# Patient Record
Sex: FEMALE | Race: WHITE | Employment: UNEMPLOYED | URBAN - METROPOLITAN AREA
[De-identification: names, ages, dates, MRNs, and addresses within clinical notes are randomized per-mention and may not be internally consistent; named-entity substitution may affect disease eponyms.]

---

## 2018-04-09 ENCOUNTER — MEDICAL CORRESPONDENCE (OUTPATIENT)
Dept: TRANSPLANT | Facility: CLINIC | Age: 28
End: 2018-04-09

## 2018-04-09 ENCOUNTER — OFFICE VISIT (OUTPATIENT)
Dept: TRANSPLANT | Facility: CLINIC | Age: 28
End: 2018-04-09
Attending: INTERNAL MEDICINE
Payer: COMMERCIAL

## 2018-04-09 VITALS
TEMPERATURE: 98.8 F | BODY MASS INDEX: 28.42 KG/M2 | RESPIRATION RATE: 16 BRPM | SYSTOLIC BLOOD PRESSURE: 122 MMHG | HEART RATE: 75 BPM | HEIGHT: 63 IN | OXYGEN SATURATION: 98 % | DIASTOLIC BLOOD PRESSURE: 74 MMHG | WEIGHT: 160.4 LBS

## 2018-04-09 DIAGNOSIS — Z71.9 VISIT FOR COUNSELING: Primary | ICD-10-CM

## 2018-04-09 DIAGNOSIS — C81.10 HODGKIN LYMPHOMA, NODULAR SCLEROSIS (H): Primary | ICD-10-CM

## 2018-04-09 DIAGNOSIS — C81.11 NODULAR SCLEROSIS HODGKIN LYMPHOMA OF LYMPH NODES OF NECK (H): Primary | ICD-10-CM

## 2018-04-09 PROCEDURE — 40000268 ZZH STATISTIC NO CHARGES: Mod: ZF

## 2018-04-09 PROCEDURE — G0463 HOSPITAL OUTPT CLINIC VISIT: HCPCS

## 2018-04-09 RX ORDER — IBUPROFEN 200 MG
200 TABLET ORAL EVERY 6 HOURS PRN
COMMUNITY
Start: 2013-12-11 | End: 2018-05-02

## 2018-04-09 RX ORDER — ACETAMINOPHEN 500 MG
TABLET ORAL EVERY 6 HOURS PRN
Status: ON HOLD | COMMUNITY
End: 2018-08-07

## 2018-04-09 RX ORDER — AMOXICILLIN 500 MG/1
500 CAPSULE ORAL
COMMUNITY
Start: 2018-04-06 | End: 2018-04-16

## 2018-04-09 ASSESSMENT — PAIN SCALES - GENERAL: PAINLEVEL: NO PAIN (0)

## 2018-04-09 NOTE — MR AVS SNAPSHOT
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  After hours call the   hospital at [phone] or   [phone]. Ask for the BMT   fellow on-call            Follow-ups after your visit        Who to contact     If you have questions or need follow up information about today's clinic visit or your schedule please contact [facility] BLOOD AND MARROW TRANSPLANT directly at [phone].  Normal or non-critical lab and imaging results will be communicated to you by Waveseerhart, letter or phone within 4 business days after the clinic has received the results. If you do not hear from us within 7 days, please contact the clinic through Mint Solutionst or phone. If you have a critical or abnormal lab result, we will notify you by phone as soon as possible.  Submit refill requests through [employer] or call your pharmacy and they will forward the refill request to us. Please allow 3 business days for your refill to be completed.          Additional Information About Your Visit        Waveseerhart Information     [employer] lets you send messages to your doctor, view your test results, renew your prescriptions, schedule appointments and more. To sign up, go to [email or web address] . Click on \"Log in\" on the left side of the screen, which will take you to the Welcome page. Then click on \"Sign up Now\" on the right side of the page. " "    You will be asked to enter the access code listed below, as well as some personal information. Please follow the directions to create your username and password.     Your access code is: [email or web address]  Expires: 2018  6:30 AM     Your access code will  in 90 days. If you need help or a new code, please call your [facility] clinic or [phone].        Care EveryWhere ID     This is your Care EveryWhere ID. This could be used by other organizations to access your [facility] medical records  [id number]        Your Vitals Were     Pulse Temperature Respirations Height Pulse Oximetry BMI (Body Mass Index)    75 98.8  F (37.1  C) (Oral) 16 1.6 m (5' 3\") 98% 28.41 kg/m2       Blood Pressure from Last 3 Encounters:   18 122/74    Weight from Last 3 Encounters:   18 72.8 kg (160 lb 6.4 oz)              Today, you had the following     No orders found for display       Recent Review Flowsheet Data     There is no flowsheet data to display.               Primary Care Provider Fax #    Physician No Ref-Primary [phone]       No address on file        Equal Access to Services     RICHIE RAMEY : Hadii velma Luis, wasarahda luamita, qaybta kaalmada adetima, jen raymundo. So [facility] [phone].    ATENCIÓN: Si habla español, tiene a isabel disposición servicios gratuitos de asistencia lingüística. Llame al [phone].    We comply with applicable federal civil rights laws and [place] laws. We do not discriminate on the basis of race, color, national origin, age, disability, sex, sexual orientation, or gender identity.            Thank you!     Thank you for choosing [facility] BLOOD AND MARROW TRANSPLANT  for your care. Our goal is always to provide you with excellent care. Hearing back from our patients is one way we can continue to improve our services. Please take a few minutes to complete the written survey that you may receive in the mail after your visit with " us. Thank you!             Your Updated Medication List - Protect others around you: Learn how to safely use, store and throw away your medicines at www.disposemymeds.org.          This list is accurate as of 4/9/18 11:59 PM.  Always use your most recent med list.                   Brand Name Dispense Instructions for use Diagnosis    acetaminophen 500 MG tablet    TYLENOL     Take 500-1,000 mg by mouth        amoxicillin 500 MG capsule    AMOXIL     Take 500 mg by mouth        ibuprofen 200 MG tablet    ADVIL/MOTRIN     Take 200 mg by mouth

## 2018-04-09 NOTE — MR AVS SNAPSHOT
After Visit Summary   4/9/2018    Amira Avery    MRN: [id number]           Patient Information     Date Of Birth          1990        Visit Information        Provider Department      4/9/2018 9:00 AM Coordinator, Nettie Bmt Nurse [facility] Blood and Marrow Transplant        Today's Diagnoses     Hodgkin lymphoma, nodular sclerosis (H)    -  1          Clinics and Surgery Center ([facility])  [address] [place] [address]  Phone: [phone]  Clinic Hours:   Monday-Thursday:7am to 7pm   Friday: 7am to 5pm   Weekends and holidays:    8am to noon (in general)  If your fever is 100.5  or greater,   call the clinic.  After hours call the   hospital at [phone] or   [phone]. Ask for the BMT   fellow on-call            Follow-ups after your visit        Your next 10 appointments already scheduled     Apr 20, 2018 12:30 PM CDT   Masonic Lab Draw with  MASONIC LAB DRAW   [facility] Masonic Lab Draw ([facility])    [address] [place] [address]  [address] [place] [id number] [phone]            Apr 20, 2018  1:00 PM CDT   Bone Marrow Biopsy with  BMT WES #3, UU BONE MARROW BIOPSY   [facility] Blood and Marrow Transplant ([facility])    [address] [place] [address]  [address]  [facility] [address]   [phone]            Apr 20, 2018  2:00 PM CDT   BMT Nurse Visit with  Bmt Nurse 1   [facility] Blood and Marrow Transplant ([facility])    [address] [place] [id number] [phone]            Apr 23, 2018  6:45 AM CDT   (Arrive by 6:30 AM)   PET ONCOLOGY WHOLE BODY with UUPET1   [facility] PET [facility] ([facility])    [address] [id number] [phone]           Tell your doctor:   If there is any chance you may be pregnant or if you are breastfeeding.   If you have problems lying in small spaces  (claustrophobia). If you do, your doctor may give you medicine to help you relax. If you have diabetes:   Have your exam early in the morning. Your blood glucose will go up as the day goes by.   Your glucose level must be 180 or less at the start of the exam. Please take any medicines you need to ensure this blood glucose level.   If you are taking insulin in the morning take with breakfast by 6 am and schedule procedure between 12 and 2:15 pm.   If you are taking insulin at night take nightly dose, fast overnight, schedule procedure before 10 am.   If you take insulin both morning and night take morning dose by 6am and schedule procedure between 12 and 2:15 pm.   24 hours before your scan: Don t do any heavy exercise. (No jogging, aerobics or other workouts.) Exercise will make your pictures less accurate.  At least 7 hours before your scan, or the evening before if you have an early appointment: Eat a low carb, high protein meal (Lean meats, seafood, beans, soy, low-fat dairy, eggs, nuts & seeds). 6 hours before your scan:   Stop all food and liquids (except water).   Do not chew gum or suck on mints.   If you need to take medicine with food, you may take it with a few crackers.  Please call your Imaging Department at your exam site with any questions.            Apr 23, 2018 11:30 AM CDT   FULL PULMONARY FUNCTION with  PFL A   [facility] Pulmonary Function Testing ([facility])    [address]  3rd Floor  [facility] [id number] [phone]            Apr 23, 2018 12:30 PM CDT   (Arrive by 12:15 PM)   New Patient Visit with MARIA TERESA Alston [facility] Interventional Radiology ([facility])    [address]  1st Floor  [facility] [id number] [phone]            Apr 23, 2018  2:00 PM CDT   BMT NURSE COORD WITH ROOM with  Bmt Nurse Coordinator,  2 116 CONSULT [facility] Blood and Marrow Transplant ([facility])    [address]  [address]  [facility] [id number] [phone]            Apr 24, 2018  9:00 AM CDT   (Arrive by 8:45 AM)   BMT SW PSA with MAT Kitchen   [facility] Blood and Marrow Transplant ([facility])    [address]  [address]  [facility] [id number] [phone]              Who to contact     If you have questions or need follow up information about today's clinic visit or your schedule please contact [facility] BLOOD AND MARROW TRANSPLANT directly at [phone].  Normal or non-critical lab and imaging results will be communicated to you by Private Practicehart, letter or phone within 4 business days after the clinic has received the results. If you do not hear from us within 7 days, please contact the clinic through Private Practicehart or phone. If you have a critical or abnormal lab result, we will notify you by phone as soon as possible.  Submit refill requests through [employer] or call your pharmacy and they will forward the refill request to us. Please allow 3 business days for your refill to be completed.          Additional Information About Your Visit        Private Practicehart Information     [employer] gives you secure access to your electronic health record. If you see a primary care provider, you can also send messages to your care team and make appointments. If you have questions, please call your primary care clinic.  If you do not have a primary care provider, please call [phone] and they will assist you.        Care EveryWhere ID     This is your Care EveryWhere ID. This could be used by other organizations to access your [facility] medical records  [id number]         Blood Pressure from Last 3 Encounters:   04/19/18 123/70   04/18/18 131/78   04/09/18 122/74    Weight from Last 3 Encounters:   04/19/18 70.6 kg (155 lb 10.3 oz)   04/18/18 70.9 kg (156 lb 6.4 oz)   04/09/18 72.8 kg (160 lb 6.4 oz)              Today, you had the following     No orders  found for display       Recent Review Flowsheet Data     BMT Recent Results Latest Ref Rng & Units 4/18/2018    WBC 4.0 - 11.0 10e9/L 5.0    Hemoglobin 11.7 - 15.7 g/dL 12.3    Platelet Count 150 - 450 10e9/L 268    Neutrophils (Absolute) 1.6 - 8.3 10e9/L 2.9    INR 0.86 - 1.14 1.06    Sodium 133 - 144 mmol/L 139    Potassium 3.4 - 5.3 mmol/L 4.0    Chloride 94 - 109 mmol/L 110(H)    Glucose 70 - 99 mg/dL 94    Urea Nitrogen 7 - 30 mg/dL 13    Creatinine 0.52 - 1.04 mg/dL 0.65    Calcium (Total) 8.5 - 10.1 mg/dL 8.7    Protein (Total) 6.8 - 8.8 g/dL 7.4    Albumin 3.4 - 5.0 g/dL 3.9    Alkaline Phosphatase 40 - 150 U/L 48    AST 0 - 45 U/L 53(H)    ALT 0 - 50 U/L 40    MCV 78 - 100 fl 90               Primary Care Provider Fax #    Physician No Ref-Primary [phone]       No address on file        Equal Access to Services     RICHIE RAMEY : Hadii velma Luis, samaria ceja, sweta mcgraw, jen de la cruz . So [facility] [phone].    ATENCIÓN: Si habla español, tiene a isabel disposición servicios gratuitos de asistencia lingüística. ame al [phone].    We comply with applicable federal civil rights laws and [place] laws. We do not discriminate on the basis of race, color, national origin, age, disability, sex, sexual orientation, or gender identity.            Thank you!     Thank you for choosing [facility] BLOOD AND MARROW TRANSPLANT  for your care. Our goal is always to provide you with excellent care. Hearing back from our patients is one way we can continue to improve our services. Please take a few minutes to complete the written survey that you may receive in the mail after your visit with us. Thank you!             Your Updated Medication List - Protect others around you: Learn how to safely use, store and throw away your medicines at www.Indexingemymeds.org.          This list is accurate as of 4/9/18 11:59 PM.  Always use your most recent med list.                    Brand Name Dispense Instructions for use Diagnosis    acetaminophen 500 MG tablet    TYLENOL     Take by mouth every 6 hours as needed        amoxicillin 500 MG capsule    AMOXIL     Take 500 mg by mouth        ibuprofen 200 MG tablet    ADVIL/MOTRIN     Take 200 mg by mouth every 6 hours as needed

## 2018-04-09 NOTE — PROGRESS NOTES
HISTORY OF PRESENT ILLNESS:  She is a 28 years old female diagnosed with classical Hodgkin's lymphoma, initially presented in 02/2007 with left neck mass.  Ultrasound-guided FNA was done in 03/2017, negative for malignancy, however, it continued.  Therefore on 04/19/2017, CT scan was done showing diffuse cervical lymphadenopathy left.  The maximum seemed to be around 2.5 x 2 cm in the mediastinum.  She had also lymphadenopathy in the left axilla, retroperitoneum and mediastinum.  The path confirmed this on 05/30/2017, extensive intense hypermetabolic lymphadenopathy in the neck, left axilla, mediastinum, but nothing below the diaphragm, followed by excisional left cervical lymphadenopathy with lymph nodes showing classical Hodgkin's lymphoma, nodular sclerosis type.  She was diagnosed stage IIB.  Although she has lost some weight at that time, ESR was high, greater than 50, IPI was score 1.  She received 6 cycles of ABVD between 06/09/2017 and 11/10/2017.  However, 11/09/2017 CT showed increasing minimal activity within poorly visualized but stable-appearing right cervical chain lymph nodes suggests mild progression of disease.  SUV was maximum of 4.7.  She was followed, and repeat PET/CT was repeated 01/09/2018.  There was new and increasing hypermetabolic right cervical adenopathy suspicious for disease progression.  The lymph nodes are persistent hypermetabolic activity in the tonsillar tissues with maximum 7.9, right jugulodigastric lymph nodes with SUV 4.0, posterior cervical chain node SUV 6, new 6 mm right posterior cervical chain, maximum SUV is 3.3, new 7 mm right supraclavicular node with maximum SUV of 4.7.  Chest, no abnormal hypermetabolic activity.  Abdomen, no abnormal hypermetabolic activity.  She also had on 02/05/2018 showed enlarged heterogeneous right jugular chain cervical lymph nodes in level 3 and 4 measuring approximately 1.9 x 2 x 2.6 cm and 2 x 1.8 x 2.2 cm are increased since neck CT from  04/21/2017, although may be similar to the PET/CT from 01/09/2018.  Resolution of previously enlarged left cervical lymph nodes on the CT compared to 04/21/2017.  Nasopharynx, oropharynx, tongue base, hypopharynx, larynx were normal, possible small sub-centimeter left thyroid nodule versus artifact, so she started on GDP 02/16/2018, and she had another PET scan 03/28/2018 showing that head and neck there is stable activity in the tonsillar tissue.  There are post-surgical changes in the right neck.  Hypermetabolic right cervical adenopathy in the prior study no longer demonstrated.  There are no new hypermetabolic lesions or lymph nodes in the neck.  No abnormal hypermetabolic activity in the abdomen.  Before starting GDP she underwent right neck excisional biopsy showing recurrent classical Hodgkin lymphoma, nodular-sclerosing.                 PMH: no DM, HTN ,organ issues inclusing liver, lung, heart  She tolerated chemos very well  No transfusion was needed    SH: Smoked, recently quit  occational ETOH  No illict drug  Works in "[employer]    ALl: [place], hives    A/P    1-HL, classic NS. SHe did not have good response after ABVD, but progressed quickly and received GDP, now S/P 2 cycles in PET/CR   I discussed about blood ceels, including WBC, RBC, and platelts. I noted their basic functions prevention of infection/immune system composition; prevention of bleeding, tiredness/CV/piulmoany issues due to o2 transfer.     I discussed hematopoietic stem cells as blood making mother cells present in bone marrow.     I dicussed each conventional chemotherapy effective for lymphoma also decreases/kills some stem cells, but after certain point of time stem cells regenerate to make enough CBC, therefore in cycles patients get following cycles.     In refractory/relapsed to ABVD type of HL, we increase intensity of chemotherapy to have reponses, she received GDP and if this is effective, then we increase the intensity  even higher doses to cre HL. This dose of chemotherapy though cannot be compatible with life due to to many stem cell killing. Therefore requires stem cells support.     I explained how we can collect stem cells     I explained how we store stem cells     I noted how we do stem cell transplant (5 days of chemo, followed by stem cell infusion in the hopsital, discharge follwed in the clinic every day.     Early complications of AHSCT; GIT toxicity (N/V/D, decreased appetite), pancytopenia (infections, bleeding, tiredness), acute organ toxicities (lung/liver/kidney). Mratality can occur but rare (1-2%)     Long-term potential toxicities: Blood cancers, secondary other cancers including skin, oral mucosa, cardiac toxicities (CAD would be much higher with radiation)   infertilty, she noted she heard about it even before chemo, but not interested in measures and understands infertility can be permanent especially after auoHCT.    Reason of AHSCT failures: mortality, progression of HD     I noted the differences between alloHCT and AHSCT; basically potentially more effective but certainly more toxic, depends on GVL effect. Therefore we can only consider if she relapses aftter autoHCT     Donor options: 1 sister and 1 brother in that case     Summary:  1-start pretransplant workup, stem cell colleciton and autoHCT; follwed by BV maintenance  2-ENT given her tonsillary PET activity in the past          I spent 70 minutes, 60 minutes in counselling  Tanya Landa MD

## 2018-04-09 NOTE — MR AVS SNAPSHOT
"              After Visit Summary   4/9/2018    Amira Avery    MRN: [id number]           Patient Information     Date Of Birth          1990        Visit Information        Provider Department      4/9/2018 9:30 AM Becky William LICSW;  2 116 CONSULT [facility] Blood and Marrow Transplant        Today's Diagnoses     Visit for counseling    -  1          [facility] ([facility])  [address] [place] [address]  Phone: [phone]  Clinic Hours:   Monday-Thursday:7am to 7pm   Friday: 7am to 5pm   Weekends and holidays:    8am to noon (in general)  If your fever is 100.5  or greater,   call the clinic.  After hours call the   hospital at [phone] or   [phone]. Ask for the BMT   fellow on-call            Follow-ups after your visit        Who to contact     If you have questions or need follow up information about today's clinic visit or your schedule please contact [facility] BLOOD AND MARROW TRANSPLANT directly at [phone].  Normal or non-critical lab and imaging results will be communicated to you by Digifeyehart, letter or phone within 4 business days after the clinic has received the results. If you do not hear from us within 7 days, please contact the clinic through Fik Storest or phone. If you have a critical or abnormal lab result, we will notify you by phone as soon as possible.  Submit refill requests through [employer] or call your pharmacy and they will forward the refill request to us. Please allow 3 business days for your refill to be completed.          Additional Information About Your Visit        Digifeyehart Information     [employer] lets you send messages to your doctor, view your test results, renew your prescriptions, schedule appointments and more. To sign up, go to [email or web address] . Click on \"Log in\" on the left side of the screen, which will take you to the Welcome page. Then click on \"Sign up Now\" on the right side of the page.     You will be " asked to enter the access code listed below, as well as some personal information. Please follow the directions to create your username and password.     Your access code is: [email or web address]  Expires: 2018  6:30 AM     Your access code will  in 90 days. If you need help or a new code, please call your [facility] clinic or [phone].        Care EveryWhere ID     This is your Care EveryWhere ID. This could be used by other organizations to access your [facility] medical records  [id number]         Blood Pressure from Last 3 Encounters:   18 122/74    Weight from Last 3 Encounters:   18 72.8 kg (160 lb 6.4 oz)              Today, you had the following     No orders found for display       Recent Review Flowsheet Data     There is no flowsheet data to display.               Primary Care Provider Fax #    Physician No Ref-Primary [phone]       No address on file        Equal Access to Services     RICHIE RAMEY : Hadii velma bowieo Soshar, waaxda luqadaha, qaybta kaalmada adeegyada, jen de la cruz . So [facility] [phone].    ATENCIÓN: Si habla español, tiene a isabel disposición servicios gratuitos de asistencia lingüística. Llame al [phone].    We comply with applicable federal civil rights laws and [place] laws. We do not discriminate on the basis of race, color, national origin, age, disability, sex, sexual orientation, or gender identity.            Thank you!     Thank you for choosing [facility] BLOOD AND MARROW TRANSPLANT  for your care. Our goal is always to provide you with excellent care. Hearing back from our patients is one way we can continue to improve our services. Please take a few minutes to complete the written survey that you may receive in the mail after your visit with us. Thank you!             Your Updated Medication List - Protect others around you: Learn how to safely use, store and throw away your medicines at www.disposemymeds.org.           This list is accurate as of 4/9/18 11:59 PM.  Always use your most recent med list.                   Brand Name Dispense Instructions for use Diagnosis    acetaminophen 500 MG tablet    TYLENOL     Take 500-1,000 mg by mouth        amoxicillin 500 MG capsule    AMOXIL     Take 500 mg by mouth        ibuprofen 200 MG tablet    ADVIL/MOTRIN     Take 200 mg by mouth

## 2018-04-09 NOTE — NURSING NOTE
"Oncology Rooming Note    April 9, 2018 8:00 AM   Amira Avery is a 28 year old female who presents for:    Chief Complaint   Patient presents with     RECHECK     New- Non Hodgkin's Lymphoma      Initial Vitals: /74 (BP Location: Right arm, Patient Position: Sitting, Cuff Size: Adult Regular)  Pulse 75  Temp 98.8  F (37.1  C) (Oral)  Resp 16  Ht 1.6 m (5' 3\")  Wt 72.8 kg (160 lb 6.4 oz)  SpO2 98%  BMI 28.41 kg/m2 Estimated body mass index is 28.41 kg/(m^2) as calculated from the following:    Height as of this encounter: 1.6 m (5' 3\").    Weight as of this encounter: 72.8 kg (160 lb 6.4 oz). Body surface area is 1.8 meters squared.  No Pain (0) Comment: Data Unavailable   No LMP recorded.  Allergies reviewed: Yes  Medications reviewed: Yes    Medications: Medication refills not needed today.  Pharmacy name entered into EPIC: Data Unavailable    Clinical concerns: N/A  5 minutes for nursing intake (face to face time)     Yue Montelongo CMA              "

## 2018-04-10 NOTE — PROGRESS NOTES
"Blood and Marrow Transplant   New Transplant Visit with   Clinical     Amira Mary Carmennes  4/10/2018    With whom do you live:   Significant other Dana    Relocation Requirement:   Amira lives 20 minutes / 9.6 miles from [facility] and will not be required to relocate post-transplant.    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 She is eligible for FMLA and is starting her leave on 4.16.18. She has no pay available if she is not working.     Source of Income: Significant other's income    Spouse/Partner Employed:   Yes     Do you have concerns or questions about finances or insurance related to BMT?:   Amira is aware of her deductible and Max OOP - she is also aware that both have been met for 2018. She was able to use [facility] at [facility] to help with the costs of medical care.     Have you completed a health care directive?: No - we talked about [facility] policy in the absence of a HCD her decision-makers would be her parents and then siblings.     Support:  Is there a network of people who are available to support you and/or your family?: Yes    Education Provided:   Caregiver Requirement/Role  BMT Packet provided  BMT Book provided  HCD information and blank document  [place]  Support resources  Description of Inpatient Unit    Comments: Amira comes to her NT appointment with her sister Felecia and her father Facundo. Amira endorsed that she had \"heard some information before\" that was shared with her today by BMT MD. She is aware that BMT MD would like her to come for Work-Up soon. Amira expressed interest in applying " for financial grants when she returns for Work-Up. She did endorsed that they have 2 pet snakes at home - they live in their cage and are not handled much - only when they clean the cages. Amira shared that she can plan on not cleaning their cages until BMT MD indicates that it is safe to do so. Writer shared that we will reach out to BMT MD for further clarification on the plan. Encouraged Amira and her family to reach out as questions or concerns arise.     Becky William  [phone] - Pager  4/10/2018

## 2018-04-16 ENCOUNTER — TELEPHONE (OUTPATIENT)
Dept: INTERNAL MEDICINE | Facility: CLINIC | Age: 28
End: 2018-04-16

## 2018-04-16 DIAGNOSIS — C81.90 HODGKIN'S LYMPHOMA (H): Primary | ICD-10-CM

## 2018-04-16 DIAGNOSIS — Z86.2 PERSONAL HISTORY OF DISEASES OF BLOOD AND BLOOD-FORMING ORGANS: ICD-10-CM

## 2018-04-16 DIAGNOSIS — C81.90 HODGKIN DISEASE (H): ICD-10-CM

## 2018-04-18 ENCOUNTER — RESULTS ONLY (OUTPATIENT)
Dept: OTHER | Facility: CLINIC | Age: 28
End: 2018-04-18

## 2018-04-18 ENCOUNTER — APPOINTMENT (OUTPATIENT)
Dept: LAB | Facility: CLINIC | Age: 28
End: 2018-04-18
Attending: INTERNAL MEDICINE
Payer: COMMERCIAL

## 2018-04-18 ENCOUNTER — OFFICE VISIT (OUTPATIENT)
Dept: TRANSPLANT | Facility: CLINIC | Age: 28
End: 2018-04-18
Attending: INTERNAL MEDICINE
Payer: COMMERCIAL

## 2018-04-18 ENCOUNTER — MEDICAL CORRESPONDENCE (OUTPATIENT)
Dept: TRANSPLANT | Facility: CLINIC | Age: 28
End: 2018-04-18

## 2018-04-18 ENCOUNTER — HOSPITAL ENCOUNTER (OUTPATIENT)
Dept: NUCLEAR MEDICINE | Facility: CLINIC | Age: 28
Setting detail: NUCLEAR MEDICINE
Discharge: HOME | End: 2018-04-18
Attending: INTERNAL MEDICINE | Admitting: INTERNAL MEDICINE
Payer: COMMERCIAL

## 2018-04-18 ENCOUNTER — HOSPITAL ENCOUNTER (OUTPATIENT)
Dept: GENERAL RADIOLOGY | Facility: CLINIC | Age: 28
Discharge: HOME | End: 2018-04-18
Attending: INTERNAL MEDICINE | Admitting: INTERNAL MEDICINE
Payer: COMMERCIAL

## 2018-04-18 VITALS
SYSTOLIC BLOOD PRESSURE: 131 MMHG | HEIGHT: 64 IN | HEART RATE: 73 BPM | WEIGHT: 156.4 LBS | BODY MASS INDEX: 26.7 KG/M2 | DIASTOLIC BLOOD PRESSURE: 78 MMHG | OXYGEN SATURATION: 96 % | TEMPERATURE: 97.6 F | RESPIRATION RATE: 18 BRPM

## 2018-04-18 DIAGNOSIS — Z86.2 PERSONAL HISTORY OF DISEASES OF BLOOD AND BLOOD-FORMING ORGANS: ICD-10-CM

## 2018-04-18 DIAGNOSIS — C81.90 HODGKIN DISEASE (H): ICD-10-CM

## 2018-04-18 LAB
ABO + RH BLD: NORMAL
ABO + RH BLD: NORMAL
ALBUMIN SERPL-MCNC: 3.9 G/DL (ref 3.4–5)
ALBUMIN UR-MCNC: NEGATIVE MG/DL
ALP SERPL-CCNC: 48 U/L (ref 40–150)
ALT SERPL W P-5'-P-CCNC: 40 U/L (ref 0–50)
ANION GAP SERPL CALCULATED.3IONS-SCNC: 8 MMOL/L (ref 3–14)
APPEARANCE UR: CLEAR
APTT PPP: 28 SEC (ref 22–37)
AST SERPL W P-5'-P-CCNC: 53 U/L (ref 0–45)
B2 MICROGLOB SERPL-MCNC: 1.6 MG/L
BASOPHILS # BLD AUTO: 0 10E9/L (ref 0–0.2)
BASOPHILS NFR BLD AUTO: 0.6 %
BILIRUB SERPL-MCNC: 0.6 MG/DL (ref 0.2–1.3)
BILIRUB UR QL STRIP: NEGATIVE
BLD GP AB SCN SERPL QL: NORMAL
BLOOD BANK CMNT PATIENT-IMP: NORMAL
BUN SERPL-MCNC: 13 MG/DL (ref 7–30)
CALCIUM SERPL-MCNC: 8.7 MG/DL (ref 8.5–10.1)
CHLORIDE SERPL-SCNC: 110 MMOL/L (ref 94–109)
CO2 SERPL-SCNC: 21 MMOL/L (ref 20–32)
COLOR UR AUTO: ABNORMAL
CREAT SERPL-MCNC: 0.65 MG/DL (ref 0.52–1.04)
DIFFERENTIAL METHOD BLD: NORMAL
EOSINOPHIL # BLD AUTO: 0 10E9/L (ref 0–0.7)
EOSINOPHIL NFR BLD AUTO: 0.8 %
ERYTHROCYTE [DISTWIDTH] IN BLOOD BY AUTOMATED COUNT: 14.8 % (ref 10–15)
GFR SERPL CREATININE-BSD FRML MDRD: >90 ML/MIN/1.7M2
GLUCOSE SERPL-MCNC: 94 MG/DL (ref 70–99)
GLUCOSE UR STRIP-MCNC: NEGATIVE MG/DL
HCG SERPL QL: NEGATIVE
HCT VFR BLD AUTO: 36 % (ref 35–47)
HGB BLD-MCNC: 12.3 G/DL (ref 11.7–15.7)
HGB UR QL STRIP: NEGATIVE
IMM GRANULOCYTES # BLD: 0 10E9/L (ref 0–0.4)
IMM GRANULOCYTES NFR BLD: 0.4 %
INR PPP: 1.06 (ref 0.86–1.14)
KETONES UR STRIP-MCNC: NEGATIVE MG/DL
LDH SERPL L TO P-CCNC: 167 U/L (ref 81–234)
LEUKOCYTE ESTERASE UR QL STRIP: NEGATIVE
LYMPHOCYTES # BLD AUTO: 1.7 10E9/L (ref 0.8–5.3)
LYMPHOCYTES NFR BLD AUTO: 34.1 %
MCH RBC QN AUTO: 30.8 PG (ref 26.5–33)
MCHC RBC AUTO-ENTMCNC: 34.2 G/DL (ref 31.5–36.5)
MCV RBC AUTO: 90 FL (ref 78–100)
MONOCYTES # BLD AUTO: 0.3 10E9/L (ref 0–1.3)
MONOCYTES NFR BLD AUTO: 6 %
NEUTROPHILS # BLD AUTO: 2.9 10E9/L (ref 1.6–8.3)
NEUTROPHILS NFR BLD AUTO: 58.1 %
NITRATE UR QL: NEGATIVE
NRBC # BLD AUTO: 0 10*3/UL
NRBC BLD AUTO-RTO: 0 /100
PH UR STRIP: 8 PH (ref 5–7)
PHOSPHATE SERPL-MCNC: 3.2 MG/DL (ref 2.5–4.5)
PLATELET # BLD AUTO: 268 10E9/L (ref 150–450)
POTASSIUM SERPL-SCNC: 4 MMOL/L (ref 3.4–5.3)
PROT SERPL-MCNC: 7.4 G/DL (ref 6.8–8.8)
RBC # BLD AUTO: 4 10E12/L (ref 3.8–5.2)
RBC #/AREA URNS AUTO: 0 /HPF (ref 0–2)
SODIUM SERPL-SCNC: 139 MMOL/L (ref 133–144)
SOURCE: ABNORMAL
SP GR UR STRIP: 1.01 (ref 1–1.03)
SPECIMEN EXP DATE BLD: NORMAL
T3FREE SERPL-MCNC: 2.8 PG/ML (ref 2.3–4.2)
T4 FREE SERPL-MCNC: 1.01 NG/DL (ref 0.76–1.46)
TSH SERPL DL<=0.005 MIU/L-ACNC: 0.9 MU/L (ref 0.4–4)
URATE SERPL-MCNC: 3.4 MG/DL (ref 2.6–6)
UROBILINOGEN UR STRIP-MCNC: 0 MG/DL (ref 0–2)
WBC # BLD AUTO: 5 10E9/L (ref 4–11)
WBC #/AREA URNS AUTO: <1 /HPF (ref 0–5)

## 2018-04-18 PROCEDURE — 86665 EPSTEIN-BARR CAPSID VCA: CPT | Performed by: INTERNAL MEDICINE

## 2018-04-18 PROCEDURE — 80053 COMPREHEN METABOLIC PANEL: CPT | Performed by: INTERNAL MEDICINE

## 2018-04-18 PROCEDURE — 86901 BLOOD TYPING SEROLOGIC RH(D): CPT | Performed by: INTERNAL MEDICINE

## 2018-04-18 PROCEDURE — 86644 CMV ANTIBODY: CPT | Performed by: INTERNAL MEDICINE

## 2018-04-18 PROCEDURE — 86753 PROTOZOA ANTIBODY NOS: CPT | Performed by: INTERNAL MEDICINE

## 2018-04-18 PROCEDURE — 86696 HERPES SIMPLEX TYPE 2 TEST: CPT | Performed by: INTERNAL MEDICINE

## 2018-04-18 PROCEDURE — 00000402 ZZHCL STATISTIC TOTAL PROTEIN: Performed by: INTERNAL MEDICINE

## 2018-04-18 PROCEDURE — 81001 URINALYSIS AUTO W/SCOPE: CPT | Performed by: INTERNAL MEDICINE

## 2018-04-18 PROCEDURE — 84439 ASSAY OF FREE THYROXINE: CPT | Performed by: INTERNAL MEDICINE

## 2018-04-18 PROCEDURE — 84443 ASSAY THYROID STIM HORMONE: CPT | Performed by: INTERNAL MEDICINE

## 2018-04-18 PROCEDURE — 85610 PROTHROMBIN TIME: CPT | Performed by: INTERNAL MEDICINE

## 2018-04-18 PROCEDURE — 85025 COMPLETE CBC W/AUTO DIFF WBC: CPT | Performed by: INTERNAL MEDICINE

## 2018-04-18 PROCEDURE — 82784 ASSAY IGA/IGD/IGG/IGM EACH: CPT | Performed by: INTERNAL MEDICINE

## 2018-04-18 PROCEDURE — 85730 THROMBOPLASTIN TIME PARTIAL: CPT | Performed by: INTERNAL MEDICINE

## 2018-04-18 PROCEDURE — 87516 HEPATITIS B DNA AMP PROBE: CPT | Mod: XU | Performed by: INTERNAL MEDICINE

## 2018-04-18 PROCEDURE — 87535 HIV-1 PROBE&REVERSE TRNSCRPJ: CPT | Mod: XU | Performed by: INTERNAL MEDICINE

## 2018-04-18 PROCEDURE — 78472 GATED HEART PLANAR SINGLE: CPT

## 2018-04-18 PROCEDURE — 84550 ASSAY OF BLOOD/URIC ACID: CPT | Performed by: INTERNAL MEDICINE

## 2018-04-18 PROCEDURE — 71046 X-RAY EXAM CHEST 2 VIEWS: CPT

## 2018-04-18 PROCEDURE — 84100 ASSAY OF PHOSPHORUS: CPT | Performed by: INTERNAL MEDICINE

## 2018-04-18 PROCEDURE — 86803 HEPATITIS C AB TEST: CPT | Performed by: INTERNAL MEDICINE

## 2018-04-18 PROCEDURE — A9560 TC99M LABELED RBC: HCPCS | Performed by: INTERNAL MEDICINE

## 2018-04-18 PROCEDURE — 81370 HLA I & II TYPING LR: CPT | Performed by: INTERNAL MEDICINE

## 2018-04-18 PROCEDURE — 87521 HEPATITIS C PROBE&RVRS TRNSC: CPT | Mod: XU | Performed by: INTERNAL MEDICINE

## 2018-04-18 PROCEDURE — 86334 IMMUNOFIX E-PHORESIS SERUM: CPT | Performed by: INTERNAL MEDICINE

## 2018-04-18 PROCEDURE — 84481 FREE ASSAY (FT-3): CPT | Performed by: INTERNAL MEDICINE

## 2018-04-18 PROCEDURE — G0463 HOSPITAL OUTPT CLINIC VISIT: HCPCS | Mod: 25,ZF

## 2018-04-18 PROCEDURE — 87340 HEPATITIS B SURFACE AG IA: CPT | Performed by: INTERNAL MEDICINE

## 2018-04-18 PROCEDURE — 82232 ASSAY OF BETA-2 PROTEIN: CPT | Performed by: INTERNAL MEDICINE

## 2018-04-18 PROCEDURE — 86695 HERPES SIMPLEX TYPE 1 TEST: CPT | Performed by: INTERNAL MEDICINE

## 2018-04-18 PROCEDURE — 25000128 H RX IP 250 OP 636: Performed by: INTERNAL MEDICINE

## 2018-04-18 PROCEDURE — 87798 DETECT AGENT NOS DNA AMP: CPT | Performed by: INTERNAL MEDICINE

## 2018-04-18 PROCEDURE — 86687 HTLV-I ANTIBODY: CPT | Performed by: INTERNAL MEDICINE

## 2018-04-18 PROCEDURE — 86703 HIV-1/HIV-2 1 RESULT ANTBDY: CPT | Performed by: INTERNAL MEDICINE

## 2018-04-18 PROCEDURE — 83021 HEMOGLOBIN CHROMOTOGRAPHY: CPT | Performed by: INTERNAL MEDICINE

## 2018-04-18 PROCEDURE — 86900 BLOOD TYPING SEROLOGIC ABO: CPT | Performed by: INTERNAL MEDICINE

## 2018-04-18 PROCEDURE — 86780 TREPONEMA PALLIDUM: CPT | Performed by: INTERNAL MEDICINE

## 2018-04-18 PROCEDURE — 83615 LACTATE (LD) (LDH) ENZYME: CPT | Performed by: INTERNAL MEDICINE

## 2018-04-18 PROCEDURE — 84165 PROTEIN E-PHORESIS SERUM: CPT | Performed by: INTERNAL MEDICINE

## 2018-04-18 PROCEDURE — 84703 CHORIONIC GONADOTROPIN ASSAY: CPT | Performed by: INTERNAL MEDICINE

## 2018-04-18 PROCEDURE — 25000128 H RX IP 250 OP 636: Mod: ZF | Performed by: INTERNAL MEDICINE

## 2018-04-18 PROCEDURE — 34300033 ZZH RX 343: Performed by: INTERNAL MEDICINE

## 2018-04-18 PROCEDURE — 86704 HEP B CORE ANTIBODY TOTAL: CPT | Performed by: INTERNAL MEDICINE

## 2018-04-18 PROCEDURE — 86850 RBC ANTIBODY SCREEN: CPT | Performed by: INTERNAL MEDICINE

## 2018-04-18 PROCEDURE — 81376 HLA II TYPING 1 LOCUS LR: CPT | Performed by: INTERNAL MEDICINE

## 2018-04-18 PROCEDURE — 88240 CELL CRYOPRESERVE/STORAGE: CPT | Performed by: INTERNAL MEDICINE

## 2018-04-18 RX ORDER — HEPARIN SODIUM (PORCINE) LOCK FLUSH IV SOLN 100 UNIT/ML 100 UNIT/ML
5 SOLUTION INTRAVENOUS EVERY 8 HOURS
Status: DISCONTINUED | OUTPATIENT
Start: 2018-04-18 | End: 2018-04-18 | Stop reason: HOSPADM

## 2018-04-18 RX ORDER — HEPARIN SODIUM (PORCINE) LOCK FLUSH IV SOLN 100 UNIT/ML 100 UNIT/ML
500 SOLUTION INTRAVENOUS ONCE
Status: COMPLETED | OUTPATIENT
Start: 2018-04-18 | End: 2018-04-18

## 2018-04-18 RX ADMIN — Medication 30 MCI.: at 13:15

## 2018-04-18 RX ADMIN — SODIUM CHLORIDE, PRESERVATIVE FREE 500 UNITS: 5 INJECTION INTRAVENOUS at 13:48

## 2018-04-18 RX ADMIN — SODIUM CHLORIDE, PRESERVATIVE FREE 5 ML: 5 INJECTION INTRAVENOUS at 11:29

## 2018-04-18 ASSESSMENT — PAIN SCALES - GENERAL: PAINLEVEL: NO PAIN (0)

## 2018-04-18 NOTE — MR AVS SNAPSHOT
After Visit Summary   4/18/2018    Amira Avery    MRN: [id number]           Patient Information     Date Of Birth          1990        Visit Information        Provider Department      4/18/2018 10:00 AM 1, Nettie Bmt Nurse [facility] Blood and Marrow Transplant        Today's Diagnoses     Hodgkin disease (H)        Personal history of diseases of blood and blood-forming organs              [facility] ([facility]  [address] [place] [address]  Phone: [phone]  Clinic Hours:   Monday-Thursday:7am to 7pm   Friday: 7am to 5pm   Weekends and holidays:    8am to noon (in general)  If your fever is 100.5  or greater,   call the clinic.  After hours call the   hospital at [phone] or   [phone]. Ask for the BMT   fellow on-call            Follow-ups after your visit        Your next 10 appointments already scheduled     Apr 18, 2018 12:00 PM CDT   PHARM D CONSULT WITH ROOM with  Bmt Pharm D, RPH,  2 118 CONSULT RM   [facility] Blood and Marrow Transplant ([facility])    [address] [place] [address]  [address]  [facility] [id number] [phone]            Apr 18, 2018  1:45 PM CDT   (Arrive by 1:30 PM)   XR CHEST 2 VIEWS with UUXR3   [facility],  Radiology ([facility], [facility])    [address] [facility] [id number] [phone]           Please bring a list of your current medicines to your exam. (Include vitamins, minerals and over-thecounter medicines.) Leave your valuables at home.  Tell your doctor if there is a chance you may be pregnant.  You do not need to do anything special for this exam.            Apr 18, 2018  2:00 PM CDT   (Arrive by 1:45 PM)   NM HEART MUGA REST with UUNM2   [facility] ([facility], [facility])    [address] [facility] [id number] [phone]           Please bring a list of  your medicines to the exam. (Include vitamins, minerals and over-the-counter drugs.) You should wear comfortable clothes. Leave your valuables at home. Please bring related prior results and films.  Tell your doctor:   If you are breastfeeding or may be pregnant.   If you have had a barium test within the past 48 hours. Barium may change the results of certain exams.   If you think you may need sedation (medicine to help you relax).  You may eat and drink as normal.  Please call your Imaging Department at your exam site with any questions.            Apr 19, 2018  9:00 AM CDT   (Arrive by 8:45 AM)   PET ONCOLOGY WHOLE BODY with UUPET1   [facility] PET CT ([facility])    [address] [facility] [address] [phone]           Tell your doctor:   If there is any chance you may be pregnant or if you are breastfeeding.   If you have problems lying in small spaces (claustrophobia). If you do, your doctor may give you medicine to help you relax. If you have diabetes:   Have your exam early in the morning. Your blood glucose will go up as the day goes by.   Your glucose level must be 180 or less at the start of the exam. Please take any medicines you need to ensure this blood glucose level.   If you are taking insulin in the morning take with breakfast by 6 am and schedule procedure between 12 and 2:15 pm.   If you are taking insulin at night take nightly dose, fast overnight, schedule procedure before 10 am.   If you take insulin both morning and night take morning dose by 6am and schedule procedure between 12 and 2:15 pm.   24 hours before your scan: Don t do any heavy exercise. (No jogging, aerobics or other workouts.) Exercise will make your pictures less accurate.  At least 7 hours before your scan, or the evening before if you have an early appointment: Eat a low carb, high protein meal (Lean meats, seafood, beans, soy, low-fat dairy, eggs, nuts &  seeds). 6 hours before your scan:   Stop all food and liquids (except water).   Do not chew gum or suck on mints.   If you need to take medicine with food, you may take it with a few crackers.  Please call your Imaging Department at your exam site with any questions.            Apr 19, 2018 12:00 PM CDT   RESEARCH WITH ROOM with  Bmt Research Coordinator,  2 116 CONSULT [facility] Blood and Marrow Transplant ([facility])    [address] [place] [address]  [address]  [facility] [id number] [phone]            Apr 19, 2018  1:00 PM CDT   (Arrive by 12:45 PM)   Autologous Consultation with  APHERESIS RN8,  2 118 CONSULT [facility] [place] Apheresis ([facility])    [address] [facility]  [address]  [facility] [id number] [phone]            Apr 20, 2018 12:30 PM CDT   Masonic Lab Draw with  MASONIC LAB DRAW   [facility] Masonic Lab Draw ([facility])    [address] [place] [address]  [address]  [facility] [id number] [phone]              Who to contact     If you have questions or need follow up information about today's clinic visit or your schedule please contact [facility] BLOOD AND MARROW TRANSPLANT directly at [phone].  Normal or non-critical lab and imaging results will be communicated to you by Coopkanicshart, letter or phone within 4 business days after the clinic has received the results. If you do not hear from us within 7 days, please contact the clinic through Coopkanicshart or phone. If you have a critical or abnormal lab result, we will notify you by phone as soon as possible.  Submit refill requests through Enjoyor or call your pharmacy and they will forward the refill request to us. Please allow 3 business days for your refill to be completed.          Additional Information About Your Visit        Enjoyor Information     Enjoyor gives you secure access to your electronic health record. If you see a  "primary care provider, you can also send messages to your care team and make appointments. If you have questions, please call your primary care clinic.  If you do not have a primary care provider, please call [phone] and they will assist you.        Care EveryWhere ID     This is your Care EveryWhere ID. This could be used by other organizations to access your [facility] medical records  [id number]        Your Vitals Were     Pulse Temperature Respirations Height Pulse Oximetry BMI (Body Mass Index)    73 97.6  F (36.4  C) (Oral) 18 1.614 m (5' 3.54\") 96% 27.23 kg/m2       Blood Pressure from Last 3 Encounters:   04/18/18 131/78   04/09/18 122/74    Weight from Last 3 Encounters:   04/18/18 70.9 kg (156 lb 6.4 oz)   04/09/18 72.8 kg (160 lb 6.4 oz)              We Performed the Following     ABO/Rh type and screen     Auto BMR Freeze     Beta 2 microglobuline     BMT Infectious Disease Donor Panel- SEND TO [facility]     CBC with platelets differential     Comprehensive metabolic panel     EBV Capsid Antibody IgG     HBV HCV HIV WNV by NIEVES- [facility]END [facility]     Hemoglobin S     Herpes Simplex Virus 1 and 2 IgG     HLA Typing Complete BMT Recipient     INR     Lactate Dehydrogenase     Order if  female with child bearing potential: HCG qualitative     Order if 2004-24: Protein electrophoresis     Partial thromboplastin time     Phosphorus     Protein Immunofixation Serum     Routine UA with microscopic     T3 Free     T4 free     TSH     Uric acid        Recent Review Flowsheet Data     BMT Recent Results Latest Ref Rng & Units 4/18/2018    WBC 4.0 - 11.0 10e9/L 5.0    Hemoglobin 11.7 - 15.7 g/dL 12.3    Platelet Count 150 - 450 10e9/L 268    Neutrophils (Absolute) 1.6 - 8.3 10e9/L 2.9    INR 0.86 - 1.14 1.06    MCV 78 - 100 fl 90               Primary Care Provider Fax #    Physician No Ref-Primary [phone]       No address on file        Equal Access to Services     IRCHIE RAMEY AH: Hadii " velma Luis, samaria pazkatherineha, qadarellta kadamon kirktima, waxleonardo idiin haytrishgeorge schillinghernáncolumba ramosBrycekevin zackary. So [facility] [phone].    ATENCIÓN: Si habla español, tiene a isabel disposición servicios gratuitos de asistencia lingüística. Llame al [phone].    We comply with applicable federal civil rights laws and [place] laws. We do not discriminate on the basis of race, color, national origin, age, disability, sex, sexual orientation, or gender identity.            Thank you!     Thank you for choosing [facility] BLOOD AND MARROW TRANSPLANT  for your care. Our goal is always to provide you with excellent care. Hearing back from our patients is one way we can continue to improve our services. Please take a few minutes to complete the written survey that you may receive in the mail after your visit with us. Thank you!             Your Updated Medication List - Protect others around you: Learn how to safely use, store and throw away your medicines at www.disposemymeds.org.          This list is accurate as of 4/18/18 11:48 AM.  Always use your most recent med list.                   Brand Name Dispense Instructions for use Diagnosis    acetaminophen 500 MG tablet    TYLENOL     Take 500-1,000 mg by mouth        ibuprofen 200 MG tablet    ADVIL/MOTRIN     Take 200 mg by mouth

## 2018-04-18 NOTE — NURSING NOTE
"Oncology Rooming Note    April 18, 2018 11:43 AM   Amira Avery is a 28 year old female who presents for:    Chief Complaint   Patient presents with     RECHECK     workup calendar review, consents,labs, med review, pre BMT for Hodgkins      Initial Vitals: /78  Pulse 73  Temp 97.6  F (36.4  C) (Oral)  Resp 18  Ht 1.614 m (5' 3.54\")  Wt 70.9 kg (156 lb 6.4 oz)  SpO2 96%  BMI 27.23 kg/m2 Estimated body mass index is 27.23 kg/(m^2) as calculated from the following:    Height as of this encounter: 1.614 m (5' 3.54\").    Weight as of this encounter: 70.9 kg (156 lb 6.4 oz). Body surface area is 1.78 meters squared.  No Pain (0) Comment: Data Unavailable   No LMP recorded.  Allergies reviewed: Yes  Medications reviewed: Yes    Medications: Medication refills not needed today.  Pharmacy name entered into EPIC: Data Unavailable    Clinical concerns: none     45 minutes for nursing intake (face to face time)     SERGO WINKLER RN              "

## 2018-04-18 NOTE — PROGRESS NOTES
BMT Teaching Flowsheet  .workup      Amira Avery is a 28 year old female  Diagnoses of Hodgkin disease (H) and Personal history of diseases of blood and blood-forming organs were pertinent to this visit.    Teaching Topic: work-up instructions    Person(s) involved in teaching: Patient, sister  Motivation Level  Asks Questions: Yes  Eager to Learn: Yes  Cooperative: Yes  Receptive (willing/able to accept information): Yes  Any cultural factors/[place] beliefs that may influence understanding or compliance? No    Patient demonstrates understanding of the following:  - Reason for the appointment, diagnosis and treatment plan: Yes  - Knowledge of proper use of medications and conditions for which they are ordered (with special attention to potential side effects or drug interactions): Yes  - Which situations necessitate calling provider and whom to contact: Yes    Teaching concerns addressed: Reviewed calendar, explained unfamiliar procedures, instructed patients on location of tests.    Proper use and care of (medical equipment, care aids, etc.) NA  Pain management techniques: NA  Patient instructed on hand hygiene: NA  How and/when to access community resources: NA    Infection Control:  Patient demonstrates understanding of the following:  Surgical procedure site care taught NA  Signs and symptoms of infection taught Yes  Wound care taught NA  Central venous catheter care taught NA    Instructional Materials Used/Given: Work up calendar, verbal instruction    Patient here for first day of workup for Auto transplant for Hodgkins disease.  She reports feeling well and offers no complaints.  Her workup calendar was reviewed and unfamiliar tests were explained.  The patient verbalized understanding of info and all questions were answered. All consents were signed prior to labs.  Her portacath was accessed by this RN and labs drawn.  Port was flushed with heparin and left in place for use during her MUGA  scan later today.  A urine sample was collected while in clinic today and sent to lab.  The patient was discharged ambulatory in the care of her sister.      Time spent with patient: 45 minutes.    Specific Concerns: NA

## 2018-04-18 NOTE — MR AVS SNAPSHOT
After Visit Summary   4/18/2018    Amira Avery    MRN: [id number]           Patient Information     Date Of Birth          1990        Visit Information        Provider Department      4/18/2018 12:00 PM D, Nettie Bmt Pharm, RPH;  2 118 CONSULT [facility] Blood and Marrow Transplant        Today's Diagnoses     Hodgkin disease (H)        Personal history of diseases of blood and blood-forming organs              [facility] ([facility])  [address] [place] [address]  Phone: [phone]  Clinic Hours:   Monday-Thursday:7am to 7pm   Friday: 7am to 5pm   Weekends and holidays:    8am to noon (in general)  If your fever is 100.5  or greater,   call the clinic.  After hours call the   hospital at [phone] or   [phone]. Ask for the BMT   fellow on-call            Follow-ups after your visit        Your next 10 appointments already scheduled     Apr 19, 2018 12:00 PM CDT   RESEARCH WITH ROOM with  Bmt Research Coordinator,  2 116 CONSULT [facility] Blood and Marrow Transplant ([facility])    [address] [place] [address]  [address]  [facility] [id number] [phone]            Apr 19, 2018  1:00 PM CDT   (Arrive by 12:45 PM)   Autologous Consultation with  APHERESIS RN8,  2 118 CONSULT [facility] Apheresis ([facility])    [address] [place] [address]  [address]  [facility] [id number] [phone]            Apr 20, 2018 12:30 PM CDT   Masonic Lab Draw with  MASONIC LAB DRAW   [facility] Masonic Lab Draw ([facility])    [address] [place] [address]  [address]  [facility] [id number] [phone]            Apr 20, 2018  1:00 PM CDT   Bone Marrow Biopsy with  BMT WES #3, UU BONE MARROW BIOPSY   [facility] Blood and Marrow Transplant ([facility])    [address] [place] [address]  [address]  [facility] [id number] [phone]             Apr 20, 2018  2:00 PM CDT   BMT Nurse Visit with  Bmt Nurse 1   [facility] Blood and Marrow Transplant ([facility])    [address] [facility]  [address]  [facility] [id number] [phone]            Apr 23, 2018  6:45 AM CDT   (Arrive by 6:30 AM)   PET ONCOLOGY WHOLE BODY with UUPET1   [facility] PET CT ([facility])    [address] [facility] [id number] [phone]           Tell your doctor:   If there is any chance you may be pregnant or if you are breastfeeding.   If you have problems lying in small spaces (claustrophobia). If you do, your doctor may give you medicine to help you relax. If you have diabetes:   Have your exam early in the morning. Your blood glucose will go up as the day goes by.   Your glucose level must be 180 or less at the start of the exam. Please take any medicines you need to ensure this blood glucose level.   If you are taking insulin in the morning take with breakfast by 6 am and schedule procedure between 12 and 2:15 pm.   If you are taking insulin at night take nightly dose, fast overnight, schedule procedure before 10 am.   If you take insulin both morning and night take morning dose by 6am and schedule procedure between 12 and 2:15 pm.   24 hours before your scan: Don t do any heavy exercise. (No jogging, aerobics or other workouts.) Exercise will make your pictures less accurate.  At least 7 hours before your scan, or the evening before if you have an early appointment: Eat a low carb, high protein meal (Lean meats, seafood, beans, soy, low-fat dairy, eggs, nuts & seeds). 6 hours before your scan:   Stop all food and liquids (except water).   Do not chew gum or suck on mints.   If you need to take medicine with food, you may take it with a few crackers.  Please call your Imaging Department at your exam site with any questions.            Apr 23, 2018 11:30 AM CDT   FULL PULMONARY FUNCTION  with  PFL A   [facility] Pulmonary Function Testing ([facility])    [address] [facility]  3rd Floor  [facility] [address] [phone]              Who to contact     If you have questions or need follow up information about today's clinic visit or your schedule please contact [facility] BLOOD AND MARROW TRANSPLANT directly at [phone].  Normal or non-critical lab and imaging results will be communicated to you by CIBDOhart, letter or phone within 4 business days after the clinic has received the results. If you do not hear from us within 7 days, please contact the clinic through CIBDOhart or phone. If you have a critical or abnormal lab result, we will notify you by phone as soon as possible.  Submit refill requests through Quitbit or call your pharmacy and they will forward the refill request to us. Please allow 3 business days for your refill to be completed.          Additional Information About Your Visit        CIBDOhart Information     Quitbit gives you secure access to your electronic health record. If you see a primary care provider, you can also send messages to your care team and make appointments. If you have questions, please call your primary care clinic.  If you do not have a primary care provider, please call [phone] and they will assist you.        Care EveryWhere ID     This is your Care EveryWhere ID. This could be used by other organizations to access your [facility] medical records  [id number]         Blood Pressure from Last 3 Encounters:   04/18/18 131/78   04/09/18 122/74    Weight from Last 3 Encounters:   04/18/18 70.9 kg (156 lb 6.4 oz)   04/09/18 72.8 kg (160 lb 6.4 oz)              We Performed the Following     Consult with PharmD        Recent Review Flowsheet Data     BMT Recent Results Latest Ref Rng & Units 4/18/2018    WBC 4.0 - 11.0 10e9/L 5.0    Hemoglobin 11.7 - 15.7 g/dL 12.3    Platelet Count 150 - 450 10e9/L 268    Neutrophils (Absolute) 1.6 -  8.3 10e9/L 2.9    INR 0.86 - 1.14 1.06    Sodium 133 - 144 mmol/L 139    Potassium 3.4 - 5.3 mmol/L 4.0    Chloride 94 - 109 mmol/L 110(H)    Glucose 70 - 99 mg/dL 94    Urea Nitrogen 7 - 30 mg/dL 13    Creatinine 0.52 - 1.04 mg/dL 0.65    Calcium (Total) 8.5 - 10.1 mg/dL 8.7    Protein (Total) 6.8 - 8.8 g/dL 7.4    Albumin 3.4 - 5.0 g/dL 3.9    Alkaline Phosphatase 40 - 150 U/L 48    AST 0 - 45 U/L 53(H)    ALT 0 - 50 U/L 40    MCV 78 - 100 fl 90               Primary Care Provider Fax #    Physician No Ref-Primary [phone]       No address on file        Equal Access to Services     RICHIE RAMEY : Alexis Luis, samaria ceja, sweta mcgraw, jen de la cruz . So [facility] [phone].    ATENCIÓN: Si habla español, tiene a isabel disposición servicios gratuitos de asistencia lingüística. [facility] [phone].    We comply with applicable federal civil rights laws and [place] laws. We do not discriminate on the basis of race, color, national origin, age, disability, sex, sexual orientation, or gender identity.            Thank you!     Thank you for choosing [facility] BLOOD AND MARROW TRANSPLANT  for your care. Our goal is always to provide you with excellent care. Hearing back from our patients is one way we can continue to improve our services. Please take a few minutes to complete the written survey that you may receive in the mail after your visit with us. Thank you!             Your Updated Medication List - Protect others around you: Learn how to safely use, store and throw away your medicines at www.disposemymeds.org.          This list is accurate as of 4/18/18 11:59 PM.  Always use your most recent med list.                   Brand Name Dispense Instructions for use Diagnosis    acetaminophen 500 MG tablet    TYLENOL     Take by mouth every 6 hours as needed        ibuprofen 200 MG tablet    ADVIL/MOTRIN     Take 200 mg by mouth every 6 hours as needed

## 2018-04-19 ENCOUNTER — HOSPITAL ENCOUNTER (OUTPATIENT)
Dept: LAB | Facility: CLINIC | Age: 28
Discharge: HOME | End: 2018-04-19
Attending: INTERNAL MEDICINE | Admitting: INTERNAL MEDICINE
Payer: COMMERCIAL

## 2018-04-19 ENCOUNTER — ALLIED HEALTH/NURSE VISIT (OUTPATIENT)
Dept: TRANSPLANT | Facility: CLINIC | Age: 28
End: 2018-04-19
Attending: INTERNAL MEDICINE
Payer: COMMERCIAL

## 2018-04-19 VITALS
BODY MASS INDEX: 27.1 KG/M2 | SYSTOLIC BLOOD PRESSURE: 123 MMHG | TEMPERATURE: 97.5 F | WEIGHT: 155.65 LBS | HEART RATE: 61 BPM | RESPIRATION RATE: 16 BRPM | DIASTOLIC BLOOD PRESSURE: 70 MMHG

## 2018-04-19 DIAGNOSIS — C81.11 NODULAR SCLEROSIS HODGKIN LYMPHOMA OF LYMPH NODES OF NECK (H): Primary | ICD-10-CM

## 2018-04-19 LAB
ALBUMIN SERPL ELPH-MCNC: 4.4 G/DL (ref 3.7–5.1)
ALPHA1 GLOB SERPL ELPH-MCNC: 0.3 G/DL (ref 0.2–0.4)
ALPHA2 GLOB SERPL ELPH-MCNC: 0.7 G/DL (ref 0.5–0.9)
AUTO BMR FREEZE: NORMAL
B-GLOBULIN SERPL ELPH-MCNC: 0.8 G/DL (ref 0.6–1)
EBV VCA IGG SER QL IA: 6.6 AI (ref 0–0.8)
GAMMA GLOB SERPL ELPH-MCNC: 1 G/DL (ref 0.7–1.6)
HLA TYPING COMPLETE BMT RECIPIENT: NORMAL
HSV1 IGG SERPL QL IA: 4.8 AI (ref 0–0.8)
HSV2 IGG SERPL QL IA: <0.2 AI (ref 0–0.8)
IGA SERPL-MCNC: 298 MG/DL (ref 70–380)
IGG SERPL-MCNC: 986 MG/DL (ref 695–1620)
IGM SERPL-MCNC: 91 MG/DL (ref 60–265)
M PROTEIN SERPL ELPH-MCNC: 0 G/DL
PROT PATTERN SERPL ELPH-IMP: NORMAL
PROT PATTERN SERPL IFE-IMP: NORMAL

## 2018-04-19 PROCEDURE — G0463 HOSPITAL OUTPT CLINIC VISIT: HCPCS | Mod: ZF

## 2018-04-19 NOTE — CONSULTS
APHERESIS INITIAL CONSULT CHECKLIST    Current Encounter Information  Current Encounter Information: Reason for Visit, Allergies and Current Meds  Procedure Requested: MNC/PBSC Collection  History of: (Reason for Apheresis): Hodgkins Lymphoma    Access Assessment  Access Assessment  Vein Assessment:  Veins are adequate: No  Needs a catheter placed for Apheresis?: Yes, transfusion medicine physician informed.    Vital Signs  Vital Signs  BP: 123/70  Pulse: 61  Temp: 97.5  F (36.4  C)  Temp src: Oral  Resp: 16  Height:  (161.4cm)  Weight: 70.6 kg (155 lb 10.3 oz)    Reviewed   Review With Patient  Have you read the brochure Getting ready for Apheresis?: Yes  Have you had any invasive procedures, surgery, biopsy, bleeding in the last month?: No  Review medications and allergies: Yes  Have you ever been transfused?: No  Patient given tour of the unit: Yes    Additional Information  Notes, needs and time spent with patient  Explain procedure, side effects or reactions, instructions: Yes  Time spent: 20 MINUTES SPENT FACE TO FACE FOR MEDICAL HISTORY REVIEW.

## 2018-04-19 NOTE — CONSULTS
Transfusion Medicine Consultation    Amira Avery [id number]   YOB: 1990 Age: 28 year old   Date of Consult: 4/19/2018     Reason for consult: Autologous Hematopoietic Progenitor Cell (HPC) Collection           Assessment and Plan:   28 year old female presents for consultation for autologous HPC collection for classical Hodgkins lymphoma (NS type) originally diagnosed 5/2017 and resistance to chemotherapy x2.  The plan is to collect for 1 to 3 days or until the target goal is met.   A central line will be placed and will be used for access for the procedure.          Chief Complaint:   Transfusion medicine consultation.         History of Present Illness:   28 year old female presents for consultation for autologous HPC collection.  Her past medical history includes classical Hodgkin's lymphoma, nodular sclerosing type.  She is currently well.  The patient denies any back pain that would prevent her from tolerating the procedure.  The patient has not had a recent flu vaccination.  No significant travel history.  The patient has no identifiable risk factors for infectious disease.  The procedure, risks/benefits were discussed with the patient and all of her questions were addressed at this time.             Past Medical History:   I have reviewed this patient's past medical history. Had mononucleosis at age 17 that required 5 days in the hospital. Otherwise non-contributory.          Past Surgical History:   This patient has no significant past surgical history           Social History:     Social History   Substance Use Topics     Smoking status: Former Smoker     Smokeless tobacco: Never Used     Alcohol use No     Former pre-K teacher. On leave. Lives in [place] with her girlfriend (now firichie)          Family History:   Father has hypertension. Maternal grandmother (early 70s) has had two heart attacks and colon cancer (dxg aged 65)          Immunizations:   Immunizations are up to  date          Allergies:   All allergies reviewed and addressed. (Hives with morphine; responds to diphenhydramine.)          Medications:   I have reviewed this patient's current medications          Review of Systems:   The 10 point Review of Systems is negative other than noted in the HPI           Vital Signs:   /70  Pulse 61  Temp 97.5  F (36.4  C) (Oral)  Resp 16  Wt 70.6 kg (155 lb 10.3 oz)  BMI 27.1 kg/m2            Data:     Lab Results   Component Value Date    WBC 5.0 04/18/2018    HGB 12.3 04/18/2018    HCT 36.0 04/18/2018     04/18/2018     04/18/2018    POTASSIUM 4.0 04/18/2018    CHLORIDE 110 (H) 04/18/2018    CO2 21 04/18/2018    BUN 13 04/18/2018    CR 0.65 04/18/2018    GLC 94 04/18/2018    AST 53 (H) 04/18/2018    ALT 40 04/18/2018    ALKPHOS 48 04/18/2018    BILITOTAL 0.6 04/18/2018    INR 1.06 04/18/2018     Ash Nuno MD, PhD  Clinical Pathology Resident (PGY2) - Transfusion Medicine - pager: [phone]  4/19/2018

## 2018-04-19 NOTE — MR AVS SNAPSHOT
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  After hours call the   hospital at [phone] or   [phone]. Ask for the BMT   fellow on-call            Follow-ups after your visit        Your next 10 appointments already scheduled     Apr 20, 2018 12:30 PM CDT   Masonic Lab Draw with  MASONIC LAB DRAW   [facility] Masonic Lab Draw ([facility])    [address] [place] [address]  [address] [place] [id number] [phone]            Apr 20, 2018  1:00 PM CDT   Bone Marrow Biopsy with  BMT WES #3, UU BONE MARROW BIOPSY   [facility] Blood and Marrow Transplant ([facility])    [address] [place] [address]  [address] [place] [id number] [phone]            Apr 20, 2018  2:00 PM CDT   BMT Nurse Visit with  Bmt Nurse 1   [facility] Blood and Marrow Transplant ([facility])    [address] [place] [address]  [address] [place] [id number] [phone]            Apr 23, 2018  6:45 AM CDT   (Arrive by 6:30 AM)   PET ONCOLOGY WHOLE BODY with UUPET1   [facility] PET CT ([facility])    [address] [facility] [address] [id number] [phone]           Tell your doctor:   If there is any chance you may be pregnant or if you are breastfeeding.   If  you have problems lying in small spaces (claustrophobia). If you do, your doctor may give you medicine to help you relax. If you have diabetes:   Have your exam early in the morning. Your blood glucose will go up as the day goes by.   Your glucose level must be 180 or less at the start of the exam. Please take any medicines you need to ensure this blood glucose level.   If you are taking insulin in the morning take with breakfast by 6 am and schedule procedure between 12 and 2:15 pm.   If you are taking insulin at night take nightly dose, fast overnight, schedule procedure before 10 am.   If you take insulin both morning and night take morning dose by 6am and schedule procedure between 12 and 2:15 pm.   24 hours before your scan: Don t do any heavy exercise. (No jogging, aerobics or other workouts.) Exercise will make your pictures less accurate.  At least 7 hours before your scan, or the evening before if you have an early appointment: Eat a low carb, high protein meal (Lean meats, seafood, beans, soy, low-fat dairy, eggs, nuts & seeds). 6 hours before your scan:   Stop all food and liquids (except water).   Do not chew gum or suck on mints.   If you need to take medicine with food, you may take it with a few crackers.  Please call your Imaging Department at your exam site with any questions.            Apr 23, 2018 11:30 AM CDT   FULL PULMONARY FUNCTION with  PFL COLBY   [facility] Pulmonary Function Testing ([facility])    [address]  3rd Floor  [facility] [id number] [phone]            Apr 23, 2018 12:30 PM CDT   (Arrive by 12:15 PM)   New Patient Visit with MARIA TERESA [facility] Interventional Radiology ([facility])    [address]  1st Floor  [facility] [phone] [phone]            Apr 23, 2018  2:00 PM CDT   BMT NURSE COORD WITH ROOM with  Bmt Nurse Coordinator,  2 116 CONSULT [facility] Blood and  Marrow Transplant ([facility])    [address]  [address]  [facility] [phone]            Apr 24, 2018  9:00 AM CDT   (Arrive by 8:45 AM)   BMT SW PSA with MAT Kitchen   [facility] Blood and Marrow Transplant ([facility])    [address]  [address]  [facility] [id number] [phone]              Who to contact     If you have questions or need follow up information about today's clinic visit or your schedule please contact [facility] BLOOD AND MARROW TRANSPLANT directly at [phone].  Normal or non-critical lab and imaging results will be communicated to you by TrustRadiushart, letter or phone within 4 business days after the clinic has received the results. If you do not hear from us within 7 days, please contact the clinic through Filecoint or phone. If you have a critical or abnormal lab result, we will notify you by phone as soon as possible.  Submit refill requests through Coolest Cooler or call your pharmacy and they will forward the refill request to us. Please allow 3 business days for your refill to be completed.          Additional Information About Your Visit        TrustRadiusharDress Code Information     Coolest Cooler gives you secure access to your electronic health record. If you see a primary care provider, you can also send messages to your care team and make appointments. If you have questions, please call your primary care clinic.  If you do not have a primary care provider, please call [phone] and they will assist you.        Care EveryWhere ID     This is your Care EveryWhere ID. This could be used by other organizations to access your [facility] medical records  [id number]         Blood Pressure from Last 3 Encounters:   04/19/18 123/70   04/18/18 131/78   04/09/18 122/74    Weight from Last 3 Encounters:   04/19/18 70.6 kg (155 lb 10.3 oz)   04/18/18 70.9 kg (156 lb 6.4 oz)   04/09/18 72.8 kg (160 lb 6.4 oz)              Today,  you had the following     No orders found for display       Recent Review Flowsheet Data     BMT Recent Results Latest Ref Rng & Units 4/18/2018    WBC 4.0 - 11.0 10e9/L 5.0    Hemoglobin 11.7 - 15.7 g/dL 12.3    Platelet Count 150 - 450 10e9/L 268    Neutrophils (Absolute) 1.6 - 8.3 10e9/L 2.9    INR 0.86 - 1.14 1.06    Sodium 133 - 144 mmol/L 139    Potassium 3.4 - 5.3 mmol/L 4.0    Chloride 94 - 109 mmol/L 110(H)    Glucose 70 - 99 mg/dL 94    Urea Nitrogen 7 - 30 mg/dL 13    Creatinine 0.52 - 1.04 mg/dL 0.65    Calcium (Total) 8.5 - 10.1 mg/dL 8.7    Protein (Total) 6.8 - 8.8 g/dL 7.4    Albumin 3.4 - 5.0 g/dL 3.9    Alkaline Phosphatase 40 - 150 U/L 48    AST 0 - 45 U/L 53(H)    ALT 0 - 50 U/L 40    MCV 78 - 100 fl 90               Primary Care Provider Fax #    Physician No Ref-Primary [phone]       No address on file        Equal Access to Services     RICHIE RAMEY : Hadii velma Luis, wasarahda brenna, qaybta kaalmada mariluz, jen raymundo. So [facility] [phone].    ATENCIÓN: Si habla español, tiene a isabel disposición servicios gratuitos de asistencia lingüística. Llame al [phone].    We comply with applicable federal civil rights laws and [place] laws. We do not discriminate on the basis of race, color, national origin, age, disability, sex, sexual orientation, or gender identity.            Thank you!     Thank you for choosing [facility] BLOOD AND MARROW TRANSPLANT  for your care. Our goal is always to provide you with excellent care. Hearing back from our patients is one way we can continue to improve our services. Please take a few minutes to complete the written survey that you may receive in the mail after your visit with us. Thank you!             Your Updated Medication List - Protect others around you: Learn how to safely use, store and throw away your medicines at www.disposemymeds.org.          This list is accurate as of 4/19/18  3:39 PM.  Always  use your most recent med list.                   Brand Name Dispense Instructions for use Diagnosis    acetaminophen 500 MG tablet    TYLENOL     Take by mouth every 6 hours as needed        ibuprofen 200 MG tablet    ADVIL/MOTRIN     Take 200 mg by mouth every 6 hours as needed

## 2018-04-19 NOTE — PROGRESS NOTES
IRB #: [id number]  PI Phone/Pager: Fabio Mccrary/[phone]  Coordinator Phone/Pager: Senthil Jenkins, [phone]  Anticipated Dates of Participation: TBD by admission for BMT    Does the research protocol require services be billed to patients/insurance? no    Informed Consent     Senthil Jenkins met with patient Amira Avery to present consent for study MT [id number]: Microbial, Graft and Host Interactions in HCT. The patient was provided with a copy of the IRB-approved consent form, and all questions were answered before the patient agreed to participate by signing the informed consent document.  A copy of the form was provided to the patient. Patient consented to inpatient and outpatient arm of study.    Date:     4/19/18                                  Consent Version Date:   8/8/17  Consent Obtained by:  Senthil Jenkins  HIPAA:  Yes  HIPAA Authorization Signed Date:  4/19/18

## 2018-04-19 NOTE — PROGRESS NOTES
Pharmacy Assessment - Pre-Stem Cell Transplant    Assessments & Recommendations:  1) avoid olanzapine per patient request due to sedating effects  2) consider scheduled antiemetics for 1 week upon discharge given difficulty with nausea in the past      History of Present Illness:  Amira Avery is a 28 year old year old female diagnosed with hodgkins lymphoma.  She has been treated with ABVD and GDP.  She is now being work up for autologous Stem Cell Transplant on protocol 2016-11, which utilizes BEAM as a conditioning regimen.    Pertinent labs/tests:  Pending    Weights:   Wt Readings from Last 3 Encounters:   04/18/18 70.9 kg (156 lb 6.4 oz)   04/09/18 72.8 kg (160 lb 6.4 oz)   Ideal body weight: 53.6 kg (118 lb 4.4 oz)  Adjusted ideal body weight: 60.6 kg (133 lb 8.4 oz)    Primary BMT Physician: Dr Landa  BMT RN Coordinator:  Mary Hobson    Past Medical History:  No past medical history on file.    Medication Allergies:  Allergies   Allergen Reactions     Morphine Hives       Current Medications (pre-admit):  Current Outpatient Prescriptions   Medication Sig Dispense Refill     acetaminophen (TYLENOL) 500 MG tablet Take by mouth every 6 hours as needed        ibuprofen (ADVIL/MOTRIN) 200 MG tablet Take 200 mg by mouth every 6 hours as needed          Herbal Medication/Nutritional Supplements:      Smoking/Past Drug Use:  denies    Nausea/Vomiting, Pain, or other issues:  Not currently in pain or taking pain medicaitons  Has experienced nausea in the past, mostly with cisplatin    Summary:  I met with Amira Avery for approximately 30 minutes.  We discussed her current and upcoming transplant medication list including the conditioning chemotherapy and immunosuppression, antiemetics, prophylactic antibiotics and vaccinations. Amira Avery and her caregiver participated in our conversation and voiced their understanding of the topics discussed. Thank you for allowing me to participate  in the care of this patient.    Lobito Rosenberg, TanyaD

## 2018-04-19 NOTE — TELEPHONE ENCOUNTER
FUTURE VISIT INFORMATION      FUTURE VISIT INFORMATION:    Date: 4/25/18    Time: 8:20AM    Location: [facility]   REFERRAL INFORMATION:    Referring provider:  Tanya Landa MD    Referring providers clinic:   BMT    Reason for visit/diagnosis  Pre-BMT work up, evaluate tonsillary involvement    RECORDS REQUESTED FROM:       Clinic name Comments Records Status Imaging Status   [facility] B[facility] 4/9/18 visit notes with BMT EPIC                                    RECORDS STATUS

## 2018-04-20 ENCOUNTER — OFFICE VISIT (OUTPATIENT)
Dept: TRANSPLANT | Facility: CLINIC | Age: 28
End: 2018-04-20
Attending: INTERNAL MEDICINE
Payer: COMMERCIAL

## 2018-04-20 ENCOUNTER — OFFICE VISIT (OUTPATIENT)
Dept: TRANSPLANT | Facility: CLINIC | Age: 28
End: 2018-04-20
Attending: NURSE PRACTITIONER
Payer: COMMERCIAL

## 2018-04-20 VITALS
RESPIRATION RATE: 16 BRPM | OXYGEN SATURATION: 100 % | HEART RATE: 71 BPM | DIASTOLIC BLOOD PRESSURE: 74 MMHG | TEMPERATURE: 98 F | SYSTOLIC BLOOD PRESSURE: 121 MMHG

## 2018-04-20 DIAGNOSIS — C81.90 HODGKIN DISEASE (H): ICD-10-CM

## 2018-04-20 DIAGNOSIS — C81.10 NODULAR SCLEROSING HODGKIN'S LYMPHOMA, UNSPECIFIED BODY REGION (H): ICD-10-CM

## 2018-04-20 DIAGNOSIS — Z86.2 PERSONAL HISTORY OF DISEASES OF BLOOD AND BLOOD-FORMING ORGANS: Primary | ICD-10-CM

## 2018-04-20 DIAGNOSIS — Z86.2 PERSONAL HISTORY OF DISEASES OF BLOOD AND BLOOD-FORMING ORGANS: ICD-10-CM

## 2018-04-20 LAB
BASOPHILS # BLD AUTO: 0 10E9/L (ref 0–0.2)
BASOPHILS NFR BLD AUTO: 0.3 %
DIFFERENTIAL METHOD BLD: NORMAL
EOSINOPHIL # BLD AUTO: 0 10E9/L (ref 0–0.7)
EOSINOPHIL NFR BLD AUTO: 0.4 %
ERYTHROCYTE [DISTWIDTH] IN BLOOD BY AUTOMATED COUNT: 14.7 % (ref 10–15)
HCT VFR BLD AUTO: 36 % (ref 35–47)
HGB BLD-MCNC: 12.3 G/DL (ref 11.7–15.7)
IMM GRANULOCYTES # BLD: 0 10E9/L (ref 0–0.4)
IMM GRANULOCYTES NFR BLD: 0.3 %
LAB SCANNED RESULT: NORMAL
LYMPHOCYTES # BLD AUTO: 2.5 10E9/L (ref 0.8–5.3)
LYMPHOCYTES NFR BLD AUTO: 34.1 %
MCH RBC QN AUTO: 30.7 PG (ref 26.5–33)
MCHC RBC AUTO-ENTMCNC: 34.2 G/DL (ref 31.5–36.5)
MCV RBC AUTO: 90 FL (ref 78–100)
MONOCYTES # BLD AUTO: 0.3 10E9/L (ref 0–1.3)
MONOCYTES NFR BLD AUTO: 4.6 %
NEUTROPHILS # BLD AUTO: 4.3 10E9/L (ref 1.6–8.3)
NEUTROPHILS NFR BLD AUTO: 60.3 %
NRBC # BLD AUTO: 0 10*3/UL
NRBC BLD AUTO-RTO: 0 /100
PLATELET # BLD AUTO: 273 10E9/L (ref 150–450)
RBC # BLD AUTO: 4.01 10E12/L (ref 3.8–5.2)
WBC # BLD AUTO: 7.2 10E9/L (ref 4–11)

## 2018-04-20 PROCEDURE — 93005 ELECTROCARDIOGRAM TRACING: CPT | Mod: ZF

## 2018-04-20 PROCEDURE — 88275 CYTOGENETICS 100-300: CPT | Performed by: INTERNAL MEDICINE

## 2018-04-20 PROCEDURE — 40000795 ZZHCL STATISTIC DNA PROCESS AND HOLD: Performed by: INTERNAL MEDICINE

## 2018-04-20 PROCEDURE — 00000161 ZZHCL STATISTIC H-SPHEME PROCESS B/S: Performed by: INTERNAL MEDICINE

## 2018-04-20 PROCEDURE — 40000611 ZZHCL STATISTIC MORPHOLOGY W/INTERP HEMEPATH TC 85060: Performed by: INTERNAL MEDICINE

## 2018-04-20 PROCEDURE — 40000424 ZZHCL STATISTIC BONE MARROW CORE PERF TC 38221: Performed by: INTERNAL MEDICINE

## 2018-04-20 PROCEDURE — 88313 SPECIAL STAINS GROUP 2: CPT | Performed by: INTERNAL MEDICINE

## 2018-04-20 PROCEDURE — 40000951 ZZHCL STATISTIC BONE MARROW INTERP TC 85097: Performed by: INTERNAL MEDICINE

## 2018-04-20 PROCEDURE — 25000128 H RX IP 250 OP 636: Mod: ZF | Performed by: NURSE PRACTITIONER

## 2018-04-20 PROCEDURE — 00000346 ZZHCL STATISTIC REVIEW OUTSIDE SLIDES TC 88321: Performed by: INTERNAL MEDICINE

## 2018-04-20 PROCEDURE — 88237 TISSUE CULTURE BONE MARROW: CPT | Performed by: INTERNAL MEDICINE

## 2018-04-20 PROCEDURE — 88264 CHROMOSOME ANALYSIS 20-25: CPT | Performed by: INTERNAL MEDICINE

## 2018-04-20 PROCEDURE — 00000058 ZZHCL STATISTIC BONE MARROW ASP PERF TC 38220: Performed by: INTERNAL MEDICINE

## 2018-04-20 PROCEDURE — 88311 DECALCIFY TISSUE: CPT | Performed by: INTERNAL MEDICINE

## 2018-04-20 PROCEDURE — 88271 CYTOGENETICS DNA PROBE: CPT | Performed by: INTERNAL MEDICINE

## 2018-04-20 PROCEDURE — 93010 ELECTROCARDIOGRAM REPORT: CPT | Mod: ZP | Performed by: INTERNAL MEDICINE

## 2018-04-20 PROCEDURE — 88305 TISSUE EXAM BY PATHOLOGIST: CPT | Performed by: INTERNAL MEDICINE

## 2018-04-20 PROCEDURE — 88161 CYTOPATH SMEAR OTHER SOURCE: CPT | Performed by: INTERNAL MEDICINE

## 2018-04-20 PROCEDURE — 85025 COMPLETE CBC W/AUTO DIFF WBC: CPT | Performed by: INTERNAL MEDICINE

## 2018-04-20 PROCEDURE — 38222 DX BONE MARROW BX & ASPIR: CPT | Mod: ZF

## 2018-04-20 PROCEDURE — 88280 CHROMOSOME KARYOTYPE STUDY: CPT | Performed by: INTERNAL MEDICINE

## 2018-04-20 RX ORDER — HEPARIN SODIUM (PORCINE) LOCK FLUSH IV SOLN 100 UNIT/ML 100 UNIT/ML
5 SOLUTION INTRAVENOUS DAILY PRN
Status: CANCELLED
Start: 2018-04-20

## 2018-04-20 RX ORDER — HEPARIN SODIUM (PORCINE) LOCK FLUSH IV SOLN 100 UNIT/ML 100 UNIT/ML
5 SOLUTION INTRAVENOUS DAILY PRN
Status: DISCONTINUED | OUTPATIENT
Start: 2018-04-20 | End: 2018-04-20 | Stop reason: HOSPADM

## 2018-04-20 RX ADMIN — MIDAZOLAM 2 MG: 1 INJECTION INTRAMUSCULAR; INTRAVENOUS at 13:31

## 2018-04-20 RX ADMIN — SODIUM CHLORIDE, PRESERVATIVE FREE 5 ML: 5 INJECTION INTRAVENOUS at 13:39

## 2018-04-20 ASSESSMENT — PAIN SCALES - GENERAL: PAINLEVEL: NO PAIN (0)

## 2018-04-20 NOTE — PROGRESS NOTES
Met with patient and family after new evaluation with Dr Landa regarding plan and BMT nurse coordinator role. Provided patient with Martín Yan's contact information for future questions and plan. Patient verbalized understanding of provided information.

## 2018-04-20 NOTE — NURSING NOTE
BMT Teaching Flowsheet    Amira Avery is a 28 year old female  The primary encounter diagnosis was Personal history of diseases of blood and blood-forming organs. A diagnosis of Nodular sclerosing Hodgkin's lymphoma, unspecified body region (H) was also pertinent to this visit.    Teaching Topic: bmbx    Person(s) involved in teaching: Patient  Motivation Level  Asks Questions: Yes  Eager to Learn: Yes  Cooperative: Yes  Receptive (willing/able to accept information): Yes  Any cultural factors/Evangelical beliefs that may influence understanding or compliance? No    Patient demonstrates understanding of the following:  - Reason for the appointment, diagnosis and treatment plan: Yes  - Knowledge of proper use of medications and conditions for which they are ordered (with special attention to potential side effects or drug interactions): Yes  - Which situations necessitate calling provider and whom to contact: Yes    Teaching concerns addressed: activity level, pain management, site care    Instructional Materials Used/Given: Discussion    Time spent with patient: 30 minutes.    Specific Concerns: No, explain: Patient tolerated well.  Patient stated she did not think the Versed helped her very much.  Patient has no complaints of pain post procedure.  Dressing is dry clean and intact.

## 2018-04-20 NOTE — PROGRESS NOTES
BMT ONC Adult Bone Marrow Biopsy Procedure Note  April 20, 2018  /74  Pulse 71  Temp 98  F (36.7  C) (Oral)  Resp 16  SpO2 100%     Learning needs assessment complete within 12 months? YES    DIAGNOSIS: Lymphoma     PROCEDURE: Unilateral Bone Marrow Biopsy and Unilateral Aspirate    LOCATION: [facility]    Patient s identification was positively verified by verbal identification and invasive procedure safety checklist was completed. Informed consent was obtained. Following the administration of versed 2mg as pre-medication, patient was placed in the prone position and prepped and draped in a sterile manner. Approximately 20 cc of 1% Lidocaine was used over the left posterior iliac spine. Following this a 3 mm incision was made. Trephine bone marrow core(s) was (were) obtained from the LPIC. Bone marrow aspirates were obtained from the IC. Aspirates were sent for morphology, immunophenotyping, cytogenetics and molecular diagnostics -held. A total of approximately 20 ml of marrow was aspirated. Following this procedure a sterile dressing was applied to the bone marrow biopsy site(s). The patient was placed in the supine position to maintain pressure on the biopsy site. Post-procedure wound care instructions were given.     Complications: NO    Pre-procedural pain: 0 out of 10 on the numeric pain rating scale.     Procedural pain: 3 out of 10 on the numeric pain rating scale.     Post-procedural pain assessment: 2 out of 10 on the numeric pain rating scale.     Interventions: NO    Length of procedure:20 minutes or less      Procedure performed by: Belinda Castro NP

## 2018-04-20 NOTE — MR AVS SNAPSHOT
After Visit Summary   4/20/2018    Amira Avery    MRN: [id number]           Patient Information     Date Of Birth          1990        Visit Information        Provider Department      4/20/2018 1:00 PM UU BONE MARROW BIOPSY;  BMT WES #[facility] Blood [facility]        Today's Diagnoses     Personal history of diseases of blood and blood-forming organs    -  1    Nodular sclerosing Hodgkin's lymphoma, unspecified body region (H)              [facility] [place] [place]  [address] [place] [address]  Phone: [phone]  Clinic Hours:   Monday-Thursday:7am to 7pm   Friday: 7am to 5pm   Weekends and holidays:    8am to noon (in general)  If your fever is 100.5  or greater,   call the clinic.  After hours call the   hospital at [phone] or   [phone]. Ask for the BMT   fellow on-call            Follow-ups after your visit        Your next 10 appointments already scheduled     Apr 23, 2018  6:45 AM CDT   (Arrive by 6:30 AM)   PET ONCOLOGY WHOLE BODY with UUPET1   [facility] [place] PET CT ([facility] [place])    [address] [id number] [phone]           Tell your doctor:   If there is any chance you may be pregnant or if you are breastfeeding.   If you have problems lying in small spaces (claustrophobia). If you do, your doctor may give you medicine to help you relax. If you have diabetes:   Have your exam early in the morning. Your blood glucose will go up as the day goes by.   Your glucose level must be 180 or less at the start of the exam. Please take any medicines you need to ensure this blood glucose level.   If you are taking insulin in the morning take with breakfast by 6 am and schedule procedure between 12 and 2:15 pm.   If you are taking insulin at night take nightly dose, fast overnight, schedule procedure before 10 am.   If you take insulin both morning and night take  morning dose by 6am and schedule procedure between 12 and 2:15 pm.   24 hours before your scan: Don t do any heavy exercise. (No jogging, aerobics or other workouts.) Exercise will make your pictures less accurate.  At least 7 hours before your scan, or the evening before if you have an early appointment: Eat a low carb, high protein meal (Lean meats, seafood, beans, soy, low-fat dairy, eggs, nuts & seeds). 6 hours before your scan:   Stop all food and liquids (except water).   Do not chew gum or suck on mints.   If you need to take medicine with food, you may take it with a few crackers.  Please call your Imaging Department at your exam site with any questions.            Apr 23, 2018 11:30 AM CDT   FULL PULMONARY FUNCTION with  PFL COLBY   [facility] Pulmonary Function Testing ([facility])    [address] [place] [address]  3rd Floor  [facility] [address]   [phone]            Apr 23, 2018 12:30 PM CDT   (Arrive by 12:15 PM)   New Patient Visit with MARIA TERESA Alston Replaced by [facility] Interventional Radiology ([facility])    [address] [place] [address]  1st Floor  [facility] [address]   [phone]            Apr 23, 2018  2:00 PM CDT   BMT NURSE COORD WITH ROOM with  Bmt Nurse Coordinator,  2 [address] CONSULT [facility] Blood and Marrow Transplant ([facility])    [address] [place] [address]  [address]  [facility] [address]   [phone]            Apr 24, 2018  9:00 AM CDT   (Arrive by 8:45 AM)   BMT SW PSA with MAT Kitchen,  2 116 CONSULT [facility] Blood and Marrow Transplant ([facility])    [address] [place] [address]  [address]  [facility] [address]   [phone]            Apr 24, 2018 10:45 AM CDT   Central Venous Catheter - [address] [place] [address] with Leonor Gordon RN,  2 116 CONSULT [facility], [facility] ([facility])    [address]  [facility] [address]              Appointment is located at [address] [place] [address]            Apr 25, 2018  8:20 AM CDT   (Arrive by 8:05 AM)   New Patient Visit with Lia Silva MD   [facility] ([facility] Clin[facility] Surgery Center)    [address] [facility]  4th Floor  [facility] [id number] [phone]              Who to contact     If you have questions or need follow up information about today's clinic visit or your schedule please contact [facility] BLOOD AND MARROW TRANSPLANT directly at [phone].  Normal or non-critical lab and imaging results will be communicated to you by MyChart, letter or phone within 4 business days after the clinic has received the results. If you do not hear from us within 7 days, please contact the clinic through Octoniushart or phone. If you have a critical or abnormal lab result, we will notify you by phone as soon as possible.  Submit refill requests through [employer] or call your pharmacy and they will forward the refill request to us. Please allow 3 business days for your refill to be completed.          Additional Information About Your Visit        Octoniushar[employer] Information     [employer] gives you secure access to your electronic health record. If you see a primary care provider, you can also send messages to your care team and make appointments. If you have questions, please call your primary care clinic.  If you do not have a primary care provider, please call [phone] and they will assist you.        Care EveryWhere ID     This is your Care EveryWhere ID. This could be used by other organizations to access your [place] medical records  [id number]        Your Vitals Were     Pulse Temperature Respirations Pulse Oximetry          71 98  F (36.7  C) (Oral) 16 100%         Blood Pressure from Last 3 Encounters:   04/20/18 121/74   04/19/18 123/70   04/18/18 131/78    Weight from Last 3 Encounters:   04/19/18 70.6  kg (155 lb 10.3 oz)   04/18/18 70.9 kg (156 lb 6.4 oz)   04/09/18 72.8 kg (160 lb 6.4 oz)              We Performed the Following     Bone marrow biopsy     CHROMOSOME BONE MARROW With Professional Interpretation     DX BONE MARROW BIOPSY(IES) & ASPIRATION(S) (Charge)     FISH With Professional Interpretation     Leukemia Lymphoma Evaluation (Flow Cytometry)        Recent Review Flowsheet Data     BMT Recent Results Latest Ref Rng & Units 4/18/2018 4/20/2018    WBC 4.0 - 11.0 10e9/L 5.0 7.2    Hemoglobin 11.7 - 15.7 g/dL 12.3 12.3    Platelet Count 150 - 450 10e9/L 268 273    Neutrophils (Absolute) 1.6 - 8.3 10e9/L 2.9 4.3    INR 0.86 - 1.14 1.06 -    Sodium 133 - 144 mmol/L 139 -    Potassium 3.4 - 5.3 mmol/L 4.0 -    Chloride 94 - 109 mmol/L 110(H) -    Glucose 70 - 99 mg/dL 94 -    Urea Nitrogen 7 - 30 mg/dL 13 -    Creatinine 0.52 - 1.04 mg/dL 0.65 -    Calcium (Total) 8.5 - 10.1 mg/dL 8.7 -    Protein (Total) 6.8 - 8.8 g/dL 7.4 -    Albumin 3.4 - 5.0 g/dL 3.9 -    Alkaline Phosphatase 40 - 150 U/L 48 -    AST 0 - 45 U/L 53(H) -    ALT 0 - 50 U/L 40 -    MCV 78 - 100 fl 90 90               Primary Care Provider Fax #    Physician No Ref-Primary [phone]       No address on file        Equal Access to Services     RICHIE RAMEY : Hadii aad ku hadasho Sotiagoali, waaxda luqadaha, qaybta kaalmada adeegmishel, jen idiin hayaan adeeg kharash la'aan . So [facility] [phone].    ATENCIÓN: Si habla español, tiene a isabel disposición servicios gratuitos de asistencia lingüística. Llame al [phone].    We comply with applicable federal civil rights laws and [place] laws. We do not discriminate on the basis of race, color, national origin, age, disability, sex, sexual orientation, or gender identity.            Thank you!     Thank you for choosing [facility] BLOOD AND MARROW TRANSPLANT  for your care. Our goal is always to provide you with excellent care. Hearing back from our patients is one way we can continue to  improve our services. Please take a few minutes to complete the written survey that you may receive in the mail after your visit with us. Thank you!             Your Updated Medication List - Protect others around you: Learn how to safely use, store and throw away your medicines at www.disposemymeds.org.          This list is accurate as of 4/20/18  2:01 PM.  Always use your most recent med list.                   Brand Name Dispense Instructions for use Diagnosis    acetaminophen 500 MG tablet    TYLENOL     Take by mouth every 6 hours as needed        ibuprofen 200 MG tablet    ADVIL/MOTRIN     Take 200 mg by mouth every 6 hours as needed

## 2018-04-20 NOTE — MR AVS SNAPSHOT
After Visit Summary   4/20/2018    Amira Avery    MRN: [id number]           Patient Information     Date Of Birth          1990        Visit Information        Provider Department      4/20/2018 2:00 PM 1Nettie Bmt Nurse [facility]        Today's Diagnoses     Hodgkin disease (H)        Personal history of diseases of blood and blood-forming organs              Cl[facility]ics [facility] ([facility]  [address] [place] [address]  Phone: [phone]  Clinic Hours:   Monday-Thursday:7am to 7pm   Friday: 7am to 5pm   Weekends and holidays:    8am to noon (in general)  If your fever is 100.5  or greater,   call the clinic.  After hours call the   hospital at [phone] or   [phone]. Ask for the BMT   fellow on-call            Follow-ups after your visit        Your next 10 appointments already scheduled     Apr 23, 2018  6:45 AM CDT   (Arrive by 6:30 AM)   PET ONCOLOGY WHOLE BODY with UUPET1   [facility] PET CT ([facility])    [address] [facility] [id number] [phone]           Tell your doctor:   If there is any chance you may be pregnant or if you are breastfeeding.   If you have problems lying in small spaces (claustrophobia). If you do, your doctor may give you medicine to help you relax. If you have diabetes:   Have your exam early in the morning. Your blood glucose will go up as the day goes by.   Your glucose level must be 180 or less at the start of the exam. Please take any medicines you need to ensure this blood glucose level.   If you are taking insulin in the morning take with breakfast by 6 am and schedule procedure between 12 and 2:15 pm.   If you are taking insulin at night take nightly dose, fast overnight, schedule procedure before 10 am.   If you take insulin both morning and night take morning dose by 6am and schedule procedure between 12 and 2:15 pm.   24  hours before your scan: Don t do any heavy exercise. (No jogging, aerobics or other workouts.) Exercise will make your pictures less accurate.  At least 7 hours before your scan, or the evening before if you have an early appointment: Eat a low carb, high protein meal (Lean meats, seafood, beans, soy, low-fat dairy, eggs, nuts & seeds). 6 hours before your scan:   Stop all food and liquids (except water).   Do not chew gum or suck on mints.   If you need to take medicine with food, you may take it with a few crackers.  Please call your Imaging Department at your exam site with any questions.            Apr 23, 2018 11:30 AM CDT   FULL PULMONARY FUNCTION with  PFL COLBY   [facility] Pulmonary Function Testing ([facility])    [address] [place] [address]  3rd Floor  [facility] [address]   [phone]            Apr 23, 2018 12:30 PM CDT   (Arrive by 12:15 PM)   New Patient Visit with MARIA TERESA Alston [facility] Interventional Radiology ([facility])    [address] [place] [address]  [address]st [facility] [address]   [phone]            Apr 23, 2018  2:00 PM CDT   BMT NURSE COORD WITH ROOM with  Bmt Nurse Coordinator,  2 116 CONSULT [facility] Blood and Marrow Transplant ([facility])    [address] [place] [address]  [address]  [facility] [address]   [phone]            Apr 24, 2018  9:00 AM CDT   (Arrive by 8:45 AM)   BMT SW PSA with MAT Kitchen,  2 116 CONSULT [facility] Blood and Marrow Transplant ([facility])    [address] [place] [address]  [address]  [facility] [address]   [phone]            Apr 24, 2018 10:45 AM CDT   Central Venous Catheter - [address] [place] [address] with Leonor Gordon RN,  2 116 CONSULT [facility] ([facility] [place])    [address] [facility] [address]              Appointment  is located at [address] [place] [address]            Apr 25, 2018  8:20 AM CDT   (Arrive by 8:05 AM)   New Patient Visit with Lia Silva MD   [facility] Ear Nose and Throat ([facility] Clinics [facility] Surgery Center)    [address] [address] [phone]              Who to contact     If you have questions or need follow up information about today's clinic visit or your schedule please contact [facility] BLOOD AND MARROW TRANSPLANT directly at [phone].  Normal or non-critical lab and imaging results will be communicated to you by Voddlerhart, letter or phone within 4 business days after the clinic has received the results. If you do not hear from us within 7 days, please contact the clinic through Volt[employer] or phone. If you have a critical or abnormal lab result, we will notify you by phone as soon as possible.  Submit refill requests through [employer] or call your pharmacy and they will forward the refill request to us. Please allow 3 business days for your refill to be completed.          Additional Information About Your Visit        MyChart Information     [employer] gives you secure access to your electronic health record. If you see a primary care provider, you can also send messages to your care team and make appointments. If you have questions, please call your primary care clinic.  If you do not have a primary care provider, please call [phone] and they will assist you.        Care EveryWhere ID     This is your Care EveryWhere ID. This could be used by other organizations to access your [place] medical records  [id number]         Blood Pressure from Last 3 Encounters:   04/20/18 121/74   04/19/18 123/70   04/18/18 131/78    Weight from Last 3 Encounters:   04/19/18 70.6 kg (155 lb 10.3 oz)   04/18/18 70.9 kg (156 lb 6.4 oz)   04/09/18 72.8 kg (160 lb 6.4 oz)              We Performed the Following     EKG 12-lead complete w/read - Clinics        Recent  Review Flowsheet Data     BMT Recent Results Latest Ref Rng & Units 4/18/2018 4/20/2018    WBC 4.0 - 11.0 10e9/L 5.0 7.2    Hemoglobin 11.7 - 15.7 g/dL 12.3 12.3    Platelet Count 150 - 450 10e9/L 268 273    Neutrophils (Absolute) 1.6 - 8.3 10e9/L 2.9 4.3    INR 0.86 - 1.14 1.06 -    Sodium 133 - 144 mmol/L 139 -    Potassium 3.4 - 5.3 mmol/L 4.0 -    Chloride 94 - 109 mmol/L 110(H) -    Glucose 70 - 99 mg/dL 94 -    Urea Nitrogen 7 - 30 mg/dL 13 -    Creatinine 0.52 - 1.04 mg/dL 0.65 -    Calcium (Total) 8.5 - 10.1 mg/dL 8.7 -    Protein (Total) 6.8 - 8.8 g/dL 7.4 -    Albumin 3.4 - 5.0 g/dL 3.9 -    Alkaline Phosphatase 40 - 150 U/L 48 -    AST 0 - 45 U/L 53(H) -    ALT 0 - 50 U/L 40 -    MCV 78 - 100 fl 90 90               Primary Care Provider Fax #    Physician No Ref-Primary [phone]       No address on file        Equal Access to Services     RICHIE RAMEY : Alexis bowieo Janelle, waaxda luqadaha, qaybta kaalmada adeegyada, jen raymundo. So [facility] [phone].    ATENCIÓN: Si habla español, tiene a isabel disposición servicios gratuitos de asistencia lingüística. Llame al [phone].    We comply with applicable federal civil rights laws and [place] laws. We do not discriminate on the basis of race, color, national origin, age, disability, sex, sexual orientation, or gender identity.            Thank you!     Thank you for choosing [facility] BLOOD AND MARROW TRANSPLANT  for your care. Our goal is always to provide you with excellent care. Hearing back from our patients is one way we can continue to improve our services. Please take a few minutes to complete the written survey that you may receive in the mail after your visit with us. Thank you!             Your Updated Medication List - Protect others around you: Learn how to safely use, store and throw away your medicines at www.disposemymeds.org.          This list is accurate as of 4/20/18  2:15 PM.  Always use your  most recent med list.                   Brand Name Dispense Instructions for use Diagnosis    acetaminophen 500 MG tablet    TYLENOL     Take by mouth every 6 hours as needed        ibuprofen 200 MG tablet    ADVIL/MOTRIN     Take 200 mg by mouth every 6 hours as needed

## 2018-04-21 ENCOUNTER — HEALTH MAINTENANCE LETTER (OUTPATIENT)
Age: 28
End: 2018-04-21

## 2018-04-22 LAB
DONOR CYTOMEGALOVIRUS ABY: POSITIVE
DONOR HEP B CORE ABY: NONREACTIVE
DONOR HEP B SURF AGN: NONREACTIVE
DONOR HEPATITIS C ABY: NONREACTIVE
DONOR HTLV 1&2 ANTIBODY: NONREACTIVE
DONOR TREPONEMA PAL ABY: NONREACTIVE
HIV1+2 AB SERPL QL IA: NONREACTIVE
MPX SERIES: NONREACTIVE
T CRUZI AB SER DONR QL: NONREACTIVE
WNV RNA SPEC QL NAA+PROBE: NONREACTIVE

## 2018-04-23 ENCOUNTER — OFFICE VISIT (OUTPATIENT)
Dept: INTERVENTIONAL RADIOLOGY/VASCULAR | Facility: CLINIC | Age: 28
End: 2018-04-23
Attending: INTERNAL MEDICINE
Payer: COMMERCIAL

## 2018-04-23 ENCOUNTER — HOSPITAL ENCOUNTER (OUTPATIENT)
Dept: PET IMAGING | Facility: CLINIC | Age: 28
End: 2018-04-23
Attending: INTERNAL MEDICINE
Payer: COMMERCIAL

## 2018-04-23 ENCOUNTER — HOSPITAL ENCOUNTER (OUTPATIENT)
Dept: PET IMAGING | Facility: CLINIC | Age: 28
Discharge: HOME | End: 2018-04-23
Attending: INTERNAL MEDICINE | Admitting: INTERNAL MEDICINE
Payer: COMMERCIAL

## 2018-04-23 ENCOUNTER — OFFICE VISIT (OUTPATIENT)
Dept: TRANSPLANT | Facility: CLINIC | Age: 28
End: 2018-04-23
Attending: INTERNAL MEDICINE
Payer: COMMERCIAL

## 2018-04-23 VITALS — DIASTOLIC BLOOD PRESSURE: 61 MMHG | OXYGEN SATURATION: 100 % | SYSTOLIC BLOOD PRESSURE: 108 MMHG | HEART RATE: 68 BPM

## 2018-04-23 DIAGNOSIS — C81.90 HODGKIN DISEASE (H): ICD-10-CM

## 2018-04-23 DIAGNOSIS — Z86.2 PERSONAL HISTORY OF DISEASES OF BLOOD AND BLOOD-FORMING ORGANS: ICD-10-CM

## 2018-04-23 DIAGNOSIS — C81.10 NODULAR SCLEROSING HODGKIN'S LYMPHOMA, UNSPECIFIED BODY REGION (H): Primary | ICD-10-CM

## 2018-04-23 DIAGNOSIS — C81.11 NODULAR SCLEROSIS HODGKIN LYMPHOMA OF LYMPH NODES OF NECK (H): ICD-10-CM

## 2018-04-23 LAB
A* LOCUS NMDP: NORMAL
A* LOCUS: NORMAL
A* NMDP: NORMAL
A*: NORMAL
ABTEST METHOD: NORMAL
B* LOCUS NMDP: NORMAL
B* LOCUS: NORMAL
B* NMDP: NORMAL
B*: NORMAL
BW-1: NORMAL
C* LOCUS NMDP: NORMAL
C* LOCUS: NORMAL
C* NMDP: NORMAL
C*: NORMAL
COPATH REPORT: NORMAL
COPATH REPORT: NORMAL
DPA1* NMDP: NORMAL
DPA1*: NORMAL
DPB1* NMDP: NORMAL
DPB1*: NORMAL
DQA1*LOCUS NMDP: NORMAL
DQA1*LOCUS: NORMAL
DQA1*NMDP: NORMAL
DQB1* LOCUS NMDP: NORMAL
DQB1* LOCUS: NORMAL
DQB1* NMDP: NORMAL
DRB1* LOCUS NMDP: NORMAL
DRB1* LOCUS: NORMAL
DRB1* NMDP: NORMAL
DRB5* LOCUS NMDP: NORMAL
DRB5* LOCUS: NORMAL
DRB5* NMDP: NORMAL
DRSSO TEST METHOD: NORMAL
GLUCOSE BLDC GLUCOMTR-MCNC: 100 MG/DL (ref 70–99)
INTERPRETATION ECG - MUSE: NORMAL

## 2018-04-23 PROCEDURE — 70491 CT SOFT TISSUE NECK W/DYE: CPT

## 2018-04-23 PROCEDURE — 74177 CT ABD & PELVIS W/CONTRAST: CPT

## 2018-04-23 PROCEDURE — 34300033 ZZH RX 343: Performed by: INTERNAL MEDICINE

## 2018-04-23 PROCEDURE — 25000128 H RX IP 250 OP 636: Performed by: INTERNAL MEDICINE

## 2018-04-23 PROCEDURE — A9552 F18 FDG: HCPCS | Performed by: INTERNAL MEDICINE

## 2018-04-23 PROCEDURE — 40000268 ZZH STATISTIC NO CHARGES: Mod: ZF

## 2018-04-23 RX ORDER — IOPAMIDOL 755 MG/ML
10-140 INJECTION, SOLUTION INTRAVASCULAR ONCE
Status: COMPLETED | OUTPATIENT
Start: 2018-04-23 | End: 2018-04-23

## 2018-04-23 RX ADMIN — IOPAMIDOL 95 ML: 755 INJECTION, SOLUTION INTRAVENOUS at 07:49

## 2018-04-23 RX ADMIN — SODIUM CHLORIDE, PRESERVATIVE FREE 500 UNITS: 5 INJECTION INTRAVENOUS at 08:18

## 2018-04-23 RX ADMIN — FLUDEOXYGLUCOSE F-18 10.32 MCI.: 500 INJECTION, SOLUTION INTRAVENOUS at 06:51

## 2018-04-23 ASSESSMENT — ENCOUNTER SYMPTOMS
TROUBLE SWALLOWING: 0
HOARSE VOICE: 0
TASTE DISTURBANCE: 0
NECK MASS: 0
SMELL DISTURBANCE: 0
SORE THROAT: 1
SINUS CONGESTION: 0
SINUS PAIN: 0

## 2018-04-23 NOTE — PATIENT INSTRUCTIONS
Tunneled Catheter Placement Instructions    1. Arrive in the Gold Waiting Room on the day of your procedure, 2nd floor of the hospital, located down the mc from the main lobby as instructed by the BMT clinic.  You will be coming 1.5 hours prior to the actual procedure time    2. No solid foods or milk products for 6 hours prior to the procedure    3. May have clear liquids up to 2 hours prior to the procedure (water, apple juice, broth, coffee or tea without milk or sugar, jell-o, white grape juice)    4. Anti-bacterial scrub  -Two times the day before your procedure, and once the day of your procedure  -Think of a big square:  mid chest to ear lobes, both sides of the chest and neck  -Use a clean washcloth each time.  Wet the washcloth.  Place a capful of the scrub on the wet washcloth and rub it in, wash the area and leave on for 5 minutes    -After 5 minutes, rinse off the washcloth, then rinse off the skin and pat the skin dry.with a clean towel  -Or if you prefer, you may wash the scrub off in the shower  -No lotion or powders on the neck or chest area.  But you may use  deodorant.

## 2018-04-23 NOTE — MR AVS SNAPSHOT
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  After hours call the   hospital at [phone] or   [phone]. Ask for the BMT   fellow on-call            Follow-ups after your visit        Your next 10 appointments already scheduled     May 02, 2018   Procedure with Lia Silva MD   [facility] ([facility] [place])    [address] [place] [address]  5th Floor  [facility] [address] [phone]           Located in the Clinics and Surgery Center at [address] [place] [address].   parking is very convenient and highly recommended.  is a $6 flat rate fee.  Both  and self parkers should enter the main arrival plaza from [facility]; parking attendants will direct you based on your parking preference.            May 07, 2018 12:00 PM CDT   BMT Workup Visit with Tanya Landa MD   [facility] Blood and Marrow Transplant ([facility])    [address] [place] [address]  [address]  [facility] [address] [phone]            May 09, 2018  4:00 PM CDT   (Arrive by 3:45 PM)   Return Visit with Lia Silva MD   [facility] Ear Nose and Throat ([facility] [place])    [facility]  [address] [id number] [phone]              Who to contact     If you have questions or need follow up information about today's clinic visit or your schedule please contact [facility] BLOOD AND MARROW TRANSPLANT directly at [phone].  Normal or non-critical lab and imaging results will be communicated to you by MyChart, letter or phone within 4 business days after the clinic has received the results. If you do not hear from us within 7 days, please contact the clinic through [employer] or phone. If you have a critical or abnormal lab result, we will notify you by phone as soon as possible.  Submit refill requests through [employer] or call your pharmacy and they will forward the refill request to us. Please allow 3 business days for your refill to be completed.          Additional Information About Your Visit        MyChart Information     [employer] gives you secure access to your electronic health record. If you see a primary care provider, you can also send messages to your care team and make appointments. If you have questions, please call your primary care clinic.  If you do not have a primary care provider, please call [phone] and they will assist you.        Care EveryWhere ID     This is your Care EveryWhere ID. This could be used by other organizations to access your [facility] medical records  [id number]         Blood Pressure from Last 3 Encounters:   04/23/18 108/61   04/20/18 121/74   04/19/18 123/70    Weight from Last 3 Encounters:   04/25/18 72.2 kg (159 lb 1.6 oz)   04/19/18 70.6 kg (155 lb 10.3 oz)   04/18/18 70.9 kg (156 lb 6.4 oz)              We Performed the Following     BMT Coordinator        Recent Review Flowsheet Data     BMT Recent Results Latest Ref Rng & Units 4/18/2018 4/20/2018 4/23/2018    WBC 4.0 - 11.0 10e9/L 5.0 7.2 -    Hemoglobin 11.7 - 15.7 g/dL 12.3 12.3 -    Platelet Count 150 - 450 10e9/L 268 273 -    Neutrophils (Absolute) 1.6 - 8.3  10e9/L 2.9 4.3 -    INR 0.86 - 1.14 1.06 - -    Sodium 133 - 144 mmol/L 139 - -    Potassium 3.4 - 5.3 mmol/L 4.0 - -    Chloride 94 - 109 mmol/L 110(H) - -    Glucose 70 - 99 mg/dL 94 - 100(H)    Urea Nitrogen 7 - 30 mg/dL 13 - -    Creatinine 0.52 - 1.04 mg/dL 0.65 - -    Calcium (Total) 8.5 - 10.1 mg/dL 8.7 - -    Protein (Total) 6.8 - 8.8 g/dL 7.4 - -    Albumin 3.4 - 5.0 g/dL 3.9 - -    Alkaline Phosphatase 40 - 150 U/L 48 - -    AST 0 - 45 U/L 53(H) - -    ALT 0 - 50 U/L 40 - -    MCV 78 - 100 fl 90 90 -               Primary Care Provider Fax #    Physician No Ref-Primary [phone]       No address on file        Equal Access to Services     RICHIE RAMEY : Haddi Luis, wasarahda brenna, qaybta kaalmada mariluz, jen de la cruz . So [facility] [phone].    ATENCIÓN: Si alexander mendoza, tiene a isabel disposición servicios gratuitos de asistencia lingüística. Llame al [phone].    We comply with applicable federal civil rights laws and [place] laws. We do not discriminate on the basis of race, color, national origin, age, disability, sex, sexual orientation, or gender identity.            Thank you!     Thank you for choosing [facility] BLOOD AND MARROW TRANSPLANT  for your care. Our goal is always to provide you with excellent care. Hearing back from our patients is one way we can continue to improve our services. Please take a few minutes to complete the written survey that you may receive in the mail after your visit with us. Thank you!             Your Updated Medication List - Protect others around you: Learn how to safely use, store and throw away your medicines at www.disposemymeds.org.          This list is accurate as of 4/23/18 11:59 PM.  Always use your most recent med list.                   Brand Name Dispense Instructions for use Diagnosis    acetaminophen 500 MG tablet    TYLENOL     Take by mouth every 6 hours as needed        ibuprofen 200 MG tablet     ADVIL/MOTRIN     Take 200 mg by mouth every 6 hours as needed

## 2018-04-23 NOTE — LETTER
4/23/2018       RE: Amira Avery  [address] [place] [address]     Dear Colleague,    Thank you for referring your patient, Amira Avery, to the [facility] INTERVENTIONAL RADIOLOGY at [facility]. Please see a copy of my visit note below.    First Name: Amira  Age: 28 year old   Referring Physician: Dr. Landa   REASON FOR REFERRAL: Education and evaluation for tunneled catheter placement  Patient is undergoing w/u for autologous BMT, using her own stem cells as the donor     HPI:  This is a patient who initially presented in 02/2017 with left neck mass.  Ultrasound-guided FNA was done in 03/2017, negative for malignancy, however, it continued.  Therefore on 04/19/2017, CT scan was done showing diffuse cervical lymphadenopathy left.  The maximum seemed to be around 2.5 x 2 cm in the mediastinum.  She had also lymphadenopathy in the left axilla, retroperitoneum and mediastinum.  The PET scan confirmed extensive intense hypermetabolic lymphadenopathy in the neck, left axilla, mediastinum, but nothing below the diaphragm. She then had excision of left cervical lymph nodes showing classical Hodgkin's lymphoma, nodular sclerosis type.  She was diagnosed stage IIB.  Although she has lost some weight at that time, ESR was high, greater than 50, IPI was score 1.  She received 6 cycles of ABVD between 06/09/2017 and 11/10/2017.  However, 11/09/2017 CT showed increasing minimal activity within poorly visualized but stable-appearing right cervical chain lymph nodes suggests mild progression of disease.  SUV was maximum of 4.7.  She was followed, and PET/CT was repeated 01/09/2018.  There was new and increasing hypermetabolic right cervical adenopathy suspicious for disease progression.  The lymph nodes had persistent hypermetabolic activity in the tonsillar tissues with maximum 7.9, right jugulodigastric lymph nodes with SUV 4.0, posterior cervical chain  node SUV 6, new 6 mm right posterior cervical chain, maximum SUV is 3.3, new 7 mm right supraclavicular node with maximum SUV of 4.7.  Chest, no abnormal hypermetabolic activity.  Abdomen, no abnormal hypermetabolic activity.  She also had a CT on 02/05/2018 which showed enlarged heterogeneous right jugular chain cervical lymph nodes in level 3 and 4 measuring approximately 1.9 x 2 x 2.6 cm and 2 x 1.8 x 2.2 cm are increased since neck CT from 04/21/2017, although may be similar to the PET/CT from 01/09/2018.  Resolution of previously enlarged left cervical lymph nodes on the CT compared to 04/21/2017.  Nasopharynx, oropharynx, tongue base, hypopharynx, larynx were normal, possible small sub-centimeter left thyroid nodule versus artifact, so she started on GDP 02/16/2018, and she had another PET scan 03/28/2018 showing that in the head and neck there is stable activity in the tonsillar tissue.  There are post-surgical changes in the right neck.  Hypermetabolic right cervical adenopathy in the prior study no longer demonstrated.  There are no new hypermetabolic lesions or lymph nodes in the neck.  No abnormal hypermetabolic activity in the abdomen.  Before starting GDP she underwent right neck excisional biopsy (2/13/18) showing recurrent classical Hodgkin lymphoma, nodular-sclerosing. She is now undergoing work-up for autologous BMT.    LINE HX:  1. 6/1/17-12/1/17:  Right internal jugular vein single lumen port a cath.  Removed, because it was thought she no longer needed it.  2. 2/21/18 to present:  Left internal jugular vein single lumen port a cath.  CXR shows the tip to end in the high right atrium.  No problems with the functioning of the port.     PAST MEDICAL HISTORY:   Past Medical History:   Diagnosis Date     Tattoos      PAST SURGICAL HISTORY:   Past Surgical History:   Procedure Laterality Date     excision of deep cervical LN's Left 05/19/2017    positive for Hodgkin's     excision of deep cervical LN's  Right 02/13/2018    positive for Hodgkin's     port a cath placement Right 06/01/2017    IJ vein; removed 12/1/17 no longer needed     port a cath placement Left 02/21/2018    IJ vein     US BIOPSY LYMPH NODE FNA Left 03/06/2017    low midline neck, negative for malignancy     FAMILY HISTORY:   Family History   Problem Relation Age of Onset     Hypertension Father      Coronary Artery Disease Maternal Grandmother      Colon Cancer Maternal Grandmother      SOCIAL HISTORY:   Social History   Substance Use Topics     Smoking status: Former Smoker     Packs/day: 0.25     Years: 10.00     Types: Cigarettes     Quit date: 9/10/2017     Smokeless tobacco: Never Used     Alcohol use No     PROBLEM LIST:   Patient Active Problem List    Diagnosis Date Noted     Hodgkin lymphoma, nodular sclerosis (H) 04/20/2018     Priority: Medium     Hodgkin disease (H) 04/20/2018     Priority: Medium     MEDICATIONS:   Prescription Medications as of 4/23/2018             acetaminophen (TYLENOL) 500 MG tablet Take by mouth every 6 hours as needed     ibuprofen (ADVIL/MOTRIN) 200 MG tablet Take 200 mg by mouth every 6 hours as needed       Facility Administered Medications as of 4/23/2018             barium sulfate (VOLUMEN) oral suspension 0.1 % 450 mL Take 450 mLs by mouth once    fluorodeoxyglucose F-18 (FDG) radioisotope injection 1-18 mCi Inject 1-18 millicuries (1-18 mCi) into the vein once    heparin flush SOLN 500 Units Inject 5 mLs (500 Units) into the vein once    iopamidol (ISOVUE-370) solution  mL Inject  mLs into the vein once    sodium chloride (PF) 0.9% PF flush 10-74 mL Inject 10-74 mLs into the vein once        ALLERGIES:    Allergies   Allergen Reactions     Morphine Hives     VITALS: /61  Pulse 68  SpO2 100%    ROS:  Physical Examination: Vital signs are reviewed and they are stable  Constitutional: Pleasant, young woman, in no acute physical distress, came alone to her appt  Neck: Neck supple. No  adenopathy  Musculoskeletal: extremities normal- no gross deformities noted, gait normal and normal muscle tone  Skin: no suspicious lesions or rashes; right and left neck incisions are well healed from LN removal.  Right port chest incision is well healed from the removal.  Left port site looks good as well.  She has a decorative tattoo that spans across her chest  Neurologic: negative  Psychiatric: affect normal/bright and mentation appears normal.    RESULTS:  BMP RESULTS:  Lab Results   Component Value Date     04/18/2018    POTASSIUM 4.0 04/18/2018    CHLORIDE 110 (H) 04/18/2018    CO2 21 04/18/2018    ANIONGAP 8 04/18/2018    GLC 94 04/18/2018    BUN 13 04/18/2018    CR 0.65 04/18/2018    GFRESTIMATED >90 04/18/2018    GFRESTBLACK >90 04/18/2018    NEETU 8.7 04/18/2018        CBC RESULTS:  Lab Results   Component Value Date    WBC 7.2 04/20/2018    RBC 4.01 04/20/2018    HGB 12.3 04/20/2018    HCT 36.0 04/20/2018    MCV 90 04/20/2018    MCH 30.7 04/20/2018    MCHC 34.2 04/20/2018    RDW 14.7 04/20/2018     04/20/2018       INR/PTT:  Lab Results   Component Value Date    INR 1.06 04/18/2018    PTT 28 04/18/2018         ASSESSMENT/PLAN:  Type of catheter: Large bore double lumen tunneled apheresis capable catheter     Preferred Location: Right  Internal Jugular Vein     Platelet count is   Lab Results   Component Value Date     04/20/2018    , and coagulation factors are   Lab Results   Component Value Date    INR 1.06 04/18/2018    ,  Lab Results   Component Value Date    PTT 28 04/18/2018   . The patient is at low risk for bleeding during the procedure.    PROVIDER NOTE:  The tunneled catheter placement procedure and its risks including but not limited to bleeding, infection, fibrin sheath formation and blood clots was explained to Jennie.    CONSENT: Affirmation of informed written consent was not obtained.     INSTRUCTIONS/GUIDELINES:   Eating and drinking restriction guidelines were  reviewed., Anti-bacterial scrub was given and instructions for its use were reviewed to decrease the risk of infection on the day of the procedure., I explained the expected length of time the tunneled catheter could remain in place., I explained that when they went to the [facility] they would be taught about exit site dressing care, flushing of the lumens and how to care for the catheter when bathing., I explained that when the port a cath is now in use that it will need to be flushed once a month., The role of Interventional Radiology was reviewed. and The principles of infection control were reviewed.     30 minutes was spent with Tatum.  25 minutes was spent in counseling.    Iwona Rodas MS, APRN, CNS, CRN  Clinical Nurse Specialist  Interventional Radiology  [phone] (voice mail)  [phone] (pager)    CC  Patient Care Team:  No Ref-Primary, Physician as PCP - General  Gina Lawson MD as Referring Physician (Hematology & Oncology)  Tanya Landa MD as BMT Physician (Internal Medicine)  TANYA LANDA

## 2018-04-23 NOTE — MR AVS SNAPSHOT
After Visit Summary   4/23/2018    Amira Avery    MRN: [id number]           Patient Information     Date Of Birth          1990        Visit Information        Provider Department      4/23/2018 12:30 PM Iwona Rodas APRN [facility] Interventional Radiology        Today's Diagnoses     Hodgkin disease (H)        Personal history of diseases of blood and blood-forming organs          Care Instructions    Tunneled Catheter Placement Instructions    1. Arrive in the Gold Waiting Room on the day of your procedure, 2nd floor of the hospital, located down the mc from the main lobby as instructed by the BMT clinic.  You will be coming 1.5 hours prior to the actual procedure time    2. No solid foods or milk products for 6 hours prior to the procedure    3. May have clear liquids up to 2 hours prior to the procedure (water, apple juice, broth, coffee or tea without milk or sugar, jell-o, white grape juice)    4. Anti-bacterial scrub  -Two times the day before your procedure, and once the day of your procedure  -Think of a big square:  mid chest to ear lobes, both sides of the chest and neck  -Use a clean washcloth each time.  Wet the washcloth.  Place a capful of the scrub on the wet washcloth and rub it in, wash the area and leave on for 5 minutes    -After 5 minutes, rinse off the washcloth, then rinse off the skin and pat the skin dry.with a clean towel  -Or if you prefer, you may wash the scrub off in the shower  -No lotion or powders on the neck or chest area.  But you may use  deodorant.          Follow-ups after your visit        Your next 10 appointments already scheduled     Apr 23, 2018 11:30 AM CDT   FULL PULMONARY FUNCTION with  PFL COLBY   [facility] Pulmonary Function Testing ([facility] [place])    [address]  3rd [facility] [address] [phone]            Apr 23, 2018 12:30 PM CDT   (Arrive by 12:15 PM)   New Patient Visit with  MARIA TERESA [facility] Interventional Radiology ([facility])    [address]  1st Floor  [facility] [id number] [phone]            Apr 23, 2018  2:00 PM CDT   BMT NURSE COORD WITH ROOM with  Bmt Nurse Coordinator,  2 116 CONSULT [facility] Blood and Marrow Transplant ([facility])    [address]  [address]  [facility] [id number] [phone]            Apr 24, 2018  9:00 AM CDT   (Arrive by 8:45 AM)   BMT SW PSA with MAT Kitchen,  2 116 CONSULT [facility] Blood and Marrow Transplant ([facility])    [address]  [address]  [facility] [id number] [phone]            Apr 24, 2018 10:45 AM CDT   Central Venous Catheter - [address] [facility] with Leonor Gordon RN,  2 116 CONSULT [facility] ([facility])    [address] [facility] [address]              Appointment is located at [address] [place] [address]            Apr 25, 2018  8:20 AM CDT   (Arrive by 8:05 AM)   New Patient Visit with Lia Silva MD   [facility] Ear Nose and Throat ([facility])    [address]  4th Floor  [facility] [id number] [phone]            Apr 25, 2018 10:00 AM CDT   BMT Workup Visit with  BMT DOM   [facility] Blood and Marrow Transplant ([facility])    [address]  [address]  [facility] [id number] [phone]              Future tests that were ordered for you today     Open Future Orders        Priority Expected Expires Ordered    CT Soft Tissue Neck w Contrast Routine  4/23/2019 4/23/2018            Who to contact     Please call your clinic at [phone] to:    Ask questions about your health    Make or cancel appointments    Discuss your medicines    Learn about your test  results    Speak to your doctor            Additional Information About Your Visit        [employer] Information     [employer] gives you secure access to your electronic health record. If you see a primary care provider, you can also send messages to your care team and make appointments. If you have questions, please call your primary care clinic.  If you do not have a primary care provider, please call [phone] and they will assist you.      [employer] is an electronic gateway that provides easy, online access to your medical records. With [employer], you can request a clinic appointment, read your test results, renew a prescription or communicate with your care team.     To access your existing account, please contact your [facility] or call [phone] for assistance.        Care EveryWhere ID     This is your Care EveryWhere ID. This could be used by other organizations to access your Center medical records  [id number]        Your Vitals Were     Pulse Pulse Oximetry                68 100%           Blood Pressure from Last 3 Encounters:   04/23/18 108/61   04/20/18 121/74   04/19/18 123/70    Weight from Last 3 Encounters:   04/19/18 70.6 kg (155 lb 10.3 oz)   04/18/18 70.9 kg (156 lb 6.4 oz)   04/09/18 72.8 kg (160 lb 6.4 oz)              We Performed the Following     Consult to [facility] Radiology for line placement        Primary Care Provider Fax #    Physician No Ref-Primary [phone]       No address on file        Equal Access to Services     RICHIE RAMEY : Hadii velma bowieo Sotiagoali, waaxda luqadaha, qaybta kaalmada adeyudiyada, jen de la cruz . So [facility] [phone].    ATENCIÓN: Si habla español, tiene a isabel disposición servicios gratuitos de asistencia lingüística. Llame al [phone].    We comply with applicable federal civil rights laws and [place] laws. We do not discriminate on the basis of race, color, national origin, age, disability,  sex, sexual orientation, or gender identity.            Thank you!     Thank you for choosing [facility] INTERVENTIONAL RADIOLOGY  for your care. Our goal is always to provide you with excellent care. Hearing back from our patients is one way we can continue to improve our services. Please take a few minutes to complete the written survey that you may receive in the mail after your visit with us. Thank you!             Your Updated Medication List - Protect others around you: Learn how to safely use, store and throw away your medicines at www.disposemymeds.org.          This list is accurate as of 4/23/18 11:16 AM.  Always use your most recent med list.                   Brand Name Dispense Instructions for use Diagnosis    acetaminophen 500 MG tablet    TYLENOL     Take by mouth every 6 hours as needed        ibuprofen 200 MG tablet    ADVIL/MOTRIN     Take 200 mg by mouth every 6 hours as needed

## 2018-04-23 NOTE — PROGRESS NOTES
First Name: Amira  Age: 28 year old   Referring Physician: Dr. Landa   REASON FOR REFERRAL: Education and evaluation for tunneled catheter placement  Patient is undergoing w/u for autologous BMT, using her own stem cells as the donor     HPI:  This is a patient who initially presented in 02/2017 with left neck mass.  Ultrasound-guided FNA was done in 03/2017, negative for malignancy, however, it continued.  Therefore on 04/19/2017, CT scan was done showing diffuse cervical lymphadenopathy left.  The maximum seemed to be around 2.5 x 2 cm in the mediastinum.  She had also lymphadenopathy in the left axilla, retroperitoneum and mediastinum.  The PET scan confirmed extensive intense hypermetabolic lymphadenopathy in the neck, left axilla, mediastinum, but nothing below the diaphragm. She then had excision of left cervical lymph nodes showing classical Hodgkin's lymphoma, nodular sclerosis type.  She was diagnosed stage IIB.  Although she has lost some weight at that time, ESR was high, greater than 50, IPI was score 1.  She received 6 cycles of ABVD between 06/09/2017 and 11/10/2017.  However, 11/09/2017 CT showed increasing minimal activity within poorly visualized but stable-appearing right cervical chain lymph nodes suggests mild progression of disease.  SUV was maximum of 4.7.  She was followed, and PET/CT was repeated 01/09/2018.  There was new and increasing hypermetabolic right cervical adenopathy suspicious for disease progression.  The lymph nodes had persistent hypermetabolic activity in the tonsillar tissues with maximum 7.9, right jugulodigastric lymph nodes with SUV 4.0, posterior cervical chain node SUV 6, new 6 mm right posterior cervical chain, maximum SUV is 3.3, new 7 mm right supraclavicular node with maximum SUV of 4.7.  Chest, no abnormal hypermetabolic activity.  Abdomen, no abnormal hypermetabolic activity.  She also had a CT on 02/05/2018 which showed enlarged heterogeneous right jugular  chain cervical lymph nodes in level 3 and 4 measuring approximately 1.9 x 2 x 2.6 cm and 2 x 1.8 x 2.2 cm are increased since neck CT from 04/21/2017, although may be similar to the PET/CT from 01/09/2018.  Resolution of previously enlarged left cervical lymph nodes on the CT compared to 04/21/2017.  Nasopharynx, oropharynx, tongue base, hypopharynx, larynx were normal, possible small sub-centimeter left thyroid nodule versus artifact, so she started on GDP 02/16/2018, and she had another PET scan 03/28/2018 showing that in the head and neck there is stable activity in the tonsillar tissue.  There are post-surgical changes in the right neck.  Hypermetabolic right cervical adenopathy in the prior study no longer demonstrated.  There are no new hypermetabolic lesions or lymph nodes in the neck.  No abnormal hypermetabolic activity in the abdomen.  Before starting GDP she underwent right neck excisional biopsy (2/13/18) showing recurrent classical Hodgkin lymphoma, nodular-sclerosing. She is now undergoing work-up for autologous BMT.    LINE HX:  1. 6/1/17-12/1/17:  Right internal jugular vein single lumen port a cath.  Removed, because it was thought she no longer needed it.  2. 2/21/18 to present:  Left internal jugular vein single lumen port a cath.  CXR shows the tip to end in the high right atrium.  No problems with the functioning of the port.     PAST MEDICAL HISTORY:   Past Medical History:   Diagnosis Date     Tattoos      PAST SURGICAL HISTORY:   Past Surgical History:   Procedure Laterality Date     excision of deep cervical LN's Left 05/19/2017    positive for Hodgkin's     excision of deep cervical LN's Right 02/13/2018    positive for Hodgkin's     port a cath placement Right 06/01/2017    IJ vein; removed 12/1/17 no longer needed     port a cath placement Left 02/21/2018    IJ vein     US BIOPSY LYMPH NODE FNA Left 03/06/2017    low midline neck, negative for malignancy     FAMILY HISTORY:   Family  History   Problem Relation Age of Onset     Hypertension Father      Coronary Artery Disease Maternal Grandmother      Colon Cancer Maternal Grandmother      SOCIAL HISTORY:   Social History   Substance Use Topics     Smoking status: Former Smoker     Packs/day: 0.25     Years: 10.00     Types: Cigarettes     Quit date: 9/10/2017     Smokeless tobacco: Never Used     Alcohol use No     PROBLEM LIST:   Patient Active Problem List    Diagnosis Date Noted     Hodgkin lymphoma, nodular sclerosis (H) 04/20/2018     Priority: Medium     Hodgkin disease (H) 04/20/2018     Priority: Medium     MEDICATIONS:   Prescription Medications as of 4/23/2018             acetaminophen (TYLENOL) 500 MG tablet Take by mouth every 6 hours as needed     ibuprofen (ADVIL/MOTRIN) 200 MG tablet Take 200 mg by mouth every 6 hours as needed       Facility Administered Medications as of 4/23/2018             barium sulfate (VOLUMEN) oral suspension 0.1 % 450 mL Take 450 mLs by mouth once    fluorodeoxyglucose F-18 (FDG) radioisotope injection 1-18 mCi Inject 1-18 millicuries (1-18 mCi) into the vein once    heparin flush SOLN 500 Units Inject 5 mLs (500 Units) into the vein once    iopamidol (ISOVUE-370) solution  mL Inject  mLs into the vein once    sodium chloride (PF) 0.9% PF flush 10-74 mL Inject 10-74 mLs into the vein once        ALLERGIES:    Allergies   Allergen Reactions     Morphine Hives     VITALS: /61  Pulse 68  SpO2 100%    ROS:  Answers for HPI/ROS submitted by the patient on 4/23/2018   General Symptoms: No  Skin Symptoms: No  HENT Symptoms: Yes  EYE SYMPTOMS: No  HEART SYMPTOMS: No  LUNG SYMPTOMS: No  INTESTINAL SYMPTOMS: No  URINARY SYMPTOMS: No  GYNECOLOGIC SYMPTOMS: No  BREAST SYMPTOMS: No  SKELETAL SYMPTOMS: No  BLOOD SYMPTOMS: No  NERVOUS SYSTEM SYMPTOMS: No  MENTAL HEALTH SYMPTOMS: No  Ear pain: No  Ear discharge: No  Hearing loss: No  Tinnitus: No  Nosebleeds: No  Congestion: No  Sinus pain:  No  Trouble swallowing: No   Voice hoarseness: No  Mouth sores: No  Sore throat: Yes  Tooth pain: No  Gum tenderness: No  Bleeding gums: No  Change in taste: No  Change in sense of smell: No  Dry mouth: No  Hearing aid used: No  Neck lump: No    Physical Examination: Vital signs are reviewed and they are stable  Constitutional: Pleasant, young woman, in no acute physical distress, came alone to her appt  Neck: Neck supple. No adenopathy  Musculoskeletal: extremities normal- no gross deformities noted, gait normal and normal muscle tone  Skin: no suspicious lesions or rashes; right and left neck incisions are well healed from LN removal.  Right port chest incision is well healed from the removal.  Left port site looks good as well.  She has a decorative tattoo that spans across her chest  Neurologic: negative  Psychiatric: affect normal/bright and mentation appears normal.    RESULTS:  BMP RESULTS:  Lab Results   Component Value Date     04/18/2018    POTASSIUM 4.0 04/18/2018    CHLORIDE 110 (H) 04/18/2018    CO2 21 04/18/2018    ANIONGAP 8 04/18/2018    GLC 94 04/18/2018    BUN 13 04/18/2018    CR 0.65 04/18/2018    GFRESTIMATED >90 04/18/2018    GFRESTBLACK >90 04/18/2018    NEETU 8.7 04/18/2018        CBC RESULTS:  Lab Results   Component Value Date    WBC 7.2 04/20/2018    RBC 4.01 04/20/2018    HGB 12.3 04/20/2018    HCT 36.0 04/20/2018    MCV 90 04/20/2018    MCH 30.7 04/20/2018    MCHC 34.2 04/20/2018    RDW 14.7 04/20/2018     04/20/2018       INR/PTT:  Lab Results   Component Value Date    INR 1.06 04/18/2018    PTT 28 04/18/2018         ASSESSMENT/PLAN:  Type of catheter: Large bore double lumen tunneled apheresis capable catheter     Preferred Location: Right  Internal Jugular Vein     Platelet count is   Lab Results   Component Value Date     04/20/2018    , and coagulation factors are   Lab Results   Component Value Date    INR 1.06 04/18/2018    ,  Lab Results   Component Value Date     PTT 28 04/18/2018   . The patient is at low risk for bleeding during the procedure.    PROVIDER NOTE:  The tunneled catheter placement procedure and its risks including but not limited to bleeding, infection, fibrin sheath formation and blood clots was explained to Jennie.    CONSENT: Affirmation of informed written consent was not obtained.     INSTRUCTIONS/GUIDELINES:   Eating and drinking restriction guidelines were reviewed., Anti-bacterial scrub was given and instructions for its use were reviewed to decrease the risk of infection on the day of the procedure., I explained the expected length of time the tunneled catheter could remain in place., I explained that when they [facility] they would be taught about exit site dressing care, flushing of the lumens and how to care for the catheter when bathing., I explained that when the port a cath is now in use that it will need to be flushed once a month., The role of Interventional Radiology was reviewed. and The principles of infection control were reviewed.     30 minutes was spent with Tatum.  25 minutes was spent in counseling.  Iwona Rodas MS, APRN, CNS, CRN  Clinical Nurse Specialist  Interventional Radiology  [phone] (voice mail)  [phone] (pager)    CC  Patient Care Team:  No Ref-Primary, Physician as PCP - General  Gina Lawson MD as Referring Physician (Hematology & Oncology)  Tanya Landa MD as BMT Physician (Internal Medicine)  TANYA LANDA

## 2018-04-24 ENCOUNTER — HOSPITAL ENCOUNTER (OUTPATIENT)
Dept: EDUCATION SERVICES | Facility: CLINIC | Age: 28
End: 2018-04-24
Attending: INTERNAL MEDICINE
Payer: COMMERCIAL

## 2018-04-24 ENCOUNTER — ALLIED HEALTH/NURSE VISIT (OUTPATIENT)
Dept: TRANSPLANT | Facility: CLINIC | Age: 28
End: 2018-04-24
Attending: INTERNAL MEDICINE
Payer: COMMERCIAL

## 2018-04-24 DIAGNOSIS — C81.90 HODGKIN DISEASE (H): ICD-10-CM

## 2018-04-24 DIAGNOSIS — Z86.2 PERSONAL HISTORY OF DISEASES OF BLOOD AND BLOOD-FORMING ORGANS: ICD-10-CM

## 2018-04-24 LAB
COPATH REPORT: NORMAL
COPATH REPORT: NORMAL

## 2018-04-24 ASSESSMENT — ANXIETY QUESTIONNAIRES
5. BEING SO RESTLESS THAT IT IS HARD TO SIT STILL: MORE THAN HALF THE DAYS
IF YOU CHECKED OFF ANY PROBLEMS ON THIS QUESTIONNAIRE, HOW DIFFICULT HAVE THESE PROBLEMS MADE IT FOR YOU TO DO YOUR WORK, TAKE CARE OF THINGS AT HOME, OR GET ALONG WITH OTHER PEOPLE: SOMEWHAT DIFFICULT
2. NOT BEING ABLE TO STOP OR CONTROL WORRYING: SEVERAL DAYS
1. FEELING NERVOUS, ANXIOUS, OR ON EDGE: MORE THAN HALF THE DAYS
7. FEELING AFRAID AS IF SOMETHING AWFUL MIGHT HAPPEN: MORE THAN HALF THE DAYS
3. WORRYING TOO MUCH ABOUT DIFFERENT THINGS: SEVERAL DAYS
6. BECOMING EASILY ANNOYED OR IRRITABLE: NOT AT ALL
GAD7 TOTAL SCORE: 9

## 2018-04-24 ASSESSMENT — PATIENT HEALTH QUESTIONNAIRE - PHQ9: 5. POOR APPETITE OR OVEREATING: SEVERAL DAYS

## 2018-04-24 NOTE — PROGRESS NOTES
04/24/18 1216     Leonor Gordon-Registered Nurse (Nursing)  Mom and patient attended central line care class. Mom RD correctly on model with all skills including cap change, flushing and bandage change. Received written material related to all content taught. Mom asked relevant questions. Answered all teach-back questions appropriately. Was encouraged to practice skills with BMT nursing staff when appropriate. State she understands all information presented.

## 2018-04-24 NOTE — MR AVS SNAPSHOT
After Visit Summary   4/24/2018    Amira Avery    MRN: [id number]           Patient Information     Date Of Birth          1990        Visit Information        Provider Department      4/24/2018 9:00 AM Becky William LICSW;  2 116 CONSULT [facility] Blood and Marrow Transplant        Today's Diagnoses     Hodgkin disease (H)        Personal history of diseases of blood and blood-forming organs              [facility] ([facility])  [address] [place] [address]  Phone: [phone]  Clinic Hours:   Monday-Thursday:7am to 7pm   Friday: 7am to 5pm   Weekends and holidays:    8am to noon (in general)  If your fever is 100.5  or greater,   call the clinic.  After hours call the   hospital at [phone] or   [phone]. Ask for the BMT   fellow on-call            Follow-ups after your visit        Who to contact     If you have questions or need follow up information about today's clinic visit or your schedule please contact [facility] BLOOD AND MARROW TRANSPLANT directly at [phone].  Normal or non-critical lab and imaging results will be communicated to you by [employer], letter or phone within 4 business days after the clinic has received the results. If you do not hear from us within 7 days, please contact the clinic through hintt or phone. If you have a critical or abnormal lab result, we will notify you by phone as soon as possible.  Submit refill requests through [employer] or call your pharmacy and they will forward the refill request to us. Please allow 3 business days for your refill to be completed.          Additional Information About Your Visit        MyChart Information     [employer] gives you secure access to your electronic health record. If you see a primary care provider, you can also send messages to your care team and make appointments. If you have questions, please call your primary care clinic.  If you do not have a primary care provider,  please call [phone] and they will assist you.        Care EveryWhere ID     This is your Care EveryWhere ID. This could be used by other organizations to access your [place] medical records  [id number]         Blood Pressure from Last 3 Encounters:   04/23/18 108/61   04/20/18 121/74   04/19/18 123/70    Weight from Last 3 Encounters:   04/25/18 72.2 kg (159 lb 1.6 oz)   04/19/18 70.6 kg (155 lb 10.3 oz)   04/18/18 70.9 kg (156 lb 6.4 oz)              We Performed the Following             Recent Review Flowsheet Data     BMT Recent Results Latest Ref Rng & Units 4/18/2018 4/20/2018 4/23/2018    WBC 4.0 - 11.0 10e9/L 5.0 7.2 -    Hemoglobin 11.7 - 15.7 g/dL 12.3 12.3 -    Platelet Count 150 - 450 10e9/L 268 273 -    Neutrophils (Absolute) 1.6 - 8.3 10e9/L 2.9 4.3 -    INR 0.86 - 1.14 1.06 - -    Sodium 133 - 144 mmol/L 139 - -    Potassium 3.4 - 5.3 mmol/L 4.0 - -    Chloride 94 - 109 mmol/L 110(H) - -    Glucose 70 - 99 mg/dL 94 - 100(H)    Urea Nitrogen 7 - 30 mg/dL 13 - -    Creatinine 0.52 - 1.04 mg/dL 0.65 - -    Calcium (Total) 8.5 - 10.1 mg/dL 8.7 - -    Protein (Total) 6.8 - 8.8 g/dL 7.4 - -    Albumin 3.4 - 5.0 g/dL 3.9 - -    Alkaline Phosphatase 40 - 150 U/L 48 - -    AST 0 - 45 U/L 53(H) - -    ALT 0 - 50 U/L 40 - -    MCV 78 - 100 fl 90 90 -               Primary Care Provider Fax #    Physician No Ref-Primary [phone]       No address on file        Equal Access to Services     RICHIE RAMEY AH: Alexis Luis, samaria ceja, jen lujan la'aan ah. So [facility] [phone].    ATENCIÓN: Si habla español, tiene a isabel disposición servicios gratuitos de asistencia lingüística. Llame al [phone].    We comply with applicable federal civil rights laws and [place] laws. We do not discriminate on the basis of race, color, national origin, age, disability, sex, sexual orientation, or gender identity.            Thank you!      Thank you for choosing [facility] BLOOD AND MARROW TRANSPLANT  for your care. Our goal is always to provide you with excellent care. Hearing back from our patients is one way we can continue to improve our services. Please take a few minutes to complete the written survey that you may receive in the mail after your visit with us. Thank you!             Your Updated Medication List - Protect others around you: Learn how to safely use, store and throw away your medicines at www.disposemymeds.org.          This list is accurate as of 4/24/18 11:59 PM.  Always use your most recent med list.                   Brand Name Dispense Instructions for use Diagnosis    acetaminophen 500 MG tablet    TYLENOL     Take by mouth every 6 hours as needed        ibuprofen 200 MG tablet    ADVIL/MOTRIN     Take 200 mg by mouth every 6 hours as needed

## 2018-04-25 ENCOUNTER — PRE VISIT (OUTPATIENT)
Dept: OTOLARYNGOLOGY | Facility: CLINIC | Age: 28
End: 2018-04-25

## 2018-04-25 ENCOUNTER — OFFICE VISIT (OUTPATIENT)
Dept: OTOLARYNGOLOGY | Facility: CLINIC | Age: 28
End: 2018-04-25
Payer: COMMERCIAL

## 2018-04-25 VITALS — WEIGHT: 159.1 LBS | BODY MASS INDEX: 28.19 KG/M2 | HEIGHT: 63 IN

## 2018-04-25 DIAGNOSIS — C81.10 NODULAR SCLEROSING HODGKIN'S LYMPHOMA, UNSPECIFIED BODY REGION (H): Primary | ICD-10-CM

## 2018-04-25 DIAGNOSIS — C81.11 NODULAR SCLEROSIS HODGKIN LYMPHOMA OF LYMPH NODES OF NECK (H): ICD-10-CM

## 2018-04-25 DIAGNOSIS — R59.1 LYMPHADENOPATHY: ICD-10-CM

## 2018-04-25 ASSESSMENT — PAIN SCALES - GENERAL: PAINLEVEL: NO PAIN (0)

## 2018-04-25 NOTE — MR AVS SNAPSHOT
After Visit Summary   4/25/2018    Amira Avery    MRN: [id number]           Patient Information     Date Of Birth          1990        Visit Information        Provider Department      4/25/2018 8:20 AM Lia Silva MD [facility] Ear Nose and Throat        Today's Diagnoses     Nodular sclerosing Hodgkin's lymphoma, unspecified body region (H)    -  1    Lymphadenopathy        Nodular sclerosis Hodgkin lymphoma of lymph nodes of neck (H)          Care Instructions    1. We will call you with plan after discussing with your BMT team.   2. Please call the ENT clinic with any questions,concerns, new or worsening symptoms.    -Clinic number is [phone]   - Aneta's direct line (Dr. Silva's nurse) [phone]              Follow-ups after your visit        Your next 10 appointments already scheduled     May 02, 2018   Procedure with Lia Silva MD   [facility] ([facility])    [address] [place] [address]  [address]th [facility] [address] [phone]           Located in the Clinics and Surgery Center at [address] [place] [address].   parking is very convenient and highly recommended.  is a $6 flat rate fee.  Both  and self parkers should enter the main arrival plaza from [facility]; parking attendants will direct you based on your parking preference.            May 09, 2018  4:00 PM CDT   (Arrive by 3:45 PM)   Return Visit with Lia Silva MD   [facility] Ear Nose and Throat ([facility])    [address] [place] [address]  4th [facility] [address] [phone]              Who to contact     Please call your clinic at [phone] to:    Ask questions about your health    Make or cancel appointments    Discuss your medicines    Learn about your test results    Speak to your doctor            Additional Information About Your Visit        MyChart Information      "[employer] gives you secure access to your electronic health record. If you see a primary care provider, you can also send messages to your care team and make appointments. If you have questions, please call your primary care clinic.  If you do not have a primary care provider, please call [phone] and they will assist you.      [employer] is an electronic gateway that provides easy, online access to your medical records. With [employer], you can request a clinic appointment, read your test results, renew a prescription or communicate with your care team.     To access your existing account, please contact your [facility] or call [phone] for assistance.        Care EveryWhere ID     This is your Care EveryWhere ID. This could be used by other organizations to access your [place] medical records  [id number]        Your Vitals Were     Height BMI (Body Mass Index)                1.6 m (5' 3\") 28.18 kg/m2           Blood Pressure from Last 3 Encounters:   04/23/18 108/61   04/20/18 121/74   04/19/18 123/70    Weight from Last 3 Encounters:   04/25/18 72.2 kg (159 lb 1.6 oz)   04/19/18 70.6 kg (155 lb 10.3 oz)   04/18/18 70.9 kg (156 lb 6.4 oz)              We Performed the Following     IMAGESTREAM RECORDING ORDER     Jennie-Operative Worksheet (Head & Neck)        Primary Care Provider Fax #    Physician No Ref-Primary [phone]       No address on file        Equal Access to Services     RICHIE RAMEY : Hadii velma ku azebo Sotiagoali, waaxda luqadaha, qaybta kaalmada adeegyada, jen raymundo. So [facility] [phone].    ATENCIÓN: Si habla español, tiene a isabel disposición servicios gratuitos de asistencia lingüística. Llame al [phone].    We comply with applicable federal civil rights laws and [place] laws. We do not discriminate on the basis of race, color, national origin, age, disability, sex, sexual orientation, or gender identity.            Thank " you!     Thank you for choosing [facility] EAR NOSE AND THROAT  for your care. Our goal is always to provide you with excellent care. Hearing back from our patients is one way we can continue to improve our services. Please take a few minutes to complete the written survey that you may receive in the mail after your visit with us. Thank you!             Your Updated Medication List - Protect others around you: Learn how to safely use, store and throw away your medicines at www.disposemymeds.org.          This list is accurate as of 4/25/18 11:59 PM.  Always use your most recent med list.                   Brand Name Dispense Instructions for use Diagnosis    acetaminophen 500 MG tablet    TYLENOL     Take by mouth every 6 hours as needed        ibuprofen 200 MG tablet    ADVIL/MOTRIN     Take 200 mg by mouth every 6 hours as needed

## 2018-04-25 NOTE — LETTER
4/25/2018       RE: Amira Aevry  [address] [facility] [address]     Dear Colleague,    Thank you for referring your patient, Amira Avery, to the [facility] EAR NOSE AND THROAT at [facility]. Please see a copy of my visit note below.    Dear Dr. Landa:    I had the pleasure of meeting Amira Avery in consultation today at the [facility] at your request.     History of Present Illness:   Amira Avery is a 28 year old woman with a history of Hogkin's lymphoma who is referred for lymph node biopsy for possible recurrence. She was initially diagnosed in spring 2017. She presented with an enlarged neck node which was negative on FNA and core biopsy. She then underwent left excisional node biopsy with diagnosis consistent with classic Hodgkin's lymphoma. She was treated with chemotherapy from 6/2017 to 11/2017. She had disease recurrence and had repeat needle biopsies which were nondiagnostic and then had a right excisional node biopsy which again showed classic Hodgkin lymphoma. She is undergoing workup for bone marrow transplant and her recent PET scan showed a hypermetabolic level IV node. She has not noticed any neck masses and is not having any pain. She is not having any classic B cell symptoms of fevers, chills, nightsweats. She denies any changes in weight.    MEDICATIONS:     Current Outpatient Prescriptions   Medication Sig Dispense Refill     acetaminophen (TYLENOL) 500 MG tablet Take by mouth every 6 hours as needed        ibuprofen (ADVIL/MOTRIN) 200 MG tablet Take 200 mg by mouth every 6 hours as needed          ALLERGIES:    Allergies   Allergen Reactions     Morphine Hives       HABITS/SOCIAL HISTORY:     Social History     Social History     Marital status: Single     Spouse name: N/A     Number of children: N/A     Years of education: N/A     Occupational History     Not on file.  "    Social History Main Topics     Smoking status: Former Smoker     Packs/day: 1.00     Years: 10.00     Types: Cigarettes, Hookah     Start date: 7/15/2007     Quit date: 10/15/2017     Smokeless tobacco: Never Used     Alcohol use No     Drug use: No     Sexual activity: Yes     Partners: Female     Birth control/ protection: None     Other Topics Concern     Not on file     Social History Narrative       PAST MEDICAL HISTORY:   Past Medical History:   Diagnosis Date     Cancer (H)      Heart disease      Hypertension      Tattoos         PAST SURGICAL HISTORY:   Past Surgical History:   Procedure Laterality Date     excision of deep cervical LN's Left 05/19/2017    positive for Hodgkin's     excision of deep cervical LN's Right 02/13/2018    positive for Hodgkin's     port a cath placement Right 06/01/2017    IJ vein; removed 12/1/17 no longer needed     port a cath placement Left 02/21/2018    IJ vein     TRANSPLANT       US BIOPSY LYMPH NODE FNA Left 03/06/2017    low midline neck, negative for malignancy       FAMILY HISTORY:    Family History   Problem Relation Age of Onset     Hypertension Father      Coronary Artery Disease Maternal Grandmother      Colon Cancer Maternal Grandmother      CANCER Maternal Grandmother      HEART DISEASE Maternal Grandmother        REVIEW OF SYSTEMS:  12 point ROS was negative other than the symptoms noted above in the HPI.  Patient Supplied Answers to Review of Systems  No flowsheet data found.      PHYSICAL EXAMINATION:   Ht 1.6 m (5' 3\")  Wt 72.2 kg (159 lb 1.6 oz)  BMI 28.18 kg/m2   Appearance:   normal; NAD, age-appropriate appearance, well-developed, normal habitus   Communication:   normal; communicates verbally, normal voice quality   Head/Face:   inspection -  Normal; no scars or visible lesions   Salivary glands -  Normal size, no tenderness, swelling, or palpable masses   Facial strength -  Normal and symmetric bilateral; H/B I/VI   Skin:  normal, no rash   Ocular " Motility:  normal occular movements   Ears:  auricle (AD) -  normal  EAC (AD) -  normal  TM (AD) -  Normal, no effusion  auricle (AS) -  normal  EAC (AS) -  normal  TM (AS) -  Normal, no effusion  Normal clinical speech reception   Nose:  Ext. inspection -  Normal, piercing   Oral Cavity:  lips -  Normal mucosa, oral competence, and stoma size   Age-appropriate dentition, healthy gingival mucosa   Hard palate, buccal, floor of mouth mucosa normal   Tongue - normal movement, no lesions   Oropharynx:  mucosa -  Normal, no lesions  soft palate -  Normal, no lesions, no asymmetry, normal elevation  tonsils -  2+ tonsils bilaterally, symmetric, no palpable masses   Neck: No visible mass or asymmetry   Well healed right neck incision in approximately level III  Normal palpation, no tenderness, no tracheal deviation  Normal range of motion   Lymphatic:  palpable about 2 cm node in right level IV medial and deep to SCM   Cardiovascular: warm, pink, well-perfused extremities without swelling, tenderness, or edema   Respiratory:  Normal respiratory effort, no stridor   Neuro/Psych.:  mood/affect -  normal  mental status -  normal  cranial nerves -  normal          RESULTS REVIEWED:   I reviewed the BMT notes which are summarized above    I independently reviewed the PET/CT scan which showed a hypermetabolic node in right level IV    IMPRESSION AND PLAN:   Amira Avery is a 28 year old with recurrent Hodgkin's lymphoma. She has a palpable node in right level IV. We discussed performing a needle biopsy but the patient declines. We have contacted her hematology team to determine if they are requesting a core vs excisional node biopsy since the Epic messages indicated a core needle was desired. I discussed with the patient that if we proceed with an excisional node biopsy it will be associated with similar risks as to her previous procedures. We would try to use her old incision but may need to extend it to get access down to  level IV. Alternatively we can create a new incision over the node. The risks of the procedure include scarring, bleeding, infection, damage to surrounding structures. This specific node location places her at risk for a right sided chyle leak which I discussed with her including need for low fat diet, pressure dressing, possible additional procedures if this were to occur.    Thank you very much for the opportunity to participate in the care of your patient.      Lia Silva M.D.  Otolaryngology- Head & Neck Surgery        CC:  Tanya Landa MD  [address] [place] [address] [id number]  [place] [address]

## 2018-04-25 NOTE — PATIENT INSTRUCTIONS
1. We will call you with plan after discussing with your BMT team.   2. Please call the ENT clinic with any questions,concerns, new or worsening symptoms.    -Clinic number is [phone]   - Aneta's direct line (Dr. Silva's nurse) [phone]
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
## 2018-04-25 NOTE — NURSING NOTE
"Chief Complaint   Patient presents with     Consult     Consultation for tonsillary involvement in Hodgkin lymph.      Height 1.6 m (5' 3\"), weight 72.2 kg (159 lb 1.6 oz).    Everette Hernandez    "

## 2018-04-25 NOTE — PROGRESS NOTES
Dear Dr. Landa:    I had the pleasure of meeting Amira Avery in consultation today at the [facility] at your request.     History of Present Illness:   Amira Avery is a 28 year old woman with a history of Hogkin's lymphoma who is referred for lymph node biopsy for possible recurrence. She was initially diagnosed in spring 2017. She presented with an enlarged neck node which was negative on FNA and core biopsy. She then underwent left excisional node biopsy with diagnosis consistent with classic Hodgkin's lymphoma. She was treated with chemotherapy from 6/2017 to 11/2017. She had disease recurrence and had repeat needle biopsies which were nondiagnostic and then had a right excisional node biopsy which again showed classic Hodgkin lymphoma. She is undergoing workup for bone marrow transplant and her recent PET scan showed a hypermetabolic level IV node. She has not noticed any neck masses and is not having any pain. She is not having any classic B cell symptoms of fevers, chills, nightsweats. She denies any changes in weight.    MEDICATIONS:     Current Outpatient Prescriptions   Medication Sig Dispense Refill     acetaminophen (TYLENOL) 500 MG tablet Take by mouth every 6 hours as needed        ibuprofen (ADVIL/MOTRIN) 200 MG tablet Take 200 mg by mouth every 6 hours as needed          ALLERGIES:    Allergies   Allergen Reactions     Morphine Hives       HABITS/SOCIAL HISTORY:     Social History     Social History     Marital status: Single     Spouse name: N/A     Number of children: N/A     Years of education: N/A     Occupational History     Not on file.     Social History Main Topics     Smoking status: Former Smoker     Packs/day: 1.00     Years: 10.00     Types: Cigarettes, Hookah     Start date: 7/15/2007     Quit date: 10/15/2017     Smokeless tobacco: Never Used     Alcohol use No     Drug use: No     Sexual activity: Yes     Partners: Female     Birth  "control/ protection: None     Other Topics Concern     Not on file     Social History Narrative       PAST MEDICAL HISTORY:   Past Medical History:   Diagnosis Date     Cancer (H)      Heart disease      Hypertension      Tattoos         PAST SURGICAL HISTORY:   Past Surgical History:   Procedure Laterality Date     excision of deep cervical LN's Left 05/19/2017    positive for Hodgkin's     excision of deep cervical LN's Right 02/13/2018    positive for Hodgkin's     port a cath placement Right 06/01/2017    IJ vein; removed 12/1/17 no longer needed     port a cath placement Left 02/21/2018    IJ vein     TRANSPLANT       US BIOPSY LYMPH NODE FNA Left 03/06/2017    low midline neck, negative for malignancy       FAMILY HISTORY:    Family History   Problem Relation Age of Onset     Hypertension Father      Coronary Artery Disease Maternal Grandmother      Colon Cancer Maternal Grandmother      CANCER Maternal Grandmother      HEART DISEASE Maternal Grandmother        REVIEW OF SYSTEMS:  12 point ROS was negative other than the symptoms noted above in the HPI.  Patient Supplied Answers to Review of Systems  No flowsheet data found.      PHYSICAL EXAMINATION:   Ht 1.6 m (5' 3\")  Wt 72.2 kg (159 lb 1.6 oz)  BMI 28.18 kg/m2   Appearance:   normal; NAD, age-appropriate appearance, well-developed, normal habitus   Communication:   normal; communicates verbally, normal voice quality   Head/Face:   inspection -  Normal; no scars or visible lesions   Salivary glands -  Normal size, no tenderness, swelling, or palpable masses   Facial strength -  Normal and symmetric bilateral; H/B I/VI   Skin:  normal, no rash   Ocular Motility:  normal occular movements   Ears:  auricle (AD) -  normal  EAC (AD) -  normal  TM (AD) -  Normal, no effusion  auricle (AS) -  normal  EAC (AS) -  normal  TM (AS) -  Normal, no effusion  Normal clinical speech reception   Nose:  Ext. inspection -  Normal, piercing   Oral Cavity:  lips -  Normal " mucosa, oral competence, and stoma size   Age-appropriate dentition, healthy gingival mucosa   Hard palate, buccal, floor of mouth mucosa normal   Tongue - normal movement, no lesions   Oropharynx:  mucosa -  Normal, no lesions  soft palate -  Normal, no lesions, no asymmetry, normal elevation  tonsils -  2+ tonsils bilaterally, symmetric, no palpable masses   Neck: No visible mass or asymmetry   Well healed right neck incision in approximately level III  Normal palpation, no tenderness, no tracheal deviation  Normal range of motion   Lymphatic:  palpable about 2 cm node in right level IV medial and deep to SCM   Cardiovascular: warm, pink, well-perfused extremities without swelling, tenderness, or edema   Respiratory:  Normal respiratory effort, no stridor   Neuro/Psych.:  mood/affect -  normal  mental status -  normal  cranial nerves -  normal          RESULTS REVIEWED:   I reviewed the BMT notes which are summarized above    I independently reviewed the PET/CT scan which showed a hypermetabolic node in right level IV    IMPRESSION AND PLAN:   Amira Avery is a 28 year old with recurrent Hodgkin's lymphoma. She has a palpable node in right level IV. We discussed performing a needle biopsy but the patient declines. We have contacted her hematology team to determine if they are requesting a core vs excisional node biopsy since the Epic messages indicated a core needle was desired. I discussed with the patient that if we proceed with an excisional node biopsy it will be associated with similar risks as to her previous procedures. We would try to use her old incision but may need to extend it to get access down to level IV. Alternatively we can create a new incision over the node. The risks of the procedure include scarring, bleeding, infection, damage to surrounding structures. This specific node location places her at risk for a right sided chyle leak which I discussed with her including need for low fat diet,  pressure dressing, possible additional procedures if this were to occur.    Thank you very much for the opportunity to participate in the care of your patient.      Lia Silva M.D.  Otolaryngology- Head & Neck Surgery        CC:  Tanya Landa MD  [address] [place] [address] [id number]  [place] [address]

## 2018-04-26 ENCOUNTER — TELEPHONE (OUTPATIENT)
Dept: OTOLARYNGOLOGY | Facility: CLINIC | Age: 28
End: 2018-04-26

## 2018-04-26 ENCOUNTER — CARE COORDINATION (OUTPATIENT)
Dept: OTOLARYNGOLOGY | Facility: CLINIC | Age: 28
End: 2018-04-26

## 2018-04-26 ASSESSMENT — PATIENT HEALTH QUESTIONNAIRE - PHQ9
SUM OF ALL RESPONSES TO PHQ QUESTIONS 1-9: 14
SUM OF ALL RESPONSES TO PHQ QUESTIONS 1-9: 13

## 2018-04-26 ASSESSMENT — ANXIETY QUESTIONNAIRES: GAD7 TOTAL SCORE: 9

## 2018-04-26 NOTE — TELEPHONE ENCOUNTER
Called patient and scheduled her surgery for Dr Silva on 5/2/18 @ [facility].  Surgery packet will be mailed to patient. If she has difficulty scheduling a preop H&P, she will call me and we can schedule it at the PAC clinic.

## 2018-04-26 NOTE — PROGRESS NOTES
Called and spoke to patient to let her know that Dr. Silva has reviewed with Dr. Landa and they would like to proceed with an excisional node biopsy. Surgery worksheet entered and patient will be called with date and time. She will work with her PCP to arrange date and time for pre-op physical. Patient agreeable to plan and she was encouraged to call with further questions or concerns.     Aneta Hook, RN, BSN

## 2018-05-01 ENCOUNTER — ANESTHESIA EVENT (OUTPATIENT)
Dept: SURGERY | Facility: AMBULATORY SURGERY CENTER | Age: 28
End: 2018-05-01

## 2018-05-01 NOTE — PROGRESS NOTES
BMT Teaching Flowsheet    Amira Avery is a 28 year old female  Diagnoses of Hodgkin disease (H) and Personal history of diseases of blood and blood-forming organs were pertinent to this visit.    Teaching Topic: Autologous PBSCT     Person(s) involved in teaching: Patient  Motivation Level  Asks Questions: Yes  Eager to Learn: Yes  Cooperative: Yes  Receptive (willing/able to accept information): Yes  Any cultural factors/Ep[employer] beliefs that may influence understanding or compliance? No    Patient demonstrates understanding of the following:  - Reason for the appointment, diagnosis and treatment plan: Yes  - Knowledge of proper use of medications and conditions for which they are ordered (with special attention to potential side effects or drug interactions): Yes  - Which situations necessitate calling provider and whom to contact: Yes    Teaching concerns addressed: Reviewed collections and transplant calender, consents, medications, side effects, clinic flow and routine, limitations after transplant, caregiver role after transplant, and when to call.      Proper use and care of (medical equipment, care aids, etc.) NA  Pain management techniques: Yes  Patient instructed on hand hygiene: Yes  How and/when to access community resources: Yes    Infection Control:  Patient demonstrates understanding of the following:  Surgical procedure site care taught NA  Signs and symptoms of infection taught Yes  Wound care taught NA  Central venous catheter care taught NA    Instructional Materials Used/Given: BMT teaching binder    Time spent with patient: 90 minutes.    Specific Concerns: No, explain: patient verbalized understanding of provided material via teach back method. No further questions or concerns at this time.

## 2018-05-02 ENCOUNTER — SURGERY (OUTPATIENT)
Age: 28
End: 2018-05-02
Payer: COMMERCIAL

## 2018-05-02 ENCOUNTER — ANESTHESIA (OUTPATIENT)
Dept: SURGERY | Facility: AMBULATORY SURGERY CENTER | Age: 28
End: 2018-05-02

## 2018-05-02 ENCOUNTER — HOSPITAL ENCOUNTER (OUTPATIENT)
Facility: AMBULATORY SURGERY CENTER | Age: 28
End: 2018-05-02
Attending: OTOLARYNGOLOGY
Payer: COMMERCIAL

## 2018-05-02 VITALS
SYSTOLIC BLOOD PRESSURE: 112 MMHG | BODY MASS INDEX: 27.64 KG/M2 | TEMPERATURE: 98 F | HEIGHT: 63 IN | DIASTOLIC BLOOD PRESSURE: 61 MMHG | RESPIRATION RATE: 16 BRPM | HEART RATE: 64 BPM | WEIGHT: 156 LBS | OXYGEN SATURATION: 96 %

## 2018-05-02 DIAGNOSIS — Z98.890 S/P LYMPH NODE BIOPSY: Primary | ICD-10-CM

## 2018-05-02 LAB
DLCOCOR-%PRED-PRE: 98 %
DLCOCOR-PRE: 24.56 ML/MIN/MMHG
DLCOUNC-%PRED-PRE: 95 %
DLCOUNC-PRE: 23.69 ML/MIN/MMHG
DLCOUNC-PRED: 24.87 ML/MIN/MMHG
ERV-%PRED-PRE: 119 %
ERV-PRE: 1.29 L
ERV-PRED: 1.08 L
EXPTIME-PRE: 6.09 SEC
FEF2575-%PRED-PRE: 110 %
FEF2575-PRE: 4.01 L/SEC
FEF2575-PRED: 3.62 L/SEC
FEFMAX-%PRED-PRE: 129 %
FEFMAX-PRE: 8.85 L/SEC
FEFMAX-PRED: 6.82 L/SEC
FEV1-%PRED-PRE: 125 %
FEV1-PRE: 3.96 L
FEV1FEV6-PRE: 82 %
FEV1FEV6-PRED: 86 %
FEV1FVC-PRE: 82 %
FEV1FVC-PRED: 85 %
FEV1SVC-PRE: 84 %
FEV1SVC-PRED: 82 %
FIFMAX-PRE: 7.54 L/SEC
FRCPLETH-%PRED-PRE: 105 %
FRCPLETH-PRE: 2.76 L
FRCPLETH-PRED: 2.61 L
FVC-%PRED-PRE: 130 %
FVC-PRE: 4.82 L
FVC-PRED: 3.68 L
HCG UR QL: NEGATIVE
IC-%PRED-PRE: 123 %
IC-PRE: 3.41 L
IC-PRED: 2.76 L
INTERNAL QC OK POCT: YES
RVPLETH-%PRED-PRE: 110 %
RVPLETH-PRE: 1.48 L
RVPLETH-PRED: 1.33 L
TLCPLETH-%PRED-PRE: 129 %
TLCPLETH-PRE: 6.17 L
TLCPLETH-PRED: 4.77 L
VA-%PRED-PRE: 123 %
VA-PRE: 6.03 L
VC-%PRED-PRE: 122 %
VC-PRE: 4.69 L
VC-PRED: 3.84 L

## 2018-05-02 RX ORDER — OXYCODONE HYDROCHLORIDE 5 MG/1
5 TABLET ORAL ONCE
Status: COMPLETED | OUTPATIENT
Start: 2018-05-02 | End: 2018-05-02

## 2018-05-02 RX ORDER — ACETAMINOPHEN 325 MG/1
975 TABLET ORAL ONCE
Status: COMPLETED | OUTPATIENT
Start: 2018-05-02 | End: 2018-05-02

## 2018-05-02 RX ORDER — FENTANYL CITRATE 50 UG/ML
INJECTION, SOLUTION INTRAMUSCULAR; INTRAVENOUS PRN
Status: DISCONTINUED | OUTPATIENT
Start: 2018-05-02 | End: 2018-05-02

## 2018-05-02 RX ORDER — GABAPENTIN 300 MG/1
300 CAPSULE ORAL ONCE
Status: COMPLETED | OUTPATIENT
Start: 2018-05-02 | End: 2018-05-02

## 2018-05-02 RX ORDER — OXYCODONE HYDROCHLORIDE 5 MG/1
5 TABLET ORAL EVERY 6 HOURS PRN
Qty: 30 TABLET | Refills: 0 | Status: SHIPPED | OUTPATIENT
Start: 2018-05-02 | End: 2018-05-09

## 2018-05-02 RX ORDER — PROPOFOL 10 MG/ML
INJECTION, EMULSION INTRAVENOUS CONTINUOUS PRN
Status: DISCONTINUED | OUTPATIENT
Start: 2018-05-02 | End: 2018-05-02

## 2018-05-02 RX ORDER — OXYCODONE HYDROCHLORIDE 5 MG/1
5 TABLET ORAL EVERY 6 HOURS PRN
Qty: 12 TABLET | Refills: 0 | Status: SHIPPED | OUTPATIENT
Start: 2018-05-02 | End: 2018-05-02

## 2018-05-02 RX ORDER — NALOXONE HYDROCHLORIDE 0.4 MG/ML
.1-.4 INJECTION, SOLUTION INTRAMUSCULAR; INTRAVENOUS; SUBCUTANEOUS
Status: DISCONTINUED | OUTPATIENT
Start: 2018-05-02 | End: 2018-05-03 | Stop reason: HOSPADM

## 2018-05-02 RX ORDER — LIDOCAINE HYDROCHLORIDE AND EPINEPHRINE 10; 10 MG/ML; UG/ML
INJECTION, SOLUTION INFILTRATION; PERINEURAL PRN
Status: DISCONTINUED | OUTPATIENT
Start: 2018-05-02 | End: 2018-05-02 | Stop reason: HOSPADM

## 2018-05-02 RX ORDER — LIDOCAINE HYDROCHLORIDE 10 MG/ML
INJECTION, SOLUTION INFILTRATION; PERINEURAL PRN
Status: DISCONTINUED | OUTPATIENT
Start: 2018-05-02 | End: 2018-05-02

## 2018-05-02 RX ORDER — SODIUM CHLORIDE, SODIUM LACTATE, POTASSIUM CHLORIDE, CALCIUM CHLORIDE 600; 310; 30; 20 MG/100ML; MG/100ML; MG/100ML; MG/100ML
INJECTION, SOLUTION INTRAVENOUS CONTINUOUS
Status: DISCONTINUED | OUTPATIENT
Start: 2018-05-02 | End: 2018-05-02 | Stop reason: HOSPADM

## 2018-05-02 RX ORDER — SODIUM CHLORIDE, SODIUM LACTATE, POTASSIUM CHLORIDE, CALCIUM CHLORIDE 600; 310; 30; 20 MG/100ML; MG/100ML; MG/100ML; MG/100ML
INJECTION, SOLUTION INTRAVENOUS CONTINUOUS
Status: DISCONTINUED | OUTPATIENT
Start: 2018-05-02 | End: 2018-05-03 | Stop reason: HOSPADM

## 2018-05-02 RX ORDER — ONDANSETRON 2 MG/ML
INJECTION INTRAMUSCULAR; INTRAVENOUS PRN
Status: DISCONTINUED | OUTPATIENT
Start: 2018-05-02 | End: 2018-05-02

## 2018-05-02 RX ORDER — ONDANSETRON 2 MG/ML
4 INJECTION INTRAMUSCULAR; INTRAVENOUS EVERY 30 MIN PRN
Status: DISCONTINUED | OUTPATIENT
Start: 2018-05-02 | End: 2018-05-03 | Stop reason: HOSPADM

## 2018-05-02 RX ORDER — FENTANYL CITRATE 50 UG/ML
25-50 INJECTION, SOLUTION INTRAMUSCULAR; INTRAVENOUS
Status: DISCONTINUED | OUTPATIENT
Start: 2018-05-02 | End: 2018-05-02 | Stop reason: HOSPADM

## 2018-05-02 RX ORDER — LIDOCAINE 40 MG/G
CREAM TOPICAL
Status: DISCONTINUED | OUTPATIENT
Start: 2018-05-02 | End: 2018-05-02 | Stop reason: HOSPADM

## 2018-05-02 RX ORDER — SODIUM CHLORIDE, SODIUM LACTATE, POTASSIUM CHLORIDE, CALCIUM CHLORIDE 600; 310; 30; 20 MG/100ML; MG/100ML; MG/100ML; MG/100ML
INJECTION, SOLUTION INTRAVENOUS CONTINUOUS PRN
Status: DISCONTINUED | OUTPATIENT
Start: 2018-05-02 | End: 2018-05-02

## 2018-05-02 RX ORDER — DEXAMETHASONE SODIUM PHOSPHATE 4 MG/ML
INJECTION, SOLUTION INTRA-ARTICULAR; INTRALESIONAL; INTRAMUSCULAR; INTRAVENOUS; SOFT TISSUE PRN
Status: DISCONTINUED | OUTPATIENT
Start: 2018-05-02 | End: 2018-05-02

## 2018-05-02 RX ORDER — PROPOFOL 10 MG/ML
INJECTION, EMULSION INTRAVENOUS PRN
Status: DISCONTINUED | OUTPATIENT
Start: 2018-05-02 | End: 2018-05-02

## 2018-05-02 RX ORDER — ONDANSETRON 4 MG/1
4 TABLET, ORALLY DISINTEGRATING ORAL EVERY 30 MIN PRN
Status: DISCONTINUED | OUTPATIENT
Start: 2018-05-02 | End: 2018-05-03 | Stop reason: HOSPADM

## 2018-05-02 RX ADMIN — Medication 0.5 MG: at 11:39

## 2018-05-02 RX ADMIN — FENTANYL CITRATE 50 MCG: 50 INJECTION, SOLUTION INTRAMUSCULAR; INTRAVENOUS at 10:18

## 2018-05-02 RX ADMIN — PROPOFOL 200 MG: 10 INJECTION, EMULSION INTRAVENOUS at 10:21

## 2018-05-02 RX ADMIN — PROPOFOL 150 MCG/KG/MIN: 10 INJECTION, EMULSION INTRAVENOUS at 10:21

## 2018-05-02 RX ADMIN — SODIUM CHLORIDE, SODIUM LACTATE, POTASSIUM CHLORIDE, CALCIUM CHLORIDE: 600; 310; 30; 20 INJECTION, SOLUTION INTRAVENOUS at 10:16

## 2018-05-02 RX ADMIN — FENTANYL CITRATE 25 MCG: 50 INJECTION, SOLUTION INTRAMUSCULAR; INTRAVENOUS at 13:23

## 2018-05-02 RX ADMIN — FENTANYL CITRATE 25 MCG: 50 INJECTION, SOLUTION INTRAMUSCULAR; INTRAVENOUS at 13:04

## 2018-05-02 RX ADMIN — DEXAMETHASONE SODIUM PHOSPHATE 4 MG: 4 INJECTION, SOLUTION INTRA-ARTICULAR; INTRALESIONAL; INTRAMUSCULAR; INTRAVENOUS; SOFT TISSUE at 10:21

## 2018-05-02 RX ADMIN — FENTANYL CITRATE 25 MCG: 50 INJECTION, SOLUTION INTRAMUSCULAR; INTRAVENOUS at 12:58

## 2018-05-02 RX ADMIN — SODIUM CHLORIDE, SODIUM LACTATE, POTASSIUM CHLORIDE, CALCIUM CHLORIDE: 600; 310; 30; 20 INJECTION, SOLUTION INTRAVENOUS at 09:45

## 2018-05-02 RX ADMIN — GABAPENTIN 300 MG: 300 CAPSULE ORAL at 09:45

## 2018-05-02 RX ADMIN — OXYCODONE HYDROCHLORIDE 5 MG: 5 TABLET ORAL at 13:07

## 2018-05-02 RX ADMIN — Medication 1000 ML: at 12:04

## 2018-05-02 RX ADMIN — FENTANYL CITRATE 25 MCG: 50 INJECTION, SOLUTION INTRAMUSCULAR; INTRAVENOUS at 13:10

## 2018-05-02 RX ADMIN — ONDANSETRON 4 MG: 2 INJECTION INTRAMUSCULAR; INTRAVENOUS at 10:32

## 2018-05-02 RX ADMIN — LIDOCAINE HYDROCHLORIDE AND EPINEPHRINE 2.5 ML: 10; 10 INJECTION, SOLUTION INFILTRATION; PERINEURAL at 11:01

## 2018-05-02 RX ADMIN — ACETAMINOPHEN 975 MG: 325 TABLET ORAL at 09:45

## 2018-05-02 RX ADMIN — LIDOCAINE HYDROCHLORIDE 100 MG: 10 INJECTION, SOLUTION INFILTRATION; PERINEURAL at 10:21

## 2018-05-02 ASSESSMENT — LIFESTYLE VARIABLES: TOBACCO_USE: 1

## 2018-05-02 NOTE — DISCHARGE INSTRUCTIONS
[facility]  Home Care Following Anesthesia  For 24 hours after surgery:  1. Get plenty of rest.  A responsible adult must stay with you for at least 24 hours after you leave the surgery center.  2. Do not drive or use heavy equipment.  If you have weakness or tingling, don't drive or use heavy equipment until this feeling goes away.   3. Do not drink alcohol.   4. Avoid strenuous or risky activities.  Ask for help when climbing stairs.  5. You may feel lightheaded.  IF so, sit for a few minutes before standing.  Have someone help you get up.   6. If you have nausea (feel sick to your stomach): Drink only clear liquids such as apple juice, ginger ale, broth or 7-Up.  Rest may also help.  Be sure to drink enough fluids.  Move to a regular diet as you feel able.   7. You may have a slight fever.  Call the doctor if your fever is over 100 F (37.7 C) (taken under the tongue) or lasts longer than 24 hours.  8. You may have a dry mouth, a sore throat, muscle aches or trouble sleeping. These should go away after 24 hours.  9. Do not make important or legal decisions.   If you use hormonal birth control (such as the pill, patch, ring or implants):  You will need a second form of birth control for 7 days (condoms, a diaphragm or contraceptive foam).  While in the surgery center, you received a medicine called Sugammadex.  Hormonal birth control (such as the pill, patch, ring or implants) will not work as well for a week after taking this medicine.         Tips for taking pain medications  To get the best pain relief possible, remember these points:    Take pain medications as directed, before pain becomes severe.    Pain medication can upset your stomach: taking it with food may help.    Constipation is a common side effect of pain medication. Drink plenty of  fluids.    Eat foods high in fiber. Take a stool softener if recommended by your doctor or pharmacist.    Do not drink alcohol,  drive or operate machinery while taking pain medications.    Ask about other ways to control pain, such as with heat, ice or relaxation.    Tylenol/Acetaminophen Consumption  To help encourage the safe use of acetaminophen, the makers of TYLENOL  have lowered the maximum daily dose for single-ingredient Extra Strength TYLENOL  (acetaminophen) products sold in the [place] from 8 pills per day (4,000 mg) to 6 pills per day (3,000 mg). The dosing interval has also changed from 2 pills every 4-6 hours to 2 pills every 6 hours.    If you feel your pain relief is insufficient, you may take Tylenol/Acetaminophen in addition to your narcotic pain medication.     Be careful not to exceed 3,000 mg of Tylenol/Acetaminophen in a 24 hour period from all sources.    If you are taking extra strength Tylenol/acetaminophen (500 mg), the maximum dose is 6 tablets in 24 hours.    If you are taking regular strength acetaminophen (325 mg), the maximum dose is 9 tablets in 24 hours.    Call a doctor for any of the followin. Signs of infection (fever, growing tenderness at the surgery site, a large amount of drainage or bleeding, severe pain, foul-smelling drainage, redness, swelling).  2. It has been over 8 to 10 hours since surgery and you are still not able to urinate (pass water).  3. Headache for over 24 hours.  Your doctor is:  Dr. Lia Silva, ENT Otolaryngology: [phone]                  Or dial [phone] and ask for the resident on call for:  ENT Otolaryngology  For emergency care, call the:  [facility]:  [phone] (TTY for hearing impaired: [phone])                Caring for Your Wilian-Robin Drain    You have been discharged with a Wilian-Robin drainage tube. This tube drains fluid from your incision, helping prevent swelling and reducing the risk for infection. The tube is held in place by a few stitches. The drain will be removed when your doctor determines you no longer need it and when  the amount of drainage decreases. The color and amount of fluid varies. Right after surgery, the fluid may be bright red and may become clearer over time.   Dressing:    Keep the skin around the tube dry.    If you have a dressing, change it every day.   o Wash your hands.  o Remove the old bandage. Do not use scissors-you may accidentally cut the tube.  o Check for any redness, swelling, drainage, or broken stitches. (Call your doctor with any of these findings).  o Wash your hands again.  o Wet a cotton swab (Q-tip) and clean around the incision and the tube site. Use normal saline solution (salt and water) or soap and water. Start at the tube site and move outward in a circular motion.   o Pat dry.  o Put a new bandage on the incision and tube site. Make the bandage large enough to cover the whole incision area.   o Tape the bandage in place.  o Throw out old materials and wash your hands.   Home Care:    Tape the tube to the skin below the bandage. Make sure to keep some slack in the tube to keep it from pulling out.     DO NOT sleep on the same side as the tube.    Secure the tube and bulb inside your clothing with a safety pin. This helps keep the tube from being pulled out.     Keep the bulb compressed at all times, except when you empty it.    Empty your drain at least twice a day. Empty it more often if the drain is full.   o Lift the opening of the drain.  o Drain the fluid into a measuring cup.  o Record the amount of fluid each time you empty. Share the information with your doctor at your follow-up visit.   o Squeeze the bulb with your hands until you hear air coming out of the bulb.  o Close the opening.     Tape plastic wrap over the bandage and tube site when you shower.      Stripping  the tube helps keep blood clots from blocking the tube.                         ONLY DO THIS IF YOUR DOCTOR INSTRUCTED YOU TO DO SO!  o Hold the tubing where it leaves the skin with one hand. This keeps it from  pulling on the skin.  o Pinch the tubing with the thumb and first finger of your other hand.   o Slowly and firmly pull your thumb and first finger down the tube (squeezing the tube between your fingers). Keep squeezing the tube as you run your fingers towards the bulb. If the pulling hurts or feels like it is coming out of the skin, STOP. Begin again more gently.  o Let go of the tubing with both hands. If the tube is still blocked, repeat these steps three or four times. Make sure that the bulb is compressed so it creates suction.  When to call your doctor:    New or increased pain around the tube    Redness, warmth, or swelling around the incision or tube    Drainage that is foul smelling    Vomiting    Fever over 101 F degrees    Fluid leaking around the tube    Incision seems not to be healing    Stitches become loose    The tube falls out    Drainage that changes from light pick to dark red    A sudden increase or decrease in the amount of drainage (over 30 ml).  Your drainage record:  Date Time Bulb 1: Amount of drainage (ml or cc) Bulb 2: Amount of drainage (ml or cc) Notes

## 2018-05-02 NOTE — BRIEF OP NOTE
[facility]    Brief Operative Note    Pre-operative diagnosis: Lymphoma  Post-operative diagnosis same  Procedure: Procedure(s):  Right Excisional Node Biopsy - Wound Class: II-Clean Contaminated  Surgeon: Surgeon(s) and Role:     * Lia Silva MD - Primary  Gavin Shah MD- resident  Anesthesia: General   Estimated blood loss: Less than 10 ml  Drains: Wilian-Robin  Specimens:   ID Type Source Tests Collected by Time Destination   A : Level four lymph node Tissue Neck SURGICAL PATHOLOGY EXAM, LEUKEMIA LYMPHOMA EVALUATION (FLOW CYTOMETRY) Lia Silva MD 5/2/2018 11:37 AM      Findings:   1cmx2.5 cm right lymph node adherent to the right vagus nerve in the carotid sheeth..  Complications: None.  Implants: None.

## 2018-05-02 NOTE — ANESTHESIA POSTPROCEDURE EVALUATION
Patient: Amira Avery    Procedure(s):  Right Excisional Node Biopsy - Wound Class: II-Clean Contaminated    Diagnosis:Lymphoma  Diagnosis Additional Information: No value filed.    Anesthesia Type:  General    Note:  Anesthesia Post Evaluation    Patient location during evaluation: bedside  Patient participation: Able to fully participate in evaluation  Level of consciousness: awake  Pain management: adequate  Airway patency: patent  Cardiovascular status: acceptable  Respiratory status: acceptable  Hydration status: acceptable  PONV: controlled     Anesthetic complications: None          Last vitals:  Vitals:    05/02/18 1315 05/02/18 1330 05/02/18 1338   BP: 103/65 108/66 115/64   Pulse:      Resp: 14 16 16   Temp:  36.6  C (97.9  F) 36.6  C (97.9  F)   SpO2: 96% 94% 96%         Electronically Signed By: Kehinde Issa MD, MD  May 2, 2018  2:00 PM

## 2018-05-02 NOTE — ANESTHESIA CARE TRANSFER NOTE
Patient: Amira Avery    Procedure(s):  Right Excisional Node Biopsy - Wound Class: II-Clean Contaminated    Diagnosis: Lymphoma  Diagnosis Additional Information: No value filed.    Anesthesia Type:   General     Note:  Airway :Room Air  Patient transferred to:PACU  Comments: Patient awake and breathing spont. VSS. No complaints of pain or nausea. Report to RNHandoff Report: Identifed the Patient, Identified the Reponsible Provider, Reviewed the pertinent medical history, Discussed the surgical course, Reviewed Intra-OP anesthesia mangement and issues during anesthesia, Set expectations for post-procedure period and Allowed opportunity for questions and acknowledgement of understanding      Vitals: (Last set prior to Anesthesia Care Transfer)    CRNA VITALS  5/2/2018 1203 - 5/2/2018 1238      5/2/2018             SpO2: 97 %    Resp Rate (set): 10                Electronically Signed By: MARIA TERESA Pan CRNA  May 2, 2018  12:38 PM

## 2018-05-02 NOTE — IP AVS SNAPSHOT
[facility]    [address] [place] [id number]    Phone:  [phone]    Fax:  [phone]                                       After Visit Summary   5/2/2018    Amira Avery    MRN: [id number]           After Visit Summary Signature Page     I have received my discharge instructions, and my questions have been answered. I have discussed any challenges I see with this plan with the nurse or doctor.    ..........................................................................................................................................  Patient/Patient Representative Signature      ..........................................................................................................................................  Patient Representative Print Name and Relationship to Patient    ..................................................               ................................................  Date                                            Time    ..........................................................................................................................................  Reviewed by Signature/Title    ...................................................              ..............................................  Date                                                            Time

## 2018-05-02 NOTE — OP NOTE
Date of Procedure: 5/2/2018    Attending Physician: Lia Silva MD    Resident Physicians: Gavin Shah MD    Procedure Performed:  Excisional lymph node biopsy (deep)     Preoperative Diagnosis: recurrent Hodgkin lymphoma    Postoperative Diagnosis: same    Anesthesia: General    Blood loss: 10 cc    Specimens:            ID Type Source Tests Collected by Time Destination   A : Level four lymph node Tissue Neck SURGICAL PATHOLOGY EXAM, LEUKEMIA LYMPHOMA EVALUATION (FLOW CYTOMETRY) Lia Silva MD 5/2/2018 11:37 AM        Implants:  SHANICE drain x 1    Complications: None    Findings:    Findings:   1cm x 2.5 cm right lymph node adherent to the right vagus nerve and the internal jugular vein, demonstrating gross ECS  Vagus nerve atypical and thickened appearing in the region where it was directly overlying the abnormal node  Scar tissue from previous excisional node biopsy    Indications:  Amira Avery is a 28 year old woman with a history of Hogkin's lymphoma initially diagnosed in spring 2017. She presented with an enlarged neck node which was negative on FNA and core biopsy. She then underwent left excisional node biopsy with diagnosis consistent with classic Hodgkin's lymphoma. She was treated with chemotherapy from 6/2017 to 11/2017. She had disease recurrence and had repeat needle biopsies which were nondiagnostic and then had a right excisional node biopsy which again showed classic Hodgkin lymphoma. She is undergoing workup for bone marrow transplant and her recent PET scan showed a hypermetabolic level IV node. IR declined performing a needle biopsy. The patient is indicated for excisional node biopsy in the operating room.     Description of Procedure:  The patient was seen in the preoperative room and the surgical site was marked and consent was verified for the procedure. The patient was brought back to the operating room, placed in a supine position, was induced under general anesthesia, and an  ETT was placed without difficulty by the anesthesia team. A shoulder roll was placed, the patient was turned 90 degrees, and prepped and draped in the usual sterile fashion. At this time an institutional time out was taken in compliance with joint commission protocols. The planned incision was marked at the site of her previous scar. This was injected with 1% lidocaine with 1:100,000 epinephrine. A 4 cm incision was made through the prior scar down through the platysma. A subplatysmal flap was raised superiorly and then inferiorly down to the level of the clavicle. The anterior border of the SCM was defined using blunt dissection. The SCM was retracted using an Allis clamp and the omohyoid muscle was dissected out and retracted with a vicryl suture. The internal jugular vein was defined. Using manual palpation, the lymph node was identified medial and slightly deep to the internal jugular vein. The carotid sheath was entered to expose the lymph node. The jugular vein and carotid artery were carefully dissected off the node. The vagus nerve was identified and appeared to be adherent to the node. There was no identifiable plane between the vagus and the lymph node. The vagus nerve was thickened and abnormal appearing grossly. Similarly the medial border of the internal jugular vein was adherent to the lymph node. At this time we extended our skin incision by 1cm to allow better exposure. We were unable to find a plane between the nerve and the lymph node or the lymph node and the internal jugular vein. Following multiple attempts at trying to dissect these structures we decided the best course of action to be transecting the lymph node to obtain as much tissue as possible for pathologic studies without causing damage to the vagus nerve, rather than trying to excise the node in its entirety. This was sent for lymphoma workup. Hemostasis was obtained using bipolar cautery. A valsalva was performed multiple times by  anesthesia and there was no identifiable bleeding or chyle leak at that time. The wound was irrigated with 1 L of normal saline and 1 L of bacitracin irrigation. A 7 mm flat SHANICE drain was placed and secured to the skin with a 3-0 nylon. The skin was closed with a buried 3-0 vicryl followed by a running 4-0 prolene. Bacitracin was applied to the incision site. The patient was turned back to anesthesia, extubated and transferred to PACU in stable condition. In PACU her voice was noted to be intact and of similar quality and volume compared to preoperatively.    Dr. Lia Silva was present for the entirety of the case.    Gavin Shah PGY1  ENT head and neck surgery  [phone]      Teaching statement:  I was present for and participated in the entire procedure.      This procedure should be billed with a 22 modifier. The location of the node and the anatomy significantly increased the complexity of the procedure and prolonged the procedure.

## 2018-05-02 NOTE — ANESTHESIA PREPROCEDURE EVALUATION
Anesthesia Evaluation     . Pt has had prior anesthetic. Type: General    No history of anesthetic complications          ROS/MED HX    ENT/Pulmonary:  - neg pulmonary ROS   (+)tobacco use, Past use , . .    Neurologic:  - neg neurologic ROS     Cardiovascular:  - neg cardiovascular ROS       METS/Exercise Tolerance:  >4 METS   Hematologic:  - neg hematologic  ROS       Musculoskeletal:  - neg musculoskeletal ROS       GI/Hepatic:  - neg GI/hepatic ROS       Renal/Genitourinary:  - ROS Renal section negative       Endo:  - neg endo ROS       Psychiatric:         Infectious Disease:  - neg infectious disease ROS       Malignancy:   (+) Malignancy History of Lymphoma/Leukemia          Other:    - neg other ROS                 Physical Exam  Normal systems: dental    Airway   Mallampati: I  TM distance: >3 FB  Neck ROM: full    Dental     Cardiovascular   Rhythm and rate: regular and normal      Pulmonary    breath sounds clear to auscultation                    Anesthesia Plan      History & Physical Review  History and physical reviewed and following examination; no interval change.    ASA Status:  2 .    NPO Status:  > 6 hours    Plan for General and ETT with Intravenous induction. Maintenance will be TIVA.    PONV prophylaxis:  Ondansetron (or other 5HT-3) and Dexamethasone or Solumedrol       Postoperative Care  Postoperative pain management:  Oral pain medications and Multi-modal analgesia.      Consents  Anesthetic plan, risks, benefits and alternatives discussed with:  Patient..                          .

## 2018-05-02 NOTE — IP AVS SNAPSHOT
MRN:[id number]                      After Visit Summary   5/2/2018    Amira Avery    MRN: [id number]           Thank you!     Thank you for choosing [facility] for your care. Our goal is always to provide you with excellent care. Hearing back from our patients is one way we can continue to improve our services. Please take a few minutes to complete the written survey that you may receive in the mail after you visit with us. Thank you!        Patient Information     Date Of Birth          1990        About your hospital stay     You were admitted on:  May 2, 2018 [facility]    You were discharged on:  May 2, 2018       Who to Call     For medical emergencies, please call 911.  For non-urgent questions about your medical care, please call your primary care provider or clinic, None  For questions related to your surgery, please call your surgery clinic        Attending Provider     Provider Lia Boland MD Otolaryngology       Primary Care Provider Fax #    Physician No Ref-Primary [phone]      After Care Instructions     Diet Instructions       Resume pre procedure diet            Discharge Instructions       Patient to follow up with Dr. Silva on 5/9 @ 4pm. Ok to shower and get incision wet starting the night of 5/3 do not scrub incision but ok to allow water to run over it. You are being sent home with a SHANICE drain in your neck. You should strip the drain line at least 3 times a day to prevent blood clots from clogging the tubing. You should record the drain output three times a day and when the drain has an output of less than 30ml over a 24 hour period you can call the ENT clinic and schedule a time for nursing staff to remove your drain.            Discharge Instructions - Lifting restrictions       Lifting Restrictions no more than 15 pounds until seen at Post-op follow up appointment no strenuous  activity            No Alcohol       For 24 hours following procedure            No Aspirin, Ibuprofen or Naproxen products       for 7 - 10 days following surgery            No driving or operating machinery       While on narcotics                  Your next 10 appointments already scheduled     May 07, 2018 12:00 PM CDT   BMT Workup Visit with Tanya Landa MD   [facility] ([facility])    [address]  [address]  [facility] [id number] [phone]            May 09, 2018  4:00 PM CDT   (Arrive by 3:45 PM)   Return Visit with Lia Silva MD   [facility] ([facility])    [address]  4th [address]  [facility] [id number] [phone]              Further instructions from your care team       [facility]  Home Care Following Anesthesia  For 24 hours after surgery:  1. Get plenty of rest.  A responsible adult must stay with you for at least 24 hours after you leave the surgery center.  2. Do not drive or use heavy equipment.  If you have weakness or tingling, don't drive or use heavy equipment until this feeling goes away.   3. Do not drink alcohol.   4. Avoid strenuous or risky activities.  Ask for help when climbing stairs.  5. You may feel lightheaded.  IF so, sit for a few minutes before standing.  Have someone help you get up.   6. If you have nausea (feel sick to your stomach): Drink only clear liquids such as apple juice, ginger ale, broth or 7-Up.  Rest may also help.  Be sure to drink enough fluids.  Move to a regular diet as you feel able.   7. You may have a slight fever.  Call the doctor if your fever is over 100 F (37.7 C) (taken under the tongue) or lasts longer than 24 hours.  8. You may have a dry mouth, a sore throat, muscle aches or trouble sleeping. These should go away after 24 hours.  9. Do not make important or legal decisions.   If  you use hormonal birth control (such as the pill, patch, ring or implants):  You will need a second form of birth control for 7 days (condoms, a diaphragm or contraceptive foam).  While in the surgery center, you received a medicine called Sugammadex.  Hormonal birth control (such as the pill, patch, ring or implants) will not work as well for a week after taking this medicine.         Tips for taking pain medications  To get the best pain relief possible, remember these points:    Take pain medications as directed, before pain becomes severe.    Pain medication can upset your stomach: taking it with food may help.    Constipation is a common side effect of pain medication. Drink plenty of  fluids.    Eat foods high in fiber. Take a stool softener if recommended by your doctor or pharmacist.    Do not drink alcohol, drive or operate machinery while taking pain medications.    Ask about other ways to control pain, such as with heat, ice or relaxation.    Tylenol/Acetaminophen Consumption  To help encourage the safe use of acetaminophen, the makers of TYLENOL  have lowered the maximum daily dose for single-ingredient Extra Strength TYLENOL  (acetaminophen) products sold in the [place] from 8 pills per day (4,000 mg) to 6 pills per day (3,000 mg). The dosing interval has also changed from 2 pills every 4-6 hours to 2 pills every 6 hours.    If you feel your pain relief is insufficient, you may take Tylenol/Acetaminophen in addition to your narcotic pain medication.     Be careful not to exceed 3,000 mg of Tylenol/Acetaminophen in a 24 hour period from all sources.    If you are taking extra strength Tylenol/acetaminophen (500 mg), the maximum dose is 6 tablets in 24 hours.    If you are taking regular strength acetaminophen (325 mg), the maximum dose is 9 tablets in 24 hours.    Call a doctor for any of the followin. Signs of infection (fever, growing tenderness at the surgery site, a large amount of drainage or  bleeding, severe pain, foul-smelling drainage, redness, swelling).  2. It has been over 8 to 10 hours since surgery and you are still not able to urinate (pass water).  3. Headache for over 24 hours.  Your doctor is:  Dr. Lia Silva, ENT Otolaryngology: [phone]                  Or dial [phone] and ask for the resident on call for:  ENT Otolaryngology  For emergency care, call the:  [facility]:  [phone] (TTY for hearing impaired: [phone])                Caring for Your Wilian-Robin Drain    You have been discharged with a Wilian-Robin drainage tube. This tube drains fluid from your incision, helping prevent swelling and reducing the risk for infection. The tube is held in place by a few stitches. The drain will be removed when your doctor determines you no longer need it and when the amount of drainage decreases. The color and amount of fluid varies. Right after surgery, the fluid may be bright red and may become clearer over time.   Dressing:    Keep the skin around the tube dry.    If you have a dressing, change it every day.   o Wash your hands.  o Remove the old bandage. Do not use scissors-you may accidentally cut the tube.  o Check for any redness, swelling, drainage, or broken stitches. (Call your doctor with any of these findings).  o Wash your hands again.  o Wet a cotton swab (Q-tip) and clean around the incision and the tube site. Use normal saline solution (salt and water) or soap and water. Start at the tube site and move outward in a circular motion.   o Pat dry.  o Put a new bandage on the incision and tube site. Make the bandage large enough to cover the whole incision area.   o Tape the bandage in place.  o Throw out old materials and wash your hands.   Home Care:    Tape the tube to the skin below the bandage. Make sure to keep some slack in the tube to keep it from pulling out.     DO NOT sleep on the same side as the tube.    Secure the tube and bulb  inside your clothing with a safety pin. This helps keep the tube from being pulled out.     Keep the bulb compressed at all times, except when you empty it.    Empty your drain at least twice a day. Empty it more often if the drain is full.   o Lift the opening of the drain.  o Drain the fluid into a measuring cup.  o Record the amount of fluid each time you empty. Share the information with your doctor at your follow-up visit.   o Squeeze the bulb with your hands until you hear air coming out of the bulb.  o Close the opening.     Tape plastic wrap over the bandage and tube site when you shower.      Stripping  the tube helps keep blood clots from blocking the tube.                         ONLY DO THIS IF YOUR DOCTOR INSTRUCTED YOU TO DO SO!  o Hold the tubing where it leaves the skin with one hand. This keeps it from pulling on the skin.  o Pinch the tubing with the thumb and first finger of your other hand.   o Slowly and firmly pull your thumb and first finger down the tube (squeezing the tube between your fingers). Keep squeezing the tube as you run your fingers towards the bulb. If the pulling hurts or feels like it is coming out of the skin, STOP. Begin again more gently.  o Let go of the tubing with both hands. If the tube is still blocked, repeat these steps three or four times. Make sure that the bulb is compressed so it creates suction.  When to call your doctor:    New or increased pain around the tube    Redness, warmth, or swelling around the incision or tube    Drainage that is foul smelling    Vomiting    Fever over 101 F degrees    Fluid leaking around the tube    Incision seems not to be healing    Stitches become loose    The tube falls out    Drainage that changes from light pick to dark red    A sudden increase or decrease in the amount of drainage (over 30 ml).  Your drainage record:  Date Time Bulb 1: Amount of drainage (ml or cc) Bulb 2: Amount of drainage (ml or cc) Notes                      "                                                                           Pending Results     Date and Time Order Name Status Description    5/2/2018 1225 Surgical pathology exam In process             Admission Information     Date & Time Provider Department Dept. Phone    5/2/2018 Lia Silva MD [facility] [phone]      Your Vitals Were     Blood Pressure Pulse Temperature Respirations Height Weight    115/64 64 97.9  F (36.6  C) (Temporal) 16 1.6 m (5' 3\") 70.8 kg (156 lb)    Last Period Pulse Oximetry BMI (Body Mass Index)             04/25/2018 96% 27.63 kg/m2         MyChart Information     [email or web address] gives you secure access to your electronic health record. If you see a primary care provider, you can also send messages to your care team and make appointments. If you have questions, please call your primary care clinic.  If you do not have a primary care provider, please call [phone] and they will assist you.      [email or web address] is an electronic gateway that provides easy, online access to your medical records. With [email or web address], you can request a clinic appointment, read your test results, renew a prescription or communicate with your care team.     To access your existing account, please contact your [facility] or call [phone] for assistance.        Care EveryWhere ID     This is your Care EveryWhere ID. This could be used by other organizations to access your [place] medical records  [id number]        Equal Access to Services     RICHIE RAMEY : Haddi Luis, waaxda luamita, qaybta kaaljen carolina. So [facility] [phone].    ATENCIÓN: Si habla español, tiene a isabel disposición servicios gratuitos de asistencia lingüística. Roberto al [phone].    We comply with applicable federal civil rights laws and [place] laws. We do not discriminate on the basis of race, color, national " origin, age, disability, sex, sexual orientation, or gender identity.               Review of your medicines      START taking        Dose / Directions    oxyCODONE IR 5 MG tablet   Commonly known as:  ROXICODONE   Used for:  S/P lymph node biopsy        Dose:  5 mg   Take 1 tablet (5 mg) by mouth every 6 hours as needed for severe pain maximum 6 tablet(s) per day   Quantity:  30 tablet   Refills:  0         CONTINUE these medicines which have NOT CHANGED        Dose / Directions    acetaminophen 500 MG tablet   Commonly known as:  TYLENOL        Take by mouth every 6 hours as needed   Refills:  0         STOP taking     ibuprofen 200 MG tablet   Commonly known as:  ADVIL/MOTRIN                Where to get your medicines      Some of these will need a paper prescription and others can be bought over the counter. Ask your nurse if you have questions.     Bring a paper prescription for each of these medications     oxyCODONE IR 5 MG tablet                Protect others around you: Learn how to safely use, store and throw away your medicines at www.disposemymeds.org.        Information about OPIOIDS     PRESCRIPTION OPIOIDS: WHAT YOU NEED TO KNOW   You have a prescription for an opioid (narcotic) pain medicine. Opioids can cause addiction. If you have a history of chemical dependency of any type, you are at a higher risk of becoming addicted to opioids. Only take this medicine after all other options have been tried. Take it for as short a time and as few doses as possible.     Do not:    Drive. If you drive while taking these medicines, you could be arrested for driving under the influence (DUI).    Operate heavy machinery    Do any other dangerous activities while taking these medicines.     Drink any alcohol while taking these medicines.      Take with any other medicines that contain acetaminophen. Read all labels carefully. Look for the word  acetaminophen  or  Tylenol.  Ask your pharmacist if you have questions  or are unsure.    Store your pills in a secure place, locked if possible. We will not replace any lost or stolen medicine. If you don t finish your medicine, please throw away (dispose) as directed by your pharmacist. The [place] Pollution Control Agency has more information about safe disposal: https://www.pca.[facility].[place].us/living-green/managing-unwanted-medications    All opioids tend to cause constipation. Drink plenty of water and eat foods that have a lot of fiber, such as fruits, vegetables, prune juice, apple juice and high-fiber cereal. Take a laxative (Miralax, milk of magnesia, Colace, Senna) if you don t move your bowels at least every other day.              Medication List: This is a list of all your medications and when to take them. Check marks below indicate your daily home schedule. Keep this list as a reference.      Medications           Morning Afternoon Evening Bedtime As Needed    acetaminophen 500 MG tablet   Commonly known as:  TYLENOL   Take by mouth every 6 hours as needed   Last time this was given:  975 mg on 5/2/2018  9:45 AM                                oxyCODONE IR 5 MG tablet   Commonly known as:  ROXICODONE   Take 1 tablet (5 mg) by mouth every 6 hours as needed for severe pain maximum 6 tablet(s) per day   Last time this was given:  5 mg on 5/2/2018  1:07 PM

## 2018-05-03 ENCOUNTER — CARE COORDINATION (OUTPATIENT)
Dept: OTOLARYNGOLOGY | Facility: CLINIC | Age: 28
End: 2018-05-03

## 2018-05-03 LAB — COPATH REPORT: NORMAL

## 2018-05-03 NOTE — PROGRESS NOTES
"ENT Discharge Follow-Up      Responsible Attending Physician: Dr. Silva   Date of Discharge:  5/2/18  Discharge to:  Home    Current Status:  Pt is a 27 y/o female s/p excisional node biopsy on 5/2/18.  Patient reports operative  pain is decreasing.  Ambulating without assistance.  Pain well controlled with current meds, ample supply.  Reports incision CDI without signs of infection.  Denies redness, swelling, increased tenderness, or elevated temp.  Denies current bowel or bladder issues. SHANICE drain has had 25ml of drainage over the last 24 hours. Patient states that is no drainage currently. Advised patient to \"strip\" drainage tube to ensure there are no clots preventing drainage which patient has done. Discussed with patient that most likely drain can be removed tomorrow. Patient will have nurse visit on Friday 5/4 at 10:30 for drain removal and will call if drainage is over 30 ml in 24 hours leading to removal to reschedule visit.      Discharge instructions and medication use were reviewed.  RN triage/on call number given:  [phone] or after hours and w/e - [phone].  Patient verbalized understanding and agreement with current plan.    Follow up appointments/imaging/tests needed: 5/4 nurse visit and follow-up with Dr. Silva on 5/9 at 4:00pm.     Aneta Hook, RN, BSN        "

## 2018-05-04 ENCOUNTER — ALLIED HEALTH/NURSE VISIT (OUTPATIENT)
Dept: OTOLARYNGOLOGY | Facility: CLINIC | Age: 28
End: 2018-05-04
Payer: COMMERCIAL

## 2018-05-04 DIAGNOSIS — Z48.03 CHANGE OR REMOVAL OF DRAINS: Primary | ICD-10-CM

## 2018-05-04 NOTE — MR AVS SNAPSHOT
After Visit Summary   5/4/2018    Amira Avery    MRN: [id number]           Patient Information     Date Of Birth          1990        Visit Information        Provider Department      5/4/2018 10:30 AM Nurse, Nettie Ent [facility]        Today's Diagnoses     Change or removal of drains    -  1       Follow-ups after your visit        Your next 10 appointments already scheduled     May 07, 2018 12:00 PM CDT   BMT Workup Visit with Tanya Landa MD   [facility] ([facility])    [address]  [address]  [facility] [id number] [phone]            May 09, 2018  4:00 PM CDT   (Arrive by 3:45 PM)   Return Visit with Lia Silva MD   [facility] ([facility])    [address]  4th Floor  [facility] [address] [phone]              Who to contact     Please call your clinic at [phone] to:    Ask questions about your health    Make or cancel appointments    Discuss your medicines    Learn about your test results    Speak to your doctor            Additional Information About Your Visit        [employer] Information     [employer] gives you secure access to your electronic health record. If you see a primary care provider, you can also send messages to your care team and make appointments. If you have questions, please call your primary care clinic.  If you do not have a primary care provider, please call [phone] and they will assist you.      [employer] is an electronic gateway that provides easy, online access to your medical records. With [employer], you can request a clinic appointment, read your test results, renew a prescription or communicate with your care team.     To access your existing account, please contact your [facility] or call [phone] for assistance.        Care EveryWhere ID     This is your Care  EveryWhere ID. This could be used by other organizations to access your [place] medical records  [id number]        Your Vitals Were     Last Period                   04/25/2018            Blood Pressure from Last 3 Encounters:   05/02/18 112/61   04/23/18 108/61   04/20/18 121/74    Weight from Last 3 Encounters:   05/02/18 70.8 kg (156 lb)   04/25/18 72.2 kg (159 lb 1.6 oz)   04/19/18 70.6 kg (155 lb 10.3 oz)              Today, you had the following     No orders found for display       Primary Care Provider Fax #    Physician No Ref-Primary [phone]       No address on file        Equal Access to Services     RICHIE RAMEY : Alexis Luis, samaria ceja, sweta mcgraw, jen raymundo. So [facility] [phone].    ATENCIÓN: Si habla español, tiene a isabel disposición servicios gratuitos de asistencia lingüística. Llame al [phone].    We comply with applicable federal civil rights laws and [place] laws. We do not discriminate on the basis of race, color, national origin, age, disability, sex, sexual orientation, or gender identity.            Thank you!     Thank you for choosing [facility]  for your care. Our goal is always to provide you with excellent care. Hearing back from our patients is one way we can continue to improve our services. Please take a few minutes to complete the written survey that you may receive in the mail after your visit with us. Thank you!             Your Updated Medication List - Protect others around you: Learn how to safely use, store and throw away your medicines at www.disposemymeds.org.          This list is accurate as of 5/4/18 11:05 AM.  Always use your most recent med list.                   Brand Name Dispense Instructions for use Diagnosis    acetaminophen 500 MG tablet    TYLENOL     Take by mouth every 6 hours as needed        oxyCODONE IR 5 MG tablet    ROXICODONE    30 tablet    Take 1 tablet  (5 mg) by mouth every 6 hours as needed for severe pain maximum 6 tablet(s) per day    S/P lymph node biopsy

## 2018-05-04 NOTE — NURSING NOTE
Chief Complaint   Patient presents with     Allied Health Visit     SHANICE drain removal      Aneta Hook, RN, BSN

## 2018-05-04 NOTE — PROGRESS NOTES
Is patient coming from ER or being seen in a peds clinic? No - use [facility] [address]    Amira Avery comes into clinic today at the request of Dr. Silva Ordering Provider for SHANICE drain removal.     Patient in clinic today for SHANICE drain removal. Drain output in last 24 hours was 10 ml, indicating SHANICE removal as it was less than 30 ml in 24 hours. Incision site evaluated and it was clean, dry and intact without evidence of redness, swelling or drainage. SHANICE site was cleansed, suture around tubing was removed, drain bulb was opened, and drain was removed easily and dressing was applied to site. Patient educated on signs and symptoms of infection, site care and provided number to call with further questions or concerns. Patient verbalized understanding and was encouraged to call with any further questions or concerns      This service provided today was under the supervising provider of the day Dr. Stevens, who was available if needed.      Aneta Hook, RN, BSN

## 2018-05-07 ENCOUNTER — OFFICE VISIT (OUTPATIENT)
Dept: TRANSPLANT | Facility: CLINIC | Age: 28
End: 2018-05-07
Attending: INTERNAL MEDICINE
Payer: COMMERCIAL

## 2018-05-07 VITALS
SYSTOLIC BLOOD PRESSURE: 123 MMHG | RESPIRATION RATE: 16 BRPM | WEIGHT: 156.8 LBS | BODY MASS INDEX: 27.78 KG/M2 | HEART RATE: 86 BPM | DIASTOLIC BLOOD PRESSURE: 74 MMHG | HEIGHT: 63 IN | OXYGEN SATURATION: 98 % | TEMPERATURE: 97.7 F

## 2018-05-07 DIAGNOSIS — C81.11 NODULAR SCLEROSIS HODGKIN LYMPHOMA OF LYMPH NODES OF NECK (H): Primary | ICD-10-CM

## 2018-05-07 LAB
COPATH REPORT: NORMAL
COPATH REPORT: NORMAL

## 2018-05-07 PROCEDURE — G0463 HOSPITAL OUTPT CLINIC VISIT: HCPCS

## 2018-05-07 ASSESSMENT — PAIN SCALES - GENERAL: PAINLEVEL: NO PAIN (0)

## 2018-05-07 NOTE — NURSING NOTE
"Oncology Rooming Note    May 7, 2018 12:06 PM   Amira Avery is a 28 year old female who presents for:    Chief Complaint   Patient presents with     RECHECK     Return: BMT Close- Hodgkin lymphoma, nodular sclerosis      Initial Vitals: /74 (BP Location: Right arm, Patient Position: Sitting, Cuff Size: Adult Regular)  Pulse 86  Temp 97.7  F (36.5  C) (Oral)  Resp 16  Ht 1.6 m (5' 3\")  Wt 71.1 kg (156 lb 12.8 oz)  LMP 04/25/2018  SpO2 98%  BMI 27.78 kg/m2 Estimated body mass index is 27.78 kg/(m^2) as calculated from the following:    Height as of this encounter: 1.6 m (5' 3\").    Weight as of this encounter: 71.1 kg (156 lb 12.8 oz). Body surface area is 1.78 meters squared.  No Pain (0) Comment: Data Unavailable   Patient's last menstrual period was 04/25/2018.  Allergies reviewed: No  Medications reviewed: Yes    Medications: Medication refills not needed today.  Pharmacy name entered into [facility]: [facility] [id number] [place] [address] - [address] AT [facility] [address]    Clinical concerns: N/A      5 minutes for nursing intake (face to face time)     Yue Montelongo CMA              "

## 2018-05-07 NOTE — MR AVS SNAPSHOT
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  After hours call the   hospital at [phone] or   [phone]. Ask for the BMT   fellow on-call            Follow-ups after your visit        Your next 10 appointments already scheduled     May 09, 2018  4:00 PM CDT   (Arrive by 3:45 PM)   Return Visit with Lia Silva MD   [facility] Ear Nose and Throat ([facility])    [address] [place] [id number] [phone]              Who to contact     If you have questions or need follow up information about today's clinic visit or your schedule please contact [facility] BLOOD AND MARROW TRANSPLANT directly at [phone].  Normal or non-critical lab and imaging results will be communicated to you by MyChart, letter or phone within 4 business days after the clinic has received the results. If you do not hear from us within 7 days, please contact the clinic through [employer] or phone. If you have a critical or abnormal lab result, we will notify you by phone as soon as possible.  Submit refill requests through [employer] or call your pharmacy and they will forward the refill request to us. Please allow 3 business days for your refill to be completed.          Additional Information About Your Visit        Virtual Command[employer]Scandit Information     [employer] gives you secure  "access to your electronic health record. If you see a primary care provider, you can also send messages to your care team and make appointments. If you have questions, please call your primary care clinic.  If you do not have a primary care provider, please call [phone] and they will assist you.        Care EveryWhere ID     This is your Care EveryWhere ID. This could be used by other organizations to access your [place] medical records  [id number]        Your Vitals Were     Pulse Temperature Respirations Height Last Period Pulse Oximetry    86 97.7  F (36.5  C) (Oral) 16 1.6 m (5' 3\") 04/25/2018 98%    BMI (Body Mass Index)                   27.78 kg/m2            Blood Pressure from Last 3 Encounters:   05/07/18 123/74   05/02/18 112/61   04/23/18 108/61    Weight from Last 3 Encounters:   05/07/18 71.1 kg (156 lb 12.8 oz)   05/02/18 70.8 kg (156 lb)   04/25/18 72.2 kg (159 lb 1.6 oz)              We Performed the Following     Leukemia Lymphoma Evaluation (Flow Cytometry)        Recent Review Flowsheet Data     BMT Recent Results Latest Ref Rng & Units 4/18/2018 4/20/2018 4/23/2018    WBC 4.0 - 11.0 10e9/L 5.0 7.2 -    Hemoglobin 11.7 - 15.7 g/dL 12.3 12.3 -    Platelet Count 150 - 450 10e9/L 268 273 -    Neutrophils (Absolute) 1.6 - 8.3 10e9/L 2.9 4.3 -    INR 0.86 - 1.14 1.06 - -    Sodium 133 - 144 mmol/L 139 - -    Potassium 3.4 - 5.3 mmol/L 4.0 - -    Chloride 94 - 109 mmol/L 110(H) - -    Glucose 70 - 99 mg/dL 94 - 100(H)    Urea Nitrogen 7 - 30 mg/dL 13 - -    Creatinine 0.52 - 1.04 mg/dL 0.65 - -    Calcium (Total) 8.5 - 10.1 mg/dL 8.7 - -    Protein (Total) 6.8 - 8.8 g/dL 7.4 - -    Albumin 3.4 - 5.0 g/dL 3.9 - -    Alkaline Phosphatase 40 - 150 U/L 48 - -    AST 0 - 45 U/L 53(H) - -    ALT 0 - 50 U/L 40 - -    MCV 78 - 100 fl 90 90 -               Primary Care Provider Fax #    Physician No Ref-Primary [phone]       No address on file        Equal Access to Services     RICHIE RAMEY AH: " Hadii aad ku hadyvesmary Sotiagoali, wasarahda luqadaha, qaybta kadamon mcgraw, jen preetodilia holdengeorge zackary. So [facility] [phone].    ATENCIÓN: Si habla kelly, tiene a isabel disposición servicios gratuitos de asistencia lingüística. Llame al [phone].    We comply with applicable federal civil rights laws and [place] laws. We do not discriminate on the basis of race, color, national origin, age, disability, sex, sexual orientation, or gender identity.            Thank you!     Thank you for choosing [facility] BLOOD AND MARROW TRANSPLANT  for your care. Our goal is always to provide you with excellent care. Hearing back from our patients is one way we can continue to improve our services. Please take a few minutes to complete the written survey that you may receive in the mail after your visit with us. Thank you!             Your Updated Medication List - Protect others around you: Learn how to safely use, store and throw away your medicines at www.disposemymeds.org.          This list is accurate as of 5/7/18  1:33 PM.  Always use your most recent med list.                   Brand Name Dispense Instructions for use Diagnosis    acetaminophen 500 MG tablet    TYLENOL     Take by mouth every 6 hours as needed        oxyCODONE IR 5 MG tablet    ROXICODONE    30 tablet    Take 1 tablet (5 mg) by mouth every 6 hours as needed for severe pain maximum 6 tablet(s) per day    S/P lymph node biopsy

## 2018-05-07 NOTE — PROGRESS NOTES
HISTORY OF PRESENT ILLNESS:  She is a 28 years old female diagnosed with classical Hodgkin's lymphoma, initially presented in 02/2007 with left neck mass.  Ultrasound-guided FNA was done in 03/2017, negative for malignancy, however, it continued.  Therefore on 04/19/2017, CT scan was done showing diffuse cervical lymphadenopathy left.  The maximum seemed to be around 2.5 x 2 cm in the mediastinum.  She had also lymphadenopathy in the left axilla, retroperitoneum and mediastinum.  The path confirmed this on 05/30/2017, extensive intense hypermetabolic lymphadenopathy in the neck, left axilla, mediastinum, but nothing below the diaphragm, followed by excisional left cervical lymphadenopathy with lymph nodes showing classical Hodgkin's lymphoma, nodular sclerosis type.  She was diagnosed stage IIB.  Although she has lost some weight at that time, ESR was high, greater than 50, IPI was score 1.  She received 6 cycles of ABVD between 06/09/2017 and 11/10/2017.  However, 11/09/2017 CT showed increasing minimal activity within poorly visualized but stable-appearing right cervical chain lymph nodes suggests mild progression of disease.  SUV was maximum of 4.7.  She was followed, and repeat PET/CT was repeated 01/09/2018.  There was new and increasing hypermetabolic right cervical adenopathy suspicious for disease progression.  The lymph nodes are persistent hypermetabolic activity in the tonsillar tissues with maximum 7.9, right jugulodigastric lymph nodes with SUV 4.0, posterior cervical chain node SUV 6, new 6 mm right posterior cervical chain, maximum SUV is 3.3, new 7 mm right supraclavicular node with maximum SUV of 4.7.  Chest, no abnormal hypermetabolic activity.  Abdomen, no abnormal hypermetabolic activity.  She also had on 02/05/2018 showed enlarged heterogeneous right jugular chain cervical lymph nodes in level 3 and 4 measuring approximately 1.9 x 2 x 2.6 cm and 2 x 1.8 x 2.2 cm are increased since neck CT from  04/21/2017, although may be similar to the PET/CT from 01/09/2018.  Resolution of previously enlarged left cervical lymph nodes on the CT compared to 04/21/2017.  Nasopharynx, oropharynx, tongue base, hypopharynx, larynx were normal, possible small sub-centimeter left thyroid nodule versus artifact, so she started on GDP 02/16/2018, and she had another PET scan 03/28/2018 showing that head and neck there is stable activity in the tonsillar tissue.  There are post-surgical changes in the right neck.  Hypermetabolic right cervical adenopathy in the prior study no longer demonstrated.  There are no new hypermetabolic lesions or lymph nodes in the neck.  No abnormal hypermetabolic activity in the abdomen.  Before starting GDP she underwent right neck excisional biopsy showing recurrent classical Hodgkin lymphoma, nodular-sclerosing.     5/7/2018: Her excisional LN biopsy from R neck is concicent with HD.            P  ALl: Morhine, hives    Alert, oriented x 3  Head:Normocephalic  In no acute distress  Sclerae were unicteric, conjunctivae were not pale  Oral mucosa moist  Pulm:Clear to auscultation bilaterally  CVS: RR no S3 or S4  Abd soft, no HSM  Ext no edema  Skin:No rash    A/P    1-HL, classic NS.Relapsed refractory even after salvage GDP. I offer her BV + Nivo and when she repsonds comes to us for autoHCT      Workup showed active LN in the R neck again very suspicious, given biopys was difficult by IR due to bleeding risk, we did excitional biopsy that showed  For improved result formatting, select 'View Enhanced Report Format' under    Linked Documents section.     SPECIMEN(S):   Lymph node, neck, level four     FINAL DIAGNOSIS:   Lymph node, neck, level IV, excision biopsy:        Recurrent Classic Hodgkin Lymphoma, nodular sclerosis variant     COMMENT:   Concurrent flow cytometric analysis ([id number]) shows polytypic B cells   and T cells with an increased CD4:CD8   ratio.   Dr BRAYDEN Soler has reported these  findings to Dr ADELAIDE Landa on 5/7/2018     I have personally reviewed all specimens and/or slides, including the   listed special stains, and used them   with my medical judgement to determine or confirm the final diagnosis.     I talked to Dr. Lawson we agreed to give her    Blood. 2018 Mar 15;131(11):3096-5718. doi: 10.1182/[id number]. Epub 2017 Dec 11.  Interim results of brentuximab vedotin in combination with nivolumab in patients with relapsed or refractory Hodgkin lymphoma.  Vinny MONTERROSO, et al    I explained them that one Is targeted therapy the other one I immune therapy. We should to a PT after 2 cycles if still an evidence of activity then complete to 4. If ET is negative take her for autoHCT followed by BV.    I spent 40 minutes, 30 minutes in counseling.    Tanya Landa MD

## 2018-05-08 LAB — COPATH REPORT: NORMAL

## 2018-05-09 ENCOUNTER — OFFICE VISIT (OUTPATIENT)
Dept: OTOLARYNGOLOGY | Facility: CLINIC | Age: 28
End: 2018-05-09
Payer: COMMERCIAL

## 2018-05-09 VITALS — BODY MASS INDEX: 28.17 KG/M2 | HEIGHT: 63 IN | WEIGHT: 159 LBS

## 2018-05-09 DIAGNOSIS — R49.0 HOARSENESS: Primary | ICD-10-CM

## 2018-05-09 ASSESSMENT — PAIN SCALES - GENERAL: PAINLEVEL: NO PAIN (0)

## 2018-05-09 NOTE — NURSING NOTE
"Chief Complaint   Patient presents with     RECHECK     post op-excisional node biopsy      Height 1.61 m (5' 3.39\"), weight 72.1 kg (159 lb), last menstrual period 04/25/2018.    Willis Varela LPN    "

## 2018-05-09 NOTE — LETTER
5/9/2018     RE: Amira Avery  [address]  [place] [address]     Dear Colleague,    Thank you for referring your patient, Amira Avery, to the [facility] EAR NOSE AND THROAT at [facility] Please see a copy of my visit note below.    Dear Dr. Landa:    I had the pleasure of seeing Amira Aveyr in follow-up today at the [facility] Ot[facility].     History of Present Illness:   Amira Avery is a 28 year old woman with a history of Hogkin's lymphoma with her third recurrence. She was initially diagnosed in spring 2017. She presented with an enlarged neck node which was negative on FNA and core biopsy. She then underwent left excisional node biopsy with diagnosis consistent with classic Hodgkin's lymphoma. She was treated with chemotherapy from 6/2017 to 11/2017. She had disease recurrence and had repeat needle biopsies which were nondiagnostic and then had a right excisional node biopsy which again showed classic Hodgkin lymphoma. She was undergoing workup for bone marrow transplant and her PET scan showed a hypermetabolic level IV node.     She was taken to the operating room on 5/2/2018 for a excisional node biopsy.  Intraoperatively the node was noted to be adherent to the internal jugular vein as well as the vagus nerve.  The vagus nerve was directly overlying the node and could not be peeled off.  The internal jugular vein could not be dissected from the node.  The decision was made intraoperatively to just take a piece of the node to try and avoid injury to either the vagus or the internal jugular vein.  Final pathology was consistent with a recurrent Hodgkin's lymphoma.  In the recovery room the findings were discussed with the patient and she was able to vocalize without any evidence of hoarseness or breathing difficulties.  Today on discussion initially she says that she has no issues with her  voice when asked but then on further questioning she does notice difficulties with projection of her voice and with prolonged use she has hoarseness.  She is able to eat and drink without significant difficulty.  She does note that when she takes large drinks of water she will occasionally cough.  She is supposed to start her chemotherapy this week and heart has already seen her medical oncologist.        MEDICATIONS:     Current Outpatient Prescriptions   Medication Sig Dispense Refill     acetaminophen (TYLENOL) 500 MG tablet Take by mouth every 6 hours as needed          ALLERGIES:    Allergies   Allergen Reactions     Morphine Hives     No issues with oral narcotics, dilaudid, or fentanyl per pt       HABITS/SOCIAL HISTORY:     Social History     Social History     Marital status: Single     Spouse name: N/A     Number of children: N/A     Years of education: N/A     Occupational History     Not on file.     Social History Main Topics     Smoking status: Former Smoker     Packs/day: 1.00     Years: 10.00     Types: Cigarettes, Hookah     Start date: 7/15/2007     Quit date: 10/15/2017     Smokeless tobacco: Never Used     Alcohol use No     Drug use: No     Sexual activity: Yes     Partners: Female     Birth control/ protection: None     Other Topics Concern     Not on file     Social History Narrative       PAST MEDICAL HISTORY:   Past Medical History:   Diagnosis Date     Cancer (H)      Heart disease      Tattoos         PAST SURGICAL HISTORY:   Past Surgical History:   Procedure Laterality Date     BIOPSY LYMPH NODE CERVICAL Right 5/2/2018    Procedure: BIOPSY LYMPH NODE CERVICAL;  Right Excisional Node Biopsy;  Surgeon: Lia Silva MD;  Location: [facility] OR     excision of deep cervical LN's Left 05/19/2017    positive for Hodgkin's     excision of deep cervical LN's Right 02/13/2018    positive for Hodgkin's     port a cath placement Right 06/01/2017    IJ vein; removed 12/1/17 no longer needed     port  "a cath placement Left 02/21/2018    IJ vein     TRANSPLANT       US BIOPSY LYMPH NODE FNA Left 03/06/2017    low midline neck, negative for malignancy       FAMILY HISTORY:    Family History   Problem Relation Age of Onset     Hypertension Father      Coronary Artery Disease Maternal Grandmother      Colon Cancer Maternal Grandmother      CANCER Maternal Grandmother      HEART DISEASE Maternal Grandmother        REVIEW OF SYSTEMS:  12 point ROS was negative other than the symptoms noted above in the HPI.  Patient Supplied Answers to Review of Systems  [facility] ENT ROS 5/9/2018   Ears, Nose, Throat Hoarseness         PHYSICAL EXAMINATION:   Ht 1.61 m (5' 3.39\")  Wt 72.1 kg (159 lb)  LMP 04/25/2018  BMI 27.82 kg/m2   Patient in no apparent distress  Breathing comfortably on room air without any stridor   Voice initially strong on vocalization and then with prolonged talking does become mildly hoarse without any obvious breathiness.  There is a soft quality to her voice without significant projection.  Right neck incision is healing appropriately with Prolene sutures in place    PROCEDURE:  Flexible fiberoptic laryngoscopy:  scope exam was indicated due to patient's voice changes.  The nose was topically anesthetized with lidocaine and phenylephrine.  The flexible endoscope was passed through the anterior nasal cavity back to the nasopharynx.  The nasopharynx was clear without any masses.  The scope was then advanced down to the oropharynx and larynx.  The base of tongue was normal without any masses.  There are no lesions in the vallecula.  The epiglottis is sharp.  On exam of the larynx the vocal cord on the left is completely mobile.  The vocal cord on the right has some mobility to it but it is incomplete.  The left vocal cord is able to close together with the right cord so there is no glottic gap on phonation.  There are no secretions present in the pyriform sinuses or over the glottis.  The airway is very clear " without any evidence of aspiration.  Patient tolerated the procedure well with no immediate complications.    RESULTS REVIEWED:     FINAL DIAGNOSIS:   Lymph node, neck, level IV, excision biopsy:        Recurrent Classic Hodgkin Lymphoma, nodular sclerosis variant     COMMENT:   Concurrent flow cytometric analysis ([id number]) shows polytypic B cells   and T cells with an increased CD4:CD8   ratio.     IMPRESSION AND PLAN:   Amira Avery is a 28 year old with recurrent Hodgkin's lymphoma.  She recently underwent a a node biopsy of the right neck (only node present on PET) after IR declined to do a biopsy.  Intraoperatively the node was densely adherent to the internal jugular vein and vagus nerve without planes identified between the node and the structures.  Given these findings the node was incised and a piece of the node was sent for pathology.  Given concerns of risk of injury to the structures if we trie to remove the entire node a full excisional node biopsy was not performed.  I discussed these findings at the end of the case with the patient's family as well as the patient in the recovery room.  We again reviewed this information during her clinic visit today.  She has noticed some voice changes on questioning with some decrease in vocal production and vocal fatigue.  On scope exam she has mobility of the right vocal cord that is just incomplete compared to the left.  She is getting good closure with her cords right now without any evidence of glottic gap.  Additionally there is no evidence of aspiration or pooled secretions in the airway indicating that at least right now her airway is safe from a respiratory standpoint.  I discussed with her that I will review her scope exam with our laryngologist and see if an injection would be beneficial.  The difficulty may be that she does have some movement and we need to give it time to recover and a injection could just delay this recovery.  Given the fact that  the nerve was left intact at the end of the case I would anticipate that she should regain full motion.  The difficult part of this is there appeared to be extracapsular spread from the node into the nerve as well as the internal jugular vein which certainly cancer itself can cause difficulty with nerve recovery.  She is about to start chemotherapy which could also delay her healing.  I think there is an element of inflammation that needs to resolve from the work that we did on the nerve to try and get a piece of the node out.  Pending my discussion with the laryngologist we will place any appropriate referrals for her.  I am happy to see her back if she has ongoing issues with her voice or place a referral to our laryngologist or speech therapist.  I think we need to give the nerve a period of time to recover.  I am optimistic given the fact that there is some motion and the cord does not look like it is completely paralyzed.  Certainly if she does develop any symptoms of aspiration we need to know about this and would consider performing a vocal cord injection sooner.    Thank you very much for the opportunity to participate in the care of your patient.      Lia Silva M.D.  Otolaryngology- Head & Neck Surgery    CC:  Tanya Landa MD  [address]  [place] [address]

## 2018-05-09 NOTE — PATIENT INSTRUCTIONS
1. Please follow-up in clinic as needed with Dr. Silva.   2. Please call the ENT clinic with any questions,concerns, new or worsening symptoms.    -Clinic number is [phone]   - Aneta's direct line (Dr. Silva's nurse) [phone]

## 2018-05-09 NOTE — MR AVS SNAPSHOT
"              After Visit Summary   5/9/2018    Amira Avery    MRN: [id number]           Patient Information     Date Of Birth          1990        Visit Information        Provider Department      5/9/2018 4:00 PM Lia Silva MD [facility] Ear Nose and Throat        Today's Diagnoses     Hoarseness    -  1      Care Instructions    1. Please follow-up in clinic as needed with Dr. Silva.   2. Please call the ENT clinic with any questions,concerns, new or worsening symptoms.    -Clinic number is [phone]   - Aneta's direct line (Dr. Silva's nurse) [phone]              Follow-ups after your visit        Who to contact     Please call your clinic at [phone] to:    Ask questions about your health    Make or cancel appointments    Discuss your medicines    Learn about your test results    Speak to your doctor            Additional Information About Your Visit        [employer] Mail Information     [employer]) gives you secure access to your electronic health record. If you see a primary care provider, you can also send messages to your care team and make appointments. If you have questions, please call your primary care clinic.  If you do not have a primary care provider, please call [phone] and they will assist you.      [employer] ([employer]) is an electronic gateway that provides easy, online access to your medical records. With [employer]), you can request a clinic appointment, read your test results, renew a prescription or communicate with your care team.     To access your existing account, please contact your [facility] or call [phone] for assistance.        Care EveryWhere ID     This is your Care EveryWhere ID. This could be used by other organizations to access your [place] medical records  [id number]        Your Vitals Were     Height Last Period BMI (Body Mass Index)             1.61 m (5' 3.39\") 04/25/2018 27.82 kg/m2          Blood Pressure from Last 3 " Encounters:   05/07/18 123/74   05/02/18 112/61   04/23/18 108/61    Weight from Last 3 Encounters:   05/09/18 72.1 kg (159 lb)   05/07/18 71.1 kg (156 lb 12.8 oz)   05/02/18 70.8 kg (156 lb)              We Performed the Following     IMAGESTREAM RECORDING ORDER        Primary Care Provider Fax #    Physician No Ref-Primary [phone]       No address on file        Equal Access to Services     RICHIE RAMEY : Hadii aad ku hadasho Soomaali, waaxda luqadaha, qaybta kaalmada adeegyada, waxay idiin hayaan jeremy schillinghernáncolumba lazane . So [facility] [phone].    ATENCIÓN: Si habla español, tiene a isabel disposición servicios gratuitos de asistencia lingüística. LlUniversity [facility] [phone].    We comply with applicable federal civil rights laws and [place] laws. We do not discriminate on the basis of race, color, national origin, age, disability, sex, sexual orientation, or gender identity.            Thank you!     Thank you for choosing [facility] EAR NOSE AND THROAT  for your care. Our goal is always to provide you with excellent care. Hearing back from our patients is one way we can continue to improve our services. Please take a few minutes to complete the written survey that you may receive in the mail after your visit with us. Thank you!             Your Updated Medication List - Protect others around you: Learn how to safely use, store and throw away your medicines at www.disposemymeds.org.          This list is accurate as of 5/9/18 11:59 PM.  Always use your most recent med list.                   Brand Name Dispense Instructions for use Diagnosis    acetaminophen 500 MG tablet    TYLENOL     Take by mouth every 6 hours as needed

## 2018-05-11 ENCOUNTER — TELEPHONE (OUTPATIENT)
Dept: OTOLARYNGOLOGY | Facility: CLINIC | Age: 28
End: 2018-05-11

## 2018-05-11 LAB — COPATH REPORT: NORMAL

## 2018-05-11 NOTE — TELEPHONE ENCOUNTER
Called patient to discuss recommendations for her vocal cord partial paralysis following review of her video laryngoscopy from clinic on Wednesday.  I reviewed the video with Dr. Agrawal who agrees that there is some motion present in the right cord.  There is good closure observed from the left cord without any obvious glottic gap on the views provided.  We discussed that given the partial movement observed that she may benefit with first-line therapy being working with speech pathology.  Dr. Agrawal did not feel that there would potentially be substantial benefit at this time from a vocal cord injection.  Certainly an injection could make her voice worse.  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  We discussed certainly if she decides to go back to work it may be more appropriate to proceed with a vocal cord injection at that time if she is having issues with her vocal projection fatigue.  Based on her scope exam there is no pooling of secretions and no obvious signs of aspiration.  Certainly if this were to develop then we may reconsider an injection sooner.  We discussed that is hard to say the time course over which this may potentially improve given that it is unclear if this is irritation from dissection along the vagus to remove the node in the operating room versus irritation from tumor involvement in the vagus itself.  After a discussion the patient is open to the idea of working with speech therapy.  She would be potentially interested in meeting with speech to learn exercises to do at home given the fact that she is going to be undergoing chemotherapy.  We will place a consult for a voice rehab referral here at the [facility].  If she would like to see Dr. Agrawal I will certainly facilitate consultation with her.    Lia Silva MD    Department of Otolaryngology

## 2018-05-12 NOTE — PROGRESS NOTES
Dear Dr. Landa:    I had the pleasure of seeing Amira Avery in follow-up today at the [facility].     History of Present Illness:   Amira Avery is a 28 year old woman with a history of Hogkin's lymphoma with her third recurrence. She was initially diagnosed in spring 2017. She presented with an enlarged neck node which was negative on FNA and core biopsy. She then underwent left excisional node biopsy with diagnosis consistent with classic Hodgkin's lymphoma. She was treated with chemotherapy from 6/2017 to 11/2017. She had disease recurrence and had repeat needle biopsies which were nondiagnostic and then had a right excisional node biopsy which again showed classic Hodgkin lymphoma. She was undergoing workup for bone marrow transplant and her PET scan showed a hypermetabolic level IV node.     She was taken to the operating room on 5/2/2018 for a excisional node biopsy.  Intraoperatively the node was noted to be adherent to the internal jugular vein as well as the vagus nerve.  The vagus nerve was directly overlying the node and could not be peeled off.  The internal jugular vein could not be dissected from the node.  The decision was made intraoperatively to just take a piece of the node to try and avoid injury to either the vagus or the internal jugular vein.  Final pathology was consistent with a recurrent Hodgkin's lymphoma.  In the recovery room the findings were discussed with the patient and she was able to vocalize without any evidence of hoarseness or breathing difficulties.  Today on discussion initially she says that she has no issues with her voice when asked but then on further questioning she does notice difficulties with projection of her voice and with prolonged use she has hoarseness.  She is able to eat and drink without significant difficulty.  She does note that when she takes large drinks of water she will occasionally cough.  She is supposed  to start her chemotherapy this week and heart has already seen her medical oncologist.        MEDICATIONS:     Current Outpatient Prescriptions   Medication Sig Dispense Refill     acetaminophen (TYLENOL) 500 MG tablet Take by mouth every 6 hours as needed          ALLERGIES:    Allergies   Allergen Reactions     Morphine Hives     No issues with oral narcotics, dilaudid, or fentanyl per pt       HABITS/SOCIAL HISTORY:     Social History     Social History     Marital status: Single     Spouse name: N/A     Number of children: N/A     Years of education: N/A     Occupational History     Not on file.     Social History Main Topics     Smoking status: Former Smoker     Packs/day: 1.00     Years: 10.00     Types: Cigarettes, Hookah     Start date: 7/15/2007     Quit date: 10/15/2017     Smokeless tobacco: Never Used     Alcohol use No     Drug use: No     Sexual activity: Yes     Partners: Female     Birth control/ protection: None     Other Topics Concern     Not on file     Social History Narrative       PAST MEDICAL HISTORY:   Past Medical History:   Diagnosis Date     Cancer (H)      Heart disease      Tattoos         PAST SURGICAL HISTORY:   Past Surgical History:   Procedure Laterality Date     BIOPSY LYMPH NODE CERVICAL Right 5/2/2018    Procedure: BIOPSY LYMPH NODE CERVICAL;  Right Excisional Node Biopsy;  Surgeon: Lia Silva MD;  Location: [facility]     excision of deep cervical LN's Left 05/19/2017    positive for Hodgkin's     excision of deep cervical LN's Right 02/13/2018    positive for Hodgkin's     port a cath placement Right 06/01/2017    IJ vein; removed 12/1/17 no longer needed     port a cath placement Left 02/21/2018    IJ vein     TRANSPLANT       US BIOPSY LYMPH NODE FNA Left 03/06/2017    low midline neck, negative for malignancy       FAMILY HISTORY:    Family History   Problem Relation Age of Onset     Hypertension Father      Coronary Artery Disease Maternal Grandmother      Colon Cancer  "Maternal Grandmother      CANCER Maternal Grandmother      HEART DISEASE Maternal Grandmother        REVIEW OF SYSTEMS:  12 point ROS was negative other than the symptoms noted above in the HPI.  Patient Supplied Answers to Review of Systems  [facility] ROS 5/9/2018   Ears, Nose, Throat Hoarseness         PHYSICAL EXAMINATION:   Ht 1.61 m (5' 3.39\")  Wt 72.1 kg (159 lb)  LMP 04/25/2018  BMI 27.82 kg/m2   Patient in no apparent distress  Breathing comfortably on room air without any stridor   Voice initially strong on vocalization and then with prolonged talking does become mildly hoarse without any obvious breathiness.  There is a soft quality to her voice without significant projection.  Right neck incision is healing appropriately with Prolene sutures in place    PROCEDURE:  Flexible fiberoptic laryngoscopy:  scope exam was indicated due to patient's voice changes.  The nose was topically anesthetized with lidocaine and phenylephrine.  The flexible endoscope was passed through the anterior nasal cavity back to the nasopharynx.  The nasopharynx was clear without any masses.  The scope was then advanced down to the oropharynx and larynx.  The base of tongue was normal without any masses.  There are no lesions in the vallecula.  The epiglottis is sharp.  On exam of the larynx the vocal cord on the left is completely mobile.  The vocal cord on the right has some mobility to it but it is incomplete.  The left vocal cord is able to close together with the right cord so there is no glottic gap on phonation.  There are no secretions present in the pyriform sinuses or over the glottis.  The airway is very clear without any evidence of aspiration.  Patient tolerated the procedure well with no immediate complications.    RESULTS REVIEWED:     FINAL DIAGNOSIS:   Lymph node, neck, level IV, excision biopsy:        Recurrent Classic Hodgkin Lymphoma, nodular sclerosis variant     COMMENT:   Concurrent flow cytometric analysis " ([id number]) shows polytypic B cells   and T cells with an increased CD4:CD8   ratio.     IMPRESSION AND PLAN:   Amira Avery is a 28 year old with recurrent Hodgkin's lymphoma.  She recently underwent a a node biopsy of the right neck (only node present on PET) after IR declined to do a biopsy.  Intraoperatively the node was densely adherent to the internal jugular vein and vagus nerve without planes identified between the node and the structures.  Given these findings the node was incised and a piece of the node was sent for pathology.  Given concerns of risk of injury to the structures if we trie to remove the entire node a full excisional node biopsy was not performed.  I discussed these findings at the end of the case with the patient's family as well as the patient in the recovery room.  We again reviewed this information during her clinic visit today.  She has noticed some voice changes on questioning with some decrease in vocal production and vocal fatigue.  On scope exam she has mobility of the right vocal cord that is just incomplete compared to the left.  She is getting good closure with her cords right now without any evidence of glottic gap.  Additionally there is no evidence of aspiration or pooled secretions in the airway indicating that at least right now her airway is safe from a respiratory standpoint.  I discussed with her that I will review her scope exam with our laryngologist and see if an injection would be beneficial.  The difficulty may be that she does have some movement and we need to give it time to recover and a injection could just delay this recovery.  Given the fact that the nerve was left intact at the end of the case I would anticipate that she should regain full motion.  The difficult part of this is there appeared to be extracapsular spread from the node into the nerve as well as the internal jugular vein which certainly cancer itself can cause difficulty with nerve recovery.   She is about to start chemotherapy which could also delay her healing.  I think there is an element of inflammation that needs to resolve from the work that we did on the nerve to try and get a piece of the node out.  Pending my discussion with the laryngologist we will place any appropriate referrals for her.  I am happy to see her back if she has ongoing issues with her voice or place a referral to our laryngologist or speech therapist.  I think we need to give the nerve a period of time to recover.  I am optimistic given the fact that there is some motion and the cord does not look like it is completely paralyzed.  Certainly if she does develop any symptoms of aspiration we need to know about this and would consider performing a vocal cord injection sooner.    Thank you very much for the opportunity to participate in the care of your patient.      Lia Silva M.D.  Otolaryngology- Head & Neck Surgery        CC:  Tanya Landa MD  [address] [facility]  [place] [address]

## 2018-05-14 DIAGNOSIS — R49.0 DYSPHONIA: Primary | ICD-10-CM

## 2018-05-15 ENCOUNTER — PRE VISIT (OUTPATIENT)
Dept: OTOLARYNGOLOGY | Facility: CLINIC | Age: 28
End: 2018-05-15

## 2018-05-15 NOTE — TELEPHONE ENCOUNTER
FUTURE VISIT INFORMATION      FUTURE VISIT INFORMATION:    Date: 5/16/18    Time: 10:00am    Location: [facility]  REFERRAL INFORMATION:    Referring provider:  Dr. Silva    Referring providers clinic:  [facility]    Reason for visit/diagnosis  Dysphonia    RECORDS REQUESTED FROM:       Clinic name Comments Records Status Imaging Status   [facility] Records are Internal                                     RECORDS STATUS

## 2018-05-16 ENCOUNTER — OFFICE VISIT (OUTPATIENT)
Dept: OTOLARYNGOLOGY | Facility: CLINIC | Age: 28
End: 2018-05-16
Payer: COMMERCIAL

## 2018-05-16 DIAGNOSIS — J38.01 UNILATERAL VOCAL FOLD PARESIS: ICD-10-CM

## 2018-05-16 DIAGNOSIS — R49.0 DYSPHONIA: Primary | ICD-10-CM

## 2018-05-16 NOTE — PROGRESS NOTES
[facility] VOICE [facility]IN[facility]  Evaluation report    Clinician: Mesfin Vasquez M.M., M.A., CCC/SLP  Referring physician:  Dr. Silva  Patient: Amira Avery  Date of Visit: 5/16/2018    HISTORY  Chief complaint: Amira Avery is a 28 year old woman presenting today for evaluation of hoarseness.    Onset: Gradually 2 weeks ago  Inciting incident: surgery  Course: Improving  Salient history: She has a history significant for Hodgkin's lymphoma which was diagnosed and subsequently in spring 2017.  She has undergone both left and right excisional node biopsies confirming the diagnosis.  She underwent chemotherapy from June - November 2017; however, there was disease recurrence.  She was undergoing workup for bone marrow transplant.  In early May 2018 she underwent right excisional node biopsy and during the course of the excision the node was noted to be adherent to the jugular vein as well as the vagus nerve.  The entire node was not excised to avoid trauma to either structure.  Final pathology was consistent with recurrence of Hodgkin's lymphoma.  On her most recent laryngeal exam on 5/9/2018 left vocal fold was noted to demonstrate normal mobility however the right vocal fold demonstrates hypomobility with weak glottic closure during phonation.  The results of this evaluation were reviewed with Dr. Agrawal, who felt that injection augmentation was not going to be beneficial at this time; dawna she felt that the patient may benefit from speech therapy, and referral was generated to our care.    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     OTHER PERTINENT HISTORY    Complex medical history: please also refer to Dr. Silva's dictation.     Past Medical History:   Diagnosis Date     Cancer (H)      Heart disease      Tattoos      Past Surgical History:   Procedure Laterality Date     BIOPSY LYMPH NODE CERVICAL Right 5/2/2018    Procedure: BIOPSY LYMPH NODE CERVICAL;  Right Excisional Node Biopsy;  Surgeon: Lia Silva MD;  Location: [facility] OR     excision of deep cervical LN's Left 05/19/2017    positive for Hodgkin's     excision of deep cervical LN's Right 02/13/2018    positive for Hodgkin's     port a cath placement Right 06/01/2017    IJ vein; removed 12/1/17 no longer needed     port a cath placement Left 02/21/2018    IJ vein     TRANSPLANT       US BIOPSY LYMPH NODE FNA Left 03/06/2017    low midline neck, negative for malignancy     OBJECTIVE  Patient Supplied Answers To VHI Questionnaire  Voice Handicap Index (VHI-10) 5/16/2018   My voice makes it difficult for people to hear me 2   People have difficulty understanding me in a noisy room 3   My voice difficulties restrict my personal and social life.  1   I feel left out of conversations because of my voice 0   My voice problem causes me to lose income 0   I feel as though I have to strain to produce voice 2   The clarity of my voice is unpredictable 3   My voice problem upsets me 4   My voice makes me feel handicapped 1   People ask, \"What's wrong with your voice?\" 1   VHI-10 17     [facility] Mean - 2.62 / 4    PERCEPTUAL EVALUATION (CPT 76052)  POSTURE / TENSION:     neck and shoulders    BREATHING:     appears within normal limits and adequate     VOICE:    Roughness: Mild to moderate Intermittent    Breathiness: Mild Intermittent    Strain: Mild to " "moderate Intermittent    Transient breath voice \"breaks\"    Loudness    Conversational speech:  WNL    Pitch:    Conversational speech:  WNL    Pitch glide: breathy aphonic voicing above 500Hz    Resonance:    Conversational speech: intermittent backward focus of resonance    Singing vs. Speech:  Consistent across contexts    CAPE-V Overall Severity:  23/100    COUGH/THROAT CLEARING:    Not observed    THERAPY PROBES: Improvement was elicited with use of forward resonant stimuli, use of clear speech protocol and coordination of respiration and phonation    Laryngeal Function Studies (CPT 43802)     Acoustic Measures     Protocol / Parameter = result     Fundamental frequency Metrics        /a/ mean F0 = 198 Hz (SD = 1.40 Hz)        /i/ mean F0 = 209 Hz (SD = 1.46 Hz)        [place] Mean f0 = 219 Hz (SD 29.83 Hz)       [place]:            Min F0 = 160 Hz           Max F0 = unreliable           Range = unrealiable           Notes = outliers in Max F0 due to breathy strain     Cepstral Measures        CPPS /a/ = 21.38 dB        CPPS /i/ = 22.50 dB        CPPS \"all voiced\" = 18.53 dB        AVQI (v.3.01) = 2.38     Additional Measures        Harmonic to Noise Ratio /a/ = 25.06 dB        Harmonic to Noise Ratio: [place] = 18.07 dB       Jitter (local) /a/ = 0.526 %        Shimmer (local) /a/ = 1.664 %     Aerodynamic Measures     Protocol / Parameter Result Normative Mean (SD)   Vital Capacity     Expiratory Volume 4.05 Liters 3.08 (0.57) Liters   Comfortable Sustained Phonation 1.811 Lit/Sec    Mean SPL 70.36 dB 77.33 (5.07) dB   Mean Pitch 196.65 Hz 212.55 (21.98) Hz   Peak Expiratory Airflow 0.307 Lit/Sec 0.2 (0.11) Lit/Sec   Mean Expiratory Airflow 0.1 Lit/Sec 0.13 (0.08) Lit/Sec   Expiratory Volume 0.46 Liters 0.77 (0.47) Liters   Voicing Efficiency     Mean SPL 73.21 dB 82.57 (3.5) dB   Mean Pitch 199.36 Hz 195.52 (27.95) Hz   Peak Air Pressure 6.43 cm H2O 6.65 (1.96) cm H2O   Mean Peak Air Pressure " 5.66 cm H2O 5.57 (1.72) cm H2O   Peak Expiratory Airflow 0.362 Lit/Sec 0.19 (0.1) Lit/Sec   Mean Airflow During Voicing 0.199 Lit/Sec 0.11 (0.05) Lit/Sec   Aerodynamic Power 0.109 [place]ley 0.06 (0.04) hadley   Aerodynamic Resistance 28.2 cm H2O/(l/s) 55.18 (30.64) cm H2O/(l/s)   Acoustic Ohms 28.76 ds/cm5 56.27 (31.24) ds/cm5   Aerodynamic Efficiency 9.28 ppm 103.66 (57.29) ppm   Running Speech: All Voiced Stimulus     Peak SPL 74.43 dB    Mean Pitch 205.63 Hz    Pitch Range 149.5 Hz    Peak Expiratory Airflow 0.534 Lit/Sec    Mean Expiratory Airflow 0.224 Lit/Sec    Expiratory Volume 0.49 Liters    Mean Airflow During Voicing 0.136 Lit/Sec    Peak Inspiratory Airflow -1.6 Lit/Sec    Inspiratory Volume -0.3 Liters    Running Speech: [place]     Peak SPL 81.02 dB    Mean Pitch 202.8 Hz    Pitch Range 216.97 Hz    Peak Expiratory Airflow 1.147 Lit/Sec    Mean Expiratory Airflow 0.205 Lit/Sec    Expiratory Volume 3.43 Liters    Mean Airflow During Voicing 0.175 Lit/Sec    Peak Inspiratory Airflow -2.048 Lit/Sec    Inspiratory Volume -2.38 Liters      ASSESSMENT / PLAN  IMPRESSIONS: Amira Avery is presenting today with R49.0 (Dysphonia) in the context of 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  Today's acoustic, aerodynamic, and perceptual evaluation are in agreement with the findings of Dr. Silva's 5/9/2018 laryngeal examination.  Aerodynamic evaluation revealed that trans-glottal airflow during phonation was on par with gender/age norms, which is unexpected given known paresis.  This is likely indicative of maladaptive compensatory hyperfunction, which in turn contribute to the patient's vocal fatigue and worsening voice quality with use.    STIMULABILITY: results of therapy probes during perceptual and laryngeal evaluation demonstrate improvement with use of forward resonant stimuli, use of clear speech protocol and coordination of respiration and  phonation    RECOMMENDATIONS:     A course of speech therapy is recommended to optimize vocal technique, improve voice quality and promote reduced discomfort, effort and fatigue.    She demonstrates a Good prognosis for improvement given adherence to therapeutic recommendations.     Positive indicators: positive response to therapy probes diagnosis is known to respond to treatment    Negative indicators: None    DURATION / FREQUENCY: 4 biweekly and 2 monthly one-hour sessions    GOALS:  Patient goal:   1. To improve and maintain a healthy voice quality  2. To understand the problem and fix it as much as possible    Short-term goal(s): Within the first 4 sessions, Ms. Avery:  1. will demonstrate assigned laryngeal massage techniques with 80% accuracy or better with no clinician support  2. will be able to independently list key factors in maintenance of good vocal hygiene with 80% accuracy, and report on their use outside the therapy room.  3. will utilize silent inhalation with good low-respiratory engagement 75% of the time during therapy tasks with minimal clinician support  4. will demonstrate semi-occluded vocal tract (SOVT) exercises with at least 80% accuracy with no clinician support  5. will accurately identify target vs. habitual voice quality during therapy tasks in 4 out of 5 trials with no clinician support    Long-term goal(s): In 6 months, Ms. Avery will:  1. Report a week of typical activities, in which Dysphonia does not exceed a level of 2 out of 10, 80% of the time    This treatment plan was developed with the patient who agreed with the recommendations.    _______________________________________________________________________  THERAPY NOTE (CPT 33994)  Date of Service: 5/16/2018    SUBJECTIVE / OBJECTIVE:  Please refer to my evaluation report from today's encounter for full details regarding subjective data, patient reported measures, and diagnostic findings.    THERAPEUTIC  ACTIVITIES  Counseling and Education    Asked many questions about the nature of her symptoms, and I answered all of these thoroughly.    Instructed concepts and techniques for optimal vocal hygiene including:    Systemic hydration, including strategies for increasing daily water intake    Topical hydration - Gargling, saline nasal irrigation, humidification, steam, guaifenesin    Environmental barriers to healthy voicing - noise, inhaled irritants, room acoustics    Awareness and reduction of phonotraumatic behaviors    Moderating voice use    Substituting non-voice alternative behaviors    Avoiding cough and throat clearing    Exercises to promote optimal respiratory mechanics    She demonstrated visible tension in the neck and shoulders during volitional deep breath    Practiced in a forward leaning seated posture to facilitate awareness of low respiratory engagement    With clinician support, patient was able to demonstrate improved abdominal relaxation and engagement on inhalation    Semi-Occluded Vocal Tract (SOVT) exercises instructed to reduce laryngeal tension, promote vocal fold pliability, and coordinate respiration and phonation    Straw with water resistance was found to be most facilitating     Sustained phonation, and voice vs. voiceless productions used to promote easy voicing and raise awareness of laryngeal tension    Ascending and descending glides utilized to promote vocal fold pliability    Advanced to /w/ phoneme and lip bubble to allow for easy use outside of the home environment    Instructed to use these exercises as a warm-up / cooldown, and to re-calibrate the voice throughout the day.    Good accuracy with minimal clinician support    Resonant Voice Therapy (RVT) exercises to promote forward locus of resonance and optimized pattern of laryngeal adduction    Easy descending glide on /m/ utilized in conjunction with relaxed jaw, tongue, and lightly closed lips to facilitate forward resonant  "sound    Use of the carrier phrase \"mmhmm\" instructed to promote generalization to everyday speech    Syllable level using /m/ in alternation with cardinal vowels on sustained pitches and speech inflection    Word level exercises featuring nasal continuant loaded stimuli    She demonstrated 75% accuracy with minimal to moderate clincian support      Concepts of an optimal regimen for practice were instructed.  o She should use an interval schedule of practice, with brief periods of practice frequently throughout each day  o [place] concepts of volitional practice to facilitate motor learning.    I provided an audio recording and handouts of today's therapeutic activities to facilitate practice.    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  Good work within today's session, with the use of therapeutic exercises patient was able to achieve improved ease and relaxation with voicing, though increased breathiness (likely appropriate for the known paresis) was noted.  She reported good understanding of therapeutic rationales and was able to demonstrate all exercises with adequate accuracy.    PLAN: I will see Ms. Avery in 2-3 weeks, at which time we will advance optimization of speaking voice through the introduction of conversational training therapy.     TOTAL SERVICE TIME: 90 minutes  EVALUATION OF VOICE AND RESONANCE (74131)  TREATMENT (99795)  LARYNGEAL FUNCTION STUDIES (60025)  NO CHARGE FACILITY FEE (69837)    Mesfin Vasquez M.M., M.A., CCC-SLP  Speech-Language Pathologist  Certificate of Vocology  [phone]      "

## 2018-05-16 NOTE — MR AVS SNAPSHOT
After Visit Summary   5/16/2018    Amira Avery    MRN: [id number]           Patient Information     Date Of Birth          1990        Visit Information        Provider Department      5/16/2018 10:00 AM Isaias Vasquez, YAN  [facility]        Today's Diagnoses     Dysphonia    -  1    Unilateral vocal fold paresis           Follow-ups after your visit        Who to contact     Please call your clinic at [phone] to:    Ask questions about your health    Make or cancel appointments    Discuss your medicines    Learn about your test results    Speak to your doctor            Additional Information About Your Visit        MyChart Information     [employer] gives you secure access to your electronic health record. If you see a primary care provider, you can also send messages to your care team and make appointments. If you have questions, please call your primary care clinic.  If you do not have a primary care provider, please call [phone] and they will assist you.      [employer] is an electronic gateway that provides easy, online access to your medical records. With [employer], you can request a clinic appointment, read your test results, renew a prescription or communicate with your care team.     To access your existing account, please contact your [facility] or call [phone] for assistance.        Care EveryWhere ID     This is your Care EveryWhere ID. This could be used by other organizations to access your [facility] medical records  [id number]        Your Vitals Were     Last Period                   04/25/2018            Blood Pressure from Last 3 Encounters:   05/07/18 123/74   05/02/18 112/61   04/23/18 108/61    Weight from Last 3 Encounters:   05/09/18 72.1 kg (159 lb)   05/07/18 71.1 kg (156 lb 12.8 oz)   05/02/18 70.8 kg (156 lb)              We Performed the Following     C BEHAVIORAL & QUALITATIVE ANALYSIS VOICE AND RESONANCE      LARYNGEAL FUNCTION STUDIES     SPEECH/HEARING THERAPY, INDIVIDUAL        Primary Care Provider Fax #    Physician No Ref-Primary [phone]       No address on file        Equal Access to Services     RICHIE RAMEY : Alexis Luis, wasarahdavid ceja, elliefrances acostaebdavid bradleytima, jen raymundo. So [facility] [phone].    ATENCIÓN: Si habla español, tiene a isabel disposición servicios gratuitos de asistencia lingüística. Llame al [phone].    We comply with applicable federal civil rights laws and [place] laws. We do not discriminate on the basis of race, color, national origin, age, disability, sex, sexual orientation, or gender identity.            Thank you!     Thank you for choosing [facility]  for your care. Our goal is always to provide you with excellent care. Hearing back from our patients is one way we can continue to improve our services. Please take a few minutes to complete the written survey that you may receive in the mail after your visit with us. Thank you!             Your Updated Medication List - Protect others around you: Learn how to safely use, store and throw away your medicines at www.disposemymeds.org.          This list is accurate as of 5/16/18 12:34 PM.  Always use your most recent med list.                   Brand Name Dispense Instructions for use Diagnosis    acetaminophen 500 MG tablet    TYLENOL     Take by mouth every 6 hours as needed

## 2018-05-16 NOTE — LETTER
5/16/2018       RE: Amira Avery  [address]  [facility] [address]     Dear Colleague,    Thank you for referring your patient, Amira Avery, to the [facility] VOICE at [facility]. Please see a copy of my visit note below.    [facility] VOICE CLINIC  Evaluation report    Clinician: Mesfin Vasquez M.M. M.A., CCC/SLP  Referring physician:  Dr. Silva  Patient: Amira Avery  Date of Visit: 5/16/2018    HISTORY  Chief complaint: Amira Avery is a 28 year old woman presenting today for evaluation of hoarseness.    Onset: Gradually 2 weeks ago  Inciting incident: surgery  Course: Improving  Salient history: She has a history significant for Hodgkin's lymphoma which was diagnosed and subsequently in spring 2017.  She has undergone both left and right excisional node biopsies confirming the diagnosis.  She underwent chemotherapy from June - November 2017; however, there was disease recurrence.  She was undergoing workup for bone marrow transplant.  In early May 2018 she underwent right excisional node biopsy and during the course of the excision the node was noted to be adherent to the jugular vein as well as the vagus nerve.  The entire node was not excised to avoid trauma to either structure.  Final pathology was consistent with recurrence of Hodgkin's lymphoma.  On her most recent laryngeal exam on 5/9/2018 left vocal fold was noted to demonstrate normal mobility however the right vocal fold demonstrates hypomobility with weak glottic closure during phonation.  The results of this evaluation were reviewed with Dr. Agrawal, who felt that injection augmentation was not going to be beneficial at this time; dawna she felt that the patient may benefit from speech therapy, and referral was generated to our care.    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     OTHER PERTINENT HISTORY    Complex medical history: please also refer to Dr. Silva's dictation.     Past Medical History:   Diagnosis Date     Cancer (H)      Heart disease      Tattoos      Past Surgical History:   Procedure Laterality Date     BIOPSY LYMPH NODE CERVICAL Right 5/2/2018    Procedure: BIOPSY LYMPH NODE CERVICAL;  Right Excisional Node Biopsy;  Surgeon: Lia Silva MD;  [facility]     excision of deep cervical LN's Left 05/19/2017    positive for Hodgkin's     excision of deep cervical LN's Right 02/13/2018    positive for Hodgkin's     port a cath placement Right 06/01/2017    IJ vein; removed 12/1/17 no longer needed     port a cath placement Left 02/21/2018    IJ vein     TRANSPLANT       US BIOPSY LYMPH NODE FNA Left 03/06/2017    low midline neck, negative for malignancy     OBJECTIVE  Patient Supplied Answers To VHI Questionnaire  Voice Handicap Index (VHI-10) 5/16/2018   My voice makes it difficult for people to hear me 2   People have difficulty understanding me in a noisy room 3   My voice difficulties restrict my personal and social life.  1   I feel left out of conversations because of my voice 0   My voice problem causes me to lose income 0   I feel as though I have to strain to produce voice 2   The clarity of my voice is unpredictable 3   My voice problem upsets me 4   My voice makes me feel handicapped 1   People ask, \"What's wrong with " "your voice?\" 1   VHI-10 17     [facility] Mean - 2.62 / 4    PERCEPTUAL EVALUATION (CPT 44702)  POSTURE / TENSION:     neck and shoulders    BREATHING:     appears within normal limits and adequate     VOICE:    Roughness: Mild to moderate Intermittent    Breathiness: Mild Intermittent    Strain: Mild to moderate Intermittent    Transient breath voice \"breaks\"    Loudness    Conversational speech:  WNL    Pitch:    Conversational speech:  WNL    Pitch glide: breathy aphonic voicing above 500Hz    Resonance:    Conversational speech: intermittent backward focus of resonance    Singing vs. Speech:  Consistent across contexts    CAPE-V Overall Severity:  23/100    COUGH/THROAT CLEARING:    Not observed    THERAPY PROBES: Improvement was elicited with use of forward resonant stimuli, use of clear speech protocol and coordination of respiration and phonation    Laryngeal Function Studies (CPT 66352)     Acoustic Measures     Protocol / Parameter = result     Fundamental frequency Metrics        /a/ mean F0 = 198 Hz (SD = 1.40 Hz)        /i/ mean F0 = 209 Hz (SD = 1.46 Hz)        [place] Mean f0 = 219 Hz (SD 29.83 Hz)       [place]:            Min F0 = 160 Hz           Max F0 = unreliable           Range = unrealiable           Notes = outliers in Max F0 due to breathy strain     Cepstral Measures        CPPS /a/ = 21.38 dB        CPPS /i/ = 22.50 dB        CPPS \"all voiced\" = 18.53 dB        AVQI (v.3.01) = 2.38     Additional Measures        Harmonic to Noise Ratio /a/ = 25.06 dB        Harmonic to Noise Ratio: [place] = 18.07 dB       Jitter (local) /a/ = 0.526 %        Shimmer (local) /a/ = 1.664 %     Aerodynamic Measures     Protocol / Parameter Result Normative Mean (SD)   Vital Capacity     Expiratory Volume 4.05 Liters 3.08 (0.57) Liters   Comfortable Sustained Phonation 1.811 Lit/Sec    Mean SPL 70.36 dB 77.33 (5.07) dB   Mean Pitch 196.65 Hz 212.55 (21.98) Hz   Peak Expiratory Airflow 0.307 Lit/Sec 0.2 " (0.11) Lit/Sec   Mean Expiratory Airflow 0.1 Lit/Sec 0.13 (0.08) Lit/Sec   Expiratory Volume 0.46 Liters 0.77 (0.47) Liters   Voicing Efficiency     Mean SPL 73.21 dB 82.57 (3.5) dB   Mean Pitch 199.36 Hz 195.52 (27.95) Hz   Peak Air Pressure 6.43 cm H2O 6.65 (1.96) cm H2O   Mean Peak Air Pressure 5.66 cm H2O 5.57 (1.72) cm H2O   Peak Expiratory Airflow 0.362 Lit/Sec 0.19 (0.1) Lit/Sec   Mean Airflow During Voicing 0.199 Lit/Sec 0.11 (0.05) Lit/Sec   Aerodynamic Power 0.109 hadley 0.06 (0.04) hadley   Aerodynamic Resistance 28.2 cm H2O/(l/s) 55.18 (30.64) cm H2O/(l/s)   Acoustic Ohms 28.76 ds/cm5 56.27 (31.24) ds/cm5   Aerodynamic Efficiency 9.28 ppm 103.66 (57.29) ppm   Running Speech: All Voiced Stimulus     Peak SPL 74.43 dB    Mean Pitch 205.63 Hz    Pitch Range 149.5 Hz    Peak Expiratory Airflow 0.534 Lit/Sec    Mean Expiratory Airflow 0.224 Lit/Sec    Expiratory Volume 0.49 Liters    Mean Airflow During Voicing 0.136 Lit/Sec    Peak Inspiratory Airflow -1.6 Lit/Sec    Inspiratory Volume -0.3 Liters    Running Speech: [place]     Peak SPL 81.02 dB    Mean Pitch 202.8 Hz    Pitch Range 216.97 Hz    Peak Expiratory Airflow 1.147 Lit/Sec    Mean Expiratory Airflow 0.205 Lit/Sec    Expiratory Volume 3.43 Liters    Mean Airflow During Voicing 0.175 Lit/Sec    Peak Inspiratory Airflow -2.048 Lit/Sec    Inspiratory Volume -2.38 Liters      ASSESSMENT / PLAN  IMPRESSIONS: Amira Avery is presenting today with R49.0 (Dysphonia) in the context of 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  Today's acoustic, aerodynamic, and perceptual evaluation are in agreement with the findings of Dr. Silva's 5/9/2018 laryngeal examination.  Aerodynamic evaluation revealed that trans-glottal airflow during phonation was on par with gender/age norms, which is unexpected given known paresis.  This is likely indicative of maladaptive compensatory hyperfunction, which in  turn contribute to the patient's vocal fatigue and worsening voice quality with use.    STIMULABILITY: results of therapy probes during perceptual and laryngeal evaluation demonstrate improvement with use of forward resonant stimuli, use of clear speech protocol and coordination of respiration and phonation    RECOMMENDATIONS:     A course of speech therapy is recommended to optimize vocal technique, improve voice quality and promote reduced discomfort, effort and fatigue.    She demonstrates a Good prognosis for improvement given adherence to therapeutic recommendations.     Positive indicators: positive response to therapy probes diagnosis is known to respond to treatment    Negative indicators: None    DURATION / FREQUENCY: 4 biweekly and 2 monthly one-hour sessions    GOALS:  Patient goal:   1. To improve and maintain a healthy voice quality  2. To understand the problem and fix it as much as possible    Short-term goal(s): Within the first 4 sessions, Ms. Avery:  1. will demonstrate assigned laryngeal massage techniques with 80% accuracy or better with no clinician support  2. will be able to independently list key factors in maintenance of good vocal hygiene with 80% accuracy, and report on their use outside the therapy room.  3. will utilize silent inhalation with good low-respiratory engagement 75% of the time during therapy tasks with minimal clinician support  4. will demonstrate semi-occluded vocal tract (SOVT) exercises with at least 80% accuracy with no clinician support  5. will accurately identify target vs. habitual voice quality during therapy tasks in 4 out of 5 trials with no clinician support    Long-term goal(s): In 6 months, Ms. Avery will:  1. Report a week of typical activities, in which Dysphonia does not exceed a level of 2 out of 10, 80% of the time    This treatment plan was developed with the patient who agreed with the  recommendations.    _______________________________________________________________________  THERAPY NOTE (CPT 58990)  Date of Service: 5/16/2018    SUBJECTIVE / OBJECTIVE:  Please refer to my evaluation report from today's encounter for full details regarding subjective data, patient reported measures, and diagnostic findings.    THERAPEUTIC ACTIVITIES  Counseling and Education    Asked many questions about the nature of her symptoms, and I answered all of these thoroughly.    Instructed concepts and techniques for optimal vocal hygiene including:    Systemic hydration, including strategies for increasing daily water intake    Topical hydration - Gargling, saline nasal irrigation, humidification, steam, guaifenesin    Environmental barriers to healthy voicing - noise, inhaled irritants, room acoustics    Awareness and reduction of phonotraumatic behaviors    Moderating voice use    Substituting non-voice alternative behaviors    Avoiding cough and throat clearing    Exercises to promote optimal respiratory mechanics    She demonstrated visible tension in the neck and shoulders during volitional deep breath    Practiced in a forward leaning seated posture to facilitate awareness of low respiratory engagement    With clinician support, patient was able to demonstrate improved abdominal relaxation and engagement on inhalation    Semi-Occluded Vocal Tract (SOVT) exercises instructed to reduce laryngeal tension, promote vocal fold pliability, and coordinate respiration and phonation    Straw with water resistance was found to be most facilitating     Sustained phonation, and voice vs. voiceless productions used to promote easy voicing and raise awareness of laryngeal tension    Ascending and descending glides utilized to promote vocal fold pliability    Advanced to /w/ phoneme and lip bubble to allow for easy use outside of the home environment    Instructed to use these exercises as a warm-up / cooldown, and to  "re-calibrate the voice throughout the day.    Good accuracy with minimal clinician support    Resonant Voice Therapy (RVT) exercises to promote forward locus of resonance and optimized pattern of laryngeal adduction    Easy descending glide on /m/ utilized in conjunction with relaxed jaw, tongue, and lightly closed lips to facilitate forward resonant sound    Use of the carrier phrase \"mmhmm\" instructed to promote generalization to everyday speech    Syllable level using /m/ in alternation with cardinal vowels on sustained pitches and speech inflection    Word level exercises featuring nasal continuant loaded stimuli    She demonstrated 75% accuracy with minimal to moderate clincian support      Concepts of an optimal regimen for practice were instructed.  o She should use an interval schedule of practice, with brief periods of practice frequently throughout each day  o [place] concepts of volitional practice to facilitate motor learning.    I provided an audio recording and handouts of today's therapeutic activities to facilitate practice.    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  Good work within today's session, with the use of therapeutic exercises patient was able to achieve improved ease and relaxation with voicing, though increased breathiness (likely appropriate for the known paresis) was noted.  She reported good understanding of therapeutic rationales and was able to demonstrate all exercises with adequate accuracy.    PLAN: I will see Ms. Avery in 2-3 weeks, at which time we will advance optimization of speaking voice through the introduction of conversational training therapy.     TOTAL SERVICE TIME: 90 minutes  EVALUATION OF VOICE AND " RESONANCE (27048)  TREATMENT (30352)  LARYNGEAL FUNCTION STUDIES (98322)  NO CHARGE FACILITY FEE (50773)    Mesfin Vasquez M.M., M.A., CCC-SLP  Speech-Language Pathologist  Certificate of Vocology  [phone]

## 2018-06-29 DIAGNOSIS — Z86.2 PERSONAL HISTORY OF DISEASES OF BLOOD AND BLOOD-FORMING ORGANS: ICD-10-CM

## 2018-06-29 DIAGNOSIS — C81.90 HODGKIN DISEASE (H): ICD-10-CM

## 2018-07-03 ENCOUNTER — HOSPITAL ENCOUNTER (INPATIENT)
Dept: GENERAL RADIOLOGY | Facility: CLINIC | Age: 28
End: 2018-07-03
Attending: INTERNAL MEDICINE

## 2018-07-03 DIAGNOSIS — C81.11 NODULAR SCLEROSIS HODGKIN LYMPHOMA OF LYMPH NODES OF NECK (H): Primary | ICD-10-CM

## 2018-07-03 DIAGNOSIS — C81.11 NODULAR SCLEROSIS HODGKIN LYMPHOMA OF LYMPH NODES OF NECK (H): ICD-10-CM

## 2018-07-05 ENCOUNTER — OFFICE VISIT (OUTPATIENT)
Dept: TRANSPLANT | Facility: CLINIC | Age: 28
End: 2018-07-05
Attending: INTERNAL MEDICINE
Payer: COMMERCIAL

## 2018-07-05 ENCOUNTER — HOSPITAL ENCOUNTER (OUTPATIENT)
Dept: GENERAL RADIOLOGY | Facility: CLINIC | Age: 28
Discharge: HOME | End: 2018-07-05
Attending: INTERNAL MEDICINE | Admitting: INTERNAL MEDICINE
Payer: COMMERCIAL

## 2018-07-05 ENCOUNTER — MEDICAL CORRESPONDENCE (OUTPATIENT)
Dept: TRANSPLANT | Facility: CLINIC | Age: 28
End: 2018-07-05

## 2018-07-05 ENCOUNTER — HOSPITAL ENCOUNTER (OUTPATIENT)
Dept: NUCLEAR MEDICINE | Facility: CLINIC | Age: 28
Setting detail: NUCLEAR MEDICINE
End: 2018-07-05
Attending: INTERNAL MEDICINE
Payer: COMMERCIAL

## 2018-07-05 ENCOUNTER — APPOINTMENT (OUTPATIENT)
Dept: LAB | Facility: CLINIC | Age: 28
End: 2018-07-05
Attending: INTERNAL MEDICINE
Payer: COMMERCIAL

## 2018-07-05 VITALS
DIASTOLIC BLOOD PRESSURE: 78 MMHG | BODY MASS INDEX: 27.44 KG/M2 | HEIGHT: 64 IN | OXYGEN SATURATION: 98 % | SYSTOLIC BLOOD PRESSURE: 116 MMHG | HEART RATE: 72 BPM | RESPIRATION RATE: 16 BRPM | WEIGHT: 160.72 LBS | TEMPERATURE: 97.7 F

## 2018-07-05 DIAGNOSIS — C81.90 HODGKIN DISEASE (H): ICD-10-CM

## 2018-07-05 DIAGNOSIS — C81.11 NODULAR SCLEROSIS HODGKIN LYMPHOMA OF LYMPH NODES OF NECK (H): Primary | ICD-10-CM

## 2018-07-05 DIAGNOSIS — Z86.2 PERSONAL HISTORY OF DISEASES OF BLOOD AND BLOOD-FORMING ORGANS: ICD-10-CM

## 2018-07-05 LAB
ABO + RH BLD: NORMAL
ABO + RH BLD: NORMAL
ALBUMIN SERPL-MCNC: 4 G/DL (ref 3.4–5)
ALBUMIN UR-MCNC: NEGATIVE MG/DL
ALP SERPL-CCNC: 58 U/L (ref 40–150)
ALT SERPL W P-5'-P-CCNC: 60 U/L (ref 0–50)
ANION GAP SERPL CALCULATED.3IONS-SCNC: 8 MMOL/L (ref 3–14)
APPEARANCE UR: CLEAR
APTT PPP: 27 SEC (ref 22–37)
AST SERPL W P-5'-P-CCNC: 38 U/L (ref 0–45)
B2 MICROGLOB SERPL-MCNC: 1.7 MG/L
BASOPHILS # BLD AUTO: 0 10E9/L (ref 0–0.2)
BASOPHILS NFR BLD AUTO: 1.2 %
BILIRUB SERPL-MCNC: 0.3 MG/DL (ref 0.2–1.3)
BILIRUB UR QL STRIP: NEGATIVE
BLD GP AB SCN SERPL QL: NORMAL
BLOOD BANK CMNT PATIENT-IMP: NORMAL
BMT WORKUP IRRADIATED BLOOD REQUIRED: NORMAL
BUN SERPL-MCNC: 10 MG/DL (ref 7–30)
CALCIUM SERPL-MCNC: 8.6 MG/DL (ref 8.5–10.1)
CHLORIDE SERPL-SCNC: 109 MMOL/L (ref 94–109)
CO2 SERPL-SCNC: 23 MMOL/L (ref 20–32)
COLOR UR AUTO: ABNORMAL
CREAT SERPL-MCNC: 0.71 MG/DL (ref 0.52–1.04)
DIFFERENTIAL METHOD BLD: ABNORMAL
EBV VCA IGG SER QL IA: 6.6 AI (ref 0–0.8)
EOSINOPHIL # BLD AUTO: 0 10E9/L (ref 0–0.7)
EOSINOPHIL NFR BLD AUTO: 0.4 %
ERYTHROCYTE [DISTWIDTH] IN BLOOD BY AUTOMATED COUNT: 12.2 % (ref 10–15)
GFR SERPL CREATININE-BSD FRML MDRD: >90 ML/MIN/1.7M2
GLUCOSE SERPL-MCNC: 92 MG/DL (ref 70–99)
GLUCOSE UR STRIP-MCNC: NEGATIVE MG/DL
HCG SERPL QL: NEGATIVE
HCT VFR BLD AUTO: 38.3 % (ref 35–47)
HGB BLD-MCNC: 12.7 G/DL (ref 11.7–15.7)
HGB UR QL STRIP: NEGATIVE
HSV1 IGG SERPL QL IA: 4.8 AI (ref 0–0.8)
HSV2 IGG SERPL QL IA: <0.2 AI (ref 0–0.8)
IMM GRANULOCYTES # BLD: 0 10E9/L (ref 0–0.4)
IMM GRANULOCYTES NFR BLD: 0.4 %
INR PPP: 1.02 (ref 0.86–1.14)
KETONES UR STRIP-MCNC: NEGATIVE MG/DL
LDH SERPL L TO P-CCNC: 178 U/L (ref 81–234)
LEUKOCYTE ESTERASE UR QL STRIP: NEGATIVE
LYMPHOCYTES # BLD AUTO: 1.3 10E9/L (ref 0.8–5.3)
LYMPHOCYTES NFR BLD AUTO: 52.5 %
MCH RBC QN AUTO: 30 PG (ref 26.5–33)
MCHC RBC AUTO-ENTMCNC: 33.2 G/DL (ref 31.5–36.5)
MCV RBC AUTO: 90 FL (ref 78–100)
MONOCYTES # BLD AUTO: 0.2 10E9/L (ref 0–1.3)
MONOCYTES NFR BLD AUTO: 9 %
MUCOUS THREADS #/AREA URNS LPF: PRESENT /LPF
NEUTROPHILS # BLD AUTO: 0.9 10E9/L (ref 1.6–8.3)
NEUTROPHILS NFR BLD AUTO: 36.5 %
NITRATE UR QL: NEGATIVE
NRBC # BLD AUTO: 0 10*3/UL
NRBC BLD AUTO-RTO: 0 /100
PH UR STRIP: 7 PH (ref 5–7)
PHOSPHATE SERPL-MCNC: 3.4 MG/DL (ref 2.5–4.5)
PLATELET # BLD AUTO: 199 10E9/L (ref 150–450)
POTASSIUM SERPL-SCNC: 3.8 MMOL/L (ref 3.4–5.3)
PROT SERPL-MCNC: 7.7 G/DL (ref 6.8–8.8)
RBC # BLD AUTO: 4.24 10E12/L (ref 3.8–5.2)
RBC #/AREA URNS AUTO: 1 /HPF (ref 0–2)
SODIUM SERPL-SCNC: 140 MMOL/L (ref 133–144)
SOURCE: ABNORMAL
SP GR UR STRIP: 1 (ref 1–1.03)
SPECIMEN EXP DATE BLD: NORMAL
T3FREE SERPL-MCNC: 3.2 PG/ML (ref 2.3–4.2)
T4 FREE SERPL-MCNC: 0.99 NG/DL (ref 0.76–1.46)
TSH SERPL DL<=0.005 MIU/L-ACNC: 0.99 MU/L (ref 0.4–4)
URATE SERPL-MCNC: 3.5 MG/DL (ref 2.6–6)
UROBILINOGEN UR STRIP-MCNC: 0 MG/DL (ref 0–2)
WBC # BLD AUTO: 2.6 10E9/L (ref 4–11)
WBC #/AREA URNS AUTO: 0 /HPF (ref 0–5)

## 2018-07-05 PROCEDURE — 84155 ASSAY OF PROTEIN SERUM: CPT | Performed by: INTERNAL MEDICINE

## 2018-07-05 PROCEDURE — 82784 ASSAY IGA/IGD/IGG/IGM EACH: CPT | Performed by: INTERNAL MEDICINE

## 2018-07-05 PROCEDURE — 84481 FREE ASSAY (FT-3): CPT | Performed by: INTERNAL MEDICINE

## 2018-07-05 PROCEDURE — 85730 THROMBOPLASTIN TIME PARTIAL: CPT | Performed by: INTERNAL MEDICINE

## 2018-07-05 PROCEDURE — 86703 HIV-1/HIV-2 1 RESULT ANTBDY: CPT | Performed by: INTERNAL MEDICINE

## 2018-07-05 PROCEDURE — 87798 DETECT AGENT NOS DNA AMP: CPT | Performed by: INTERNAL MEDICINE

## 2018-07-05 PROCEDURE — 86695 HERPES SIMPLEX TYPE 1 TEST: CPT | Performed by: INTERNAL MEDICINE

## 2018-07-05 PROCEDURE — 84100 ASSAY OF PHOSPHORUS: CPT | Performed by: INTERNAL MEDICINE

## 2018-07-05 PROCEDURE — 85610 PROTHROMBIN TIME: CPT | Performed by: INTERNAL MEDICINE

## 2018-07-05 PROCEDURE — 80053 COMPREHEN METABOLIC PANEL: CPT | Performed by: INTERNAL MEDICINE

## 2018-07-05 PROCEDURE — 87535 HIV-1 PROBE&REVERSE TRNSCRPJ: CPT | Performed by: INTERNAL MEDICINE

## 2018-07-05 PROCEDURE — 87521 HEPATITIS C PROBE&RVRS TRNSC: CPT | Performed by: INTERNAL MEDICINE

## 2018-07-05 PROCEDURE — 25000128 H RX IP 250 OP 636: Mod: ZF | Performed by: INTERNAL MEDICINE

## 2018-07-05 PROCEDURE — A9560 TC99M LABELED RBC: HCPCS | Performed by: INTERNAL MEDICINE

## 2018-07-05 PROCEDURE — 86644 CMV ANTIBODY: CPT | Performed by: INTERNAL MEDICINE

## 2018-07-05 PROCEDURE — 86753 PROTOZOA ANTIBODY NOS: CPT | Performed by: INTERNAL MEDICINE

## 2018-07-05 PROCEDURE — 25000128 H RX IP 250 OP 636: Performed by: INTERNAL MEDICINE

## 2018-07-05 PROCEDURE — 81001 URINALYSIS AUTO W/SCOPE: CPT | Performed by: INTERNAL MEDICINE

## 2018-07-05 PROCEDURE — 86696 HERPES SIMPLEX TYPE 2 TEST: CPT | Performed by: INTERNAL MEDICINE

## 2018-07-05 PROCEDURE — 78472 GATED HEART PLANAR SINGLE: CPT

## 2018-07-05 PROCEDURE — 84550 ASSAY OF BLOOD/URIC ACID: CPT | Performed by: INTERNAL MEDICINE

## 2018-07-05 PROCEDURE — 34300033 ZZH RX 343: Performed by: INTERNAL MEDICINE

## 2018-07-05 PROCEDURE — 86334 IMMUNOFIX E-PHORESIS SERUM: CPT | Performed by: INTERNAL MEDICINE

## 2018-07-05 PROCEDURE — 85025 COMPLETE CBC W/AUTO DIFF WBC: CPT | Performed by: INTERNAL MEDICINE

## 2018-07-05 PROCEDURE — 84165 PROTEIN E-PHORESIS SERUM: CPT | Performed by: INTERNAL MEDICINE

## 2018-07-05 PROCEDURE — 86665 EPSTEIN-BARR CAPSID VCA: CPT | Performed by: INTERNAL MEDICINE

## 2018-07-05 PROCEDURE — 82232 ASSAY OF BETA-2 PROTEIN: CPT | Performed by: INTERNAL MEDICINE

## 2018-07-05 PROCEDURE — 87340 HEPATITIS B SURFACE AG IA: CPT | Performed by: INTERNAL MEDICINE

## 2018-07-05 PROCEDURE — 00000402 ZZHCL STATISTIC TOTAL PROTEIN: Performed by: INTERNAL MEDICINE

## 2018-07-05 PROCEDURE — 86780 TREPONEMA PALLIDUM: CPT | Performed by: INTERNAL MEDICINE

## 2018-07-05 PROCEDURE — 86901 BLOOD TYPING SEROLOGIC RH(D): CPT | Performed by: INTERNAL MEDICINE

## 2018-07-05 PROCEDURE — 86704 HEP B CORE ANTIBODY TOTAL: CPT | Performed by: INTERNAL MEDICINE

## 2018-07-05 PROCEDURE — 84443 ASSAY THYROID STIM HORMONE: CPT | Performed by: INTERNAL MEDICINE

## 2018-07-05 PROCEDURE — 86850 RBC ANTIBODY SCREEN: CPT | Performed by: INTERNAL MEDICINE

## 2018-07-05 PROCEDURE — 87516 HEPATITIS B DNA AMP PROBE: CPT | Performed by: INTERNAL MEDICINE

## 2018-07-05 PROCEDURE — 84439 ASSAY OF FREE THYROXINE: CPT | Performed by: INTERNAL MEDICINE

## 2018-07-05 PROCEDURE — 86803 HEPATITIS C AB TEST: CPT | Performed by: INTERNAL MEDICINE

## 2018-07-05 PROCEDURE — 83021 HEMOGLOBIN CHROMOTOGRAPHY: CPT | Performed by: INTERNAL MEDICINE

## 2018-07-05 PROCEDURE — 84703 CHORIONIC GONADOTROPIN ASSAY: CPT | Performed by: INTERNAL MEDICINE

## 2018-07-05 PROCEDURE — 86900 BLOOD TYPING SEROLOGIC ABO: CPT | Performed by: INTERNAL MEDICINE

## 2018-07-05 PROCEDURE — G0463 HOSPITAL OUTPT CLINIC VISIT: HCPCS | Mod: ZF

## 2018-07-05 PROCEDURE — 86687 HTLV-I ANTIBODY: CPT | Performed by: INTERNAL MEDICINE

## 2018-07-05 PROCEDURE — 71046 X-RAY EXAM CHEST 2 VIEWS: CPT

## 2018-07-05 PROCEDURE — 83615 LACTATE (LD) (LDH) ENZYME: CPT | Performed by: INTERNAL MEDICINE

## 2018-07-05 RX ORDER — HEPARIN SODIUM (PORCINE) LOCK FLUSH IV SOLN 100 UNIT/ML 100 UNIT/ML
5 SOLUTION INTRAVENOUS ONCE
Status: COMPLETED | OUTPATIENT
Start: 2018-07-05 | End: 2018-07-05

## 2018-07-05 RX ORDER — HEPARIN SODIUM (PORCINE) LOCK FLUSH IV SOLN 100 UNIT/ML 100 UNIT/ML
5 SOLUTION INTRAVENOUS
Status: DISCONTINUED | OUTPATIENT
Start: 2018-07-05 | End: 2018-07-05 | Stop reason: HOSPADM

## 2018-07-05 RX ADMIN — SODIUM CHLORIDE, PRESERVATIVE FREE 5 ML: 5 INJECTION INTRAVENOUS at 09:16

## 2018-07-05 RX ADMIN — Medication 23.8 MCI.: at 13:15

## 2018-07-05 RX ADMIN — SODIUM CHLORIDE, PRESERVATIVE FREE 5 ML: 5 INJECTION INTRAVENOUS at 14:31

## 2018-07-05 ASSESSMENT — PAIN SCALES - GENERAL: PAINLEVEL: NO PAIN (0)

## 2018-07-05 NOTE — MR AVS SNAPSHOT
After Visit Summary   7/5/2018    Amira Avery    MRN: [id number]           Patient Information     Date Of Birth          1990        Visit Information        Provider Department      7/5/2018 11:00 AM Aneta Sherman MSW;  2 114 CONSULT [facility] Blood and Marrow Transplant        Today's Diagnoses     Hodgkin disease (H)        Personal history of diseases of blood and blood-forming organs              [facility]  [address] [place] [address]  Phone: [phone]  Clinic Hours:   Monday-Thursday:7am to 7pm   Friday: 7am to 5pm   Weekends and holidays:    8am to noon (in general)  If your fever is 100.5  or greater,   call the clinic.  After hours call the   hospital at [phone] or   [phone]. Ask for the BMT   fellow on-call            Follow-ups after your visit        Your next 10 appointments already scheduled     Jul 09, 2018  8:00 AM CDT   FULL PULMONARY FUNCTION with  PFL A   [facility] Pulmonary Function Testing ([facility])    [address] [place] [address]  3rd [facility] [id number] [phone]            Jul 09, 2018  9:00 AM CDT   PHARM D CONSULT WITH ROOM with  Bmt Pharm D, TIFFANIE,  2 119 CONSULT [facility] Blood and Marrow Transplant ([facility])    [address] [place] [address]  [address]  [facility] [id number] [phone]            Jul 09, 2018 10:00 AM CDT   Masonic Lab Draw with  MASONIC LAB DRAW   [facility] Masonic Lab Draw ([facility])    [address] [place] [address]  [address]  [facility] [id number] [phone]            Jul 09, 2018 10:30 AM CDT   Bone Marrow Biopsy with  BMT WES #2, UU BONE MARROW BIOPSY   [facility] Blood and Marrow Transplant ([facility])    [address] [place] [address]  [address] [place] [id number] [phone]            Jul 09, 2018 11:30 AM CDT   BMT Nurse Visit with  Bmt Nurse 1    [facility] Blood and Marrow Transplant ([facility])    [address]  [address]  [facility] [address] [phone]            Jul 10, 2018  9:00 AM CDT   (Arrive by 8:45 AM)   Autologous Consultation with LOS APHERESIS RN8, [facility] 2 117 CONSULT RM   [facility] [place] [facility] ([facility])    [address]  [address]  [facility] [id number] [phone]            Jul 10, 2018 10:15 AM CDT   Central Venous Catheter - [address] [facility] with Leonor Gordon RN   [facility] ([facility])    [address]  [facility] [address]              Appointment is located at [address] [place] [address]              Future tests that were ordered for you today     Open Future Orders        Priority Expected Expires Ordered    PE NPET Outside Read Routine 7/5/2018 7/3/2019 7/3/2018            Who to contact     If you have questions or need follow up information about today's clinic visit or your schedule please contact [facility] BLOOD AND MARROW TRANSPLANT directly at [phone].  Normal or non-critical lab and imaging results will be communicated to you by Liibookhart, letter or phone within 4 business days after the clinic has received the results. If you do not hear from us within 7 days, please contact the clinic through Liibookhart or phone. If you have a critical or abnormal lab result, we will notify you by phone as soon as possible.  Submit refill requests through CloudTalk or call your pharmacy and they will forward the refill request to us. Please allow 3 business days for your refill to be completed.          Additional Information About Your Visit        CloudTalk Information     CloudTalk gives you secure access to your electronic health record. If you see a primary care provider, you can also send messages to your care team and make  appointments. If you have questions, please call your primary care clinic.  If you do not have a primary care provider, please call [phone] and they will assist you.        Care EveryWhere ID     This is your Care EveryWhere ID. This could be used by other organizations to access your [place] medical records  [id number]         Blood Pressure from Last 3 Encounters:   07/05/18 116/78   05/07/18 123/74   05/02/18 112/61    Weight from Last 3 Encounters:   07/05/18 72.9 kg (160 lb 11.5 oz)   05/09/18 72.1 kg (159 lb)   05/07/18 71.1 kg (156 lb 12.8 oz)              We Performed the Following             Recent Review Flowsheet Data     BMT Recent Results Latest Ref Rng & Units 4/18/2018 4/20/2018 4/23/2018 7/5/2018    WBC 4.0 - 11.0 10e9/L 5.0 7.2 - 2.6(L)    Hemoglobin 11.7 - 15.7 g/dL 12.3 12.3 - 12.7    Platelet Count 150 - 450 10e9/L 268 273 - 199    Neutrophils (Absolute) 1.6 - 8.3 10e9/L 2.9 4.3 - 0.9(L)    INR 0.86 - 1.14 1.06 - - 1.02    Sodium 133 - 144 mmol/L 139 - - 140    Potassium 3.4 - 5.3 mmol/L 4.0 - - 3.8    Chloride 94 - 109 mmol/L 110(H) - - 109    Glucose 70 - 99 mg/dL 94 - 100(H) 92    Urea Nitrogen 7 - 30 mg/dL 13 - - 10    Creatinine 0.52 - 1.04 mg/dL 0.65 - - 0.71    Calcium (Total) 8.5 - 10.1 mg/dL 8.7 - - 8.6    Protein (Total) 6.8 - 8.8 g/dL 7.4 - - 7.7    Albumin 3.4 - 5.0 g/dL 3.9 - - 4.0    Alkaline Phosphatase 40 - 150 U/L 48 - - 58    AST 0 - 45 U/L 53(H) - - 38    ALT 0 - 50 U/L 40 - - 60(H)    MCV 78 - 100 fl 90 90 - 90               Primary Care Provider Office Phone # Fax #    [facility] [phone] [address] [facility] [facility] [address]        Equal Access to Services     RICHIE RAMEY : Alexis Luis, samaria ceja, qadot kaalmada mariluz, jen raymundo. [facility] [phone].    ATENCIÓN: Si habla español, tiene a isabel disposición servicios gratuitos de asistencia  lingüísticaMarco Mejia al [phone].    We comply with applicable federal civil rights laws and [place] laws. We do not discriminate on the basis of race, color, national origin, age, disability, sex, sexual orientation, or gender identity.            Thank you!     Thank you for choosing [facility] BLOOD AND MARROW TRANSPLANT  for your care. Our goal is always to provide you with excellent care. Hearing back from our patients is one way we can continue to improve our services. Please take a few minutes to complete the written survey that you may receive in the mail after your visit with us. Thank you!             Your Updated Medication List - Protect others around you: Learn how to safely use, store and throw away your medicines at www.disposemymeds.org.          This list is accurate as of 7/5/18 11:59 PM.  Always use your most recent med list.                   Brand Name Dispense Instructions for use Diagnosis    acetaminophen 500 MG tablet    TYLENOL     Take by mouth every 6 hours as needed

## 2018-07-05 NOTE — MR AVS SNAPSHOT
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  After hours call the   hospital at [phone] or   [phone]. Ask for the BMT   fellow on-call            Follow-ups after your visit        Your next 10 appointments already scheduled     Jul 09, 2018  8:00 AM CDT   FULL PULMONARY FUNCTION with  PFL A   [facility] Pulmonary Function Testing ([facility])    [address] [place] [address]  [address] [facility] [id number] [phone]            Jul 09, 2018  9:00 AM CDT   PHARM D CONSULT WITH ROOM with  Bmt Pharm D, RPH,  2 119 CONSULT [facility] Blood and Marrow Transplant ([facility])    [address] [place] [address]  [address]  [facility] [id number] [phone]            Jul 09, 2018 10:00 AM CDT   Masonic Lab Draw with  MASONIC LAB DRAW   [facility] Masonic Lab Draw ([facility])    [address] [place] [address]  [address]  [facility] [id number] [phone]            Jul 09, 2018 10:30 AM CDT   Bone Marrow Biopsy with  BMT WES #2, UU BONE MARROW BIOPSY   [facility] Blood and Marrow Transplant ([facility])    [address] [place] [address]  [address]  [facility] [id number] [phone]            Jul 09, 2018 11:30 AM CDT   BMT Nurse Visit with  Bmt Nurse 1   [facility]  Blood and Marrow Transplant ([facility] [place])    [address]  [address]  [facility] [address] [phone]            Jul 10, 2018  9:00 AM CDT   (Arrive by 8:45 AM)   Autologous Consultation with LOS APHERESIS RN8, LOS 2 117 CONSULT [facility] [place] Apheres[facility] ([facility])    [address]  [address]  [facility] [address] [phone]            Jul 10, 2018 10:15 AM CDT   Central Venous Catheter - [address] [facility] with Leonor Gordon RN   [facility] [place] ([facility])    [address]  [facility] [address]              Appointment is located at [address] [place] [address]              Future tests that were ordered for you today     Open Future Orders        Priority Expected Expires Ordered    PE NPET Outside Read Routine 7/5/2018 7/3/2019 7/3/2018            Who to contact     If you have questions or need follow up information about today's clinic visit or your schedule please contact [facility] BLOOD AND MARROW TRANSPLANT directly at [phone].  Normal or non-critical lab and imaging results will be communicated to you by Submittablehart, letter or phone within 4 business days after the clinic has received the results. If you do not hear from us within 7 days, please contact the clinic through Submittablehart or phone. If you have a critical or abnormal lab result, we will notify you by phone as soon as possible.  Submit refill requests through [employer] or call your pharmacy and they will forward the refill request to us. Please allow 3 business days for your refill to be completed.          Additional Information About Your Visit        [employer] Information     [employer] gives you secure access to your electronic health record. If you see a primary care provider, you can also send messages to your care team and make appointments. If  you have questions, please call your primary care clinic.  If you do not have a primary care provider, please call [phone] and they will assist you.        Care EveryWhere ID     This is your Care EveryWhere ID. This could be used by other organizations to access your [place] medical records  [id number]         Blood Pressure from Last 3 Encounters:   07/05/18 116/78   05/07/18 123/74   05/02/18 112/61    Weight from Last 3 Encounters:   07/05/18 72.9 kg (160 lb 11.5 oz)   05/09/18 72.1 kg (159 lb)   05/07/18 71.1 kg (156 lb 12.8 oz)              Today, you had the following     No orders found for display       Recent Review Flowsheet Data     BMT Recent Results Latest Ref Rng & Units 4/18/2018 4/20/2018 4/23/2018 7/5/2018    WBC 4.0 - 11.0 10e9/L 5.0 7.2 - 2.6(L)    Hemoglobin 11.7 - 15.7 g/dL 12.3 12.3 - 12.7    Platelet Count 150 - 450 10e9/L 268 273 - 199    Neutrophils (Absolute) 1.6 - 8.3 10e9/L 2.9 4.3 - 0.9(L)    INR 0.86 - 1.14 1.06 - - 1.02    Sodium 133 - 144 mmol/L 139 - - 140    Potassium 3.4 - 5.3 mmol/L 4.0 - - 3.8    Chloride 94 - 109 mmol/L 110(H) - - 109    Glucose 70 - 99 mg/dL 94 - 100(H) 92    Urea Nitrogen 7 - 30 mg/dL 13 - - 10    Creatinine 0.52 - 1.04 mg/dL 0.65 - - 0.71    Calcium (Total) 8.5 - 10.1 mg/dL 8.7 - - 8.6    Protein (Total) 6.8 - 8.8 g/dL 7.4 - - 7.7    Albumin 3.4 - 5.0 g/dL 3.9 - - 4.0    Alkaline Phosphatase 40 - 150 U/L 48 - - 58    AST 0 - 45 U/L 53(H) - - 38    ALT 0 - 50 U/L 40 - - 60(H)    MCV 78 - 100 fl 90 90 - 90               Primary Care Provider Office Phone # Fax #    [facility] [phone] [address] [facility] [address]        Equal Access to Services     RICHIE RAMYE : Alexis Luis, wanatali luqadaha, qadarellta kaalmada adetima, jen raymundo. [facility] [phone].    ATENCIÓN: Si habla español, tiene a isabel disposición servicios gratuitos de asistencia  lingüísticaMarco Mejia al [phone].    We comply with applicable federal civil rights laws and [place] laws. We do not discriminate on the basis of race, color, national origin, age, disability, sex, sexual orientation, or gender identity.            Thank you!     Thank you for choosing [facility] BLOOD AND MARROW TRANSPLANT  for your care. Our goal is always to provide you with excellent care. Hearing back from our patients is one way we can continue to improve our services. Please take a few minutes to complete the written survey that you may receive in the mail after your visit with us. Thank you!             Your Updated Medication List - Protect others around you: Learn how to safely use, store and throw away your medicines at www.disposemymeds.org.          This list is accurate as of 7/5/18 11:59 PM.  Always use your most recent med list.                   Brand Name Dispense Instructions for use Diagnosis    acetaminophen 500 MG tablet    TYLENOL     Take by mouth every 6 hours as needed

## 2018-07-05 NOTE — MR AVS SNAPSHOT
After Visit Summary   7/5/2018    Amira Avery    MRN: [id number]           Patient Information     Date Of Birth          1990        Visit Information        Provider Department      7/5/2018 9:00 AM Marcello, Nettie Bmt Nurse [facility] Blood and Marrow Transplant        Today's Diagnoses     Hodgkin disease (H)        Personal history of diseases of blood and blood-forming organs              [facility] ([facility])  [address] [place] [address]  Phone: [phone]  Clinic Hours:   Monday-Thursday:7am to 7pm   Friday: 7am to 5pm   Weekends and holidays:    8am to noon (in general)  If your fever is 100.5  or greater,   call the clinic.  After hours call the   hospital at [phone] or   [phone]. Ask for the BMT   fellow on-call            Follow-ups after your visit        Your next 10 appointments already scheduled     Jul 05, 2018 11:00 AM CDT   (Arrive by 10:45 AM)   BMT SW PSA with WILLIAM Heaton,  2 114 CONSULT RM   [facility] Blood and Marrow Transplant ([facility] [place])    [address]  [address]  [facility] [id number] [phone]            Jul 05, 2018  1:30 PM CDT   NM HEART MUGA REST with UUNM2   [facility] [place], Nuclear Medicine ([facility] [place])    [address] [facility] [id number] [phone]           Please bring a list of your medicines to the exam. (Include vitamins, minerals and over-the-counter drugs.) You should wear comfortable clothes. Leave your valuables at home. Please bring related prior results and films.  Tell your doctor:   If you are breastfeeding or may be pregnant.   If you have had a barium test within the past 48 hours. Barium may change the results of certain exams.   If you think you may need sedation (medicine to help you relax).  You may eat and drink as normal.  Please call your Imaging Department at your exam site with any  questions.            Jul 05, 2018  2:45 PM CDT   XR CHEST 2 VIEWS with UUXR3   [facility] [place],  Radiology ([facility] [place])    [address] [facility] [address] [phone]           Please bring a list of your current medicines to your exam. (Include vitamins, minerals and over-thecounter medicines.) Leave your valuables at home.  Tell your doctor if there is a chance you may be pregnant.  You do not need to do anything special for this exam.            Jul 09, 2018  8:00 AM CDT   FULL PULMONARY FUNCTION with  PFL A   [facility] Pulmonary Function Testing ([facility])    [address]  3rd Floor  [facility] [id number] [phone]            Jul 09, 2018  9:00 AM CDT   PHARM D CONSULT WITH ROOM with  Bmt Pharm D, [facility],  2 119 CONSULT RM   [facility] Blood and Marrow Transplant ([facility])    [address]  [address]  [facility] [id number] [phone]            Jul 09, 2018 10:00 AM CDT   Masonic Lab Draw with  MASONIC LAB DRAW   [facility] Masonic Lab Draw ([facility])    [address]  [address]  [facility] [address] [phone]            Jul 09, 2018 10:30 AM CDT   Bone Marrow Biopsy with  BMT WES #2, UU BONE MARROW BIOPSY   [facility] Blood and Marrow Transplant ([facility])    [address]  [address]  [facility] [address] [phone]              Future tests that were ordered for you today     Open Future Orders        Priority Expected Expires Ordered    PE NPET Outside Read Routine 7/5/2018 7/3/2019 7/3/2018            Who to contact     If you have questions or need follow up information about today's clinic visit or your schedule please contact [facility] BLOOD AND MARROW TRANSPLANT directly at [phone].  Normal or non-critical lab and imaging results will be communicated to you by  "MyChart, letter or phone within 4 business days after the clinic has received the results. If you do not hear from us within 7 days, please contact the clinic through [employer] or phone. If you have a critical or abnormal lab result, we will notify you by phone as soon as possible.  Submit refill requests through [employer] or call your pharmacy and they will forward the refill request to us. Please allow 3 business days for your refill to be completed.          Additional Information About Your Visit        [employer] Information     [employer] gives you secure access to your electronic health record. If you see a primary care provider, you can also send messages to your care team and make appointments. If you have questions, please call your primary care clinic.  If you do not have a primary care provider, please call [phone] and they will assist you.        Care EveryWhere ID     This is your Care EveryWhere ID. This could be used by other organizations to access your [place] medical records  [id number]        Your Vitals Were     Pulse Temperature Respirations Height Pulse Oximetry BMI (Body Mass Index)    72 97.7  F (36.5  C) (Oral) 16 1.63 m (5' 4.17\") 98% 27.44 kg/m2       Blood Pressure from Last 3 Encounters:   07/05/18 116/78   05/07/18 123/74   05/02/18 112/61    Weight from Last 3 Encounters:   07/05/18 72.9 kg (160 lb 11.5 oz)   05/09/18 72.1 kg (159 lb)   05/07/18 71.1 kg (156 lb 12.8 oz)              We Performed the Following     ABO/Rh type and screen     Beta 2 microglobuline     BMT Infectious Disease Donor Panel- SEND TO [facility]     BMT Workup Irradiated Blood Required     CBC with platelets differential     Comprehensive metabolic panel     EBV Capsid Antibody IgG     HBV HCV HIV WNV by NIEVES- [facility]     Hemoglobin S     Herpes Simplex Virus 1 and 2 IgG     INR     Lactate Dehydrogenase     Order if  female with child bearing potential: HCG qualitative     Order if " 2004-24: Protein electrophoresis     Partial thromboplastin time     Phosphorus     Protein Immunofixation Serum     Routine UA with microscopic     T3 Free     T4 free     TSH     Uric acid        Recent Review Flowsheet Data     BMT Recent Results Latest Ref Rng & Units 4/18/2018 4/20/2018 4/23/2018 7/5/2018    WBC 4.0 - 11.0 10e9/L 5.0 7.2 - 2.6(L)    Hemoglobin 11.7 - 15.7 g/dL 12.3 12.3 - 12.7    Platelet Count 150 - 450 10e9/L 268 273 - 199    Neutrophils (Absolute) 1.6 - 8.3 10e9/L 2.9 4.3 - 0.9(L)    INR 0.86 - 1.14 1.06 - - -    Sodium 133 - 144 mmol/L 139 - - -    Potassium 3.4 - 5.3 mmol/L 4.0 - - -    Chloride 94 - 109 mmol/L 110(H) - - -    Glucose 70 - 99 mg/dL 94 - 100(H) -    Urea Nitrogen 7 - 30 mg/dL 13 - - -    Creatinine 0.52 - 1.04 mg/dL 0.65 - - -    Calcium (Total) 8.5 - 10.1 mg/dL 8.7 - - -    Protein (Total) 6.8 - 8.8 g/dL 7.4 - - -    Albumin 3.4 - 5.0 g/dL 3.9 - - -    Alkaline Phosphatase 40 - 150 U/L 48 - - -    AST 0 - 45 U/L 53(H) - - -    ALT 0 - 50 U/L 40 - - -    MCV 78 - 100 fl 90 90 - 90               Primary Care Provider Office Phone # Fax #    [facility] [phone] [address] [facility] [address]        Equal Access to Services     MARINE RAMEY AH: Hadii velma Luis, waaxda luqadaha, qaybta kaalmada adeegyada, jen raymundo. So [facility] [phone].    ATENCIÓN: Si habla español, tiene a isabel disposición servicios gratuitos de asistencia lingüística. Llame al [phone].    We comply with applicable federal civil rights laws and [place] laws. We do not discriminate on the basis of race, color, national origin, age, disability, sex, sexual orientation, or gender identity.            Thank you!     Thank you for choosing [facility] BLOOD AND MARROW TRANSPLANT  for your care. Our goal is always to provide you with excellent care. Hearing back from our patients is one way we can continue to  improve our services. Please take a few minutes to complete the written survey that you may receive in the mail after your visit with us. Thank you!             Your Updated Medication List - Protect others around you: Learn how to safely use, store and throw away your medicines at www.disposemymeds.org.          This list is accurate as of 7/5/18 10:36 AM.  Always use your most recent med list.                   Brand Name Dispense Instructions for use Diagnosis    acetaminophen 500 MG tablet    TYLENOL     Take by mouth every 6 hours as needed

## 2018-07-05 NOTE — NURSING NOTE
"Oncology Rooming Note    July 5, 2018 10:32 AM   Amira Avery is a 28 year old female who presents for:    Chief Complaint   Patient presents with     RECHECK     Pt here for Pre-BMT Workup for Hodgkin's Lymphoma.      Initial Vitals: /78  Pulse 72  Temp 97.7  F (36.5  C) (Oral)  Resp 16  Ht 1.63 m (5' 4.17\")  Wt 72.9 kg (160 lb 11.5 oz)  SpO2 98%  BMI 27.44 kg/m2 Estimated body mass index is 27.44 kg/(m^2) as calculated from the following:    Height as of this encounter: 1.63 m (5' 4.17\").    Weight as of this encounter: 72.9 kg (160 lb 11.5 oz). Body surface area is 1.82 meters squared.  No Pain (0) Comment: Data Unavailable   No LMP recorded.  Allergies reviewed: Yes  Medications reviewed: Yes    Medications: Medication refills not needed today.  Ph[facility] entered into [employer] [address] [place] [address] AVE AT [facility] OF  [address]    Clinical concerns: Pt here for Pre BMT Workup for Hodgkin's Lymphoma. Pt feeling well; with no complaints. Pt's Mother here with her today. Consents reviewed and signed by Pt prior to drawing labs, etc. UA obtained. Workup Calender reviewed with Pt and her Mother.  Pt with no further questions at this time. Pt aware that she needs to call clinic twice daily during Workup to ensure no changes have been made to her schedule.  Pt not taking any blood thinners. Aware that she will need a  on BMBX day if she would like IV Versed with procedure.       70 minutes for nursing intake (face to face time)     Felecia Cruz RN              "

## 2018-07-06 LAB
ALBUMIN SERPL ELPH-MCNC: 4.4 G/DL (ref 3.7–5.1)
ALPHA1 GLOB SERPL ELPH-MCNC: 0.3 G/DL (ref 0.2–0.4)
ALPHA2 GLOB SERPL ELPH-MCNC: 0.6 G/DL (ref 0.5–0.9)
B-GLOBULIN SERPL ELPH-MCNC: 0.8 G/DL (ref 0.6–1)
DONOR CYTOMEGALOVIRUS ABY: POSITIVE
DONOR HEP B CORE ABY: NONREACTIVE
DONOR HEP B SURF AGN: NONREACTIVE
DONOR HEPATITIS C ABY: NONREACTIVE
DONOR HTLV 1&2 ANTIBODY: NONREACTIVE
DONOR TREPONEMA PAL ABY: NONREACTIVE
GAMMA GLOB SERPL ELPH-MCNC: 1.1 G/DL (ref 0.7–1.6)
HIV1+2 AB SERPL QL IA: NONREACTIVE
IGA SERPL-MCNC: 374 MG/DL (ref 70–380)
IGG SERPL-MCNC: 1130 MG/DL (ref 695–1620)
IGM SERPL-MCNC: 97 MG/DL (ref 60–265)
M PROTEIN SERPL ELPH-MCNC: 0 G/DL
MPX SERIES: NONREACTIVE
PROT PATTERN SERPL ELPH-IMP: NORMAL
PROT PATTERN SERPL IFE-IMP: NORMAL
T CRUZI AB SER DONR QL: NONREACTIVE
WNV RNA SPEC QL NAA+PROBE: NONREACTIVE

## 2018-07-06 ASSESSMENT — ANXIETY QUESTIONNAIRES
7. FEELING AFRAID AS IF SOMETHING AWFUL MIGHT HAPPEN: SEVERAL DAYS
5. BEING SO RESTLESS THAT IT IS HARD TO SIT STILL: NOT AT ALL
6. BECOMING EASILY ANNOYED OR IRRITABLE: NOT AT ALL
3. WORRYING TOO MUCH ABOUT DIFFERENT THINGS: SEVERAL DAYS
IF YOU CHECKED OFF ANY PROBLEMS ON THIS QUESTIONNAIRE, HOW DIFFICULT HAVE THESE PROBLEMS MADE IT FOR YOU TO DO YOUR WORK, TAKE CARE OF THINGS AT HOME, OR GET ALONG WITH OTHER PEOPLE: SOMEWHAT DIFFICULT
GAD7 TOTAL SCORE: 6
2. NOT BEING ABLE TO STOP OR CONTROL WORRYING: SEVERAL DAYS
1. FEELING NERVOUS, ANXIOUS, OR ON EDGE: MORE THAN HALF THE DAYS

## 2018-07-06 ASSESSMENT — PATIENT HEALTH QUESTIONNAIRE - PHQ9: 5. POOR APPETITE OR OVEREATING: SEVERAL DAYS
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
## 2018-07-07 ASSESSMENT — ANXIETY QUESTIONNAIRES: GAD7 TOTAL SCORE: 6

## 2018-07-07 ASSESSMENT — PATIENT HEALTH QUESTIONNAIRE - PHQ9: SUM OF ALL RESPONSES TO PHQ QUESTIONS 1-9: 7

## 2018-07-09 ENCOUNTER — OFFICE VISIT (OUTPATIENT)
Dept: TRANSPLANT | Facility: CLINIC | Age: 28
End: 2018-07-09
Attending: INTERNAL MEDICINE
Payer: COMMERCIAL

## 2018-07-09 ENCOUNTER — OFFICE VISIT (OUTPATIENT)
Dept: TRANSPLANT | Facility: CLINIC | Age: 28
End: 2018-07-09
Attending: PHYSICIAN ASSISTANT
Payer: COMMERCIAL

## 2018-07-09 VITALS
BODY MASS INDEX: 27.38 KG/M2 | TEMPERATURE: 97.6 F | SYSTOLIC BLOOD PRESSURE: 107 MMHG | OXYGEN SATURATION: 100 % | WEIGHT: 160.4 LBS | HEART RATE: 54 BPM | RESPIRATION RATE: 16 BRPM | DIASTOLIC BLOOD PRESSURE: 68 MMHG

## 2018-07-09 DIAGNOSIS — Z86.2 PERSONAL HISTORY OF DISEASES OF BLOOD AND BLOOD-FORMING ORGANS: ICD-10-CM

## 2018-07-09 DIAGNOSIS — C81.90 HODGKIN DISEASE (H): ICD-10-CM

## 2018-07-09 DIAGNOSIS — C81.10 NODULAR SCLEROSING HODGKIN'S LYMPHOMA, UNSPECIFIED BODY REGION (H): ICD-10-CM

## 2018-07-09 DIAGNOSIS — Z86.2 PERSONAL HISTORY OF DISEASES OF BLOOD AND BLOOD-FORMING ORGANS: Primary | ICD-10-CM

## 2018-07-09 LAB
BASOPHILS # BLD AUTO: 0 10E9/L (ref 0–0.2)
BASOPHILS NFR BLD AUTO: 1.6 %
COPATH REPORT: NORMAL
DIFFERENTIAL METHOD BLD: ABNORMAL
EOSINOPHIL # BLD AUTO: 0 10E9/L (ref 0–0.7)
EOSINOPHIL NFR BLD AUTO: 0.4 %
ERYTHROCYTE [DISTWIDTH] IN BLOOD BY AUTOMATED COUNT: 12.5 % (ref 10–15)
HCT VFR BLD AUTO: 37.9 % (ref 35–47)
HGB BLD-MCNC: 12.7 G/DL (ref 11.7–15.7)
IMM GRANULOCYTES # BLD: 0 10E9/L (ref 0–0.4)
IMM GRANULOCYTES NFR BLD: 0 %
LYMPHOCYTES # BLD AUTO: 1.4 10E9/L (ref 0.8–5.3)
LYMPHOCYTES NFR BLD AUTO: 56.1 %
MCH RBC QN AUTO: 30.2 PG (ref 26.5–33)
MCHC RBC AUTO-ENTMCNC: 33.5 G/DL (ref 31.5–36.5)
MCV RBC AUTO: 90 FL (ref 78–100)
MONOCYTES # BLD AUTO: 0.4 10E9/L (ref 0–1.3)
MONOCYTES NFR BLD AUTO: 14.1 %
NEUTROPHILS # BLD AUTO: 0.7 10E9/L (ref 1.6–8.3)
NEUTROPHILS NFR BLD AUTO: 27.8 %
NRBC # BLD AUTO: 0 10*3/UL
NRBC BLD AUTO-RTO: 0 /100
PLATELET # BLD AUTO: 257 10E9/L (ref 150–450)
RBC # BLD AUTO: 4.2 10E12/L (ref 3.8–5.2)
WBC # BLD AUTO: 2.6 10E9/L (ref 4–11)

## 2018-07-09 PROCEDURE — 40000611 ZZHCL STATISTIC MORPHOLOGY W/INTERP HEMEPATH TC 85060: Performed by: INTERNAL MEDICINE

## 2018-07-09 PROCEDURE — 88271 CYTOGENETICS DNA PROBE: CPT | Performed by: INTERNAL MEDICINE

## 2018-07-09 PROCEDURE — 40000424 ZZHCL STATISTIC BONE MARROW CORE PERF TC 38221: Performed by: INTERNAL MEDICINE

## 2018-07-09 PROCEDURE — 85025 COMPLETE CBC W/AUTO DIFF WBC: CPT | Performed by: INTERNAL MEDICINE

## 2018-07-09 PROCEDURE — 40000795 ZZHCL STATISTIC DNA PROCESS AND HOLD: Performed by: INTERNAL MEDICINE

## 2018-07-09 PROCEDURE — 88237 TISSUE CULTURE BONE MARROW: CPT | Performed by: INTERNAL MEDICINE

## 2018-07-09 PROCEDURE — 00000058 ZZHCL STATISTIC BONE MARROW ASP PERF TC 38220: Performed by: INTERNAL MEDICINE

## 2018-07-09 PROCEDURE — 25000128 H RX IP 250 OP 636: Mod: ZF | Performed by: PHYSICIAN ASSISTANT

## 2018-07-09 PROCEDURE — 93010 ELECTROCARDIOGRAM REPORT: CPT | Mod: ZP | Performed by: INTERNAL MEDICINE

## 2018-07-09 PROCEDURE — 88275 CYTOGENETICS 100-300: CPT | Performed by: INTERNAL MEDICINE

## 2018-07-09 PROCEDURE — 00000161 ZZHCL STATISTIC H-SPHEME PROCESS B/S: Performed by: INTERNAL MEDICINE

## 2018-07-09 PROCEDURE — 88280 CHROMOSOME KARYOTYPE STUDY: CPT | Performed by: INTERNAL MEDICINE

## 2018-07-09 PROCEDURE — 88161 CYTOPATH SMEAR OTHER SOURCE: CPT | Performed by: INTERNAL MEDICINE

## 2018-07-09 PROCEDURE — 38222 DX BONE MARROW BX & ASPIR: CPT | Mod: ZF

## 2018-07-09 PROCEDURE — 88264 CHROMOSOME ANALYSIS 20-25: CPT | Performed by: INTERNAL MEDICINE

## 2018-07-09 PROCEDURE — 88311 DECALCIFY TISSUE: CPT | Performed by: INTERNAL MEDICINE

## 2018-07-09 PROCEDURE — 40000951 ZZHCL STATISTIC BONE MARROW INTERP TC 85097: Performed by: INTERNAL MEDICINE

## 2018-07-09 PROCEDURE — 88305 TISSUE EXAM BY PATHOLOGIST: CPT | Performed by: INTERNAL MEDICINE

## 2018-07-09 RX ORDER — HEPARIN SODIUM (PORCINE) LOCK FLUSH IV SOLN 100 UNIT/ML 100 UNIT/ML
5 SOLUTION INTRAVENOUS DAILY PRN
Status: DISCONTINUED | OUTPATIENT
Start: 2018-07-09 | End: 2018-07-09 | Stop reason: HOSPADM

## 2018-07-09 RX ORDER — HEPARIN SODIUM (PORCINE) LOCK FLUSH IV SOLN 100 UNIT/ML 100 UNIT/ML
5 SOLUTION INTRAVENOUS DAILY PRN
Status: CANCELLED
Start: 2018-07-09

## 2018-07-09 RX ADMIN — MIDAZOLAM 2 MG: 1 INJECTION INTRAMUSCULAR; INTRAVENOUS at 10:09

## 2018-07-09 ASSESSMENT — PAIN SCALES - GENERAL: PAINLEVEL: NO PAIN (0)

## 2018-07-09 NOTE — NURSING NOTE
Patient monitored in supine position for 30 minutes following biopsy. Patients vitals are okay and dressing is dry and intact. Patient discharged ambulatory from clinic in the care of a family member.  MARK VIEYRA CMA

## 2018-07-09 NOTE — MR AVS SNAPSHOT
After Visit Summary   7/9/2018    Amira Avery    MRN: [id number]           Patient Information     Date Of Birth          1990        Visit Information        Provider Department      7/9/2018 9:00 AM D, [facility] Bmt Pharm, RPH; LOS 2 119 CONSULT [facility] Blood and Marrow Transplant        Today's Diagnoses     Hodgkin disease (H)        Personal history of diseases of blood and blood-forming organs              Clinics and Surgery Center ([facility])  [address] [place] [address]  Phone: [phone]  Clinic Hours:   Monday-Thursday:7am to 7pm   Friday: 7am to 5pm   Weekends and holidays:    8am to noon (in general)  If your fever is 100.5  or greater,   call the clinic.  After hours call the   hospital at [phone] or   [phone]. Ask for the BMT   fellow on-call            Follow-ups after your visit        Your next 10 appointments already scheduled     Jul 09, 2018 10:30 AM CDT   Bone Marrow Biopsy with  BMT WES #2, UU BONE MARROW BIOPSY   [facility] Blood and Marrow Transplant ([facility])    [address] [place] [address]  [address]  [facility] [address]   [phone]            Jul 09, 2018 11:30 AM CDT   BMT Nurse Visit with  Bmt Nurse 1   [facility] Blood and Marrow Transplant ([facility])    [address] [place] [address]  [address]  [facility] [address]   [phone]            Jul 10, 2018  9:00 AM CDT   (Arrive by 8:45 AM)   Autologous Consultation with  APHERESIS RN8,  2 117 CONSULT [facility] Apheresis ([facility])    [address] [place] [address]  [address]  [facility] [address]   [phone]            Jul 10, 2018 10:15 AM CDT   Central Venous Catheter - [address] [place] [address] with Leonor Gordon RN,  2 116 CONSULT [facility] ([facility] [place] [facility])    [address] [facility]  [place] [address]              Appointment is located at [address] [place] [address]            Jul 10, 2018 12:30 PM CDT   BMT NURSE COORD WITH ROOM with  Bmt Nurse Coordinator, LOS 2 119 CONSULT RM   [facility] Blood and Marrow Transplant ([facility])    [address] [place] [address]  [address]  [facility] [id number] [phone]            Jul 12, 2018 12:00 PM CDT   BMT Workup Visit with LOS BMT DOM   [facility] Blood and Marrow Transplant ([facility])    [address] [place] [address]  [address]  [facility] [id number] [phone]              Who to contact     If you have questions or need follow up information about today's clinic visit or your schedule please contact [facility] BLOOD AND MARROW TRANSPLANT directly at [phone].  Normal or non-critical lab and imaging results will be communicated to you by [employer], letter or phone within 4 business days after the clinic has received the results. If you do not hear from us within 7 days, please contact the clinic through [employer] or phone. If you have a critical or abnormal lab result, we will notify you by phone as soon as possible.  Submit refill requests through [employer] or call your pharmacy and they will forward the refill request to us. Please allow 3 business days for your refill to be completed.          Additional Information About Your Visit        [employer] Information     [employer] gives you secure access to your electronic health record. If you see a primary care provider, you can also send messages to your care team and make appointments. If you have questions, please call your primary care clinic.  If you do not have a primary care provider, please call [phone] and they will assist you.        Care EveryWhere ID     This is your Care EveryWhere ID. This could be used by other organizations to access your [facility] medical records  [id number]         Blood Pressure from Last 3 Encounters:   07/05/18  116/78   05/07/18 123/74   05/02/18 112/61    Weight from Last 3 Encounters:   07/05/18 72.9 kg (160 lb 11.5 oz)   05/09/18 72.1 kg (159 lb)   05/07/18 71.1 kg (156 lb 12.8 oz)              We Performed the Following     Consult with PharmD        Recent Review Flowsheet Data     BMT Recent Results Latest Ref Rng & Units 4/18/2018 4/20/2018 4/23/2018 7/5/2018    WBC 4.0 - 11.0 10e9/L 5.0 7.2 - 2.6(L)    Hemoglobin 11.7 - 15.7 g/dL 12.3 12.3 - 12.7    Platelet Count 150 - 450 10e9/L 268 273 - 199    Neutrophils (Absolute) 1.6 - 8.3 10e9/L 2.9 4.3 - 0.9(L)    INR 0.86 - 1.14 1.06 - - 1.02    Sodium 133 - 144 mmol/L 139 - - 140    Potassium 3.4 - 5.3 mmol/L 4.0 - - 3.8    Chloride 94 - 109 mmol/L 110(H) - - 109    Glucose 70 - 99 mg/dL 94 - 100(H) 92    Urea Nitrogen 7 - 30 mg/dL 13 - - 10    Creatinine 0.52 - 1.04 mg/dL 0.65 - - 0.71    Calcium (Total) 8.5 - 10.1 mg/dL 8.7 - - 8.6    Protein (Total) 6.8 - 8.8 g/dL 7.4 - - 7.7    Albumin 3.4 - 5.0 g/dL 3.9 - - 4.0    Alkaline Phosphatase 40 - 150 U/L 48 - - 58    AST 0 - 45 U/L 53(H) - - 38    ALT 0 - 50 U/L 40 - - 60(H)    MCV 78 - 100 fl 90 90 - 90               Primary Care Provider Office Phone # Fax #    [facility] [phone] [address] [facility] [place] [address]        Equal Access to Services     MARINE RAMEY AH: Hadii aad ku roberto Luis, waaxda luqadaha, qaybta kajen mahajanhernáncolumba ramoskirillgeorge ah. Clari [facility] [phone].    ATENCIÓN: Si habla español, tiene a isabel disposición servicios gratuitos de asistencia lingüística. Llame al [phone].    We comply with applicable federal civil rights laws and [place] laws. We do not discriminate on the basis of race, color, national origin, age, disability, sex, sexual orientation, or gender identity.            Thank you!     Thank you for choosing [facility] BLOOD AND MARROW TRANSPLANT  for your care. Our goal is always to provide you with  excellent care. Hearing back from our patients is one way we can continue to improve our services. Please take a few minutes to complete the written survey that you may receive in the mail after your visit with us. Thank you!             Your Updated Medication List - Protect others around you: Learn how to safely use, store and throw away your medicines at www.disposemymeds.org.          This list is accurate as of 7/9/18 10:10 AM.  Always use your most recent med list.                   Brand Name Dispense Instructions for use Diagnosis    acetaminophen 500 MG tablet    TYLENOL     Take by mouth every 6 hours as needed

## 2018-07-09 NOTE — PROGRESS NOTES
Pharmacy Assessment - Pre-Stem Cell Transplant    Assessments & Recommendations:  1) Use ice chips during melphalan infusion.    History of Present Illness:  Amira Avery is a 28 year old year old female diagnosed with Hodgkin's Lymphoma.  She has been treated with ABVD, GDP, and most recently Brentuximab + Nivolomab.  She is now being work up for Autologous Stem Cell Transplant on protocol 2016-11, which utilizes BEAM as a conditioning regimen.  She also realizes that she will likely receive chemotherapy for stem cell mobilization.    Pertinent labs/tests:  Viral Testing:  CMV(+) / HSV(+) / EBV(+)  Ejection Fraction: 61% (7/5/18)  QTc: 431 msec (4/20/18)    Weights:   Wt Readings from Last 3 Encounters:   07/05/18 72.9 kg (160 lb 11.5 oz)   05/09/18 72.1 kg (159 lb)   05/07/18 71.1 kg (156 lb 12.8 oz)   Ideal body weight: 55.1 kg (121 lb 7.5 oz)  Adjusted ideal body weight: 62.2 kg (137 lb 0.7 oz)  % IBW:  12.9% over IBW  There is no height or weight on file to calculate BMI.    Primary BMT Physician: Dr. Santa  BMT RN Coordinator:  Mary Hobson    Past Medical History:  Past Medical History:   Diagnosis Date     Cancer (H)      Heart disease      Tattoos      Medication Allergies:  Allergies   Allergen Reactions     Morphine Hives     No issues with oral narcotics, dilaudid, or fentanyl per pt     Current Medications (pre-admit):  Current Outpatient Prescriptions   Medication Sig Dispense Refill     acetaminophen (TYLENOL) 500 MG tablet Take by mouth every 6 hours as needed          Herbal Medication/Nutritional Supplements:  None reported    Smoking/Past Drug Use:  None reported    Nausea/Vomiting, Pain, or other issues:  Amira did have significant nausea with ABVD and GDP, but not much vomiting.  Also had some neuropathy with ABVD, which has since resolved.    Summary:  I met with Amira Avery and her partner for approximately 30 minutes.  We discussed her current medication, allergies,  past chemotherapy, stem cell transplant process, chemotherapy preparative regimen, toxicities, risk of infection, use of antimicrobials, and expectations post transplant.

## 2018-07-09 NOTE — MR AVS SNAPSHOT
After Visit Summary   7/9/2018    Amira Avery    MRN: [id number]           Patient Information     Date Of Birth          1990        Visit Information        Provider Department      7/9/2018 10:30 AM UU BONE MARROW BIOPSY;  BMT WES #2 [facility] Blood and Marrow Transplant        Today's Diagnoses     Personal history of diseases of blood and blood-forming organs    -  1    Nodular sclerosing Hodgkin's lymphoma, unspecified body region (H)              Clinics and Surgery Center ([facility])  [address] [place] [address]  Phone: [phone]  Clinic Hours:   Monday-Thursday:7am to 7pm   Friday: 7am to 5pm   Weekends and holidays:    8am to noon (in general)  If your fever is 100.5  or greater,   call the clinic.  After hours call the   hospital at [phone] or   [phone]. Ask for the BMT   fellow on-call            Follow-ups after your visit        Your next 10 appointments already scheduled     Jul 09, 2018 11:30 AM CDT   BMT Nurse Visit with  Bmt Nurse 1   [facility] Blood and Marrow Transplant ([facility] [place])    [address] [place] [address]  [address]  [facility] [id number] [phone]            Jul 10, 2018  9:00 AM CDT   (Arrive by 8:45 AM)   Autologous Consultation with  APHERESIS RN8,  2 117 CONSULT [facility] [place] Apheresis ([facility])    [address] [place] [address]  [address]  [facility] [id number] [phone]            Jul 10, 2018 10:15 AM CDT   Central Venous Catheter - [address] [place] [address] with Leonor Gordon RN,  2 116 CONSULT [facility] ([facility] [place])    [address] [facility] [address]              Appointment is located at [address] [place] [address]            Jul 10, 2018 12:30 PM CDT   BMT NURSE COORD WITH ROOM with  Bmt Nurse Coordinator,  2 119 CONSULT     [facility] Blood and Marrow Transplant ([facility])    [address]  [address]  [facility] [address]   [phone]            Jul 12, 2018 12:00 PM CDT   BMT Workup Visit with UC BMT YASH   [facility] Blood and Marrow Transplant ([facility])    [address]  [address]  [facility] [address]   [phone]            Jul 12, 2018  1:00 PM CDT   (Arrive by 12:45 PM)   New Patient Visit with Howard Rodriguez PA-C   [facility] Interventional Radiology ([facility])    [address]  1st Floor  [facility] [address] [phone]              Who to contact     If you have questions or need follow up information about today's clinic visit or your schedule please contact [facility] BLOOD AND MARROW TRANSPLANT directly at [phone].  Normal or non-critical lab and imaging results will be communicated to you by BallLogichart, letter or phone within 4 business days after the clinic has received the results. If you do not hear from us within 7 days, please contact the clinic through BallLogichart or phone. If you have a critical or abnormal lab result, we will notify you by phone as soon as possible.  Submit refill requests through Ph[employer]on [employer] or call your pharmacy and they will forward the refill request to us. Please allow 3 business days for your refill to be completed.          Additional Information About Your Visit        [employer] Information     [employer] gives you secure access to your electronic health record. If you see a primary care provider, you can also send messages to your care team and make appointments. If you have questions, please call your primary care clinic.  If you do not have a primary care provider, please call [phone] and they will assist you.        Care EveryWhere ID     This is your Care EveryWhere ID. This could be used by other organizations to access your [facility]  records  [id number]        Your Vitals Were     Pulse Temperature Respirations Pulse Oximetry BMI (Body Mass Index)       74 97.6  F (36.4  C) 18 97% 27.38 kg/m2        Blood Pressure from Last 3 Encounters:   07/09/18 116/73   07/05/18 116/78   05/07/18 123/74    Weight from Last 3 Encounters:   07/09/18 72.8 kg (160 lb 6.4 oz)   07/05/18 72.9 kg (160 lb 11.5 oz)   05/09/18 72.1 kg (159 lb)              We Performed the Following     Bone marrow biopsy     CBC with platelets differential     CHROMOSOME BONE MARROW With Professional Interpretation     DX BONE MARROW BIOPSY(IES) & ASPIRATION(S) (Charge)     FISH With Professional Interpretation     Leukemia Lymphoma Evaluation (Flow Cytometry)        Recent Review Flowsheet Data     BMT Recent Results Latest Ref Rng & Units 4/18/2018 4/20/2018 4/23/2018 7/5/2018 7/9/2018    WBC 4.0 - 11.0 10e9/L 5.0 7.2 - 2.6(L) 2.6(L)    Hemoglobin 11.7 - 15.7 g/dL 12.3 12.3 - 12.7 12.7    Platelet Count 150 - 450 10e9/L 268 273 - 199 257    Neutrophils (Absolute) 1.6 - 8.3 10e9/L 2.9 4.3 - 0.9(L) 0.7(L)    INR 0.86 - 1.14 1.06 - - 1.02 -    Sodium 133 - 144 mmol/L 139 - - 140 -    Potassium 3.4 - 5.3 mmol/L 4.0 - - 3.8 -    Chloride 94 - 109 mmol/L 110(H) - - 109 -    Glucose 70 - 99 mg/dL 94 - 100(H) 92 -    Urea Nitrogen 7 - 30 mg/dL 13 - - 10 -    Creatinine 0.52 - 1.04 mg/dL 0.65 - - 0.71 -    Calcium (Total) 8.5 - 10.1 mg/dL 8.7 - - 8.6 -    Protein (Total) 6.8 - 8.8 g/dL 7.4 - - 7.7 -    Albumin 3.4 - 5.0 g/dL 3.9 - - 4.0 -    Alkaline Phosphatase 40 - 150 U/L 48 - - 58 -    AST 0 - 45 U/L 53(H) - - 38 -    ALT 0 - 50 U/L 40 - - 60(H) -    MCV 78 - 100 fl 90 90 - 90 90               Primary Care Provider Office Phone # Fax #    [facility] [phone] [address] [facility] [address]        Equal Access to Services     RICHIE RAMEY AH: samaria Fong, jen lujan  veegeorge bradleyyudi shakirjose lazane raymundo. So [facility] [phone].    ATENCIÓN: Si habla kelly, tiene a isabel disposición servicios gratuitos de asistencia lingüística. Roberto al [phone].    We comply with applicable federal civil rights laws and [place] laws. We do not discriminate on the basis of race, color, national origin, age, disability, sex, sexual orientation, or gender identity.            Thank you!     Thank you for choosing [facility] BLOOD AND MARROW TRANSPLANT  for your care. Our goal is always to provide you with excellent care. Hearing back from our patients is one way we can continue to improve our services. Please take a few minutes to complete the written survey that you may receive in the mail after your visit with us. Thank you!             Your Updated Medication List - Protect others around you: Learn how to safely use, store and throw away your medicines at www.disposemymeds.org.          This list is accurate as of 7/9/18 10:39 AM.  Always use your most recent med list.                   Brand Name Dispense Instructions for use Diagnosis    acetaminophen 500 MG tablet    TYLENOL     Take by mouth every 6 hours as needed

## 2018-07-09 NOTE — PROGRESS NOTES
BMT ONC Adult Bone Marrow Biopsy Procedure Note  July 9, 2018  Blood pressure 116/73, pulse 74, temperature 97.6  F (36.4  C), resp. rate 18, weight 72.8 kg (160 lb 6.4 oz), SpO2 97 %.    Learning needs assessment complete within 12 months? YES    DIAGNOSIS: NS HL     PROCEDURE: Unilateral Bone Marrow Biopsy and Unilateral Aspirate    [place] [facility]  Patient s identification was positively verified by verbal identification and invasive procedure safety checklist was completed. Informed consent was obtained. Following the administration of 2mg IV Midazolam as pre-medication, patient was placed in the prone position and prepped and draped in a sterile manner. Approximately 10 cc of 1% Lidocaine was used over the left posterior iliac spine. Following this a 3 mm incision was made. Trephine bone marrow core(s) was (were) obtained from the [facility]. Bone marrow aspirates were obtained from the [facility]. Aspirates were sent for morphology, immunophenotyping, cytogenetics and molecular diagnostics (process & hold). A total of approximately 16 ml of marrow was aspirated. Following this procedure a sterile dressing was applied to the bone marrow biopsy site(s). The patient was placed in the supine position to maintain pressure on the biopsy site. Post-procedure wound care instructions were given.     Complications: NO    Pre-procedural pain: 0 out of 10 on the numeric pain rating scale.     Procedural pain: 10 out of 10 on the numeric pain rating scale.     Post-procedural pain assessment: 0 out of 10 on the numeric pain rating scale.     Interventions: YES--completed aspirate.  Offered MAC for next BM bx.    Length of procedure:20 minutes or less      Procedure performed by: Georgina Sanchez pa-c  [phone]

## 2018-07-09 NOTE — MR AVS SNAPSHOT
After Visit Summary   7/9/2018    Amira Avery    MRN: [id number]           Patient Information     Date Of Birth          1990        Visit Information        Provider Department      7/9/2018 11:30 AM 1, [place] Bmt Nurse [facility] Blood and Marrow Transplant        Today's Diagnoses     Hodgkin disease (H)        Personal history of diseases of blood and blood-forming organs              [facility] and Surgery Center ([facility])  [address] [place] [address]  Phone: [phone]  Clinic Hours:   Monday-Thursday:7am to 7pm   Friday: 7am to 5pm   Weekends and holidays:    8am to noon (in general)  If your fever is 100.5  or greater,   call the clinic.  After hours call the   hospital at [phone] or   [phone]. Ask for the BMT   fellow on-call            Follow-ups after your visit        Your next 10 appointments already scheduled     Jul 10, 2018  9:00 AM CDT   (Arrive by 8:45 AM)   Autologous Consultation with  APHERESIS RN8,  2 117 CONSULT [facility] [place] Apheresis ([facility])    [address] [place] [address]  [address]  [facility] [id number] [phone]            Jul 10, 2018 10:15 AM CDT   Central Venous Catheter - [address] [place] [address] with Leonor Gordon RN,  2 116 CONSULT [facility] Learning [facility] ([facility])    [address] [facility] [address]              Appointment is located at [address] [place] [address]            Jul 10, 2018 12:30 PM CDT   BMT NURSE COORD WITH ROOM with  Bmt Nurse Coordinator,  2 119 CONSULT [facility] Blood and Marrow Transplant ([facility] [place])    [address] [place] [address]  [address]  [facility] [address] [phone]            Jul 12, 2018 12:00 PM CDT   BMT Workup Visit with LOS BMT DOM   [facility] Blood and Marrow Transplant ([facility])    [address]  [address]  [facility] [id number] [phone]            Jul 12, 2018  1:00 PM CDT   (Arrive by 12:45 PM)   New Patient Visit with Howard Rodriguez PA-C   [facility] ([facility])    [address]  1st Floor  [facility] [id number] [phone]              Who to contact     If you have questions or need follow up information about today's clinic visit or your schedule please contact [facility] BLOOD AND MARROW TRANSPLANT directly at [phone].  Normal or non-critical lab and imaging results will be communicated to you by A[employer] Bioscienceshart, letter or phone within 4 business days after the clinic has received the results. If you do not hear from us within 7 days, please contact the clinic through Kofaxt or phone. If you have a critical or abnormal lab result, we will notify you by phone as soon as possible.  Submit refill requests through Tamecco or call your pharmacy and they will forward the refill request to us. Please allow 3 business days for your refill to be completed.          Additional Information About Your Visit        [employer] Information     Tame[employer] gives you secure access to your electronic health record. If you see a primary care provider, you can also send messages to your care team and make appointments. If you have questions, please call your primary care clinic.  If you do not have a primary care provider, please call [phone] and they will assist you.        Care EveryWhere ID     This is your Care EveryWhere ID. This could be used by other organizations to access your [place] medical records  [id number]         Blood Pressure from Last 3 Encounters:   07/09/18 116/73   07/05/18 116/78   05/07/18 123/74    Weight from Last 3 Encounters:   07/09/18 72.8 kg (160 lb 6.4 oz)   07/05/18 72.9 kg (160 lb 11.5 oz)   05/09/18 72.1 kg (159 lb)              We Performed the Following     EKG 12-lead  complete w/read - Clinics        Recent Review Flowsheet Data     BMT Recent Results Latest Ref Rng & Units 4/18/2018 4/20/2018 4/23/2018 7/5/2018 7/9/2018    WBC 4.0 - 11.0 10e9/L 5.0 7.2 - 2.6(L) 2.6(L)    Hemoglobin 11.7 - 15.7 g/dL 12.3 12.3 - 12.7 12.7    Platelet Count 150 - 450 10e9/L 268 273 - 199 257    Neutrophils (Absolute) 1.6 - 8.3 10e9/L 2.9 4.3 - 0.9(L) 0.7(L)    INR 0.86 - 1.14 1.06 - - 1.02 -    Sodium 133 - 144 mmol/L 139 - - 140 -    Potassium 3.4 - 5.3 mmol/L 4.0 - - 3.8 -    Chloride 94 - 109 mmol/L 110(H) - - 109 -    Glucose 70 - 99 mg/dL 94 - 100(H) 92 -    Urea Nitrogen 7 - 30 mg/dL 13 - - 10 -    Creatinine 0.52 - 1.04 mg/dL 0.65 - - 0.71 -    Calcium (Total) 8.5 - 10.1 mg/dL 8.7 - - 8.6 -    Protein (Total) 6.8 - 8.8 g/dL 7.4 - - 7.7 -    Albumin 3.4 - 5.0 g/dL 3.9 - - 4.0 -    Alkaline Phosphatase 40 - 150 U/L 48 - - 58 -    AST 0 - 45 U/L 53(H) - - 38 -    ALT 0 - 50 U/L 40 - - 60(H) -    MCV 78 - 100 fl 90 90 - 90 90               Primary Care Provider Office Phone # Fax #    [facility] [phone] [address] [facility] [address]        Equal Access to Services     RICHIE RAMEY AH: Alexis bowieo Soshar, waaxda luqadaha, qaybta kaalmada adeegyadavid, jen raymundo. [facility] [phone].    ATENCIÓN: Si habla español, tiene a isabel disposición servicios gratuitos de asistencia lingüística. Lltom al [phone].    We comply with applicable federal civil rights laws and [place] laws. We do not discriminate on the basis of race, color, national origin, age, disability, sex, sexual orientation, or gender identity.            Thank you!     Thank you for choosing [facility] BLOOD AND MARROW TRANSPLANT  for your care. Our goal is always to provide you with excellent care. Hearing back from our patients is one way we can continue to improve our services. Please take a few minutes to complete the written  survey that you may receive in the mail after your visit with us. Thank you!             Your Updated Medication List - Protect others around you: Learn how to safely use, store and throw away your medicines at www.disposemymeds.org.          This list is accurate as of 7/9/18 11:36 AM.  Always use your most recent med list.                   Brand Name Dispense Instructions for use Diagnosis    acetaminophen 500 MG tablet    TYLENOL     Take by mouth every 6 hours as needed

## 2018-07-10 ENCOUNTER — HOSPITAL ENCOUNTER (OUTPATIENT)
Dept: EDUCATION SERVICES | Facility: CLINIC | Age: 28
Discharge: HOME | End: 2018-07-10
Attending: INTERNAL MEDICINE | Admitting: INTERNAL MEDICINE
Payer: COMMERCIAL

## 2018-07-10 ENCOUNTER — HOSPITAL ENCOUNTER (OUTPATIENT)
Dept: LAB | Facility: CLINIC | Age: 28
End: 2018-07-10
Attending: INTERNAL MEDICINE
Payer: COMMERCIAL

## 2018-07-10 ENCOUNTER — OFFICE VISIT (OUTPATIENT)
Dept: TRANSPLANT | Facility: CLINIC | Age: 28
End: 2018-07-10
Attending: INTERNAL MEDICINE
Payer: COMMERCIAL

## 2018-07-10 VITALS
TEMPERATURE: 98.3 F | DIASTOLIC BLOOD PRESSURE: 68 MMHG | BODY MASS INDEX: 27.66 KG/M2 | HEART RATE: 71 BPM | SYSTOLIC BLOOD PRESSURE: 128 MMHG | WEIGHT: 162.04 LBS | RESPIRATION RATE: 16 BRPM | HEIGHT: 64 IN

## 2018-07-10 DIAGNOSIS — C81.90 HODGKIN DISEASE (H): ICD-10-CM

## 2018-07-10 DIAGNOSIS — C81.11 NODULAR SCLEROSIS HODGKIN LYMPHOMA OF LYMPH NODES OF NECK (H): ICD-10-CM

## 2018-07-10 DIAGNOSIS — Z86.2 PERSONAL HISTORY OF DISEASES OF BLOOD AND BLOOD-FORMING ORGANS: ICD-10-CM

## 2018-07-10 LAB
COPATH REPORT: NORMAL
COPATH REPORT: NORMAL
INTERPRETATION ECG - MUSE: NORMAL

## 2018-07-10 PROCEDURE — G0463 HOSPITAL OUTPT CLINIC VISIT: HCPCS | Mod: ZF

## 2018-07-10 PROCEDURE — 40000268 ZZH STATISTIC NO CHARGES: Mod: ZF

## 2018-07-10 NOTE — CONSULTS
"APHERESIS INITIAL CONSULT CHECKLIST    Current Encounter Information  Current Encounter Information: Reason for Visit, Allergies and Current Meds  Procedure Requested: MNC/PBSC Collection  History of: (Reason for Apheresis): Hodgkin lymphoma    Access Assessment  Access Assessment  Catheter Assessment: BMT will schedule for large dual lumen catheter placement  Needs a catheter placed for Apheresis?: Yes, transfusion medicine physician informed.    Vital Signs  Vital Signs  BP: 128/68  Pulse: 71  Temp: 98.3  F (36.8  C)  Temp src: Oral  Resp: 16  Height: 163 cm (5' 4.17\")  Weight: 73.5 kg (162 lb 0.6 oz)    Reviewed   Review With Patient  Have you read the brochure Getting ready for Apheresis?: Yes  Have you had any invasive procedures, surgery, biopsy, bleeding in the last month?: Yes (bone marrow biopsy 7/9/18)  Transfusion medicine physician informed: Yes  Review medications and allergies: Yes (allergy to morphine)  Have you ever been transfused?: No  Do you require pre-medication for blood products?: No  Patient given tour of the unit: Yes  Photophoresis: sun precautions reviewed with patient: N/A    Additional Information  Notes, needs and time spent with patient  Explain procedure, side effects or reactions, instructions: Yes  Patient has special need?: No  Time spent: 30mins face to face contact with assessment and medical hx review    Pt arrived with her mom; feeling good today; VSS; med reviewed; allergy to morphine (  Hives ), no travel issues ; BMT will need to schedule for line placement. Procedure explained with emphasis to duration, side effects, hydration, and low fat diet. Questions answered. Dr. Fagan met with pt for consult and consent.      "

## 2018-07-10 NOTE — PROGRESS NOTES
07/10/18 1138     Leonor Gordon-Registered Nurse (Nursing)  Mom and patient attended Central line care class for review. Mom RD correctly on model with all cares.

## 2018-07-10 NOTE — MR AVS SNAPSHOT
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  After hours call the   hospital at [phone] or   [phone]. Ask for the BMT   fellow on-call            Follow-ups after your visit        Your next 10 appointments already scheduled     Jul 20, 2018 10:00 AM CDT   BMT Workup Visit with LOS BMT [facility] Blood and Marrow Transplant ([facility] and [facility])    [address] [place] [address]  [address] [place] [address] [phone]              Who to contact     If you have questions or need follow up information about today's clinic visit or your schedule please contact [facility] BLOOD AND MARROW TRANSPLANT directly at [phone].  Normal or non-critical lab and imaging results will be communicated to you by MyChart, letter or phone within 4 business days after the clinic has received the results. If you do not hear from us within 7 days, please contact the clinic through MyChart or phone. If you have a critical or abnormal lab result, we will notify you by phone as soon as possible.  Submit refill requests through 3Scan or call your pharmacy and they will forward the refill request to us. Please allow 3 business days for your refill to be completed.           Additional Information About Your Visit        [employer] Information     Tri[employer] gives you secure access to your electronic health record. If you see a primary care provider, you can also send messages to your care team and make appointments. If you have questions, please call your primary care clinic.  If you do not have a primary care provider, please call [phone] and they will assist you.        Care EveryWhere ID     This is your Care EveryWhere ID. This could be used by other organizations to access your [place] medical records  [id number]         Blood Pressure from Last 3 Encounters:   07/12/18 126/74   07/10/18 128/68   07/09/18 107/68    Weight from Last 3 Encounters:   07/12/18 72.3 kg (159 lb 4.8 oz)   07/10/18 73.5 kg (162 lb 0.6 oz)   07/09/18 72.8 kg (160 lb 6.4 oz)              We Performed the Following     BMT Coordinator        Recent Review Flowsheet Data     BMT Recent Results Latest Ref Rng & Units 4/18/2018 4/20/2018 4/23/2018 7/5/2018 7/9/2018 7/12/2018    WBC 4.0 - 11.0 10e9/L 5.0 7.2 - 2.6(L) 2.6(L) 4.0    Hemoglobin 11.7 - 15.7 g/dL 12.3 12.3 - 12.7 12.7 13.3    Platelet Count 150 - 450 10e9/L 268 273 - 199 257 290    Neutrophils (Absolute) 1.6 - 8.3 10e9/L 2.9 4.3 - 0.9(L) 0.7(L) 2.0    INR 0.86 - 1.14 1.06 - - 1.02 - -    Sodium 133 - 144 mmol/L 139 - - 140 - -    Potassium 3.4 - 5.3 mmol/L 4.0 - - 3.8 - -    Chloride 94 - 109 mmol/L 110(H) - - 109 - -    Glucose 70 - 99 mg/dL 94 - 100(H) 92 - -    Urea Nitrogen 7 - 30 mg/dL 13 - - 10 - -    Creatinine 0.52 - 1.04 mg/dL 0.65 - - 0.71 - -    Calcium (Total) 8.5 - 10.1 mg/dL 8.7 - - 8.6 - -    Protein (Total) 6.8 - 8.8 g/dL 7.4 - - 7.7 - -    Albumin 3.4 - 5.0 g/dL 3.9 - - 4.0 - -    Alkaline Phosphatase 40 - 150 U/L 48 - - 58 - -    AST 0 - 45 U/L 53(H) - - 38 - -    ALT 0 - 50 U/L 40 - - 60(H) - -    MCV 78 - 100 fl 90 90 - 90 90 90               Primary Care Provider Office Phone # Fax #    [facility]  [phone]       [address] [facility] [address]        Equal Access to Services     RICHIE RAMEY : Hadii aad ku hadsarah Luis, wasarahda luqadaha, qadarellta kaalmada mariluz, jen ly kirkyudi blanton lakirillgeorge zackary. So [facility] [phone].    ATENCIÓN: Si habla español, tiene a isabel disposición servicios gratuitos de asistencia lingüística. Llame al [phone].    We comply with applicable federal civil rights laws and [place] laws. We do not discriminate on the basis of race, color, national origin, age, disability, sex, sexual orientation, or gender identity.            Thank you!     Thank you for choosing [facility] BLOOD AND MARROW TRANSPLANT  for your care. Our goal is always to provide you with excellent care. Hearing back from our patients is one way we can continue to improve our services. Please take a few minutes to complete the written survey that you may receive in the mail after your visit with us. Thank you!             Your Updated Medication List - Protect others around you: Learn how to safely use, store and throw away your medicines at www.disposemymeds.org.          This list is accurate as of 7/10/18 11:59 PM.  Always use your most recent med list.                   Brand Name Dispense Instructions for use Diagnosis    acetaminophen 500 MG tablet    TYLENOL     Take by mouth every 6 hours as needed

## 2018-07-10 NOTE — CONSULTS
Transfusion Medicine Consultation    Amira Avery [id number]   YOB: 1990 Age: 28 year old   Date of Consult: 7/10/2018     Reason for consult: Autologous Hematopoietic Progenitor Cell (HPC) Collection           Assessment and Plan:   28 year old female presents for consultation for autologous HPC collection for Hodgkin's lymphoma.  The plan is to collect for 1 to 3 days or until the target goal is met.   A central line will be placed and will be used for access for the procedure.          Chief Complaint:   Transfusion medicine consultation.         History of Present Illness:   28 year old female presents for consultation for autologous HPC collection.  Her past medical history includes Hodgkin's Lymphoma (diagnosed in 2017), which is relapsing now.  She is currently well.  The patient denies any back pain that would prevent her from tolerating the procedure.  No significant travel history.  The patient has no identifiable risk factors for infectious disease.  The procedure, risks/benefits were discussed with the patient and all of her questions were addressed at this time.             Past Medical History:   This patient has a past medical history significant for Hodgkin's lymphoma          Past Surgical History:     This patient has no significant past surgical history  Past Surgical History:   Procedure Laterality Date     BIOPSY LYMPH NODE CERVICAL Right 5/2/2018    Procedure: BIOPSY LYMPH NODE CERVICAL;  Right Excisional Node Biopsy;  Surgeon: Lia Silva MD;  [facility]     excision of deep cervical LN's Left 05/19/2017    positive for Hodgkin's     excision of deep cervical LN's Right 02/13/2018    positive for Hodgkin's     port a cath placement Right 06/01/2017    IJ vein; removed 12/1/17 no longer needed     port a cath placement Left 02/21/2018    IJ vein     TRANSPLANT       US BIOPSY LYMPH NODE FNA Left 03/06/2017    low midline neck, negative for malignancy       "        Social History:     Social History   Substance Use Topics     Smoking status: Former Smoker     Packs/day: 1.00     Years: 10.00     Types: Cigarettes, Hookah     Start date: 7/15/2007     Quit date: 10/15/2017     Smokeless tobacco: Never Used     Alcohol use No             Family History:     Family History   Problem Relation Age of Onset     Hypertension Father      Coronary Artery Disease Maternal Grandmother      Colon Cancer Maternal Grandmother      Cancer Maternal Grandmother      HEART DISEASE Maternal Grandmother              Immunizations:     Immunization History   Administered Date(s) Administered     DTAP (<7y) 07/29/1996     DTaP, Unspecified 12/15/2014     HPV Quadrivalent 12/15/2014     Hep B, Peds or Adolescent 08/14/2003, 09/19/2003, 02/28/2004     Influenza Vaccine IM 3yrs+ 4 Valent IIV4 12/15/2014     OPV, trivalent, live 07/29/1996     Td (Adult), Adsorbed 08/14/2003             Allergies:     Allergies   Allergen Reactions     Morphine Hives     No issues with oral narcotics, dilaudid, or fentanyl per pt             Medications:     Current Outpatient Prescriptions   Medication Sig     acetaminophen (TYLENOL) 500 MG tablet Take by mouth every 6 hours as needed      No current facility-administered medications for this encounter.              Review of Systems:   CONSTITUTIONAL: NEGATIVE for fever, chills, change in weight  ENT/MOUTH: NEGATIVE for ear, mouth and throat problems  RESP: NEGATIVE for significant cough or SOB  CV: NEGATIVE for chest pain, palpitations or peripheral edema           Vital Signs:   /68  Pulse 71  Temp 98.3  F (36.8  C) (Oral)  Resp 16  Ht 1.63 m (5' 4.17\")  Wt 73.5 kg (162 lb 0.6 oz)  BMI 27.66 kg/m2            Data:     CBC RESULTS:   Recent Labs   Lab Test  07/09/18   0958   WBC  2.6*   RBC  4.20   HGB  12.7   HCT  37.9   MCV  90   MCH  30.2   MCHC  33.5   RDW  12.5   PLT  257           "

## 2018-07-12 ENCOUNTER — OFFICE VISIT (OUTPATIENT)
Dept: TRANSPLANT | Facility: CLINIC | Age: 28
End: 2018-07-12
Attending: INTERNAL MEDICINE
Payer: COMMERCIAL

## 2018-07-12 ENCOUNTER — OFFICE VISIT (OUTPATIENT)
Dept: INTERVENTIONAL RADIOLOGY/VASCULAR | Facility: CLINIC | Age: 28
End: 2018-07-12
Attending: INTERNAL MEDICINE
Payer: COMMERCIAL

## 2018-07-12 VITALS
RESPIRATION RATE: 16 BRPM | TEMPERATURE: 97.6 F | DIASTOLIC BLOOD PRESSURE: 74 MMHG | SYSTOLIC BLOOD PRESSURE: 126 MMHG | HEART RATE: 75 BPM | BODY MASS INDEX: 27.2 KG/M2 | WEIGHT: 159.3 LBS | OXYGEN SATURATION: 100 %

## 2018-07-12 DIAGNOSIS — Z86.2 PERSONAL HISTORY OF DISEASES OF BLOOD AND BLOOD-FORMING ORGANS: ICD-10-CM

## 2018-07-12 DIAGNOSIS — C81.70 OTHER CLASSICAL HODGKIN LYMPHOMA, UNSPECIFIED BODY REGION (H): ICD-10-CM

## 2018-07-12 DIAGNOSIS — C81.90 HODGKIN LYMPHOMA, UNSPECIFIED HODGKIN LYMPHOMA TYPE, UNSPECIFIED BODY REGION (H): Primary | ICD-10-CM

## 2018-07-12 LAB
BASOPHILS # BLD AUTO: 0 10E9/L (ref 0–0.2)
BASOPHILS NFR BLD AUTO: 0.8 %
COPATH REPORT: NORMAL
COPATH REPORT: NORMAL
DIFFERENTIAL METHOD BLD: NORMAL
EOSINOPHIL # BLD AUTO: 0 10E9/L (ref 0–0.7)
EOSINOPHIL NFR BLD AUTO: 0 %
ERYTHROCYTE [DISTWIDTH] IN BLOOD BY AUTOMATED COUNT: 12.5 % (ref 10–15)
HCT VFR BLD AUTO: 40 % (ref 35–47)
HGB BLD-MCNC: 13.3 G/DL (ref 11.7–15.7)
IMM GRANULOCYTES # BLD: 0 10E9/L (ref 0–0.4)
IMM GRANULOCYTES NFR BLD: 0.3 %
LYMPHOCYTES # BLD AUTO: 1.4 10E9/L (ref 0.8–5.3)
LYMPHOCYTES NFR BLD AUTO: 35.7 %
MCH RBC QN AUTO: 29.8 PG (ref 26.5–33)
MCHC RBC AUTO-ENTMCNC: 33.3 G/DL (ref 31.5–36.5)
MCV RBC AUTO: 90 FL (ref 78–100)
MONOCYTES # BLD AUTO: 0.5 10E9/L (ref 0–1.3)
MONOCYTES NFR BLD AUTO: 12.1 %
NEUTROPHILS # BLD AUTO: 2 10E9/L (ref 1.6–8.3)
NEUTROPHILS NFR BLD AUTO: 51.1 %
NRBC # BLD AUTO: 0 10*3/UL
NRBC BLD AUTO-RTO: 0 /100
PLATELET # BLD AUTO: 290 10E9/L (ref 150–450)
RBC # BLD AUTO: 4.47 10E12/L (ref 3.8–5.2)
WBC # BLD AUTO: 4 10E9/L (ref 4–11)

## 2018-07-12 PROCEDURE — G0463 HOSPITAL OUTPT CLINIC VISIT: HCPCS | Mod: ZF

## 2018-07-12 PROCEDURE — 36592 COLLECT BLOOD FROM PICC: CPT

## 2018-07-12 PROCEDURE — 85025 COMPLETE CBC W/AUTO DIFF WBC: CPT | Performed by: INTERNAL MEDICINE

## 2018-07-12 PROCEDURE — 25000128 H RX IP 250 OP 636: Mod: ZF | Performed by: INTERNAL MEDICINE

## 2018-07-12 RX ORDER — HEPARIN SODIUM (PORCINE) LOCK FLUSH IV SOLN 100 UNIT/ML 100 UNIT/ML
5 SOLUTION INTRAVENOUS ONCE
Status: COMPLETED | OUTPATIENT
Start: 2018-07-12 | End: 2018-07-12

## 2018-07-12 RX ADMIN — SODIUM CHLORIDE, PRESERVATIVE FREE 5 ML: 5 INJECTION INTRAVENOUS at 12:58

## 2018-07-12 ASSESSMENT — PAIN SCALES - GENERAL: PAINLEVEL: NO PAIN (0)

## 2018-07-12 NOTE — MR AVS SNAPSHOT
MRN:[id number]                      After Visit Summary   7/12/2018    Amira Avery    MRN: [id number]           Visit Information        Provider Department      7/12/2018 1:00 PM Howard Rodriguez PA-C [facility] Interventional Radiology        [employer] Information     [employer] gives you secure access to your electronic health record. If you see a primary care provider, you can also send messages to your care team and make appointments. If you have questions, please call your primary care clinic.  If you do not have a primary care provider, please call [phone] and they will assist you.      [employer] is an electronic gateway that provides easy, online access to your medical records. With [employer], you can request a clinic appointment, read your test results, renew a prescription or communicate with your care team.     To access your existing account, please contact your [facility] or call [phone] for assistance.        Care EveryWhere ID     This is your Care EveryWhere ID. This could be used by other organizations to access your [facility] medical records  [id number]        Equal Access to Services     RICHIE RAMEY : Hadii aad ku hadasho Soshar, waaxda luqadaha, qaybta kaalmadavid adetima, jen de la cruz . So [facility] [phone].    ATENCIÓN: Si habla español, tiene a isabel disposición servicios gratuitos de asistencia lingüística. Llame al [phone].    We comply with applicable federal civil rights laws and [place] laws. We do not discriminate on the basis of race, color, national origin, age, disability, sex, sexual orientation, or gender identity.

## 2018-07-12 NOTE — LETTER
7/12/2018      RE: Amira Avery  [address]  [place] [address]       BMT Progress Note    I saw and examined Ms. Amira Avery is today.  She is a 28-year-old female with relapsed Hodgkin's lymphoma.  Please refer to previous notes by Dr. Landa for additional details on this patient's medical history.    In summary.  The patient had relapsed Hodgkin's disease.  She received salvage chemotherapy and had achieved a remission.  She was in workup for transplant a few months back when she was found to have progression on the neck node, biopsy-proven.  She went back to her primary oncologist and received rituximab in combination with the PD1 inhibition.  A CT PET repeated in her primary oncologist's office shows complete remission.  This CT scan and PET were reviewed here and they agree with that finding.    She is here today to discuss proceeding to autologous transportation.  Her last chemotherapy was on 6/22/2018.  She has no new complaints.  She has no cough or shortness of breath.  There are no rashes.  There is no cardiovascular or genitourinary complaints.  She has no gastrointestinal complaints.    Her remaining complete review of systems otherwise negative.    On physical exam today her vital signs are /74  Pulse 75  Temp 97.6  F (36.4  C) (Oral)  Resp 16  Wt 72.3 kg (159 lb 4.8 oz)  SpO2 100%  BMI 27.2 kg/m2.  Her buccal mucosa is moist and intact.  Her neck has no palpable lymph nodes.  Her lungs are clear bilaterally with no crackles or wheezes.  Her heart is regular rhythm and rate with no gallop rub or murmur.  Her abdomen is flat soft and nontender with no organomegalies or masses.  Her extremities are warm and well-perfused with no edema.  The patient is grossly neurologically intact.  The patient is has no rashes, bruises, or petechiae.    The pretransplant evaluation shows a bone marrow biopsy with 40% cellularity and no evidence of lymphoma.  The PET  CT shows a complete remission.  The patient's absolute neutrophil count today is 2000.  The patient is CMV positive, HSV positive, EBV positive.  Her ejection fraction is 61% her FEV1 is 131% of predicted and her DLCO, corrected, is 114% of predicted.  Liver function and kidney function are within normal limits.    Assessment and plan    The patient is a 28-year-old now in complete remission after salvage therapy.  She is a candidate to proceed to autologous transplant.  However, she does not yet meet the 4 week interval from the last chemotherapy to be able to proceed to transplant.  This is a protocol of debility criteria.  All discussed with the protocol PI to determine whether or not we should be deviating.  She meets every other protocol eligibility criteria at this time.    I reviewed with the patient and her parents that were present in today's visit the transplant course, in detail.  I described to her the collection of stem cells, the conditioning regimen in the hospital, the reinfusion of stem cells, the discharge for outpatient follow-up, and the late management after transplantation.  I described to her the most serious and frequent complications of transportation including, but not limited to, infections, disease relapse, and organ damage.  We also talked about the small risk of secondary malignancies.  We then reviewed them frequently used in critical supportive measures including, but not limited to, blood products, antibiotics, IV fluids, and growth factors.  Overall expect a 97% the patient survive autologous transportation with the 3% mortality being largely related to an series infections.    The patient and her parents showed good understanding of the findings and the treatment plan.  They also understood the alternatives which would include chemotherapy alone, observation, or maintenance.  They also understand that autologous transportation followed by maintenance therapy with rituximab for  another 13 cycles offers this young patient the best chance of cure.    While the patient is a suitable candidate to transfer and she does not yet met all criteria to proceed.  She will be seen back in clinic next Friday when she has been a before weeks from the previous chemotherapy.  She will sign consents and then will be able to start the collection process.       In today's visit, we reviewed with the patient the protocols that Amira Avery is eligible for. We discussed the potential risks and potential benefits of each protocol, individually. We explained potential alternatives to the proposed treatment. We explained to the patient that participation is voluntary and that consent may be withdrawn at any time. The patient had opportunity to ask questions that were answered to the best of my ability and to the patient's apparent satisfaction.    The patient's testing shows no evidence of infection that require continue management during the treatment procedures.     The patient completed the last round on treatment on 6/22/18    The patient's Karnofsky performance score was 100    The patient's HCT, CI, score was ZERO. We counseled the patient about the impact of this on the risk of treatment related and overall mortality. The score fit within treatment protocol eligibility criteria.    The patient's dental health is suitable to proceed: Yes    The patient will sign consents next week when she needs all eligibility criteria, in particular time from last chemotherapy regimen.         We spent 45 minutes face-to-face with the patient today with more than 50% dedicated to counseling her about her disease and treatment options.    BMT DOM

## 2018-07-12 NOTE — PROGRESS NOTES
A collaboration between [facility] and [facility]  Experts in minimally invasive, targeted treatments performed using imaging guidance    Patient Name:  Amira Avery   YOB: 1990  Medical Record Number (MRN):  [id number]  Age:  28 year old female    Consultation:  Patient seen in Interventional Radiology Clinic 7/12/2018 at the request of referring provider: Tanya Landa MD    Requested procedure:  Placement of a central venous access catheter for autologous stem cell transplant    Indication/History:  Lymphoma    Imaging Reviewed:  CXR shows LEFT chest port in place    Discussion/Plan:  Plan for RIGHT chest TCVC and avoid chest tattoos if possible.    No further imaging will be necessary prior to the procedure.    No further laboratory testing will be necessary prior to the procedure.  Updated labs can be checked the day of the procedure.     The laboratory parameters are INR <=1.8 and platelets >=50.    IR CVC Tunnel Placement >5 yrs of age Right  [code  CLC0244]  (large bore, apheresis capable, dual lumen)    When placement is desired the procedure should be ordered in Epic and scheduled in Interventional Radiology at the [facility] with any available IR proceduralist.  The IR outpatient  and can be reached at [phone].     Thank you for the referral and for letting Interventional Radiology help care for your patient.    Sincerely,    THERESA Sebastian, PA-C  Physician Assistant - Certified  Interventional Radiology  [phone] (IR control desk)    =====    Past Medical History:  [ YES ] History of previous central venous catheter  (Previous placement of a central venous    catheter is associated with central venous stenosis)   HAS HAD RIGHT CHEST PORT IN PAST  [ NO ] History of failed vascular access  (Previously failed vascular accesses may limit     available sites for accesses; the cause of a previous failure may influence planned access    if the cause is still present)   [ NO ] Previous surgeries in the area  (e.g., mastectomy, lymph node dissection, lung    resections)   [ NO ] History of previous arm, neck, or chest surgery/trauma  (Vascular damage    associated with previous surgery or trauma may limit viable access sites)   [ NO ] Radiation therapy to the area   [ NO ] History of pacemaker use  (There is a correlation between pacemaker use and central    venous stenosis)   [ NO ] History of severe congestive heart failure  (Accesses may alter hemodynamics and    cardiac output)   [ NO ] History of anticoagulant therapy or any coagulation disorder  (Abnormal coagulation    may cause clotting or problems with hemostasis of access)   [ Right ] Dominant arm  (To minimize negative impact on quality of life, use of the non-dominant arm side is considered)    Past Medical History:   Diagnosis Date     Cancer (H)      Heart disease      Tattoos        Past Surgical History:  Past Surgical History:   Procedure Laterality Date     BIOPSY LYMPH NODE CERVICAL Right 5/2/2018    Procedure: BIOPSY LYMPH NODE CERVICAL;  Right Excisional Node Biopsy;  Surgeon: Lia Silva MD;  [facility]     excision of deep cervical LN's Left 05/19/2017    positive for Hodgkin's     excision of deep cervical LN's Right 02/13/2018    positive for Hodgkin's     port a cath placement Right 06/01/2017    IJ vein; removed 12/1/17 no longer needed     port a cath placement Left 02/21/2018    IJ vein     TRANSPLANT       US BIOPSY LYMPH NODE FNA Left 03/06/2017    low midline neck, negative for malignancy       Problem List:  Patient Active Problem List    Diagnosis Date Noted     Hodgkin lymphoma, nodular sclerosis (H) 04/20/2018     Priority: Medium     Hodgkin disease (H) 04/20/2018     Priority: Medium       Medications:  Prescription Medications as of 7/12/2018              acetaminophen (TYLENOL) 500 MG tablet Take by mouth every 6 hours as needed       Facility Administered Medications as of 7/12/2018             heparin 100 UNIT/ML injection 5 mL 5 mLs by Intracatheter route once    sodium chloride (PF) 0.9% PF flush 20 mL 20 mLs by Intracatheter route once          Allergies:  Allergies   Allergen Reactions     Morphine Hives     No issues with oral narcotics, dilaudid, or fentanyl per pt       Results Reviewed:  Complete Blood Count:  Lab Results   Component Value Date     07/12/2018       Coagulation:  Lab Results   Component Value Date    INR 1.02 07/05/2018       Vital Signs:  There were no vitals taken for this visit.    =====    Visit Details:    The patient's medical data, including pertinent imaging, was reviewed.      Education was given on the planned procedure and why it was requested, including a detailed description of interventional radiology's role.      The use of moderate sedation was reviewed.      The risks of the procedure were discussed.      All of the patient's questions were answered to their satisfaction.    Medicines to hold after discussing with prescribing provider:    Lovenox (enoxaparin) is generally held for 24 hours prior to invasive procedures.  If taken twice daily, hold the evening dose prior to the procedure as well as the morning dose the day of the procedure.    Coumadin (warfarin) is generally held for 5 days prior to the scheduled procedure, or when the INR meets established IR safety laboratory parameters.     Anti-platelet inhibitors  (not including aspirin) are held for 5 days prior to the scheduled procedure.    Patient instructions:    For sedation purposes; no solid foods or milk products for 6 hours prior to the procedure.  You may have clear liquids up to 2 hours prior to the procedure.    The procedure will be in interventional radiology (IR); arrive in the Gold Waiting room at your scheduled arrival time on the day of your  procedure.  This is on the second floor of the hospital, located down the mc from the main hospital lobby.    If you are told that you are having your procedure in the operating room (OR); arrive in unit 3C at your scheduled arrival time on the day of your procedure.  Unit 3C is Same Day Surgery; this is on the third floor of the hospital.  Follow the green diamonds on the floor.    If sedation is used; a responsible adult must accompany you after the procedure.  Hospital policy requires you not drive after sedation is administered.    Antibacterial scrub; 2 times the day before the procedure and once the day of the procedure.  Clean mid chest to earlobes, both sides of the neck and chest.  Place scrub on wet wash cloth, scrub on and leave for 5 minutes.  Wash off with a clean wash cloth and pat dry skin, or shower.    =====    A total of 30 minutes was spent with the patient.  Greater than 50% of the time was spent in counseling, education, and coordination of care.    CC:  1) Referring Provider  Tanya Landa MD    2) Primary Care Provider  [facility]  [address] [place] [address]    3) Other  Rachel E Lerner, MD [facility]  [address] [place] [address]

## 2018-07-12 NOTE — LETTER
7/12/2018       RE: Amira Avery  [address]  [facility] [address]     Dear Colleague,    Thank you for referring your patient, Amira Avery, to the [facility] INTERVENTIONAL RADIOLOGY at General acute hospital. Please see a copy of my visit note below.      A collaboration between [facility] and [facility]  Experts in minimally invasive, targeted treatments performed using imaging guidance    Patient Name:  Amira Avery   YOB: 1990  Medical Record Number (MRN):  [id number]  Age:  28 year old female    Consultation:  Patient seen in Interventional Radiology Clinic 7/12/2018 at the request of referring provider: Tanya Landa MD    Requested procedure:  Placement of a central venous access catheter for autologous stem cell transplant    Indication/History:  Lymphoma    Imaging Reviewed:  CXR shows LEFT chest port in place    Discussion/Plan:  Plan for RIGHT chest TCVC and avoid chest tattoos if possible.    No further imaging will be necessary prior to the procedure.    No further laboratory testing will be necessary prior to the procedure.  Updated labs can be checked the day of the procedure.     The laboratory parameters are INR <=1.8 and platelets >=50.    IR CVC Tunnel Placement >5 yrs of age Right  [code  [id number]]  (large bore, apheresis capable, dual lumen)    When placement is desired the procedure should be ordered in Epic and scheduled in Interventional Radiology at the [facility] with any available IR proceduralist.  The IR outpatient  and can be reached at [phone].     Thank you for the referral and for letting Interventional Radiology help care for your patient.    Sincerely,    THERESA Sebastian, PA-C  Physician Assistant - Certified  Interventional Radiology  [phone] (IR control  nasrak)    =====    Past Medical History:  [ YES ] History of previous central venous catheter  (Previous placement of a central venous    catheter is associated with central venous stenosis)   HAS HAD RIGHT CHEST PORT IN PAST  [ NO ] History of failed vascular access  (Previously failed vascular accesses may limit    available sites for accesses; the cause of a previous failure may influence planned access    if the cause is still present)   [ NO ] Previous surgeries in the area  (e.g., mastectomy, lymph node dissection, lung    resections)   [ NO ] History of previous arm, neck, or chest surgery/trauma  (Vascular damage    associated with previous surgery or trauma may limit viable access sites)   [ NO ] Radiation therapy to the area   [ NO ] History of pacemaker use  (There is a correlation between pacemaker use and central    venous stenosis)   [ NO ] History of severe congestive heart failure  (Accesses may alter hemodynamics and    cardiac output)   [ NO ] History of anticoagulant therapy or any coagulation disorder  (Abnormal coagulation    may cause clotting or problems with hemostasis of access)   [ Right ] Dominant arm  (To minimize negative impact on quality of life, use of the non-dominant arm side is considered)    Past Medical History:   Diagnosis Date     Cancer (H)      Heart disease      Tattoos        Past Surgical History:  Past Surgical History:   Procedure Laterality Date     BIOPSY LYMPH NODE CERVICAL Right 5/2/2018    Procedure: BIOPSY LYMPH NODE CERVICAL;  Right Excisional Node Biopsy;  Surgeon: Lia Silva MD;  [facility]     excision of deep cervical LN's Left 05/19/2017    positive for Hodgkin's     excision of deep cervical LN's Right 02/13/2018    positive for Hodgkin's     port a cath placement Right 06/01/2017    IJ vein; removed 12/1/17 no longer needed     port a cath placement Left 02/21/2018    IJ vein     TRANSPLANT       US BIOPSY LYMPH NODE FNA Left 03/06/2017    low  midline neck, negative for malignancy       Problem List:  Patient Active Problem List    Diagnosis Date Noted     Hodgkin lymphoma, nodular sclerosis (H) 04/20/2018     Priority: Medium     Hodgkin disease (H) 04/20/2018     Priority: Medium       Medications:  Prescription Medications as of 7/12/2018             acetaminophen (TYLENOL) 500 MG tablet Take by mouth every 6 hours as needed       Facility Administered Medications as of 7/12/2018             heparin 100 UNIT/ML injection 5 mL 5 mLs by Intracatheter route once    sodium chloride (PF) 0.9% PF flush 20 mL 20 mLs by Intracatheter route once          Allergies:  Allergies   Allergen Reactions     Morphine Hives     No issues with oral narcotics, dilaudid, or fentanyl per pt       Results Reviewed:  Complete Blood Count:  Lab Results   Component Value Date     07/12/2018       Coagulation:  Lab Results   Component Value Date    INR 1.02 07/05/2018       Vital Signs:  There were no vitals taken for this visit.    =====    Visit Details:    The patient's medical data, including pertinent imaging, was reviewed.      Education was given on the planned procedure and why it was requested, including a detailed description of interventional radiology's role.      The use of moderate sedation was reviewed.      The risks of the procedure were discussed.      All of the patient's questions were answered to their satisfaction.    Medicines to hold after discussing with prescribing provider:    Lovenox (enoxaparin) is generally held for 24 hours prior to invasive procedures.  If taken twice daily, hold the evening dose prior to the procedure as well as the morning dose the day of the procedure.    Coumadin (warfarin) is generally held for 5 days prior to the scheduled procedure, or when the INR meets established IR safety laboratory parameters.     Anti-platelet inhibitors  (not including aspirin) are held for 5 days prior to the scheduled procedure.    Patient  instructions:    For sedation purposes; no solid foods or milk products for 6 hours prior to the procedure.  You may have clear liquids up to 2 hours prior to the procedure.    The procedure will be in interventional radiology (IR); arrive in the Gold Waiting room at your scheduled arrival time on the day of your procedure.  This is on the second floor of the hospital, located down the mc from the [facility] lobby.    If you are told that you are having your procedure in the operating room (OR); arrive in unit 3C at your scheduled arrival time on the day of your procedure.  Unit 3C is Same Day Surgery; this is on the third floor of the hospital.  Follow the green diamonds on the floor.    If sedation is used; a responsible adult must accompany you after the procedure.  Hospital policy requires you not drive after sedation is administered.    Antibacterial scrub; 2 times the day before the procedure and once the day of the procedure.  Clean mid chest to earlobes, both sides of the neck and chest.  Place scrub on wet wash cloth, scrub on and leave for 5 minutes.  Wash off with a clean wash cloth and pat dry skin, or shower.    =====    A total of 30 minutes was spent with the patient.  Greater than 50% of the time was spent in counseling, education, and coordination of care.    CC:  1) Referring Provider  Tanya Landa MD    2) Primary Care Provider  [facility]  [address] [place] [address]    3) Other  Gina Lawson MD  [facility]  [address] [place] [address]      Again, thank you for allowing me to participate in the care of your patient.      Sincerely,    Howard Rodriguez PA-C

## 2018-07-12 NOTE — MR AVS SNAPSHOT
After Visit Summary   7/12/2018    Amira Avery    MRN: [id number]           Patient Information     Date Of Birth          1990        Visit Information        Provider Department      7/12/2018 12:00 PM [facility] BMT DOM [facility] Blood and Marrow Transplant        Today's Diagnoses     Other classical Hodgkin lymphoma, unspecified body region (H)        Personal history of diseases of blood and blood-forming organs              [facility] ([facility]  [address] [place] [address]  Phone: [phone]  Clinic Hours:   Monday-Thursday:7am to 7pm   Friday: 7am to 5pm   Weekends and holidays:    8am to noon (in general)  If your fever is 100.5  or greater,   call the clinic.  After hours call the   hospital at [phone] or   [phone]. Ask for the BMT   fellow on-call            Follow-ups after your visit        Who to contact     If you have questions or need follow up information about today's clinic visit or your schedule please contact [facility] BLOOD AND MARROW TRANSPLANT directly at [phone].  Normal or non-critical lab and imaging results will be communicated to you by [employer], letter or phone within 4 business days after the clinic has received the results. If you do not hear from us within 7 days, please contact the clinic through [employer] or phone. If you have a critical or abnormal lab result, we will notify you by phone as soon as possible.  Submit refill requests through [employer] or call your pharmacy and they will forward the refill request to us. Please allow 3 business days for your refill to be completed.          Additional Information About Your Visit        [employer] Information     [employer] gives you secure access to your electronic health record. If you see a primary care provider, you can also send messages to your care team and make appointments. If you have questions, please call your primary care clinic.  If you do not have a primary care  provider, please call [phone] and they will assist you.        Care EveryWhere ID     This is your Care EveryWhere ID. This could be used by other organizations to access your [place] medical records  [id number]        Your Vitals Were     Pulse Temperature Respirations Pulse Oximetry BMI (Body Mass Index)       75 97.6  F (36.4  C) (Oral) 16 100% 27.2 kg/m2        Blood Pressure from Last 3 Encounters:   07/12/18 126/74   07/10/18 128/68   07/09/18 107/68    Weight from Last 3 Encounters:   07/12/18 72.3 kg (159 lb 4.8 oz)   07/10/18 73.5 kg (162 lb 0.6 oz)   07/09/18 72.8 kg (160 lb 6.4 oz)              We Performed the Following     CBC with platelets differential     History and physical by physician        Recent Review Flowsheet Data     BMT Recent Results Latest Ref Rng & Units 4/18/2018 4/20/2018 4/23/2018 7/5/2018 7/9/2018 7/12/2018    WBC 4.0 - 11.0 10e9/L 5.0 7.2 - 2.6(L) 2.6(L) 4.0    Hemoglobin 11.7 - 15.7 g/dL 12.3 12.3 - 12.7 12.7 13.3    Platelet Count 150 - 450 10e9/L 268 273 - 199 257 290    Neutrophils (Absolute) 1.6 - 8.3 10e9/L 2.9 4.3 - 0.9(L) 0.7(L) 2.0    INR 0.86 - 1.14 1.06 - - 1.02 - -    Sodium 133 - 144 mmol/L 139 - - 140 - -    Potassium 3.4 - 5.3 mmol/L 4.0 - - 3.8 - -    Chloride 94 - 109 mmol/L 110(H) - - 109 - -    Glucose 70 - 99 mg/dL 94 - 100(H) 92 - -    Urea Nitrogen 7 - 30 mg/dL 13 - - 10 - -    Creatinine 0.52 - 1.04 mg/dL 0.65 - - 0.71 - -    Calcium (Total) 8.5 - 10.1 mg/dL 8.7 - - 8.6 - -    Protein (Total) 6.8 - 8.8 g/dL 7.4 - - 7.7 - -    Albumin 3.4 - 5.0 g/dL 3.9 - - 4.0 - -    Alkaline Phosphatase 40 - 150 U/L 48 - - 58 - -    AST 0 - 45 U/L 53(H) - - 38 - -    ALT 0 - 50 U/L 40 - - 60(H) - -    MCV 78 - 100 fl 90 90 - 90 90 90               Primary Care Provider Office Phone # Fax #    [facility] [phone] [address] [address]        Equal Access to Services     RICHIE TYLER: Alexis youngblood  roberto Luis, samaria pazkatherineha, sweta kadamon kirktima, jen preetin hayaan kirkyudi shakirjose lakirillgeorge zackary. So [facility] [phone].    ATENCIÓN: Si habla español, tiene a isabel disposición servicios gratuitos de asistencia lingüística. Kalaniame al [phone].    We comply with applicable federal civil rights laws and [place] laws. We do not discriminate on the basis of race, color, national origin, age, disability, sex, sexual orientation, or gender identity.            Thank you!     Thank you for choosing [facility] BLOOD AND MARROW TRANSPLANT  for your care. Our goal is always to provide you with excellent care. Hearing back from our patients is one way we can continue to improve our services. Please take a few minutes to complete the written survey that you may receive in the mail after your visit with us. Thank you!             Your Updated Medication List - Protect others around you: Learn how to safely use, store and throw away your medicines at www.disposemymeds.org.          This list is accurate as of 7/12/18 11:59 PM.  Always use your most recent med list.                   Brand Name Dispense Instructions for use Diagnosis    acetaminophen 500 MG tablet    TYLENOL     Take by mouth every 6 hours as needed

## 2018-07-12 NOTE — NURSING NOTE
"Oncology Rooming Note    July 12, 2018 12:21 PM   Amira Avery is a 28 year old female who presents for:    Chief Complaint   Patient presents with     RECHECK     HUYNH CLOSE- NHL     Initial Vitals: /74  Pulse 75  Temp 97.6  F (36.4  C) (Oral)  Resp 16  Wt 72.3 kg (159 lb 4.8 oz)  SpO2 100%  BMI 27.2 kg/m2 Estimated body mass index is 27.2 kg/(m^2) as calculated from the following:    Height as of 7/10/18: 1.63 m (5' 4.17\").    Weight as of this encounter: 72.3 kg (159 lb 4.8 oz). Body surface area is 1.81 meters squared.  No Pain (0) Comment: Data Unavailable   No LMP recorded.  Allergies reviewed: Yes  Medications reviewed: Yes    Medications: Medication refills not needed today.  Pharm[facility] into [facility]: [facility] [address] [place] [address] [address] AT [facility] [address]    Clinical concerns: n/a     5 minutes for nursing intake (face to face time)     MARK VIEYRA CMA              "

## 2018-07-12 NOTE — PROGRESS NOTES
BMT Progress Note    I saw and examined Ms. Amira Avery is today.  She is a 28-year-old female with relapsed Hodgkin's lymphoma.  Please refer to previous notes by Dr. Landa for additional details on this patient's medical history.    In summary.  The patient had relapsed Hodgkin's disease.  She received salvage chemotherapy and had achieved a remission.  She was in workup for transplant a few months back when she was found to have progression on the neck node, biopsy-proven.  She went back to her primary oncologist and received rituximab in combination with the PD1 inhibition.  A CT PET repeated in her primary oncologist's office shows complete remission.  This CT scan and PET were reviewed here and they agree with that finding.    She is here today to discuss proceeding to autologous transportation.  Her last chemotherapy was on 6/22/2018.  She has no new complaints.  She has no cough or shortness of breath.  There are no rashes.  There is no cardiovascular or genitourinary complaints.  She has no gastrointestinal complaints.    Her remaining complete review of systems otherwise negative.    On physical exam today her vital signs are /74  Pulse 75  Temp 97.6  F (36.4  C) (Oral)  Resp 16  Wt 72.3 kg (159 lb 4.8 oz)  SpO2 100%  BMI 27.2 kg/m2.  Her buccal mucosa is moist and intact.  Her neck has no palpable lymph nodes.  Her lungs are clear bilaterally with no crackles or wheezes.  Her heart is regular rhythm and rate with no gallop rub or murmur.  Her abdomen is flat soft and nontender with no organomegalies or masses.  Her extremities are warm and well-perfused with no edema.  The patient is grossly neurologically intact.  The patient is has no rashes, bruises, or petechiae.    The pretransplant evaluation shows a bone marrow biopsy with 40% cellularity and no evidence of lymphoma.  The PET CT shows a complete remission.  The patient's absolute neutrophil count today is 2000.  The patient  is CMV positive, HSV positive, EBV positive.  Her ejection fraction is 61% her FEV1 is 131% of predicted and her DLCO, corrected, is 114% of predicted.  Liver function and kidney function are within normal limits.    Assessment and plan    The patient is a 28-year-old now in complete remission after salvage therapy.  She is a candidate to proceed to autologous transplant.  However, she does not yet meet the 4 week interval from the last chemotherapy to be able to proceed to transplant.  This is a protocol of debility criteria.  All discussed with the protocol PI to determine whether or not we should be deviating.  She meets every other protocol eligibility criteria at this time.    I reviewed with the patient and her parents that were present in today's visit the transplant course, in detail.  I described to her the collection of stem cells, the conditioning regimen in the hospital, the reinfusion of stem cells, the discharge for outpatient follow-up, and the late management after transplantation.  I described to her the most serious and frequent complications of transportation including, but not limited to, infections, disease relapse, and organ damage.  We also talked about the small risk of secondary malignancies.  We then reviewed them frequently used in critical supportive measures including, but not limited to, blood products, antibiotics, IV fluids, and growth factors.  Overall expect a 97% the patient survive autologous transportation with the 3% mortality being largely related to an series infections.    The patient and her parents showed good understanding of the findings and the treatment plan.  They also understood the alternatives which would include chemotherapy alone, observation, or maintenance.  They also understand that autologous transportation followed by maintenance therapy with rituximab for another 13 cycles offers this young patient the best chance of cure.    While the patient is a suitable  candidate to transfer and she does not yet met all criteria to proceed.  She will be seen back in clinic next Friday when she has been a before weeks from the previous chemotherapy.  She will sign consents and then will be able to start the collection process.       In today's visit, we reviewed with the patient the protocols that Amira Avery is eligible for. We discussed the potential risks and potential benefits of each protocol, individually. We explained potential alternatives to the proposed treatment. We explained to the patient that participation is voluntary and that consent may be withdrawn at any time. The patient had opportunity to ask questions that were answered to the best of my ability and to the patient's apparent satisfaction.    The patient's testing shows no evidence of infection that require continue management during the treatment procedures.     The patient completed the last round on treatment on 6/22/18    The patient's Karnofsky performance score was 100    The patient's HCT, CI, score was ZERO. We counseled the patient about the impact of this on the risk of treatment related and overall mortality. The score fit within treatment protocol eligibility criteria.    The patient's dental health is suitable to proceed: Yes    The patient will sign consents next week when she needs all eligibility criteria, in particular time from last chemotherapy regimen.         We spent 45 minutes face-to-face with the patient today with more than 50% dedicated to counseling her about her disease and treatment options.

## 2018-07-17 LAB — LAB SCANNED RESULT: ABNORMAL

## 2018-07-17 NOTE — PROGRESS NOTES
BMT Teaching Flowsheet    Amira Avery is a 28 year old female  Diagnoses of Hodgkin disease (H) and Personal history of diseases of blood and blood-forming organs were pertinent to this visit.    Teaching Topic: Autologous PBSCT for HL    Person(s) involved in teaching: Patient and Mother  Motivation Level  Asks Questions: Yes  Eager to Learn: Yes  Cooperative: Yes  Receptive (willing/able to accept information): Yes  Any cultural factors/Anabaptism beliefs that may influence understanding or compliance? No    Patient and mother demonstrates understanding of the following:  - Reason for the appointment, diagnosis and treatment plan: Yes  - Knowledge of proper use of medications and conditions for which they are ordered (with special attention to potential side effects or drug interactions): Yes  - Which situations necessitate calling provider and whom to contact: Yes    Teaching concerns addressed: Reviewed collections and transplant calender, consents, medications, side effects, clinic flow and routine, limitations after transplant, caregiver role after transplant, and when to call.    Proper use and care of (medical equipment, care aids, etc.) NA  Pain management techniques: Yes  Patient instructed on hand hygiene: Yes  How and/when to access community resources: Yes    Infection Control:  Patient and mother demonstrates understanding of the following:  Surgical procedure site care taught NA  Signs and symptoms of infection taught Yes  Wound care taught NA  Central venous catheter care taught NA    Instructional Materials Used/Given: BMT teaching binder    Time spent with patient: 90  minutes.    Specific Concerns: No, explain: patient verbalized understanding of provided material via teach back method. No further questions or concerns at this time.

## 2018-07-20 ENCOUNTER — MEDICAL CORRESPONDENCE (OUTPATIENT)
Dept: TRANSPLANT | Facility: CLINIC | Age: 28
End: 2018-07-20

## 2018-07-20 ENCOUNTER — CARE COORDINATION (OUTPATIENT)
Dept: TRANSPLANT | Facility: CLINIC | Age: 28
End: 2018-07-20

## 2018-07-20 ENCOUNTER — OFFICE VISIT (OUTPATIENT)
Dept: TRANSPLANT | Facility: CLINIC | Age: 28
End: 2018-07-20
Attending: INTERNAL MEDICINE
Payer: COMMERCIAL

## 2018-07-20 ENCOUNTER — TELEPHONE (OUTPATIENT)
Dept: INTERVENTIONAL RADIOLOGY/VASCULAR | Facility: CLINIC | Age: 28
End: 2018-07-20

## 2018-07-20 DIAGNOSIS — Z86.2 PERSONAL HISTORY OF DISEASES OF BLOOD AND BLOOD-FORMING ORGANS: ICD-10-CM

## 2018-07-20 DIAGNOSIS — C81.90 HODGKIN DISEASE (H): Primary | ICD-10-CM

## 2018-07-20 DIAGNOSIS — C81.10 NODULAR SCLEROSING HODGKIN'S LYMPHOMA, UNSPECIFIED BODY REGION (H): Primary | ICD-10-CM

## 2018-07-20 DIAGNOSIS — Z41.8 ENCOUNTER FOR OTHER PROCEDURES FOR PURPOSES OTHER THAN REMEDYING HEALTH STATE (CODE): Primary | ICD-10-CM

## 2018-07-20 DIAGNOSIS — C81.11 NODULAR SCLEROSIS HODGKIN LYMPHOMA OF LYMPH NODES OF NECK (H): ICD-10-CM

## 2018-07-20 LAB
BASOPHILS # BLD AUTO: 0 10E9/L (ref 0–0.2)
BASOPHILS NFR BLD AUTO: 0.5 %
DIFFERENTIAL METHOD BLD: NORMAL
EOSINOPHIL # BLD AUTO: 0 10E9/L (ref 0–0.7)
EOSINOPHIL NFR BLD AUTO: 0.2 %
ERYTHROCYTE [DISTWIDTH] IN BLOOD BY AUTOMATED COUNT: 12.9 % (ref 10–15)
HCT VFR BLD AUTO: 41.5 % (ref 35–47)
HGB BLD-MCNC: 13.6 G/DL (ref 11.7–15.7)
IMM GRANULOCYTES # BLD: 0 10E9/L (ref 0–0.4)
IMM GRANULOCYTES NFR BLD: 0.3 %
LYMPHOCYTES # BLD AUTO: 1.7 10E9/L (ref 0.8–5.3)
LYMPHOCYTES NFR BLD AUTO: 29.3 %
MCH RBC QN AUTO: 29.3 PG (ref 26.5–33)
MCHC RBC AUTO-ENTMCNC: 32.8 G/DL (ref 31.5–36.5)
MCV RBC AUTO: 89 FL (ref 78–100)
MONOCYTES # BLD AUTO: 0.4 10E9/L (ref 0–1.3)
MONOCYTES NFR BLD AUTO: 6.7 %
NEUTROPHILS # BLD AUTO: 3.7 10E9/L (ref 1.6–8.3)
NEUTROPHILS NFR BLD AUTO: 63 %
NRBC # BLD AUTO: 0 10*3/UL
NRBC BLD AUTO-RTO: 0 /100
PLATELET # BLD AUTO: 274 10E9/L (ref 150–450)
RBC # BLD AUTO: 4.64 10E12/L (ref 3.8–5.2)
WBC # BLD AUTO: 5.9 10E9/L (ref 4–11)

## 2018-07-20 PROCEDURE — 96372 THER/PROPH/DIAG INJ SC/IM: CPT

## 2018-07-20 PROCEDURE — 25000128 H RX IP 250 OP 636: Mod: ZF | Performed by: INTERNAL MEDICINE

## 2018-07-20 PROCEDURE — 85025 COMPLETE CBC W/AUTO DIFF WBC: CPT | Performed by: INTERNAL MEDICINE

## 2018-07-20 RX ORDER — HEPARIN SODIUM (PORCINE) LOCK FLUSH IV SOLN 100 UNIT/ML 100 UNIT/ML
5 SOLUTION INTRAVENOUS DAILY PRN
Status: CANCELLED
Start: 2018-07-20

## 2018-07-20 RX ORDER — HEPARIN SODIUM (PORCINE) LOCK FLUSH IV SOLN 100 UNIT/ML 100 UNIT/ML
5 SOLUTION INTRAVENOUS
Status: DISCONTINUED | OUTPATIENT
Start: 2018-07-20 | End: 2018-07-20 | Stop reason: HOSPADM

## 2018-07-20 RX ADMIN — FILGRASTIM 780 MCG: 480 INJECTION, SOLUTION INTRAVENOUS; SUBCUTANEOUS at 13:00

## 2018-07-20 RX ADMIN — HEPARIN 5 ML: 100 SYRINGE at 11:39

## 2018-07-20 NOTE — MR AVS SNAPSHOT
After Visit Summary   7/20/2018    Amira Avery    MRN: [id number]           Patient Information     Date Of Birth          1990        Visit Information        Provider Department      7/20/2018 10:00 AM UC BMT DOM 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  After hours call the   hospital at [phone] or   [phone]. Ask for the BMT   fellow on-call            Follow-ups after your visit        Your next 10 appointments already scheduled     Jul 21, 2018  8:15 AM CDT   BMT Injection with  Bmt Nurse 1   [facility] Blood and Marrow Transplant ([facility])    [address] [place] [address]  [address] [place] [id number] [phone]            Jul 22, 2018  8:15 AM CDT   BMT Injection with Nettie Bmt Nurse 1   [facility] Blood and Marrow Transplant ([facility])    [address] [place] [address]  [address] [place] [id number] [phone]            Jul 23, 2018  7:30 AM CDT   Procedure 3.5 hour with U2A ROOM 6   Unit 2A [facility] ([facility])    [address] [facility] [address]               Jul 23, 2018  9:00 AM CDT   IR CVC TUNNEL PLACEMENT > 5 YRS OF AGE with UUIR2   [facility] [place], Interventional Radiology ([facility])    [address] [facility] [id number] [phone]           1. Your  doctor will need to do a history and physical within 7 days before this procedure. 2. Your doctor will which medications should not be taken the morning of the exam. 3. Laboratory tests are to be obtained by your doctor prior to the exam (Basic Metabolic Panel, CBCP, PTT and INR) (No labs needed if you are having a tunneled catheter exchange or removal) 4. If you have allergies to x-ray contrast or iodine, contact your doctor or a Radiology nurse prior to the exam day for instructions. 5. Someone will need to drive you to and from the hospital. 6. If you are or may be pregnant, contact your doctor or a Radiology nurse prior to the day of the exam. 7. If you have diabetes, check with your doctor or a Radiology nurse to see if your insulin needs to be adjusted for the exam. 8. If you are taking a medication called Glucophage or Glucovance; these medications need to be held the day of the exam and for approximately 48 hours following. A blood sample must be drawn so your creatinine level can be checked before resuming this medication. 9. If you are taking Coumadin (to thin you blood) please contact your doctor or a Radiology nurse at least 3 days before the exam for special instructions. 10. You should not have received contrast within 48 hours of this exam. 11. The day before your exam you may eat your regular diet and are encouraged to drink at least 2 quarts of clear liquids. Drink no alcoholic beverages for 24 hours prior to the exam. 12. If you have a colostomy you will need to irrigate it with tap water at 8PM the evening before and again at 6AM the morning of the exam. 13. Do not smoke for 24 hours prior to the procedure. 14. Birth to 4 years: - Breast feeding must be stopped 4 hours prior to exam - Solid food or formula must be stopped 6 hours prior to exam - Tube feedings must be stopped 6 hours prior to exam 15. 4-10 years old: - Nothing to eat or drink 6 hours prior to exam 16. 10+ years old: - Nothing to  eat or drink 8 hours prior to exam 17. The morning of the exam you may brush your teeth and take medications as directed with a sip of water. 18. When discharged, you cannot drive until morning, and an adult must be with you until then. You should stay in the [facility] overnight. 19. Bring a list of all drugs you are taking; include supplements and over-the-counter medications. Wear comfortable clothes and leave your valuables at home.            Jul 23, 2018 11:00 AM CDT   Masonic Lab Draw with  MASONIC LAB DRAW   [facility] Masonic Lab Draw ([facility])    [address] [facility] [facility]  [address]  [facility] [address]   [phone]            Jul 23, 2018 11:30 AM CDT   BMT Injection with  Bmt Nurse 1   [facility] Blood and Marrow Transplant ([facility])    [address] [place] [address]  [address]  [facility] [address]   [phone]            Jul 23, 2018 12:30 PM CDT   Return with  BMT WES #2   [facility] Blood and Marrow Transplant ([facility])    [address] [place] [address]  [address]  [facility] [address]   [phone]            Jul 24, 2018  7:30 AM CDT   Masonic Lab Draw with  MASONIC LAB DRAW   [facility] Masonic Lab Draw ([facility])    [address]  [address]  [facility] [address]   [phone]            Jul 24, 2018  7:45 AM CDT   BMT Injection with  Bmt Nurse 1   [facility] Blood and Marrow Transplant ([facility])    [address] [place] [address]  [address]  [facility] [address]   [phone]            Jul 24, 2018  8:00 AM CDT   (Arrive by 7:45 AM)   MNC Collection with  APHERESIS RN5   [facility] Apheresis ([facility])    [address] [place] [address]  [address]  [facility] [address]   [phone]              Future tests that were ordered for you today     Open Future Orders        Priority Expected Expires Ordered     HCG qualitative urine Routine 7/21/2018 7/20/2019 7/20/2018    CBC with platelets differential Routine 7/23/2018 7/20/2019 7/20/2018    CD34 Absolute Count Routine 7/23/2018 7/20/2019 7/20/2018    IR CVC Tunnel Placement > 5 Yrs of Age Routine 7/23/2018 7/20/2019 7/20/2018            Who to contact     If you have questions or need follow up information about today's clinic visit or your schedule please contact [facility] BLOOD AND MARROW TRANSPLANT directly at [phone].  Normal or non-critical lab and imaging results will be communicated to you by [employer], letter or phone within 4 business days after the clinic has received the results. If you do not hear from us within 7 days, please contact the clinic through [employer]t or phone. If you have a critical or abnormal lab result, we will notify you by phone as soon as possible.  Submit refill requests through [employer] or call your pharmacy and they will forward the refill request to us. Please allow 3 business days for your refill to be completed.          Additional Information About Your Visit        MyChart Information     [employer] gives you secure access to your electronic health record. If you see a primary care provider, you can also send messages to your care team and make appointments. If you have questions, please call your primary care clinic.  If you do not have a primary care provider, please call [phone] and they will assist you.        Care EveryWhere ID     This is your Care EveryWhere ID. This could be used by other organizations to access your [facility] medical records  [id number]         Blood Pressure from Last 3 Encounters:   07/12/18 126/74   07/10/18 128/68   07/09/18 107/68    Weight from Last 3 Encounters:   07/12/18 72.3 kg (159 lb 4.8 oz)   07/10/18 73.5 kg (162 lb 0.6 oz)   07/09/18 72.8 kg (160 lb 6.4 oz)              We Performed the Following     CBC with platelets differential     Oxygen Saturation        Recent Review Flowsheet  Data     BMT Recent Results Latest Ref Rng & Units 4/18/2018 4/20/2018 4/23/2018 7/5/2018 7/9/2018 7/12/2018 7/20/2018    WBC 4.0 - 11.0 10e9/L 5.0 7.2 - 2.6(L) 2.6(L) 4.0 5.9    Hemoglobin 11.7 - 15.7 g/dL 12.3 12.3 - 12.7 12.7 13.3 13.6    Platelet Count 150 - 450 10e9/L 268 273 - 199 257 290 274    Neutrophils (Absolute) 1.6 - 8.3 10e9/L 2.9 4.3 - 0.9(L) 0.7(L) 2.0 3.7    INR 0.86 - 1.14 1.06 - - 1.02 - - -    Sodium 133 - 144 mmol/L 139 - - 140 - - -    Potassium 3.4 - 5.3 mmol/L 4.0 - - 3.8 - - -    Chloride 94 - 109 mmol/L 110(H) - - 109 - - -    Glucose 70 - 99 mg/dL 94 - 100(H) 92 - - -    Urea Nitrogen 7 - 30 mg/dL 13 - - 10 - - -    Creatinine 0.52 - 1.04 mg/dL 0.65 - - 0.71 - - -    Calcium (Total) 8.5 - 10.1 mg/dL 8.7 - - 8.6 - - -    Protein (Total) 6.8 - 8.8 g/dL 7.4 - - 7.7 - - -    Albumin 3.4 - 5.0 g/dL 3.9 - - 4.0 - - -    Alkaline Phosphatase 40 - 150 U/L 48 - - 58 - - -    AST 0 - 45 U/L 53(H) - - 38 - - -    ALT 0 - 50 U/L 40 - - 60(H) - - -    MCV 78 - 100 fl 90 90 - 90 90 90 89               Primary Care Provider Office Phone # Fax #    [facility] [phone] [address] [place] [address]        Equal Access to Services     RICHIE RAMEY AH: Hadii velma ku azebo Soomaali, waaxda sweta ceja waxay idiin hayaan adeeg khhernáncolumba de la cruz ah. So [facility] [phone].    ATENCIÓN: Si habla kelly, tiene a isabel disposición servicios gratuitos de asistencia lingüística. Llame al [phone].    We comply with applicable federal civil rights laws and [place] laws. We do not discriminate on the basis of race, color, national origin, age, disability, sex, sexual orientation, or gender identity.            Thank you!     Thank you for choosing [facility] BLOOD AND MARROW TRANSPLANT  for your care. Our goal is always to provide you with excellent care. Hearing back from our patients is one way we can continue to improve our services.  Please take a few minutes to complete the written survey that you may receive in the mail after your visit with us. Thank you!             Your Updated Medication List - Protect others around you: Learn how to safely use, store and throw away your medicines at www.disposemymeds.org.          This list is accurate as of 7/20/18  4:31 PM.  Always use your most recent med list.                   Brand Name Dispense Instructions for use Diagnosis    acetaminophen 500 MG tablet    TYLENOL     Take by mouth every 6 hours as needed           Unknown if ever smoked

## 2018-07-20 NOTE — PROGRESS NOTES
Per patient and caregiver's request, faxed [facility] paperwork for patient's caregiver (mother/ Alicja) as well as patient's MLOA letter (please see Letter tab) to [phone] and received confirmation.

## 2018-07-20 NOTE — NURSING NOTE
Pt received first dose of GCSF 780 mcg subcutaneous in Right Upper Arm in preparation for Collections. Pt monitored for 30 minutes post injection. Upon DC Pt's Arm without redness or firmness; Pt denies Pain. Side effects of Neupogen reviewed with Pt. Pt with no further questions at this time. Pt aware that she can use Tylenol and Claritin for any discomfort she has.

## 2018-07-20 NOTE — LETTER
7/20/2018      To Whom It May Concern:      Amira Avery, 1990, is currently a patient at the [facility]. Ms. Avery has a history of Hodgkin's Lymphoma for which she will have a Autologous Transplant at the [facility] the week of July 31, 2018.     Due to the recovery time after transplant and high risk for infection, it is recommended that Ms. Avery not return to work until October 5, 2018.     Please do not hesitate to contact the [facility] Program at [phone] or Toll Free: [phone]  for any additional questions or concerns.        Sincerely,            Mesfin Flynn MD    Division of Hematology, Oncology, and Transplantation  [facility]  [address] [place] [address]  [address]  [facility] [address]  Phone: [phone]  Fax: [phone]

## 2018-07-20 NOTE — PROGRESS NOTES
Wt Readings from Last 4 Encounters:   07/12/18 72.3 kg (159 lb 4.8 oz)   07/10/18 73.5 kg (162 lb 0.6 oz)   07/09/18 72.8 kg (160 lb 6.4 oz)   07/05/18 72.9 kg (160 lb 11.5 oz)     This is a confirmatory pre-transplant workup to extend Dr. Kim's evaluation from 8 days ago. Briefly, this is a 28-year-old woman with chemotherapy sensitive relapsed Hodgkin's lymphoma. His note from July 12 and a previous note from May 7, 2018 documented a history of her illness. Briefly, she presented with classic Hodgkin's lymphoma February 2007 with a left neck mass, diffuse cervical, mediastinal, axillary  adenopathy and biopsy showed hypermetabolic nodes and her staging was IIB. She received 6 cycles of ABVD in the second half of 2017 with a possible CR, but some suspicious nodes. By January 2018, there was increasing hypermetabolic adenopathy suspicious for progression in the same regions plus, head, neck and tonsillar activity. 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 She had an inadequate response and was then treated with rituximab and nivolumab for 2 cycles leading to a excellent response and then a third cycle of treatment to a complete response with the last therapy given on June 22.    Her recent review of systems shows no fever, chills, sweats, GI,  or respiratory symptoms. She has no paresthesias, headaches and no evidence of recent infection. There was no other new symptomatology since her visit last week.    She was comfortable and conversant, alert, in no pain and no distress. Her weight was unchanged in the last few weeks. We reviewed in detail the findings from Dr. Kim's exam from last week and no new findings were apparent.   We also reviewed in detail the findings of her pretransplant evaluation that demonstrated a complete response confirmed on her CT PET scan, 40% cellular bone marrow with no  lymphoma. Acceptable CBC biochemistries, liver functions, LDH immunoglobulins protein electrophoresis. Thyroid function. Her cardiac ejection fraction was 61%. She was CMV HSV and EBV seropositive and seronegative for other pathogens tested.    In an extended (50 minute) visit we reviewed the planned course of treatment. Briefly with filgrastim and possibly plerixafor, the apheresis  collection of her autograft and reviewed the alternatives if inadequate cells were collected and frozen. Her target is 5 million CD34 positive cells per kilogram of body weight. If a sufficient graft were collected, she will then proceed to BRANDON M chemotherapy given in hospital, followed by thawing and infusion of her previously collected stem cells. We reviewed the toxicity of the chemotherapy including pneumonitis, venoocclusive disease, mucositis, GI upset, pancytopenia, which would last 2-3 weeks until her counts recovered and her need for transfusions or risks of infections during that period of time. We reviewed late complications of bone marrow injury and even in a small fraction of patients, treatment associated leukemia, later infections, persistent organ injury in any organ that might be compromised during her early peritransplant course and we reviewed the risks of recurrent lymphoma. Given that she is currently in complete remission, her risks of relapse are in the 40 to 50% range and may be lessened with a planned course of brentuximab for maintenance therapy to be given over the first year post transplant.    We reviewed the information and any questions she had remaining from her visit last week and this week. Today, she signed consent to proceed with transplant, to receive blood transfusions, to participate in a trial of pathogen activated platelets and to submit data to the [facility].    She is an excellent candidate for this treatment and will start her filgrastim immediately with a plan for apheresis collection of her  stem cells early next week.    Mesfin Flynn M.D.    Professor of Medicine      Results for ELIZABETH DUMONT (MRN [id number]) as of 7/20/2018 16:28   Ref. Range 7/5/2018 14:40 7/9/2018 08:35 7/9/2018 09:58 7/9/2018 10:25 7/9/2018 10:27 7/9/2018 11:29 7/12/2018 12:27 7/20/2018 11:48   WBC Latest Ref Range: 4.0 - 11.0 10e9/L   2.6 (L)    4.0 5.9   Hemoglobin Latest Ref Range: 11.7 - 15.7 g/dL   12.7    13.3 13.6   Hematocrit Latest Ref Range: 35.0 - 47.0 %   37.9    40.0 41.5   Platelet Count Latest Ref Range: 150 - 450 10e9/L   257    290 274   RBC Count Latest Ref Range: 3.8 - 5.2 10e12/L   4.20    4.47 4.64   MCV Latest Ref Range: 78 - 100 fl   90    90 89   MCH Latest Ref Range: 26.5 - 33.0 pg   30.2    29.8 29.3   MCHC Latest Ref Range: 31.5 - 36.5 g/dL   33.5    33.3 32.8   RDW Latest Ref Range: 10.0 - 15.0 %   12.5    12.5 12.9   Diff Method Unknown   Automated Method    Automated Method Automated Method   % Neutrophils Latest Units: %   27.8    51.1 63.0   % Lymphocytes Latest Units: %   56.1    35.7 29.3   % Monocytes Latest Units: %   14.1    12.1 6.7   % Eosinophils Latest Units: %   0.4    0.0 0.2   % Basophils Latest Units: %   1.6    0.8 0.5   % Immature Granulocytes Latest Units: %   0.0    0.3 0.3   Nucleated RBCs Latest Ref Range: 0 /100   0    0 0   Absolute Neutrophil Latest Ref Range: 1.6 - 8.3 10e9/L   0.7 (L)    2.0 3.7   Absolute Lymphocytes Latest Ref Range: 0.8 - 5.3 10e9/L   1.4    1.4 1.7   Absolute Monocytes Latest Ref Range: 0.0 - 1.3 10e9/L   0.4    0.5 0.4   Absolute Eosinophils Latest Ref Range: 0.0 - 0.7 10e9/L   0.0    0.0 0.0   Absolute Basophils Latest Ref Range: 0.0 - 0.2 10e9/L   0.0    0.0 0.0   Abs Immature Granulocytes Latest Ref Range: 0 - 0.4 10e9/L   0.0    0.0 0.0   Absolute Nucleated RBC Unknown   0.0    0.0 0.0   CHROMOSOME BONE MARROW Unknown     Rpt      FISH Unknown     Rpt      LEUKEMIA LYMPHOMA EVALUATION (FLOW CYTOMETRY) Unknown     Rpt      PROCESS AND  HOLD DNA Unknown     Rpt      BONE MARROW BIOPSY Unknown    Rpt       NM HEART MUGA REST Unknown Rpt          FVC-Pred Latest Units: L  3.68         FVC-Pre Latest Units: L  4.98         FVC-%Pred-Pre Latest Units: %  135         FEV1-Pre Latest Units: L  4.12         FEV1-%Pred-Pre Latest Units: %  131         FEV1FVC-Pred Latest Units: %  85         FEV1FVC-Pre Latest Units: %  83         FEV1SVC-Pred Latest Units: %  82         FEV1SVC-Pre Latest Units: %  85         FEV1FEV6-Pred Latest Units: %  86         FEV1FEV6-Pre Latest Units: %  83         FEFMax-Pred Latest Units: L/sec  6.82         FEFMax-Pre Latest Units: L/sec  9.57         FEFMax-%Pred-Pre Latest Units: %  140         FIFMax-Pre Latest Units: L/sec  5.62         ExpTime-Pre Latest Units: sec  6.25         ERV-Pred Latest Units: L  1.03         ERV-Pre Latest Units: L  1.02         ERV-%Pred-Pre Latest Units: %  99         IC-Pred Latest Units: L  2.80         IC-Pre Latest Units: L  3.82         IC-%Pred-Pre Latest Units: %  136         VC-Pred Latest Units: L  3.83         VC-Pre Latest Units: L  4.84         VC-%Pred-Pre Latest Units: %  126         DLCOunc-Pred Latest Units: ml/min/mmHg  24.85         DLCOunc-Pre Latest Units: ml/min/mmHg  27.79         DLCOunc-%Pred-Pre Latest Units: %  111         DLCOcor-Pre Latest Units: ml/min/mmHg  28.42         DLCOcor-%Pred-Pre Latest Units: %  114         VA-Pre Latest Units: L  6.02         VA-%Pred-Pre Latest Units: %  122         EKG 12-LEAD COMPLETE W/READ - CLINICS Unknown      Rpt     PE NPET OUTSIDE READ     PET CT,  7/3/2018 12:14 PM :     Outside films from  not available at time of dictation, dated  6/21/2018 were submitted for interpretation.  Images were acquired  from the Eyes to the Mid Thigh.      1. PET of the neck, chest, abdomen, and pelvis.  2. PET CT fusion images of the chest, abdomen and pelvis.  3. 3D MIP and PET-CT fused images were processed on an independent  workstation and  archived to PACS and reviewed by a radiologists.     Technique:  1. PET: The PET/CT was performed at an outside institution and the  specifics (FDG dose, patient weight, uptake time) of the technique  could not be verified. Images were evaluated in the axial, sagittal,  and coronal planes as well as the rotational whole body MIP.      2. PET CT Fusion for Attenuation Correction and Anatomical  Localization:  Images were evaluated in axial, coronal, and sagittal  planes in bone, soft tissue, and lung windows.          BACKGROUND:  Liver SUV max= 2.51,   Aorta Blood SUV Max: 1.84.      Indication: Hodgkin's lymphoma     PREVIOUS REPORT: The original interpretation was available for review  at the time of this dictation.      Additional Information: 28-year-old history of nodular sclerosing  Hodgkin's lymphoma, right level 4, anterior to the jugular vein and  vagus nerve. Not resected. On chemotherapy. Patient is a workup for  auto bone marrow transplant.     Comparison: PET/CT 4/23/2018     Findings:      Neck: Evaluation limited by lack of intravenous contrast.  Postoperative changes of right neck dissection. No focal FDG uptake  within the region of the previously described right level 4 lymph  node.     Evaluation of the mucosal space demonstrates no abnormality or  abnormal metabolic uptake on PET  in the nasopharynx, oropharynx,  hypopharynx or the glottis. The tongue base appears normal. Symmetric  uptake and palatine tonsils, likely physiologic.  No hypermetabolic cervical lymphadenopathy.     Chest:   Left chest wall Port-A-Cath tip in the high right atrium.     Mild anterior superior mediastinal soft tissue with minimal FDG  activity, likely thymic tissue/thymic rebound. No FDG avid  mediastinal, axillary or hilar lymphadenopathy.   Mild bilateral symmetric uptake in the breast tissue, physiologic.     Abdomen and Pelvis:   No abnormal FDG uptake in the abdomen or pelvis. No significant  lymphadenopathy of  the abdomen or pelvis. Physiologic uptake in the  ovaries.     Bones/soft tissues: No abnormal FDG uptake.         Impression:  This patient with a history of Hodgkin's lymphoma:  1.  No focal hypermetabolic uptake in the region of the right level 4.  Postoperative changes of right neck dissection.  2. No hypermetabolic lymphadenopathy in the neck, chest, abdomen or  pelvis.     I have personally reviewed the examination and initial interpretation  and I agree with the findings.     MARBIN CANELA MD  Patient Name: ELIZABETH DUMONT   MR#: [id number]   Specimen #: [id number]   Collected: 7/9/2018   Received: 7/9/2018   Reported: 7/10/2018 11:44   Ordering Phy(s): CLARIBEL MILLS   Additional Phy(s): Copy to Cytogenetics     For improved result formatting, select 'View Enhanced Report Format' under    Linked Documents section.     TEST(S):   Unilateral Bone Marrow Biopsy/Aspiration     FINAL DIAGNOSIS:   Bone marrow, posterior iliac crest, left decalcified trephine biopsy and   touch imprint; left particle crush,   direct aspirate smear, and concentrated aspirate smear; and peripheral   blood smear:     - No morphologic evidence of Hodgkin lymphoma (see comment)     - Hypocellular marrow for age (40%) with trilineage hematopoietic   maturation     - Peripheral blood showing moderate leukopenia with neutropenia     I have personally reviewed all specimens and/or slides, including the   listed special stains, and used them   with my medical judgment to determine the final diagnosis.     Electronically signed out by:     Shiloh Castillo M.D., [facility]

## 2018-07-20 NOTE — LETTER
7/20/2018      RE: Amira Avery  [address]  [place] [address]         Wt Readings from Last 4 Encounters:   07/12/18 72.3 kg (159 lb 4.8 oz)   07/10/18 73.5 kg (162 lb 0.6 oz)   07/09/18 72.8 kg (160 lb 6.4 oz)   07/05/18 72.9 kg (160 lb 11.5 oz)     This is a confirmatory pre-transplant workup to extend Dr. Kim's evaluation from 8 days ago. Briefly, this is a 28-year-old woman with chemotherapy sensitive relapsed Hodgkin's lymphoma. His note from July 12 and a previous note from May 7, 2018 documented a history of her illness. Briefly, she presented with classic Hodgkin's lymphoma February 2007 with a left neck mass, diffuse cervical, mediastinal, axillary  adenopathy and biopsy showed hypermetabolic nodes and her staging was IIB. She received 6 cycles of ABVD in the second half of 2017 with a possible CR, but some suspicious nodes. By January 2018, there was increasing hypermetabolic adenopathy suspicious for progression in the same regions plus, head, neck and tonsillar activity. 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 She had an inadequate response and was then treated with rituximab and nivolumab for 2 cycles leading to a excellent response and then a third cycle of treatment to a complete response with the last therapy given on June 22.    Her recent review of systems shows no fever, chills, sweats, GI,  or respiratory symptoms. She has no paresthesias, headaches and no evidence of recent infection. There was no other new symptomatology since her visit last week.    She was comfortable and conversant, alert, in no pain and no distress. Her weight was unchanged in the last few weeks. We reviewed in detail the findings from Dr. Kim's exam from last week and no new findings were apparent.   We also reviewed in detail the findings of her pretransplant evaluation  that demonstrated a complete response confirmed on her CT PET scan, 40% cellular bone marrow with no lymphoma. Acceptable CBC biochemistries, liver functions, LDH immunoglobulins protein electrophoresis. Thyroid function. Her cardiac ejection fraction was 61%. She was CMV HSV and EBV seropositive and seronegative for other pathogens tested.    In an extended (50 minute) visit we reviewed the planned course of treatment. Briefly with filgrastim and possibly plerixafor, the apheresis  collection of her autograft and reviewed the alternatives if inadequate cells were collected and frozen. Her target is 5 million CD34 positive cells per kilogram of body weight. If a sufficient graft were collected, she will then proceed to BRANDON M chemotherapy given in hospital, followed by thawing and infusion of her previously collected stem cells. We reviewed the toxicity of the chemotherapy including pneumonitis, venoocclusive disease, mucositis, GI upset, pancytopenia, which would last 2-3 weeks until her counts recovered and her need for transfusions or risks of infections during that period of time. We reviewed late complications of bone marrow injury and even in a small fraction of patients, treatment associated leukemia, later infections, persistent organ injury in any organ that might be compromised during her early peritransplant course and we reviewed the risks of recurrent lymphoma. Given that she is currently in complete remission, her risks of relapse are in the 40 to 50% range and may be lessened with a planned course of brentuximab for maintenance therapy to be given over the first year post transplant.    We reviewed the information and any questions she had remaining from her visit last week and this week. Today, she signed consent to proceed with transplant, to receive blood transfusions, to participate in a trial of pathogen activated platelets and to submit data to the [facility].    She is an excellent candidate for  this treatment and will start her filgrastim immediately with a plan for apheresis collection of her stem cells early next week.    Mesfin Flynn M.D.    Professor of Medicine      Results for ELIZABETH DUMONT (MRN [id number]) as of 7/20/2018 16:28   Ref. Range 7/5/2018 14:40 7/9/2018 08:35 7/9/2018 09:58 7/9/2018 10:25 7/9/2018 10:27 7/9/2018 11:29 7/12/2018 12:27 7/20/2018 11:48   WBC Latest Ref Range: 4.0 - 11.0 10e9/L   2.6 (L)    4.0 5.9   Hemoglobin Latest Ref Range: 11.7 - 15.7 g/dL   12.7    13.3 13.6   Hematocrit Latest Ref Range: 35.0 - 47.0 %   37.9    40.0 41.5   Platelet Count Latest Ref Range: 150 - 450 10e9/L   257    290 274   RBC Count Latest Ref Range: 3.8 - 5.2 10e12/L   4.20    4.47 4.64   MCV Latest Ref Range: 78 - 100 fl   90    90 89   MCH Latest Ref Range: 26.5 - 33.0 pg   30.2    29.8 29.3   MCHC Latest Ref Range: 31.5 - 36.5 g/dL   33.5    33.3 32.8   RDW Latest Ref Range: 10.0 - 15.0 %   12.5    12.5 12.9   Diff Method Unknown   Automated Method    Automated Method Automated Method   % Neutrophils Latest Units: %   27.8    51.1 63.0   % Lymphocytes Latest Units: %   56.1    35.7 29.3   % Monocytes Latest Units: %   14.1    12.1 6.7   % Eosinophils Latest Units: %   0.4    0.0 0.2   % Basophils Latest Units: %   1.6    0.8 0.5   % Immature Granulocytes Latest Units: %   0.0    0.3 0.3   Nucleated RBCs Latest Ref Range: 0 /100   0    0 0   Absolute Neutrophil Latest Ref Range: 1.6 - 8.3 10e9/L   0.7 (L)    2.0 3.7   Absolute Lymphocytes Latest Ref Range: 0.8 - 5.3 10e9/L   1.4    1.4 1.7   Absolute Monocytes Latest Ref Range: 0.0 - 1.3 10e9/L   0.4    0.5 0.4   Absolute Eosinophils Latest Ref Range: 0.0 - 0.7 10e9/L   0.0    0.0 0.0   Absolute Basophils Latest Ref Range: 0.0 - 0.2 10e9/L   0.0    0.0 0.0   Abs Immature Granulocytes Latest Ref Range: 0 - 0.4 10e9/L   0.0    0.0 0.0   Absolute Nucleated RBC Unknown   0.0    0.0 0.0   CHROMOSOME BONE MARROW Unknown     Rpt      FISH  Unknown     Rpt      LEUKEMIA LYMPHOMA EVALUATION (FLOW CYTOMETRY) Unknown     Rpt      PROCESS AND HOLD DNA Unknown     Rpt      BONE MARROW BIOPSY Unknown    Rpt       NM HEART MUGA REST Unknown Rpt          FVC-Pred Latest Units: L  3.68         FVC-Pre Latest Units: L  4.98         FVC-%Pred-Pre Latest Units: %  135         FEV1-Pre Latest Units: L  4.12         FEV1-%Pred-Pre Latest Units: %  131         FEV1FVC-Pred Latest Units: %  85         FEV1FVC-Pre Latest Units: %  83         FEV1SVC-Pred Latest Units: %  82         FEV1SVC-Pre Latest Units: %  85         FEV1FEV6-Pred Latest Units: %  86         FEV1FEV6-Pre Latest Units: %  83         FEFMax-Pred Latest Units: L/sec  6.82         FEFMax-Pre Latest Units: L/sec  9.57         FEFMax-%Pred-Pre Latest Units: %  140         FIFMax-Pre Latest Units: L/sec  5.62         ExpTime-Pre Latest Units: sec  6.25         ERV-Pred Latest Units: L  1.03         ERV-Pre Latest Units: L  1.02         ERV-%Pred-Pre Latest Units: %  99         IC-Pred Latest Units: L  2.80         IC-Pre Latest Units: L  3.82         IC-%Pred-Pre Latest Units: %  136         VC-Pred Latest Units: L  3.83         VC-Pre Latest Units: L  4.84         VC-%Pred-Pre Latest Units: %  126         DLCOunc-Pred Latest Units: ml/min/mmHg  24.85         DLCOunc-Pre Latest Units: ml/min/mmHg  27.79         DLCOunc-%Pred-Pre Latest Units: %  111         DLCOcor-Pre Latest Units: ml/min/mmHg  28.42         DLCOcor-%Pred-Pre Latest Units: %  114         VA-Pre Latest Units: L  6.02         VA-%Pred-Pre Latest Units: %  122         EKG 12-LEAD COMPLETE W/READ - CLINICS Unknown      Rpt     PE NPET OUTSIDE READ     PET CT,  7/3/2018 12:14 PM :     Outside films from  not available at time of dictation, dated  6/21/2018 were submitted for interpretation.  Images were acquired  from the Eyes to the Mid Thigh.      1. PET of the neck, chest, abdomen, and pelvis.  2. PET CT fusion images of the chest, abdomen  and pelvis.  3. 3D MIP and PET-CT fused images were processed on an independent  workstation and archived to PACS and reviewed by a radiologists.     Technique:  1. PET: The PET/CT was performed at an outside institution and the  specifics (FDG dose, patient weight, uptake time) of the technique  could not be verified. Images were evaluated in the axial, sagittal,  and coronal planes as well as the rotational whole body MIP.      2. PET CT Fusion for Attenuation Correction and Anatomical  Localization:  Images were evaluated in axial, coronal, and sagittal  planes in bone, soft tissue, and lung windows.          BACKGROUND:  Liver SUV max= 2.51,   Aorta Blood SUV Max: 1.84.      Indication: Hodgkin's lymphoma     PREVIOUS REPORT: The original interpretation was available for review  at the time of this dictation.      Additional Information: 28-year-old history of nodular sclerosing  Hodgkin's lymphoma, right level 4, anterior to the jugular vein and  vagus nerve. Not resected. On chemotherapy. Patient is a workup for  auto bone marrow transplant.     Comparison: PET/CT 4/23/2018     Findings:      Neck: Evaluation limited by lack of intravenous contrast.  Postoperative changes of right neck dissection. No focal FDG uptake  within the region of the previously described right level 4 lymph  node.     Evaluation of the mucosal space demonstrates no abnormality or  abnormal metabolic uptake on PET  in the nasopharynx, oropharynx,  hypopharynx or the glottis. The tongue base appears normal. Symmetric  uptake and palatine tonsils, likely physiologic.  No hypermetabolic cervical lymphadenopathy.     Chest:   Left chest wall Port-A-Cath tip in the high right atrium.     Mild anterior superior mediastinal soft tissue with minimal FDG  activity, likely thymic tissue/thymic rebound. No FDG avid  mediastinal, axillary or hilar lymphadenopathy.   Mild bilateral symmetric uptake in the breast tissue, physiologic.     Abdomen and  Pelvis:   No abnormal FDG uptake in the abdomen or pelvis. No significant  lymphadenopathy of the abdomen or pelvis. Physiologic uptake in the  ovaries.     Bones/soft tissues: No abnormal FDG uptake.         Impression:  This patient with a history of Hodgkin's lymphoma:  1.  No focal hypermetabolic uptake in the region of the right level 4.  Postoperative changes of right neck dissection.  2. No hypermetabolic lymphadenopathy in the neck, chest, abdomen or  pelvis.     I have personally reviewed the examination and initial interpretation  and I agree with the findings.     MARBIN CANELA MD  Patient Name: ELIZABETH DUMONT   MR#: [id number]   Specimen #: [id number]   Collected: 7/9/2018   Received: 7/9/2018   Reported: 7/10/2018 11:44   Ordering Phy(s): CLARIBEL MILLS   Additional Phy(s): Copy to [employer]     For improved result formatting, select 'View Enhanced Report Format' under    Linked Documents section.     TEST(S):   Unilateral Bone Marrow Biopsy/Aspiration     FINAL DIAGNOSIS:   Bone marrow, posterior iliac crest, left decalcified trephine biopsy and   touch imprint; left particle crush,   direct aspirate smear, and concentrated aspirate smear; and peripheral   blood smear:     - No morphologic evidence of Hodgkin lymphoma (see comment)     - Hypocellular marrow for age (40%) with trilineage hematopoietic   maturation     - Peripheral blood showing moderate leukopenia with neutropenia     I have personally reviewed all specimens and/or slides, including the   listed special stains, and used them   with my medical judgment to determine the final diagnosis.     Electronically signed out by:     Shiloh Castillo M.D., [facility]     [facility]

## 2018-07-21 ENCOUNTER — OFFICE VISIT (OUTPATIENT)
Dept: TRANSPLANT | Facility: CLINIC | Age: 28
End: 2018-07-21
Attending: INTERNAL MEDICINE
Payer: COMMERCIAL

## 2018-07-21 DIAGNOSIS — Z41.8 ENCOUNTER FOR OTHER PROCEDURES FOR PURPOSES OTHER THAN REMEDYING HEALTH STATE (CODE): ICD-10-CM

## 2018-07-21 DIAGNOSIS — C81.10 NODULAR SCLEROSING HODGKIN'S LYMPHOMA, UNSPECIFIED BODY REGION (H): Primary | ICD-10-CM

## 2018-07-21 LAB — HCG UR QL: NEGATIVE

## 2018-07-21 PROCEDURE — 81025 URINE PREGNANCY TEST: CPT | Performed by: NURSE PRACTITIONER

## 2018-07-21 PROCEDURE — 96372 THER/PROPH/DIAG INJ SC/IM: CPT

## 2018-07-21 PROCEDURE — 25000128 H RX IP 250 OP 636: Mod: ZF | Performed by: INTERNAL MEDICINE

## 2018-07-21 RX ORDER — HEPARIN SODIUM (PORCINE) LOCK FLUSH IV SOLN 100 UNIT/ML 100 UNIT/ML
5 SOLUTION INTRAVENOUS DAILY PRN
Status: CANCELLED
Start: 2018-07-21

## 2018-07-21 RX ADMIN — FILGRASTIM 780 MCG: 480 INJECTION, SOLUTION INTRAVENOUS; SUBCUTANEOUS at 08:51

## 2018-07-21 NOTE — MR AVS SNAPSHOT
After Visit Summary   7/21/2018    Amira Avery    MRN: [id number]           Patient Information     Date Of Birth          1990        Visit Information        Provider Department      7/21/2018 8:15 AM 1, Nettie Bmt Nurse [facility] Blood and Marrow Transplant        Today's Diagnoses     Nodular sclerosing Hodgkin's lymphoma, unspecified body region (H)    -  1    Encounter for other procedures for purposes other than remedying health state (CODE)              [facility] ([facility]  [address] [place] [address]  Phone: [phone]  Clinic Hours:   Monday-Thursday:7am to 7pm   Friday: 7am to 5pm   Weekends and holidays:    8am to noon (in general)  If your fever is 100.5  or greater,   call the clinic.  After hours call the   hospital at [phone] or   [phone]. Ask for the BMT   fellow on-call            Follow-ups after your visit        Your next 10 appointments already scheduled     Jul 22, 2018  8:15 AM CDT   BMT Injection with  Bmt Nurse 1   [facility]row [facility]plant ([facility])    [address]  [address]  [facility] [id number] [phone]            Jul 23, 2018  7:30 AM CDT   Procedure 3.5 hour with U2A ROOM 6   [address] [facility] ([facility])    [address] [place] [address]               Jul 23, 2018  9:00 AM CDT   IR CVC TUNNEL PLACEMENT > 5 YRS OF AGE with UUIR2   [facility] [place], Interventional Radiology ([facility])    [address] [id number] [phone]           1. Your doctor will need to do a history and physical within 7 days before this procedure. 2. Your doctor will which medications should not be taken the morning of the exam. 3. Laboratory tests are to be obtained by your doctor prior to the exam (Basic Metabolic Panel, CBCP, PTT and INR) (No labs  needed if you are having a tunneled catheter exchange or removal) 4. If you have allergies to x-ray contrast or iodine, contact your doctor or a Radiology nurse prior to the exam day for instructions. 5. Someone will need to drive you to and from the hospital. 6. If you are or may be pregnant, contact your doctor or a Radiology nurse prior to the day of the exam. 7. If you have diabetes, check with your doctor or a Radiology nurse to see if your insulin needs to be adjusted for the exam. 8. If you are taking a medication called Glucophage or Glucovance; these medications need to be held the day of the exam and for approximately 48 hours following. A blood sample must be drawn so your creatinine level can be checked before resuming this medication. 9. If you are taking Coumadin (to thin you blood) please contact your doctor or a Radiology nurse at least 3 days before the exam for special instructions. 10. You should not have received contrast within 48 hours of this exam. 11. The day before your exam you may eat your regular diet and are encouraged to drink at least 2 quarts of clear liquids. Drink no alcoholic beverages for 24 hours prior to the exam. 12. If you have a colostomy you will need to irrigate it with tap water at 8PM the evening before and again at 6AM the morning of the exam. 13. Do not smoke for 24 hours prior to the procedure. 14. Birth to 4 years: - Breast feeding must be stopped 4 hours prior to exam - Solid food or formula must be stopped 6 hours prior to exam - Tube feedings must be stopped 6 hours prior to exam 15. 4-10 years old: - Nothing to eat or drink 6 hours prior to exam 16. 10+ years old: - Nothing to eat or drink 8 hours prior to exam 17. The morning of the exam you may brush your teeth and take medications as directed with a sip of water. 18. When discharged, you cannot drive until morning, and an adult must be with you until then. You should stay in the [facility] overnight. 19.  Bring a list of all drugs you are taking; include supplements and over-the-counter medications. Wear comfortable clothes and leave your valuables at home.            Jul 23, 2018 11:00 AM CDT   [facility] Lab Draw with UC MASONIC LAB DRAW   [facility] Masonic Lab Draw ([facility])    [address]  [address]  [facility] [address]   [phone]            Jul 23, 2018 11:30 AM CDT   BMT Injection with  Bmt Nurse 1   [facility] Blood and Marrow Transplant ([facility])    [address]  [address]  [place] [place] [address]   [phone]            Jul 23, 2018 12:30 PM CDT   Return with  BMT WES #2   [facility] Blood and Marrow Transplant ([facility])    [address]  [address]  [place] [address]   [phone]            Jul 24, 2018  7:30 AM CDT   Masonic Lab Draw with  MASONIC LAB DRAW   [facility] Masonic Lab Draw ([facility])    [address]  [address]  [place] [address]   [phone]            Jul 24, 2018  7:45 AM CDT   BMT Injection with  Bmt Nurse 1   [facility] Blood and Marrow Transplant ([facility])    [address]  [address]  [place] [address]   [phone]            Jul 24, 2018  8:00 AM CDT   (Arrive by 7:45 AM)   MNC Collection with  APHERESIS RN5, UC 38 ATC   [facility] Apheresis ([facility])    [address] [place] [address]  [address]  [facility] [address]   [phone]              Future tests that were ordered for you today     Open Future Orders        Priority Expected Expires Ordered    CBC with platelets differential Routine 7/23/2018 7/20/2019 7/20/2018    CD34 Absolute Count Routine 7/23/2018 7/20/2019 7/20/2018    IR CVC Tunnel Placement > 5 Yrs of Age Routine 7/23/2018 7/20/2019 7/20/2018            Who to contact     If you have questions or need follow  up information about today's clinic visit or your schedule please contact [facility] BLOOD AND MARROW TRANSPLANT directly at [phone].  Normal or non-critical lab and imaging results will be communicated to you by [employer], letter or phone within 4 business days after the clinic has received the results. If you do not hear from us within 7 days, please contact the clinic through [employer] or phone. If you have a critical or abnormal lab result, we will notify you by phone as soon as possible.  Submit refill requests through [employer] or call your pharmacy and they will forward the refill request to us. Please allow 3 business days for your refill to be completed.          Additional Information About Your Visit        [employer] Information     [employer] gives you secure access to your electronic health record. If you see a primary care provider, you can also send messages to your care team and make appointments. If you have questions, please call your primary care clinic.  If you do not have a primary care provider, please call [phone] and they will assist you.        Care EveryWhere ID     This is your Care EveryWhere ID. This could be used by other organizations to access your [facility] medical records  [id number]         Blood Pressure from Last 3 Encounters:   07/12/18 126/74   07/10/18 128/68   07/09/18 107/68    Weight from Last 3 Encounters:   07/12/18 72.3 kg (159 lb 4.8 oz)   07/10/18 73.5 kg (162 lb 0.6 oz)   07/09/18 72.8 kg (160 lb 6.4 oz)              We Performed the Following     HCG qualitative urine        Recent Review Flowsheet Data     BMT Recent Results Latest Ref Rng & Units 4/18/2018 4/20/2018 4/23/2018 7/5/2018 7/9/2018 7/12/2018 7/20/2018    WBC 4.0 - 11.0 10e9/L 5.0 7.2 - 2.6(L) 2.6(L) 4.0 5.9    Hemoglobin 11.7 - 15.7 g/dL 12.3 12.3 - 12.7 12.7 13.3 13.6    Platelet Count 150 - 450 10e9/L 268 273 - 199 257 290 274    Neutrophils (Absolute) 1.6 - 8.3 10e9/L 2.9 4.3 - 0.9(L) 0.7(L) 2.0 3.7     INR 0.86 - 1.14 1.06 - - 1.02 - - -    Sodium 133 - 144 mmol/L 139 - - 140 - - -    Potassium 3.4 - 5.3 mmol/L 4.0 - - 3.8 - - -    Chloride 94 - 109 mmol/L 110(H) - - 109 - - -    Glucose 70 - 99 mg/dL 94 - 100(H) 92 - - -    Urea Nitrogen 7 - 30 mg/dL 13 - - 10 - - -    Creatinine 0.52 - 1.04 mg/dL 0.65 - - 0.71 - - -    Calcium (Total) 8.5 - 10.1 mg/dL 8.7 - - 8.6 - - -    Protein (Total) 6.8 - 8.8 g/dL 7.4 - - 7.7 - - -    Albumin 3.4 - 5.0 g/dL 3.9 - - 4.0 - - -    Alkaline Phosphatase 40 - 150 U/L 48 - - 58 - - -    AST 0 - 45 U/L 53(H) - - 38 - - -    ALT 0 - 50 U/L 40 - - 60(H) - - -    MCV 78 - 100 fl 90 90 - 90 90 90 89               Primary Care Provider Office Phone # Fax #    [facility] [phone] [address] [address]        Equal Access to Services     RICHIE RAMEY : Alexis bowieo Soshar, waaxda luqadaha, qaybta kaalmada adeegyadavid, jen raymundo. So [facility] [phone].    ATENCIÓN: Si habla español, tiene a isabel disposición servicios gratuitos de asistencia lingüística. Roberto al [phone].    We comply with applicable federal civil rights laws and [place] laws. We do not discriminate on the basis of race, color, national origin, age, disability, sex, sexual orientation, or gender identity.            Thank you!     Thank you for choosing [facility] BLOOD AND MARROW TRANSPLANT  for your care. Our goal is always to provide you with excellent care. Hearing back from our patients is one way we can continue to improve our services. Please take a few minutes to complete the written survey that you may receive in the mail after your visit with us. Thank you!             Your Updated Medication List - Protect others around you: Learn how to safely use, store and throw away your medicines at www.disposemymeds.org.          This list is accurate as of 7/21/18  9:06 AM.  Always use your most recent med list.                    Brand Name Dispense Instructions for use Diagnosis    acetaminophen 500 MG tablet    TYLENOL     Take by mouth every 6 hours as needed

## 2018-07-21 NOTE — PROGRESS NOTES
Patient received GCSF subcutaneous X 1 in the left arm.  Patient tolerated well.  Patient described pain in joints mild, painful skin and tongue.  Encouraged Tylenol.

## 2018-07-22 ENCOUNTER — OFFICE VISIT (OUTPATIENT)
Dept: TRANSPLANT | Facility: CLINIC | Age: 28
End: 2018-07-22
Attending: INTERNAL MEDICINE
Payer: COMMERCIAL

## 2018-07-22 VITALS
SYSTOLIC BLOOD PRESSURE: 112 MMHG | RESPIRATION RATE: 16 BRPM | WEIGHT: 159.7 LBS | HEART RATE: 65 BPM | OXYGEN SATURATION: 99 % | TEMPERATURE: 98 F | BODY MASS INDEX: 27.26 KG/M2 | DIASTOLIC BLOOD PRESSURE: 72 MMHG

## 2018-07-22 DIAGNOSIS — Z41.8 ENCOUNTER FOR OTHER PROCEDURES FOR PURPOSES OTHER THAN REMEDYING HEALTH STATE (CODE): Primary | ICD-10-CM

## 2018-07-22 DIAGNOSIS — C81.10 NODULAR SCLEROSING HODGKIN'S LYMPHOMA, UNSPECIFIED BODY REGION (H): ICD-10-CM

## 2018-07-22 PROCEDURE — 25000128 H RX IP 250 OP 636: Mod: ZF | Performed by: INTERNAL MEDICINE

## 2018-07-22 PROCEDURE — 96372 THER/PROPH/DIAG INJ SC/IM: CPT

## 2018-07-22 RX ORDER — HEPARIN SODIUM (PORCINE) LOCK FLUSH IV SOLN 100 UNIT/ML 100 UNIT/ML
5 SOLUTION INTRAVENOUS DAILY PRN
Status: CANCELLED
Start: 2018-07-22

## 2018-07-22 RX ADMIN — FILGRASTIM 780 MCG: 480 INJECTION, SOLUTION INTRAVENOUS; SUBCUTANEOUS at 08:46

## 2018-07-22 ASSESSMENT — PAIN SCALES - GENERAL: PAINLEVEL: NO PAIN (0)

## 2018-07-22 NOTE — PROGRESS NOTES
Chief Complaint   Patient presents with     Imm/Inj     Patient with Hodgkin Lymphoma here for Neupogen injection      Neupogen 780 mcg administered  SQ in right arm per order. Patient tolerated procedure with no incident.   Anitra Flores CMA July 22, 2018

## 2018-07-22 NOTE — MR AVS SNAPSHOT
After Visit Summary   7/22/2018    Amira Avery    MRN: [id number]           Patient Information     Date Of Birth          1990        Visit Information        Provider Department      7/22/2018 8:15 AM Nettie Armendariz Bmt Nurse [facility] Blood and Marrow Transplant        Today's Diagnoses     Encounter for other procedures for purposes other than remedying health state (CODE)    -  1    Nodular sclerosing Hodgkin's lymphoma, unspecified body region (H)              [facility] ([facility])  [address] [place] [address]  Phone: [phone]  Clinic Hours:   Monday-Thursday:7am to 7pm   Friday: 7am to 5pm   Weekends and holidays:    8am to noon (in general)  If your fever is 100.5  or greater,   call the clinic.  After hours call the   hospital at [phone] or   [phone]. Ask for the BMT   fellow on-call            Follow-ups after your visit        Your next 10 appointments already scheduled     Jul 23, 2018  7:30 AM CDT   Procedure 3.5 hour with U2A ROOM 6   [address] [facility] [place] ([facility])    [address] [place] [address]               Jul 23, 2018  9:00 AM CDT   IR CVC TUNNEL PLACEMENT > 5 YRS OF AGE with UUIR2   [facility] [place], Interventional Radiology ([facility])    [address] [id number] [phone]           1. Your doctor will need to do a history and physical within 7 days before this procedure. 2. Your doctor will which medications should not be taken the morning of the exam. 3. Laboratory tests are to be obtained by your doctor prior to the exam (Basic Metabolic Panel, CBCP, PTT and INR) (No labs needed if you are having a tunneled catheter exchange or removal) 4. If you have allergies to x-ray contrast or iodine, contact your doctor or a Radiology nurse prior to the exam day for instructions. 5. Someone will need to  drive you to and from the hospital. 6. If you are or may be pregnant, contact your doctor or a Radiology nurse prior to the day of the exam. 7. If you have diabetes, check with your doctor or a Radiology nurse to see if your insulin needs to be adjusted for the exam. 8. If you are taking a medication called Glucophage or Glucovance; these medications need to be held the day of the exam and for approximately 48 hours following. A blood sample must be drawn so your creatinine level can be checked before resuming this medication. 9. If you are taking Coumadin (to thin you blood) please contact your doctor or a Radiology nurse at least 3 days before the exam for special instructions. 10. You should not have received contrast within 48 hours of this exam. 11. The day before your exam you may eat your regular diet and are encouraged to drink at least 2 quarts of clear liquids. Drink no alcoholic beverages for 24 hours prior to the exam. 12. If you have a colostomy you will need to irrigate it with tap water at 8PM the evening before and again at 6AM the morning of the exam. 13. Do not smoke for 24 hours prior to the procedure. 14. Birth to 4 years: - Breast feeding must be stopped 4 hours prior to exam - Solid food or formula must be stopped 6 hours prior to exam - Tube feedings must be stopped 6 hours prior to exam 15. 4-10 years old: - Nothing to eat or drink 6 hours prior to exam 16. 10+ years old: - Nothing to eat or drink 8 hours prior to exam 17. The morning of the exam you may brush your teeth and take medications as directed with a sip of water. 18. When discharged, you cannot drive until morning, and an adult must be with you until then. You should stay in the [facility] overnight. 19. Bring a list of all drugs you are taking; include supplements and over-the-counter medications. Wear comfortable clothes and leave your valuables at home.            Jul 23, 2018 11:00 AM WICHO   Adformonic Lab Draw with LOS  MASONIC LAB DRAW   [facility] Masonic Lab Draw ([facility])    [address]  [address]  [facility] [address]   [phone]            Jul 23, 2018 11:30 AM CDT   BMT Injection with Uc Bmt Nurse 1   [facility] Blood and Marrow Transplant ([facility])    [address]  [address]  [facility] [address]   [phone]            Jul 23, 2018 12:30 PM CDT   Return with UC BMT WES #2   [facility] Blood and Marrow Transplant ([facility])    [address]  [address]  [facility] [address]   [phone]            Jul 24, 2018  7:30 AM CDT   Masonic Lab Draw with  MASONIC LAB DRAW   [facility] Masonic Lab Draw ([facility])    [address]  [address]  [facility] [address]   [phone]            Jul 24, 2018  7:45 AM CDT   BMT Injection with Uc Bmt Nurse 1   [facility] Blood and Marrow Transplant ([facility])    [address]  [address]  [facility] [address]   [phone]            Jul 24, 2018  8:00 AM CDT   (Arrive by 7:45 AM)   MNC Collection with  APHERESIS RN5, UC 38 ATC   [facility] Apheresis ([facility])    [address]  [address]  [facility] [address]   [phone]            Jul 24, 2018  1:00 PM CDT   Return with UC BMT WES #3   [facility] Blood and Marrow Transplant ([facility])    [address]  [address]  [facility] [address]   [phone]              Who to contact     If you have questions or need follow up information about today's clinic visit or your schedule please contact [facility] BLOOD AND MARROW TRANSPLANT directly at [phone].  Normal or non-critical lab and imaging results will be communicated to you by MyChart, letter or phone within 4 business days after the clinic has received the results. If you do not hear from us  within 7 days, please contact the clinic through [employer] or phone. If you have a critical or abnormal lab result, we will notify you by phone as soon as possible.  Submit refill requests through [employer] or call your pharmacy and they will forward the refill request to us. Please allow 3 business days for your refill to be completed.          Additional Information About Your Visit        [employer] Information     [employer] gives you secure access to your electronic health record. If you see a primary care provider, you can also send messages to your care team and make appointments. If you have questions, please call your primary care clinic.  If you do not have a primary care provider, please call [phone] and they will assist you.        Care EveryWhere ID     This is your Care EveryWhere ID. This could be used by other organizations to access your [place] medical records  [id number]        Your Vitals Were     Pulse Temperature Respirations Pulse Oximetry BMI (Body Mass Index)       65 98  F (36.7  C) (Oral) 16 99% 27.26 kg/m2        Blood Pressure from Last 3 Encounters:   07/22/18 112/72   07/12/18 126/74   07/10/18 128/68    Weight from Last 3 Encounters:   07/22/18 72.4 kg (159 lb 11.2 oz)   07/12/18 72.3 kg (159 lb 4.8 oz)   07/10/18 73.5 kg (162 lb 0.6 oz)              Today, you had the following     No orders found for display       Recent Review Flowsheet Data     BMT Recent Results Latest Ref Rng & Units 4/18/2018 4/20/2018 4/23/2018 7/5/2018 7/9/2018 7/12/2018 7/20/2018    WBC 4.0 - 11.0 10e9/L 5.0 7.2 - 2.6(L) 2.6(L) 4.0 5.9    Hemoglobin 11.7 - 15.7 g/dL 12.3 12.3 - 12.7 12.7 13.3 13.6    Platelet Count 150 - 450 10e9/L 268 273 - 199 257 290 274    Neutrophils (Absolute) 1.6 - 8.3 10e9/L 2.9 4.3 - 0.9(L) 0.7(L) 2.0 3.7    INR 0.86 - 1.14 1.06 - - 1.02 - - -    Sodium 133 - 144 mmol/L 139 - - 140 - - -    Potassium 3.4 - 5.3 mmol/L 4.0 - - 3.8 - - -    Chloride 94 - 109 mmol/L 110(H) - - 109 - - -     Glucose 70 - 99 mg/dL 94 - 100(H) 92 - - -    Urea Nitrogen 7 - 30 mg/dL 13 - - 10 - - -    Creatinine 0.52 - 1.04 mg/dL 0.65 - - 0.71 - - -    Calcium (Total) 8.5 - 10.1 mg/dL 8.7 - - 8.6 - - -    Protein (Total) 6.8 - 8.8 g/dL 7.4 - - 7.7 - - -    Albumin 3.4 - 5.0 g/dL 3.9 - - 4.0 - - -    Alkaline Phosphatase 40 - 150 U/L 48 - - 58 - - -    AST 0 - 45 U/L 53(H) - - 38 - - -    ALT 0 - 50 U/L 40 - - 60(H) - - -    MCV 78 - 100 fl 90 90 - 90 90 90 89               Primary Care Provider Office Phone # Fax #    [facility] [phone] [address] [address]        Equal Access to Services     RICHIE RAMEY AH: Hadii aad ku hadasho Soomaali, waaxda luqadaha, qaybta kaalmada adeegyada, jen de la cruz . So [facility] [phone].    ATENCIÓN: Si habla español, tiene a isabel disposición servicios gratuitos de asistencia lingüística. [facility] [phone].    We comply with applicable federal civil rights laws and [place] laws. We do not discriminate on the basis of race, color, national origin, age, disability, sex, sexual orientation, or gender identity.            Thank you!     Thank you for choosing [facility] BLOOD AND MARROW TRANSPLANT  for your care. Our goal is always to provide you with excellent care. Hearing back from our patients is one way we can continue to improve our services. Please take a few minutes to complete the written survey that you may receive in the mail after your visit with us. Thank you!             Your Updated Medication List - Protect others around you: Learn how to safely use, store and throw away your medicines at www.disposemymeds.org.          This list is accurate as of 7/22/18  8:55 AM.  Always use your most recent med list.                   Brand Name Dispense Instructions for use Diagnosis    acetaminophen 500 MG tablet    TYLENOL     Take by mouth every 6 hours as needed

## 2018-07-23 ENCOUNTER — APPOINTMENT (OUTPATIENT)
Dept: MEDSURG UNIT | Facility: CLINIC | Age: 28
End: 2018-07-23
Attending: INTERNAL MEDICINE
Payer: COMMERCIAL

## 2018-07-23 ENCOUNTER — APPOINTMENT (OUTPATIENT)
Dept: INTERVENTIONAL RADIOLOGY/VASCULAR | Facility: CLINIC | Age: 28
End: 2018-07-23
Attending: INTERNAL MEDICINE
Payer: COMMERCIAL

## 2018-07-23 ENCOUNTER — APPOINTMENT (OUTPATIENT)
Dept: LAB | Facility: CLINIC | Age: 28
End: 2018-07-23
Attending: INTERNAL MEDICINE
Payer: COMMERCIAL

## 2018-07-23 ENCOUNTER — OFFICE VISIT (OUTPATIENT)
Dept: TRANSPLANT | Facility: CLINIC | Age: 28
End: 2018-07-23
Attending: INTERNAL MEDICINE
Payer: COMMERCIAL

## 2018-07-23 ENCOUNTER — HOSPITAL ENCOUNTER (OUTPATIENT)
Facility: CLINIC | Age: 28
Discharge: HOME | End: 2018-07-23
Attending: INTERNAL MEDICINE | Admitting: INTERNAL MEDICINE
Payer: COMMERCIAL

## 2018-07-23 ENCOUNTER — ONCOLOGY VISIT (OUTPATIENT)
Dept: TRANSPLANT | Facility: CLINIC | Age: 28
End: 2018-07-23
Attending: PHYSICIAN ASSISTANT
Payer: COMMERCIAL

## 2018-07-23 VITALS
WEIGHT: 161.7 LBS | TEMPERATURE: 98.3 F | RESPIRATION RATE: 16 BRPM | DIASTOLIC BLOOD PRESSURE: 67 MMHG | SYSTOLIC BLOOD PRESSURE: 108 MMHG | HEART RATE: 66 BPM | OXYGEN SATURATION: 99 % | BODY MASS INDEX: 28.64 KG/M2

## 2018-07-23 VITALS
HEIGHT: 63 IN | DIASTOLIC BLOOD PRESSURE: 63 MMHG | RESPIRATION RATE: 20 BRPM | HEART RATE: 66 BPM | OXYGEN SATURATION: 100 % | TEMPERATURE: 98.2 F | WEIGHT: 158.95 LBS | SYSTOLIC BLOOD PRESSURE: 117 MMHG | BODY MASS INDEX: 28.16 KG/M2

## 2018-07-23 VITALS
HEART RATE: 66 BPM | BODY MASS INDEX: 28.63 KG/M2 | RESPIRATION RATE: 16 BRPM | WEIGHT: 161.6 LBS | OXYGEN SATURATION: 99 % | SYSTOLIC BLOOD PRESSURE: 108 MMHG | TEMPERATURE: 98.3 F | DIASTOLIC BLOOD PRESSURE: 67 MMHG

## 2018-07-23 DIAGNOSIS — Z41.8 ENCOUNTER FOR OTHER PROCEDURES FOR PURPOSES OTHER THAN REMEDYING HEALTH STATE (CODE): ICD-10-CM

## 2018-07-23 DIAGNOSIS — C81.10 NODULAR SCLEROSING HODGKIN'S LYMPHOMA, UNSPECIFIED BODY REGION (H): ICD-10-CM

## 2018-07-23 DIAGNOSIS — Z41.8 ENCOUNTER FOR OTHER PROCEDURES FOR PURPOSES OTHER THAN REMEDYING HEALTH STATE (CODE): Primary | ICD-10-CM

## 2018-07-23 DIAGNOSIS — Z52.001 ENCOUNTER FOR HARVESTING OF PERIPHERAL BLOOD STEM CELLS: Primary | ICD-10-CM

## 2018-07-23 DIAGNOSIS — Z52.001 ENCOUNTER FOR HARVESTING OF PERIPHERAL BLOOD STEM CELLS: ICD-10-CM

## 2018-07-23 DIAGNOSIS — Z52.001 STEM CELL DONOR: ICD-10-CM

## 2018-07-23 DIAGNOSIS — C81.11 NODULAR SCLEROSIS HODGKIN LYMPHOMA OF LYMPH NODES OF NECK (H): ICD-10-CM

## 2018-07-23 LAB
B-HCG SERPL-ACNC: <1 IU/L (ref 0–5)
BASOPHILS # BLD AUTO: 0.1 10E9/L (ref 0–0.2)
BASOPHILS NFR BLD AUTO: 0.4 %
CD34 CELLS # SPEC: 2 /UL
CD34 CELLS NFR SPEC: 0.01 %
CELL THERAPY PRODUCT NUMBER: NORMAL
DIFFERENTIAL METHOD BLD: ABNORMAL
EOSINOPHIL # BLD AUTO: 0 10E9/L (ref 0–0.7)
EOSINOPHIL NFR BLD AUTO: 0.1 %
ERYTHROCYTE [DISTWIDTH] IN BLOOD BY AUTOMATED COUNT: 13.3 % (ref 10–15)
HCT VFR BLD AUTO: 36.7 % (ref 35–47)
HGB BLD-MCNC: 12 G/DL (ref 11.7–15.7)
IFC SPECIMEN: NORMAL
IMM GRANULOCYTES # BLD: 0.4 10E9/L (ref 0–0.4)
IMM GRANULOCYTES NFR BLD: 1.3 %
LYMPHOCYTES # BLD AUTO: 2.8 10E9/L (ref 0.8–5.3)
LYMPHOCYTES NFR BLD AUTO: 9.8 %
MCH RBC QN AUTO: 30.2 PG (ref 26.5–33)
MCHC RBC AUTO-ENTMCNC: 32.7 G/DL (ref 31.5–36.5)
MCV RBC AUTO: 92 FL (ref 78–100)
MONOCYTES # BLD AUTO: 0.6 10E9/L (ref 0–1.3)
MONOCYTES NFR BLD AUTO: 2.2 %
NEUTROPHILS # BLD AUTO: 24.8 10E9/L (ref 1.6–8.3)
NEUTROPHILS NFR BLD AUTO: 86.2 %
NRBC # BLD AUTO: 0 10*3/UL
NRBC BLD AUTO-RTO: 0 /100
PLATELET # BLD AUTO: 205 10E9/L (ref 150–450)
RBC # BLD AUTO: 3.98 10E12/L (ref 3.8–5.2)
VIABLE CD34 CELLS NFR FLD: 78.18 %
WBC # BLD AUTO: 28.7 10E9/L (ref 4–11)

## 2018-07-23 PROCEDURE — 25000128 H RX IP 250 OP 636: Mod: ZF | Performed by: INTERNAL MEDICINE

## 2018-07-23 PROCEDURE — 25000128 H RX IP 250 OP 636: Performed by: RADIOLOGY

## 2018-07-23 PROCEDURE — 25000125 ZZHC RX 250: Performed by: RADIOLOGY

## 2018-07-23 PROCEDURE — 40000166 ZZH STATISTIC PP CARE STAGE 1

## 2018-07-23 PROCEDURE — 76937 US GUIDE VASCULAR ACCESS: CPT

## 2018-07-23 PROCEDURE — 25000128 H RX IP 250 OP 636: Mod: ZF | Performed by: PHYSICIAN ASSISTANT

## 2018-07-23 PROCEDURE — 36558 INSERT TUNNELED CV CATH: CPT | Mod: LT

## 2018-07-23 PROCEDURE — 96372 THER/PROPH/DIAG INJ SC/IM: CPT | Mod: XS

## 2018-07-23 PROCEDURE — 27210738 ZZH ACCESSORY CR2

## 2018-07-23 PROCEDURE — 86367 STEM CELLS TOTAL COUNT: CPT | Performed by: INTERNAL MEDICINE

## 2018-07-23 PROCEDURE — 99152 MOD SED SAME PHYS/QHP 5/>YRS: CPT

## 2018-07-23 PROCEDURE — 84702 CHORIONIC GONADOTROPIN TEST: CPT | Performed by: RADIOLOGY

## 2018-07-23 PROCEDURE — 25000128 H RX IP 250 OP 636: Performed by: PHYSICIAN ASSISTANT

## 2018-07-23 PROCEDURE — 99153 MOD SED SAME PHYS/QHP EA: CPT

## 2018-07-23 PROCEDURE — 25000128 H RX IP 250 OP 636: Mod: JW,ZF | Performed by: PHYSICIAN ASSISTANT

## 2018-07-23 PROCEDURE — 85025 COMPLETE CBC W/AUTO DIFF WBC: CPT | Performed by: INTERNAL MEDICINE

## 2018-07-23 PROCEDURE — 27210908 ZZH NEEDLE CR4

## 2018-07-23 PROCEDURE — 27210904 ZZH KIT CR6

## 2018-07-23 PROCEDURE — 27210732 ZZH ACCESSORY CR1

## 2018-07-23 PROCEDURE — C1750 CATH, HEMODIALYSIS,LONG-TERM: HCPCS

## 2018-07-23 PROCEDURE — C1769 GUIDE WIRE: HCPCS

## 2018-07-23 RX ORDER — LORATADINE 10 MG/1
10 TABLET ORAL DAILY
Status: ON HOLD | COMMUNITY
End: 2018-08-07

## 2018-07-23 RX ORDER — HEPARIN SODIUM (PORCINE) LOCK FLUSH IV SOLN 100 UNIT/ML 100 UNIT/ML
5 SOLUTION INTRAVENOUS ONCE
Status: COMPLETED | OUTPATIENT
Start: 2018-07-23 | End: 2018-07-23

## 2018-07-23 RX ORDER — HEPARIN SODIUM (PORCINE) LOCK FLUSH IV SOLN 100 UNIT/ML 100 UNIT/ML
5 SOLUTION INTRAVENOUS DAILY PRN
Status: CANCELLED
Start: 2018-07-24

## 2018-07-23 RX ORDER — SODIUM CHLORIDE 9 MG/ML
INJECTION, SOLUTION INTRAVENOUS CONTINUOUS
Status: DISCONTINUED | OUTPATIENT
Start: 2018-07-23 | End: 2018-07-23 | Stop reason: HOSPADM

## 2018-07-23 RX ORDER — FENTANYL CITRATE 50 UG/ML
25-50 INJECTION, SOLUTION INTRAMUSCULAR; INTRAVENOUS EVERY 5 MIN PRN
Status: DISCONTINUED | OUTPATIENT
Start: 2018-07-23 | End: 2018-07-23 | Stop reason: HOSPADM

## 2018-07-23 RX ORDER — HEPARIN SODIUM (PORCINE) LOCK FLUSH IV SOLN 100 UNIT/ML 100 UNIT/ML
3 SOLUTION INTRAVENOUS
Status: DISCONTINUED | OUTPATIENT
Start: 2018-07-23 | End: 2018-07-23 | Stop reason: HOSPADM

## 2018-07-23 RX ORDER — NALOXONE HYDROCHLORIDE 0.4 MG/ML
.1-.4 INJECTION, SOLUTION INTRAMUSCULAR; INTRAVENOUS; SUBCUTANEOUS
Status: DISCONTINUED | OUTPATIENT
Start: 2018-07-23 | End: 2018-07-23 | Stop reason: HOSPADM

## 2018-07-23 RX ORDER — PLERIXAFOR 24 MG/1.2ML
0.24 SOLUTION SUBCUTANEOUS DAILY
Status: CANCELLED
Start: 2018-07-23

## 2018-07-23 RX ORDER — PLERIXAFOR 24 MG/1.2ML
0.24 SOLUTION SUBCUTANEOUS DAILY
Status: CANCELLED
Start: 2018-07-24

## 2018-07-23 RX ORDER — HEPARIN SODIUM (PORCINE) LOCK FLUSH IV SOLN 100 UNIT/ML 100 UNIT/ML
5 SOLUTION INTRAVENOUS DAILY PRN
Status: CANCELLED
Start: 2018-07-23

## 2018-07-23 RX ORDER — HEPARIN SODIUM (PORCINE) LOCK FLUSH IV SOLN 100 UNIT/ML 100 UNIT/ML
3 SOLUTION INTRAVENOUS EVERY 24 HOURS
Status: DISCONTINUED | OUTPATIENT
Start: 2018-07-23 | End: 2018-07-23 | Stop reason: HOSPADM

## 2018-07-23 RX ORDER — FLUMAZENIL 0.1 MG/ML
0.2 INJECTION, SOLUTION INTRAVENOUS
Status: DISCONTINUED | OUTPATIENT
Start: 2018-07-23 | End: 2018-07-23 | Stop reason: HOSPADM

## 2018-07-23 RX ORDER — CEFAZOLIN SODIUM 2 G/100ML
2 INJECTION, SOLUTION INTRAVENOUS
Status: COMPLETED | OUTPATIENT
Start: 2018-07-23 | End: 2018-07-23

## 2018-07-23 RX ORDER — PLERIXAFOR 24 MG/1.2ML
0.24 SOLUTION SUBCUTANEOUS DAILY
Status: DISCONTINUED | OUTPATIENT
Start: 2018-07-23 | End: 2018-07-23 | Stop reason: HOSPADM

## 2018-07-23 RX ADMIN — SODIUM CHLORIDE, PRESERVATIVE FREE 5 ML: 5 INJECTION INTRAVENOUS at 11:47

## 2018-07-23 RX ADMIN — MIDAZOLAM 4 MG: 1 INJECTION INTRAMUSCULAR; INTRAVENOUS at 10:03

## 2018-07-23 RX ADMIN — SODIUM CHLORIDE, PRESERVATIVE FREE 3 ML: 5 INJECTION INTRAVENOUS at 10:04

## 2018-07-23 RX ADMIN — PLERIXAFOR 17.6 MG: 24 SOLUTION SUBCUTANEOUS at 16:05

## 2018-07-23 RX ADMIN — FILGRASTIM 780 MCG: 480 INJECTION, SOLUTION INTRAVENOUS; SUBCUTANEOUS at 12:15

## 2018-07-23 RX ADMIN — FENTANYL CITRATE 100 MCG: 50 INJECTION, SOLUTION INTRAMUSCULAR; INTRAVENOUS at 10:02

## 2018-07-23 RX ADMIN — CEFAZOLIN SODIUM 2 G: 2 INJECTION, SOLUTION INTRAVENOUS at 08:09

## 2018-07-23 RX ADMIN — LIDOCAINE HYDROCHLORIDE 20 ML: 10 INJECTION, SOLUTION EPIDURAL; INFILTRATION; INTRACAUDAL; PERINEURAL at 10:03

## 2018-07-23 RX ADMIN — SODIUM CHLORIDE: 9 INJECTION, SOLUTION INTRAVENOUS at 08:09

## 2018-07-23 RX ADMIN — HEPARIN SODIUM 5000 UNITS: 1000 INJECTION, SOLUTION INTRAVENOUS; SUBCUTANEOUS at 10:14

## 2018-07-23 ASSESSMENT — PAIN SCALES - GENERAL: PAINLEVEL: MILD PAIN (2)

## 2018-07-23 NOTE — IP AVS SNAPSHOT
MRN:[id number]                      After Visit Summary   7/23/2018    Amira Avery    MRN: [id number]           Visit Information        Department      7/23/2018  7:26 AM [address] [facility] [place]          Review of your medicines      UNREVIEWED medicines. Ask your doctor about these medicines        Dose / Directions    acetaminophen 500 MG tablet   Commonly known as:  TYLENOL        Take by mouth every 6 hours as needed   Refills:  0       loratadine 10 MG tablet   Commonly known as:  CLARITIN        Dose:  10 mg   Take 10 mg by mouth daily   Refills:  0                Protect others around you: Learn how to safely use, store and throw away your medicines at www.disposemymeds.org.         Follow-ups after your visit        Your next 10 appointments already scheduled     Jul 23, 2018 11:00 AM CDT   Masonic Lab Draw with  MASONIC LAB DRAW   [facility] Masonic Lab Draw ([facility])    [address] [place] [address]  [address]  [facility] [address]   [phone]            Jul 23, 2018 11:30 AM CDT   BMT Injection with Uc Bmt Nurse 1   [facility] Blood and Marrow Transplant ([facility])    [address] [place] [address]  [address]  [facility] [address]   [phone]            Jul 23, 2018 12:30 PM CDT   Return with UC BMT WES #2   [facility] Blood and Marrow Transplant ([facility])    [address] [place] [address]  [address]  [facility] [address]   [phone]            Jul 24, 2018  7:30 AM CDT   Masonic Lab Draw with UC MASONIC LAB DRAW   [facility] Masonic Lab Draw ([facility])    [address] [place] [address]  [address]  [facility] [address]   [phone]            Jul 24, 2018  7:45 AM CDT   BMT Injection with Uc Bmt Nurse 1   [facility] Blood and Marrow Transplant ([facility])    [address] [place] [address]  [address]  [facility] [address]   [phone]            Jul 24, 2018   8:00 AM CDT   (Arrive by 7:45 AM)   MNC Collection with  APHERESIS RN5, UC 38 ATC   [facility] Apheresis ([facility])    [address]  [address]  [place] [address]   [phone]            Jul 24, 2018  1:00 PM CDT   Return with  BMT WES #3   [facility] Blood and Marrow Transplant ([facility])    [address]  [address]  [facility] [address]   [phone]            Jul 25, 2018  9:00 AM CDT   (Arrive by 8:45 AM)   MNC Collection with  APHERESIS RN6, UC 38 ATC   [facility] Apheresis ([facility])    [address]  [address]  [facility] [place] [address]   [phone]               Care Instructions        Further instructions from your care team                                                                        Interventional Radiology                                                                   Discharge Instructions                                                                FollowingTunneled Catheter Placement    Your site(s) has been closed with:     The Catheter is sutured  to skin at  insertion site    Derma Raygoza (Skin Glue) on neck incision    Do not apply any ointments over site    This thin layer will slough off in 7-10 days               May gently remove Derma Raygoza in 10 days if still present         Tunneled catheter is always covered with a Sterile Transparent dressing    Keep site clean and dry     Cover with an occlusive dressing for showering    Weekly transparent dressing changes or when it becomes wet or dirty     If there is any oozing or bleeding from the site, apply direct pressure for 5-10 minutes with a gauze pad.  If bleeding continues after 10 minutes call your primary doctor.  If bleeding cannot be controlled with direct pressure, call 911.    Call your Doctor if:    Bleeding as above    Swelling in your neck or  "arm    Sudden onset of shortness of breath, lightheadedness, or heart palpitations..    Fever greater than 100.5  F    Other signs of infection such as, redness, tenderness, or drainage from the wound.    If you were given sedation:    We recommend an adult stay with you for the first 24 hours.    No driving or alcoholic beverages for 24 hours.    ADDITIONAL INSTRUCTIONS:           No heavy lifting greater than 10 lbs for 3 days.           May use ice pack for pain or minor swelling for 15 min 3-4 times per day.    [facility] Interventional Radiology Department    Physician: Dr. Mcrae                                  Date:July 23, 2018    Telephone Numbers:  [phone].....8 am to 4:30 pm   Monday-Friday  Hospital : [phone]....After hours, Weekends, & Holidays.  Ask for the Interventional Radiologist on call.  Someone is on call 24 hours a day.   Emergency Department:  [phone]                                                                                                                                                               Additional Information About Your Visit        [facility] [employer] Information     [employer] gives you secure access to your electronic health record. If you see a primary care provider, you can also send messages to your care team and make appointments. If you have questions, please call your primary care clinic.  If you do not have a primary care provider, please call [phone] and they will assist you.        Care EveryWhere ID     This is your Care EveryWhere ID. This could be used by other organizations to access your [place] medical records  [id number]        Your Vitals Were     Blood Pressure Pulse Temperature Respirations Height Weight    106/57 66 98.2  F (36.8  C) (Oral) 15 1.6 m (5' 3\") 72.1 kg (158 lb 15.2 oz)    Pulse Oximetry BMI (Body Mass Index)                99% 28.16 kg/m2           Primary Care Provider Office Phone # Fax #    [facility] " [phone]      Equal Access to Services     RICHIE RAMEY : Alexis Luis, wanatali ceja, sweta nunezmadavid mcgraw, jen raymundo. So [facility] [phone].    ATENCIÓN: Si habla español, tiene a isabel disposición servicios gratuitos de asistencia lingüística. Llame al [phone].    We comply with applicable federal civil rights laws and [place] laws. We do not discriminate on the basis of race, color, national origin, age, disability, sex, sexual orientation, or gender identity.            Thank you!     Thank you for choosing [facility] for your care. Our goal is always to provide you with excellent care. Hearing back from our patients is one way we can continue to improve our services. Please take a few minutes to complete the written survey that you may receive in the mail after you visit with us. Thank you!             Medication List: This is a list of all your medications and when to take them. Check marks below indicate your daily home schedule. Keep this list as a reference.      Medications           Morning Afternoon Evening Bedtime As Needed    acetaminophen 500 MG tablet   Commonly known as:  TYLENOL   Take by mouth every 6 hours as needed                                loratadine 10 MG tablet   Commonly known as:  CLARITIN   Take 10 mg by mouth daily

## 2018-07-23 NOTE — NURSING NOTE
"Oncology Rooming Note    July 23, 2018 12:25 PM   Amira Avery is a 28 year old female who presents for:    No chief complaint on file.    Initial Vitals: /67  Pulse 66  Temp 98.3  F (36.8  C) (Oral)  Resp 16  Wt 73.3 kg (161 lb 9.6 oz)  SpO2 99%  BMI 28.63 kg/m2 Estimated body mass index is 28.63 kg/(m^2) as calculated from the following:    Height as of an earlier encounter on 7/23/18: 1.6 m (5' 3\").    Weight as of this encounter: 73.3 kg (161 lb 9.6 oz). Body surface area is 1.81 meters squared.  Data Unavailable Comment: Data Unavailable   No LMP recorded.  Allergies reviewed: Yes  Medications reviewed: Yes    Medications: Medication refills not needed today.  Pharmacy name entered into [employer] / [email or web address]: [facility], [place] - [address] AT [facility] [address]    Clinical concerns: -GCSF was given in her right arm      0 minutes for nursing intake (face to face time)     Mariam Mojica MA              `  "

## 2018-07-23 NOTE — MR AVS SNAPSHOT
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  After hours call the   hospital at [phone] or   [phone]. Ask for the BMT   fellow on-call            Follow-ups after your visit        Your next 10 appointments already scheduled     Jul 24, 2018  7:30 AM CDT   [facility] Lab Draw with  [facility]AS[facility] LAB DRAW   [facility] Lab Draw ([facility])    [address] [place] [address]  [address]  [facility] [address]   [phone]            Jul 24, 2018  7:45 AM CDT   BMT Injection with  Bmt Nurse 1   [facility] Blood and Marrow Transplant ([facility])    [address] [place] [address]  [address]  [facility] [address]   [phone]            Jul 24, 2018  8:00 AM CDT   (Arrive by 7:45 AM)   MNC Collection with  APHERESIS RN5, UC 38 ATC   [facility] ([facility])    [address] [place] [address]  [address]  [facility] [address]   [phone]            Jul 24, 2018  1:00 PM CDT   Return with  BMT WES #3   [facility] Blood and Marrow Transplant ([facility])    [address] [place] [address]  [address]  [facility] [address]   [phone]            Jul 25, 2018  9:00 AM CDT   (Arrive by 8:45 AM)   MNC  Collection with  APHERESIS RN6, UC 38 ATC   [facility] ([facility])    [address]  [address]  [facility] [address] [phone]            Jul 25, 2018  2:30 PM CDT   Return with  BMT WES #2   [facility] Blood and Marrow Transplant ([facility])    [address]  [address]  [facility] [address] [phone]            Jul 26, 2018  8:00 AM CDT   (Arrive by 7:45 AM)   MNC Collection with  APHERESIS RN7, UC 36 ATC   [facility] ([facility])    [address]  [address]  [facility] [address] [phone]              Future tests that were ordered for you today     Open Future Orders        Priority Expected Expires Ordered    CBC with platelets differential Routine 7/24/2018 1/19/2019 7/23/2018    Basic metabolic panel Routine 7/24/2018 1/19/2019 7/23/2018    CD34 Absolute Count Routine 7/24/2018 1/19/2019 7/23/2018            Who to contact     If you have questions or need follow up information about today's clinic visit or your schedule please contact [facility] BLOOD AND MARROW TRANSPLANT directly at [phone].  Normal or non-critical lab and imaging results will be communicated to you by GoGoPinhart, letter or phone within 4 business days after the clinic has received the results. If you do not hear from us within 7 days, please contact the clinic through GoGoPinhart or phone. If you have a critical or abnormal lab result, we will notify you by phone as soon as possible.  Submit refill requests through PedidosYa / PedidosJÃ¡ or call your pharmacy and they will forward the refill request to us. Please allow 3 business days for your refill to be completed.          Additional Information About Your Visit        PedidosYa / PedidosJÃ¡ Information     Ped[employer]Ya / PedidosJ[employer] gives you secure access to your electronic health record. If you see a primary care provider, you can also  send messages to your care team and make appointments. If you have questions, please call your primary care clinic.  If you do not have a primary care provider, please call [phone] and they will assist you.        Care EveryWhere ID     This is your Care EveryWhere ID. This could be used by other organizations to access your [place] medical records  [id number]        Your Vitals Were     Pulse Temperature Respirations Pulse Oximetry BMI (Body Mass Index)       66 98.3  F (36.8  C) (Oral) 16 99% 28.64 kg/m2        Blood Pressure from Last 3 Encounters:   07/23/18 108/67   07/23/18 108/67   07/23/18 117/63    Weight from Last 3 Encounters:   07/23/18 73.3 kg (161 lb 11.2 oz)   07/23/18 73.3 kg (161 lb 9.6 oz)   07/23/18 72.1 kg (158 lb 15.2 oz)              We Performed the Following     CBC with platelets differential     CD34 Absolute Count        Recent Review Flowsheet Data     BMT Recent Results Latest Ref Rng & Units 4/20/2018 4/23/2018 7/5/2018 7/9/2018 7/12/2018 7/20/2018 7/23/2018    WBC 4.0 - 11.0 10e9/L 7.2 - 2.6(L) 2.6(L) 4.0 5.9 28.7(H)    Hemoglobin 11.7 - 15.7 g/dL 12.3 - 12.7 12.7 13.3 13.6 12.0    Platelet Count 150 - 450 10e9/L 273 - 199 257 290 274 205    Neutrophils (Absolute) 1.6 - 8.3 10e9/L 4.3 - 0.9(L) 0.7(L) 2.0 3.7 24.8(H)    INR 0.86 - 1.14 - - 1.02 - - - -    Sodium 133 - 144 mmol/L - - 140 - - - -    Potassium 3.4 - 5.3 mmol/L - - 3.8 - - - -    Chloride 94 - 109 mmol/L - - 109 - - - -    Glucose 70 - 99 mg/dL - 100(H) 92 - - - -    Urea Nitrogen 7 - 30 mg/dL - - 10 - - - -    Creatinine 0.52 - 1.04 mg/dL - - 0.71 - - - -    Calcium (Total) 8.5 - 10.1 mg/dL - - 8.6 - - - -    Protein (Total) 6.8 - 8.8 g/dL - - 7.7 - - - -    Albumin 3.4 - 5.0 g/dL - - 4.0 - - - -    Alkaline Phosphatase 40 - 150 U/L - - 58 - - - -    AST 0 - 45 U/L - - 38 - - - -    ALT 0 - 50 U/L - - 60(H) - - - -    MCV 78 - 100 fl 90 - 90 90 90 89 92               Primary Care Provider Office Phone # Fax #     [facility] [phone]       [address] [facility] [facility] [address]        Equal Access to Services     RICHIE RAMEY : Hadii aad ku hadyveso Sotiagoali, waaxda luqadaha, qaybta kaalmada adetima, jen ly kirkyudi blanton jono raymundo. So [facility] [phone].    ATENCIÓN: Si habla español, tiene a isabel disposición servicios gratuitos de asistencia lingüística. Llame al [phone].    We comply with applicable federal civil rights laws and [place] laws. We do not discriminate on the basis of race, color, national origin, age, disability, sex, sexual orientation, or gender identity.            Thank you!     Thank you for choosing [facility] BLOOD AND MARROW TRANSPLANT  for your care. Our goal is always to provide you with excellent care. Hearing back from our patients is one way we can continue to improve our services. Please take a few minutes to complete the written survey that you may receive in the mail after your visit with us. Thank you!             Your Updated Medication List - Protect others around you: Learn how to safely use, store and throw away your medicines at www.disposemymeds.org.          This list is accurate as of 7/23/18  1:27 PM.  Always use your most recent med list.                   Brand Name Dispense Instructions for use Diagnosis    acetaminophen 500 MG tablet    TYLENOL     Take by mouth every 6 hours as needed        loratadine 10 MG tablet    CLARITIN     Take 10 mg by mouth daily

## 2018-07-23 NOTE — NURSING NOTE
"Chief Complaint   Patient presents with     Port Draw     Labs drawn from port by RN. Line flushed with saline and heparin. Vs taken and pt checked in for appt     Port accessed with 20g 3/4\" gripper needle by RN, labs collected, line flushed with saline and heparin.  Vitals taken. Pt checked in for appointment(s).    Nia Bautista RN  "

## 2018-07-23 NOTE — PROGRESS NOTES
Patient Name: Amira Avery  Medical Record Number: [id number]  Today's Date: 7/23/2018    Procedure: tunneled central venous catheter placement  Proceduralist: Kayla Mcrae and Deann    Sedation start time: 0915  Sedation end time: 0955  Sedation medications administered: versed 4 mg., fentanyl 100 mcg.  Total sedation time: 40 minutes    Procedure start time: 0925  Puncture time: 0925  Procedure end time: 0955    Report given to: Shiloh Maldonado Notes: Pt arrived to IR room 2  from  . Pt denies any questions or concerns regarding procedure. Pt positioned supine and monitored per protocol. Pt tolerated procedure without any noted complications. Pt transferred back to 2A.

## 2018-07-23 NOTE — NURSING NOTE
Chief Complaint   Patient presents with     Infusion     mozobil injection, hx Hodgkin lymphoma.

## 2018-07-23 NOTE — PROGRESS NOTES
Discharge instructions given and pt voiced understanding. No scripts needed from pharmacy. Right upper chest site is soft and flat. No hematoma. Positive pulses, color, and warmth bilateral upper extremities. Up walking in room. Ate well for lunch. Urinating adequate amounts. No complaint of discomfort. Adequate for discharge. Discharged to home with family.

## 2018-07-23 NOTE — PROGRESS NOTES
Returned to unit following tunnelled line placement. Rt upper chest site is dry/drsg intact over catheter and dermabond in place. Received sedation. Alert and orient. No complaint of nausea or discomfort. Respiratory status stable. Vital signs within normal limits.

## 2018-07-23 NOTE — PROGRESS NOTES
Infusion Nursing Note:  Amira Avery presents today for mozobil.    Patient seen by provider today: Yes: Diana Donovan   present during visit today: Not Applicable.    Note: Presents for first dose of mozobil.  Administered subcutaneous left arm and tolerated well.  Monitored for 30 minutes with no sign of adverse effect.  Discussed GI symptoms with patient who verbalizes understanding.    Intravenous Access:  No Intravenous access/labs at this visit.    Treatment Conditions:  Results reviewed, labs MET treatment parameters, ok to proceed with treatment.      Post Infusion Assessment:  Patient tolerated injection without incident.  Patient observed for 30 minutes post injection per protocol.    Discharge Plan:   Patient discharged in stable condition accompanied by: friend.  Departure Mode: Ambulatory.  Patient aware of follow up appointments.    Shelley Oleary RN

## 2018-07-23 NOTE — PROCEDURES
Interventional Radiology Brief Post Procedure Note    Procedure: IR CVC TUNNEL PLACEMENT > 5 YRS OF AGE    Proceduralist: Aries Roman MD and Rolando Mcrae MD    Assistant: IR Fellow Physician, Loreta Zacarias and None    Time Out: Prior to the start of the procedure and with procedural staff participation, I verbally confirmed the patient s identity using two indicators, relevant allergies, that the procedure was appropriate and matched the consent or emergent situation, and that the correct equipment/implants were available. Immediately prior to starting the procedure I conducted the Time Out with the procedural staff and re-confirmed the patient s name, procedure, and site/side. (The Joint Commission universal protocol was followed.)  Yes    Medications   Medication Event Details Admin User Admin Time   fentaNYL (PF) (SUBLIMAZE) injection 25-50 mcg Medication Given Dose: 100 mcg; Route: Intravenous Albee, Duane A, RN 7/23/2018 10:02 AM   lidocaine 1 % 1-30 mL Medication Given by Other Clinician Dose: 20 mL; Route: Intradermal Albee, Duane A, RN 7/23/2018 10:03 AM   midazolam (VERSED) injection 0.5-1 mg Medication Given Dose: 4 mg; Route: Intravenous Albee, Duane A, RN 7/23/2018 10:03 AM   heparin 100 UNIT/ML injection 3 mL Medication Given by Other Clinician Dose: 3 mL; Route: Intracatheter Albee, Duane A, RN 7/23/2018 10:04 AM   heparin 100 UNIT/ML injection 3 mL Medication Given by Other Clinician Dose: 3 mL; Route: Intracatheter; Scheduled Time:  9:45 AM Albee, Duane A, RN 7/23/2018 10:04 AM       Sedation: Monitored Anesthesia Care (MAC) administered and documented by Anesthesia Care Provider    Findings: Tunneled CVC catheter is placed from right IJV    Estimated Blood Loss: Minimal    Fluoroscopy Time:  minute(s)    SPECIMENS: None    Complications: 1. None     Condition: Stable    Plan:   Return to Unit 2A  Line ready for immediate use.  1 hour bedrest for sedation     Comments: See dictated procedure note  for full details.    Loreta Zacarias MD

## 2018-07-23 NOTE — IP AVS SNAPSHOT
Unit [address] [address]                                       After Visit Summary   7/23/2018    Amira Avery    MRN: [id number]           After Visit Summary Signature Page     I have received my discharge instructions, and my questions have been answered. I have discussed any challenges I see with this plan with the nurse or doctor.    ..........................................................................................................................................  Patient/Patient Representative Signature      ..........................................................................................................................................  Patient Representative Print Name and Relationship to Patient    ..................................................               ................................................  Date                                            Time    ..........................................................................................................................................  Reviewed by Signature/Title    ...................................................              ..............................................  Date                                                            Time

## 2018-07-23 NOTE — PROGRESS NOTES
BMT Clinic Progress Note    Amira Avery  7/23/2018    History of Present Illness  Central line just placed. Went okay. Achy from GCSF. Using tylenol and claritin. Doesn't need anything additional.      Review of Systems Per above, remainder  10 point complete ROS negative.    Physical Exam:   /67 (BP Location: Right arm, Cuff Size: Adult Regular)  Pulse 66  Temp 98.3  F (36.8  C) (Oral)  Resp 16  Wt 73.3 kg (161 lb 11.2 oz)  SpO2 99%  BMI 28.64 kg/m2  - General:              A&O x3, appropriate, NAD   - Head:                 NC/AT   - Eyes:                 PERRL.  Sclera anicteric.    - Lungs:                CTA t/o. No crackles.  - Cardiovascular:       RRR, no M/R/G.   - Abdominal/Rectal:     +BS x4, soft, NT, ND.   - Lymphatics:           No edema.  - Musculoskeletal: Full strength.   - Skin:                 No rashes or petechaie.   - Neuro:                Non-focal, CN II-XII grossly intact.   - Additional Findings:  Duvol site just placed. Little bit of blood on disc.  Assessment and Plan:   1.  BMT: NSHL GCSF priming prior to BEAM Auto PBSCT  - Day 4 today. CD34 2 today. Will add mozobil this afternoon and possibly collect tomorrow.   - CVC placed today.   - Continue GCSF daily through collections  - Goal 5 million    RTC: daily with GCSF and mozobil    Diana Donovan PA-C  #[id number]

## 2018-07-23 NOTE — PROGRESS NOTES
Interventional Radiology Pre-Procedure Sedation Assessment   Time of Assessment: 8:13 AM    Expected Level: Moderate Sedation    Indication: Sedation is required for the following type of Procedure: Tunneled Central Venous Catheter Placement    Sedation and procedural consent: Risks, benefits and alternatives were discussed with Patient    PO Intake: Appropriately NPO for procedure    ASA Class: Class 1 - HEALTHY PATIENT    Mallampati: Grade 1:  Soft palate, uvula, tonsillar pillars, and posterior pharyngeal wall visible    Lungs: Lungs Clear with good breath sounds bilaterally    Heart: Normal heart sounds and rate    History and physical reviewed and no updates needed. I have reviewed the lab findings, diagnostic data, medications, and the plan for sedation. I have determined this patient to be an appropriate candidate for the planned sedation and procedure and have reassessed the patient IMMEDIATELY PRIOR to sedation and procedure.    Loreta Zacarias MD

## 2018-07-23 NOTE — MR AVS SNAPSHOT
After Visit Summary   7/23/2018    Amira Avery    MRN: [id number]           Patient Information     Date Of Birth          1990        Visit Information        Provider Department      7/23/2018 11:30 AM Nettie Armendariz Bmt Nurse [facility] Blood and Marrow Transplant        Today's Diagnoses     Encounter for other procedures for purposes other than remedying health state (CODE)    -  1    Nodular sclerosing Hodgkin's lymphoma, unspecified body region (H)              Clinics and Surgery Center ([facility] [place]  [address] [place] [address]  Phone: [phone]  Clinic Hours:   Monday-Thursday:7am to 7pm   Friday: 7am to 5pm   Weekends and holidays:    8am to noon (in general)  If your fever is 100.5  or greater,   call the clinic.  After hours call the   hospital at [phone] or   [phone]. Ask for the BMT   fellow on-call            Follow-ups after your visit        Who to contact     If you have questions or need follow up information about today's clinic visit or your schedule please contact [facility] BLOOD AND MARROW TRANSPLANT directly at [phone].  Normal or non-critical lab and imaging results will be communicated to you by Saguna Networkshart, letter or phone within 4 business days after the clinic has received the results. If you do not hear from us within 7 days, please contact the clinic through [employer] or phone. If you have a critical or abnormal lab result, we will notify you by phone as soon as possible.  Submit refill requests through SocialExpress or call your pharmacy and they will forward the refill request to us. Please allow 3 business days for your refill to be completed.          Additional Information About Your Visit        MyChart Information     [employer] gives you secure access to your electronic health record. If you see a primary care provider, you can also send messages to your care team and make appointments. If you have questions, please call your primary care clinic.   If you do not have a primary care provider, please call [phone] and they will assist you.        Care EveryWhere ID     This is your Care EveryWhere ID. This could be used by other organizations to access your [place] medical records  [id number]        Your Vitals Were     Pulse Temperature Respirations Pulse Oximetry BMI (Body Mass Index)       66 98.3  F (36.8  C) (Oral) 16 99% 28.63 kg/m2        Blood Pressure from Last 3 Encounters:   08/01/18 112/64   07/25/18 124/73   07/24/18 112/73    Weight from Last 3 Encounters:   08/01/18 70.6 kg (155 lb 11.2 oz)   07/25/18 73.8 kg (162 lb 12.8 oz)   07/24/18 72.5 kg (159 lb 13.3 oz)              Today, you had the following     No orders found for display       Recent Review Flowsheet Data     BMT Recent Results Latest Ref Rng & Units 7/12/2018 7/20/2018 7/23/2018 7/24/2018 7/25/2018 7/31/2018 8/1/2018    WBC 4.0 - 11.0 10e9/L 4.0 5.9 28.7(H) 45.5(H) 31.0(H) 3.5(L) 7.0    Hemoglobin 11.7 - 15.7 g/dL 13.3 13.6 12.0 12.1 12.1 11.2(L) 11.7    Platelet Count 150 - 450 10e9/L 290 274 205 190 171 168 175    Neutrophils (Absolute) 1.6 - 8.3 10e9/L 2.0 3.7 24.8(H) 32.3(H) 27.5(H) 1.9 6.4    INR 0.86 - 1.14 - - - - - 1.07 -    Sodium 133 - 144 mmol/L - - - 138 141 140 140    Potassium 3.4 - 5.3 mmol/L - - - 3.9 3.4 3.9 4.0    Chloride 94 - 109 mmol/L - - - 109 109 107 108    Glucose 70 - 99 mg/dL - - - 95 92 86 165(H)    Urea Nitrogen 7 - 30 mg/dL - - - 8 7 12 11    Creatinine 0.52 - 1.04 mg/dL - - - 0.71 0.66 0.62 0.68    Calcium (Total) 8.5 - 10.1 mg/dL - - - 8.8 8.4(L) 8.6 8.9    Protein (Total) 6.8 - 8.8 g/dL - - - - - 7.2 -    Albumin 3.4 - 5.0 g/dL - - - - - 3.8 -    Alkaline Phosphatase 40 - 150 U/L - - - - - 74 -    AST 0 - 45 U/L - - - - - 16 -    ALT 0 - 50 U/L - - - - - 36 -    MCV 78 - 100 fl 90 89 92 93 90 90 90               Primary Care Provider Office Phone # Fax #    [facility] [phone] [address]  [facility] [address]        Equal Access to Services     RICHIE RAMEY : Alexis Luis, samaria ceja, jen lujan. So [facility] [phone].    ATENCIÓN: Si habla español, tiene a isabel disposición servicios gratuitos de asistencia lingüística. Llame al [phone].    We comply with applicable federal civil rights laws and [place] laws. We do not discriminate on the basis of race, color, national origin, age, disability, sex, sexual orientation, or gender identity.            Thank you!     Thank you for choosing [facility] BLOOD AND MARROW TRANSPLANT  for your care. Our goal is always to provide you with excellent care. Hearing back from our patients is one way we can continue to improve our services. Please take a few minutes to complete the written survey that you may receive in the mail after your visit with us. Thank you!             Your Updated Medication List - Protect others around you: Learn how to safely use, store and throw away your medicines at www.disposemymeds.org.          This list is accurate as of 7/23/18 11:59 PM.  Always use your most recent med list.                   Brand Name Dispense Instructions for use Diagnosis    acetaminophen 500 MG tablet    TYLENOL     Take by mouth every 6 hours as needed        loratadine 10 MG tablet    CLARITIN     Take 10 mg by mouth daily

## 2018-07-23 NOTE — DISCHARGE INSTRUCTIONS
Interventional Radiology                                                                   Discharge Instructions                                                                FollowingTunneled Catheter Placement    Your site(s) has been closed with:     The Catheter is sutured  to skin at  insertion site    Derma Raygoza (Skin Glue) on neck incision    Do not apply any ointments over site    This thin layer will slough off in 7-10 days               May gently remove Derma Raygoza in 10 days if still present         Tunneled catheter is always covered with a Sterile Transparent dressing    Keep site clean and dry     Cover with an occlusive dressing for showering    Weekly transparent dressing changes or when it becomes wet or dirty     If there is any oozing or bleeding from the site, apply direct pressure for 5-10 minutes with a gauze pad.  If bleeding continues after 10 minutes call your primary doctor.  If bleeding cannot be controlled with direct pressure, call 911.    Call your Doctor if:    Bleeding as above    Swelling in your neck or arm    Sudden onset of shortness of breath, lightheadedness, or heart palpitations..    Fever greater than 100.5  F    Other signs of infection such as, redness, tenderness, or drainage from the wound.    If you were given sedation:    We recommend an adult stay with you for the first 24 hours.    No driving or alcoholic beverages for 24 hours.    ADDITIONAL INSTRUCTIONS:           No heavy lifting greater than 10 lbs for 3 days.           May use ice pack for pain or minor swelling for 15 min 3-4 times per day.    [facility] Interventional Radiology Department    Physician: Dr. Mcrae                                  Date:July 23, 2018    Telephone Numbers:  [phone].....8 am to 4:30 pm   Monday-Friday  Hospital : [phone]....After hours, Weekends, & Holidays.  Ask for the Interventional Radiologist on call.   Someone is on call 24 hours a day.   Emergency Department:  [phone]

## 2018-07-23 NOTE — MR AVS SNAPSHOT
After Visit Summary   7/23/2018    Amira Avery    MRN: [id number]           Patient Information     Date Of Birth          1990        Visit Information        Provider Department      7/23/2018 4:00 PM UC 2 ATC; UC BMT INFUSION [facility] Blood and Marrow Transplant        Today's Diagnoses     Encounter for harvesting of peripheral blood stem cells    -  1    Stem cell donor        Encounter for other procedures for purposes other than remedying health state (CODE)        Nodular sclerosing Hodgkin's lymphoma, unspecified body region (H)              Clinics and Surgery Center ([facility])  [address] [place] [address]  Phone: [phone]  Clinic Hours:   Monday-Thursday:7am to 7pm   Friday: 7am to 5pm   Weekends and holidays:    8am to noon (in general)  If your fever is 100.5  or greater,   call the clinic.  After hours call the   hospital at [phone] or   [phone]. Ask for the BMT   fellow on-call            Follow-ups after your visit        Your next 10 appointments already scheduled     Jul 24, 2018  7:30 AM CDT   Masonic Lab Draw with  MASONIC LAB DRAW   [facility] Lab Draw ([facility])    [address] [place] [address]  [address]  [facility] [address] [phone]            Jul 24, 2018  7:45 AM CDT   BMT Injection with  Bmt Nurse 1   [facility] Blood and Marrow Transplant ([facility])    [address] [place] [address]  [address]  [facility] [address] [phone]            Jul 24, 2018  8:00 AM CDT   (Arrive by 7:45 AM)   MNC Collection with  APHERESIS RN5, UC 38 ATC   [facility] ([facility])    [address] [place] [address]  [address]  [facility] [id number] [phone]            Jul 24, 2018  1:00 PM CDT   Return with  BMT WES #3   [facility] Blood and Marrow Transplant ([facility])    [address] [place] [address]  [address]  [facility] [address]   [phone]            Jul 25, 2018  9:00 AM CDT   (Arrive by 8:45 AM)   MNC Collection with LOS APHERESIS RN6, UC 38 ATC   [facility] Apheresis ([facility])    [address]  [address]  [facility] [address]   [phone]            Jul 25, 2018  2:30 PM CDT   Return with  BMT WES #2   [facility] Blood and Marrow Transplant ([facility])    [address]  [address]  [facility] [address]   [phone]            Jul 26, 2018  8:00 AM CDT   (Arrive by 7:45 AM)   MNC Collection with LOS APHERESIS RN7, UC 36 ATC   [facility] Apheresis ([facility])    [address]  [address]  [facility] [address]   [phone]              Who to contact     If you have questions or need follow up information about today's clinic visit or your schedule please contact [facility] BLOOD AND MARROW TRANSPLANT directly at [phone].  Normal or non-critical lab and imaging results will be communicated to you by Wefthart, letter or phone within 4 business days after the clinic has received the results. If you do not hear from us within 7 days, please contact the clinic through Mobileyet or phone. If you have a critical or abnormal lab result, we will notify you by phone as soon as possible.  Submit refill requests through [email or web address]@ [email or web address] or call your pharmacy and they will forward the refill request to us. Please allow 3 business days for your refill to be completed.          Additional Information About Your Visit        Wefthart Information     [email or web address] gives you secure access to your electronic health record. If you see a primary care provider, you can also send messages to your care team and make appointments. If you have questions, please call your primary care clinic.  If you do not have a primary care provider, please call [phone] and they will assist  you.        Care EveryWhere ID     This is your Care EveryWhere ID. This could be used by other organizations to access your [facility] medical records  [id number]         Blood Pressure from Last 3 Encounters:   07/23/18 108/67   07/23/18 108/67   07/23/18 117/63    Weight from Last 3 Encounters:   07/23/18 73.3 kg (161 lb 11.2 oz)   07/23/18 73.3 kg (161 lb 9.6 oz)   07/23/18 72.1 kg (158 lb 15.2 oz)              Today, you had the following     No orders found for display       Recent Review Flowsheet Data     BMT Recent Results Latest Ref Rng & Units 4/20/2018 4/23/2018 7/5/2018 7/9/2018 7/12/2018 7/20/2018 7/23/2018    WBC 4.0 - 11.0 10e9/L 7.2 - 2.6(L) 2.6(L) 4.0 5.9 28.7(H)    Hemoglobin 11.7 - 15.7 g/dL 12.3 - 12.7 12.7 13.3 13.6 12.0    Platelet Count 150 - 450 10e9/L 273 - 199 257 290 274 205    Neutrophils (Absolute) 1.6 - 8.3 10e9/L 4.3 - 0.9(L) 0.7(L) 2.0 3.7 24.8(H)    INR 0.86 - 1.14 - - 1.02 - - - -    Sodium 133 - 144 mmol/L - - 140 - - - -    Potassium 3.4 - 5.3 mmol/L - - 3.8 - - - -    Chloride 94 - 109 mmol/L - - 109 - - - -    Glucose 70 - 99 mg/dL - 100(H) 92 - - - -    Urea Nitrogen 7 - 30 mg/dL - - 10 - - - -    Creatinine 0.52 - 1.04 mg/dL - - 0.71 - - - -    Calcium (Total) 8.5 - 10.1 mg/dL - - 8.6 - - - -    Protein (Total) 6.8 - 8.8 g/dL - - 7.7 - - - -    Albumin 3.4 - 5.0 g/dL - - 4.0 - - - -    Alkaline Phosphatase 40 - 150 U/L - - 58 - - - -    AST 0 - 45 U/L - - 38 - - - -    ALT 0 - 50 U/L - - 60(H) - - - -    MCV 78 - 100 fl 90 - 90 90 90 89 92               Primary Care Provider Office Phone # Fax #    [facility] [phone] [address] [address]        Equal Access to Services     RICHIE RAMEY : Alexis Luis, samaria ceja, sweta mcgraw, jen raymundo. [facility] [phone].    ATENCIÓN: Si habla español, tiene a isabel disposición servicios gratuitos de  asistencia lingüística. Roberto al [phone].    We comply with applicable federal civil rights laws and [place] laws. We do not discriminate on the basis of race, color, national origin, age, disability, sex, sexual orientation, or gender identity.            Thank you!     Thank you for choosing [facility] BLOOD AND MARROW TRANSPLANT  for your care. Our goal is always to provide you with excellent care. Hearing back from our patients is one way we can continue to improve our services. Please take a few minutes to complete the written survey that you may receive in the mail after your visit with us. Thank you!             Your Updated Medication List - Protect others around you: Learn how to safely use, store and throw away your medicines at www.disposemymeds.org.          This list is accurate as of 7/23/18  4:50 PM.  Always use your most recent med list.                   Brand Name Dispense Instructions for use Diagnosis    acetaminophen 500 MG tablet    TYLENOL     Take by mouth every 6 hours as needed        loratadine 10 MG tablet    CLARITIN     Take 10 mg by mouth daily

## 2018-07-24 ENCOUNTER — ONCOLOGY VISIT (OUTPATIENT)
Dept: TRANSPLANT | Facility: CLINIC | Age: 28
End: 2018-07-24
Attending: PHYSICIAN ASSISTANT
Payer: COMMERCIAL

## 2018-07-24 ENCOUNTER — HOSPITAL ENCOUNTER (OUTPATIENT)
Dept: LAB | Facility: CLINIC | Age: 28
Discharge: HOME | End: 2018-07-24
Attending: INTERNAL MEDICINE | Admitting: INTERNAL MEDICINE
Payer: COMMERCIAL

## 2018-07-24 ENCOUNTER — APPOINTMENT (OUTPATIENT)
Dept: LAB | Facility: CLINIC | Age: 28
End: 2018-07-24
Attending: INTERNAL MEDICINE
Payer: COMMERCIAL

## 2018-07-24 ENCOUNTER — OFFICE VISIT (OUTPATIENT)
Dept: TRANSPLANT | Facility: CLINIC | Age: 28
End: 2018-07-24
Attending: INTERNAL MEDICINE
Payer: COMMERCIAL

## 2018-07-24 VITALS
BODY MASS INDEX: 28.31 KG/M2 | HEART RATE: 70 BPM | TEMPERATURE: 98.1 F | WEIGHT: 159.83 LBS | SYSTOLIC BLOOD PRESSURE: 112 MMHG | RESPIRATION RATE: 16 BRPM | DIASTOLIC BLOOD PRESSURE: 73 MMHG

## 2018-07-24 VITALS
DIASTOLIC BLOOD PRESSURE: 66 MMHG | HEART RATE: 86 BPM | OXYGEN SATURATION: 96 % | TEMPERATURE: 98.3 F | WEIGHT: 160.9 LBS | RESPIRATION RATE: 16 BRPM | SYSTOLIC BLOOD PRESSURE: 115 MMHG | BODY MASS INDEX: 28.5 KG/M2

## 2018-07-24 DIAGNOSIS — C81.11 NODULAR SCLEROSIS HODGKIN LYMPHOMA OF LYMPH NODES OF NECK (H): ICD-10-CM

## 2018-07-24 DIAGNOSIS — C81.10 NODULAR SCLEROSING HODGKIN'S LYMPHOMA, UNSPECIFIED BODY REGION (H): ICD-10-CM

## 2018-07-24 DIAGNOSIS — Z41.8 ENCOUNTER FOR OTHER PROCEDURES FOR PURPOSES OTHER THAN REMEDYING HEALTH STATE (CODE): ICD-10-CM

## 2018-07-24 DIAGNOSIS — Z52.001 ENCOUNTER FOR HARVESTING OF PERIPHERAL BLOOD STEM CELLS: Primary | ICD-10-CM

## 2018-07-24 DIAGNOSIS — Z52.001 STEM CELL DONOR: ICD-10-CM

## 2018-07-24 LAB
ANION GAP SERPL CALCULATED.3IONS-SCNC: 8 MMOL/L (ref 3–14)
BASOPHILS # BLD AUTO: 0 10E9/L (ref 0–0.2)
BASOPHILS NFR BLD AUTO: 0 %
BUN SERPL-MCNC: 8 MG/DL (ref 7–30)
CALCIUM SERPL-MCNC: 8.8 MG/DL (ref 8.5–10.1)
CD34 CELLS # SPEC: 68 /UL
CD34 CELLS NFR SPEC: 0.13 %
CELL THERAPY PRODUCT NUMBER: NORMAL
CHLORIDE SERPL-SCNC: 109 MMOL/L (ref 94–109)
CO2 SERPL-SCNC: 22 MMOL/L (ref 20–32)
CREAT SERPL-MCNC: 0.71 MG/DL (ref 0.52–1.04)
DIFFERENTIAL METHOD BLD: ABNORMAL
EOSINOPHIL # BLD AUTO: 0.4 10E9/L (ref 0–0.7)
EOSINOPHIL NFR BLD AUTO: 0.9 %
ERYTHROCYTE [DISTWIDTH] IN BLOOD BY AUTOMATED COUNT: 13.4 % (ref 10–15)
GFR SERPL CREATININE-BSD FRML MDRD: >90 ML/MIN/1.7M2
GLUCOSE SERPL-MCNC: 95 MG/DL (ref 70–99)
HCT VFR BLD AUTO: 37.2 % (ref 35–47)
HGB BLD-MCNC: 12.1 G/DL (ref 11.7–15.7)
IFC SPECIMEN: NORMAL
LYMPHOCYTES # BLD AUTO: 8.8 10E9/L (ref 0.8–5.3)
LYMPHOCYTES NFR BLD AUTO: 19.3 %
MCH RBC QN AUTO: 30.1 PG (ref 26.5–33)
MCHC RBC AUTO-ENTMCNC: 32.5 G/DL (ref 31.5–36.5)
MCV RBC AUTO: 93 FL (ref 78–100)
METAMYELOCYTES # BLD: 0.8 10E9/L
METAMYELOCYTES NFR BLD MANUAL: 1.8 %
MONOCYTES # BLD AUTO: 2.8 10E9/L (ref 0–1.3)
MONOCYTES NFR BLD AUTO: 6.1 %
MYELOCYTES # BLD: 0.4 10E9/L
MYELOCYTES NFR BLD MANUAL: 0.9 %
NEUTROPHILS # BLD AUTO: 32.3 10E9/L (ref 1.6–8.3)
NEUTROPHILS NFR BLD AUTO: 71 %
PLATELET # BLD AUTO: 190 10E9/L (ref 150–450)
PLATELET # BLD EST: ABNORMAL 10*3/UL
POTASSIUM SERPL-SCNC: 3.9 MMOL/L (ref 3.4–5.3)
RBC # BLD AUTO: 4.02 10E12/L (ref 3.8–5.2)
RBC MORPH BLD: NORMAL
SODIUM SERPL-SCNC: 138 MMOL/L (ref 133–144)
VIABLE CD34 CELLS NFR FLD: 90.12 %
WBC # BLD AUTO: 45.5 10E9/L (ref 4–11)

## 2018-07-24 PROCEDURE — 25000128 H RX IP 250 OP 636: Mod: ZF | Performed by: INTERNAL MEDICINE

## 2018-07-24 PROCEDURE — 38207 CRYOPRESERVE STEM CELLS: CPT | Performed by: INTERNAL MEDICINE

## 2018-07-24 PROCEDURE — 25000132 ZZH RX MED GY IP 250 OP 250 PS 637: Mod: ZF | Performed by: PATHOLOGY

## 2018-07-24 PROCEDURE — 25000128 H RX IP 250 OP 636: Mod: ZF | Performed by: PATHOLOGY

## 2018-07-24 PROCEDURE — 80048 BASIC METABOLIC PNL TOTAL CA: CPT | Performed by: PHYSICIAN ASSISTANT

## 2018-07-24 PROCEDURE — 25000125 ZZHC RX 250: Mod: ZF | Performed by: PATHOLOGY

## 2018-07-24 PROCEDURE — 85025 COMPLETE CBC W/AUTO DIFF WBC: CPT | Performed by: PHYSICIAN ASSISTANT

## 2018-07-24 PROCEDURE — 38206 HARVEST AUTO STEM CELLS: CPT | Mod: ZF

## 2018-07-24 PROCEDURE — 86367 STEM CELLS TOTAL COUNT: CPT | Performed by: PHYSICIAN ASSISTANT

## 2018-07-24 RX ORDER — HEPARIN SODIUM,PORCINE 10 UNIT/ML
5 VIAL (ML) INTRAVENOUS
Status: DISCONTINUED | OUTPATIENT
Start: 2018-07-24 | End: 2018-07-24 | Stop reason: HOSPADM

## 2018-07-24 RX ORDER — ACETAMINOPHEN 325 MG/1
650 TABLET ORAL ONCE
Status: COMPLETED | OUTPATIENT
Start: 2018-07-24 | End: 2018-07-24

## 2018-07-24 RX ORDER — HEPARIN SODIUM (PORCINE) LOCK FLUSH IV SOLN 100 UNIT/ML 100 UNIT/ML
5 SOLUTION INTRAVENOUS DAILY PRN
Status: CANCELLED
Start: 2018-07-24

## 2018-07-24 RX ORDER — HEPARIN SODIUM (PORCINE) LOCK FLUSH IV SOLN 100 UNIT/ML 100 UNIT/ML
3 SOLUTION INTRAVENOUS ONCE
Status: COMPLETED | OUTPATIENT
Start: 2018-07-24 | End: 2018-07-24

## 2018-07-24 RX ORDER — PLERIXAFOR 24 MG/1.2ML
0.24 SOLUTION SUBCUTANEOUS DAILY
Status: CANCELLED
Start: 2018-07-24

## 2018-07-24 RX ADMIN — ACETAMINOPHEN 650 MG: 325 TABLET ORAL at 15:25

## 2018-07-24 RX ADMIN — SODIUM CHLORIDE, PRESERVATIVE FREE 3 ML: 5 INJECTION INTRAVENOUS at 15:46

## 2018-07-24 RX ADMIN — CALCIUM GLUCONATE 1466 MG/HR: 94 INJECTION, SOLUTION INTRAVENOUS at 10:37

## 2018-07-24 RX ADMIN — SODIUM CHLORIDE, PRESERVATIVE FREE 5 ML: 5 INJECTION INTRAVENOUS at 08:04

## 2018-07-24 RX ADMIN — FILGRASTIM 780 MCG: 480 INJECTION, SOLUTION INTRAVENOUS; SUBCUTANEOUS at 08:22

## 2018-07-24 RX ADMIN — ANTICOAGULANT CITRATE DEXTROSE SOLUTION FORMULA A 1372 ML: 12.25; 11; 3.65 SOLUTION INTRAVENOUS at 10:37

## 2018-07-24 ASSESSMENT — PAIN SCALES - GENERAL: PAINLEVEL: NO PAIN (0)

## 2018-07-24 NOTE — IP AVS SNAPSHOT
MRN:[id number]                      After Visit Summary   7/24/2018    Amira Avery    MRN: [id number]           Thank you!     Thank you for choosing [facility] for your care. Our goal is always to provide you with excellent care. Hearing back from our patients is one way we can continue to improve our services. Please take a few minutes to complete the written survey that you may receive in the mail after you visit with us. Thank you!        Patient Information     Date Of Birth          1990        About your hospital stay     You were admitted on:  July 24, 2018 You last received care in the:  [facility]    You were discharged on:  July 24, 2018       Who to Call     For medical emergencies, please call 911.  For non-urgent questions about your medical care, please call your primary care provider or clinic, [phone]          Attending Provider     Provider Mesfin Hill MD Internal Medicine       Primary Care Provider Office Phone # Fax #    [facility] [phone]      Your next 10 appointments already scheduled     Jul 24, 2018  1:00 PM CDT   Return with  BMT WES #3   [facility] Blood and Marrow Transplant ([facility])    [address]  [address]  [facility] [address]   [phone]            Jul 25, 2018  9:00 AM CDT   (Arrive by 8:45 AM)   MNC Collection with LOS APHERESIS RN6, UC 38 ATC   [facility] Apheres[facility] ([facility])    [address]  [address]  [facility] [address]   [phone]            Jul 25, 2018  2:30 PM CDT   Return with  BMT WES #2   [facility] Blood and Marrow Transplant ([facility])    [address]  [address]  [facility] [address]   [phone]            Jul 26, 2018  8:00 AM CDT   (Arrive by 7:45 AM)   MNC Collection with UC  APHERESIS RN7,  36 ATC   [facility] Apheresis ([facility])    [address]  [address]  [facility] [address] [phone]            Jul 26, 2018 12:30 PM CDT   Return with  BMT WES #4   [facility] Blood and Marrow Transplant ([facility])    [address]  [address]  [facility] [address] [phone]              Further instructions from your care team       Apheresis Blood Donor Center Post Instructions  You may feel tired after your procedure today.   Please call your doctor if you have:  bleeding that doesn t stop, fever, pain where a needle or tube (catheter) was placed, seizures, trouble breathing, red urine, nausea or vomiting, other health concerns.     If your symptoms are severe, call 911.    If you have a Central Venous Catheter:  Notify your doctor if you have had a fever, chills, shaking  or redness, warmth, swelling, drainage at the exit-site.  This could be a sign of infection.      The Apheresis/Blood Donor Center is open Monday-Friday 7:30 a.m. to 5 p.m.  The phone number is [phone].  A Transfusion Medicine physician can be reached after 5:00 p.m. weekdays and on weekends /Holidays by calling [phone], and asking for the physician on call.      Autologous HPC/MNC Collection:   In most cases, the cell dose report will be available tomorrow morning.   The Bone Marrow Transplant (BMT) clinic staff looks at your report, and a decision is made if you will need another collection.   Remember it is important to follow a low fat diet during the collection process. Sometimes following the procedure, your blood platelet count may be low.  If you are told your platelet count is low, you need to avoid taking aspirin/aspirin containing products and avoid heavy physical activity and activities that may result in bruising or traumatic injury.  To contact the BMT fellow or attending physician after 5  p.m. call [phone].        Pending Results     Date and Time Order Name Status Description    7/23/2018 0727 IR CVC Tunnel Placement > 5 Yrs of Age Preliminary             Admission Information     Date & Time Provider Department Dept. Phone    7/24/2018 Mesfin Flynn MD [facility] Apheresis [phone]      Your Vitals Were     Weight BMI (Body Mass Index)                72.5 kg (159 lb 13.3 oz) 28.31 kg/m2          MyChart Information     Iptune gives you secure access to your electronic health record. If you see a primary care provider, you can also send messages to your care team and make appointments. If you have questions, please call your primary care clinic.  If you do not have a primary care provider, please call [phone] and they will assist you.        Care EveryWhere ID     This is your Care EveryWhere ID. This could be used by other organizations to access your [facility] medical records  [id number]        Equal Access to Services     RICHIE RAMEY AH: Hadii velma bowieo Soshar, waaxda luqadaha, qaybta kaalmada adetima, jen de la cruz . So [facility] [phone].    ATENCIÓN: Si habla español, tiene a isabel disposición servicios gratuitos de asistencia lingüística. Llame al [phone].    We comply with applicable federal civil rights laws and [place] laws. We do not discriminate on the basis of race, color, national origin, age, disability, sex, sexual orientation, or gender identity.               Review of your medicines      UNREVIEWED medicines. Ask your doctor about these medicines        Dose / Directions    acetaminophen 500 MG tablet   Commonly known as:  TYLENOL        Take by mouth every 6 hours as needed   Refills:  0       loratadine 10 MG tablet   Commonly known as:  CLARITIN        Dose:  10 mg   Take 10 mg by mouth daily   Refills:  0                Protect others around you: Learn how to safely use, store and throw  away your medicines at www.disposemymeds.org.             Medication List: This is a list of all your medications and when to take them. Check marks below indicate your daily home schedule. Keep this list as a reference.      Medications           Morning Afternoon Evening Bedtime As Needed    acetaminophen 500 MG tablet   Commonly known as:  TYLENOL   Take by mouth every 6 hours as needed                                loratadine 10 MG tablet   Commonly known as:  CLARITIN   Take 10 mg by mouth daily

## 2018-07-24 NOTE — DISCHARGE INSTRUCTIONS
Apheresis Blood Donor Center Post Instructions  You may feel tired after your procedure today.   Please call your doctor if you have:  bleeding that doesn t stop, fever, pain where a needle or tube (catheter) was placed, seizures, trouble breathing, red urine, nausea or vomiting, other health concerns.     If your symptoms are severe, call 201.    If you have a Central Venous Catheter:  Notify your doctor if you have had a fever, chills, shaking  or redness, warmth, swelling, drainage at the exit-site.  This could be a sign of infection.      The Apheresis/Blood Donor Center is open Monday-Friday 7:30 a.m. to 5 p.m.  The phone number is [phone].  A Transfusion Medicine physician can be reached after 5:00 p.m. weekdays and on weekends /Holidays by calling [phone], and asking for the physician on call.      Autologous HPC/MNC Collection:   In most cases, the cell dose report will be available tomorrow morning.   The Bone Marrow Transplant (BMT) clinic staff looks at your report, and a decision is made if you will need another collection.   Remember it is important to follow a low fat diet during the collection process. Sometimes following the procedure, your blood platelet count may be low.  If you are told your platelet count is low, you need to avoid taking aspirin/aspirin containing products and avoid heavy physical activity and activities that may result in bruising or traumatic injury.  To contact the BMT fellow or attending physician after 5 p.m. call [phone].

## 2018-07-24 NOTE — MR AVS SNAPSHOT
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)  [address] [place] [address]  Phone: [phone]  Clinic Hours:   Monday-Thursday:7am to 7pm   Friday: 7am to 5pm   Weekends and holidays:    8am to noon (in general)  If your fever is 100.5  or greater,   call the clinic.  After hours call the   hospital at [phone] or   [phone]. Ask for the BMT   fellow on-call            Follow-ups after your visit        Your next 10 appointments already scheduled     Jul 25, 2018  2:30 PM CDT   Return with  BMT WES #2    [facility] ([facility])    [address] [place] [address]  [address]  [facility] [address] [phone]            Jul 26, 2018 12:30 PM CDT   Return with  BMT WES #4    [facility] ([facility])    [address] [place] [address]  [address] [place] [address] [phone]              Who to contact     If you have questions or need follow up information about today's clinic visit or your schedule please contact [facility] BLOOD AND MARROW TRANSPLANT directly at [phone].  Normal or non-critical lab and imaging results will be communicated to you by MyChart, letter or phone within 4 business days after the clinic has received the results. If you do not hear from us within 7 days, please contact the clinic through Tradeshifthart or phone. If you have a critical or abnormal lab result, we will notify you by phone as soon as possible.  Submit refill requests through Kenandy or call your pharmacy and they will forward the refill  request to us. Please allow 3 business days for your refill to be completed.          Additional Information About Your Visit        COFCOhart Information     nCrypted Cloud gives you secure access to your electronic health record. If you see a primary care provider, you can also send messages to your care team and make appointments. If you have questions, please call your primary care clinic.  If you do not have a primary care provider, please call [phone] and they will assist you.        Care EveryWhere ID     This is your Care EveryWhere ID. This could be used by other organizations to access your [facility] medical records  [id number]         Blood Pressure from Last 3 Encounters:   07/24/18 112/73   07/24/18 115/66   07/23/18 108/67    Weight from Last 3 Encounters:   07/24/18 72.5 kg (159 lb 13.3 oz)   07/24/18 73 kg (160 lb 14.4 oz)   07/23/18 73.3 kg (161 lb 11.2 oz)              We Performed the Following     Basic metabolic panel     CBC with platelets differential     CD34 Absolute Count        Recent Review Flowsheet Data     BMT Recent Results Latest Ref Rng & Units 7/5/2018 7/9/2018 7/12/2018 7/20/2018 7/23/2018 7/24/2018 7/25/2018    WBC 4.0 - 11.0 10e9/L 2.6(L) 2.6(L) 4.0 5.9 28.7(H) 45.5(H) 31.0(H)    Hemoglobin 11.7 - 15.7 g/dL 12.7 12.7 13.3 13.6 12.0 12.1 12.1    Platelet Count 150 - 450 10e9/L 199 257 290 274 205 190 171    Neutrophils (Absolute) 1.6 - 8.3 10e9/L 0.9(L) 0.7(L) 2.0 3.7 24.8(H) 32.3(H) 27.5(H)    INR 0.86 - 1.14 1.02 - - - - - -    Sodium 133 - 144 mmol/L 140 - - - - 138 141    Potassium 3.4 - 5.3 mmol/L 3.8 - - - - 3.9 3.4    Chloride 94 - 109 mmol/L 109 - - - - 109 109    Glucose 70 - 99 mg/dL 92 - - - - 95 92    Urea Nitrogen 7 - 30 mg/dL 10 - - - - 8 7    Creatinine 0.52 - 1.04 mg/dL 0.71 - - - - 0.71 0.66    Calcium (Total) 8.5 - 10.1 mg/dL 8.6 - - - - 8.8 8.4(L)    Protein (Total) 6.8 - 8.8 g/dL 7.7 - - - - - -    Albumin 3.4 - 5.0 g/dL 4.0 - - - - - -    Alkaline Phosphatase 40  - 150 U/L 58 - - - - - -    AST 0 - 45 U/L 38 - - - - - -    ALT 0 - 50 U/L 60(H) - - - - - -    MCV 78 - 100 fl 90 90 90 89 92 93 90               Primary Care Provider Office Phone # Fax #    [facility] [phone] [address] [facility] [address]        Equal Access to Services     RICHEI RAMEY : Hadii aad ku hadasho Soomaali, waaxda luqadaha, qaybta kaalmada adeegyada, waxay idiin hayaan adeeg shakirhernáncolumba lazane . So [facility] [phone].    ATENCIÓN: Si alexander mendoza, tiene a isabel disposición servicios gratuitos de asistencia lingüística. [facility] [phone].    We comply with applicable federal civil rights laws and [place] laws. We do not discriminate on the basis of race, color, national origin, age, disability, sex, sexual orientation, or gender identity.            Thank you!     Thank you for choosing [facility] BLOOD AND MARROW TRANSPLANT  for your care. Our goal is always to provide you with excellent care. Hearing back from our patients is one way we can continue to improve our services. Please take a few minutes to complete the written survey that you may receive in the mail after your visit with us. Thank you!             Your Updated Medication List - Protect others around you: Learn how to safely use, store and throw away your medicines at www.disposemymeds.org.          This list is accurate as of 7/24/18 11:59 PM.  Always use your most recent med list.                   Brand Name Dispense Instructions for use Diagnosis    acetaminophen 500 MG tablet    TYLENOL     Take by mouth every 6 hours as needed        loratadine 10 MG tablet    CLARITIN     Take 10 mg by mouth daily

## 2018-07-24 NOTE — MR AVS SNAPSHOT
After Visit Summary   7/24/2018    Amira Avery    MRN: [id number]           Patient Information     Date Of Birth          1990        Visit Information        Provider Department      7/24/2018 7:45 AM 1, Los Bmt Nurse [facility] Blood and Marrow Transplant        Today's Diagnoses     Encounter for harvesting of peripheral blood stem cells    -  1    Stem cell donor        Encounter for other procedures for purposes other than remedying health state (CODE)        Nodular sclerosing Hodgkin's lymphoma, unspecified body region (H)              Clinics and Surgery Center ([facility])  [address] [place] [address]  Phone: [phone]  Clinic Hours:   Monday-Thursday:7am to 7pm   Friday: 7am to 5pm   Weekends and holidays:    8am to noon (in general)  If your fever is 100.5  or greater,   call the clinic.  After hours call the   hospital at [phone] or   [phone]. Ask for the BMT   fellow on-call            Follow-ups after your visit        Your next 10 appointments already scheduled     Jul 24, 2018  1:00 PM CDT   Return with UC BMT WES #3   [facility]row [facility] ([facility])    [address] [place] [address]  [address]  [facility] [address]   [phone]            Jul 25, 2018  9:00 AM CDT   (Arrive by 8:45 AM)   MNC Collection with LOS APHERESIS RN6, UC 38 ATC   [facility] ([facility])    [address] [place] [address]  [address]  [facility] [address]   [phone]            Jul 25, 2018  2:30 PM CDT   Return with UC BMT WES #2   [facility] Marrow [facility] ([facility])    [address] [place] [address]  [address]  [facility] [address]   [phone]            Jul 26, 2018  8:00 AM CDT   (Arrive by 7:45 AM)   MNC Collection with LOS APHERESIS RN7, UC 36 ATC   [facility] ([facility])    [facility]  [facility] [address]  [address]  [facility] [address] [phone]            Jul 26, 2018 12:30 PM CDT   Return with  BMT WES #4   [facility] Blood and Marrow Transplant ([facility])    [address]  [address]  [facility] [address] [phone]              Who to contact     If you have questions or need follow up information about today's clinic visit or your schedule please contact [facility] BLOOD AND MARROW TRANSPLANT directly at [phone].  Normal or non-critical lab and imaging results will be communicated to you by [employer], letter or phone within 4 business days after the clinic has received the results. If you do not hear from us within 7 days, please contact the clinic through [employer] or phone. If you have a critical or abnormal lab result, we will notify you by phone as soon as possible.  Submit refill requests through [employer] or call your pharmacy and they will forward the refill request to us. Please allow 3 business days for your refill to be completed.          Additional Information About Your Visit        [employer] Information     [employer] gives you secure access to your electronic health record. If you see a primary care provider, you can also send messages to your care team and make appointments. If you have questions, please call your primary care clinic.  If you do not have a primary care provider, please call [phone] and they will assist you.        Care EveryWhere ID     This is your Care EveryWhere ID. This could be used by other organizations to access your [place] medical records  [id number]        Your Vitals Were     Pulse Temperature Respirations Pulse Oximetry BMI (Body Mass Index)       86 98.3  F (36.8  C) (Oral) 16 96% 28.5 kg/m2        Blood Pressure from Last 3 Encounters:   07/24/18 115/66   07/23/18 108/67   07/23/18 108/67    Weight from Last 3 Encounters:   07/24/18 73 kg (160 lb 14.4 oz)   07/23/18 73.3 kg (161 lb 11.2 oz)    07/23/18 73.3 kg (161 lb 9.6 oz)              Today, you had the following     No orders found for display       Recent Review Flowsheet Data     BMT Recent Results Latest Ref Rng & Units 4/23/2018 7/5/2018 7/9/2018 7/12/2018 7/20/2018 7/23/2018 7/24/2018    WBC 4.0 - 11.0 10e9/L - 2.6(L) 2.6(L) 4.0 5.9 28.7(H) 45.5(H)    Hemoglobin 11.7 - 15.7 g/dL - 12.7 12.7 13.3 13.6 12.0 12.1    Platelet Count 150 - 450 10e9/L - 199 257 290 274 205 190    Neutrophils (Absolute) 1.6 - 8.3 10e9/L - 0.9(L) 0.7(L) 2.0 3.7 24.8(H) -    INR 0.86 - 1.14 - 1.02 - - - - -    Sodium 133 - 144 mmol/L - 140 - - - - -    Potassium 3.4 - 5.3 mmol/L - 3.8 - - - - -    Chloride 94 - 109 mmol/L - 109 - - - - -    Glucose 70 - 99 mg/dL 100(H) 92 - - - - -    Urea Nitrogen 7 - 30 mg/dL - 10 - - - - -    Creatinine 0.52 - 1.04 mg/dL - 0.71 - - - - -    Calcium (Total) 8.5 - 10.1 mg/dL - 8.6 - - - - -    Protein (Total) 6.8 - 8.8 g/dL - 7.7 - - - - -    Albumin 3.4 - 5.0 g/dL - 4.0 - - - - -    Alkaline Phosphatase 40 - 150 U/L - 58 - - - - -    AST 0 - 45 U/L - 38 - - - - -    ALT 0 - 50 U/L - 60(H) - - - - -    MCV 78 - 100 fl - 90 90 90 89 92 93               Primary Care Provider Office Phone # Fax #    [facility] [phone] [address] [facility] [address]        Equal Access to Services     RICHIE RAMEY AH: Alexis Luis, wasarahda luqadaha, qaybta kaalmadavid mcgraw, jen de la cruz . So [facility] [phone].    ATENCIÓN: Si habla español, tiene a isabel disposición servicios gratuitos de asistencia lingüística. Llame al [phone].    We comply with applicable federal civil rights laws and [place] laws. We do not discriminate on the basis of race, color, national origin, age, disability, sex, sexual orientation, or gender identity.            Thank you!     Thank you for choosing [facility] BLOOD AND MARROW TRANSPLANT  for your care. Our goal is always  to provide you with excellent care. Hearing back from our patients is one way we can continue to improve our services. Please take a few minutes to complete the written survey that you may receive in the mail after your visit with us. Thank you!             Your Updated Medication List - Protect others around you: Learn how to safely use, store and throw away your medicines at www.disposemymeds.org.          This list is accurate as of 7/24/18  8:33 AM.  Always use your most recent med list.                   Brand Name Dispense Instructions for use Diagnosis    acetaminophen 500 MG tablet    TYLENOL     Take by mouth every 6 hours as needed        loratadine 10 MG tablet    CLARITIN     Take 10 mg by mouth daily

## 2018-07-24 NOTE — NURSING NOTE
Chief Complaint   Patient presents with     Blood Draw     Labs drawn from CVC by RN. Line flushed with saline and heparin. Vs taken and pt checked in for appt     Labs collected from CVC by RN, line flushed with saline and heparin.  Vitals taken. Pt checked in for appointment(s).    Nia Bautista RN

## 2018-07-24 NOTE — PROCEDURES
Transfusion Medicine Procedure    Amira Avery [id number]   YOB: 1990 Age: 28 year old        Procedure: Autologous Hematopoietic Progenitor Cell (HPC) Collection           Assessment and Plan:   28 year old female presents for autologous HPC collection for Hodgkin's lymphoma.  The plan is to collect for 1 to 3 days or until the target goal is met.   A central line is being used for access for the procedure.     The patient is tolerating the first day of collection with no complaints.  Continue with plan.         History of Present Illness:   28 year old female presents for consultation for autologous HPC collection.  Her past medical history includes Hodgkin's Lymphoma (diagnosed in 2017), which is relapsing now.  She is currently well.  The patient denies any back pain that would prevent her from tolerating the procedure.  No significant travel history.  The patient has no identifiable risk factors for infectious disease.  The procedure, risks/benefits were discussed with the patient and all of her questions were addressed at this time.             Past Medical History:   This patient has a past medical history significant for Hodgkin's lymphoma          Past Surgical History:     This patient has no significant past surgical history  Past Surgical History:   Procedure Laterality Date     BIOPSY LYMPH NODE CERVICAL Right 5/2/2018    Procedure: BIOPSY LYMPH NODE CERVICAL;  Right Excisional Node Biopsy;  Surgeon: Lia Silva MD;  [facility]     excision of deep cervical LN's Left 05/19/2017    positive for Hodgkin's     excision of deep cervical LN's Right 02/13/2018    positive for Hodgkin's     port a cath placement Right 06/01/2017    IJ vein; removed 12/1/17 no longer needed     port a cath placement Left 02/21/2018    IJ vein     US BIOPSY LYMPH NODE FNA Left 03/06/2017    low midline neck, negative for malignancy              Social History:     Social History    Substance Use Topics     Smoking status: Former Smoker     Packs/day: 1.00     Years: 10.00     Types: Cigarettes, Hookah     Start date: 7/15/2007     Quit date: 10/15/2017     Smokeless tobacco: Never Used     Alcohol use No             Family History:     Family History   Problem Relation Age of Onset     Hypertension Father      Coronary Artery Disease Maternal Grandmother      Colon Cancer Maternal Grandmother      Cancer Maternal Grandmother      HEART DISEASE Maternal Grandmother              Immunizations:     Immunization History   Administered Date(s) Administered     DTAP (<7y) 07/29/1996     DTaP, Unspecified 12/15/2014     HPV Quadrivalent 12/15/2014     Hep B, Peds or Adolescent 08/14/2003, 09/19/2003, 02/28/2004     Influenza Vaccine IM 3yrs+ 4 Valent IIV4 12/15/2014     OPV, trivalent, live 07/29/1996     Td (Adult), Adsorbed 08/14/2003             Allergies:     Allergies   Allergen Reactions     Morphine Hives     No issues with oral narcotics, dilaudid, or fentanyl per pt             Medications:     Current Outpatient Prescriptions   Medication Sig     acetaminophen (TYLENOL) 500 MG tablet Take by mouth every 6 hours as needed      loratadine (CLARITIN) 10 MG tablet Take 10 mg by mouth daily     No current facility-administered medications for this encounter.              Review of Systems:   CONSTITUTIONAL: NEGATIVE for fever, chills, change in weight  ENT/MOUTH: NEGATIVE for ear, mouth and throat problems  RESP: NEGATIVE for significant cough or SOB  CV: NEGATIVE for chest pain, palpitations or peripheral edema           Vital Signs:   /53  Pulse 72  Temp 98.1  F (36.7  C) (Oral)  Resp 16  Wt 72.5 kg (159 lb 13.3 oz)  BMI 28.31 kg/m2            Data:     ROUTINE IP LABS (Last four results)  BMP  Recent Labs  Lab 07/24/18  0805      POTASSIUM 3.9   CHLORIDE 109   NEETU 8.8   CO2 22   BUN 8   CR 0.71   GLC 95     CBC  Recent Labs  Lab 07/24/18  0805 07/23/18  1149 07/20/18  1148    WBC 45.5* 28.7* 5.9   RBC 4.02 3.98 4.64   HGB 12.1 12.0 13.6   HCT 37.2 36.7 41.5   MCV 93 92 89   MCH 30.1 30.2 29.3   MCHC 32.5 32.7 32.8   RDW 13.4 13.3 12.9    205 274     INRNo lab results found in last 7 days.      ATTESTATION STATEMENT:   During the procedure this patient was directly seen and evaluated by me , Saige Rodriguez MD, PhD.    Saige Rodriguez MD, PhD  Transfusion Medicine Attending  Medical Director, Blood Bank Laboratory  Pager [phone]

## 2018-07-24 NOTE — PROGRESS NOTES
BMT Clinic Progress Note    Amira Avery  7/23/2018    History of Present Illness  Central line doing okay. No bleeding. Diarrhea post mozobil. Okay with pain plan of tylenol and claritin.     Review of Systems Per above, remainder  10 point complete ROS negative.    Physical Exam:   - General:              A&O x3, appropriate, NAD   - Head:                 NC/AT   - Eyes:                 PERRL.  Sclera anicteric.    - Lungs:                CTA t/o. No crackles.  - Cardiovascular:       RRR, no M/R/G.   - Abdominal/Rectal:     +BS x4, soft, NT, ND.   - Lymphatics:           No edema.  - Musculoskeletal: Full strength.   - Skin:                 No rashes or petechaie.   - Neuro:                Non-focal, CN II-XII grossly intact.   - Additional Findings:  Duvol site okay.   Assessment and Plan:   1.  BMT: NSHL GCSF priming prior to BEAM Auto PBSCT  - Day 5 today. 1st dose of mozobil 7/23 for low CD34. CD34 today 68million! 1st day of collections today. No mozobil today. Will plan possible collection still debbie just in case, but no mozobil.   - Continue GCSF daily through collections  - Goal 5 million  - Will message Mary Calixto about setting up line care supplies as she has already had teaching. Will update Amira tomorrow about this as she was asking about line care supplies.     RTC: daily with GCSF and possible collection.    Diana Donovan PA-C  #[id number]

## 2018-07-24 NOTE — NURSING NOTE
"Oncology Rooming Note    July 24, 2018 8:31 AM   Amira Avery is a 28 year old female who presents for:    Chief Complaint   Patient presents with     Blood Draw     Labs drawn from CVC by RN. Line flushed with saline and heparin. Vs taken and pt checked in for appt     RECHECK     Pt is here for her GCSF     Initial Vitals: /66 (BP Location: Right arm, Cuff Size: Adult Regular)  Pulse 86  Temp 98.3  F (36.8  C) (Oral)  Resp 16  Wt 73 kg (160 lb 14.4 oz)  SpO2 96%  BMI 28.5 kg/m2 Estimated body mass index is 28.5 kg/(m^2) as calculated from the following:    Height as of 7/23/18: 1.6 m (5' 3\").    Weight as of this encounter: 73 kg (160 lb 14.4 oz). Body surface area is 1.8 meters squared.  No Pain (0) Comment: Data Unavailable   No LMP recorded.  Allergies reviewed: Yes  Medications reviewed: Yes    Medications: Medication refills not needed today.  Pharmacy name entered into [employer]: [facility] [address] [place] [address] [address] AT [facility] [address]    Clinical concerns: none      0 minutes for nursing intake (face to face time)     Mariam Mojica MA            GCSF was given in her left arm today with no concerns.     Mariam Mojica MA    "

## 2018-07-24 NOTE — IP AVS SNAPSHOT
[facility]    [address] [address]    Phone:  [phone]    Fax:  [phone]                                       After Visit Summary   7/24/2018    Amira Avery    MRN: [id number]           After Visit Summary Signature Page     I have received my discharge instructions, and my questions have been answered. I have discussed any challenges I see with this plan with the nurse or doctor.    ..........................................................................................................................................  Patient/Patient Representative Signature      ..........................................................................................................................................  Patient Representative Print Name and Relationship to Patient    ..................................................               ................................................  Date                                            Time    ..........................................................................................................................................  Reviewed by Signature/Title    ...................................................              ..............................................  Date                                                            Time

## 2018-07-25 ENCOUNTER — ONCOLOGY VISIT (OUTPATIENT)
Dept: TRANSPLANT | Facility: CLINIC | Age: 28
End: 2018-07-25
Attending: PHYSICIAN ASSISTANT
Payer: COMMERCIAL

## 2018-07-25 ENCOUNTER — HOME INFUSION (PRE-WILLOW HOME INFUSION) (OUTPATIENT)
Dept: PHARMACY | Facility: CLINIC | Age: 28
End: 2018-07-25

## 2018-07-25 VITALS
DIASTOLIC BLOOD PRESSURE: 73 MMHG | BODY MASS INDEX: 28.84 KG/M2 | OXYGEN SATURATION: 98 % | WEIGHT: 162.8 LBS | SYSTOLIC BLOOD PRESSURE: 124 MMHG | RESPIRATION RATE: 18 BRPM | TEMPERATURE: 98.4 F | HEART RATE: 83 BPM

## 2018-07-25 DIAGNOSIS — C81.11 NODULAR SCLEROSIS HODGKIN LYMPHOMA OF LYMPH NODES OF NECK (H): Primary | ICD-10-CM

## 2018-07-25 DIAGNOSIS — C81.10 NODULAR SCLEROSING HODGKIN'S LYMPHOMA, UNSPECIFIED BODY REGION (H): ICD-10-CM

## 2018-07-25 DIAGNOSIS — Z52.001 ENCOUNTER FOR HARVESTING OF PERIPHERAL BLOOD STEM CELLS: ICD-10-CM

## 2018-07-25 DIAGNOSIS — Z52.001 STEM CELL DONOR: ICD-10-CM

## 2018-07-25 DIAGNOSIS — Z41.8 ENCOUNTER FOR OTHER PROCEDURES FOR PURPOSES OTHER THAN REMEDYING HEALTH STATE (CODE): ICD-10-CM

## 2018-07-25 LAB
ANION GAP SERPL CALCULATED.3IONS-SCNC: 8 MMOL/L (ref 3–14)
BASOPHILS # BLD AUTO: 0 10E9/L (ref 0–0.2)
BASOPHILS NFR BLD AUTO: 0 %
BUN SERPL-MCNC: 7 MG/DL (ref 7–30)
CALCIUM SERPL-MCNC: 8.4 MG/DL (ref 8.5–10.1)
CHLORIDE SERPL-SCNC: 109 MMOL/L (ref 94–109)
CO2 SERPL-SCNC: 24 MMOL/L (ref 20–32)
CREAT SERPL-MCNC: 0.66 MG/DL (ref 0.52–1.04)
DIFFERENTIAL METHOD BLD: ABNORMAL
EOSINOPHIL # BLD AUTO: 0 10E9/L (ref 0–0.7)
EOSINOPHIL NFR BLD AUTO: 0 %
ERYTHROCYTE [DISTWIDTH] IN BLOOD BY AUTOMATED COUNT: 13.4 % (ref 10–15)
GFR SERPL CREATININE-BSD FRML MDRD: >90 ML/MIN/1.7M2
GLUCOSE SERPL-MCNC: 92 MG/DL (ref 70–99)
HCT VFR BLD AUTO: 36.7 % (ref 35–47)
HGB BLD-MCNC: 12.1 G/DL (ref 11.7–15.7)
LYMPHOCYTES # BLD AUTO: 2.2 10E9/L (ref 0.8–5.3)
LYMPHOCYTES NFR BLD AUTO: 7 %
MAGNESIUM SERPL-MCNC: 1.7 MG/DL (ref 1.6–2.3)
MCH RBC QN AUTO: 29.7 PG (ref 26.5–33)
MCHC RBC AUTO-ENTMCNC: 33 G/DL (ref 31.5–36.5)
MCV RBC AUTO: 90 FL (ref 78–100)
MONOCYTES # BLD AUTO: 0.8 10E9/L (ref 0–1.3)
MONOCYTES NFR BLD AUTO: 2.6 %
NEUTROPHILS # BLD AUTO: 27.5 10E9/L (ref 1.6–8.3)
NEUTROPHILS NFR BLD AUTO: 88.7 %
PLATELET # BLD AUTO: 171 10E9/L (ref 150–450)
PLATELET # BLD EST: ABNORMAL 10*3/UL
POTASSIUM SERPL-SCNC: 3.4 MMOL/L (ref 3.4–5.3)
PROMYELOCYTES # BLD MANUAL: 0.5 10E9/L
PROMYELOCYTES NFR BLD MANUAL: 1.7 %
RBC # BLD AUTO: 4.07 10E12/L (ref 3.8–5.2)
RBC MORPH BLD: NORMAL
SODIUM SERPL-SCNC: 141 MMOL/L (ref 133–144)
WBC # BLD AUTO: 31 10E9/L (ref 4–11)

## 2018-07-25 PROCEDURE — 85025 COMPLETE CBC W/AUTO DIFF WBC: CPT | Performed by: PHYSICIAN ASSISTANT

## 2018-07-25 PROCEDURE — 83735 ASSAY OF MAGNESIUM: CPT | Performed by: PHYSICIAN ASSISTANT

## 2018-07-25 PROCEDURE — 25000128 H RX IP 250 OP 636: Mod: ZF | Performed by: PHYSICIAN ASSISTANT

## 2018-07-25 PROCEDURE — 80048 BASIC METABOLIC PNL TOTAL CA: CPT | Performed by: PHYSICIAN ASSISTANT

## 2018-07-25 RX ORDER — HEPARIN SODIUM (PORCINE) LOCK FLUSH IV SOLN 100 UNIT/ML 100 UNIT/ML
5 SOLUTION INTRAVENOUS DAILY PRN
Status: DISCONTINUED | OUTPATIENT
Start: 2018-07-25 | End: 2018-07-25 | Stop reason: HOSPADM

## 2018-07-25 RX ORDER — HEPARIN SODIUM,PORCINE 10 UNIT/ML
5 VIAL (ML) INTRAVENOUS
Status: DISCONTINUED | OUTPATIENT
Start: 2018-07-25 | End: 2018-07-25 | Stop reason: HOSPADM

## 2018-07-25 RX ORDER — HEPARIN SODIUM (PORCINE) LOCK FLUSH IV SOLN 100 UNIT/ML 100 UNIT/ML
5 SOLUTION INTRAVENOUS DAILY PRN
Status: CANCELLED
Start: 2018-07-25

## 2018-07-25 RX ADMIN — SODIUM CHLORIDE, PRESERVATIVE FREE 5 ML: 5 INJECTION INTRAVENOUS at 09:16

## 2018-07-25 RX ADMIN — SODIUM CHLORIDE, PRESERVATIVE FREE 5 ML: 5 INJECTION INTRAVENOUS at 09:15

## 2018-07-25 ASSESSMENT — PAIN SCALES - GENERAL: PAINLEVEL: NO PAIN (0)

## 2018-07-25 NOTE — NURSING NOTE
"Oncology Rooming Note    July 25, 2018 9:23 AM   Amira Avery is a 28 year old female who presents for:    Chief Complaint   Patient presents with     Oncology Clinic Visit     Patient with Hodgkin Lymphoma here for provider visit and lab review      Initial Vitals: There were no vitals taken for this visit. Estimated body mass index is 28.31 kg/(m^2) as calculated from the following:    Height as of 7/23/18: 1.6 m (5' 3\").    Weight as of 7/24/18: 72.5 kg (159 lb 13.3 oz). There is no height or weight on file to calculate BSA.  Data Unavailable Comment: Data Unavailable   No LMP recorded.  Allergies reviewed: Yes  Medications reviewed: Yes    Medications: Medication refills not needed today.  Pharmacy name entered into [email or web address]: [facility] [address] [place] [address] AVE AT [facility] OF  [address]    Clinical concerns:     5 minutes for nursing intake (face to face time)     Anitra Flores CMA              "

## 2018-07-25 NOTE — MR AVS SNAPSHOT
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  After hours call the   hospital at [phone] or   [phone]. Ask for the BMT   fellow on-call            Follow-ups after your visit        Who to contact     If you have questions or need follow up information about today's clinic visit or your schedule please contact [facility] BLOOD AND MARROW TRANSPLANT directly at [phone].  Normal or non-critical lab and imaging results will be communicated to you by [employer], letter or phone within 4 business days after the clinic has received the results. If you do not hear from us within 7 days, please contact the clinic through [employer] or phone. If you have a critical or abnormal lab result, we will notify you by phone as soon as possible.  Submit refill requests through [employer] or call your pharmacy and they will forward the refill request to us. Please allow 3 business days for your refill to be completed.          Additional Information About Your Visit        [employer] Information     [employer] gives you secure access to your electronic health record. If you see a  primary care provider, you can also send messages to your care team and make appointments. If you have questions, please call your primary care clinic.  If you do not have a primary care provider, please call [phone] and they will assist you.        Care EveryWhere ID     This is your Care EveryWhere ID. This could be used by other organizations to access your [facility] medical records  [id number]        Your Vitals Were     Pulse Temperature Respirations Pulse Oximetry BMI (Body Mass Index)       83 98.4  F (36.9  C) (Oral) 18 98% 28.84 kg/m2        Blood Pressure from Last 3 Encounters:   07/25/18 124/73   07/24/18 112/73   07/24/18 115/66    Weight from Last 3 Encounters:   07/25/18 73.8 kg (162 lb 12.8 oz)   07/24/18 72.5 kg (159 lb 13.3 oz)   07/24/18 73 kg (160 lb 14.4 oz)              We Performed the Following     Basic metabolic panel     CBC with platelets differential     Magnesium        Recent Review Flowsheet Data     BMT Recent Results Latest Ref Rng & Units 7/5/2018 7/9/2018 7/12/2018 7/20/2018 7/23/2018 7/24/2018 7/25/2018    WBC 4.0 - 11.0 10e9/L 2.6(L) 2.6(L) 4.0 5.9 28.7(H) 45.5(H) 31.0(H)    Hemoglobin 11.7 - 15.7 g/dL 12.7 12.7 13.3 13.6 12.0 12.1 12.1    Platelet Count 150 - 450 10e9/L 199 257 290 274 205 190 171    Neutrophils (Absolute) 1.6 - 8.3 10e9/L 0.9(L) 0.7(L) 2.0 3.7 24.8(H) 32.3(H) 27.5(H)    INR 0.86 - 1.14 1.02 - - - - - -    Sodium 133 - 144 mmol/L 140 - - - - 138 141    Potassium 3.4 - 5.3 mmol/L 3.8 - - - - 3.9 3.4    Chloride 94 - 109 mmol/L 109 - - - - 109 109    Glucose 70 - 99 mg/dL 92 - - - - 95 92    Urea Nitrogen 7 - 30 mg/dL 10 - - - - 8 7    Creatinine 0.52 - 1.04 mg/dL 0.71 - - - - 0.71 0.66    Calcium (Total) 8.5 - 10.1 mg/dL 8.6 - - - - 8.8 8.4(L)    Protein (Total) 6.8 - 8.8 g/dL 7.7 - - - - - -    Albumin 3.4 - 5.0 g/dL 4.0 - - - - - -    Alkaline Phosphatase 40 - 150 U/L 58 - - - - - -    AST 0 - 45 U/L 38 - - - - - -    ALT 0 - 50 U/L 60(H) - - - - - -     MCV 78 - 100 fl 90 90 90 89 92 93 90               Primary Care Provider Office Phone # Fax #    [facility] [phone] [address] [facility] [address]        Equal Access to Services     DAGOMARINE TANVIR : Hadii aad ku hadyveso Soomaali, waaxda luqadaha, qaybta kaalmada adeegyada, waxay idiin hayaan adeeg khhernánsh lazane raymundo. So [facility] [phone].    ATENCIÓN: Si habla español, tiene a isabel disposición servicios gratuitos de asistencia lingüística. Llame al [phone].    We comply with applicable federal civil rights laws and [place] laws. We do not discriminate on the basis of race, color, national origin, age, disability, sex, sexual orientation, or gender identity.            Thank you!     Thank you for choosing [facility] BLOOD AND MARROW TRANSPLANT  for your care. Our goal is always to provide you with excellent care. Hearing back from our patients is one way we can continue to improve our services. Please take a few minutes to complete the written survey that you may receive in the mail after your visit with us. Thank you!             Your Updated Medication List - Protect others around you: Learn how to safely use, store and throw away your medicines at www.disposemymeds.org.          This list is accurate as of 7/25/18 11:59 PM.  Always use your most recent med list.                   Brand Name Dispense Instructions for use Diagnosis    acetaminophen 500 MG tablet    TYLENOL     Take by mouth every 6 hours as needed        loratadine 10 MG tablet    CLARITIN     Take 10 mg by mouth daily

## 2018-07-25 NOTE — PROGRESS NOTES
BMT Clinic Progress Note    Amira Avery  7/25/2018    History of Present Illness  Finished collection in 1 day! Doing well. Okay with admit for transplant next Tuesday. She will flush line daily at home. Just had dressing changed. No complaints.     Review of Systems Per above, remainder  10 point complete ROS negative.    Physical Exam:   - General:              A&O x3, appropriate, NAD   - Head:                 NC/AT   - Eyes:                 PERRL.  Sclera anicteric.    - Lungs:                CTA t/o. No crackles.  - Cardiovascular:       RRR, no M/R/G.   - Lymphatics:           No edema.  - Musculoskeletal: Full strength.   - Skin:                 No rashes or petechaie.   - Neuro:                Non-focal, CN II-XII grossly intact.   - Additional Findings:  [facility] site okay.   Assessment and Plan:   1.  BMT: NSHL GCSF + mozobil priming prior to BEAM Auto PBSCT  - Collected 6.21million CD34/kg on 7/24  - RN coordinator aware. Plan for admit next Tuesday. Mary will call her to confirm on Monday.  - [facility] to deliver line care supplies. Will flush line daily at home. Given 3 days of supplies while awaiting [facility].   - Amira is aware to call if any change in clinical condition or line troubles.    Admit Tuesday for transplant.    Diana Donovan PA-C  #[id number]

## 2018-07-26 NOTE — PROGRESS NOTES
This is a recent snapshot of the patient's [facility] medical record.  For current drug dose and complete information and questions, call [phone] or In Basket pool, [facility] home infusion ([id number])  CSN Number:  [id number]

## 2018-07-30 ENCOUNTER — TELEPHONE (OUTPATIENT)
Dept: TRANSPLANT | Facility: CLINIC | Age: 28
End: 2018-07-30

## 2018-07-30 DIAGNOSIS — C81.90 HODGKIN DISEASE (H): Primary | ICD-10-CM

## 2018-07-30 NOTE — PROGRESS NOTES
Spoke with patient over the phone regarding upcoming admission to  for BMT. Instructed patient to check in at the hospital admission desk (main entrance) tomorrow, 7/31 between 9:00-10:00 am. Patient verbalized understanding and had no further questions. Pt states she is feeling good w/ no symptoms and no issues with flushing central line.

## 2018-07-31 ENCOUNTER — MEDICAL CORRESPONDENCE (OUTPATIENT)
Dept: TRANSPLANT | Facility: CLINIC | Age: 28
End: 2018-07-31

## 2018-07-31 ENCOUNTER — HOSPITAL ENCOUNTER (INPATIENT)
Facility: CLINIC | Age: 28
LOS: 7 days | Discharge: HOME | DRG: 017 | End: 2018-08-07
Attending: INTERNAL MEDICINE | Admitting: INTERNAL MEDICINE
Payer: COMMERCIAL

## 2018-07-31 DIAGNOSIS — C81.95 HODGKIN LYMPHOMA, UNSPECIFIED, LYMPH NODES OF INGUINAL REGION AND LOWER LIMB (H): ICD-10-CM

## 2018-07-31 DIAGNOSIS — C81.10 NODULAR SCLEROSING HODGKIN'S LYMPHOMA, UNSPECIFIED BODY REGION (H): Primary | ICD-10-CM

## 2018-07-31 PROBLEM — C85.90 LYMPHOMA (H): Status: ACTIVE | Noted: 2018-07-31

## 2018-07-31 LAB
ABO + RH BLD: NORMAL
ABO + RH BLD: NORMAL
ALBUMIN SERPL-MCNC: 3.8 G/DL (ref 3.4–5)
ALP SERPL-CCNC: 74 U/L (ref 40–150)
ALT SERPL W P-5'-P-CCNC: 36 U/L (ref 0–50)
ANION GAP SERPL CALCULATED.3IONS-SCNC: 8 MMOL/L (ref 3–14)
APTT PPP: 27 SEC (ref 22–37)
AST SERPL W P-5'-P-CCNC: 16 U/L (ref 0–45)
BASOPHILS # BLD AUTO: 0 10E9/L (ref 0–0.2)
BASOPHILS NFR BLD AUTO: 0.6 %
BILIRUB SERPL-MCNC: 0.4 MG/DL (ref 0.2–1.3)
BLD GP AB SCN SERPL QL: NORMAL
BLOOD BANK CMNT PATIENT-IMP: NORMAL
BUN SERPL-MCNC: 12 MG/DL (ref 7–30)
CALCIUM SERPL-MCNC: 8.6 MG/DL (ref 8.5–10.1)
CHLORIDE SERPL-SCNC: 107 MMOL/L (ref 94–109)
CO2 SERPL-SCNC: 24 MMOL/L (ref 20–32)
CREAT SERPL-MCNC: 0.62 MG/DL (ref 0.52–1.04)
DIFFERENTIAL METHOD BLD: ABNORMAL
EOSINOPHIL # BLD AUTO: 0.1 10E9/L (ref 0–0.7)
EOSINOPHIL NFR BLD AUTO: 1.7 %
ERYTHROCYTE [DISTWIDTH] IN BLOOD BY AUTOMATED COUNT: 13.7 % (ref 10–15)
GFR SERPL CREATININE-BSD FRML MDRD: >90 ML/MIN/1.7M2
GLUCOSE SERPL-MCNC: 86 MG/DL (ref 70–99)
HCT VFR BLD AUTO: 33.8 % (ref 35–47)
HGB BLD-MCNC: 11.2 G/DL (ref 11.7–15.7)
IMM GRANULOCYTES # BLD: 0 10E9/L (ref 0–0.4)
IMM GRANULOCYTES NFR BLD: 0.3 %
INR PPP: 1.07 (ref 0.86–1.14)
LYMPHOCYTES # BLD AUTO: 1.3 10E9/L (ref 0.8–5.3)
LYMPHOCYTES NFR BLD AUTO: 35.9 %
MAGNESIUM SERPL-MCNC: 2 MG/DL (ref 1.6–2.3)
MCH RBC QN AUTO: 29.8 PG (ref 26.5–33)
MCHC RBC AUTO-ENTMCNC: 33.1 G/DL (ref 31.5–36.5)
MCV RBC AUTO: 90 FL (ref 78–100)
MONOCYTES # BLD AUTO: 0.3 10E9/L (ref 0–1.3)
MONOCYTES NFR BLD AUTO: 7.9 %
NEUTROPHILS # BLD AUTO: 1.9 10E9/L (ref 1.6–8.3)
NEUTROPHILS NFR BLD AUTO: 53.6 %
NRBC # BLD AUTO: 0 10*3/UL
NRBC BLD AUTO-RTO: 0 /100
PLATELET # BLD AUTO: 168 10E9/L (ref 150–450)
POTASSIUM SERPL-SCNC: 3.9 MMOL/L (ref 3.4–5.3)
PROT SERPL-MCNC: 7.2 G/DL (ref 6.8–8.8)
RBC # BLD AUTO: 3.76 10E12/L (ref 3.8–5.2)
SODIUM SERPL-SCNC: 140 MMOL/L (ref 133–144)
SPECIMEN EXP DATE BLD: NORMAL
URATE SERPL-MCNC: 3.2 MG/DL (ref 2.6–6)
WBC # BLD AUTO: 3.5 10E9/L (ref 4–11)

## 2018-07-31 PROCEDURE — 25000132 ZZH RX MED GY IP 250 OP 250 PS 637: Performed by: NURSE PRACTITIONER

## 2018-07-31 PROCEDURE — 25000131 ZZH RX MED GY IP 250 OP 636 PS 637: Performed by: NURSE PRACTITIONER

## 2018-07-31 PROCEDURE — 85610 PROTHROMBIN TIME: CPT | Performed by: NURSE PRACTITIONER

## 2018-07-31 PROCEDURE — 3E04305 INTRODUCTION OF OTHER ANTINEOPLASTIC INTO CENTRAL VEIN, PERCUTANEOUS APPROACH: ICD-10-PCS | Performed by: INTERNAL MEDICINE

## 2018-07-31 PROCEDURE — 85730 THROMBOPLASTIN TIME PARTIAL: CPT | Performed by: NURSE PRACTITIONER

## 2018-07-31 PROCEDURE — 86900 BLOOD TYPING SEROLOGIC ABO: CPT | Performed by: NURSE PRACTITIONER

## 2018-07-31 PROCEDURE — 86901 BLOOD TYPING SEROLOGIC RH(D): CPT | Performed by: NURSE PRACTITIONER

## 2018-07-31 PROCEDURE — 25000128 H RX IP 250 OP 636: Performed by: NURSE PRACTITIONER

## 2018-07-31 PROCEDURE — 87081 CULTURE SCREEN ONLY: CPT | Performed by: NURSE PRACTITIONER

## 2018-07-31 PROCEDURE — 86850 RBC ANTIBODY SCREEN: CPT | Performed by: NURSE PRACTITIONER

## 2018-07-31 PROCEDURE — 84550 ASSAY OF BLOOD/URIC ACID: CPT | Performed by: NURSE PRACTITIONER

## 2018-07-31 PROCEDURE — 83735 ASSAY OF MAGNESIUM: CPT | Performed by: NURSE PRACTITIONER

## 2018-07-31 PROCEDURE — 20600000 ZZH R&B BMT

## 2018-07-31 PROCEDURE — 25000125 ZZHC RX 250: Performed by: NURSE PRACTITIONER

## 2018-07-31 PROCEDURE — 25000128 H RX IP 250 OP 636: Performed by: INTERNAL MEDICINE

## 2018-07-31 PROCEDURE — 80053 COMPREHEN METABOLIC PANEL: CPT | Performed by: NURSE PRACTITIONER

## 2018-07-31 PROCEDURE — 85025 COMPLETE CBC W/AUTO DIFF WBC: CPT | Performed by: NURSE PRACTITIONER

## 2018-07-31 RX ORDER — ACETAMINOPHEN 325 MG/1
325-650 TABLET ORAL EVERY 4 HOURS PRN
Status: DISCONTINUED | OUTPATIENT
Start: 2018-07-31 | End: 2018-08-07 | Stop reason: HOSPADM

## 2018-07-31 RX ORDER — MEPERIDINE HYDROCHLORIDE 50 MG/ML
25-50 INJECTION INTRAMUSCULAR; INTRAVENOUS; SUBCUTANEOUS
Status: DISCONTINUED | OUTPATIENT
Start: 2018-08-06 | End: 2018-08-07 | Stop reason: HOSPADM

## 2018-07-31 RX ORDER — SULFAMETHOXAZOLE/TRIMETHOPRIM 800-160 MG
1 TABLET ORAL
Status: DISCONTINUED | OUTPATIENT
Start: 2018-09-03 | End: 2018-08-07 | Stop reason: HOSPADM

## 2018-07-31 RX ORDER — ACETAMINOPHEN 325 MG/1
650 TABLET ORAL ONCE
Status: COMPLETED | OUTPATIENT
Start: 2018-08-06 | End: 2018-08-06

## 2018-07-31 RX ORDER — POTASSIUM CHLORIDE 1.5 G/1.58G
20-40 POWDER, FOR SOLUTION ORAL
Status: DISCONTINUED | OUTPATIENT
Start: 2018-07-31 | End: 2018-08-07 | Stop reason: HOSPADM

## 2018-07-31 RX ORDER — PANTOPRAZOLE SODIUM 40 MG/1
40 TABLET, DELAYED RELEASE ORAL DAILY
Status: DISCONTINUED | OUTPATIENT
Start: 2018-07-31 | End: 2018-08-07 | Stop reason: HOSPADM

## 2018-07-31 RX ORDER — LEVOFLOXACIN 250 MG/1
250 TABLET, FILM COATED ORAL
Status: DISCONTINUED | OUTPATIENT
Start: 2018-08-05 | End: 2018-08-07 | Stop reason: HOSPADM

## 2018-07-31 RX ORDER — POTASSIUM CHLORIDE 29.8 MG/ML
20 INJECTION INTRAVENOUS
Status: DISCONTINUED | OUTPATIENT
Start: 2018-07-31 | End: 2018-08-07 | Stop reason: HOSPADM

## 2018-07-31 RX ORDER — POTASSIUM CHLORIDE 7.45 MG/ML
10 INJECTION INTRAVENOUS
Status: DISCONTINUED | OUTPATIENT
Start: 2018-07-31 | End: 2018-08-07 | Stop reason: HOSPADM

## 2018-07-31 RX ORDER — SODIUM CHLORIDE 9 MG/ML
INJECTION, SOLUTION INTRAVENOUS CONTINUOUS
Status: ACTIVE | OUTPATIENT
Start: 2018-08-05 | End: 2018-08-05

## 2018-07-31 RX ORDER — HEPARIN SODIUM,PORCINE 10 UNIT/ML
5-10 VIAL (ML) INTRAVENOUS EVERY 24 HOURS
Status: DISCONTINUED | OUTPATIENT
Start: 2018-07-31 | End: 2018-08-07 | Stop reason: HOSPADM

## 2018-07-31 RX ORDER — DEXAMETHASONE 4 MG/1
4 TABLET ORAL ONCE
Status: COMPLETED | OUTPATIENT
Start: 2018-08-06 | End: 2018-08-06

## 2018-07-31 RX ORDER — ALLOPURINOL 300 MG/1
300 TABLET ORAL DAILY
Status: COMPLETED | OUTPATIENT
Start: 2018-07-31 | End: 2018-08-05

## 2018-07-31 RX ORDER — LORAZEPAM 2 MG/ML
.5-1 INJECTION INTRAMUSCULAR EVERY 4 HOURS PRN
Status: DISCONTINUED | OUTPATIENT
Start: 2018-07-31 | End: 2018-08-07 | Stop reason: HOSPADM

## 2018-07-31 RX ORDER — POTASSIUM CL/LIDO/0.9 % NACL 10MEQ/0.1L
10 INTRAVENOUS SOLUTION, PIGGYBACK (ML) INTRAVENOUS
Status: DISCONTINUED | OUTPATIENT
Start: 2018-07-31 | End: 2018-08-07 | Stop reason: HOSPADM

## 2018-07-31 RX ORDER — LORAZEPAM 0.5 MG/1
.5-1 TABLET ORAL EVERY 4 HOURS PRN
Status: DISCONTINUED | OUTPATIENT
Start: 2018-07-31 | End: 2018-08-07 | Stop reason: HOSPADM

## 2018-07-31 RX ORDER — ONDANSETRON 8 MG/1
8 TABLET, FILM COATED ORAL EVERY 8 HOURS
Status: DISCONTINUED | OUTPATIENT
Start: 2018-07-31 | End: 2018-08-02

## 2018-07-31 RX ORDER — DEXAMETHASONE 4 MG/1
8 TABLET ORAL ONCE
Status: COMPLETED | OUTPATIENT
Start: 2018-08-04 | End: 2018-08-04

## 2018-07-31 RX ORDER — ACYCLOVIR 800 MG/1
800 TABLET ORAL
Status: DISCONTINUED | OUTPATIENT
Start: 2018-08-02 | End: 2018-08-07 | Stop reason: HOSPADM

## 2018-07-31 RX ORDER — MAGNESIUM SULFATE HEPTAHYDRATE 40 MG/ML
4 INJECTION, SOLUTION INTRAVENOUS EVERY 4 HOURS PRN
Status: DISCONTINUED | OUTPATIENT
Start: 2018-07-31 | End: 2018-08-07 | Stop reason: HOSPADM

## 2018-07-31 RX ORDER — PROCHLORPERAZINE MALEATE 5 MG
5-10 TABLET ORAL EVERY 6 HOURS PRN
Status: DISCONTINUED | OUTPATIENT
Start: 2018-07-31 | End: 2018-08-02

## 2018-07-31 RX ORDER — MAGNESIUM SULFATE HEPTAHYDRATE 40 MG/ML
2 INJECTION, SOLUTION INTRAVENOUS DAILY PRN
Status: DISCONTINUED | OUTPATIENT
Start: 2018-07-31 | End: 2018-08-07 | Stop reason: HOSPADM

## 2018-07-31 RX ORDER — FLUCONAZOLE 200 MG/1
200 TABLET ORAL DAILY
Status: DISCONTINUED | OUTPATIENT
Start: 2018-07-31 | End: 2018-08-07 | Stop reason: HOSPADM

## 2018-07-31 RX ORDER — HEPARIN SODIUM,PORCINE 10 UNIT/ML
5-10 VIAL (ML) INTRAVENOUS
Status: DISCONTINUED | OUTPATIENT
Start: 2018-07-31 | End: 2018-08-07 | Stop reason: HOSPADM

## 2018-07-31 RX ORDER — POTASSIUM CHLORIDE 750 MG/1
20-40 TABLET, EXTENDED RELEASE ORAL
Status: DISCONTINUED | OUTPATIENT
Start: 2018-07-31 | End: 2018-08-07 | Stop reason: HOSPADM

## 2018-07-31 RX ORDER — LIDOCAINE 40 MG/G
CREAM TOPICAL
Status: DISCONTINUED | OUTPATIENT
Start: 2018-07-31 | End: 2018-08-07 | Stop reason: HOSPADM

## 2018-07-31 RX ORDER — DIPHENHYDRAMINE HCL 25 MG
25 CAPSULE ORAL ONCE
Status: COMPLETED | OUTPATIENT
Start: 2018-08-06 | End: 2018-08-06

## 2018-07-31 RX ADMIN — FLUCONAZOLE 200 MG: 200 TABLET ORAL at 18:10

## 2018-07-31 RX ADMIN — ONDANSETRON HYDROCHLORIDE 8 MG: 8 TABLET, FILM COATED ORAL at 20:03

## 2018-07-31 RX ADMIN — CARMUSTINE 543 MG: KIT at 20:11

## 2018-07-31 RX ADMIN — PANTOPRAZOLE SODIUM 40 MG: 40 TABLET, DELAYED RELEASE ORAL at 12:16

## 2018-07-31 RX ADMIN — POTASSIUM CHLORIDE 20 MEQ: 750 TABLET, EXTENDED RELEASE ORAL at 15:33

## 2018-07-31 RX ADMIN — DEXAMETHASONE 10 MG: 2 TABLET ORAL at 20:02

## 2018-07-31 RX ADMIN — ACETAMINOPHEN 650 MG: 325 TABLET, FILM COATED ORAL at 20:03

## 2018-07-31 RX ADMIN — SODIUM CHLORIDE, PRESERVATIVE FREE 10 ML: 5 INJECTION INTRAVENOUS at 12:16

## 2018-07-31 RX ADMIN — ALLOPURINOL 300 MG: 300 TABLET ORAL at 15:32

## 2018-07-31 ASSESSMENT — ACTIVITIES OF DAILY LIVING (ADL)
ADLS_ACUITY_SCORE: 9
ADLS_ACUITY_SCORE: 9
ADLS_ACUITY_SCORE: 15

## 2018-07-31 NOTE — PLAN OF CARE
Problem: Stem Cell/Bone Marrow Transplant (Adult)  Goal: Signs and Symptoms of Listed Potential Problems Will be Absent, Minimized or Managed (Stem Cell/Bone Marrow Transplant)  Signs and symptoms of listed potential problems will be absent, minimized or managed by discharge/transition of care (reference Stem Cell/Bone Marrow Transplant (Adult) [facility]).   Outcome: No Change   07/31/18 1818   Stem Cell/Bone Marrow Transplant   Problems Assessed (Stem Cell/Bone Marrow Transplant) all   Problems Present (Stem Cell/BMT) none       Problem: Patient Care Overview  Goal: Plan of Care/Patient Progress Review  Outcome: No Change  Vitals stable since admit, patient offers no complaints.  Plan to start BCNU tonight at 8pm.

## 2018-07-31 NOTE — H&P
BMT History & Physical     Admission  Name: Amira Avery  Date:  7/31/2018  Service: BMT   Chief Complaint:     Informant:  Patient and Chart  Resuscitation Status: Full Code    Patient ID:  Amira Avery is a 28 year old female, currently day -6 of her HCT.    Transplant Essential Data:  Diagnosis HDN Hodgkin's Disease - NOS  HCT Type Autologous    Prep Regimen BCNU  Lisa-C  Etoposide  Melphalan   Donor Source No data was found    GVHD Prophylaxis No  Clinical Trials      Oncology Treatment History:   She is a 28 years old female diagnosed with classical Hodgkin's lymphoma, initially presented in 02/2007 with left neck mass.  Ultrasound-guided FNA was done in 03/2017, negative for malignancy, however, it continued.  Therefore on 04/19/2017, CT scan was done showing diffuse cervical lymphadenopathy left.  The maximum seemed to be around 2.5 x 2 cm in the mediastinum.  She had also lymphadenopathy in the left axilla, retroperitoneum and mediastinum.  The path confirmed this on 05/30/2017, extensive intense hypermetabolic lymphadenopathy in the neck, left axilla, mediastinum, but nothing below the diaphragm, followed by excisional left cervical lymphadenopathy with lymph nodes showing classical Hodgkin's lymphoma, nodular sclerosis type. She was diagnosed stage IIB.  Although she has lost some weight at that time, ESR was high, greater than 50, IPI was score 1.  She received 6 cycles of ABVD between 06/09/2017 and 11/10/2017.  However, 11/09/2017 CT showed increasing minimal activity within poorly visualized but stable-appearing right cervical chain lymph nodes suggests mild progression of disease.  SUV was maximum of 4.7.  She was followed, and repeat PET/CT was repeated 01/09/2018.  There was new and increasing hypermetabolic right cervical adenopathy suspicious for disease progression.  The lymph nodes are persistent hypermetabolic activity in the tonsillar tissues with maximum 7.9, right  jugulodigastric lymph nodes with SUV 4.0, posterior cervical chain node SUV 6, new 6 mm right posterior cervical chain, maximum SUV is 3.3, new 7 mm right supraclavicular node with maximum SUV of 4.7.  Chest, no abnormal hypermetabolic activity.  Abdomen, no abnormal hypermetabolic activity.  She also had on 02/05/2018 showed enlarged heterogeneous right jugular chain cervical lymph nodes in level 3 and 4 measuring approximately 1.9 x 2 x 2.6 cm and 2 x 1.8 x 2.2 cm are increased since neck CT from 04/21/2017, although may be similar to the PET/CT from 01/09/2018.  Resolution of previously enlarged left cervical lymph nodes on the CT compared to 04/21/2017.  Nasopharynx, oropharynx, tongue base, hypopharynx, larynx were normal, possible small sub-centimeter left thyroid nodule versus artifact, so she started on GDP 02/16/2018, and she had another PET scan 03/28/2018 showing that head and neck there is stable activity in the tonsillar tissue.  There are post-surgical changes in the right neck.  Hypermetabolic right cervical adenopathy in the prior study no longer demonstrated.  There are no new hypermetabolic lesions or lymph nodes in the neck.  No abnormal hypermetabolic activity in the abdomen.  Before starting GDP she underwent right neck excisional biopsy showing recurrent classical Hodgkin lymphoma, nodular-sclerosing.      5/7/2018: Her excisional LN biopsy from R neck is consistent with HD.    Treatment/Chemotherapy Number of Cycles Date Range Outcomes & Complications   ABVD 6 6/9/17-11/10/17 11/9/17  showed increasing minimal activity within poorly visualized but stable-appearing right cervical chain lymph nodes suggests mild progression of disease.  SUV was maximum of 4.7.  She was followed, and repeat PET/CT was repeated 01/09/2018.  There was new and increasing hypermetabolic right cervical adenopathy suspicious for disease progression.  The lymph nodes are persistent hypermetabolic activity in the  tonsillar tissues with maximum 7.9, right jugulodigastric lymph nodes with SUV 4.0, posterior cervical chain node SUV 6, new 6 mm right posterior cervical chain, maximum SUV is 3.3, new 7 mm right supraclavicular node with maximum SUV of 4.7.  Chest, no abnormal hypermetabolic activity.  Abdomen, no abnormal hypermetabolic activity.  She also had on 02/05/2018 showed enlarged heterogeneous right jugular chain cervical lymph nodes in level 3 and 4 measuring approximately 1.9 x 2 x 2.6 cm and 2 x 1.8 x 2.2 cm are increased since neck CT from 04/21/2017, although may be similar to the PET/CT from 01/09/2018.  Resolution of previously enlarged left cervical lymph nodes on the CT compared to 04/21/2017.  Nasopharynx, oropharynx, tongue base, hypopharynx, larynx were normal, possible small sub-centimeter left thyroid nodule versus artifact   GDP  2/16/18 PET scan 03/28/2018 showing that head and neck there is stable activity in the tonsillar tissue.  There are post-surgical changes in the right neck.  Hypermetabolic right cervical adenopathy in the prior study no longer demonstrated.  There are no new hypermetabolic lesions or lymph nodes in the neck.  No abnormal hypermetabolic activity in the abdomen.  Before starting GDP she underwent right neck excisional biopsy showing recurrent classical Hodgkin lymphoma, nodular-sclerosing.   5/7/2018: Her excisional LN biopsy from R neck is concicent with HD.   Rituximab in combo with PD1 inhibition  6/22/18 A CT PET repeated in her primary oncologist's office shows complete remission.  This CT scan and PET were reviewed here and they agree with that finding.             Bone Marrow Workup Results:  Blood Counts Recent Labs   Lab Test  07/31/18   1021   WBC  3.5*   ANEU  1.9   ALYM  1.3   SHELLEY  0.3   AEOS  0.1   HGB  11.2*   HCT  33.8*   PLT  168      Bone Marrow bone marrow biopsy with 40% cellularity and no evidence of lymphoma.    Blood Type Recent Labs   Lab Test  07/31/18    1021   ABO  PENDING      Chemistries Recent Labs   Lab Test  07/31/18   1021   NA  140   POTASSIUM  3.9   CHLORIDE  107   CO2  24   BUN  12   CR  0.62      Liver Tests Recent Labs   Lab Test  07/31/18   1021   BILITOTAL  0.4   ALKPHOS  74   AST  16   ALT  36      PET/CT: CR   Chest X-Ray: No acute cardiopulmonary findings     Chest CT    PFTs FVC%  Recent Labs   Lab Test  07/09/18   0835  04/23/18   0842   20003  135  130       FEV1%  Recent Labs   Lab Test  07/09/18   0835  04/23/18   0842   20016  131  125       DLCO%  Recent Labs   Lab Test  07/09/18   0835  04/23/18   0842   99816  114  98      ECHO or MUGA: EF 61%   EKG  sinus rhythm   Serologies: CMV +, EBV +, HSV +      Vitamin D No results for input(s): VITDT in the last 17958 hours.     Family History:   Family History   Problem Relation Age of Onset     Hypertension Father      Coronary Artery Disease Maternal Grandmother      Colon Cancer Maternal Grandmother      Cancer Maternal Grandmother      HEART DISEASE Maternal Grandmother        Social History:   Social History     Social History     Marital status: Single     Spouse name: N/A     Number of children: N/A     Years of education: N/A     Occupational History     Not on file.     Social History Main Topics     Smoking status: Former Smoker     Packs/day: 1.00     Years: 10.00     Types: Cigarettes, Hookah     Start date: 7/15/2007     Quit date: 10/15/2017     Smokeless tobacco: Never Used     Alcohol use No     Drug use: No     Sexual activity: Yes     Partners: Female     Birth control/ protection: None     Other Topics Concern     Not on file     Social History Narrative       Past Medical History:   Past Medical History:   Diagnosis Date     Cancer (H)      Tattoos         Past Surgical History:   Past Surgical History:   Procedure Laterality Date     BIOPSY LYMPH NODE CERVICAL Right 5/2/2018    Procedure: BIOPSY LYMPH NODE CERVICAL;  Right Excisional Node Biopsy;  Surgeon: Lia Silva,  "MD;  Location: [facility] OR     excision of deep cervical LN's Left 05/19/2017    positive for Hodgkin's     excision of deep cervical LN's Right 02/13/2018    positive for Hodgkin's     port a cath placement Right 06/01/2017    IJ vein; removed 12/1/17 no longer needed     port a cath placement Left 02/21/2018    IJ vein     US BIOPSY LYMPH NODE FNA Left 03/06/2017    low midline neck, negative for malignancy       Allergies:   Allergies   Allergen Reactions     Morphine Hives     No issues with oral narcotics, dilaudid, or fentanyl per pt       Home Medications      Prior to Admission medications    Medication Sig Start Date End Date Taking? Authorizing Provider   loratadine (CLARITIN) 10 MG tablet Take 10 mg by mouth daily   Yes Reported, Patient   acetaminophen (TYLENOL) 500 MG tablet Take by mouth every 6 hours as needed     Reported, Patient       Review of Systems    Review of Systems:  10pt ROS otherwise negative    PHYSICAL EXAM      Weight     Wt Readings from Last 3 Encounters:   07/31/18 73.3 kg (161 lb 9.6 oz)   07/25/18 73.8 kg (162 lb 12.8 oz)   07/24/18 72.5 kg (159 lb 13.3 oz)          /67  Pulse 78  Temp 97.5  F (36.4  C) (Axillary)  Resp 16  Ht 1.605 m (5' 3.19\")  Wt 73.3 kg (161 lb 9.6 oz)  SpO2 98%  BMI 28.45 kg/m2     General: NAD   Eyes: ARMINDA, sclera anicteric   Nose/Mouth/Throat: OP clear, buccal mucosa moist, no ulcerations   Lungs: CTA bilaterally  Cardiovascular: RRR, no M/R/G   Abdominal/Rectal: +BS, soft, NT, ND, No HSM   Lymphatics: No edema  Skin: No rashes or petechaie  Neuro: A&O   Additional Findings: Wang site NT, no drainage.    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    Prep:   D-6 BCNU  D-5 to D-2 Lisa-cx2 and -16x2  D-1 Transplant  8/6 Day 0    1.  BMT: NSHL GCSF + mozobil priming prior to BEAM Auto PBSCT  - Collected 6.21million CD34/kg " on 7/24  - Prep as above. Allopurinol through day 0. Flush and pre-meds prior to transplant. GCSF starts d+5 and cont to until ANC>2500 x2 consecutive days. Re-stage per protocol.    2.  HEME: Keep Hgb>8 and plts>10K. No pre-meds.  Platelet intercept study                            3.  ID: Afebrile. Cont Levo, Fluc, and HD ACV (CMV+, HSV+, EBV+) prophy. Bactrim or appropriate PCP therapy to start d+28.                                             4.  GI: Zofran and dexamethasone prior to chemo to prevent N/V. Ativan and compazine available PRN break-through symptoms. Protonix for GI prophy.    5.  FEN/Renal: Monitor creat and lytes. Replete lytes PRN per SS. Monitor weight, I+O, lytes per protocol with IVF flush.    Mandy Jovita Castro      Patient has been seen and evaluated by me. I have reviewed today's vital signs, medications, labs and imaging results independently. I have discussed the plan with the team and agree with the findings and plan in this note.      28 years old female, relapsed Hodgkins disease, admitted for autologous transplant.  Conditioning as above. Zofran and dex for nausea. ID: Prophy with levaquin, fluc and acyclovir  Alma Garcia MD

## 2018-07-31 NOTE — PROGRESS NOTES
Patient admitted to:  room [id number]  Admitted from: home  Arrived by: personal transportation  Reason for admission: scheduled admission for auto transplant for Hodgkins Lymphoma  Patient accompanied by: Dana petersen  Belongings: in room with patient  Teaching: admission teaching per unit routine

## 2018-08-01 ENCOUNTER — APPOINTMENT (OUTPATIENT)
Dept: OCCUPATIONAL THERAPY | Facility: CLINIC | Age: 28
DRG: 017 | End: 2018-08-01
Attending: NURSE PRACTITIONER
Payer: COMMERCIAL

## 2018-08-01 LAB
ANION GAP SERPL CALCULATED.3IONS-SCNC: 8 MMOL/L (ref 3–14)
BASOPHILS # BLD AUTO: 0 10E9/L (ref 0–0.2)
BASOPHILS NFR BLD AUTO: 0 %
BUN SERPL-MCNC: 11 MG/DL (ref 7–30)
CALCIUM SERPL-MCNC: 8.9 MG/DL (ref 8.5–10.1)
CHLORIDE SERPL-SCNC: 108 MMOL/L (ref 94–109)
CMV DNA SPEC NAA+PROBE-ACNC: NORMAL [IU]/ML
CMV DNA SPEC NAA+PROBE-LOG#: NORMAL {LOG_IU}/ML
CO2 SERPL-SCNC: 24 MMOL/L (ref 20–32)
CREAT SERPL-MCNC: 0.68 MG/DL (ref 0.52–1.04)
DIFFERENTIAL METHOD BLD: ABNORMAL
EOSINOPHIL # BLD AUTO: 0 10E9/L (ref 0–0.7)
EOSINOPHIL NFR BLD AUTO: 0 %
ERYTHROCYTE [DISTWIDTH] IN BLOOD BY AUTOMATED COUNT: 13.3 % (ref 10–15)
GFR SERPL CREATININE-BSD FRML MDRD: >90 ML/MIN/1.7M2
GLUCOSE SERPL-MCNC: 165 MG/DL (ref 70–99)
HCT VFR BLD AUTO: 36 % (ref 35–47)
HGB BLD-MCNC: 11.7 G/DL (ref 11.7–15.7)
IMM GRANULOCYTES # BLD: 0 10E9/L (ref 0–0.4)
IMM GRANULOCYTES NFR BLD: 0.1 %
LYMPHOCYTES # BLD AUTO: 0.6 10E9/L (ref 0.8–5.3)
LYMPHOCYTES NFR BLD AUTO: 8.7 %
MAGNESIUM SERPL-MCNC: 2 MG/DL (ref 1.6–2.3)
MCH RBC QN AUTO: 29.1 PG (ref 26.5–33)
MCHC RBC AUTO-ENTMCNC: 32.5 G/DL (ref 31.5–36.5)
MCV RBC AUTO: 90 FL (ref 78–100)
MONOCYTES # BLD AUTO: 0 10E9/L (ref 0–1.3)
MONOCYTES NFR BLD AUTO: 0.3 %
NEUTROPHILS # BLD AUTO: 6.4 10E9/L (ref 1.6–8.3)
NEUTROPHILS NFR BLD AUTO: 90.9 %
NRBC # BLD AUTO: 0 10*3/UL
NRBC BLD AUTO-RTO: 0 /100
PHOSPHATE SERPL-MCNC: 2.9 MG/DL (ref 2.5–4.5)
PLATELET # BLD AUTO: 175 10E9/L (ref 150–450)
POTASSIUM SERPL-SCNC: 4 MMOL/L (ref 3.4–5.3)
RBC # BLD AUTO: 4.02 10E12/L (ref 3.8–5.2)
SODIUM SERPL-SCNC: 140 MMOL/L (ref 133–144)
SPECIMEN SOURCE: NORMAL
WBC # BLD AUTO: 7 10E9/L (ref 4–11)

## 2018-08-01 PROCEDURE — 97110 THERAPEUTIC EXERCISES: CPT | Mod: GO | Performed by: OCCUPATIONAL THERAPIST

## 2018-08-01 PROCEDURE — 25000125 ZZHC RX 250: Performed by: NURSE PRACTITIONER

## 2018-08-01 PROCEDURE — 83735 ASSAY OF MAGNESIUM: CPT | Performed by: NURSE PRACTITIONER

## 2018-08-01 PROCEDURE — 40000893 ZZH STATISTIC PT IP EVAL DEFER: Performed by: PHYSICAL THERAPIST

## 2018-08-01 PROCEDURE — 84100 ASSAY OF PHOSPHORUS: CPT | Performed by: NURSE PRACTITIONER

## 2018-08-01 PROCEDURE — 40000133 ZZH STATISTIC OT WARD VISIT: Performed by: OCCUPATIONAL THERAPIST

## 2018-08-01 PROCEDURE — 97530 THERAPEUTIC ACTIVITIES: CPT | Mod: GO | Performed by: OCCUPATIONAL THERAPIST

## 2018-08-01 PROCEDURE — 25000128 H RX IP 250 OP 636: Performed by: NURSE PRACTITIONER

## 2018-08-01 PROCEDURE — 25000132 ZZH RX MED GY IP 250 OP 250 PS 637: Performed by: NURSE PRACTITIONER

## 2018-08-01 PROCEDURE — 25000131 ZZH RX MED GY IP 250 OP 636 PS 637: Performed by: NURSE PRACTITIONER

## 2018-08-01 PROCEDURE — 85025 COMPLETE CBC W/AUTO DIFF WBC: CPT | Performed by: NURSE PRACTITIONER

## 2018-08-01 PROCEDURE — 20600000 ZZH R&B BMT

## 2018-08-01 PROCEDURE — 25000128 H RX IP 250 OP 636: Performed by: INTERNAL MEDICINE

## 2018-08-01 PROCEDURE — 80048 BASIC METABOLIC PNL TOTAL CA: CPT | Performed by: NURSE PRACTITIONER

## 2018-08-01 PROCEDURE — 97165 OT EVAL LOW COMPLEX 30 MIN: CPT | Mod: GO | Performed by: OCCUPATIONAL THERAPIST

## 2018-08-01 RX ADMIN — LORAZEPAM 0.5 MG: 0.5 TABLET ORAL at 23:43

## 2018-08-01 RX ADMIN — DEXAMETHASONE 10 MG: 2 TABLET ORAL at 08:06

## 2018-08-01 RX ADMIN — CYTARABINE 180 MG: 100 INJECTION, SOLUTION INTRATHECAL; INTRAVENOUS; SUBCUTANEOUS at 22:34

## 2018-08-01 RX ADMIN — ALLOPURINOL 300 MG: 300 TABLET ORAL at 08:06

## 2018-08-01 RX ADMIN — FLUCONAZOLE 200 MG: 200 TABLET ORAL at 08:06

## 2018-08-01 RX ADMIN — CYTARABINE 180 MG: 100 INJECTION, SOLUTION INTRATHECAL; INTRAVENOUS; SUBCUTANEOUS at 10:20

## 2018-08-01 RX ADMIN — LORAZEPAM 0.5 MG: 0.5 TABLET ORAL at 22:44

## 2018-08-01 RX ADMIN — SODIUM CHLORIDE, PRESERVATIVE FREE 10 ML: 5 INJECTION INTRAVENOUS at 12:10

## 2018-08-01 RX ADMIN — MAGNESIUM SULFATE HEPTAHYDRATE 2 G: 40 INJECTION, SOLUTION INTRAVENOUS at 06:37

## 2018-08-01 RX ADMIN — PROCHLORPERAZINE MALEATE 5 MG: 5 TABLET, FILM COATED ORAL at 12:10

## 2018-08-01 RX ADMIN — PANTOPRAZOLE SODIUM 40 MG: 40 TABLET, DELAYED RELEASE ORAL at 08:06

## 2018-08-01 RX ADMIN — PROCHLORPERAZINE MALEATE 5 MG: 5 TABLET, FILM COATED ORAL at 08:17

## 2018-08-01 RX ADMIN — ETOPOSIDE 180 MG: 20 INJECTION, SOLUTION, CONCENTRATE INTRAVENOUS at 20:16

## 2018-08-01 RX ADMIN — ETOPOSIDE 180 MG: 20 INJECTION, SOLUTION, CONCENTRATE INTRAVENOUS at 08:04

## 2018-08-01 RX ADMIN — PROCHLORPERAZINE MALEATE 5 MG: 5 TABLET, FILM COATED ORAL at 19:21

## 2018-08-01 RX ADMIN — POTASSIUM CHLORIDE 20 MEQ: 750 TABLET, EXTENDED RELEASE ORAL at 08:06

## 2018-08-01 ASSESSMENT — ACTIVITIES OF DAILY LIVING (ADL)
PREVIOUS_RESPONSIBILITIES: MEAL PREP;HOUSEKEEPING;LAUNDRY;SHOPPING;DRIVING
ADLS_ACUITY_SCORE: 9

## 2018-08-01 NOTE — PLAN OF CARE
Problem: Patient Care Overview  Goal: Plan of Care/Patient Progress Review  PT 5C: DEFER- Per chart review and discussion with OT, pt independent with mobility. OT following during inpatient stay for BMT to assist with UE/LE strength and aerobic exercise programs, education on lab values/exercise modifications. PT order completed.

## 2018-08-01 NOTE — PLAN OF CARE
Problem: Stem Cell/Bone Marrow Transplant (Adult)  Goal: Signs and Symptoms of Listed Potential Problems Will be Absent, Minimized or Managed (Stem Cell/Bone Marrow Transplant)  Signs and symptoms of listed potential problems will be absent, minimized or managed by discharge/transition of care (reference Stem Cell/Bone Marrow Transplant (Adult) [facility]).   Outcome: No Change   08/01/18 1305   Stem Cell/Bone Marrow Transplant   Problems Assessed (Stem Cell/Bone Marrow Transplant) all   Problems Present (Stem Cell/BMT) none       Problem: Patient Care Overview  Goal: Plan of Care/Patient Progress Review  Outcome: No Change   08/01/18 1305   OTHER   Plan Of Care Reviewed With patient;significant other   Plan of Care Review   Progress no change     Afebrile, OVSS on RA. A&Ox4. Up ind, amb halls w/ mask. Duglas pain, N/V/D. Endorses feeling some constipation, given PRN compazine instead of scheduled zofran to alleviate further discomfort. Appetite fair. Good UOP. CVC line dressing changed. Received etoposide and cytarabine w/out issue. Selina Cope, at bedside. Will continue to monitor per POC.

## 2018-08-01 NOTE — PLAN OF CARE
Problem: Patient Care Overview  Goal: Plan of Care/Patient Progress Review  Discharge Planner OT   Patient plan for discharge: Home w/ assist  Current status: Eval completed and tx initiated. Pt provided auto BMT folder with activity guidelines, lab value information, BUE/BLE exercises, and cancer rehab handout. Pt ind in all functional transfers and in-room ambulation, demonstrating a steady gait. Pt given blue theraband and resistive exercise handout for additional strengthening therex.  Barriers to return to prior living situation: Medical status  Recommendations for discharge: Home w/ assist prn  Rationale for recommendations: To further increase pt's ind participation in ADLs/IADLs prn       Entered by: Gina Boswell 08/01/2018 1:32 PM

## 2018-08-01 NOTE — PLAN OF CARE
"Problem: Stem Cell/Bone Marrow Transplant (Adult)  Goal: Signs and Symptoms of Listed Potential Problems Will be Absent, Minimized or Managed (Stem Cell/Bone Marrow Transplant)  Signs and symptoms of listed potential problems will be absent, minimized or managed by discharge/transition of care (reference Stem Cell/Bone Marrow Transplant (Adult) [facility]).   Outcome: No Change   08/01/18 0621   Stem Cell/Bone Marrow Transplant   Problems Assessed (Stem Cell/Bone Marrow Transplant) all   Problems Present (Stem Cell/BMT) none       Problem: Patient Care Overview  Goal: Plan of Care/Patient Progress Review  Outcome: No Change   08/01/18 0621   OTHER   Plan Of Care Reviewed With patient;significant other   Plan of Care Review   Progress no change       Comments: Blood pressure 103/66, pulse 75, temperature 97.6  F (36.4  C), temperature source Axillary, resp. rate 16, height 1.605 m (5' 3.19\"), weight 73.3 kg (161 lb 9.6 oz), SpO2 98 %.    Patient vitals stable; alert and oriented x4; stable on room air.  Denies pain/nausea/diarhea.  BCNU infusion completed tonight with no issues.  Patient tolerated well.  Fiancee at bedside all night.  Patient is independent with daily cares; up ad hosea.  Voiding adequate amounts.  Tolerating regular diet.  2g Magnesium replaced this morning per protocol.  Continue to monitor per BMT plan of care.  "

## 2018-08-01 NOTE — PLAN OF CARE
Problem: Patient Care Overview  Goal: Discharge Needs Assessment  Social Work Note  1. Continue to provide support and assessment re: patient's adjustment to illness and coping.  2. Continue to provide support and assessment re: caregiver's adjustment to role of caregiver and coping.  3. Continue to complete ongoing needs assessment for appropriate resources.  4. Patient will remain involved with discharge planning

## 2018-08-01 NOTE — PROGRESS NOTES
"  Patient ID:  Amira Avery is a 28 year old female, currently day -5 of her HCT.  Interval History:   Tolerated BCNU yesterday with mainly HA as side effect. Still residual HA this morning. No other new complaints. Etoposide running already this morning.   10 pt ROS otherwise negative    PHYSICAL EXAM      Weight     Wt Readings from Last 3 Encounters:   08/01/18 70.6 kg (155 lb 11.2 oz)   07/25/18 73.8 kg (162 lb 12.8 oz)   07/24/18 72.5 kg (159 lb 13.3 oz)          /64 (BP Location: Right arm)  Pulse 65  Temp 97.7  F (36.5  C) (Axillary)  Resp 16  Ht 1.605 m (5' 3.19\")  Wt 70.6 kg (155 lb 11.2 oz)  SpO2 97%  BMI 27.42 kg/m2   General: NAD   Eyes: ARMINDA, sclera anicteric   Nose/Mouth/Throat: OP clear, buccal mucosa moist, no ulcerations   Lungs: CTA bilaterally  Cardiovascular: RRR, no M/R/G   Abdominal/Rectal: +BS, soft, NT, ND, No HSM   Lymphatics: No edema  Skin: No rashes or petechaie  Neuro: A&O   Additional Findings: Wang site NT, no drainage.    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 D-5    Prep:   D-6 BCNU  D-5 to D-2 Lisa-cx2 and -16x2  D-1 Transplant  8/6 Day 0    1.  BMT: NSHL GCSF + mozobil priming prior to BEAM Auto PBSCT  - Collected 6.21million CD34/kg on 7/24  - Prep as above. Allopurinol through day 0. Flush and pre-meds prior to transplant. GCSF starts d+5 and cont to until ANC>2500 x2 consecutive days. Re-stage per protocol.    2.  HEME: Keep Hgb>8 and plts>10K. No pre-meds.  Platelet intercept study                            3.  ID: Afebrile. Cont Levo, Fluc, and HD ACV (CMV+, HSV+, EBV+) prophy. Bactrim or appropriate PCP therapy to start d+28.                                             4.  GI: Zofran and dexamethasone prior to chemo to prevent N/V. Ativan and compazine available PRN break-through symptoms. Protonix for GI " prophy.    5.  FEN/Renal: Monitor creat and lytes. Replete lytes PRN per SS. Monitor weight, I+O, lytes per protocol with IVF flush.    Mandy Jovita Castro    Patient has been seen and evaluated by me. I have reviewed today's vital signs, medications, labs and imaging results independently. I have discussed the plan with the team and agree with the findings and plan in this note.       28 years old female, relapsed Hodgkins disease, admitted for autologous transplant.  Conditioning as above. Zofran and dex for nausea. ID: Prophy with levaquin, fluc and acyclovir  Alma Garcia MD
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
## 2018-08-01 NOTE — PROGRESS NOTES
Type of chemo infused: Etoposide and cytarabine  Pt tolerated infusion: Well   Interventions: N/A  Response to interventions used: N/A  Plan: Monitor pt, providing supportive cares as necessary.

## 2018-08-01 NOTE — PROGRESS NOTES
08/01/18 0900   Quick Adds   Type of Visit Initial Occupational Therapy Evaluation   Living Environment   Lives With significant other   Living Arrangements apartment   Home Accessibility no concerns   Number of Stairs to Enter Home 36   Number of Stairs Within Home 0   Stair Railings at Home other (see comments)  (Pt did not report railings)   Transportation Available car;public transportation   Living Environment Comment Pt lives with SO in an apartment on the third floor of the building with 3 flights of stairs to enter the home, no elevator.    Self-Care   Dominant Hand right   Usual Activity Tolerance good   Current Activity Tolerance good   Regular Exercise yes   Activity/Exercise Type strength training;walking   Exercise Amount/Frequency 5-7 times/wk   Equipment Currently Used at Home none   Activity/Exercise/Self-Care Comment Pt reported enjoying lifting weights as much as possible when she feels up for the activity, otherwise she tries to stay active by walking.   Functional Level Prior   Ambulation 0-->independent   Transferring 0-->independent   Toileting 0-->independent   Bathing 0-->independent   Dressing 0-->independent   Eating 0-->independent   Communication 0-->understands/communicates without difficulty   Swallowing 0-->swallows foods/liquids without difficulty   Cognition 0 - no cognition issues reported   Fall history within last six months no   Which of the above functional risks had a recent onset or change? none   Prior Functional Level Comment Pt reports independence in all ADLs/IADLs. Pt reported no current employment.       Present no   Language English   General Information   Onset of Illness/Injury or Date of Surgery - Date 07/31/18   Referring Physician Mandy Castro APRN CNP   Patient/Family Goals Statement To return home and back to previous activities.   Additional Occupational Profile Info/Pertinent History of Current Problem Amira Avery is  a 28 year old female, currently day -5 of her auto BMT HCT.   Precautions/Limitations immunosuppressed   Weight-Bearing Status - LUE full weight-bearing   Weight-Bearing Status - RUE full weight-bearing   Weight-Bearing Status - LLE full weight-bearing   Cognitive Status Examination   Orientation orientation to person, place and time   Level of Consciousness alert   Able to Follow Commands WNL/WFL   Personal Safety (Cognitive) WNL/WFL   Memory intact   Attention No deficits were identified   Organization/Problem Solving No deficits were identified   Executive Function No deficits were identified   Cognitive Comment OT: No cognitive concerns noted.   Visual Perception   Visual Perception No deficits were identified   Visual Perception Comments OT: Pt reports vision is WNL   Sensory Examination   Sensory Quick Adds No deficits were identified   Sensory Comments OT: Pt reports sensation is WNL.   Pain Assessment   Patient Currently in Pain No   Integumentary/Edema   Integumentary/Edema no deficits were identifed   Posture   Posture not impaired   Range of Motion (ROM)   ROM Quick Adds No deficits were identified   Strength   Manual Muscle Testing Quick Adds No deficits were identified   Muscle Tone Assessment   Muscle Tone Quick Adds No deficits were identified   Coordination   Upper Extremity Coordination No deficits were identified   Mobility   Bed Mobility Bed mobility skill: Supine to sit   Bed Mobility Skill: Supine to Sit   Level of [place]: Supine/Sit independent   Transfer Skill: Bed to Chair/Chair to Bed   Level of [place]: Bed to Chair independent   Transfer Skill: Sit to Stand   Level of [place]: Sit/Stand independent   Transfer Skill: Toilet Transfer   Level of [place]: Toilet independent   Balance   Balance Quick Add No deficits identified   Balance Comments OT: Pt's balance appears steady and WNL. No LOB during functional transfers and in-room ambulation.   Instrumental Activities of  "Daily Living (IADL)   Previous Responsibilities meal prep;housekeeping;laundry;shopping;driving   IADL Comments OT: Pt reports being ind in ADLs.   Activities of Daily Living Analysis   Impairments Contributing to Impaired Activities of Daily Living strength decreased;balance impaired   General Therapy Interventions   Planned Therapy Interventions ADL retraining;IADL retraining;ROM;strengthening;stretching;home program guidelines;progressive activity/exercise;risk factor education   Clinical Impression   Criteria for Skilled Therapeutic Interventions Met yes, treatment indicated   OT Diagnosis Pt with decreased ind in ADLs/IADLs   Influenced by the following impairments strength, fatigue   Assessment of Occupational Performance 1-3 Performance Deficits   Identified Performance Deficits Decreased ind in ADLs/IADLs due to strength and fatigue from BMT process   Clinical Decision Making (Complexity) Low complexity   Therapy Frequency 3 times/wk   Predicted Duration of Therapy Intervention (days/wks) 8/13/18   Anticipated Discharge Disposition Home with Assist   Risks and Benefits of Treatment have been explained. Yes   Patient, Family & other staff in agreement with plan of care Yes   [facility] AM-PAC  \"6 Clicks\" Daily Activity Inpatient Short Form   1. Putting on and taking off regular lower body clothing? 4 - None   2. Bathing (including washing, rinsing, drying)? 4 - None   3. Toileting, which includes using toilet, bedpan or urinal? 4 - None   4. Putting on and taking off regular upper body clothing? 4 - None   5. Taking care of personal grooming such as brushing teeth? 4 - None   6. Eating meals? 4 - None   Daily Activity Raw Score (Score out of 24.Lower scores equate to lower levels of function) 24   Total Evaluation Time   Total Evaluation Time (Minutes) 4     "

## 2018-08-02 ENCOUNTER — CARE COORDINATION (OUTPATIENT)
Dept: TRANSPLANT | Facility: CLINIC | Age: 28
End: 2018-08-02

## 2018-08-02 LAB
ANION GAP SERPL CALCULATED.3IONS-SCNC: 10 MMOL/L (ref 3–14)
BACTERIA SPEC CULT: NORMAL
BASOPHILS # BLD AUTO: 0 10E9/L (ref 0–0.2)
BASOPHILS NFR BLD AUTO: 0 %
BUN SERPL-MCNC: 15 MG/DL (ref 7–30)
CALCIUM SERPL-MCNC: 8.6 MG/DL (ref 8.5–10.1)
CHLORIDE SERPL-SCNC: 108 MMOL/L (ref 94–109)
CO2 SERPL-SCNC: 22 MMOL/L (ref 20–32)
CREAT SERPL-MCNC: 0.66 MG/DL (ref 0.52–1.04)
DIFFERENTIAL METHOD BLD: ABNORMAL
EOSINOPHIL # BLD AUTO: 0 10E9/L (ref 0–0.7)
EOSINOPHIL NFR BLD AUTO: 0 %
ERYTHROCYTE [DISTWIDTH] IN BLOOD BY AUTOMATED COUNT: 13.6 % (ref 10–15)
GFR SERPL CREATININE-BSD FRML MDRD: >90 ML/MIN/1.7M2
GLUCOSE SERPL-MCNC: 129 MG/DL (ref 70–99)
HCT VFR BLD AUTO: 32.9 % (ref 35–47)
HGB BLD-MCNC: 11 G/DL (ref 11.7–15.7)
IMM GRANULOCYTES # BLD: 0 10E9/L (ref 0–0.4)
IMM GRANULOCYTES NFR BLD: 0.2 %
LYMPHOCYTES # BLD AUTO: 0.6 10E9/L (ref 0.8–5.3)
LYMPHOCYTES NFR BLD AUTO: 5.5 %
Lab: NORMAL
MAGNESIUM SERPL-MCNC: 2.2 MG/DL (ref 1.6–2.3)
MCH RBC QN AUTO: 29.7 PG (ref 26.5–33)
MCHC RBC AUTO-ENTMCNC: 33.4 G/DL (ref 31.5–36.5)
MCV RBC AUTO: 89 FL (ref 78–100)
MONOCYTES # BLD AUTO: 0.3 10E9/L (ref 0–1.3)
MONOCYTES NFR BLD AUTO: 2.7 %
NEUTROPHILS # BLD AUTO: 9.1 10E9/L (ref 1.6–8.3)
NEUTROPHILS NFR BLD AUTO: 91.6 %
NRBC # BLD AUTO: 0 10*3/UL
NRBC BLD AUTO-RTO: 0 /100
PLATELET # BLD AUTO: 207 10E9/L (ref 150–450)
POTASSIUM SERPL-SCNC: 3.8 MMOL/L (ref 3.4–5.3)
RBC # BLD AUTO: 3.7 10E12/L (ref 3.8–5.2)
SODIUM SERPL-SCNC: 140 MMOL/L (ref 133–144)
SPECIMEN SOURCE: NORMAL
WBC # BLD AUTO: 10 10E9/L (ref 4–11)

## 2018-08-02 PROCEDURE — 20000002 ZZH R&B BMT INTERMEDIATE

## 2018-08-02 PROCEDURE — 83735 ASSAY OF MAGNESIUM: CPT | Performed by: NURSE PRACTITIONER

## 2018-08-02 PROCEDURE — 80048 BASIC METABOLIC PNL TOTAL CA: CPT | Performed by: NURSE PRACTITIONER

## 2018-08-02 PROCEDURE — 85025 COMPLETE CBC W/AUTO DIFF WBC: CPT | Performed by: NURSE PRACTITIONER

## 2018-08-02 PROCEDURE — 25000128 H RX IP 250 OP 636: Performed by: NURSE PRACTITIONER

## 2018-08-02 PROCEDURE — 25000132 ZZH RX MED GY IP 250 OP 250 PS 637: Performed by: NURSE PRACTITIONER

## 2018-08-02 PROCEDURE — 25000131 ZZH RX MED GY IP 250 OP 636 PS 637: Performed by: NURSE PRACTITIONER

## 2018-08-02 PROCEDURE — 25000128 H RX IP 250 OP 636: Performed by: INTERNAL MEDICINE

## 2018-08-02 PROCEDURE — 25000132 ZZH RX MED GY IP 250 OP 250 PS 637: Performed by: STUDENT IN AN ORGANIZED HEALTH CARE EDUCATION/TRAINING PROGRAM

## 2018-08-02 RX ORDER — POLYETHYLENE GLYCOL 3350 17 G/17G
17 POWDER, FOR SOLUTION ORAL DAILY
Status: DISCONTINUED | OUTPATIENT
Start: 2018-08-02 | End: 2018-08-05

## 2018-08-02 RX ORDER — ONDANSETRON 8 MG/1
8 TABLET, ORALLY DISINTEGRATING ORAL EVERY 8 HOURS PRN
Status: DISCONTINUED | OUTPATIENT
Start: 2018-08-02 | End: 2018-08-07 | Stop reason: HOSPADM

## 2018-08-02 RX ORDER — HEPARIN SODIUM (PORCINE) LOCK FLUSH IV SOLN 100 UNIT/ML 100 UNIT/ML
5 SOLUTION INTRAVENOUS
Status: DISCONTINUED | OUTPATIENT
Start: 2018-08-28 | End: 2018-08-07 | Stop reason: HOSPADM

## 2018-08-02 RX ORDER — SENNOSIDES 8.6 MG
8.6 TABLET ORAL 2 TIMES DAILY PRN
Status: DISCONTINUED | OUTPATIENT
Start: 2018-08-02 | End: 2018-08-07 | Stop reason: HOSPADM

## 2018-08-02 RX ORDER — PROCHLORPERAZINE MALEATE 5 MG
5 TABLET ORAL EVERY 6 HOURS
Status: DISCONTINUED | OUTPATIENT
Start: 2018-08-02 | End: 2018-08-07 | Stop reason: HOSPADM

## 2018-08-02 RX ADMIN — PROCHLORPERAZINE MALEATE 10 MG: 5 TABLET, FILM COATED ORAL at 08:12

## 2018-08-02 RX ADMIN — SENNOSIDES 8.6 MG: 8.6 TABLET, FILM COATED ORAL at 12:07

## 2018-08-02 RX ADMIN — FLUCONAZOLE 200 MG: 200 TABLET ORAL at 08:11

## 2018-08-02 RX ADMIN — PROCHLORPERAZINE MALEATE 5 MG: 5 TABLET, FILM COATED ORAL at 15:42

## 2018-08-02 RX ADMIN — CYTARABINE 180 MG: 100 INJECTION, SOLUTION INTRATHECAL; INTRAVENOUS; SUBCUTANEOUS at 10:27

## 2018-08-02 RX ADMIN — ACYCLOVIR 800 MG: 800 TABLET ORAL at 19:42

## 2018-08-02 RX ADMIN — ETOPOSIDE 180 MG: 20 INJECTION, SOLUTION, CONCENTRATE INTRAVENOUS at 20:40

## 2018-08-02 RX ADMIN — POTASSIUM CHLORIDE 20 MEQ: 750 TABLET, EXTENDED RELEASE ORAL at 08:11

## 2018-08-02 RX ADMIN — ACYCLOVIR 800 MG: 800 TABLET ORAL at 15:41

## 2018-08-02 RX ADMIN — ACYCLOVIR 800 MG: 800 TABLET ORAL at 08:12

## 2018-08-02 RX ADMIN — PANTOPRAZOLE SODIUM 40 MG: 40 TABLET, DELAYED RELEASE ORAL at 08:11

## 2018-08-02 RX ADMIN — SODIUM CHLORIDE, PRESERVATIVE FREE 5 ML: 5 INJECTION INTRAVENOUS at 12:02

## 2018-08-02 RX ADMIN — ALLOPURINOL 300 MG: 300 TABLET ORAL at 08:12

## 2018-08-02 RX ADMIN — ETOPOSIDE 180 MG: 20 INJECTION, SOLUTION, CONCENTRATE INTRAVENOUS at 08:10

## 2018-08-02 RX ADMIN — CYTARABINE 180 MG: 100 INJECTION, SOLUTION INTRATHECAL; INTRAVENOUS; SUBCUTANEOUS at 23:01

## 2018-08-02 RX ADMIN — ACYCLOVIR 800 MG: 800 TABLET ORAL at 12:01

## 2018-08-02 RX ADMIN — PROCHLORPERAZINE MALEATE 5 MG: 5 TABLET, FILM COATED ORAL at 19:42

## 2018-08-02 RX ADMIN — DEXAMETHASONE 10 MG: 2 TABLET ORAL at 08:12

## 2018-08-02 ASSESSMENT — ACTIVITIES OF DAILY LIVING (ADL)
ADLS_ACUITY_SCORE: 9

## 2018-08-02 NOTE — PLAN OF CARE
Problem: Stem Cell/Bone Marrow Transplant (Adult)  Goal: Signs and Symptoms of Listed Potential Problems Will be Absent, Minimized or Managed (Stem Cell/Bone Marrow Transplant)  Signs and symptoms of listed potential problems will be absent, minimized or managed by discharge/transition of care (reference Stem Cell/Bone Marrow Transplant (Adult) CPG).    08/02/18 0000   Stem Cell/Bone Marrow Transplant   Problems Assessed (Stem Cell/Bone Marrow Transplant) all   Problems Present (Stem Cell/BMT) none       Problem: Patient Care Overview  Goal: Plan of Care/Patient Progress Review   08/02/18 0645   OTHER   Plan Of Care Reviewed With patient   Plan of Care Review   Progress progress toward functional goals as expected       Afebrile VSS, on room air. Has some nausea, declines scheduled zofran as she has had problems with zofran making her constipated in the past, prefers compazine or ativan. Ativan x2 during the night, and compazine x1 during the evening. Pt reports feeling constipated, has not had a bowel movement in the past few days. Etoposide and Cytarabine last night, tolerated both chemo well. Plan for etoposide and cytarabine today.

## 2018-08-02 NOTE — PLAN OF CARE
Problem: Stem Cell/Bone Marrow Transplant (Adult)  Goal: Signs and Symptoms of Listed Potential Problems Will be Absent, Minimized or Managed (Stem Cell/Bone Marrow Transplant)  Signs and symptoms of listed potential problems will be absent, minimized or managed by discharge/transition of care (reference Stem Cell/Bone Marrow Transplant (Adult) CPG).   Outcome: No Change   08/02/18 1550   Stem Cell/Bone Marrow Transplant   Problems Assessed (Stem Cell/Bone Marrow Transplant) all   Problems Present (Stem Cell/BMT) gastrointestinal complications;fatigue       Problem: Patient Care Overview  Goal: Plan of Care/Patient Progress Review  Outcome: No Change   08/02/18 1550   OTHER   Plan Of Care Reviewed With patient;significant other   Plan of Care Review   Progress no change     Afebrile, OVSS on RA. Up ad hosea in room. Denies pain. C/o N, given compazine x 1 w/ relief. Also provided aromatherapy. C/o C, given senna, will wait to see if it helps, otherwise has 1x order for miralax which she will request. No emesis. Tolerated chemotherapy w/out additional issues, see note. Appetite fair. Urinating w/out difficulty. SO at bedside. Fatigued, napping on and off today. Will continue to monitor per POC.

## 2018-08-02 NOTE — PROGRESS NOTES
"BMT Daily Progress Note    Patient ID:  Amira Avery is a 28 year old female, currently day -4 of her HCT.    Interval History: Headache from yesterday resolved. Feeling more constipated, no stool x3 days. Some mild belly cramping, does pass gas. Endorses nausea without vomiting. No fevers, chills, congestion, cough, rashes, bruises or bleeding.     10 pt ROS otherwise negative    PHYSICAL EXAM      Weight     Wt Readings from Last 3 Encounters:   08/02/18 70.7 kg (155 lb 12.8 oz)   07/25/18 73.8 kg (162 lb 12.8 oz)   07/24/18 72.5 kg (159 lb 13.3 oz)          /64 (BP Location: Right arm)  Pulse 99  Temp 98.5  F (36.9  C) (Axillary)  Resp 18  Ht 1.605 m (5' 3.19\")  Wt 70.7 kg (155 lb 12.8 oz)  SpO2 97%  BMI 27.43 kg/m2   General: NAD   Eyes: ARMINDA, sclera anicteric   Nose/Mouth/Throat: OP clear, buccal mucosa moist, no ulcerations   Lungs: CTA bilaterally  Cardiovascular: RRR, no M/R/G   Abdominal/Rectal: +BS, soft, NT, ND, No HSM   Lymphatics: No edema  Skin: No rashes or petechaie  Neuro: A&O   Additional Findings: Wang site NT, no drainage.    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 D-4    Prep:   D-6 BCNU  D-5 to D-2 Lisa-cx2 and -16x2  D-1 Transplant  8/6 Day 0    1.  BMT: NSHL GCSF + mozobil priming prior to BEAM Auto PBSCT  - Collected 6.21million CD34/kg on 7/24  - Prep as above. Allopurinol through day 0. Flush and pre-meds prior to transplant. GCSF starts d+5 and cont to until ANC>2500 x2 consecutive days. Re-stage per protocol.    2.  HEME: Keep Hgb>8 and plts>10K. No pre-meds.  Platelet intercept study                            3.  ID: Afebrile. Cont Levo, Fluc, and HD ACV (CMV+, HSV+, EBV+) prophy. Bactrim or appropriate PCP therapy to start d+28.                                             4.  GI: Zofran and dexamethasone prior to chemo to " prevent N/V. Ativan and compazine available PRN break-through symptoms. Protonix for GI prophy.  Switch zofran to prn and compazine scheduled. Zofran has worsened constipation in the past for her  - Miralax 8/2 (takes at home for intermittent constipation)    5.  FEN/Renal: Monitor creat and lytes. Replete lytes PRN per SS. Monitor weight, I+O, lytes per protocol with IVF flush.    Rafaela Pinto    Patient has been seen and evaluated by me. I have reviewed today's vital signs, medications, labs and imaging results independently. I have discussed the plan with the team and agree with the findings and plan in this note.       28 years old female, relapsed Hodgkins disease, admitted for autologous transplant.  Conditioning as above. Zofran and dex for nausea. ID: Prophy with levaquin, fluc and acyclovir  Alma Garcia MD

## 2018-08-02 NOTE — PROGRESS NOTES
Type of chemo infused: Etoposide, cytarabine  Pt tolerated infusion: Well, ongoing N  Interventions: Compazine   Response to interventions used: Improvement  Plan: Compazine now scheduled in lieu of zofran (prefers not to use first-line d/t constipation). Continue to monitor symptoms of N, has zofran and ativan available PRN if desired.

## 2018-08-03 LAB
ABO + RH BLD: NORMAL
ABO + RH BLD: NORMAL
ANION GAP SERPL CALCULATED.3IONS-SCNC: 8 MMOL/L (ref 3–14)
BASOPHILS # BLD AUTO: 0 10E9/L (ref 0–0.2)
BASOPHILS NFR BLD AUTO: 0 %
BLD GP AB SCN SERPL QL: NORMAL
BLOOD BANK CMNT PATIENT-IMP: NORMAL
BUN SERPL-MCNC: 18 MG/DL (ref 7–30)
CALCIUM SERPL-MCNC: 8.5 MG/DL (ref 8.5–10.1)
CHLORIDE SERPL-SCNC: 108 MMOL/L (ref 94–109)
CO2 SERPL-SCNC: 23 MMOL/L (ref 20–32)
CREAT SERPL-MCNC: 0.65 MG/DL (ref 0.52–1.04)
DIFFERENTIAL METHOD BLD: ABNORMAL
EOSINOPHIL # BLD AUTO: 0 10E9/L (ref 0–0.7)
EOSINOPHIL NFR BLD AUTO: 0 %
ERYTHROCYTE [DISTWIDTH] IN BLOOD BY AUTOMATED COUNT: 14 % (ref 10–15)
GFR SERPL CREATININE-BSD FRML MDRD: >90 ML/MIN/1.7M2
GLUCOSE SERPL-MCNC: 123 MG/DL (ref 70–99)
HCT VFR BLD AUTO: 32.5 % (ref 35–47)
HGB BLD-MCNC: 10.8 G/DL (ref 11.7–15.7)
IMM GRANULOCYTES # BLD: 0 10E9/L (ref 0–0.4)
IMM GRANULOCYTES NFR BLD: 0.1 %
LYMPHOCYTES # BLD AUTO: 0.6 10E9/L (ref 0.8–5.3)
LYMPHOCYTES NFR BLD AUTO: 5.9 %
MAGNESIUM SERPL-MCNC: 2.3 MG/DL (ref 1.6–2.3)
MCH RBC QN AUTO: 29.6 PG (ref 26.5–33)
MCHC RBC AUTO-ENTMCNC: 33.2 G/DL (ref 31.5–36.5)
MCV RBC AUTO: 89 FL (ref 78–100)
MONOCYTES # BLD AUTO: 0.2 10E9/L (ref 0–1.3)
MONOCYTES NFR BLD AUTO: 1.6 %
NEUTROPHILS # BLD AUTO: 8.8 10E9/L (ref 1.6–8.3)
NEUTROPHILS NFR BLD AUTO: 92.4 %
NRBC # BLD AUTO: 0 10*3/UL
NRBC BLD AUTO-RTO: 0 /100
PLATELET # BLD AUTO: 217 10E9/L (ref 150–450)
POTASSIUM SERPL-SCNC: 3.9 MMOL/L (ref 3.4–5.3)
RBC # BLD AUTO: 3.65 10E12/L (ref 3.8–5.2)
SODIUM SERPL-SCNC: 139 MMOL/L (ref 133–144)
SPECIMEN EXP DATE BLD: NORMAL
WBC # BLD AUTO: 9.5 10E9/L (ref 4–11)

## 2018-08-03 PROCEDURE — 25000128 H RX IP 250 OP 636: Performed by: STUDENT IN AN ORGANIZED HEALTH CARE EDUCATION/TRAINING PROGRAM

## 2018-08-03 PROCEDURE — 83735 ASSAY OF MAGNESIUM: CPT | Performed by: NURSE PRACTITIONER

## 2018-08-03 PROCEDURE — 85025 COMPLETE CBC W/AUTO DIFF WBC: CPT | Performed by: NURSE PRACTITIONER

## 2018-08-03 PROCEDURE — 86901 BLOOD TYPING SEROLOGIC RH(D): CPT | Performed by: NURSE PRACTITIONER

## 2018-08-03 PROCEDURE — 25000131 ZZH RX MED GY IP 250 OP 636 PS 637: Performed by: NURSE PRACTITIONER

## 2018-08-03 PROCEDURE — 25000128 H RX IP 250 OP 636: Performed by: NURSE PRACTITIONER

## 2018-08-03 PROCEDURE — 86850 RBC ANTIBODY SCREEN: CPT | Performed by: NURSE PRACTITIONER

## 2018-08-03 PROCEDURE — 86900 BLOOD TYPING SEROLOGIC ABO: CPT | Performed by: NURSE PRACTITIONER

## 2018-08-03 PROCEDURE — 80048 BASIC METABOLIC PNL TOTAL CA: CPT | Performed by: NURSE PRACTITIONER

## 2018-08-03 PROCEDURE — 25000128 H RX IP 250 OP 636: Performed by: INTERNAL MEDICINE

## 2018-08-03 PROCEDURE — 25000132 ZZH RX MED GY IP 250 OP 250 PS 637: Performed by: NURSE PRACTITIONER

## 2018-08-03 PROCEDURE — 25000132 ZZH RX MED GY IP 250 OP 250 PS 637: Performed by: STUDENT IN AN ORGANIZED HEALTH CARE EDUCATION/TRAINING PROGRAM

## 2018-08-03 PROCEDURE — 20600000 ZZH R&B BMT

## 2018-08-03 RX ADMIN — ACYCLOVIR 800 MG: 800 TABLET ORAL at 12:42

## 2018-08-03 RX ADMIN — ALLOPURINOL 300 MG: 300 TABLET ORAL at 08:57

## 2018-08-03 RX ADMIN — PROCHLORPERAZINE MALEATE 5 MG: 5 TABLET, FILM COATED ORAL at 12:41

## 2018-08-03 RX ADMIN — LORAZEPAM 0.5 MG: 0.5 TABLET ORAL at 04:13

## 2018-08-03 RX ADMIN — PROCHLORPERAZINE EDISYLATE 5 MG: 5 INJECTION INTRAMUSCULAR; INTRAVENOUS at 20:08

## 2018-08-03 RX ADMIN — POLYETHYLENE GLYCOL 3350 17 G: 17 POWDER, FOR SOLUTION ORAL at 08:57

## 2018-08-03 RX ADMIN — ACYCLOVIR 800 MG: 800 TABLET ORAL at 08:57

## 2018-08-03 RX ADMIN — CYTARABINE 180 MG: 100 INJECTION, SOLUTION INTRATHECAL; INTRAVENOUS; SUBCUTANEOUS at 22:39

## 2018-08-03 RX ADMIN — ACYCLOVIR 800 MG: 800 TABLET ORAL at 14:53

## 2018-08-03 RX ADMIN — ACYCLOVIR 800 MG: 800 TABLET ORAL at 22:50

## 2018-08-03 RX ADMIN — ACYCLOVIR 800 MG: 800 TABLET ORAL at 00:25

## 2018-08-03 RX ADMIN — PROCHLORPERAZINE MALEATE 5 MG: 5 TABLET, FILM COATED ORAL at 08:57

## 2018-08-03 RX ADMIN — SODIUM CHLORIDE, PRESERVATIVE FREE 5 ML: 5 INJECTION INTRAVENOUS at 00:25

## 2018-08-03 RX ADMIN — ETOPOSIDE 180 MG: 20 INJECTION, SOLUTION, CONCENTRATE INTRAVENOUS at 20:14

## 2018-08-03 RX ADMIN — DEXAMETHASONE 10 MG: 2 TABLET ORAL at 08:57

## 2018-08-03 RX ADMIN — FLUCONAZOLE 200 MG: 200 TABLET ORAL at 08:57

## 2018-08-03 RX ADMIN — ACYCLOVIR 800 MG: 800 TABLET ORAL at 18:17

## 2018-08-03 RX ADMIN — PANTOPRAZOLE SODIUM 40 MG: 40 TABLET, DELAYED RELEASE ORAL at 08:57

## 2018-08-03 RX ADMIN — LORAZEPAM 0.5 MG: 0.5 TABLET ORAL at 20:07

## 2018-08-03 RX ADMIN — SODIUM CHLORIDE, PRESERVATIVE FREE 5 ML: 5 INJECTION INTRAVENOUS at 14:53

## 2018-08-03 RX ADMIN — SODIUM CHLORIDE, PRESERVATIVE FREE 5 ML: 5 INJECTION INTRAVENOUS at 04:01

## 2018-08-03 RX ADMIN — CYTARABINE 180 MG: 100 INJECTION, SOLUTION INTRATHECAL; INTRAVENOUS; SUBCUTANEOUS at 11:13

## 2018-08-03 RX ADMIN — LORAZEPAM 0.5 MG: 0.5 TABLET ORAL at 00:25

## 2018-08-03 RX ADMIN — PROCHLORPERAZINE MALEATE 5 MG: 5 TABLET, FILM COATED ORAL at 04:01

## 2018-08-03 RX ADMIN — ETOPOSIDE 180 MG: 20 INJECTION, SOLUTION, CONCENTRATE INTRAVENOUS at 09:01

## 2018-08-03 ASSESSMENT — VISUAL ACUITY: OU: NORMAL ACUITY

## 2018-08-03 ASSESSMENT — ACTIVITIES OF DAILY LIVING (ADL)
ADLS_ACUITY_SCORE: 9

## 2018-08-03 NOTE — PROGRESS NOTES
"BMT Daily Progress Note    Patient ID:  Amira Avery is a 28 year old female, currently day -3 of her HCT.    Interval History: Nursing notes reviewed. No acute events overnight.      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 D-2    Prep:   D-6 BCNU  D-5 to D-2 Lisa-cx2 and -16x2  D-1 Transplant  8/6 Day 0    1.  BMT: [facility] GCSF + mozobil priming prior to BEAM Auto PBSCT  - Collected 6.21million CD34/kg on 7/24  - Prep as above. Allopurinol through day 0. Flush and pre-meds prior to transplant. GCSF starts d+5 and cont to until ANC>2500 x2 consecutive days. Re-stage per protocol.    2.  HEME: Keep Hgb>8 and plts>10K. No pre-meds.  Platelet intercept study                            3.  ID: Afebrile. Cont Levo, Fluc, and HD ACV (CMV+, HSV+, EBV+) prophy. Bactrim or appropriate PCP therapy to start d+28.                                             4.  GI: Zofran and dexamethasone prior to chemo to prevent N/V. Ativan and compazine available PRN break-through symptoms. Protonix for GI prophy.  Switch zofran to prn and compazine scheduled. Zofran has worsened constipation in the past for her  - Miralax 8/2 (takes at home for intermittent constipation)    5.  FEN/Renal: Monitor creat and lytes. Replete lytes PRN per SS. Monitor weight, I+O, lytes per protocol with IVF flush.    Mandy Castro    Patient has been seen and evaluated by me. I " have reviewed today's vital signs, medications, labs and imaging results independently. I have discussed the plan with the team and agree with the findings and plan in this note.       28 years old female, relapsed Hodgkins disease, admitted for autologous transplant.  Conditioning as above. Zofran and dex for nausea. ID: Prophy with levaquin, fluc and acyclovir  Alma Garcia MD

## 2018-08-03 NOTE — PLAN OF CARE
Problem: Patient Care Overview  Goal: Plan of Care/Patient Progress Review  OT: Pt soundly sleeping when OT attempted, OT will cx and reschedule for 8/6/18

## 2018-08-03 NOTE — PLAN OF CARE
Problem: Stem Cell/Bone Marrow Transplant (Adult)  Goal: Signs and Symptoms of Listed Potential Problems Will be Absent, Minimized or Managed (Stem Cell/Bone Marrow Transplant)  Signs and symptoms of listed potential problems will be absent, minimized or managed by discharge/transition of care (reference Stem Cell/Bone Marrow Transplant (Adult) CPG).   AVSS. Patient received etoposide and cytarabine last evening. Has ongoing nausea, scheduled compazine administered and prn ativan x2 overnight. Pt reports some improvement of symptoms with medications. Patient c/o constipation, took senna, waiting for results, declined miralax at this time. No replacements. Will continue to monitor and follow plan of care.   08/03/18 0555   Stem Cell/Bone Marrow Transplant   Problems Assessed (Stem Cell/Bone Marrow Transplant) all   Problems Present (Stem Cell/BMT) gastrointestinal complications;fatigue       Problem: Patient Care Overview  Goal: Plan of Care/Patient Progress Review   08/03/18 0555   OTHER   Plan Of Care Reviewed With patient   Plan of Care Review   Progress no change

## 2018-08-03 NOTE — PROGRESS NOTES
SPIRITUAL HEALTH SERVICES  SPIRITUAL ASSESSMENT Progress Note  [facility] ([place]) [address]     REFERRAL SOURCE: length of stay    I met with the patient and 3 of her friends, and provided an introduction to [facility] and to the option of having a BMT [facility] service. The patient feels that she has no spiritual needs at this time, and will contact [facility] through her nurse if she would like a follow up visit or if she decides she would like a BMT [facility] service.    PLAN: no follow up needed at this time.     Carolina Resendiz   Intern  Pager [phone]

## 2018-08-03 NOTE — PROGRESS NOTES
Type of chemo infused: Etoposide, Cytarabine  Pt tolerated infusion: Well, some nausea  Interventions: Scheduled Compazine, prn ativan.  Response to interventions used: Improvement of symptoms.  Plan: Continue to monitor.

## 2018-08-04 LAB
ANION GAP SERPL CALCULATED.3IONS-SCNC: 6 MMOL/L (ref 3–14)
BASOPHILS # BLD AUTO: 0 10E9/L (ref 0–0.2)
BASOPHILS NFR BLD AUTO: 0 %
BUN SERPL-MCNC: 15 MG/DL (ref 7–30)
CA-I BLD-MCNC: 4.7 MG/DL (ref 4.4–5.2)
CALCIUM SERPL-MCNC: 6.8 MG/DL (ref 8.5–10.1)
CHLORIDE SERPL-SCNC: 115 MMOL/L (ref 94–109)
CO2 SERPL-SCNC: 22 MMOL/L (ref 20–32)
CREAT SERPL-MCNC: 0.52 MG/DL (ref 0.52–1.04)
DIFFERENTIAL METHOD BLD: ABNORMAL
EOSINOPHIL # BLD AUTO: 0 10E9/L (ref 0–0.7)
EOSINOPHIL NFR BLD AUTO: 0 %
ERYTHROCYTE [DISTWIDTH] IN BLOOD BY AUTOMATED COUNT: 14 % (ref 10–15)
GFR SERPL CREATININE-BSD FRML MDRD: >90 ML/MIN/1.7M2
GLUCOSE SERPL-MCNC: 114 MG/DL (ref 70–99)
HCT VFR BLD AUTO: 31.8 % (ref 35–47)
HGB BLD-MCNC: 10.6 G/DL (ref 11.7–15.7)
IMM GRANULOCYTES # BLD: 0 10E9/L (ref 0–0.4)
IMM GRANULOCYTES NFR BLD: 0.2 %
LYMPHOCYTES # BLD AUTO: 0.3 10E9/L (ref 0.8–5.3)
LYMPHOCYTES NFR BLD AUTO: 6.5 %
MCH RBC QN AUTO: 29.6 PG (ref 26.5–33)
MCHC RBC AUTO-ENTMCNC: 33.3 G/DL (ref 31.5–36.5)
MCV RBC AUTO: 89 FL (ref 78–100)
MONOCYTES # BLD AUTO: 0 10E9/L (ref 0–1.3)
MONOCYTES NFR BLD AUTO: 0.8 %
NEUTROPHILS # BLD AUTO: 4.9 10E9/L (ref 1.6–8.3)
NEUTROPHILS NFR BLD AUTO: 92.5 %
NRBC # BLD AUTO: 0 10*3/UL
NRBC BLD AUTO-RTO: 0 /100
PLATELET # BLD AUTO: 195 10E9/L (ref 150–450)
POTASSIUM SERPL-SCNC: 3.3 MMOL/L (ref 3.4–5.3)
POTASSIUM SERPL-SCNC: 4 MMOL/L (ref 3.4–5.3)
POTASSIUM SERPL-SCNC: 4.1 MMOL/L (ref 3.4–5.3)
RBC # BLD AUTO: 3.58 10E12/L (ref 3.8–5.2)
SODIUM SERPL-SCNC: 143 MMOL/L (ref 133–144)
WBC # BLD AUTO: 5.3 10E9/L (ref 4–11)

## 2018-08-04 PROCEDURE — 82330 ASSAY OF CALCIUM: CPT | Performed by: PHYSICIAN ASSISTANT

## 2018-08-04 PROCEDURE — 84132 ASSAY OF SERUM POTASSIUM: CPT | Performed by: INTERNAL MEDICINE

## 2018-08-04 PROCEDURE — 84132 ASSAY OF SERUM POTASSIUM: CPT | Performed by: PHYSICIAN ASSISTANT

## 2018-08-04 PROCEDURE — 25000132 ZZH RX MED GY IP 250 OP 250 PS 637: Performed by: NURSE PRACTITIONER

## 2018-08-04 PROCEDURE — 25000131 ZZH RX MED GY IP 250 OP 636 PS 637: Performed by: NURSE PRACTITIONER

## 2018-08-04 PROCEDURE — 25000132 ZZH RX MED GY IP 250 OP 250 PS 637: Performed by: STUDENT IN AN ORGANIZED HEALTH CARE EDUCATION/TRAINING PROGRAM

## 2018-08-04 PROCEDURE — 25000128 H RX IP 250 OP 636: Performed by: INTERNAL MEDICINE

## 2018-08-04 PROCEDURE — 80048 BASIC METABOLIC PNL TOTAL CA: CPT | Performed by: NURSE PRACTITIONER

## 2018-08-04 PROCEDURE — 25000128 H RX IP 250 OP 636: Performed by: NURSE PRACTITIONER

## 2018-08-04 PROCEDURE — 20600000 ZZH R&B BMT

## 2018-08-04 PROCEDURE — 25000128 H RX IP 250 OP 636: Performed by: STUDENT IN AN ORGANIZED HEALTH CARE EDUCATION/TRAINING PROGRAM

## 2018-08-04 PROCEDURE — 85025 COMPLETE CBC W/AUTO DIFF WBC: CPT | Performed by: NURSE PRACTITIONER

## 2018-08-04 RX ORDER — POLYETHYLENE GLYCOL 3350 17 G/17G
17 POWDER, FOR SOLUTION ORAL DAILY PRN
Status: DISCONTINUED | OUTPATIENT
Start: 2018-08-04 | End: 2018-08-05

## 2018-08-04 RX ORDER — LORAZEPAM 2 MG/ML
0.5 INJECTION INTRAMUSCULAR EVERY 6 HOURS
Status: DISCONTINUED | OUTPATIENT
Start: 2018-08-04 | End: 2018-08-07 | Stop reason: HOSPADM

## 2018-08-04 RX ADMIN — POTASSIUM CHLORIDE 20 MEQ: 400 INJECTION, SOLUTION INTRAVENOUS at 05:20

## 2018-08-04 RX ADMIN — CYTARABINE 180 MG: 100 INJECTION, SOLUTION INTRATHECAL; INTRAVENOUS; SUBCUTANEOUS at 11:02

## 2018-08-04 RX ADMIN — POTASSIUM CHLORIDE 20 MEQ: 750 TABLET, EXTENDED RELEASE ORAL at 12:16

## 2018-08-04 RX ADMIN — ACYCLOVIR 800 MG: 800 TABLET ORAL at 18:27

## 2018-08-04 RX ADMIN — ALLOPURINOL 300 MG: 300 TABLET ORAL at 08:45

## 2018-08-04 RX ADMIN — PROCHLORPERAZINE MALEATE 5 MG: 5 TABLET, FILM COATED ORAL at 20:25

## 2018-08-04 RX ADMIN — ETOPOSIDE 180 MG: 20 INJECTION, SOLUTION, CONCENTRATE INTRAVENOUS at 08:46

## 2018-08-04 RX ADMIN — FLUCONAZOLE 200 MG: 200 TABLET ORAL at 08:45

## 2018-08-04 RX ADMIN — ETOPOSIDE 180 MG: 20 INJECTION, SOLUTION, CONCENTRATE INTRAVENOUS at 20:14

## 2018-08-04 RX ADMIN — PROCHLORPERAZINE MALEATE 5 MG: 5 TABLET, FILM COATED ORAL at 15:35

## 2018-08-04 RX ADMIN — PROCHLORPERAZINE EDISYLATE 5 MG: 5 INJECTION INTRAMUSCULAR; INTRAVENOUS at 02:49

## 2018-08-04 RX ADMIN — LORAZEPAM 0.5 MG: 0.5 TABLET ORAL at 23:08

## 2018-08-04 RX ADMIN — DEXAMETHASONE 8 MG: 4 TABLET ORAL at 08:45

## 2018-08-04 RX ADMIN — PROCHLORPERAZINE MALEATE 5 MG: 5 TABLET, FILM COATED ORAL at 09:52

## 2018-08-04 RX ADMIN — ACYCLOVIR 800 MG: 800 TABLET ORAL at 08:45

## 2018-08-04 RX ADMIN — SENNOSIDES 8.6 MG: 8.6 TABLET, FILM COATED ORAL at 14:03

## 2018-08-04 RX ADMIN — LORAZEPAM 0.5 MG: 0.5 TABLET ORAL at 14:47

## 2018-08-04 RX ADMIN — SODIUM CHLORIDE, PRESERVATIVE FREE 5 ML: 5 INJECTION INTRAVENOUS at 12:19

## 2018-08-04 RX ADMIN — ACYCLOVIR 800 MG: 800 TABLET ORAL at 23:08

## 2018-08-04 RX ADMIN — PANTOPRAZOLE SODIUM 40 MG: 40 TABLET, DELAYED RELEASE ORAL at 08:45

## 2018-08-04 RX ADMIN — POLYETHYLENE GLYCOL 3350 17 G: 17 POWDER, FOR SOLUTION ORAL at 08:46

## 2018-08-04 RX ADMIN — LORAZEPAM 0.5 MG: 0.5 TABLET ORAL at 08:50

## 2018-08-04 RX ADMIN — ACYCLOVIR 800 MG: 800 TABLET ORAL at 13:55

## 2018-08-04 RX ADMIN — CYTARABINE 180 MG: 100 INJECTION, SOLUTION INTRATHECAL; INTRAVENOUS; SUBCUTANEOUS at 23:00

## 2018-08-04 RX ADMIN — ACYCLOVIR 800 MG: 800 TABLET ORAL at 11:06

## 2018-08-04 RX ADMIN — POTASSIUM CHLORIDE 20 MEQ: 400 INJECTION, SOLUTION INTRAVENOUS at 06:33

## 2018-08-04 ASSESSMENT — VISUAL ACUITY
OU: NORMAL ACUITY
OU: NORMAL ACUITY

## 2018-08-04 ASSESSMENT — ACTIVITIES OF DAILY LIVING (ADL)
ADLS_ACUITY_SCORE: 9

## 2018-08-04 NOTE — PLAN OF CARE
Problem: Stem Cell/Bone Marrow Transplant (Adult)  Goal: Signs and Symptoms of Listed Potential Problems Will be Absent, Minimized or Managed (Stem Cell/Bone Marrow Transplant)  Signs and symptoms of listed potential problems will be absent, minimized or managed by discharge/transition of care (reference Stem Cell/Bone Marrow Transplant (Adult) CPG).   Outcome: No Change   08/04/18 0440   Stem Cell/Bone Marrow Transplant   Problems Assessed (Stem Cell/Bone Marrow Transplant) all   Problems Present (Stem Cell/BMT) fatigue;gastrointestinal complications       Problem: Patient Care Overview  Goal: Plan of Care/Patient Progress Review  Outcome: No Change   08/04/18 0440   OTHER   Plan Of Care Reviewed With patient;significant other   Plan of Care Review   Progress no change     Goal: Individualization & Mutuality  Outcome: No Change  Type of chemo infused: Etoposide and Cytarabine.   Pt tolerated infusion:  Yes. Patient continues to have mild nausea without emesis.   Interventions: Good blood return on the blue port. Patient received scheduled compazine iv and ativan 0.5 mg po x 1 PRN.   Response to interventions used: Improve nausea and no emesis.   Plan: Continue with plan of care and notify MD for status changes.

## 2018-08-04 NOTE — PLAN OF CARE
"Problem: Stem Cell/Bone Marrow Transplant (Adult)  Goal: Signs and Symptoms of Listed Potential Problems Will be Absent, Minimized or Managed (Stem Cell/Bone Marrow Transplant)  Signs and symptoms of listed potential problems will be absent, minimized or managed by discharge/transition of care (reference Stem Cell/Bone Marrow Transplant (Adult) CPG).   Outcome: No Change   08/04/18 0451   Stem Cell/Bone Marrow Transplant   Problems Assessed (Stem Cell/Bone Marrow Transplant) all   Problems Present (Stem Cell/BMT) fatigue;gastrointestinal complications       Problem: Patient Care Overview  Goal: Plan of Care/Patient Progress Review  Outcome: No Change   08/04/18 0451   OTHER   Plan Of Care Reviewed With patient   Plan of Care Review   Progress no change     Goal: Individualization & Mutuality  Outcome: No Change  /68 (BP Location: Right arm)  Pulse 63  Temp 97.6  F (36.4  C) (Axillary)  Resp 16  Ht 1.605 m (5' 3.19\")  Wt 71.6 kg (157 lb 14.4 oz)  SpO2 98%  BMI 27.8 kg/m2. Patient is A & O x 4. Afebrile, vital signs stable and lung sounds clear. Central line dressing is C/D/I and good blood return on the blue port. Patient received Etoposide & Cytarabine and tolerated. Patient c/o continue mild nausea without emesis and received  scheduled compazine 5 mg iv x 2,  Ativan 0.5 mg po x 1 with some relief. Potassium 20 meq iv x 2 and potassium will be recheck at 10 am. Continue to encourage patient to do good mouth care, cough,deep breath and sit up while eating or drinking. Increase fluid intake and patient did not save urine output. Patient will Etopside and Cytarabine today. Continue with plan of care and notify MD for status change.       "

## 2018-08-04 NOTE — PLAN OF CARE
Problem: Stem Cell/Bone Marrow Transplant (Adult)  Goal: Signs and Symptoms of Listed Potential Problems Will be Absent, Minimized or Managed (Stem Cell/Bone Marrow Transplant)  Signs and symptoms of listed potential problems will be absent, minimized or managed by discharge/transition of care (reference Stem Cell/Bone Marrow Transplant (Adult) CPG).    08/04/18 1818   Stem Cell/Bone Marrow Transplant   Problems Assessed (Stem Cell/Bone Marrow Transplant) all   Problems Present (Stem Cell/BMT) altered nutrition;fatigue;gastrointestinal complications;situational response   Patient vital signs stable. She slept between cares today. Patient able to eat and drink more today. She tolerated her chemotherapy this morning.  Continue to monitor.

## 2018-08-04 NOTE — PLAN OF CARE
Problem: Stem Cell/Bone Marrow Transplant (Adult)  Goal: Signs and Symptoms of Listed Potential Problems Will be Absent, Minimized or Managed (Stem Cell/Bone Marrow Transplant)  Signs and symptoms of listed potential problems will be absent, minimized or managed by discharge/transition of care (reference Stem Cell/Bone Marrow Transplant (Adult) [facility]).    08/03/18 1902   Stem Cell/Bone Marrow Transplant   Problems Assessed (Stem Cell/Bone Marrow Transplant) all   Problems Present (Stem Cell/BMT) altered nutrition;fatigue;gastrointestinal complications;situational response   Patient vital signs stable, no new complaints, appetite and intake poor today.  She tolerated her Etoposide and Cytarabine this morning she will receive more this evening.  Fiance at bedside, continue to monitor.

## 2018-08-04 NOTE — PROGRESS NOTES
"BMT Daily Progress Note    Patient ID:  Amira Avery is a 28 year old female, currently day -2 of her HCT.    Interval History: Nursing notes reviewed. Says yesterday was the worst day yet in terms of nausea.  She is having hard stools.  No fevers.  No cough or sob.  No bleeding.  No rash.  Not much of an appetite.     10 pt ROS otherwise negative    PHYSICAL EXAM      Weight     Wt Readings from Last 3 Encounters:   08/04/18 70.8 kg (156 lb 1.4 oz)   07/25/18 73.8 kg (162 lb 12.8 oz)   07/24/18 72.5 kg (159 lb 13.3 oz)        Defer exam today-pt sleeping [id number], see below MD note  /63  Pulse 61  Temp 96.5  F (35.8  C) (Axillary)  Resp 16  Ht 1.605 m (5' 3.19\")  Wt 70.8 kg (156 lb 1.4 oz)  SpO2 97%  BMI 27.48 kg/m2   General: NAD   Eyes: ARMINDA, sclera anicteric   Nose/Mouth/Throat: OP clear, buccal mucosa moist, no ulcerations   Lungs: CTA bilaterally  Cardiovascular: RRR, no M/R/G   Abdominal/Rectal: +BS, soft, NT, ND, No HSM   Lymphatics: No edema  Skin: No rashes or petechaie  Neuro: A&O   Additional Findings: Wang site NT, no drainage. 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 D-2    Prep:   D-6 BCNU  D-5 to D-2 Latesha-cx2 and -16x2  D-1 8/5/2018 Melphalan with flush  Day 0:  8/6/2018:  Transplant day    1.  BMT:  Completed GCSF + mozobil priming prior to BEAM Auto PBSCT  - Collected 6.21million CD34/kg on 7/24  - Prep as above, will get LATESHA-c and -15 again today. Allopurinol through day 0.  GCSF starts d+5 and cont to until ANC>2500 x2 consecutive days. Re-stage per protocol.    2.  HEME: Keep Hgb>8 and plts>10K. No pre-meds. Not yet neutropenic.  WBC=5.3  Platelet intercept study                            3.  ID: Afebrile. Cont Levo, Fluc, and HD ACV (CMV+, HSV+, EBV+) prophy. Bactrim or appropriate PCP therapy to start " d+28.                                             4.  GI:dexamethasone prior to chemo to prevent N/V. Switch zofran to prn and compazine scheduled. Zofran has worsened constipation in the past for her.  Added scheduled ativan today to see if makes nausea better  Protonix for GI prophy.  - Miralax 8/2 (takes at home for intermittent constipation)  Give senna bid today for still hard stools    5.  FEN/Renal: Monitor creat and lytes. Replete lytes PRN per SS. Monitor weight, I+O, lytes per protocol   Decreased appetite, not eating much.  Encourage supplements..  -hypokalemia:  Recheck K at 16:00.  Ca=6.8, will recheck I-calcium today.  Jovita Borges    Patient has been seen and evaluated by me. I have reviewed today's vital signs, medications, labs and imaging results independently. I have discussed the plan with the team and agree with the findings and plan in this note.       28 years old female, relapsed Hodgkins disease, admitted for autologous transplant.  Conditioning as above. Zofran and dex for nausea. ID: Prophy with levaquin, fluc and acyclovir  Alma Garcia MD

## 2018-08-05 LAB
ANION GAP SERPL CALCULATED.3IONS-SCNC: 9 MMOL/L (ref 3–14)
BASOPHILS # BLD AUTO: 0 10E9/L (ref 0–0.2)
BASOPHILS NFR BLD AUTO: 0 %
BUN SERPL-MCNC: 19 MG/DL (ref 7–30)
CALCIUM SERPL-MCNC: 8.2 MG/DL (ref 8.5–10.1)
CHLORIDE SERPL-SCNC: 107 MMOL/L (ref 94–109)
CO2 SERPL-SCNC: 22 MMOL/L (ref 20–32)
CREAT SERPL-MCNC: 0.58 MG/DL (ref 0.52–1.04)
DIFFERENTIAL METHOD BLD: ABNORMAL
EOSINOPHIL # BLD AUTO: 0 10E9/L (ref 0–0.7)
EOSINOPHIL NFR BLD AUTO: 0 %
ERYTHROCYTE [DISTWIDTH] IN BLOOD BY AUTOMATED COUNT: 13.9 % (ref 10–15)
GFR SERPL CREATININE-BSD FRML MDRD: >90 ML/MIN/1.7M2
GLUCOSE SERPL-MCNC: 103 MG/DL (ref 70–99)
HCT VFR BLD AUTO: 33.9 % (ref 35–47)
HGB BLD-MCNC: 11.1 G/DL (ref 11.7–15.7)
IMM GRANULOCYTES # BLD: 0 10E9/L (ref 0–0.4)
IMM GRANULOCYTES NFR BLD: 0.2 %
LYMPHOCYTES # BLD AUTO: 0.3 10E9/L (ref 0.8–5.3)
LYMPHOCYTES NFR BLD AUTO: 6.7 %
MCH RBC QN AUTO: 29.4 PG (ref 26.5–33)
MCHC RBC AUTO-ENTMCNC: 32.7 G/DL (ref 31.5–36.5)
MCV RBC AUTO: 90 FL (ref 78–100)
MONOCYTES # BLD AUTO: 0 10E9/L (ref 0–1.3)
MONOCYTES NFR BLD AUTO: 0.2 %
NEUTROPHILS # BLD AUTO: 4.3 10E9/L (ref 1.6–8.3)
NEUTROPHILS NFR BLD AUTO: 92.9 %
NRBC # BLD AUTO: 0 10*3/UL
NRBC BLD AUTO-RTO: 0 /100
PLATELET # BLD AUTO: 197 10E9/L (ref 150–450)
POTASSIUM SERPL-SCNC: 3.6 MMOL/L (ref 3.4–5.3)
RBC # BLD AUTO: 3.77 10E12/L (ref 3.8–5.2)
SODIUM SERPL-SCNC: 139 MMOL/L (ref 133–144)
WBC # BLD AUTO: 4.6 10E9/L (ref 4–11)

## 2018-08-05 PROCEDURE — 20000002 ZZH R&B BMT INTERMEDIATE

## 2018-08-05 PROCEDURE — 25000128 H RX IP 250 OP 636: Performed by: PHYSICIAN ASSISTANT

## 2018-08-05 PROCEDURE — 25800025 ZZH RX 258: Performed by: NURSE PRACTITIONER

## 2018-08-05 PROCEDURE — 25000128 H RX IP 250 OP 636: Performed by: NURSE PRACTITIONER

## 2018-08-05 PROCEDURE — 25000132 ZZH RX MED GY IP 250 OP 250 PS 637: Performed by: INTERNAL MEDICINE

## 2018-08-05 PROCEDURE — 25000132 ZZH RX MED GY IP 250 OP 250 PS 637: Performed by: NURSE PRACTITIONER

## 2018-08-05 PROCEDURE — 85025 COMPLETE CBC W/AUTO DIFF WBC: CPT | Performed by: NURSE PRACTITIONER

## 2018-08-05 PROCEDURE — 25000125 ZZHC RX 250: Performed by: STUDENT IN AN ORGANIZED HEALTH CARE EDUCATION/TRAINING PROGRAM

## 2018-08-05 PROCEDURE — 25000132 ZZH RX MED GY IP 250 OP 250 PS 637: Performed by: PHYSICIAN ASSISTANT

## 2018-08-05 PROCEDURE — 25000132 ZZH RX MED GY IP 250 OP 250 PS 637: Performed by: STUDENT IN AN ORGANIZED HEALTH CARE EDUCATION/TRAINING PROGRAM

## 2018-08-05 PROCEDURE — 25000131 ZZH RX MED GY IP 250 OP 636 PS 637: Performed by: NURSE PRACTITIONER

## 2018-08-05 PROCEDURE — 25000128 H RX IP 250 OP 636: Performed by: INTERNAL MEDICINE

## 2018-08-05 PROCEDURE — 80048 BASIC METABOLIC PNL TOTAL CA: CPT | Performed by: NURSE PRACTITIONER

## 2018-08-05 RX ORDER — BISACODYL 10 MG
10 SUPPOSITORY, RECTAL RECTAL ONCE
Status: COMPLETED | OUTPATIENT
Start: 2018-08-05 | End: 2018-08-05

## 2018-08-05 RX ORDER — BISACODYL 10 MG
10 SUPPOSITORY, RECTAL RECTAL DAILY PRN
Status: DISCONTINUED | OUTPATIENT
Start: 2018-08-05 | End: 2018-08-05

## 2018-08-05 RX ORDER — LACTULOSE 10 G/15ML
20 SOLUTION ORAL 3 TIMES DAILY PRN
Status: DISCONTINUED | OUTPATIENT
Start: 2018-08-05 | End: 2018-08-07 | Stop reason: HOSPADM

## 2018-08-05 RX ORDER — POLYETHYLENE GLYCOL 3350 17 G/17G
17 POWDER, FOR SOLUTION ORAL 2 TIMES DAILY
Status: DISCONTINUED | OUTPATIENT
Start: 2018-08-05 | End: 2018-08-07 | Stop reason: HOSPADM

## 2018-08-05 RX ORDER — POLYETHYLENE GLYCOL 3350 17 G/17G
17 POWDER, FOR SOLUTION ORAL 2 TIMES DAILY PRN
Status: DISCONTINUED | OUTPATIENT
Start: 2018-08-05 | End: 2018-08-07 | Stop reason: HOSPADM

## 2018-08-05 RX ADMIN — BISACODYL 10 MG: 10 SUPPOSITORY RECTAL at 17:33

## 2018-08-05 RX ADMIN — LEVOFLOXACIN 250 MG: 250 TABLET, FILM COATED ORAL at 10:04

## 2018-08-05 RX ADMIN — SODIUM CHLORIDE 150 MG: 9 INJECTION, SOLUTION INTRAVENOUS at 11:03

## 2018-08-05 RX ADMIN — PROCHLORPERAZINE EDISYLATE 5 MG: 5 INJECTION INTRAMUSCULAR; INTRAVENOUS at 13:32

## 2018-08-05 RX ADMIN — ACYCLOVIR 800 MG: 800 TABLET ORAL at 22:04

## 2018-08-05 RX ADMIN — DEXTROSE AND SODIUM CHLORIDE: 5; 450 INJECTION, SOLUTION INTRAVENOUS at 06:01

## 2018-08-05 RX ADMIN — LACTULOSE 20 G: 20 SOLUTION ORAL at 18:15

## 2018-08-05 RX ADMIN — LORAZEPAM 1 MG: 2 INJECTION INTRAMUSCULAR; INTRAVENOUS at 15:06

## 2018-08-05 RX ADMIN — ACYCLOVIR 800 MG: 800 TABLET ORAL at 08:02

## 2018-08-05 RX ADMIN — LORAZEPAM 0.5 MG: 0.5 TABLET ORAL at 04:35

## 2018-08-05 RX ADMIN — SODIUM CHLORIDE, PRESERVATIVE FREE 5 ML: 5 INJECTION INTRAVENOUS at 20:59

## 2018-08-05 RX ADMIN — ONDANSETRON 8 MG: 8 TABLET, ORALLY DISINTEGRATING ORAL at 17:21

## 2018-08-05 RX ADMIN — ACYCLOVIR 800 MG: 800 TABLET ORAL at 11:06

## 2018-08-05 RX ADMIN — PANTOPRAZOLE SODIUM 40 MG: 40 TABLET, DELAYED RELEASE ORAL at 08:03

## 2018-08-05 RX ADMIN — PROCHLORPERAZINE MALEATE 5 MG: 5 TABLET, FILM COATED ORAL at 08:02

## 2018-08-05 RX ADMIN — ACYCLOVIR 800 MG: 800 TABLET ORAL at 18:15

## 2018-08-05 RX ADMIN — POLYETHYLENE GLYCOL 3350 17 G: 17 POWDER, FOR SOLUTION ORAL at 20:58

## 2018-08-05 RX ADMIN — SENNOSIDES 8.6 MG: 8.6 TABLET, FILM COATED ORAL at 08:06

## 2018-08-05 RX ADMIN — POTASSIUM CHLORIDE 20 MEQ: 400 INJECTION, SOLUTION INTRAVENOUS at 06:01

## 2018-08-05 RX ADMIN — MELPHALAN HYDROCHLORIDE 250 MG: KIT at 11:33

## 2018-08-05 RX ADMIN — POLYETHYLENE GLYCOL 3350 17 G: 17 POWDER, FOR SOLUTION ORAL at 08:03

## 2018-08-05 RX ADMIN — DEXTROSE AND SODIUM CHLORIDE: 5; 450 INJECTION, SOLUTION INTRAVENOUS at 15:06

## 2018-08-05 RX ADMIN — ACYCLOVIR 800 MG: 800 TABLET ORAL at 13:33

## 2018-08-05 RX ADMIN — DEXAMETHASONE 6 MG: 2 TABLET ORAL at 08:02

## 2018-08-05 RX ADMIN — ALLOPURINOL 300 MG: 300 TABLET ORAL at 08:02

## 2018-08-05 RX ADMIN — PROCHLORPERAZINE MALEATE 5 MG: 5 TABLET, FILM COATED ORAL at 04:20

## 2018-08-05 RX ADMIN — PROCHLORPERAZINE MALEATE 5 MG: 5 TABLET, FILM COATED ORAL at 13:03

## 2018-08-05 RX ADMIN — FLUCONAZOLE 200 MG: 200 TABLET ORAL at 08:02

## 2018-08-05 RX ADMIN — PROCHLORPERAZINE MALEATE 5 MG: 5 TABLET, FILM COATED ORAL at 20:57

## 2018-08-05 RX ADMIN — LORAZEPAM 0.5 MG: 0.5 TABLET ORAL at 10:04

## 2018-08-05 RX ADMIN — LORAZEPAM 0.5 MG: 0.5 TABLET ORAL at 22:04

## 2018-08-05 ASSESSMENT — ACTIVITIES OF DAILY LIVING (ADL)
ADLS_ACUITY_SCORE: 9

## 2018-08-05 ASSESSMENT — VISUAL ACUITY: OU: NORMAL ACUITY

## 2018-08-05 NOTE — PLAN OF CARE
Problem: Stem Cell/Bone Marrow Transplant (Adult)  Goal: Signs and Symptoms of Listed Potential Problems Will be Absent, Minimized or Managed (Stem Cell/Bone Marrow Transplant)  Signs and symptoms of listed potential problems will be absent, minimized or managed by discharge/transition of care (reference Stem Cell/Bone Marrow Transplant (Adult) CPG).    08/05/18 1801   Stem Cell/Bone Marrow Transplant   Problems Assessed (Stem Cell/Bone Marrow Transplant) all   Problems Present (Stem Cell/BMT) altered nutrition;fatigue;gastrointestinal complications;situational response   Patient vital signs stable. She received Emend this morning before her Melphalan dose for her ongoing nausea. She ate popsicles and chewed ice before and after Melphalan infusion. She had more nausea this afternoon post Melphalan, mainly dry heaving. She was given Zofran, Compazine, and also some additional Ativan which did not seem to bring much relief She was given Dulcolax suppository x 1 she was unable to take lactulose for constipation that might be causing the nausea.  Post melphalan flush will end at 2200 tonight, transplant will have to be after 12 noon tomorrow since Melphalan ended at 12 noon today. One dose lactulose given this evening. Continue to monitor.

## 2018-08-05 NOTE — PROGRESS NOTES
"BMT Daily Progress Note    Patient ID:  Amira Avery is a 28 year old female, currently day -1 of her HCT.    Interval History: Says still having nausea despite compazine and scheduled ativan. She had no stool at all yesterday despite miralax and senna.  She is agreeable to laculose- knows it may cause diarrhea.  No fevers.  No cough or sob.  No bleeding.  No rash.  Not much of an appetite and fatigued.     10 pt ROS otherwise negative    PHYSICAL EXAM      Weight     Wt Readings from Last 3 Encounters:   08/05/18 70.2 kg (154 lb 12.8 oz)   07/25/18 73.8 kg (162 lb 12.8 oz)   07/24/18 72.5 kg (159 lb 13.3 oz)        Defer exam today-pt sleeping [id number], see below MD note  /75 (BP Location: Right arm)  Pulse 73  Temp 97.7  F (36.5  C) (Axillary)  Resp 18  Ht 1.605 m (5' 3.19\")  Wt 70.2 kg (154 lb 12.8 oz)  SpO2 98%  BMI 27.26 kg/m2   General: NAD   Eyes: ARMINDA, sclera anicteric   Nose/Mouth/Throat: OP clear, buccal mucosa moist, no ulcerations   Lungs: CTA bilaterally  Cardiovascular: RRR, no M/R/G   Abdominal/Rectal: +BS, soft, NT, ND, No HSM   Lymphatics: No edema  Skin: No rashes or petechaie  Neuro: A&O   Additional Findings: Wang site NT, no drainage. 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 D-1    Prep:   D-6 BCNU  D-5 to D-2 Lisa-cx2 and -16x2  D-1 8/5/2018 Melphalan with flush  Day 0:  8/6/2018:  Transplant day    1.  BMT:  Completed GCSF + mozobil priming prior to BEAM Auto PBSCT  - Collected 6.21million CD34/kg on 7/24  - Prep as above, will get Melphalan today. Allopurinol through day 0. Will get her cells tomorrow but do not know what time. Premeds already ordered but ordered a flush pre and post PBSCT. GCSF starts d+5 and cont to until ANC>2500 x2 consecutive days. Re-stage per protocol.    2.  HEME: Keep Hgb>8 and " plts>10K. No pre-meds. Not yet neutropenic.  WBC=4.6. No transfusions.  Platelet intercept study                            3.  ID: Afebrile. Cont Levo, Fluc, and HD ACV (CMV+, HSV+, EBV+) prophy. Bactrim or appropriate PCP therapy to start d+28.                                             4.  GI:dexamethasone prior to chemo to prevent N/V. Switch zofran to prn and compazine scheduled. Zofran has worsened constipation in the past for her.  Added scheduled ativan 8/4 and will give dose of emend today  Protonix for GI prophy.  - Miralax 8/4 (takes at home for intermittent constipation) and senna but no stool.  Changed miralax to bid and give dose of lactulose today.    5.  FEN/Renal: Monitor creat and lytes. Replete lytes PRN per SS. Monitor weight, I+O, lytes per protocol   Decreased appetite, not eating much.  Encourage supplements..  -hypokalemia: 8/4 Ca=6.8,  I-calcium=4.7, no replacement.  6. Dispo: reviewed what will happen with infusion of cells including possibility of hematuria. She knows she will get premeds and a flush.  BOBY [id number]  [id number]    Patient has been seen and evaluated by me. I have reviewed today's vital signs, medications, labs and imaging results independently. I have discussed the plan with the team and agree with the findings and plan in this note.       28 years old female, relapsed Hodgkins disease, admitted for autologous transplant.  Conditioning as above. Zofran and dex for nausea. ID: Prophy with levaquin, fluc and acyclovir  Alma Garcia MD

## 2018-08-05 NOTE — PLAN OF CARE
Problem: Stem Cell/Bone Marrow Transplant (Adult)  Goal: Signs and Symptoms of Listed Potential Problems Will be Absent, Minimized or Managed (Stem Cell/Bone Marrow Transplant)  Signs and symptoms of listed potential problems will be absent, minimized or managed by discharge/transition of care (reference Stem Cell/Bone Marrow Transplant (Adult) CPG).   Outcome: No Change   08/05/18 0720   Stem Cell/Bone Marrow Transplant   Problems Assessed (Stem Cell/Bone Marrow Transplant) all   Problems Present (Stem Cell/BMT) fatigue;gastrointestinal complications;situational response       Problem: Patient Care Overview  Goal: Plan of Care/Patient Progress Review  Outcome: No Change   08/05/18 0720   OTHER   Plan Of Care Reviewed With patient   Plan of Care Review   Progress no change     Goal: Individualization & Mutuality  Outcome: No Change  Patient is A & O x 3. Afebrile, vital signs stable and lung sounds clear. No c/o pain but continues to c/o nausea without emesis. Patient received scheduled compazine 5 mg po x 2,  Ativan 0.5 mg po x 2 with some relief. Central line dressing is C/D/I and good blood returns on both ports. Patient received Etoposide, Cytarabine and tolerated. Melphalan flush started at 06 am and will receive Melphalan at 10 am. Patient was educated about Melphalan r/t mouth care. Potassium was replaced. Continue to encourage patient to increase physical activities and site up in the chair during the day. Continue with plan of care and notify MD for status changes.

## 2018-08-06 LAB
ABO + RH BLD: NORMAL
ABO + RH BLD: NORMAL
ALBUMIN SERPL-MCNC: 3.3 G/DL (ref 3.4–5)
ALP SERPL-CCNC: 52 U/L (ref 40–150)
ALT SERPL W P-5'-P-CCNC: 28 U/L (ref 0–50)
ANION GAP SERPL CALCULATED.3IONS-SCNC: 6 MMOL/L (ref 3–14)
AST SERPL W P-5'-P-CCNC: 8 U/L (ref 0–45)
BASOPHILS # BLD AUTO: 0 10E9/L (ref 0–0.2)
BASOPHILS NFR BLD AUTO: 0 %
BILIRUB DIRECT SERPL-MCNC: 0.1 MG/DL (ref 0–0.2)
BILIRUB SERPL-MCNC: 0.6 MG/DL (ref 0.2–1.3)
BLD GP AB SCN SERPL QL: NORMAL
BLOOD BANK CMNT PATIENT-IMP: NORMAL
BUN SERPL-MCNC: 19 MG/DL (ref 7–30)
CALCIUM SERPL-MCNC: 8.2 MG/DL (ref 8.5–10.1)
CHLORIDE SERPL-SCNC: 105 MMOL/L (ref 94–109)
CO2 SERPL-SCNC: 27 MMOL/L (ref 20–32)
CREAT SERPL-MCNC: 0.62 MG/DL (ref 0.52–1.04)
DIFFERENTIAL METHOD BLD: ABNORMAL
EOSINOPHIL # BLD AUTO: 0 10E9/L (ref 0–0.7)
EOSINOPHIL NFR BLD AUTO: 0 %
ERYTHROCYTE [DISTWIDTH] IN BLOOD BY AUTOMATED COUNT: 13.7 % (ref 10–15)
GFR SERPL CREATININE-BSD FRML MDRD: >90 ML/MIN/1.7M2
GLUCOSE SERPL-MCNC: 106 MG/DL (ref 70–99)
HCT VFR BLD AUTO: 31 % (ref 35–47)
HGB BLD-MCNC: 10.5 G/DL (ref 11.7–15.7)
IMM GRANULOCYTES # BLD: 0 10E9/L (ref 0–0.4)
IMM GRANULOCYTES NFR BLD: 0.3 %
INR PPP: 1.32 (ref 0.86–1.14)
LYMPHOCYTES # BLD AUTO: 0.3 10E9/L (ref 0.8–5.3)
LYMPHOCYTES NFR BLD AUTO: 7.7 %
MAGNESIUM SERPL-MCNC: 2 MG/DL (ref 1.6–2.3)
MCH RBC QN AUTO: 29.7 PG (ref 26.5–33)
MCHC RBC AUTO-ENTMCNC: 33.9 G/DL (ref 31.5–36.5)
MCV RBC AUTO: 88 FL (ref 78–100)
MONOCYTES # BLD AUTO: 0 10E9/L (ref 0–1.3)
MONOCYTES NFR BLD AUTO: 0 %
NEUTROPHILS # BLD AUTO: 3.4 10E9/L (ref 1.6–8.3)
NEUTROPHILS NFR BLD AUTO: 92 %
NRBC # BLD AUTO: 0 10*3/UL
NRBC BLD AUTO-RTO: 0 /100
PHOSPHATE SERPL-MCNC: 3.5 MG/DL (ref 2.5–4.5)
PLATELET # BLD AUTO: 175 10E9/L (ref 150–450)
POTASSIUM SERPL-SCNC: 3.9 MMOL/L (ref 3.4–5.3)
PROT SERPL-MCNC: 6.1 G/DL (ref 6.8–8.8)
RBC # BLD AUTO: 3.53 10E12/L (ref 3.8–5.2)
SODIUM SERPL-SCNC: 138 MMOL/L (ref 133–144)
SPECIMEN EXP DATE BLD: NORMAL
WBC # BLD AUTO: 3.7 10E9/L (ref 4–11)

## 2018-08-06 PROCEDURE — 40000871 ZZH STATISTIC AUTOLOGOUS BM-INITIAL INFUSION

## 2018-08-06 PROCEDURE — 86900 BLOOD TYPING SEROLOGIC ABO: CPT | Performed by: NURSE PRACTITIONER

## 2018-08-06 PROCEDURE — 86850 RBC ANTIBODY SCREEN: CPT | Performed by: NURSE PRACTITIONER

## 2018-08-06 PROCEDURE — 80048 BASIC METABOLIC PNL TOTAL CA: CPT | Performed by: NURSE PRACTITIONER

## 2018-08-06 PROCEDURE — 25800025 ZZH RX 258: Performed by: PHYSICIAN ASSISTANT

## 2018-08-06 PROCEDURE — 25000131 ZZH RX MED GY IP 250 OP 636 PS 637: Performed by: NURSE PRACTITIONER

## 2018-08-06 PROCEDURE — 84100 ASSAY OF PHOSPHORUS: CPT | Performed by: NURSE PRACTITIONER

## 2018-08-06 PROCEDURE — 86901 BLOOD TYPING SEROLOGIC RH(D): CPT | Performed by: NURSE PRACTITIONER

## 2018-08-06 PROCEDURE — 25000132 ZZH RX MED GY IP 250 OP 250 PS 637: Performed by: PHYSICIAN ASSISTANT

## 2018-08-06 PROCEDURE — 83735 ASSAY OF MAGNESIUM: CPT | Performed by: NURSE PRACTITIONER

## 2018-08-06 PROCEDURE — 80076 HEPATIC FUNCTION PANEL: CPT | Performed by: NURSE PRACTITIONER

## 2018-08-06 PROCEDURE — 25000132 ZZH RX MED GY IP 250 OP 250 PS 637: Performed by: NURSE PRACTITIONER

## 2018-08-06 PROCEDURE — 25000128 H RX IP 250 OP 636: Performed by: NURSE PRACTITIONER

## 2018-08-06 PROCEDURE — 38208 THAW PRESERVED STEM CELLS: CPT | Performed by: INTERNAL MEDICINE

## 2018-08-06 PROCEDURE — 30243Y0 TRANSFUSION OF AUTOLOGOUS HEMATOPOIETIC STEM CELLS INTO CENTRAL VEIN, PERCUTANEOUS APPROACH: ICD-10-PCS | Performed by: INTERNAL MEDICINE

## 2018-08-06 PROCEDURE — 85025 COMPLETE CBC W/AUTO DIFF WBC: CPT | Performed by: NURSE PRACTITIONER

## 2018-08-06 PROCEDURE — 85610 PROTHROMBIN TIME: CPT | Performed by: NURSE PRACTITIONER

## 2018-08-06 PROCEDURE — 20600000 ZZH R&B BMT

## 2018-08-06 RX ADMIN — ACYCLOVIR 800 MG: 800 TABLET ORAL at 18:36

## 2018-08-06 RX ADMIN — ACYCLOVIR 800 MG: 800 TABLET ORAL at 10:05

## 2018-08-06 RX ADMIN — POTASSIUM CHLORIDE 20 MEQ: 400 INJECTION, SOLUTION INTRAVENOUS at 06:32

## 2018-08-06 RX ADMIN — DIPHENHYDRAMINE HYDROCHLORIDE 25 MG: 25 CAPSULE ORAL at 12:19

## 2018-08-06 RX ADMIN — LORAZEPAM 0.5 MG: 0.5 TABLET ORAL at 18:36

## 2018-08-06 RX ADMIN — PANTOPRAZOLE SODIUM 40 MG: 40 TABLET, DELAYED RELEASE ORAL at 08:02

## 2018-08-06 RX ADMIN — DEXTROSE AND SODIUM CHLORIDE: 5; 450 INJECTION, SOLUTION INTRAVENOUS at 21:27

## 2018-08-06 RX ADMIN — LEVOFLOXACIN 250 MG: 250 TABLET, FILM COATED ORAL at 10:05

## 2018-08-06 RX ADMIN — PROCHLORPERAZINE MALEATE 5 MG: 5 TABLET, FILM COATED ORAL at 16:03

## 2018-08-06 RX ADMIN — ACYCLOVIR 800 MG: 800 TABLET ORAL at 21:29

## 2018-08-06 RX ADMIN — ACETAMINOPHEN 650 MG: 325 TABLET, FILM COATED ORAL at 12:19

## 2018-08-06 RX ADMIN — DEXAMETHASONE 4 MG: 4 TABLET ORAL at 08:02

## 2018-08-06 RX ADMIN — MAGNESIUM SULFATE HEPTAHYDRATE 2 G: 40 INJECTION, SOLUTION INTRAVENOUS at 05:54

## 2018-08-06 RX ADMIN — LORAZEPAM 0.5 MG: 0.5 TABLET ORAL at 08:02

## 2018-08-06 RX ADMIN — FLUCONAZOLE 200 MG: 200 TABLET ORAL at 08:02

## 2018-08-06 RX ADMIN — PROCHLORPERAZINE MALEATE 5 MG: 5 TABLET, FILM COATED ORAL at 03:51

## 2018-08-06 RX ADMIN — ACYCLOVIR 800 MG: 800 TABLET ORAL at 13:52

## 2018-08-06 RX ADMIN — POLYETHYLENE GLYCOL 3350 17 G: 17 POWDER, FOR SOLUTION ORAL at 20:13

## 2018-08-06 RX ADMIN — POLYETHYLENE GLYCOL 3350 17 G: 17 POWDER, FOR SOLUTION ORAL at 08:02

## 2018-08-06 RX ADMIN — ACYCLOVIR 800 MG: 800 TABLET ORAL at 08:02

## 2018-08-06 RX ADMIN — PROCHLORPERAZINE MALEATE 5 MG: 5 TABLET, FILM COATED ORAL at 10:05

## 2018-08-06 RX ADMIN — LORAZEPAM 0.5 MG: 0.5 TABLET ORAL at 03:51

## 2018-08-06 RX ADMIN — PROCHLORPERAZINE MALEATE 5 MG: 5 TABLET, FILM COATED ORAL at 21:29

## 2018-08-06 RX ADMIN — LORAZEPAM 0.5 MG: 0.5 TABLET ORAL at 12:22

## 2018-08-06 ASSESSMENT — VISUAL ACUITY: OU: NORMAL ACUITY

## 2018-08-06 ASSESSMENT — ACTIVITIES OF DAILY LIVING (ADL)
ADLS_ACUITY_SCORE: 9

## 2018-08-06 NOTE — PROCEDURES
BMT/Cellular Autologous Product Infusion         Patient Vitals for the past 24 hrs:   Temp Temp src Pulse Resp Heart Rate BP   08/05/18 1550 97.8  F (36.6  C) Axillary - 18 104 120/80   08/05/18 2023 98.1  F (36.7  C) Axillary - 20 86 118/74   08/06/18 0331 97.8  F (36.6  C) Axillary - 18 80 113/74   08/06/18 0738 97.4  F (36.3  C) Axillary - 18 107 -   08/06/18 1145 98.3  F (36.8  C) Axillary - 16 97 119/78   08/06/18 1237 98.9  F (37.2  C) Axillary 86 16 - 120/75   08/06/18 1256 98.4  F (36.9  C) Axillary - 16 91 122/72   08/06/18 1300 - - - 16 - -   08/06/18 1307 96.7  F (35.9  C) Axillary 83 16 - 122/72   08/06/18 1315 98.6  F (37  C) Axillary - 16 78 125/77   08/06/18 1349 98.9  F (37.2  C) Axillary - 16 91 121/79         BMT INFUSION DOCUMENTATION (last 48 hours)      BMT/Cellular Product Infusion       08/06/18 0800             [REMOVED] Product 08/06/18 0915 HPC, Apheresis    Product Details Product Release Date: 08/06/18  -NB Product Release Time: 0915 -NB Product Type: HPC, Apheresis  -NB DIN: [id number]  -NB Product Description Code: [id number]  -NB Volume Dispensed (mL): 134 mL  -NB Completion Date (RN to complete): 08/06/18  -JF Completion Time (RN to complete): 1256  -JF    Checked by (Patient RN) Tayla Marcus RN  -JF       Checked by (Witness) CRS  -CS       Product Volume Infused (mL) 134 mL  -JF       Flush Volume (mL) 30 mL  -JF       Volume Dispensed (mL)        [REMOVED] Product 08/06/18 0915 HPC, Apheresis    Product Details Product Release Date: 08/06/18  -NB Product Release Time: 0915 -NB Product Type: HPC, Apheresis  -NB DIN: [id number]  -NB Product Description Code: [id number]  -NB Volume Dispensed (mL): 134 mL  -NB Completion Date (RN to complete): 08/06/18  -JF Completion Time (RN to complete): 1313  -JF    Checked by (Patient RN) Tayla Marcus RN  -JF       Checked by (Witness) CRS  -CS       Product Volume Infused (mL) 134 mL  -JF       Flush Volume (mL) 50 mL  -JF       Volume  Dispensed (mL)          User Key  (r) = Recorded By, (t) = Taken By, (c) = Cosigned By    Initials Name Effective Dates    CS MarcelaAshley DASH 04/29/14 -     Tayla Lamb RN 04/30/15 -     Bibi Esparza 04/29/14 -         Allogeneic Donor Eligibility Determination and Summary of Records: N/A - Autologous        Type of Infusion: Autologous      Baseline Pre-Infusion Evaluation (to be completed by Provider):   Dyspnea: Grade 0 - none  Hypoxia: Grade 0 - not present  Fever: Grade 0 - afebrile  Chills: Grade 0 - none  Febrile Neutropenia: Grade 0 - not present  Sinus Bradycardia: Grade 0 - none  Hypertension: Grade 0 - none  Hypotension: Grade 0 - none  Chest Pain: Grade 0 - none  Bronchospasm: Grade 0 - none  Pain: Grade 0 - none  Rash: Grade 0 - None  Neurologic Specify: none    If adverse reactions, events or complications occur (fever greater than 2 degrees fahrenheit increase, and severe reactions of the following types: chills, dyspnea, bronchospasm, hyper/hypotension, hypoxia, bradycardia, chest pain, back/flank pain, hypoxia, and any other reaction deemed severe or life threatening; any instance of product bag breakage or unusual product appearance)    Any other events that are >= grade 3, then immediately contact the BMT Attending physician, the Cell Therapy Laboratory Medical Director (pager [phone]) and the Cell Therapy Laboratory ([phone]).  After midnight, holidays & weekends contact the [facility] on the appropriate campus ([facility] [place]: [phone]; [facility] [place]: [phone]).    MARIA TERESA To CNP

## 2018-08-06 NOTE — PLAN OF CARE
Problem: Stem Cell/Bone Marrow Transplant (Adult)  Goal: Signs and Symptoms of Listed Potential Problems Will be Absent, Minimized or Managed (Stem Cell/Bone Marrow Transplant)  Signs and symptoms of listed potential problems will be absent, minimized or managed by discharge/transition of care (reference Stem Cell/Bone Marrow Transplant (Adult) CPG).   Outcome: No Change   08/06/18 0608   Stem Cell/Bone Marrow Transplant   Problems Assessed (Stem Cell/Bone Marrow Transplant) all   Problems Present (Stem Cell/BMT) fatigue;gastrointestinal complications;situational response       Problem: Patient Care Overview  Goal: Plan of Care/Patient Progress Review  Outcome: No Change   08/06/18 0608   OTHER   Plan Of Care Reviewed With patient;caregiver   Plan of Care Review   Progress no change     Goal: Individualization & Mutuality  Outcome: No Change  Patient is A & O x 3. Afebrile, vital signs stable and lung sounds clear. Central line dressing is C/D/I and good blood returns. Patient continues to c/o mild nausea without emesis. Patient received scheduled compazine 5 mg po x 2, Ativan 0.5 mg po x 2 with some relief. Melphalan flush ended at 2200. Transplant flush started at 04 am and today is transplantation. Potassium and magnesium are infusing. Patient showered and slept good. Patient ate dinner 75% and is drinking ok. Patient had 2 small bowel movement, hard. Continue to encourage patient to do good mouth cares, cough, deep breath and sit up while eating or drinking. Family at the bedside and very supportive. Continue with plan of care and notify MD for status changes.

## 2018-08-06 NOTE — PROGRESS NOTES
"BMT Daily Progress Note    Patient ID:  Amira Avery is a 28 year old female, currently day 0 of her HCT.    Interval History: Still with c/o constipation although took miralax this morning. She had a very small stool yesterday otherwise mainly just flatulence. Eating/drinking alright. No fevers. Transplant this afternoon.     10 pt ROS otherwise negative    PHYSICAL EXAM      Weight     Wt Readings from Last 3 Encounters:   08/06/18 71 kg (156 lb 9.6 oz)   07/25/18 73.8 kg (162 lb 12.8 oz)   07/24/18 72.5 kg (159 lb 13.3 oz)        /78 (BP Location: Right arm)  Pulse 98  Temp 98.3  F (36.8  C) (Axillary)  Resp 16  Ht 1.605 m (5' 3.19\")  Wt 71 kg (156 lb 9.6 oz)  SpO2 97%  BMI 27.57 kg/m2   General: NAD   Eyes: ARMINDA, sclera anicteric   Nose/Mouth/Throat: OP clear, buccal mucosa moist, no ulcerations   Lungs: CTA bilaterally  Cardiovascular: RRR, no M/R/G   Abdominal/Rectal: quiet BS, soft, NT, ND, No HSM   Lymphatics: No edema  Skin: No rashes or petechaie  Neuro: A&O   Additional Findings: Wang site NT, no drainage. 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 D-0    Prep:   D-6 BCNU  D-5 to D-2 Lisa-cx2 and -16x2  D-1 8/5/2018 Melphalan with flush  Day 0:  8/6/2018:  Transplant day    1.  BMT:  Completed GCSF + mozobil priming prior to BEAM Auto PBSCT  - Collected 6.21million CD34/kg on 7/24  - Allopurinol through day 0. Premeds flush pre and post PBSCT. GCSF starts d+5 and cont to until ANC>2500 x2 consecutive days. Re-stage per protocol.    2.  HEME: Keep Hgb>8 and plts>10K. No pre-meds.   Platelet intercept study                            3.  ID: Afebrile. Cont Levo, Fluc, and HD ACV (CMV+, HSV+, EBV+) prophy. Bactrim or appropriate PCP therapy to start d+28.                                             4.  GI:dexamethasone " prior to chemo to prevent N/V. Switch zofran to prn and compazine scheduled. Zofran has worsened constipation in the past for her.  Added scheduled ativan 8/4 and will give dose of emend today  Protonix for GI prophy.  - Miralax 8/4 (takes at home for intermittent constipation) and senna but no stool.  Changed miralax to bid with dose of lactulose last 8/5. Hold off on additional today    5.  FEN/Renal: Monitor creat and lytes. Replete lytes PRN per SS. Monitor weight, I+O, lytes per protocol   Decreased appetite, not eating much.  Encourage supplements..    Belinda Castro NP  [phone]       Patient has been seen and evaluated by me. I have reviewed today's vital signs, medications, labs and imaging results independently. I have discussed the plan with the team and agree with the findings and plan in this note.       28 years old female, relapsed Hodgkins disease, admitted for autologous transplant.  Conditioning as above. Zofran and dex for nausea. ID: Prophy with levaquin, fluc and acyclovir    Day 0 today: I was present during the beginning of the infusion and supervised the procedure. The patient tolerated the procedure well without complications.  Alma Garcia MD

## 2018-08-06 NOTE — PLAN OF CARE
Problem: Stem Cell/Bone Marrow Transplant (Adult)  Goal: Signs and Symptoms of Listed Potential Problems Will be Absent, Minimized or Managed (Stem Cell/Bone Marrow Transplant)  Signs and symptoms of listed potential problems will be absent, minimized or managed by discharge/transition of care (reference Stem Cell/Bone Marrow Transplant (Adult) CPG).   Outcome: No Change   08/06/18 1802   Stem Cell/Bone Marrow Transplant   Problems Assessed (Stem Cell/Bone Marrow Transplant) all   Problems Present (Stem Cell/BMT) fatigue;situational response       Problem: Patient Care Overview  Goal: Plan of Care/Patient Progress Review  Outcome: No Change   08/06/18 1802   OTHER   Plan Of Care Reviewed With patient   Plan of Care Review   Progress no change     AVSS on room air. Pt denies pain or nausea. Given scheduled compazine and ativan, given ativan prior to transplant this afternoon. Tolerated transplant well. Flush will continue until 0115. Large soft stool this shift. Flat affect, drowsy and frequently sleeping throughout day. Plan for discharge home tomorrow. Discharge education at 1100 tomorrow.

## 2018-08-06 NOTE — PROGRESS NOTES
Transplant Note:  I was present during the beginning of the infusion and supervised the procedure. The patient tolerated the procedure well without complications.    Alma Garcia

## 2018-08-06 NOTE — PLAN OF CARE
Problem: Patient Care Overview  Goal: Plan of Care/Patient Progress Review   OT: Pt politely refused OT t/x session 2/2 fatigue and upcoming transplant. OT will continue with POC.

## 2018-08-06 NOTE — PROGRESS NOTES
BMT SOCIAL WORK NOTE:    Focus: Support    Data: Amira Avery is a 28 year old female with Nodular sclerosing Hodgkins disease here for auto PBSCT, day 0, per medical record.    Interventions: SW met with pt to assess coping, provide psychosocial support and assist with resources as needed. Also present in the room was pt's significant other-Dana. Pt shared she is having her transplant today. Pt endorsed being fatigued and sleeping a lot. SW inquired about the [employer] jam and [facility] Fund jam applications. Pt said she has not completed them yet; pt will look for the grants and let SW know if she needs another set of copies. SW will meet with pt tomorrow prior to discharge. SW provided empathetic listening, validation of concerns, and encouragement. SW encouraged pt to contact SW for support, questions and/or resources.     Assessment: Pt presented as calm and fatigued, pt was laying in bed.  Pt appears to be coping well. Pt continues to be supported by her significant other-Dana, mother-Alicja, father-Facundo, siblings and other friends/family.    Plan: SW will continue to provide psychosocial support and assistance with resources as needed. SW will continue to collaborate with multidisciplinary team regarding pt's plan of care.     Discharge Plan: Pt is anticipated to discharge home ([facility]) on 8/7/2018 with her mother-Alicja as primary caregiver.    Aneta THOMAS, MEL  Phone: [phone]  Pager: [phone]

## 2018-08-06 NOTE — PROGRESS NOTES
Type of transplant: Donor: Autologous  Product: auto 2 bags     BMT INFUSION DOCUMENTATION (last 48 hours)      BMT/Cellular Product Infusion       08/06/18 0800             [REMOVED] Product 08/06/18 0915 HPC, Apheresis    Product Details Product Release Date: 08/06/18 -NB Product Release Time: 0915 -NB Product Type: HPC, Apheresis  -NB DIN: [id number]  -NB Product Description Code: [id number]  -NB Volume Dispensed (mL): 134 mL  -NB Completion Date (RN to complete): 08/06/18  -JF Completion Time (RN to complete): 1256  -JF    Checked by (Patient RN) Tayla Marcus RN  -JF       Checked by (Witness) CRS  -CS       Product Volume Infused (mL) 134 mL  -JF       Flush Volume (mL) 30 mL  -JF       Volume Dispensed (mL)        [REMOVED] Product 08/06/18 0915 HPC, Apheresis    Product Details Product Release Date: 08/06/18  -NB Product Release Time: 0915 -NB Product Type: HPC, Apheresis  -NB DIN: [id number]  -NB Product Description Code: [id number]  -NB Volume Dispensed (mL): 134 mL  -NB Completion Date (RN to complete): 08/06/18  -JF Completion Time (RN to complete): 1313  -JF    Checked by (Patient RN) Tayla Marcus RN  -JF       Checked by (Witness) CRS  -CS       Product Volume Infused (mL) 134 mL  -JF       Flush Volume (mL) 50 mL  -JF       Volume Dispensed (mL)          User Key  (r) = Recorded By, (t) = Taken By, (c) = Cosigned By    Initials Name Effective Dates    Ashley Champagne 04/29/14 -     Tayla Lamb RN 04/30/15 -     Bibi Esparza 04/29/14 -         Preparation: RN Documentation  Patient was premedicated as ordered: yes  Line Type: central line, right  Patient Stable Prior to Infusion: yes  Time Infusion Started: 1244 (bag B0 at 1259)  Teaching: pt instructed on what to expect today and in the next week  Tolerated/Reaction: Patient tolerance of product infusion tolerated well  Immediate suspected transfusion reaction to the product: none  Did patient have prior history of similar  signs/symptoms during this hospitalization?: NA  Symptoms during/after infusion: other (comment) (n/a)  Did the patient tolerate the infusion well: yes  Medications and treatment for symptoms: none, given ativan prior  Did the symptoms resolve?:  (n/a)  Enter comments if clots, leaks, broken bag, infusion delays, other issues with bag/infusion: n/a  Intervention: n/a, given ativan prior to transfusion as pt nervous about becoming nauseated, no nausea  Response to intervention: no nausea, tolerated well, AVSS  Plan: continue to monitor for signs/symptoms of reaction.

## 2018-08-07 ENCOUNTER — HOME INFUSION (PRE-WILLOW HOME INFUSION) (OUTPATIENT)
Dept: PHARMACY | Facility: CLINIC | Age: 28
End: 2018-08-07

## 2018-08-07 ENCOUNTER — CARE COORDINATION (OUTPATIENT)
Dept: ONCOLOGY | Facility: CLINIC | Age: 28
End: 2018-08-07

## 2018-08-07 VITALS
HEART RATE: 82 BPM | SYSTOLIC BLOOD PRESSURE: 123 MMHG | TEMPERATURE: 98.2 F | HEIGHT: 63 IN | BODY MASS INDEX: 27.43 KG/M2 | OXYGEN SATURATION: 98 % | WEIGHT: 154.8 LBS | RESPIRATION RATE: 16 BRPM | DIASTOLIC BLOOD PRESSURE: 74 MMHG

## 2018-08-07 LAB
ALBUMIN SERPL-MCNC: 3.5 G/DL (ref 3.4–5)
ALP SERPL-CCNC: 50 U/L (ref 40–150)
ALT SERPL W P-5'-P-CCNC: 30 U/L (ref 0–50)
ANION GAP SERPL CALCULATED.3IONS-SCNC: 6 MMOL/L (ref 3–14)
AST SERPL W P-5'-P-CCNC: 20 U/L (ref 0–45)
BASOPHILS # BLD AUTO: 0 10E9/L (ref 0–0.2)
BASOPHILS NFR BLD AUTO: 0 %
BILIRUB SERPL-MCNC: 0.7 MG/DL (ref 0.2–1.3)
BUN SERPL-MCNC: 15 MG/DL (ref 7–30)
CALCIUM SERPL-MCNC: 8.2 MG/DL (ref 8.5–10.1)
CHLORIDE SERPL-SCNC: 105 MMOL/L (ref 94–109)
CO2 SERPL-SCNC: 26 MMOL/L (ref 20–32)
CREAT SERPL-MCNC: 0.57 MG/DL (ref 0.52–1.04)
DIFFERENTIAL METHOD BLD: ABNORMAL
EOSINOPHIL # BLD AUTO: 0 10E9/L (ref 0–0.7)
EOSINOPHIL NFR BLD AUTO: 0 %
ERYTHROCYTE [DISTWIDTH] IN BLOOD BY AUTOMATED COUNT: 13.6 % (ref 10–15)
GFR SERPL CREATININE-BSD FRML MDRD: >90 ML/MIN/1.7M2
GLUCOSE SERPL-MCNC: 106 MG/DL (ref 70–99)
HCT VFR BLD AUTO: 30.4 % (ref 35–47)
HGB BLD-MCNC: 10.3 G/DL (ref 11.7–15.7)
IMM GRANULOCYTES # BLD: 0 10E9/L (ref 0–0.4)
IMM GRANULOCYTES NFR BLD: 0.8 %
LYMPHOCYTES # BLD AUTO: 0.3 10E9/L (ref 0.8–5.3)
LYMPHOCYTES NFR BLD AUTO: 5.3 %
MAGNESIUM SERPL-MCNC: 2.2 MG/DL (ref 1.6–2.3)
MCH RBC QN AUTO: 29.9 PG (ref 26.5–33)
MCHC RBC AUTO-ENTMCNC: 33.9 G/DL (ref 31.5–36.5)
MCV RBC AUTO: 88 FL (ref 78–100)
MONOCYTES # BLD AUTO: 0 10E9/L (ref 0–1.3)
MONOCYTES NFR BLD AUTO: 0.4 %
NEUTROPHILS # BLD AUTO: 4.6 10E9/L (ref 1.6–8.3)
NEUTROPHILS NFR BLD AUTO: 93.5 %
NRBC # BLD AUTO: 0 10*3/UL
NRBC BLD AUTO-RTO: 0 /100
PLATELET # BLD AUTO: 115 10E9/L (ref 150–450)
POTASSIUM SERPL-SCNC: 3.9 MMOL/L (ref 3.4–5.3)
PROT SERPL-MCNC: 6.1 G/DL (ref 6.8–8.8)
RBC # BLD AUTO: 3.44 10E12/L (ref 3.8–5.2)
SODIUM SERPL-SCNC: 137 MMOL/L (ref 133–144)
WBC # BLD AUTO: 4.9 10E9/L (ref 4–11)

## 2018-08-07 PROCEDURE — 25000132 ZZH RX MED GY IP 250 OP 250 PS 637: Performed by: NURSE PRACTITIONER

## 2018-08-07 PROCEDURE — 25000132 ZZH RX MED GY IP 250 OP 250 PS 637: Performed by: PHYSICIAN ASSISTANT

## 2018-08-07 PROCEDURE — 25000128 H RX IP 250 OP 636: Performed by: NURSE PRACTITIONER

## 2018-08-07 PROCEDURE — 85025 COMPLETE CBC W/AUTO DIFF WBC: CPT | Performed by: NURSE PRACTITIONER

## 2018-08-07 PROCEDURE — 83735 ASSAY OF MAGNESIUM: CPT | Performed by: NURSE PRACTITIONER

## 2018-08-07 PROCEDURE — 80053 COMPREHEN METABOLIC PANEL: CPT | Performed by: NURSE PRACTITIONER

## 2018-08-07 RX ORDER — LEVOFLOXACIN 250 MG/1
250 TABLET, FILM COATED ORAL DAILY
Qty: 30 TABLET | Refills: 0 | Status: ON HOLD | OUTPATIENT
Start: 2018-08-07 | End: 2018-08-17

## 2018-08-07 RX ORDER — PANTOPRAZOLE SODIUM 40 MG/1
40 TABLET, DELAYED RELEASE ORAL DAILY
Qty: 30 TABLET | Refills: 1 | Status: SHIPPED | OUTPATIENT
Start: 2018-08-07 | End: 2018-09-14

## 2018-08-07 RX ORDER — SENNOSIDES 8.6 MG
2 TABLET ORAL 2 TIMES DAILY PRN
Qty: 60 EACH | Refills: 0 | Status: SHIPPED | OUTPATIENT
Start: 2018-08-07 | End: 2018-09-14

## 2018-08-07 RX ORDER — LORAZEPAM 0.5 MG/1
.5-1 TABLET ORAL EVERY 4 HOURS PRN
Qty: 30 TABLET | Refills: 0 | Status: SHIPPED | OUTPATIENT
Start: 2018-08-07 | End: 2018-09-14

## 2018-08-07 RX ORDER — ONDANSETRON 8 MG/1
8 TABLET, ORALLY DISINTEGRATING ORAL EVERY 8 HOURS PRN
Qty: 30 TABLET | Refills: 0 | Status: SHIPPED | OUTPATIENT
Start: 2018-08-07 | End: 2018-09-14

## 2018-08-07 RX ORDER — FLUCONAZOLE 200 MG/1
200 TABLET ORAL DAILY
Qty: 30 TABLET | Refills: 0 | Status: SHIPPED | OUTPATIENT
Start: 2018-08-07 | End: 2018-08-24

## 2018-08-07 RX ORDER — PROCHLORPERAZINE MALEATE 5 MG
5 TABLET ORAL EVERY 6 HOURS
Qty: 30 TABLET | Refills: 0 | Status: SHIPPED | OUTPATIENT
Start: 2018-08-07 | End: 2018-08-10

## 2018-08-07 RX ORDER — SULFAMETHOXAZOLE/TRIMETHOPRIM 800-160 MG
1 TABLET ORAL
Qty: 34 TABLET | Refills: 0 | Status: SHIPPED | OUTPATIENT
Start: 2018-09-03 | End: 2018-09-10

## 2018-08-07 RX ORDER — ACYCLOVIR 800 MG/1
800 TABLET ORAL
Qty: 150 TABLET | Refills: 0 | Status: SHIPPED | OUTPATIENT
Start: 2018-08-07 | End: 2018-09-05

## 2018-08-07 RX ADMIN — SODIUM CHLORIDE, PRESERVATIVE FREE 10 ML: 5 INJECTION INTRAVENOUS at 09:00

## 2018-08-07 RX ADMIN — LEVOFLOXACIN 250 MG: 250 TABLET, FILM COATED ORAL at 09:00

## 2018-08-07 RX ADMIN — PROCHLORPERAZINE MALEATE 5 MG: 5 TABLET, FILM COATED ORAL at 03:52

## 2018-08-07 RX ADMIN — SODIUM CHLORIDE, PRESERVATIVE FREE 5 ML: 5 INJECTION INTRAVENOUS at 03:53

## 2018-08-07 RX ADMIN — SODIUM CHLORIDE 1000 ML: 9 INJECTION, SOLUTION INTRAVENOUS at 08:59

## 2018-08-07 RX ADMIN — PANTOPRAZOLE SODIUM 40 MG: 40 TABLET, DELAYED RELEASE ORAL at 08:59

## 2018-08-07 RX ADMIN — POTASSIUM CHLORIDE 10 MEQ: 10 INJECTION, SOLUTION INTRAVENOUS at 09:01

## 2018-08-07 RX ADMIN — POTASSIUM CHLORIDE 10 MEQ: 10 INJECTION, SOLUTION INTRAVENOUS at 10:09

## 2018-08-07 RX ADMIN — PROCHLORPERAZINE MALEATE 5 MG: 5 TABLET, FILM COATED ORAL at 09:10

## 2018-08-07 RX ADMIN — FLUCONAZOLE 200 MG: 200 TABLET ORAL at 08:59

## 2018-08-07 RX ADMIN — ACYCLOVIR 800 MG: 800 TABLET ORAL at 08:59

## 2018-08-07 RX ADMIN — LORAZEPAM 0.5 MG: 0.5 TABLET ORAL at 08:12

## 2018-08-07 RX ADMIN — ACYCLOVIR 800 MG: 800 TABLET ORAL at 11:32

## 2018-08-07 ASSESSMENT — ACTIVITIES OF DAILY LIVING (ADL)
ADLS_ACUITY_SCORE: 9

## 2018-08-07 NOTE — SUMMARY OF CARE
[facility] Summary of Care   & Survivorship Care Plan  Treatment Team:  Patient Care Team:  Hanny, [facility] as PCP - General  Gina Lawson MD as Referring Physician (Hematology & Oncology)  Tanya Landa MD as BMT Physician (Internal Medicine)  Aneta Sherman MSW as   Becky William LICSW as  ( - Clinical)  Discharge Diagnosis: S/P BMT    Transplant Essential Data:  Diagnosis HDN Hodgkin's Disease - NOS  HCT Type Autologous    Prep Regimen BCNU  Lisa-C  Etoposide  Melphalan   Donor Source No data was found    GVHD Prophylaxis No  Clinical Trials      Oncology Treatment History:    She is a 28 years old female diagnosed with classical Hodgkin's lymphoma, initially presented in 02/2007 with left neck mass.  Ultrasound-guided FNA was done in 03/2017, negative for malignancy, however, it continued.  Therefore on 04/19/2017, CT scan was done showing diffuse cervical lymphadenopathy left.  The maximum seemed to be around 2.5 x 2 cm in the mediastinum.  She had also lymphadenopathy in the left axilla, retroperitoneum and mediastinum.  The path confirmed this on 05/30/2017, extensive intense hypermetabolic lymphadenopathy in the neck, left axilla, mediastinum, but nothing below the diaphragm, followed by excisional left cervical lymphadenopathy with lymph nodes showing classical Hodgkin's lymphoma, nodular sclerosis type. She was diagnosed stage IIB.  Although she has lost some weight at that time, ESR was high, greater than 50, IPI was score 1.  She received 6 cycles of ABVD between 06/09/2017 and 11/10/2017.  However, 11/09/2017 CT showed increasing minimal activity within poorly visualized but stable-appearing right cervical chain lymph nodes suggests mild progression of disease.  SUV was maximum of 4.7.  She was followed, and repeat PET/CT was repeated 01/09/2018.  There was new and increasing hypermetabolic right cervical adenopathy suspicious for  disease progression.  The lymph nodes are persistent hypermetabolic activity in the tonsillar tissues with maximum 7.9, right jugulodigastric lymph nodes with SUV 4.0, posterior cervical chain node SUV 6, new 6 mm right posterior cervical chain, maximum SUV is 3.3, new 7 mm right supraclavicular node with maximum SUV of 4.7.  Chest, no abnormal hypermetabolic activity.  Abdomen, no abnormal hypermetabolic activity.  She also had on 02/05/2018 showed enlarged heterogeneous right jugular chain cervical lymph nodes in level 3 and 4 measuring approximately 1.9 x 2 x 2.6 cm and 2 x 1.8 x 2.2 cm are increased since neck CT from 04/21/2017, although may be similar to the PET/CT from 01/09/2018.  Resolution of previously enlarged left cervical lymph nodes on the CT compared to 04/21/2017.  Nasopharynx, oropharynx, tongue base, hypopharynx, larynx were normal, possible small sub-centimeter left thyroid nodule versus artifact, so she started on GDP 02/16/2018, and she had another PET scan 03/28/2018 showing that head and neck there is stable activity in the tonsillar tissue.  There are post-surgical changes in the right neck.  Hypermetabolic right cervical adenopathy in the prior study no longer demonstrated.  There are no new hypermetabolic lesions or lymph nodes in the neck.  No abnormal hypermetabolic activity in the abdomen.  Before starting GDP she underwent right neck excisional biopsy showing recurrent classical Hodgkin lymphoma, nodular-sclerosing.       5/7/2018: Her excisional LN biopsy from R neck is consistent with HD.    Hospital Discharge on 08/07/18  Discharge Location home   Activity at Discharge As tolerated   Nutrition Regular diet as tolerated     Blood Transfusion Parameters Transfuse if Hemoglobin < or equal 8 mg/dL  Red Blood Cell Order: 2 units, irradiated and leukoreduced   Transfuse if Platelet count < or equal 10 uL  Platelet order: 1 adult dose, irradiated and leukoreduced  Transfusion Pre-meds:         Blood Counts Recent Labs   Lab Test  08/07/18   0353   WBC  4.9   ANEU  4.6   ALYM  0.3*   SHELLEY  0.0   AEOS  0.0   HGB  10.3*   HCT  30.4*   PLT  115*      IV Medications Outpatient Pharmacy G-CSF to be given in clinic: (dose) 480mcg subcutaneous daily starting D+5     Electrolyte Replacement  Parameters in Clinic Intravenous Electrolyte Replacement:  Potassium Chloride  Give only if serum creatinine <2. Reference range 3.4-5.3 mmol/L        3.0 - 3.3 mmol/L Oral: 40 mEq by mouth x 1  PIV/CVC on TPN: 30 mEq over 1 hour x 1 doses & 20 mEq by mouth  CVC NOT on TPN: 40 mEq IV x 1   2.5-2.9 mmol/L  Oral: 40 mEq by mouth every 2 hrs x 2  PIV/CVC on TPN: 30 mEq IV & 40 mEq by mouth   CVC NOT on TPN: 60 mEq IV        <2.5 PIV/CVC on TPN: 30 mEq IV & 40 mEq by mouth   CVC NOT on TPN: 60 mEq IV      Magnesium Sulfate  Reference range 1.6-2.3 mg/dl       1.2-1.5 mg/dl  Oral:400 mg by mouth twice daily x 2 days   IV: 2 gm over 1 hour        0.9-1.1 IV Only: 4 gm IV over 2 hours   < 0.9  Discretion of provider and pharmacist        Home Infusion  IV Medications through home infusion: None   Line Care Care: Wang - Flush each line with 5mL of 10 unit/mL heparin every 24 hours  Supplies Through: [facility]  Fax: [phone]  Ph: [phone]      Physical Therapy & Support Services None     Laboratory Tests at Next Clinic Visit Hemogram (CBC) differential, platelet count  Basic Metabolic Panel     Restrictions Immediately Post-Transplant   Always wear your mask in public.    No driving until cleared by your physician    Continue dietary precautions as discussed at your pre-BMT teaching session   When to Call  (Refer to Discharge Teaching Materials)   Fever > 100.5 F    Bleeding that does not stop after a few minutes    Severe nausea, vomiting, or diarrhea     New fall or injury    Change in designated caregiver    Medication question    Any other urgent concern   Follow Up Visits BMT Clinic Appointment  Date/Time:   BMT Clinic (date, time, provider): 8/8/18, BMT provider and lab appt requested    Survivorship Visits:     You will have a 60-minute provider visit dedicated to cancer survivorship one week before your scheduled anniversary visit on day + 100, 1 year, and 2 years.    Your labs will be drawn after the appointment to allow your provider to order additional tests as needed.     Hospital Discharge Medications:   BennieAmira Mary   [place] Medication Instructions MIKE:[id number]    Printed on:08/07/18 0853   Medication Information                      acyclovir (ZOVIRAX) 800 MG tablet  Take 1 tablet (800 mg) by mouth 5 times daily             fluconazole (DIFLUCAN) 200 MG tablet  Take 1 tablet (200 mg) by mouth daily             levofloxacin (LEVAQUIN) 250 MG tablet  Take 1 tablet (250 mg) by mouth daily             LORazepam (ATIVAN) 0.5 MG tablet  Take 1-2 tablets (0.5-1 mg) by mouth every 4 hours as needed for anxiety (nausea/vomiting/sleep)             ondansetron (ZOFRAN-ODT) 8 MG ODT tab  Take 1 tablet (8 mg) by mouth every 8 hours as needed for nausea             pantoprazole (PROTONIX) 40 MG EC tablet  Take 1 tablet (40 mg) by mouth daily             prochlorperazine (COMPAZINE) 5 MG tablet  Take 1 tablet (5 mg) by mouth every 6 hours             sennosides (SENOKOT) 8.6 MG tablet  Take 2 tablets by mouth 2 times daily as needed for constipation             sulfamethoxazole-trimethoprim (BACTRIM DS/SEPTRA DS) 800-160 MG per tablet  Take 1 tablet by mouth Every Mon, Tues two times daily-HOLD until day 28 when notified to start by BMT clinic                   Mandy Castro

## 2018-08-07 NOTE — PLAN OF CARE
"Problem: Stem Cell/Bone Marrow Transplant (Adult)  Goal: Signs and Symptoms of Listed Potential Problems Will be Absent, Minimized or Managed (Stem Cell/Bone Marrow Transplant)  Signs and symptoms of listed potential problems will be absent, minimized or managed by discharge/transition of care (reference Stem Cell/Bone Marrow Transplant (Adult) CPG).   Outcome: No Change  /74 (BP Location: Right arm)  Pulse 82  Temp 97.6  F (36.4  C) (Axillary)  Resp 16  Ht 1.605 m (5' 3.19\")  Wt 71 kg (156 lb 9.6 oz)  SpO2 97%  BMI 27.57 kg/m2  Pt's AVSS, denied pain and continue to have slight nausea. Pt stated schedule compazine is helping with nausea. Pt up independently in room, voiding good amount and had x2 soft stool over night. Pt's post transplant flush was done at 0100 and right chest short is hep locked.  Pt stable with Hgb @ 10.3, K+3.9, WBC 4.9, Plt 115 and Mag of 2.2. Pt to have discharge teaching today at 11am and possible discharge later. Keep monitoring pt as ordered and notify MD with any new changes.    08/07/18 0634   Stem Cell/Bone Marrow Transplant   Problems Assessed (Stem Cell/Bone Marrow Transplant) all   Problems Present (Stem Cell/BMT) fatigue;situational response          "

## 2018-08-07 NOTE — DISCHARGE SUMMARY
[facility] Discharge Summary   Amira Avery MRN# [id number]   Age: 28 year old  YOB: 1990   Date of Admission: 7/31/2018  Date of Discharge:  8/7/18  Admitting Physician: Alma Garcia MD  Discharge Physician:  Dr. Garcia  Discharge Diagnoses:    1. S/p autologous transplant for Hodgkins disease  2. Constipation  Discharge Medications:       mAira Avery   Home Medication Instructions MIKE:[id number]    Printed on:08/07/18 6757   Medication Information                      acyclovir (ZOVIRAX) 800 MG tablet  Take 1 tablet (800 mg) by mouth 5 times daily             fluconazole (DIFLUCAN) 200 MG tablet  Take 1 tablet (200 mg) by mouth daily             levofloxacin (LEVAQUIN) 250 MG tablet  Take 1 tablet (250 mg) by mouth daily             LORazepam (ATIVAN) 0.5 MG tablet  Take 1-2 tablets (0.5-1 mg) by mouth every 4 hours as needed for anxiety (nausea/vomiting/sleep)             ondansetron (ZOFRAN-ODT) 8 MG ODT tab  Take 1 tablet (8 mg) by mouth every 8 hours as needed for nausea             pantoprazole (PROTONIX) 40 MG EC tablet  Take 1 tablet (40 mg) by mouth daily             prochlorperazine (COMPAZINE) 5 MG tablet  Take 1 tablet (5 mg) by mouth every 6 hours             sennosides (SENOKOT) 8.6 MG tablet  Take 2 tablets by mouth 2 times daily as needed for constipation             sulfamethoxazole-trimethoprim (BACTRIM DS/SEPTRA DS) 800-160 MG per tablet  Take 1 tablet by mouth Every Mon, Tues two times daily               Brief History of Illness:    **Adopted from H&P  Transplant Essential Data:  Diagnosis HDN Hodgkin's Disease - NOS  HCT Type Autologous    Prep Regimen BCNU  Lisa-C  Etoposide  Melphalan   Donor Source No data was found    GVHD Prophylaxis No  Clinical Trials        Oncology Treatment History:   She is a 28 years old female diagnosed with classical Hodgkin's lymphoma, initially presented in 02/2007 with left neck mass.  Ultrasound-guided FNA was done  in 03/2017, negative for malignancy, however, it continued.  Therefore on 04/19/2017, CT scan was done showing diffuse cervical lymphadenopathy left.  The maximum seemed to be around 2.5 x 2 cm in the mediastinum.  She had also lymphadenopathy in the left axilla, retroperitoneum and mediastinum.  The path confirmed this on 05/30/2017, extensive intense hypermetabolic lymphadenopathy in the neck, left axilla, mediastinum, but nothing below the diaphragm, followed by excisional left cervical lymphadenopathy with lymph nodes showing classical Hodgkin's lymphoma, nodular sclerosis type. She was diagnosed stage IIB.  Although she has lost some weight at that time, ESR was high, greater than 50, IPI was score 1.  She received 6 cycles of ABVD between 06/09/2017 and 11/10/2017.  However, 11/09/2017 CT showed increasing minimal activity within poorly visualized but stable-appearing right cervical chain lymph nodes suggests mild progression of disease.  SUV was maximum of 4.7.  She was followed, and repeat PET/CT was repeated 01/09/2018.  There was new and increasing hypermetabolic right cervical adenopathy suspicious for disease progression.  The lymph nodes are persistent hypermetabolic activity in the tonsillar tissues with maximum 7.9, right jugulodigastric lymph nodes with SUV 4.0, posterior cervical chain node SUV 6, new 6 mm right posterior cervical chain, maximum SUV is 3.3, new 7 mm right supraclavicular node with maximum SUV of 4.7.  Chest, no abnormal hypermetabolic activity.  Abdomen, no abnormal hypermetabolic activity.  She also had on 02/05/2018 showed enlarged heterogeneous right jugular chain cervical lymph nodes in level 3 and 4 measuring approximately 1.9 x 2 x 2.6 cm and 2 x 1.8 x 2.2 cm are increased since neck CT from 04/21/2017, although may be similar to the PET/CT from 01/09/2018.  Resolution of previously enlarged left cervical lymph nodes on the CT compared to 04/21/2017.  Nasopharynx, oropharynx,  "tongue base, hypopharynx, larynx were normal, possible small sub-centimeter left thyroid nodule versus artifact, so she started on GDP 02/16/2018, and she had another PET scan 03/28/2018 showing that head and neck there is stable activity in the tonsillar tissue.  There are post-surgical changes in the right neck.  Hypermetabolic right cervical adenopathy in the prior study no longer demonstrated.  There are no new hypermetabolic lesions or lymph nodes in the neck.  No abnormal hypermetabolic activity in the abdomen.  Before starting GDP she underwent right neck excisional biopsy showing recurrent classical Hodgkin lymphoma, nodular-sclerosing.       5/7/2018: Her excisional LN biopsy from R neck is consistent with HD.    Physical Exam:    /80 (BP Location: Right arm)  Pulse 82  Temp 98.4  F (36.9  C) (Axillary)  Resp 16  Ht 1.605 m (5' 3.19\")  Wt 70.2 kg (154 lb 12.8 oz)  SpO2 97%  BMI 27.26 kg/m2  General: NAD   Eyes: ARMINDA, sclera anicteric   Nose/Mouth/Throat: OP clear, buccal mucosa moist, no ulcerations   Lungs: CTA bilaterally  Cardiovascular: RRR, no M/R/G   Abdominal/Rectal: quiet BS, soft, NT, ND, No HSM   Lymphatics: No edema  Skin: No rashes or petechaie  Neuro: A&O   Additional Findings: Wang site NT, no drainage. Has port a cath: not accessed  Hospital Course:    Amira Avery is a 28 year old female with Nodular sclerosing Hodgkins disease, s/p gcsf+mozobil priming now admitted for BEAM auto PBSCT. D+1     Prep:   D-6 BCNU  D-5 to D-2 Lisa-cx2 and -16x2  D-1 8/5/2018 Melphalan with flush  Day 0:  8/6/2018:  Transplant day     1.  BMT:  Completed GCSF + mozobil priming prior to BEAM Auto PBSCT  - Collected 6.21million CD34/kg on 7/24  - Allopurinol through day 0. GCSF starts d+5 and cont to until ANC>2500 x2 consecutive days. Re-stage per protocol.     2.  HEME: Keep Hgb>8 and plts>10K. No pre-meds.   Platelet intercept study      3.  ID: Afebrile. Cont Levo, Fluc, and HD ACV " (CMV+, HSV+, EBV+) prophy. Bactrim or appropriate PCP therapy to start d+28.                     4.  GI:dexamethasone prior to chemo to prevent N/V.   Given Rx for Ativan, Compazine and zofran. Zofran as last resort with hx of constipation.  Protonix for GI prophy.  - Bm finally on 8/7, s/p lactulose and miralax. Sent with Rx for Senna BID prn.     5.  FEN/Renal: Monitor creat and lytes. Replete lytes PRN per SS. Monitor weight, I+O, lytes per protocol   1L NS on day of discharge     Discharge Instructions and Follow-Up:    Discharge diet: Regular diet as tolerated  Discharge activity: Activity as tolerated   Discharge follow-up: Follow up with BMT Clinic     Discharge Disposition:    Discharged to home.         Patient has been seen and evaluated by me. I have reviewed today's vital signs, medications, labs and imaging results independently. I have discussed the plan with the team and agree with the findings and plan in this note.    Alma Garcia MD

## 2018-08-07 NOTE — PLAN OF CARE
Problem: Patient Care Overview  Goal: Plan of Care/Patient Progress Review  Occupational Therapy Discharge Summary    Reason for therapy discharge:    Discharged to home.    Progress towards therapy goal(s). See goals on Care Plan in [facility] electronic health record for goal details.  Goals partially met.  Barriers to achieving goals:   discharge from facility.    Therapy recommendation(s):    Continue home exercise program.

## 2018-08-07 NOTE — PROGRESS NOTES
BMT Teaching Flowsheet    Amira Avery is a 28 year old female  The primary encounter diagnosis was Nodular sclerosing Hodgkin's lymphoma, unspecified body region (H). A diagnosis of Hodgkin lymphoma, unspecified, lymph nodes of inguinal region and lower limb (H) was also pertinent to this visit.    Teaching Topic: Autologous stem cell transplant discharge teaching    Person(s) involved in teaching: Patient and Other  Motivation Level  Asks Questions: Yes  Eager to Learn: Yes  Cooperative: Yes  Receptive (willing/able to accept information): Yes  Any cultural factors/[employer] beliefs that may influence understanding or compliance? No    Patient and Caregiver demonstrates understanding of the following:  - Reason for the appointment, diagnosis and treatment plan: Yes  - Knowledge of proper use of medications and conditions for which they are ordered (with special attention to potential side effects or drug interactions): No, explain: Bedside RN to complete medication teaching with patient and caregiver prior to discharge.   - Which situations necessitate calling provider and whom to contact: Yes    Teaching concerns addressed: None    Proper use and care of (medical equipment, care aids, etc.) Yes  Pain management techniques: Yes  Patient instructed on hand hygiene: Yes  How and/when to access community resources: Yes    Infection Control:  Patient and Other demonstrates understanding of the following:  Surgical procedure site care taught NA  Signs and symptoms of infection taught Yes  Wound care taught NA  Central venous catheter care taught No, explain: Patient and caregiver previously received teaching, and decline further need     Instructional Materials Used/Given: Discharge teaching completed with patient and family. Written guidelines provided including clinic follow up, signs and symptoms to report, infection prevention, and contact list. Reviewed potential side effects s/p transplant and what to  expect during recovery period. Discussed clinic routines. Discussed activities to avoid to prevent infections and mask guidelines. [place] home infusion for line care supplies. Pt and family verbalize understanding of material via teach back and all questions have been answered.    Time spent with patient: 25 minutes.    Specific Concerns: NA

## 2018-08-07 NOTE — PROGRESS NOTES
Line Company:  [facility] [phone] for line care supplies   Referral made for line care:  Yes  PT recommended:  No  Referral made for PT:  NA  D/c location:  [facility]  Has placement need been communicated to Social Work?  NA  IV medications needed at discharge:  None   Pharmacy concerns:  None  Teaching time arranged with family:  Tues 8/7 at 11am, with Dana present  Notify nurse to schedule line care class/ DM teaching, prior to d/c:  Patient and caregiver previously received teaching, and decline further need   Medicare form required:  No    Caregiver:  Alicja Avery (mom)  [phone]  Facundo Avery (dad) [phone]  Dana Ramirez (fiance)  [phone]  César Avery (brother) [phone]    Outpatient Nurse Coordinator notified of patient discharge.

## 2018-08-07 NOTE — PROGRESS NOTES
"Discharge SBAR:    Situation:  Amira Avery is a 28 year old being discharged to: Home  Admission reason: Scheduled Admission  Is this a readmission? No    Background:  Primary diagnosis: Hodgkin's Lymphoma  /74 (BP Location: Right arm)  Pulse 82  Temp 98.2  F (36.8  C) (Axillary)  Resp 16  Ht 1.605 m (5' 3.19\")  Wt 70.2 kg (154 lb 12.8 oz)  SpO2 98%  BMI 27.26 kg/m2  Type of donor: Autologous  Type of stem cells: PBSC  Relapsed? No  Falls Precautions? No  Isolation? No  DNR? No  DNI? No  Confidential Patient? No  Positive blood cultures? No    Assessment:  Discharge teaching    Review discharge medications and schedule: Yes    Set up pill box: Yes, per pt with nurse oversight    Discharge instructions reviewed: Yes    Special considerations (think about previous reactions, issues with flushing CVC, premedication needs, etc): No    Patient Concerns: No    Recommendations:  Anticipated needs:    Daily infusions: No    Daily transfusions: No    G-CSF: Yes; to start 8/11    Other: Not Applicable  Verbal report called to clinic: No    Pt given 1L bolus of NS and 20 meq of KCl prior to discharge. Pt has good appetite, ate breakfast and lunch.    Escorted down to curb by writer at 1255. Pt encouraged to call with questions.  "

## 2018-08-08 ENCOUNTER — ONCOLOGY VISIT (OUTPATIENT)
Dept: TRANSPLANT | Facility: CLINIC | Age: 28
End: 2018-08-08
Attending: PHYSICIAN ASSISTANT
Payer: COMMERCIAL

## 2018-08-08 ENCOUNTER — APPOINTMENT (OUTPATIENT)
Dept: LAB | Facility: CLINIC | Age: 28
End: 2018-08-08
Attending: PHYSICIAN ASSISTANT
Payer: COMMERCIAL

## 2018-08-08 VITALS
OXYGEN SATURATION: 97 % | DIASTOLIC BLOOD PRESSURE: 76 MMHG | TEMPERATURE: 98.5 F | HEART RATE: 99 BPM | BODY MASS INDEX: 27.15 KG/M2 | RESPIRATION RATE: 18 BRPM | WEIGHT: 154.2 LBS | SYSTOLIC BLOOD PRESSURE: 119 MMHG

## 2018-08-08 DIAGNOSIS — C81.95 HODGKIN LYMPHOMA, UNSPECIFIED, LYMPH NODES OF INGUINAL REGION AND LOWER LIMB (H): Primary | ICD-10-CM

## 2018-08-08 DIAGNOSIS — C81.10 NODULAR SCLEROSING HODGKIN'S LYMPHOMA, UNSPECIFIED BODY REGION (H): ICD-10-CM

## 2018-08-08 LAB
ANION GAP SERPL CALCULATED.3IONS-SCNC: 8 MMOL/L (ref 3–14)
BASOPHILS # BLD AUTO: 0 10E9/L (ref 0–0.2)
BASOPHILS NFR BLD AUTO: 0 %
BUN SERPL-MCNC: 20 MG/DL (ref 7–30)
CALCIUM SERPL-MCNC: 8 MG/DL (ref 8.5–10.1)
CHLORIDE SERPL-SCNC: 103 MMOL/L (ref 94–109)
CMV DNA SPEC NAA+PROBE-ACNC: NORMAL [IU]/ML
CMV DNA SPEC NAA+PROBE-LOG#: NORMAL {LOG_IU}/ML
CO2 SERPL-SCNC: 24 MMOL/L (ref 20–32)
CREAT SERPL-MCNC: 0.66 MG/DL (ref 0.52–1.04)
DIFFERENTIAL METHOD BLD: ABNORMAL
EOSINOPHIL # BLD AUTO: 0 10E9/L (ref 0–0.7)
EOSINOPHIL NFR BLD AUTO: 0 %
ERYTHROCYTE [DISTWIDTH] IN BLOOD BY AUTOMATED COUNT: 13.3 % (ref 10–15)
GFR SERPL CREATININE-BSD FRML MDRD: >90 ML/MIN/1.7M2
GLUCOSE SERPL-MCNC: 108 MG/DL (ref 70–99)
HCT VFR BLD AUTO: 31.5 % (ref 35–47)
HGB BLD-MCNC: 10.5 G/DL (ref 11.7–15.7)
LYMPHOCYTES # BLD AUTO: 0.5 10E9/L (ref 0.8–5.3)
LYMPHOCYTES NFR BLD AUTO: 27 %
MCH RBC QN AUTO: 30 PG (ref 26.5–33)
MCHC RBC AUTO-ENTMCNC: 33.3 G/DL (ref 31.5–36.5)
MCV RBC AUTO: 90 FL (ref 78–100)
MONOCYTES # BLD AUTO: 0 10E9/L (ref 0–1.3)
MONOCYTES NFR BLD AUTO: 0 %
NEUTROPHILS # BLD AUTO: 1.3 10E9/L (ref 1.6–8.3)
NEUTROPHILS NFR BLD AUTO: 73 %
PLATELET # BLD AUTO: 107 10E9/L (ref 150–450)
PLATELET # BLD EST: ABNORMAL 10*3/UL
POTASSIUM SERPL-SCNC: 3.3 MMOL/L (ref 3.4–5.3)
RBC # BLD AUTO: 3.5 10E12/L (ref 3.8–5.2)
RBC MORPH BLD: NORMAL
SODIUM SERPL-SCNC: 135 MMOL/L (ref 133–144)
SPECIMEN SOURCE: NORMAL
WBC # BLD AUTO: 1.8 10E9/L (ref 4–11)

## 2018-08-08 PROCEDURE — 25000125 ZZHC RX 250: Mod: ZF | Performed by: PHYSICIAN ASSISTANT

## 2018-08-08 PROCEDURE — 96374 THER/PROPH/DIAG INJ IV PUSH: CPT

## 2018-08-08 PROCEDURE — 87493 C DIFF AMPLIFIED PROBE: CPT | Performed by: PHYSICIAN ASSISTANT

## 2018-08-08 PROCEDURE — 96375 TX/PRO/DX INJ NEW DRUG ADDON: CPT

## 2018-08-08 PROCEDURE — 85025 COMPLETE CBC W/AUTO DIFF WBC: CPT | Performed by: NURSE PRACTITIONER

## 2018-08-08 PROCEDURE — 25000128 H RX IP 250 OP 636: Mod: ZF | Performed by: PHYSICIAN ASSISTANT

## 2018-08-08 PROCEDURE — 87506 IADNA-DNA/RNA PROBE TQ 6-11: CPT | Performed by: PHYSICIAN ASSISTANT

## 2018-08-08 PROCEDURE — 96361 HYDRATE IV INFUSION ADD-ON: CPT

## 2018-08-08 PROCEDURE — G0463 HOSPITAL OUTPT CLINIC VISIT: HCPCS | Mod: 25

## 2018-08-08 PROCEDURE — 25000132 ZZH RX MED GY IP 250 OP 250 PS 637: Mod: ZF | Performed by: PHYSICIAN ASSISTANT

## 2018-08-08 PROCEDURE — 80048 BASIC METABOLIC PNL TOTAL CA: CPT | Performed by: NURSE PRACTITIONER

## 2018-08-08 RX ORDER — ONDANSETRON 8 MG/1
8 TABLET, ORALLY DISINTEGRATING ORAL ONCE
Status: COMPLETED | OUTPATIENT
Start: 2018-08-08 | End: 2018-08-08

## 2018-08-08 RX ORDER — HEPARIN SODIUM,PORCINE 10 UNIT/ML
5 VIAL (ML) INTRAVENOUS
Status: DISCONTINUED | OUTPATIENT
Start: 2018-08-08 | End: 2018-08-08 | Stop reason: HOSPADM

## 2018-08-08 RX ORDER — LORAZEPAM 2 MG/ML
0.5 INJECTION INTRAMUSCULAR ONCE
Status: COMPLETED | OUTPATIENT
Start: 2018-08-08 | End: 2018-08-08

## 2018-08-08 RX ORDER — POTASSIUM CHLORIDE 1500 MG/1
20 TABLET, EXTENDED RELEASE ORAL ONCE
Status: COMPLETED | OUTPATIENT
Start: 2018-08-08 | End: 2018-08-08

## 2018-08-08 RX ORDER — POTASSIUM CHLORIDE 14.9 MG/ML
20 INJECTION INTRAVENOUS ONCE
Status: COMPLETED | OUTPATIENT
Start: 2018-08-08 | End: 2018-08-08

## 2018-08-08 RX ORDER — POTASSIUM CHLORIDE 1500 MG/1
40 TABLET, EXTENDED RELEASE ORAL ONCE
Status: DISCONTINUED | OUTPATIENT
Start: 2018-08-08 | End: 2018-08-08

## 2018-08-08 RX ADMIN — ONDANSETRON 8 MG: 8 TABLET, ORALLY DISINTEGRATING ORAL at 11:10

## 2018-08-08 RX ADMIN — POTASSIUM CHLORIDE 20 MEQ: 1500 TABLET, EXTENDED RELEASE ORAL at 12:13

## 2018-08-08 RX ADMIN — LORAZEPAM 0.5 MG: 2 INJECTION INTRAMUSCULAR; INTRAVENOUS at 11:08

## 2018-08-08 RX ADMIN — POTASSIUM CHLORIDE 20 MEQ: 200 INJECTION, SOLUTION INTRAVENOUS at 11:08

## 2018-08-08 RX ADMIN — SODIUM CHLORIDE, PRESERVATIVE FREE 5 ML: 5 INJECTION INTRAVENOUS at 09:53

## 2018-08-08 RX ADMIN — SODIUM CHLORIDE 1000 ML: 9 INJECTION, SOLUTION INTRAVENOUS at 11:07

## 2018-08-08 ASSESSMENT — PAIN SCALES - GENERAL: PAINLEVEL: NO PAIN (0)

## 2018-08-08 NOTE — MR AVS SNAPSHOT
After Visit Summary   8/8/2018    Amira Avery    MRN: [id number]           Patient Information     Date Of Birth          1990        Visit Information        Provider Department      8/8/2018 10:00 AM UC BMT WES #2 [facility] Transplant        Today's Diagnoses     Nodular sclerosing Hodgkin's lymphoma, unspecified body region (H)              [facility] ([facility])  [address] [place] [id number]  Phone: [phone]  Clinic Hours:   Monday-Thursday:7am to 7pm   Friday: 7am to 5pm   Weekends and holidays:    8am to noon (in general)  If your fever is 100.5  or greater,   call the clinic.  After hours call the   hospital at [phone] or   [phone]. Ask for the BMT   fellow on-call           Care Instructions    Return on Thursday, 8/9/2018 for labs and BMT provider visit and for infusion of 1 liter of normal saline.      TOMORROW: 10:15am labs,visit and infusion           Follow-ups after your visit        Your next 10 appointments already scheduled     Aug 09, 2018 10:15 AM CDT   M[facility]ic Lab Draw with  MASONIC LAB DRAW   [facility] Masonic Lab Draw ([facility])    [address] [place] [address]  [address]  [facility] [address] [phone]            Aug 09, 2018 11:00 AM CDT   Return with UC BMT WES #2   [facility] ([facility])    [address] [place] [address]  [address]  [facility] [address]   [phone]            Aug 09, 2018 11:30 AM CDT   Infusion 120 with UC BMT INFUSION, UC 3 ATC   [facility] ([facility])    [address] [place] [address]  [address]  [facility] [address]   [phone]            Sep 05, 2018  1:20 PM CDT   CT SOFT TISSUE NECK W CONTRAST with UCCT1   [facility] [place] [facility] ([facility])    [address] [place] [address]  1st Floor  [facility] [address] [phone]            Please bring any scans or X-rays taken at other hospitals, if similar tests were done. Also bring a list of your medicines, including vitamins, minerals and over-the-counter drugs. It is safest to leave personal items at home.  Be sure to tell your doctor:   If you have any allergies.   If there s any chance you are pregnant.   If you are breastfeeding.    If you have diabetes as your medication may need to be adjusted for this exam.  You will have contrast for this exam. To prepare:   Do not eat or drink for 2 hours before your exam. If you need to take medicine, you may take it with small sips of water. (We may ask you to take liquid medicine as well.)   The day before your exam, drink extra fluids at least six 8-ounce glasses (unless your doctor tells you to restrict your fluids).  Patients over 70 or patients with diabetes or kidney problems:   If you haven t had a blood test (creatinine test) within the last 30 days, the Cardiologist/Radiologist may require you to get this test prior to your exam.  Please wear loose clothing, such as a sweat suit or jogging clothes. Avoid snaps, zippers and other metal. We may ask you to undress and put on a hospital gown.  If you have any questions, please call the Imaging Department where you will have your exam.            Sep 05, 2018  1:40 PM CDT   CT CHEST/ABDOMEN/PELVIS W CONTRAST with UCCT1   [facility] CT ([facility])    [address] [address] [phone]           Please bring any scans or X-rays taken at other hospitals, if similar tests were done. Also bring a list of your medicines, including vitamins, minerals and over-the-counter drugs. It is safest to leave personal items at home.  Be sure to tell your doctor:   If you have any allergies.   If there s any chance you are pregnant.   If you are breastfeeding.  How to prepare:   Do not eat or drink for 2 hours before your exam. If you need  to take medicine, you may take it with small sips of water. (We may ask you to take liquid medicine as well.)   Please wear loose clothing, such as a sweat suit or jogging clothes. Avoid snaps, zippers and other metal. We may ask you to undress and put on a hospital gown.  Please arrive 30 minutes early for your CT. Once in the department you might be asked to drink water 15-20 minutes prior to your exam.  If indicated you may be asked to drink an oral contrast in advance of your CT.  If this is the case, the imaging team will let you know or be in contact with you prior to your appointment  Patients over 70 or patients with diabetes or kidney problems:   If you haven t had a blood test (creatinine test) within the last 30 days, the Cardiologist/Radiologist may require you to get this test prior to your exam.  If you have diabetes:   Continue to take your metformin medication on the day of your exam  If you have any questions, please call the Imaging Department where you will have your exam.            Sep 05, 2018  2:00 PM CDT   Masonic Lab Draw with  MASONIC LAB DRAW   [facility] Masonic Lab Draw ([facility])    [address]  [address]  [facility] [id number] [phone]            Sep 05, 2018  2:30 PM CDT   (Arrive by 2:15 PM)   BMT 28 Day Anniversary Visit with  BMT DOM   [facility] Blood and Marrow Transplant ([facility])    [address]  [address]  [facility] [id number] [phone]              Who to contact     If you have questions or need follow up information about today's clinic visit or your schedule please contact [facility] directly at [phone].  Normal or non-critical lab and imaging results will be communicated to you by MyChart, letter or phone within 4 business days after the clinic has received the results. If you do not hear from us within 7 days, please contact the clinic through  [employer] or phone. If you have a critical or abnormal lab result, we will notify you by phone as soon as possible.  Submit refill requests through [employer] or call your pharmacy and they will forward the refill request to us. Please allow 3 business days for your refill to be completed.          Additional Information About Your Visit        [email or web address] Information     [facility] gives you secure access to your electronic health record. If you see a primary care provider, you can also send messages to your care team and make appointments. If you have questions, please call your primary care clinic.  If you do not have a primary care provider, please call [phone] and they will assist you.        Care EveryWhere ID     This is your Care EveryWhere ID. This could be used by other organizations to access your [facility] medical records  [id number]        Your Vitals Were     Pulse Temperature Respirations Pulse Oximetry BMI (Body Mass Index)       99 98.5  F (36.9  C) (Oral) 18 97% 27.15 kg/m2        Blood Pressure from Last 3 Encounters:   08/08/18 119/76   08/07/18 123/74   07/25/18 124/73    Weight from Last 3 Encounters:   08/08/18 69.9 kg (154 lb 3.2 oz)   08/07/18 70.2 kg (154 lb 12.8 oz)   07/25/18 73.8 kg (162 lb 12.8 oz)              We Performed the Following     Basic metabolic panel     CBC with platelets differential     Clostridium difficile toxin B PCR     Enteric Bacteria and Virus Panel by NIEVES Stool        Recent Review Flowsheet Data     BMT Recent Results Latest Ref Rng & Units 8/4/2018 8/4/2018 8/4/2018 8/5/2018 8/6/2018 8/7/2018 8/8/2018    WBC 4.0 - 11.0 10e9/L 5.3 - - 4.6 3.7(L) 4.9 1.8(L)    Hemoglobin 11.7 - 15.7 g/dL 10.6(L) - - 11.1(L) 10.5(L) 10.3(L) 10.5(L)    Platelet Count 150 - 450 10e9/L 195 - - 197 175 115(L) 107(L)    Neutrophils (Absolute) 1.6 - 8.3 10e9/L 4.9 - - 4.3 3.4 4.6 1.3(L)    INR 0.86 - 1.14 - - - - 1.32(H) - -    Sodium 133 - 144 mmol/L 143 - - 139 138 137 135    Potassium 3.4  - 5.3 mmol/L 3.3(L) 4.0 4.1 3.6 3.9 3.9 3.3(L)    Chloride 94 - 109 mmol/L 115(H) - - 107 105 105 103    Glucose 70 - 99 mg/dL 114(H) - - 103(H) 106(H) 106(H) 108(H)    Urea Nitrogen 7 - 30 mg/dL 15 - - 19 19 15 20    Creatinine 0.52 - 1.04 mg/dL 0.52 - - 0.58 0.62 0.57 0.66    Calcium (Total) 8.5 - 10.1 mg/dL 6.8(L) - - 8.2(L) 8.2(L) 8.2(L) 8.0(L)    Protein (Total) 6.8 - 8.8 g/dL - - - - 6.1(L) 6.1(L) -    Albumin 3.4 - 5.0 g/dL - - - - 3.3(L) 3.5 -    Bilirubin (Direct) 0.0 - 0.2 mg/dL - - - - 0.1 - -    Alkaline Phosphatase 40 - 150 U/L - - - - 52 50 -    AST 0 - 45 U/L - - - - 8 20 -    ALT 0 - 50 U/L - - - - 28 30 -    MCV 78 - 100 fl 89 - - 90 88 88 90               Primary Care Provider Office Phone # Fax #    [facility] [phone] [address] [address]        Equal Access to Services     RICHIE RAMEY AH: Hadii aad ku hadasho Soomaali, waaxda luqadaha, qaybta kaalmada adeegyada, waxay idiin hayaan adeeg kharash la'aan . So [facility] [phone].    ATENCIÓN: Si habla español, tiene a isabel disposición servicios gratuitos de asistencia lingüística. Llame al [phone].    We comply with applicable federal civil rights laws and [place] laws. We do not discriminate on the basis of race, color, national origin, age, disability, sex, sexual orientation, or gender identity.            Thank you!     Thank you for choosing [facility]  for your care. Our goal is always to provide you with excellent care. Hearing back from our patients is one way we can continue to improve our services. Please take a few minutes to complete the written survey that you may receive in the mail after your visit with us. Thank you!             Your Updated Medication List - Protect others around you: Learn how to safely use, store and throw away your medicines at www.disposemymeds.org.          This list is accurate as of 8/8/18 11:01 AM.  Always  use your most recent med list.                   Brand Name Dispense Instructions for use Diagnosis    acyclovir 800 MG tablet    ZOVIRAX    150 tablet    Take 1 tablet (800 mg) by mouth 5 times daily    Hodgkin lymphoma, unspecified, lymph nodes of inguinal region and lower limb (H)       fluconazole 200 MG tablet    DIFLUCAN    30 tablet    Take 1 tablet (200 mg) by mouth daily    Hodgkin lymphoma, unspecified, lymph nodes of inguinal region and lower limb (H)       levofloxacin 250 MG tablet    LEVAQUIN    30 tablet    Take 1 tablet (250 mg) by mouth daily    Hodgkin lymphoma, unspecified, lymph nodes of inguinal region and lower limb (H)       LORazepam 0.5 MG tablet    ATIVAN    30 tablet    Take 1-2 tablets (0.5-1 mg) by mouth every 4 hours as needed for anxiety (nausea/vomiting/sleep)    Nodular sclerosing Hodgkin's lymphoma, unspecified body region (H)       ondansetron 8 MG ODT tab    ZOFRAN-ODT    30 tablet    Take 1 tablet (8 mg) by mouth every 8 hours as needed for nausea    Nodular sclerosing Hodgkin's lymphoma, unspecified body region (H)       pantoprazole 40 MG EC tablet    PROTONIX    30 tablet    Take 1 tablet (40 mg) by mouth daily    Nodular sclerosing Hodgkin's lymphoma, unspecified body region (H)       prochlorperazine 5 MG tablet    COMPAZINE    30 tablet    Take 1 tablet (5 mg) by mouth every 6 hours    Nodular sclerosing Hodgkin's lymphoma, unspecified body region (H)       sennosides 8.6 MG tablet    SENOKOT    60 each    Take 2 tablets by mouth 2 times daily as needed for constipation    Nodular sclerosing Hodgkin's lymphoma, unspecified body region (H)       sulfamethoxazole-trimethoprim 800-160 MG per tablet   Start taking on:  9/3/2018    BACTRIM DS/SEPTRA DS    34 tablet    Take 1 tablet by mouth Every Mon, Tues two times daily    Hodgkin lymphoma, unspecified, lymph nodes of inguinal region and lower limb (H)

## 2018-08-08 NOTE — PROGRESS NOTES
BMT Clinic Note     Patient ID:  Amira Avery is a 28 year old women who is day +3 s/p Auto PBST for Hodgekins Lymphoma,      Diagnosis HDN Hodgkin's Disease - NOS  HCT Type Autologous    Prep Regimen BCNU  Lisa-C  Etoposide  Melphalan   Donor Source No data was found    GVHD Prophylaxis No  Primary BMT Provider Dr. Landa         INTERVAL  HISTORY     Amira is feeling terrible & very tired.  C/O N/V/D.  Taking Ativan & Compazine round the clock, but still having occasional emesis.  Having 4-5 small watery stools daily, no abd pain.  Eating a little, but appetite is poor.  Afebrile with no cough, congestion or dyspnea.  No pain, bleeding or rash.      Review of Systems: 10 point ROS negative except as noted above.    PHYSICAL EXAM       /62 (BP Location: Right arm, Patient Position: Sitting, Cuff Size: Adult Regular)  Pulse 108  Temp 98.5  F (36.9  C) (Oral)  Resp 18  Wt 69.8 kg (153 lb 14.4 oz)  SpO2 98%  BMI 27.1 kg/m2       General: NAD, tired appearing  Eyes: : ARMINDA, sclera anicteric   Nose/Mouth/Throat: OP clear, buccal mucosa moist, no ulcerations   Lungs: CTA bilaterally  Cardiovascular: RRR, no M/R/G   Abdominal/Rectal: +BS, soft, NT, ND, No HSM   Lymphatics: no edema  Skin: no rashes or petechaie  Neuro: A&O   Additional Findings: Gerald: site NT, no drainage.  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  BMT:  Completed GCSF + mozobil priming prior to BEAM Auto PBSCT  - Collected 6.21million CD34/kg on 7/24.  - Transplanted on 8/6/2018.  -  GCSF starts d+5( 8/11/2018) and cont  until ANC>2500 x2 consecutive days. Re-stage per protocol.     2.  HEME: Keep Hgb>8 and plts>10K. No pre-meds.   - Platelet intercept study  - Counts are trending down but no transfusions needed today.      3.  ID: Afebrile.   - Cont Levo, Fluc, and HD ACV (CMV+, HSV+, EBV+) prophy.   - Bactrim or appropriate PCP therapy to start d+28.                     4.  GI:  N/V/D   - Continue compazine & Ativan.  Advised to add Zofran as well. She has not been taking it due to hx of constipation, but she is now having diarrhea.    - Diarrhea.  Likely due to prep, but cx sent today  - Protonix for GI prophy.    5.  FEN/Renal:  Lytes & creat stable.    - 1L NS today due to poor intake.     RTC daily.  IVF tomorrow  Kirti Porras

## 2018-08-08 NOTE — MR AVS SNAPSHOT
After Visit Summary   8/8/2018    Amira Avery    MRN: [id number]           Patient Information     Date Of Birth          1990        Visit Information        Provider Department      8/8/2018 10:30 AM UC 3 ATC; UC BMT INFUSION [facility] Trans[facility]        Today's Diagnoses     Hodgkin lymphoma, unspecified, lymph nodes of inguinal region and lower limb (H)    -  1          [facility] [place]  [address] [place] [address]  Phone: [phone]  Clinic Hours:   Monday-Thursday:7am to 7pm   Friday: 7am to 5pm   Weekends and holidays:    8am to noon (in general)  If your fever is 100.5  or greater,   call the clinic.  After hours call the   hospital at [phone] or   [phone]. Ask for the BMT   fellow on-call            Follow-ups after your visit        Your next 10 appointments already scheduled     Aug 09, 2018 10:15 AM CDT   [facility] Lab Draw with UC MASONIC LAB DRAW   [facility] Mason[facility] Lab Draw ([facility])    [address] [place] [address]  [address]  [facility] [address]   [phone]            Aug 09, 2018 11:00 AM CDT   Return with UC BMT WES #2   [facility] Mar[facility] Transplant ([facility])    [address] [place] [address]  [address]  [facility] [address]   [phone]            Aug 09, 2018 11:30 AM CDT   Infusion 120 with UC BMT INFUSION, UC 3 ATC   [facility] Transplant ([facility])    [address] [place] [address]  [address]  [facility] [address]   [phone]            Aug 10, 2018 11:45 AM CDT   Masonic Lab Draw with UC MASONIC LAB DRAW   [facility] Lab Draw ([facility])    [address] [place] [address]  [address]  [facility] [address]   [phone]            Aug 10, 2018 12:30 PM CDT   Return with UC BMT WES #4   [facility] and Marrow Transplant ([facility])    [address] [place] [address]  [address]  [address]  [facility] [address]   [phone]            Sep 05, 2018  1:20 PM CDT   CT SOFT TISSUE NECK W CONTRAST with UCCT1   [facility] CT ([facility])    [address]  [address] [facility] [id number] [phone]           Please bring any scans or X-rays taken at other hospitals, if similar tests were done. Also bring a list of your medicines, including vitamins, minerals and over-the-counter drugs. It is safest to leave personal items at home.  Be sure to tell your doctor:   If you have any allergies.   If there s any chance you are pregnant.   If you are breastfeeding.    If you have diabetes as your medication may need to be adjusted for this exam.  You will have contrast for this exam. To prepare:   Do not eat or drink for 2 hours before your exam. If you need to take medicine, you may take it with small sips of water. (We may ask you to take liquid medicine as well.)   The day before your exam, drink extra fluids at least six 8-ounce glasses (unless your doctor tells you to restrict your fluids).  Patients over 70 or patients with diabetes or kidney problems:   If you haven t had a blood test (creatinine test) within the last 30 days, the Cardiologist/Radiologist may require you to get this test prior to your exam.  Please wear loose clothing, such as a sweat suit or jogging clothes. Avoid snaps, zippers and other metal. We may ask you to undress and put on a hospital gown.  If you have any questions, please call the Imaging Department where you will have your exam.            Sep 05, 2018  1:40 PM CDT   CT CHEST/ABDOMEN/PELVIS W CONTRAST with UCCT1   [facility] CT ([facility])    [address]  [address] [facility] [id number] [phone]           Please bring any scans or X-rays taken at other hospitals, if similar tests were done. Also bring a list of your medicines, including vitamins,  minerals and over-the-counter drugs. It is safest to leave personal items at home.  Be sure to tell your doctor:   If you have any allergies.   If there s any chance you are pregnant.   If you are breastfeeding.  How to prepare:   Do not eat or drink for 2 hours before your exam. If you need to take medicine, you may take it with small sips of water. (We may ask you to take liquid medicine as well.)   Please wear loose clothing, such as a sweat suit or jogging clothes. Avoid snaps, zippers and other metal. We may ask you to undress and put on a hospital gown.  Please arrive 30 minutes early for your CT. Once in the department you might be asked to drink water 15-20 minutes prior to your exam.  If indicated you may be asked to drink an oral contrast in advance of your CT.  If this is the case, the imaging team will let you know or be in contact with you prior to your appointment  Patients over 70 or patients with diabetes or kidney problems:   If you haven t had a blood test (creatinine test) within the last 30 days, the Cardiologist/Radiologist may require you to get this test prior to your exam.  If you have diabetes:   Continue to take your metformin medication on the day of your exam  If you have any questions, please call the Imaging Department where you will have your exam.            Sep 05, 2018  2:00 PM CDT   Masonic Lab Draw with  MASONIC LAB DRAW   [facility] Masonic Lab Draw ([facility])    [address]  [address]  [facility] [address]   [phone]            Sep 05, 2018  2:30 PM CDT   (Arrive by 2:15 PM)   BMT 28 Day Anniversary Visit with  BMT DOM   [facility] Blood and Marrow Transplant ([facility])    [address]  [address]  [facility] [address]   [phone]              Future tests that were ordered for you today     Open Future Orders        Priority Expected Expires Ordered    Basic metabolic panel Routine 8/9/2018  8/23/2018 8/8/2018    Magnesium Routine 8/9/2018 8/23/2018 8/8/2018    CBC with platelets differential Routine 8/9/2018 8/23/2018 8/8/2018            Who to contact     If you have questions or need follow up information about today's clinic visit or your schedule please contact [facility] directly at [phone].  Normal or non-critical lab and imaging results will be communicated to you by [employer], letter or phone within 4 business days after the clinic has received the results. If you do not hear from us within 7 days, please contact the clinic through [employer] or phone. If you have a critical or abnormal lab result, we will notify you by phone as soon as possible.  Submit refill requests through [employer] or call your pharmacy and they will forward the refill request to us. Please allow 3 business days for your refill to be completed.          Additional Information About Your Visit        [employer] Information     [employer] gives you secure access to your electronic health record. If you see a primary care provider, you can also send messages to your care team and make appointments. If you have questions, please call your primary care clinic.  If you do not have a primary care provider, please call [phone] and they will assist you.        Care EveryWhere ID     This is your Care EveryWhere ID. This could be used by other organizations to access your [facility] medical records  [id number]         Blood Pressure from Last 3 Encounters:   08/08/18 119/76   08/07/18 123/74   07/25/18 124/73    Weight from Last 3 Encounters:   08/08/18 69.9 kg (154 lb 3.2 oz)   08/07/18 70.2 kg (154 lb 12.8 oz)   07/25/18 73.8 kg (162 lb 12.8 oz)              Today, you had the following     No orders found for display       Recent Review Flowsheet Data     BMT Recent Results Latest Ref Rng & Units 8/4/2018 8/4/2018 8/4/2018 8/5/2018 8/6/2018 8/7/2018 8/8/2018    WBC 4.0 - 11.0 10e9/L 5.3 - - 4.6 3.7(L) 4.9  1.8(L)    Hemoglobin 11.7 - 15.7 g/dL 10.6(L) - - 11.1(L) 10.5(L) 10.3(L) 10.5(L)    Platelet Count 150 - 450 10e9/L 195 - - 197 175 115(L) 107(L)    Neutrophils (Absolute) 1.6 - 8.3 10e9/L 4.9 - - 4.3 3.4 4.6 1.3(L)    INR 0.86 - 1.14 - - - - 1.32(H) - -    Sodium 133 - 144 mmol/L 143 - - 139 138 137 135    Potassium 3.4 - 5.3 mmol/L 3.3(L) 4.0 4.1 3.6 3.9 3.9 3.3(L)    Chloride 94 - 109 mmol/L 115(H) - - 107 105 105 103    Glucose 70 - 99 mg/dL 114(H) - - 103(H) 106(H) 106(H) 108(H)    Urea Nitrogen 7 - 30 mg/dL 15 - - 19 19 15 20    Creatinine 0.52 - 1.04 mg/dL 0.52 - - 0.58 0.62 0.57 0.66    Calcium (Total) 8.5 - 10.1 mg/dL 6.8(L) - - 8.2(L) 8.2(L) 8.2(L) 8.0(L)    Protein (Total) 6.8 - 8.8 g/dL - - - - 6.1(L) 6.1(L) -    Albumin 3.4 - 5.0 g/dL - - - - 3.3(L) 3.5 -    Bilirubin (Direct) 0.0 - 0.2 mg/dL - - - - 0.1 - -    Alkaline Phosphatase 40 - 150 U/L - - - - 52 50 -    AST 0 - 45 U/L - - - - 8 20 -    ALT 0 - 50 U/L - - - - 28 30 -    MCV 78 - 100 fl 89 - - 90 88 88 90               Primary Care Provider Office Phone # Fax #    [facility] [phone] [address] [place] [address]        Equal Access to Services     RICHIE RAMEY AH: Haddi Luis, waaxda luqadaha, qaybta kaalmadavid mcgraw, jen raymundo. So [place] [phone].    ATENCIÓN: Si habla español, tiene a isabel disposición servicios gratuitos de asistencia lingüística. Llame al [phone].    We comply with applicable federal civil rights laws and [place] laws. We do not discriminate on the basis of race, color, national origin, age, disability, sex, sexual orientation, or gender identity.            Thank you!     Thank you for choosing [facility]LOOD [facility]ARR[facility]  for your care. Our goal is always to provide you with excellent care. Hearing back from our patients is one way we can continue to improve our services. Please take a few  minutes to complete the written survey that you may receive in the mail after your visit with us. Thank you!             Your Updated Medication List - Protect others around you: Learn how to safely use, store and throw away your medicines at www.disposemymeds.org.          This list is accurate as of 8/8/18 12:53 PM.  Always use your most recent med list.                   Brand Name Dispense Instructions for use Diagnosis    acyclovir 800 MG tablet    ZOVIRAX    150 tablet    Take 1 tablet (800 mg) by mouth 5 times daily    Hodgkin lymphoma, unspecified, lymph nodes of inguinal region and lower limb (H)       fluconazole 200 MG tablet    DIFLUCAN    30 tablet    Take 1 tablet (200 mg) by mouth daily    Hodgkin lymphoma, unspecified, lymph nodes of inguinal region and lower limb (H)       levofloxacin 250 MG tablet    LEVAQUIN    30 tablet    Take 1 tablet (250 mg) by mouth daily    Hodgkin lymphoma, unspecified, lymph nodes of inguinal region and lower limb (H)       LORazepam 0.5 MG tablet    ATIVAN    30 tablet    Take 1-2 tablets (0.5-1 mg) by mouth every 4 hours as needed for anxiety (nausea/vomiting/sleep)    Nodular sclerosing Hodgkin's lymphoma, unspecified body region (H)       ondansetron 8 MG ODT tab    ZOFRAN-ODT    30 tablet    Take 1 tablet (8 mg) by mouth every 8 hours as needed for nausea    Nodular sclerosing Hodgkin's lymphoma, unspecified body region (H)       pantoprazole 40 MG EC tablet    PROTONIX    30 tablet    Take 1 tablet (40 mg) by mouth daily    Nodular sclerosing Hodgkin's lymphoma, unspecified body region (H)       prochlorperazine 5 MG tablet    COMPAZINE    30 tablet    Take 1 tablet (5 mg) by mouth every 6 hours    Nodular sclerosing Hodgkin's lymphoma, unspecified body region (H)       sennosides 8.6 MG tablet    SENOKOT    60 each    Take 2 tablets by mouth 2 times daily as needed for constipation    Nodular sclerosing Hodgkin's lymphoma, unspecified body region (H)        sulfamethoxazole-trimethoprim 800-160 MG per tablet   Start taking on:  9/3/2018    BACTRIM DS/SEPTRA DS    34 tablet    Take 1 tablet by mouth Every Mon, Tues two times daily    Hodgkin lymphoma, unspecified, lymph nodes of inguinal region and lower limb (H)

## 2018-08-08 NOTE — NURSING NOTE
Drug Administration Record    Drug Name: Ativan  Dose: 0.5mg  Route Administered: IV  NDC#: 7792-0854-09  Amount of waste (mL): 1.5mg  Reason for waste: multi use vial

## 2018-08-08 NOTE — PROGRESS NOTES
This is a recent snapshot of the patient's [facility] medical record.  For current drug dose and complete information and questions, call [phone] or In Basket pool, [facility] home infusion ([id number])  CSN Number:  [id number]

## 2018-08-08 NOTE — PROGRESS NOTES
Infusion Nursing Note:  Amira Avery presents today for add on fluids and potassium.    Patient seen by provider today: Yes: BOBY Menjivar   present during visit today: Not Applicable.    Note: Patient was given 1L NS. She was given 20 meq IV potassium and 20 meq PO potassium for a level of 3.3.  0.5 mg IV ativan and oral zofran were given for nausea.  Patient's nausea improved by the time of discharge.     Intravenous Access:  Wang.    Treatment Conditions:  Lab Results   Component Value Date    HGB 10.5 08/08/2018     Lab Results   Component Value Date    WBC 1.8 08/08/2018      Lab Results   Component Value Date    ANEU 1.3 08/08/2018     Lab Results   Component Value Date     08/08/2018      Lab Results   Component Value Date     08/08/2018                   Lab Results   Component Value Date    POTASSIUM 3.3 08/08/2018           Lab Results   Component Value Date    MAG 2.2 08/07/2018            Lab Results   Component Value Date    CR 0.66 08/08/2018                   Lab Results   Component Value Date    NEETU 8.0 08/08/2018                Lab Results   Component Value Date    BILITOTAL 0.7 08/07/2018           Lab Results   Component Value Date    ALBUMIN 3.5 08/07/2018                    Lab Results   Component Value Date    ALT 30 08/07/2018           Lab Results   Component Value Date    AST 20 08/07/2018           Post Infusion Assessment:  Patient tolerated infusion without incident.    Discharge Plan:   AVS to patient via MYCHART.  Patient will return tomorrow for next appointment.   Patient discharged in stable condition accompanied by: mother.  Departure Mode: Ambulatory.    SERGO WINKLER RN

## 2018-08-08 NOTE — NURSING NOTE
Chief Complaint   Patient presents with     Blood Draw     Labs drawn via CVC by RN. Lines flushed and hep locked. CAPS changed, DUE FOR DRESSING CHANGE. VS Taken.     Karen Renteria RN

## 2018-08-08 NOTE — NURSING NOTE
"Oncology Rooming Note    August 8, 2018 10:09 AM   Amira Avery is a 28 year old female who presents for:    Chief Complaint   Patient presents with     Blood Draw     Labs drawn via CVC by RN. Lines flushed and hep locked. CAPS changed, DUE FOR DRESSING CHANGE. VS Taken.     Initial Vitals: /76 (BP Location: Right arm, Patient Position: Sitting, Cuff Size: Adult Regular)  Pulse 99  Temp 98.5  F (36.9  C) (Oral)  Resp 18  Wt 69.9 kg (154 lb 3.2 oz)  SpO2 97%  BMI 27.15 kg/m2 Estimated body mass index is 27.15 kg/(m^2) as calculated from the following:    Height as of 7/31/18: 1.605 m (5' 3.19\").    Weight as of this encounter: 69.9 kg (154 lb 3.2 oz). Body surface area is 1.77 meters squared.  No Pain (0) Comment: Data Unavailable   No LMP recorded.  Allergies reviewed: Yes  Medications reviewed: Yes    Medications: Medication refills not needed today.  Pharmacy [facility] entered into Knotice: [facility] [address] [place] [address] [address] AT [facility] [address]    Clinical concerns: having a  Lot of nausea and diarrhea     6 minutes for nursing intake (face to face time)     Mariam Mojica MA              "

## 2018-08-08 NOTE — PROGRESS NOTES
BMT Daily Progress Note   08/08/2018    Patient ID:  Amira Avery is a 28 year old women who is day +2 s/p Auto PBST for Hodgekins Lymphoma,      Diagnosis HDN Hodgkin's Disease - NOS  HCT Type Autologous    Prep Regimen BCNU  Lisa-C  Etoposide  Melphalan   Donor Source No data was found    GVHD Prophylaxis No  Primary BMT Provider Dr. Landa         INTERVAL  HISTORY     Amira was discharged yesterday from her transplant.  She is not feeling well today. She is having nausea and emesis.  She also started having diarrhea with cramps before she stools.  She did take miralax yesterday( has been constipated) but has stopped this now.  She also stopped senna.  She has been taking compazine every 6 hours and prn ativan. She has been able to eat a little bit and has had no problems taking her pills.She is having difficulty drinking as this has cause some nausea. No fevers.  No cough or sob.  No bleeding or rash.    Review of Systems: 10 point ROS negative except as noted above.      PHYSICAL EXAM     Weight In/Out     Wt Readings from Last 3 Encounters:   08/08/18 69.9 kg (154 lb 3.2 oz)   08/07/18 70.2 kg (154 lb 12.8 oz)   07/25/18 73.8 kg (162 lb 12.8 oz)      [unfilled]         /76 (BP Location: Right arm, Patient Position: Sitting, Cuff Size: Adult Regular)  Pulse 99  Temp 98.5  F (36.9  C) (Oral)  Resp 18  Wt 69.9 kg (154 lb 3.2 oz)  SpO2 97%  BMI 27.15 kg/m2       General: NAD   Eyes: : ARMINDA, sclera anicteric   Nose/Mouth/Throat: OP clear, buccal mucosa moist, no ulcerations   Lungs: CTA bilaterally  Cardiovascular: RRR, no M/R/G   Abdominal/Rectal: +BS, soft, NT, ND, No HSM   Lymphatics: no edema  Skin: no rashes or petechaie  Neuro: A&O   Additional Findings: Gerald: site NT, no drainage.  Port a cath: not accessed      LABS AND IMAGING - PAST 24 HOURS     Results for orders placed or performed in visit on 08/08/18 (from the past 24 hour(s))   CBC with platelets differential   Result  Value Ref Range    WBC 1.8 (L) 4.0 - 11.0 10e9/L    RBC Count 3.50 (L) 3.8 - 5.2 10e12/L    Hemoglobin 10.5 (L) 11.7 - 15.7 g/dL    Hematocrit 31.5 (L) 35.0 - 47.0 %    MCV 90 78 - 100 fl    MCH 30.0 26.5 - 33.0 pg    MCHC 33.3 31.5 - 36.5 g/dL    RDW 13.3 10.0 - 15.0 %    Platelet Count 107 (L) 150 - 450 10e9/L    Diff Method Manual Differential     % Neutrophils 73.0 %    % Lymphocytes 27.0 %    % Monocytes 0.0 %    % Eosinophils 0.0 %    % Basophils 0.0 %    Absolute Neutrophil 1.3 (L) 1.6 - 8.3 10e9/L    Absolute Lymphocytes 0.5 (L) 0.8 - 5.3 10e9/L    Absolute Monocytes 0.0 0.0 - 1.3 10e9/L    Absolute Eosinophils 0.0 0.0 - 0.7 10e9/L    Absolute Basophils 0.0 0.0 - 0.2 10e9/L    RBC Morphology Normal     Platelet Estimate Confirming automated cell count    Basic metabolic panel   Result Value Ref Range    Sodium 135 133 - 144 mmol/L    Potassium 3.3 (L) 3.4 - 5.3 mmol/L    Chloride 103 94 - 109 mmol/L    Carbon Dioxide 24 20 - 32 mmol/L    Anion Gap 8 3 - 14 mmol/L    Glucose 108 (H) 70 - 99 mg/dL    Urea Nitrogen 20 7 - 30 mg/dL    Creatinine 0.66 0.52 - 1.04 mg/dL    GFR Estimate >90 >60 mL/min/1.7m2    GFR Estimate If Black >90 >60 mL/min/1.7m2    Calcium 8.0 (L) 8.5 - 10.1 mg/dL         ASSESSMENT BY SYSTEMS     Amira Avery is a 28 year old women who is day +2 s/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  BMT:  Completed GCSF + mozobil priming prior to BEAM Auto PBSCT  - Collected 6.21million CD34/kg on 7/24.  Transplanted on 8/6/2018.  - Completed Allopurinol through day 0.  GCSF starts d+5( 8/11/2018) and cont  until ANC>2500 x2 consecutive days. Re-stage per protocol.     2.  HEME: Keep Hgb>8 and plts>10K. No pre-meds.   Platelet intercept study  Counts are trending down but no transfusions needed today.      3.   ID: Afebrile. Cont Levo, Fluc, and HD ACV (CMV+, HSV+, EBV+) prophy. Bactrim or appropriate PCP therapy to start d+28.                     4.  GI:  Having nausea, emesis and diarrhea.    Continue compazine scheduled and schedule ativan, alternating ativan and compazine every 4 hours. Zofran has worsened constipation in the past for her so she has not been on it.  Since she has diarrhea today will try zofran in the clinic and see if that helps.  If it helps and has diarrhea then will RX some zofran for her maybe tomorrow.    Protonix for GI prophy.  - Miralax and senna:  Hold due to diarrhea.   -Diarrhea:  Likely either chemo induced or secondary to miralax/senna-  Rule out infection, sent home with stool kit for cdiff/enteric.     5.  FEN/Renal: Monitor creat and lytes. Give her 1 liter of NS today in the clinic.  Schedule for 1 liter of NS in clinic tomorrow.  -hypokalemia:  K=3.3, likely from the diarrhea.  Give K in clinic today 20 meq IV and 20 meq po.  Decreased appetite, but eating a little.  Encourage supplements.    Jovita Borges PA-C  [id number]

## 2018-08-08 NOTE — PROGRESS NOTES
I met with Amira Avery to review her medication list after hospital discharge post-transplant. Amira Avery and her caregiver had the opportunity to ask questions regarding her therapy which were answered to their satisfaction.     Current Outpatient Prescriptions   Medication Sig Dispense Refill     acyclovir (ZOVIRAX) 800 MG tablet Take 1 tablet (800 mg) by mouth 5 times daily 150 tablet 0     fluconazole (DIFLUCAN) 200 MG tablet Take 1 tablet (200 mg) by mouth daily 30 tablet 0     levofloxacin (LEVAQUIN) 250 MG tablet Take 1 tablet (250 mg) by mouth daily 30 tablet 0     LORazepam (ATIVAN) 0.5 MG tablet Take 1-2 tablets (0.5-1 mg) by mouth every 4 hours as needed for anxiety (nausea/vomiting/sleep) 30 tablet 0     ondansetron (ZOFRAN-ODT) 8 MG ODT tab Take 1 tablet (8 mg) by mouth every 8 hours as needed for nausea 30 tablet 0     pantoprazole (PROTONIX) 40 MG EC tablet Take 1 tablet (40 mg) by mouth daily 30 tablet 1     prochlorperazine (COMPAZINE) 5 MG tablet Take 1 tablet (5 mg) by mouth every 6 hours 30 tablet 0     sennosides (SENOKOT) 8.6 MG tablet Take 2 tablets by mouth 2 times daily as needed for constipation 60 each 0     [START ON 9/3/2018] sulfamethoxazole-trimethoprim (BACTRIM DS/SEPTRA DS) 800-160 MG per tablet Take 1 tablet by mouth Every Mon, Tues two times daily 34 tablet 0        Pertinent labs considered today:  Lab Results   Component Value Date     08/08/2018                 normal sodium 136-148  Lab Results   Component Value Date    POTASSIUM 3.3 08/08/2018       normal potassium 3.5-5.2   Lab Results   Component Value Date    CHLORIDE 103 08/08/2018           normal chloride    Lab Results   Component Value Date    CO2 24 08/08/2018                normal CO2 20-32  Lab Results   Component Value Date    BUN 20 08/08/2018                 normal BUN 5-24  Lab Results   Component Value Date     08/08/2018                 normal glucose   Lab  Results   Component Value Date    CR 0.66 08/08/2018                 normal cr 0.8-1.5  Lab Results   Component Value Date    NEETU 8.0 08/08/2018               normal calcium  8.5-10.4  Lab Results   Component Value Date    BILITOTAL 0.7 08/07/2018          normal bilirubin 0.2-1.3  Lab Results   Component Value Date    PROTTOTAL 6.1 08/07/2018          normal total protein 6.0-8.2  Lab Results   Component Value Date    ALBUMIN 3.5 08/07/2018             normal albumin 3.2-4.5  Lab Results   Component Value Date    ALKPHOS 50 08/07/2018            normal alkphos   Lab Results   Component Value Date    ALT 30 08/07/2018         normal ALT 0-65  Lab Results   Component Value Date    AST 20 08/07/2018         normal AST 0-37  Lab Results   Component Value Date    HGB 10.5 08/08/2018     Lab Results   Component Value Date    WBC 1.8 08/08/2018      Lab Results   Component Value Date     08/08/2018     Estimated Creatinine Clearance: 120.5 mL/min (based on Cr of 0.66).      Lobito Rosenberg, PharmD

## 2018-08-08 NOTE — PATIENT INSTRUCTIONS
Return on Thursday, 8/9/2018 for labs and BMT provider visit and for infusion of 1 liter of normal saline.      TOMORROW: 10:15am labs,visit and infusion       Fri: visit added per pt's family.    Moira

## 2018-08-09 ENCOUNTER — APPOINTMENT (OUTPATIENT)
Dept: LAB | Facility: CLINIC | Age: 28
End: 2018-08-09
Attending: STUDENT IN AN ORGANIZED HEALTH CARE EDUCATION/TRAINING PROGRAM
Payer: COMMERCIAL

## 2018-08-09 ENCOUNTER — ONCOLOGY VISIT (OUTPATIENT)
Dept: TRANSPLANT | Facility: CLINIC | Age: 28
End: 2018-08-09
Attending: STUDENT IN AN ORGANIZED HEALTH CARE EDUCATION/TRAINING PROGRAM
Payer: COMMERCIAL

## 2018-08-09 VITALS
DIASTOLIC BLOOD PRESSURE: 62 MMHG | RESPIRATION RATE: 18 BRPM | WEIGHT: 153.9 LBS | SYSTOLIC BLOOD PRESSURE: 100 MMHG | HEART RATE: 108 BPM | BODY MASS INDEX: 27.1 KG/M2 | TEMPERATURE: 98.5 F | OXYGEN SATURATION: 98 %

## 2018-08-09 DIAGNOSIS — C81.99 HODGKIN'S DISEASE OF EXTRANODAL OR SOLID ORGAN SITE (H): Primary | ICD-10-CM

## 2018-08-09 DIAGNOSIS — C81.10 NODULAR SCLEROSING HODGKIN'S LYMPHOMA, UNSPECIFIED BODY REGION (H): Primary | ICD-10-CM

## 2018-08-09 DIAGNOSIS — Z52.001 STEM CELL DONOR: ICD-10-CM

## 2018-08-09 DIAGNOSIS — Z41.8 ENCOUNTER FOR OTHER PROCEDURES FOR PURPOSES OTHER THAN REMEDYING HEALTH STATE (CODE): ICD-10-CM

## 2018-08-09 DIAGNOSIS — Z52.001 ENCOUNTER FOR HARVESTING OF PERIPHERAL BLOOD STEM CELLS: ICD-10-CM

## 2018-08-09 LAB
ANION GAP SERPL CALCULATED.3IONS-SCNC: 8 MMOL/L (ref 3–14)
BUN SERPL-MCNC: 15 MG/DL (ref 7–30)
C COLI+JEJUNI+LARI FUSA STL QL NAA+PROBE: NOT DETECTED
C DIFF TOX B STL QL: NEGATIVE
CALCIUM SERPL-MCNC: 7.9 MG/DL (ref 8.5–10.1)
CHLORIDE SERPL-SCNC: 102 MMOL/L (ref 94–109)
CO2 SERPL-SCNC: 24 MMOL/L (ref 20–32)
CREAT SERPL-MCNC: 0.61 MG/DL (ref 0.52–1.04)
DIFFERENTIAL METHOD BLD: ABNORMAL
EC STX1 GENE STL QL NAA+PROBE: NOT DETECTED
EC STX2 GENE STL QL NAA+PROBE: NOT DETECTED
ENTERIC PATHOGEN COMMENT: NORMAL
ERYTHROCYTE [DISTWIDTH] IN BLOOD BY AUTOMATED COUNT: 13.2 % (ref 10–15)
GFR SERPL CREATININE-BSD FRML MDRD: >90 ML/MIN/1.7M2
GLUCOSE SERPL-MCNC: 111 MG/DL (ref 70–99)
HCT VFR BLD AUTO: 31.4 % (ref 35–47)
HGB BLD-MCNC: 10.5 G/DL (ref 11.7–15.7)
MAGNESIUM SERPL-MCNC: 2 MG/DL (ref 1.6–2.3)
MCH RBC QN AUTO: 29.7 PG (ref 26.5–33)
MCHC RBC AUTO-ENTMCNC: 33.4 G/DL (ref 31.5–36.5)
MCV RBC AUTO: 89 FL (ref 78–100)
NOROV GI+II ORF1-ORF2 JNC STL QL NAA+PR: NOT DETECTED
PLATELET # BLD AUTO: 69 10E9/L (ref 150–450)
POTASSIUM SERPL-SCNC: 3.8 MMOL/L (ref 3.4–5.3)
RBC # BLD AUTO: 3.53 10E12/L (ref 3.8–5.2)
RVA NSP5 STL QL NAA+PROBE: NOT DETECTED
SALMONELLA SP RPOD STL QL NAA+PROBE: NOT DETECTED
SHIGELLA SP+EIEC IPAH STL QL NAA+PROBE: NOT DETECTED
SODIUM SERPL-SCNC: 134 MMOL/L (ref 133–144)
SPECIMEN SOURCE: NORMAL
V CHOL+PARA RFBL+TRKH+TNAA STL QL NAA+PR: NOT DETECTED
WBC # BLD AUTO: 0.2 10E9/L (ref 4–11)
Y ENTERO RECN STL QL NAA+PROBE: NOT DETECTED

## 2018-08-09 PROCEDURE — 96374 THER/PROPH/DIAG INJ IV PUSH: CPT

## 2018-08-09 PROCEDURE — 80048 BASIC METABOLIC PNL TOTAL CA: CPT | Performed by: PHYSICIAN ASSISTANT

## 2018-08-09 PROCEDURE — 25000128 H RX IP 250 OP 636: Mod: ZF | Performed by: STUDENT IN AN ORGANIZED HEALTH CARE EDUCATION/TRAINING PROGRAM

## 2018-08-09 PROCEDURE — 83735 ASSAY OF MAGNESIUM: CPT | Performed by: PHYSICIAN ASSISTANT

## 2018-08-09 PROCEDURE — 96361 HYDRATE IV INFUSION ADD-ON: CPT

## 2018-08-09 PROCEDURE — 85027 COMPLETE CBC AUTOMATED: CPT

## 2018-08-09 PROCEDURE — 25000128 H RX IP 250 OP 636: Mod: ZF | Performed by: NURSE PRACTITIONER

## 2018-08-09 PROCEDURE — 25000128 H RX IP 250 OP 636: Mod: ZF | Performed by: PHYSICIAN ASSISTANT

## 2018-08-09 PROCEDURE — G0463 HOSPITAL OUTPT CLINIC VISIT: HCPCS | Mod: 25,ZF

## 2018-08-09 RX ORDER — LORAZEPAM 2 MG/ML
0.5 INJECTION INTRAMUSCULAR ONCE
Status: COMPLETED | OUTPATIENT
Start: 2018-08-09 | End: 2018-08-09

## 2018-08-09 RX ORDER — HEPARIN SODIUM (PORCINE) LOCK FLUSH IV SOLN 100 UNIT/ML 100 UNIT/ML
5 SOLUTION INTRAVENOUS DAILY PRN
Status: CANCELLED
Start: 2018-08-09

## 2018-08-09 RX ORDER — HEPARIN SODIUM,PORCINE 10 UNIT/ML
5 VIAL (ML) INTRAVENOUS
Status: DISCONTINUED | OUTPATIENT
Start: 2018-08-09 | End: 2018-08-09 | Stop reason: HOSPADM

## 2018-08-09 RX ADMIN — SODIUM CHLORIDE, PRESERVATIVE FREE 5 ML: 5 INJECTION INTRAVENOUS at 10:39

## 2018-08-09 RX ADMIN — LORAZEPAM 0.5 MG: 2 INJECTION INTRAMUSCULAR; INTRAVENOUS at 11:30

## 2018-08-09 RX ADMIN — SODIUM CHLORIDE, PRESERVATIVE FREE 5 ML: 5 INJECTION INTRAVENOUS at 12:11

## 2018-08-09 RX ADMIN — SODIUM CHLORIDE 1000 ML: 9 INJECTION, SOLUTION INTRAVENOUS at 11:09

## 2018-08-09 ASSESSMENT — PAIN SCALES - GENERAL: PAINLEVEL: NO PAIN (0)

## 2018-08-09 NOTE — MR AVS SNAPSHOT
After Visit Summary   8/9/2018    Amira Avery    MRN: [id number]           Patient Information     Date Of Birth          1990        Visit Information        Provider Department      8/9/2018 11:30 AM  3 ATC; UC BMT INFUSION [facility] Blood and Marrow Transplant        Today's Diagnoses     Nodular sclerosing Hodgkin's lymphoma, unspecified body region (H)    -  1    Encounter for harvesting of peripheral blood stem cells        Stem cell donor        Encounter for other procedures for purposes other than remedying health state (CODE)              [facility] [place] [place]  [address] [place] [address]  Phone: [phone]  Clinic Hours:   Monday-Thursday:7am to 7pm   Friday: 7am to 5pm   Weekends and holidays:    8am to noon (in general)  If your fever is 100.5  or greater,   call the clinic.  After hours call the   hospital at [phone] or   [phone]. Ask for the BMT   fellow on-call            Follow-ups after your visit        Your next 10 appointments already scheduled     Aug 10, 2018 11:45 AM CDT   [facility] Lab Draw with  [facility] LAB DRAW   [facility] Mason[facility] Lab Draw ([facility])    [address] [place] [address]  [address]  [facility] [id number] [phone]            Aug 10, 2018 12:30 PM CDT   Return with  BMT WES #4   [facility] Blood and Marrow Transplant ([facility])    [address] [place] [address]  [address]  [facility] [id number] [phone]            Sep 05, 2018  1:20 PM CDT   CT SOFT TISSUE NECK W CONTRAST with UCCT1   [facility] [place] [facility] ([facility])    [address] [place] [address]  1st Floor  [facility] [id number] [phone]           Please bring any scans or X-rays taken at other hospitals, if similar tests were done. Also bring a list of your medicines, including vitamins, minerals and over-the-counter drugs. It is safest to leave personal items at home.   Be sure to tell your doctor:   If you have any allergies.   If there s any chance you are pregnant.   If you are breastfeeding.    If you have diabetes as your medication may need to be adjusted for this exam.  You will have contrast for this exam. To prepare:   Do not eat or drink for 2 hours before your exam. If you need to take medicine, you may take it with small sips of water. (We may ask you to take liquid medicine as well.)   The day before your exam, drink extra fluids at least six 8-ounce glasses (unless your doctor tells you to restrict your fluids).  Patients over 70 or patients with diabetes or kidney problems:   If you haven t had a blood test (creatinine test) within the last 30 days, the Cardiologist/Radiologist may require you to get this test prior to your exam.  Please wear loose clothing, such as a sweat suit or jogging clothes. Avoid snaps, zippers and other metal. We may ask you to undress and put on a hospital gown.  If you have any questions, please call the Imaging Department where you will have your exam.            Sep 05, 2018  1:40 PM CDT   CT CHEST/ABDOMEN/PELVIS W CONTRAST with UCCT1   [facility] ([facility])    [address] [facility]  1st Floor  [facility] [address] [phone]           Please bring any scans or X-rays taken at other hospitals, if similar tests were done. Also bring a list of your medicines, including vitamins, minerals and over-the-counter drugs. It is safest to leave personal items at home.  Be sure to tell your doctor:   If you have any allergies.   If there s any chance you are pregnant.   If you are breastfeeding.  How to prepare:   Do not eat or drink for 2 hours before your exam. If you need to take medicine, you may take it with small sips of water. (We may ask you to take liquid medicine as well.)   Please wear loose clothing, such as a sweat suit or jogging clothes. Avoid snaps, zippers and other metal. We may  ask you to undress and put on a hospital gown.  Please arrive 30 minutes early for your CT. Once in the department you might be asked to drink water 15-20 minutes prior to your exam.  If indicated you may be asked to drink an oral contrast in advance of your CT.  If this is the case, the imaging team will let you know or be in contact with you prior to your appointment  Patients over 70 or patients with diabetes or kidney problems:   If you haven t had a blood test (creatinine test) within the last 30 days, the Cardiologist/Radiologist may require you to get this test prior to your exam.  If you have diabetes:   Continue to take your metformin medication on the day of your exam  If you have any questions, please call the Imaging Department where you will have your exam.            Sep 05, 2018  2:00 PM CDT   [facility] Lab Draw with  MASONIC LAB DRAW   [facility] Masonic Lab Draw ([facility])    [address] [place] [address]  [address]  [facility] [address] [phone]            Sep 05, 2018  2:30 PM CDT   (Arrive by 2:15 PM)   BMT 28 Day Anniversary Visit with  BMT DOM   [facility] Blood and Marrow Transplant ([facility])    [address] [place] [address]  [address]  [facility] [address] [phone]              Who to contact     If you have questions or need follow up information about today's clinic visit or your schedule please contact [facility] BLOOD AND MARROW TRANSPLANT directly at [phone].  Normal or non-critical lab and imaging results will be communicated to you by MyChart, letter or phone within 4 business days after the clinic has received the results. If you do not hear from us within 7 days, please contact the clinic through [employer] or phone. If you have a critical or abnormal lab result, we will notify you by phone as soon as possible.  Submit refill requests through [employer] or call your pharmacy and they will forward the refill request to us. Please  allow 3 business days for your refill to be completed.          Additional Information About Your Visit        [employer] Information     [employer] gives you secure access to your electronic health record. If you see a primary care provider, you can also send messages to your care team and make appointments. If you have questions, please call your primary care clinic.  If you do not have a primary care provider, please call [phone] and they will assist you.        Care EveryWhere ID     This is your Care EveryWhere ID. This could be used by other organizations to access your [facility] medical records  [id number]         Blood Pressure from Last 3 Encounters:   08/09/18 100/62   08/08/18 119/76   08/07/18 123/74    Weight from Last 3 Encounters:   08/09/18 69.8 kg (153 lb 14.4 oz)   08/08/18 69.9 kg (154 lb 3.2 oz)   08/07/18 70.2 kg (154 lb 12.8 oz)              We Performed the Following     Basic metabolic panel     CBC with platelets differential     Magnesium        Recent Review Flowsheet Data     BMT Recent Results Latest Ref Rng & Units 8/4/2018 8/4/2018 8/5/2018 8/6/2018 8/7/2018 8/8/2018 8/9/2018    WBC 4.0 - 11.0 10e9/L - - 4.6 3.7(L) 4.9 1.8(L) 0.2(LL)    Hemoglobin 11.7 - 15.7 g/dL - - 11.1(L) 10.5(L) 10.3(L) 10.5(L) 10.5(L)    Platelet Count 150 - 450 10e9/L - - 197 175 115(L) 107(L) 69(L)    Neutrophils (Absolute) 1.6 - 8.3 10e9/L - - 4.3 3.4 4.6 1.3(L) -    INR 0.86 - 1.14 - - - 1.32(H) - - -    Sodium 133 - 144 mmol/L - - 139 138 137 135 134    Potassium 3.4 - 5.3 mmol/L 4.0 4.1 3.6 3.9 3.9 3.3(L) 3.8    Chloride 94 - 109 mmol/L - - 107 105 105 103 102    Glucose 70 - 99 mg/dL - - 103(H) 106(H) 106(H) 108(H) 111(H)    Urea Nitrogen 7 - 30 mg/dL - - 19 19 15 20 15    Creatinine 0.52 - 1.04 mg/dL - - 0.58 0.62 0.57 0.66 0.61    Calcium (Total) 8.5 - 10.1 mg/dL - - 8.2(L) 8.2(L) 8.2(L) 8.0(L) 7.9(L)    Protein (Total) 6.8 - 8.8 g/dL - - - 6.1(L) 6.1(L) - -    Albumin 3.4 - 5.0 g/dL - - - 3.3(L) 3.5 - -     Bilirubin (Direct) 0.0 - 0.2 mg/dL - - - 0.1 - - -    Alkaline Phosphatase 40 - 150 U/L - - - 52 50 - -    AST 0 - 45 U/L - - - 8 20 - -    ALT 0 - 50 U/L - - - 28 30 - -    MCV 78 - 100 fl - - 90 88 88 90 89               Primary Care Provider Office Phone # Fax #    [facility] [phone] [address] [facility] [address]        Equal Access to Services     RICHIE RAMEY : Hadii aad ku hadasho Soomaali, waaxda luqadaha, qaybta kaalmada adeegyada, waxay barbra raymundo. So [facility] [phone].    ATENCIÓN: Si habla español, tiene a isabel disposición servicios gratuitos de asistencia lingüística. [facility] [phone].    We comply with applicable federal civil rights laws and [place] laws. We do not discriminate on the basis of race, color, national origin, age, disability, sex, sexual orientation, or gender identity.            Thank you!     Thank you for choosing [facility] BLOOD AND MARROW TRANSPLANT  for your care. Our goal is always to provide you with excellent care. Hearing back from our patients is one way we can continue to improve our services. Please take a few minutes to complete the written survey that you may receive in the mail after your visit with us. Thank you!             Your Updated Medication List - Protect others around you: Learn how to safely use, store and throw away your medicines at www.disposemymeds.org.          This list is accurate as of 8/9/18 12:11 PM.  Always use your most recent med list.                   Brand Name Dispense Instructions for use Diagnosis    acyclovir 800 MG tablet    ZOVIRAX    150 tablet    Take 1 tablet (800 mg) by mouth 5 times daily    Hodgkin lymphoma, unspecified, lymph nodes of inguinal region and lower limb (H)       fluconazole 200 MG tablet    DIFLUCAN    30 tablet    Take 1 tablet (200 mg) by mouth daily    Hodgkin lymphoma, unspecified, lymph nodes of inguinal region and lower  limb (H)       levofloxacin 250 MG tablet    LEVAQUIN    30 tablet    Take 1 tablet (250 mg) by mouth daily    Hodgkin lymphoma, unspecified, lymph nodes of inguinal region and lower limb (H)       LORazepam 0.5 MG tablet    ATIVAN    30 tablet    Take 1-2 tablets (0.5-1 mg) by mouth every 4 hours as needed for anxiety (nausea/vomiting/sleep)    Nodular sclerosing Hodgkin's lymphoma, unspecified body region (H)       ondansetron 8 MG ODT tab    ZOFRAN-ODT    30 tablet    Take 1 tablet (8 mg) by mouth every 8 hours as needed for nausea    Nodular sclerosing Hodgkin's lymphoma, unspecified body region (H)       pantoprazole 40 MG EC tablet    PROTONIX    30 tablet    Take 1 tablet (40 mg) by mouth daily    Nodular sclerosing Hodgkin's lymphoma, unspecified body region (H)       prochlorperazine 5 MG tablet    COMPAZINE    30 tablet    Take 1 tablet (5 mg) by mouth every 6 hours    Nodular sclerosing Hodgkin's lymphoma, unspecified body region (H)       sennosides 8.6 MG tablet    SENOKOT    60 each    Take 2 tablets by mouth 2 times daily as needed for constipation    Nodular sclerosing Hodgkin's lymphoma, unspecified body region (H)       sulfamethoxazole-trimethoprim 800-160 MG per tablet   Start taking on:  9/3/2018    BACTRIM DS/SEPTRA DS    34 tablet    Take 1 tablet by mouth Every Mon, Tues two times daily    Hodgkin lymphoma, unspecified, lymph nodes of inguinal region and lower limb (H)

## 2018-08-09 NOTE — NURSING NOTE
Drug Administration Record    Drug Name: Ativan  Dose: 0.5 mg  Route Administered: IV  NDC#: 1716-2545-64  Amount of waste (mL): 1.75  Reason for waste: dose ordered

## 2018-08-09 NOTE — PROGRESS NOTES
Infusion Nursing Note:  Amira Avery presents today for IV fluids and IV ativan.    Patient seen by provider today: Yes: Kirti TENORIO   present during visit today: Not Applicable.    Note: Pt here for infusion of 1 liter of normal saline and also 0.5 mg of IV Ativan. Patient tolerated infusion well without any issues or side affects. She was feeling a little nausea and the ativan helped. Patient tolerated infusion well without any issues.     Intravenous Access:  Wang.    Treatment Conditions:  Lab Results   Component Value Date    HGB 10.5 08/09/2018     Lab Results   Component Value Date    WBC 0.2 08/09/2018      Lab Results   Component Value Date    ANEU 1.3 08/08/2018     Lab Results   Component Value Date    PLT 69 08/09/2018      Lab Results   Component Value Date     08/09/2018                   Lab Results   Component Value Date    POTASSIUM 3.8 08/09/2018           Lab Results   Component Value Date    MAG 2.0 08/09/2018            Lab Results   Component Value Date    CR 0.61 08/09/2018                   Lab Results   Component Value Date    NEETU 7.9 08/09/2018                Lab Results   Component Value Date    BILITOTAL 0.7 08/07/2018           Lab Results   Component Value Date    ALBUMIN 3.5 08/07/2018                    Lab Results   Component Value Date    ALT 30 08/07/2018           Lab Results   Component Value Date    AST 20 08/07/2018           Post Infusion Assessment:  Patient tolerated infusion without incident.    Discharge Plan:   Patient discharged in stable condition accompanied by: mother.  Departure Mode: Ambulatory.    TRAVIS WILL RN

## 2018-08-09 NOTE — NURSING NOTE
"Oncology Rooming Note    August 9, 2018 10:53 AM   Amira Avery is a 28 year old female who presents for:    Chief Complaint   Patient presents with     Blood Draw     Labs drawn via CVC by RN .Lines flushed and hep locked. VS taken.     RECHECK     post bmt for Lymphoma here for labs and md visit     Initial Vitals: /62 (BP Location: Right arm, Patient Position: Sitting, Cuff Size: Adult Regular)  Pulse 108  Temp 98.5  F (36.9  C) (Oral)  Resp 18  Wt 69.8 kg (153 lb 14.4 oz)  SpO2 98%  BMI 27.1 kg/m2 Estimated body mass index is 27.1 kg/(m^2) as calculated from the following:    Height as of 7/31/18: 1.605 m (5' 3.19\").    Weight as of this encounter: 69.8 kg (153 lb 14.4 oz). Body surface area is 1.76 meters squared.  No Pain (0) Comment: Data Unavailable   No LMP recorded.  Allergies reviewed: Yes  Medications reviewed: Yes    Medications: Medication refills not needed today.  Pharmacy [facility] entered into [employer] [address] [place] [address] [address] AT [facility] [address]    Clinical concerns: pt reports a lot of nausea, emesis x 1 watery diarrhea x4 in the past 24 hrs     10 minutes for nursing intake (face to face time)     Lidia Brar RN              "

## 2018-08-09 NOTE — PROGRESS NOTES
BMT Clinic Note     Patient ID:  Amira Avery is a 28 year old women who is day +4 s/p Auto PBST for Hodgekins Lymphoma,      Diagnosis HDN Hodgkin's Disease - NOS  HCT Type Autologous    Prep Regimen BCNU  Lisa-C  Etoposide  Melphalan   Donor Source No data was found    GVHD Prophylaxis No  Primary BMT Provider Dr. Landa         INTERVAL  HISTORY     Amira is feeling terrible, very tired.  C/O N/V.  Taking Ativan,Compazine & Zofran.  Stools are a little less frequent, still soft. No abd pain.  Eating a little, but appetite is poor.  Temp to 100.5. No chillss, cough, congestion or dyspnea.  No pain, bleeding or rash.      Review of Systems: 10 point ROS negative except as noted above.    PHYSICAL EXAM     /75 (BP Location: Left arm, Patient Position: Chair, Cuff Size: Adult Regular)  Pulse 117  Temp 100.2  F (37.9  C) (Oral)  Wt 68.9 kg (152 lb)  SpO2 98%  BMI 26.76 kg/m2     General: NAD, tired appearing  Eyes: : ARMINDA, sclera anicteric   Nose/Mouth/Throat: OP clear, buccal mucosa moist, no ulcerations   Lungs: CTA bilaterally  Cardiovascular: RRR, no M/R/G   Abdominal/Rectal: +BS, soft, NT, ND, No HSM   Lymphatics: no edema  Skin: no rashes or petechaie  Neuro: A&O   Additional Findings: [place]: site NT, no drainage.  Port a cath: not accessed    Lab Results   Component Value Date    WBC 0.1 (LL) 08/10/2018    ANEU 1.3 (L) 08/08/2018    HGB 10.2 (L) 08/10/2018    HCT 29.9 (L) 08/10/2018    PLT 43 (LL) 08/10/2018     08/10/2018    POTASSIUM 3.8 08/10/2018    CHLORIDE 102 08/10/2018    CO2 24 08/10/2018     (H) 08/10/2018    BUN 12 08/10/2018    CR 0.57 08/10/2018    MAG 2.1 08/10/2018    INR 1.32 (H) 08/06/2018       ASSESSMENT BY SYSTEMS     Amira Avery is a 28 year old women who is day +4 s/p Auto PBSCT with Beam for Nodular sclerosing Hodgkins disease, s/p gcsf+mozobil priming    Prep:   D-6 BCNU  D-5 to D-2 Lisa-cx2 and -16x2  D-1 8/5/2018 Melphalan with flush  Day  0:  8/6/2018:  Transplant day     1.  BMT:  Completed GCSF + mozobil priming prior to BEAM Auto PBSCT  - Collected 6.21million CD34/kg on 7/24.  - Transplanted on 8/6/2018.  -  GCSF starts d+5( 8/11/2018) and cont  until ANC>2500 x2 consecutive days. Re-stage per protocol.     2.  HEME: Keep Hgb>8 and plts>10K. No pre-meds.   - Platelet intercept study  - Counts are trending down but no transfusions needed today.      3.  ID: Neutropenic fever.  Obtain blood cx today & give Rocephin & Vanco x3 days  - Cont Levo, Fluc, and HD ACV (CMV+, HSV+, EBV+) prophy.   - Stool cx negative for C Diff  - Bactrim or appropriate PCP therapy to start d+28.                     4.  GI:  N/V/D   - Continue compazine, Ativan & Zofran.   - Diarrhea.  Likely due to prep, cx negative.  Ok to use Imodium  - Protonix for GI prophy.    5.  FEN/Renal:  Lytes & creat stable.    - 1L NS today due to poor intake.     RTC daily.  IVF, Vanco & Rocephin  tomorrow  Kirti Porras

## 2018-08-09 NOTE — NURSING NOTE
Chief Complaint   Patient presents with     Blood Draw     Labs drawn via CVC by RN .Lines flushed and hep locked. VS taken.     Karen Renteria RN

## 2018-08-09 NOTE — NURSING NOTE
Chief Complaint   Patient presents with     Infusion     pt here for infusion of 1 liter of normal saline s/p bmt for HL

## 2018-08-10 ENCOUNTER — APPOINTMENT (OUTPATIENT)
Dept: LAB | Facility: CLINIC | Age: 28
End: 2018-08-10
Attending: STUDENT IN AN ORGANIZED HEALTH CARE EDUCATION/TRAINING PROGRAM
Payer: COMMERCIAL

## 2018-08-10 ENCOUNTER — INFUSION THERAPY VISIT (OUTPATIENT)
Dept: TRANSPLANT | Facility: CLINIC | Age: 28
End: 2018-08-10
Attending: STUDENT IN AN ORGANIZED HEALTH CARE EDUCATION/TRAINING PROGRAM
Payer: COMMERCIAL

## 2018-08-10 VITALS
DIASTOLIC BLOOD PRESSURE: 75 MMHG | BODY MASS INDEX: 26.76 KG/M2 | HEART RATE: 117 BPM | SYSTOLIC BLOOD PRESSURE: 109 MMHG | WEIGHT: 152 LBS | TEMPERATURE: 100.2 F | OXYGEN SATURATION: 98 %

## 2018-08-10 VITALS
DIASTOLIC BLOOD PRESSURE: 61 MMHG | SYSTOLIC BLOOD PRESSURE: 98 MMHG | OXYGEN SATURATION: 98 % | HEART RATE: 103 BPM | TEMPERATURE: 100 F

## 2018-08-10 DIAGNOSIS — C81.10 NODULAR SCLEROSING HODGKIN'S LYMPHOMA, UNSPECIFIED BODY REGION (H): ICD-10-CM

## 2018-08-10 DIAGNOSIS — Z52.001 ENCOUNTER FOR HARVESTING OF PERIPHERAL BLOOD STEM CELLS: Primary | ICD-10-CM

## 2018-08-10 DIAGNOSIS — Z52.001 ENCOUNTER FOR HARVESTING OF PERIPHERAL BLOOD STEM CELLS: ICD-10-CM

## 2018-08-10 DIAGNOSIS — Z52.001 STEM CELL DONOR: ICD-10-CM

## 2018-08-10 DIAGNOSIS — Z41.8 ENCOUNTER FOR OTHER PROCEDURES FOR PURPOSES OTHER THAN REMEDYING HEALTH STATE (CODE): ICD-10-CM

## 2018-08-10 DIAGNOSIS — C81.95 HODGKIN LYMPHOMA, UNSPECIFIED, LYMPH NODES OF INGUINAL REGION AND LOWER LIMB (H): Primary | ICD-10-CM

## 2018-08-10 LAB
ANION GAP SERPL CALCULATED.3IONS-SCNC: 8 MMOL/L (ref 3–14)
BUN SERPL-MCNC: 12 MG/DL (ref 7–30)
CALCIUM SERPL-MCNC: 8.1 MG/DL (ref 8.5–10.1)
CHLORIDE SERPL-SCNC: 102 MMOL/L (ref 94–109)
CO2 SERPL-SCNC: 24 MMOL/L (ref 20–32)
CREAT SERPL-MCNC: 0.57 MG/DL (ref 0.52–1.04)
DIFFERENTIAL METHOD BLD: ABNORMAL
ERYTHROCYTE [DISTWIDTH] IN BLOOD BY AUTOMATED COUNT: 13 % (ref 10–15)
GFR SERPL CREATININE-BSD FRML MDRD: >90 ML/MIN/1.7M2
GLUCOSE SERPL-MCNC: 124 MG/DL (ref 70–99)
HCT VFR BLD AUTO: 29.9 % (ref 35–47)
HGB BLD-MCNC: 10.2 G/DL (ref 11.7–15.7)
MAGNESIUM SERPL-MCNC: 2.1 MG/DL (ref 1.6–2.3)
MCH RBC QN AUTO: 29.9 PG (ref 26.5–33)
MCHC RBC AUTO-ENTMCNC: 34.1 G/DL (ref 31.5–36.5)
MCV RBC AUTO: 88 FL (ref 78–100)
PLATELET # BLD AUTO: 43 10E9/L (ref 150–450)
POTASSIUM SERPL-SCNC: 3.8 MMOL/L (ref 3.4–5.3)
RBC # BLD AUTO: 3.41 10E12/L (ref 3.8–5.2)
SODIUM SERPL-SCNC: 134 MMOL/L (ref 133–144)
WBC # BLD AUTO: 0.1 10E9/L (ref 4–11)

## 2018-08-10 PROCEDURE — 80048 BASIC METABOLIC PNL TOTAL CA: CPT | Performed by: NURSE PRACTITIONER

## 2018-08-10 PROCEDURE — 25000128 H RX IP 250 OP 636: Mod: ZF | Performed by: STUDENT IN AN ORGANIZED HEALTH CARE EDUCATION/TRAINING PROGRAM

## 2018-08-10 PROCEDURE — 25000128 H RX IP 250 OP 636: Mod: ZF | Performed by: NURSE PRACTITIONER

## 2018-08-10 PROCEDURE — 25000128 H RX IP 250 OP 636: Mod: ZF | Performed by: PHYSICIAN ASSISTANT

## 2018-08-10 PROCEDURE — 83735 ASSAY OF MAGNESIUM: CPT | Performed by: NURSE PRACTITIONER

## 2018-08-10 PROCEDURE — 85027 COMPLETE CBC AUTOMATED: CPT

## 2018-08-10 PROCEDURE — 96367 TX/PROPH/DG ADDL SEQ IV INF: CPT

## 2018-08-10 PROCEDURE — 96375 TX/PRO/DX INJ NEW DRUG ADDON: CPT

## 2018-08-10 PROCEDURE — 27210995 ZZH RX 272: Mod: ZF | Performed by: STUDENT IN AN ORGANIZED HEALTH CARE EDUCATION/TRAINING PROGRAM

## 2018-08-10 PROCEDURE — 96374 THER/PROPH/DIAG INJ IV PUSH: CPT

## 2018-08-10 PROCEDURE — G0463 HOSPITAL OUTPT CLINIC VISIT: HCPCS | Mod: 25,ZF

## 2018-08-10 PROCEDURE — 96365 THER/PROPH/DIAG IV INF INIT: CPT

## 2018-08-10 PROCEDURE — 87040 BLOOD CULTURE FOR BACTERIA: CPT | Performed by: NURSE PRACTITIONER

## 2018-08-10 RX ORDER — LORAZEPAM 2 MG/ML
0.5 INJECTION INTRAMUSCULAR ONCE
Status: COMPLETED | OUTPATIENT
Start: 2018-08-10 | End: 2018-08-10

## 2018-08-10 RX ORDER — CEFTRIAXONE SODIUM 2 G
2 VIAL (EA) INJECTION EVERY 24 HOURS
Status: DISCONTINUED | OUTPATIENT
Start: 2018-08-10 | End: 2018-08-10 | Stop reason: HOSPADM

## 2018-08-10 RX ORDER — CEFTRIAXONE 2 G/1
2 INJECTION, POWDER, FOR SOLUTION INTRAMUSCULAR; INTRAVENOUS EVERY 24 HOURS
Status: CANCELLED
Start: 2018-08-10

## 2018-08-10 RX ORDER — CEFTRIAXONE 2 G/1
2 INJECTION, POWDER, FOR SOLUTION INTRAMUSCULAR; INTRAVENOUS EVERY 24 HOURS
Status: DISCONTINUED | OUTPATIENT
Start: 2018-08-10 | End: 2018-08-10

## 2018-08-10 RX ORDER — DIPHENHYDRAMINE HYDROCHLORIDE 50 MG/ML
25 INJECTION INTRAMUSCULAR; INTRAVENOUS DAILY
Status: CANCELLED
Start: 2018-08-10

## 2018-08-10 RX ORDER — HEPARIN SODIUM (PORCINE) LOCK FLUSH IV SOLN 100 UNIT/ML 100 UNIT/ML
5 SOLUTION INTRAVENOUS ONCE
Status: DISCONTINUED | OUTPATIENT
Start: 2018-08-10 | End: 2018-08-10 | Stop reason: HOSPADM

## 2018-08-10 RX ORDER — HEPARIN SODIUM,PORCINE 10 UNIT/ML
5 VIAL (ML) INTRAVENOUS
Status: DISCONTINUED | OUTPATIENT
Start: 2018-08-10 | End: 2018-08-10 | Stop reason: HOSPADM

## 2018-08-10 RX ORDER — HEPARIN SODIUM (PORCINE) LOCK FLUSH IV SOLN 100 UNIT/ML 100 UNIT/ML
5 SOLUTION INTRAVENOUS DAILY PRN
Status: CANCELLED
Start: 2018-08-10

## 2018-08-10 RX ORDER — HEPARIN SODIUM,PORCINE 10 UNIT/ML
5 VIAL (ML) INTRAVENOUS ONCE
Status: COMPLETED | OUTPATIENT
Start: 2018-08-10 | End: 2018-08-10

## 2018-08-10 RX ORDER — PROCHLORPERAZINE MALEATE 5 MG
5 TABLET ORAL EVERY 6 HOURS
Qty: 30 TABLET | Refills: 0 | Status: ON HOLD | OUTPATIENT
Start: 2018-08-10 | End: 2018-08-17

## 2018-08-10 RX ORDER — DIPHENHYDRAMINE HYDROCHLORIDE 50 MG/ML
25 INJECTION INTRAMUSCULAR; INTRAVENOUS DAILY
Status: CANCELLED
Start: 2018-08-11

## 2018-08-10 RX ADMIN — VANCOMYCIN HYDROCHLORIDE 1000 MG: 1 INJECTION, POWDER, LYOPHILIZED, FOR SOLUTION INTRAVENOUS at 13:13

## 2018-08-10 RX ADMIN — LORAZEPAM 0.5 MG: 2 INJECTION INTRAMUSCULAR; INTRAVENOUS at 12:55

## 2018-08-10 RX ADMIN — SODIUM CHLORIDE, PRESERVATIVE FREE 5 ML: 5 INJECTION INTRAVENOUS at 13:17

## 2018-08-10 RX ADMIN — DIPHENHYDRAMINE HYDROCHLORIDE: 50 INJECTION INTRAMUSCULAR; INTRAVENOUS at 13:58

## 2018-08-10 RX ADMIN — SODIUM CHLORIDE, PRESERVATIVE FREE 5 ML: 5 INJECTION INTRAVENOUS at 12:14

## 2018-08-10 RX ADMIN — SODIUM CHLORIDE, PRESERVATIVE FREE 5 ML: 5 INJECTION INTRAVENOUS at 12:13

## 2018-08-10 RX ADMIN — CEFTRIAXONE SODIUM 2 G: 2 INJECTION, POWDER, FOR SOLUTION INTRAMUSCULAR; INTRAVENOUS at 12:55

## 2018-08-10 RX ADMIN — SODIUM CHLORIDE, PRESERVATIVE FREE 5 ML: 5 INJECTION INTRAVENOUS at 13:16

## 2018-08-10 RX ADMIN — SODIUM CHLORIDE 1000 ML: 9 INJECTION, SOLUTION INTRAVENOUS at 12:45

## 2018-08-10 ASSESSMENT — PAIN SCALES - GENERAL: PAINLEVEL: MODERATE PAIN (5)

## 2018-08-10 NOTE — MR AVS SNAPSHOT
After Visit Summary   8/10/2018    Amira Avery    MRN: [id number]           Patient Information     Date Of Birth          1990        Visit Information        Provider Department      8/10/2018 1:00 PM UC 8 ATC; UC BMT INFUSION [facility] Blood and Marrow Transplant        Today's Diagnoses     Encounter for harvesting of peripheral blood stem cells    -  1    Stem cell donor        Encounter for other procedures for purposes other than remedying health state (CODE)        Nodular sclerosing Hodgkin's lymphoma, unspecified body region (H)              [facility] ([facility])  [address] [place] [address]  Phone: [phone]  Clinic Hours:   Monday-Thursday:7am to 7pm   Friday: 7am to 5pm   Weekends and holidays:    8am to noon (in general)  If your fever is 100.5  or greater,   call the clinic.  After hours call the   hospital at [phone] or   [phone]. Ask for the BMT   fellow on-call            Follow-ups after your visit        Your next 10 appointments already scheduled     Aug 11, 2018  8:30 AM CDT   Masonic Lab Draw with UC MASONIC LAB DRAW   [facility] Masonic Lab Draw ([facility])    [address] [place] [address]  [address]  [facility] [address] [phone]            Aug 11, 2018  9:00 AM CDT   Return with UC BMT DOM   [facility] Blood and Marrow Transplant ([facility])    [address] [place] [address]  [address]  [facility] [address]   [phone]            Aug 11, 2018  9:30 AM CDT   Infusion 120 with UC BMT INFUSION, UC 1 ATC   [facility] Blood and Marrow Transplant ([facility])    [address] [place] [address]  [address]  [facility] [address]   [phone]            Aug 12, 2018  8:30 AM CDT   Masonic Lab Draw with UC MASONIC LAB DRAW   [facility] Masonic Lab Draw ([facility])    [address] [place] [address]  [address]  [facility] [address] [phone]             Aug 12, 2018  9:00 AM CDT   Return with UC BMT DOM   [facility] Blood and Marrow Transplant ([facility])    [address]  [address]  [facility] [address] [phone]            Aug 12, 2018  9:30 AM CDT   Infusion 120 with UC BMT INFUSION, UC 1 ATC   [facility] Blood and Marrow Transplant ([facility])    [address]  [address]  [facility] [address] [phone]            Aug 13, 2018  1:15 PM CDT   Masonic Lab Draw with UC MASONIC LAB DRAW   [facility] Masonic Lab Draw ([facility])    [address]  [address]  [facility] [address] [phone]            Aug 13, 2018  2:00 PM CDT   Return with UC BMT WES #2   [facility] Blood and Marrow Transplant ([facility])    [address]  [address]  [facility] [address] [phone]              Who to contact     If you have questions or need follow up information about today's clinic visit or your schedule please contact [facility] BLOOD AND MARROW TRANSPLANT directly at [phone].  Normal or non-critical lab and imaging results will be communicated to you by [employer], letter or phone within 4 business days after the clinic has received the results. If you do not hear from us within 7 days, please contact the clinic through [employer] or phone. If you have a critical or abnormal lab result, we will notify you by phone as soon as possible.  Submit refill requests through [employer] or call your pharmacy and they will forward the refill request to us. Please allow 3 business days for your refill to be completed.          Additional Information About Your Visit        [employer] Information     [employer] gives you secure access to your electronic health record. If you see a primary care provider, you can also send messages to your care team and make appointments. If you have questions, please call your primary care clinic.  If you  do not have a primary care provider, please call [phone] and they will assist you.        Care EveryWhere ID     This is your Care EveryWhere ID. This could be used by other organizations to access your [place] medical records  [id number]        Your Vitals Were     Pulse Temperature Pulse Oximetry             103 100  F (37.8  C) (Oral) 98%          Blood Pressure from Last 3 Encounters:   08/10/18 98/61   08/10/18 109/75   08/09/18 100/62    Weight from Last 3 Encounters:   08/10/18 68.9 kg (152 lb)   08/09/18 69.8 kg (153 lb 14.4 oz)   08/08/18 69.9 kg (154 lb 3.2 oz)              Today, you had the following     No orders found for display         Where to get your medicines      These medications were sent to [facility] [address] [place] [address] GABRIELLA PUENTE - [address] [facility] [address]  [address] [place] [address]     Phone:  [phone]     prochlorperazine 5 MG tablet          Recent Review Flowsheet Data     BMT Recent Results Latest Ref Rng & Units 8/4/2018 8/5/2018 8/6/2018 8/7/2018 8/8/2018 8/9/2018 8/10/2018    WBC 4.0 - 11.0 10e9/L - 4.6 3.7(L) 4.9 1.8(L) 0.2(LL) 0.1(LL)    Hemoglobin 11.7 - 15.7 g/dL - 11.1(L) 10.5(L) 10.3(L) 10.5(L) 10.5(L) 10.2(L)    Platelet Count 150 - 450 10e9/L - 197 175 115(L) 107(L) 69(L) 43(LL)    Neutrophils (Absolute) 1.6 - 8.3 10e9/L - 4.3 3.4 4.6 1.3(L) - -    INR 0.86 - 1.14 - - 1.32(H) - - - -    Sodium 133 - 144 mmol/L - 139 138 137 135 134 134    Potassium 3.4 - 5.3 mmol/L 4.1 3.6 3.9 3.9 3.3(L) 3.8 3.8    Chloride 94 - 109 mmol/L - 107 105 105 103 102 102    Glucose 70 - 99 mg/dL - 103(H) 106(H) 106(H) 108(H) 111(H) 124(H)    Urea Nitrogen 7 - 30 mg/dL - 19 19 15 20 15 12    Creatinine 0.52 - 1.04 mg/dL - 0.58 0.62 0.57 0.66 0.61 0.57    Calcium (Total) 8.5 - 10.1 mg/dL - 8.2(L) 8.2(L) 8.2(L) 8.0(L) 7.9(L) 8.1(L)    Protein (Total) 6.8 - 8.8 g/dL - - 6.1(L) 6.1(L) - - -    Albumin 3.4 - 5.0 g/dL - - 3.3(L) 3.5 - - -     Bilirubin (Direct) 0.0 - 0.2 mg/dL - - 0.1 - - - -    Alkaline Phosphatase 40 - 150 U/L - - 52 50 - - -    AST 0 - 45 U/L - - 8 20 - - -    ALT 0 - 50 U/L - - 28 30 - - -    MCV 78 - 100 fl - 90 88 88 90 89 88               Primary Care Provider Office Phone # Fax #    [facility] [phone] [address] [address]        Equal Access to Services     RICHIE RAMEY : Hadii aad ku hadasho Soomaali, waaxda luqadaha, qaybta kaalmada adeegyada, waxay barbra raymundo. So [facility] [phone].    ATENCIÓN: Si habla español, tiene a isabel disposición servicios gratuitos de asistencia lingüística. [facility] [phone].    We comply with applicable federal civil rights laws and [place] laws. We do not discriminate on the basis of race, color, national origin, age, disability, sex, sexual orientation, or gender identity.            Thank you!     Thank you for choosing [facility] BLOOD AND MARROW TRANSPLANT  for your care. Our goal is always to provide you with excellent care. Hearing back from our patients is one way we can continue to improve our services. Please take a few minutes to complete the written survey that you may receive in the mail after your visit with us. Thank you!             Your Updated Medication List - Protect others around you: Learn how to safely use, store and throw away your medicines at www.disposemymeds.org.          This list is accurate as of 8/10/18  3:21 PM.  Always use your most recent med list.                   Brand Name Dispense Instructions for use Diagnosis    acyclovir 800 MG tablet    ZOVIRAX    150 tablet    Take 1 tablet (800 mg) by mouth 5 times daily    Hodgkin lymphoma, unspecified, lymph nodes of inguinal region and lower limb (H)       fluconazole 200 MG tablet    DIFLUCAN    30 tablet    Take 1 tablet (200 mg) by mouth daily    Hodgkin lymphoma, unspecified, lymph nodes of inguinal region and lower  limb (H)       levofloxacin 250 MG tablet    LEVAQUIN    30 tablet    Take 1 tablet (250 mg) by mouth daily    Hodgkin lymphoma, unspecified, lymph nodes of inguinal region and lower limb (H)       LORazepam 0.5 MG tablet    ATIVAN    30 tablet    Take 1-2 tablets (0.5-1 mg) by mouth every 4 hours as needed for anxiety (nausea/vomiting/sleep)    Nodular sclerosing Hodgkin's lymphoma, unspecified body region (H)       ondansetron 8 MG ODT tab    ZOFRAN-ODT    30 tablet    Take 1 tablet (8 mg) by mouth every 8 hours as needed for nausea    Nodular sclerosing Hodgkin's lymphoma, unspecified body region (H)       pantoprazole 40 MG EC tablet    PROTONIX    30 tablet    Take 1 tablet (40 mg) by mouth daily    Nodular sclerosing Hodgkin's lymphoma, unspecified body region (H)       prochlorperazine 5 MG tablet    COMPAZINE    30 tablet    Take 1 tablet (5 mg) by mouth every 6 hours    Nodular sclerosing Hodgkin's lymphoma, unspecified body region (H)       sennosides 8.6 MG tablet    SENOKOT    60 each    Take 2 tablets by mouth 2 times daily as needed for constipation    Nodular sclerosing Hodgkin's lymphoma, unspecified body region (H)       sulfamethoxazole-trimethoprim 800-160 MG per tablet   Start taking on:  9/3/2018    BACTRIM DS/SEPTRA DS    34 tablet    Take 1 tablet by mouth Every Mon, Tues two times daily    Hodgkin lymphoma, unspecified, lymph nodes of inguinal region and lower limb (H)

## 2018-08-10 NOTE — NURSING NOTE
Chief Complaint   Patient presents with     Blood Draw     labs drawn via cvc by RN     /75 (BP Location: Left arm, Patient Position: Chair, Cuff Size: Adult Regular)  Pulse 117  Temp 100.2  F (37.9  C) (Oral)  Wt 68.9 kg (152 lb)  SpO2 98%  BMI 26.76 kg/m2    Vitals taken. Lines accessed by RN. Labs collected through brown lumen and sent. Both lines flushed with NS & Heparin. Pt tolerated well. Pt checked in for next appointment.    Reena Edwards RN

## 2018-08-10 NOTE — NURSING NOTE
Drug Administration Record    Drug Name: Ativan  Dose: 0.5 mg  Route Administered: IV  NDC#: 4479-8600-43  Amount of waste (mL): 0.75  Reason for waste: dose ordered

## 2018-08-10 NOTE — MR AVS SNAPSHOT
After Visit Summary   8/10/2018    Amira Avery    MRN: [id number]           Patient Information     Date Of Birth          1990        Visit Information        Provider Department      8/10/2018 12:30 PM UC BMT WES #1 [facility] Blood and Marrow Transplant        Today's Diagnoses     Hodgkin lymphoma, unspecified, lymph nodes of inguinal region and lower limb (H)    -  1    Stem cell donor        Encounter for harvesting of peripheral blood stem cells        Nodular sclerosing Hodgkin's lymphoma, unspecified body region (H)        Encounter for other procedures for purposes other than remedying health state (CODE)              [facility] ([facility])  [address] [place] [address]  Phone: [phone]  Clinic Hours:   Monday-Thursday:7am to 7pm   Friday: 7am to 5pm   Weekends and holidays:    8am to noon (in general)  If your fever is 100.5  or greater,   call the clinic.  After hours call the   hospital at [phone] or   [phone]. Ask for the BMT   fellow on-call            Follow-ups after your visit        Your next 10 appointments already scheduled     Aug 11, 2018  8:30 AM CDT   Masonic Lab Draw with UC MASONIC LAB DRAW   [facility] Masonic Lab Draw ([facility])    [address] [place] [address]  [address]  [facility] [address] [phone]            Aug 11, 2018  9:00 AM CDT   Return with UC BMT DOM   [facility] Blood and Marrow Transplant ([facility])    [address] [place] [address]  [address]  [facility] [address] [phone]            Aug 11, 2018  9:30 AM CDT   Infusion 120 with UC BMT INFUSION, UC 1 ATC   [facility] Blood and Marrow Transplant ([facility])    [address] [place] [address]  [address]  [facility] [address] [phone]            Aug 12, 2018  8:30 AM CDT   Masonic Lab Draw with UC MASONIC LAB DRAW   [facility] Masonic Lab Draw ([facility])     [address]  [address]  [facility] [address]   [phone]            Aug 12, 2018  9:00 AM CDT   Return with UC BMT DOM   [facility] Blood and Marrow Transplant ([facility])    [address]  [address]  [facility] [address]   [phone]            Aug 12, 2018  9:30 AM CDT   Infusion 120 with UC BMT INFUSION, UC 1 ATC   [facility] Blood and Marrow Transplant ([facility])    [address]  [address]  [facility] [address]   [phone]            Aug 13, 2018  1:15 PM CDT   Masonic Lab Draw with UC MASONIC LAB DRAW   [facility] Masonic Lab Draw ([facility])    [address] [place] [address]  [address]  [facility] [address]   [phone]            Aug 13, 2018  2:00 PM CDT   Return with UC BMT WES #2   [facility] Blood and Marrow Transplant ([facility])    [address] [place] [address]  [address]  [facility] [address]   [phone]              Future tests that were ordered for you today     Open Future Orders        Priority Expected Expires Ordered    Basic metabolic panel Routine 8/11/2018 8/11/2018 8/10/2018    Magnesium Routine 8/11/2018 8/11/2018 8/10/2018    CBC with platelets differential Routine 8/11/2018 8/11/2018 8/10/2018            Who to contact     If you have questions or need follow up information about today's clinic visit or your schedule please contact [facility] BLOOD AND MARROW TRANSPLANT directly at [phone].  Normal or non-critical lab and imaging results will be communicated to you by MyChart, letter or phone within 4 business days after the clinic has received the results. If you do not hear from us within 7 days, please contact the clinic through MyChart or phone. If you have a critical or abnormal lab result, we will notify you by phone as soon as possible.  Submit refill requests through [employer] or call your pharmacy and they will forward the refill request to  us. Please allow 3 business days for your refill to be completed.          Additional Information About Your Visit        [employer] Information     [employer] gives you secure access to your electronic health record. If you see a primary care provider, you can also send messages to your care team and make appointments. If you have questions, please call your primary care clinic.  If you do not have a primary care provider, please call [phone] and they will assist you.        Care EveryWhere ID     This is your Care EveryWhere ID. This could be used by other organizations to access your [place] medical records  [id number]        Your Vitals Were     Pulse Temperature Pulse Oximetry BMI (Body Mass Index)          117 100.2  F (37.9  C) (Oral) 98% 26.76 kg/m2         Blood Pressure from Last 3 Encounters:   08/10/18 109/75   08/09/18 100/62   08/08/18 119/76    Weight from Last 3 Encounters:   08/10/18 68.9 kg (152 lb)   08/09/18 69.8 kg (153 lb 14.4 oz)   08/08/18 69.9 kg (154 lb 3.2 oz)              We Performed the Following     Basic metabolic panel     Blood culture - NOW     CBC with platelets differential     Magnesium          Where to get your medicines      These medications were sent to [facility] [address] [place] [address] [facility] [address] [facility] [address]  [address] [facility] [place] [address]     Phone:  [phone]     prochlorperazine 5 MG tablet          Recent Review Flowsheet Data     BMT Recent Results Latest Ref Rng & Units 8/4/2018 8/5/2018 8/6/2018 8/7/2018 8/8/2018 8/9/2018 8/10/2018    WBC 4.0 - 11.0 10e9/L - 4.6 3.7(L) 4.9 1.8(L) 0.2(LL) 0.1(LL)    Hemoglobin 11.7 - 15.7 g/dL - 11.1(L) 10.5(L) 10.3(L) 10.5(L) 10.5(L) 10.2(L)    Platelet Count 150 - 450 10e9/L - 197 175 115(L) 107(L) 69(L) 43(LL)    Neutrophils (Absolute) 1.6 - 8.3 10e9/L - 4.3 3.4 4.6 1.3(L) - -    INR 0.86 - 1.14 - - 1.32(H) - - - -    Sodium 133 - 144 mmol/L - 139 138 137 135 134 134     Potassium 3.4 - 5.3 mmol/L 4.1 3.6 3.9 3.9 3.3(L) 3.8 3.8    Chloride 94 - 109 mmol/L - 107 105 105 103 102 102    Glucose 70 - 99 mg/dL - 103(H) 106(H) 106(H) 108(H) 111(H) 124(H)    Urea Nitrogen 7 - 30 mg/dL - 19 19 15 20 15 12    Creatinine 0.52 - 1.04 mg/dL - 0.58 0.62 0.57 0.66 0.61 0.57    Calcium (Total) 8.5 - 10.1 mg/dL - 8.2(L) 8.2(L) 8.2(L) 8.0(L) 7.9(L) 8.1(L)    Protein (Total) 6.8 - 8.8 g/dL - - 6.1(L) 6.1(L) - - -    Albumin 3.4 - 5.0 g/dL - - 3.3(L) 3.5 - - -    Bilirubin (Direct) 0.0 - 0.2 mg/dL - - 0.1 - - - -    Alkaline Phosphatase 40 - 150 U/L - - 52 50 - - -    AST 0 - 45 U/L - - 8 20 - - -    ALT 0 - 50 U/L - - 28 30 - - -    MCV 78 - 100 fl - 90 88 88 90 89 88               Primary Care Provider Office Phone # Fax #    [facility] [phone] [address] [facility] [address]        Equal Access to Services     MARINE RAMEY AH: Hadii aad ku hadasho Soomaali, waaxda luqadaha, qaybta kaalmada adeegyada, waxay idiodilia raymundo. So [facility] [phone].    ATENCIÓN: Si habla español, tiene a isabel disposición servicios gratuitos de asistencia lingüística. Lltom al [phone].    We comply with applicable federal civil rights laws and [place] laws. We do not discriminate on the basis of race, color, national origin, age, disability, sex, sexual orientation, or gender identity.            Thank you!     Thank you for choosing [facility] BLOOD AND MARROW TRANSPLANT  for your care. Our goal is always to provide you with excellent care. Hearing back from our patients is one way we can continue to improve our services. Please take a few minutes to complete the written survey that you may receive in the mail after your visit with us. Thank you!             Your Updated Medication List - Protect others around you: Learn how to safely use, store and throw away your medicines at www.disposemymeds.org.          This list is accurate as of  8/10/18  1:08 PM.  Always use your most recent med list.                   Brand Name Dispense Instructions for use Diagnosis    acyclovir 800 MG tablet    ZOVIRAX    150 tablet    Take 1 tablet (800 mg) by mouth 5 times daily    Hodgkin lymphoma, unspecified, lymph nodes of inguinal region and lower limb (H)       fluconazole 200 MG tablet    DIFLUCAN    30 tablet    Take 1 tablet (200 mg) by mouth daily    Hodgkin lymphoma, unspecified, lymph nodes of inguinal region and lower limb (H)       levofloxacin 250 MG tablet    LEVAQUIN    30 tablet    Take 1 tablet (250 mg) by mouth daily    Hodgkin lymphoma, unspecified, lymph nodes of inguinal region and lower limb (H)       LORazepam 0.5 MG tablet    ATIVAN    30 tablet    Take 1-2 tablets (0.5-1 mg) by mouth every 4 hours as needed for anxiety (nausea/vomiting/sleep)    Nodular sclerosing Hodgkin's lymphoma, unspecified body region (H)       ondansetron 8 MG ODT tab    ZOFRAN-ODT    30 tablet    Take 1 tablet (8 mg) by mouth every 8 hours as needed for nausea    Nodular sclerosing Hodgkin's lymphoma, unspecified body region (H)       pantoprazole 40 MG EC tablet    PROTONIX    30 tablet    Take 1 tablet (40 mg) by mouth daily    Nodular sclerosing Hodgkin's lymphoma, unspecified body region (H)       prochlorperazine 5 MG tablet    COMPAZINE    30 tablet    Take 1 tablet (5 mg) by mouth every 6 hours    Nodular sclerosing Hodgkin's lymphoma, unspecified body region (H)       sennosides 8.6 MG tablet    SENOKOT    60 each    Take 2 tablets by mouth 2 times daily as needed for constipation    Nodular sclerosing Hodgkin's lymphoma, unspecified body region (H)       sulfamethoxazole-trimethoprim 800-160 MG per tablet   Start taking on:  9/3/2018    BACTRIM DS/SEPTRA DS    34 tablet    Take 1 tablet by mouth Every Mon, Tues two times daily    Hodgkin lymphoma, unspecified, lymph nodes of inguinal region and lower limb (H)

## 2018-08-10 NOTE — PROGRESS NOTES
Infusion Nursing Note:  Amira Avery presents today for IV fluids, Vancomycin and Rocephin as well as IV Ativan.    Patient seen by provider today: Yes: Kirti TENORIO   present during visit today: Not Applicable.    Note: Patient presents to clinic not feeling well. She has a temperature of 100.2 F so cultures were drawn in lab. Patient saw provider, and she ordered IVF, 0.5 mg of Ativan, Rocephin, and also Vancomycin for the next three days. About 35 minutes into the infusion of IV Vancomycin, patient complained of having an itchy head, and upon assessment, head was a little red in color around the hairline and base of the head. She said the itching stopped immediately as the infusion was stopped. Her line was flushed, provider notified, and order for 25 mg of IV Benadryl was given. No trouble breathing, scratchy throat or any other symptoms. Vitals stable. Once Benadryl was completed, nurse waited and then restarted Vancomycin. Once completed, patient did have more redness on her face but vitals stable and provider notified. Gave the OK to go home, advised taking more benadryl before bed. They verbalized understanding and agreed with plan. Need to give benadryl before infusion tomorrow.     Intravenous Access:  Wang.    Treatment Conditions:  Lab Results   Component Value Date    HGB 10.2 08/10/2018     Lab Results   Component Value Date    WBC 0.1 08/10/2018      Lab Results   Component Value Date    ANEU 1.3 08/08/2018     Lab Results   Component Value Date    PLT 43 08/10/2018      Lab Results   Component Value Date     08/10/2018                   Lab Results   Component Value Date    POTASSIUM 3.8 08/10/2018           Lab Results   Component Value Date    MAG 2.1 08/10/2018            Lab Results   Component Value Date    CR 0.57 08/10/2018                   Lab Results   Component Value Date    NEETU 8.1 08/10/2018                Lab Results   Component Value Date    BILITOTAL 0.7  08/07/2018           Lab Results   Component Value Date    ALBUMIN 3.5 08/07/2018                    Lab Results   Component Value Date    ALT 30 08/07/2018           Lab Results   Component Value Date    AST 20 08/07/2018           Post Infusion Assessment:  Patient tolerated infusions well, except Vancomycin, patient needed 25 mg of Benadryl then once restarted, patient tolerated infusion well without any issues.    Discharge Plan:   Patient discharged in stable condition accompanied by: mother.  Departure Mode: Ambulatory.    TRAVIS WILL RN

## 2018-08-10 NOTE — PROGRESS NOTES
[facility]    HPI and Interval History: 28-year-old woman with Hodgkin lymphoma day +5 of auto HCT.  She is here today to receive her day of antibiotics with ceftriaxone and vancomycin due to fever. T curve coming down and diarrhea better, some cramps in abdomen but not worse. Eating and drinking some. 10-point ROS otherwise negative.      Lab Results   Component Value Date    WBC 0.1 (LL) 08/11/2018    HGB 10.2 (L) 08/11/2018    HCT 30.2 (L) 08/11/2018    PLT 22 (LL) 08/11/2018     08/11/2018    POTASSIUM 3.9 08/11/2018    CHLORIDE 105 08/11/2018    CO2 24 08/11/2018    BUN 12 08/11/2018    CR 0.61 08/11/2018     (H) 08/11/2018    AST 20 08/07/2018    ALT 30 08/07/2018    ALKPHOS 50 08/07/2018    BILITOTAL 0.7 08/07/2018    INR 1.32 (H) 08/06/2018        Current Outpatient Prescriptions   Medication     acyclovir (ZOVIRAX) 800 MG tablet     fluconazole (DIFLUCAN) 200 MG tablet     levofloxacin (LEVAQUIN) 250 MG tablet     LORazepam (ATIVAN) 0.5 MG tablet     ondansetron (ZOFRAN-ODT) 8 MG ODT tab     pantoprazole (PROTONIX) 40 MG EC tablet     prochlorperazine (COMPAZINE) 5 MG tablet     sennosides (SENOKOT) 8.6 MG tablet     [START ON 9/3/2018] sulfamethoxazole-trimethoprim (BACTRIM DS/SEPTRA DS) 800-160 MG per tablet     No current facility-administered medications for this visit.      Facility-Administered Medications Ordered in Other Visits   Medication     cefTRIAXone (ROCEPHIN) 2 g in 20 mL SWFI for IVP     diphenhydrAMINE (BENADRYL) injection 25 mg     filgrastim (NEUPOGEN) injection 480 mcg     vancomycin (VANCOCIN) 1,000 mg in sodium chloride 0.9 % 250 mL       Ph/E:   /66 (BP Location: Left arm, Patient Position: Chair, Cuff Size: Adult Regular)  Pulse 117  Temp 99.2  F (37.3  C) (Oral)  Wt 68.8 kg (151 lb 9.6 oz)  SpO2 98%  BMI 26.69 kg/m2  General: NAD; H&N: no mucosal lesions; Lungs clear; Heart RRR; Abdomen; Soft, No organomegaly; Extremities: No edema;  Skin: No rash; Neuro: Nonfocal; Mood/Affect: appropriate    A&P:   1.  Hodgkin lymphoma: Day +5 of auto HCT.  Start G-CSF daily  2. Low-grade fever and neutropenia: Cultures stay negative, continue today and tomorrow with ceftriaxone and vancomycin.  Continue acyclovir and fluconazole. Levaquin once off rocephin.

## 2018-08-10 NOTE — NURSING NOTE
Chief Complaint   Patient presents with     Infusion     pt here for infusion of IVF, Rocephin, and Vancomycin s/p bmt for NHL

## 2018-08-10 NOTE — NURSING NOTE
"Oncology Rooming Note    August 10, 2018 12:47 PM   Amira Avery is a 28 year old female who presents for:    Chief Complaint   Patient presents with     Blood Draw     labs drawn via cvc by RN     RECHECK     pt here for recheck with provider s/p bmt for NHL     Initial Vitals: /75 (BP Location: Left arm, Patient Position: Chair, Cuff Size: Adult Regular)  Pulse 117  Temp 100.2  F (37.9  C) (Oral)  Wt 68.9 kg (152 lb)  SpO2 98%  BMI 26.76 kg/m2 Estimated body mass index is 26.76 kg/(m^2) as calculated from the following:    Height as of 7/31/18: 1.605 m (5' 3.19\").    Weight as of this encounter: 68.9 kg (152 lb). Body surface area is 1.75 meters squared.  Moderate Pain (5) Comment: Data Unavailable   No LMP recorded.  Allergies reviewed: Yes  Medications reviewed: Yes    Medications:Medication refills discussed with provider  Pharmacy name entered into [employer]: [facility] [address] [place] [address] [address] AT [facility] OF [address]    Clinical concerns: Patient had a fever of 100.2 and provider was notified, as she is not feeling well today.     0 minutes for nursing intake (face to face time)     TRAVIS WILL RN              "

## 2018-08-11 ENCOUNTER — INFUSION THERAPY VISIT (OUTPATIENT)
Dept: TRANSPLANT | Facility: CLINIC | Age: 28
End: 2018-08-11
Attending: INTERNAL MEDICINE
Payer: COMMERCIAL

## 2018-08-11 ENCOUNTER — APPOINTMENT (OUTPATIENT)
Dept: LAB | Facility: CLINIC | Age: 28
End: 2018-08-11
Attending: INTERNAL MEDICINE
Payer: COMMERCIAL

## 2018-08-11 VITALS
HEART RATE: 117 BPM | TEMPERATURE: 99.2 F | SYSTOLIC BLOOD PRESSURE: 112 MMHG | DIASTOLIC BLOOD PRESSURE: 66 MMHG | OXYGEN SATURATION: 98 % | BODY MASS INDEX: 26.69 KG/M2 | WEIGHT: 151.6 LBS

## 2018-08-11 DIAGNOSIS — C81.10 NODULAR SCLEROSING HODGKIN'S LYMPHOMA, UNSPECIFIED BODY REGION (H): ICD-10-CM

## 2018-08-11 DIAGNOSIS — Z52.001 STEM CELL DONOR: ICD-10-CM

## 2018-08-11 DIAGNOSIS — C81.95 HODGKIN LYMPHOMA, UNSPECIFIED, LYMPH NODES OF INGUINAL REGION AND LOWER LIMB (H): Primary | ICD-10-CM

## 2018-08-11 DIAGNOSIS — C81.10 NODULAR SCLEROSING HODGKIN'S LYMPHOMA, UNSPECIFIED BODY REGION (H): Primary | ICD-10-CM

## 2018-08-11 DIAGNOSIS — Z41.8 ENCOUNTER FOR OTHER PROCEDURES FOR PURPOSES OTHER THAN REMEDYING HEALTH STATE (CODE): ICD-10-CM

## 2018-08-11 DIAGNOSIS — Z52.001 ENCOUNTER FOR HARVESTING OF PERIPHERAL BLOOD STEM CELLS: ICD-10-CM

## 2018-08-11 LAB
ANION GAP SERPL CALCULATED.3IONS-SCNC: 8 MMOL/L (ref 3–14)
BUN SERPL-MCNC: 12 MG/DL (ref 7–30)
CALCIUM SERPL-MCNC: 8 MG/DL (ref 8.5–10.1)
CHLORIDE SERPL-SCNC: 105 MMOL/L (ref 94–109)
CO2 SERPL-SCNC: 24 MMOL/L (ref 20–32)
CREAT SERPL-MCNC: 0.61 MG/DL (ref 0.52–1.04)
DIFFERENTIAL METHOD BLD: ABNORMAL
ERYTHROCYTE [DISTWIDTH] IN BLOOD BY AUTOMATED COUNT: 12.7 % (ref 10–15)
GFR SERPL CREATININE-BSD FRML MDRD: >90 ML/MIN/1.7M2
GLUCOSE SERPL-MCNC: 124 MG/DL (ref 70–99)
HCT VFR BLD AUTO: 30.2 % (ref 35–47)
HGB BLD-MCNC: 10.2 G/DL (ref 11.7–15.7)
MAGNESIUM SERPL-MCNC: 2.1 MG/DL (ref 1.6–2.3)
MCH RBC QN AUTO: 29.6 PG (ref 26.5–33)
MCHC RBC AUTO-ENTMCNC: 33.8 G/DL (ref 31.5–36.5)
MCV RBC AUTO: 88 FL (ref 78–100)
PLATELET # BLD AUTO: 22 10E9/L (ref 150–450)
POTASSIUM SERPL-SCNC: 3.9 MMOL/L (ref 3.4–5.3)
RBC # BLD AUTO: 3.45 10E12/L (ref 3.8–5.2)
SODIUM SERPL-SCNC: 137 MMOL/L (ref 133–144)
WBC # BLD AUTO: 0.1 10E9/L (ref 4–11)

## 2018-08-11 PROCEDURE — 25000128 H RX IP 250 OP 636: Mod: ZF | Performed by: NURSE PRACTITIONER

## 2018-08-11 PROCEDURE — 96372 THER/PROPH/DIAG INJ SC/IM: CPT | Mod: XS

## 2018-08-11 PROCEDURE — 27210995 ZZH RX 272: Mod: ZF | Performed by: NURSE PRACTITIONER

## 2018-08-11 PROCEDURE — 25000128 H RX IP 250 OP 636: Mod: ZF | Performed by: INTERNAL MEDICINE

## 2018-08-11 PROCEDURE — 96365 THER/PROPH/DIAG IV INF INIT: CPT

## 2018-08-11 PROCEDURE — 80048 BASIC METABOLIC PNL TOTAL CA: CPT | Performed by: NURSE PRACTITIONER

## 2018-08-11 PROCEDURE — 85027 COMPLETE CBC AUTOMATED: CPT

## 2018-08-11 PROCEDURE — 83735 ASSAY OF MAGNESIUM: CPT | Performed by: NURSE PRACTITIONER

## 2018-08-11 PROCEDURE — 96375 TX/PRO/DX INJ NEW DRUG ADDON: CPT

## 2018-08-11 RX ORDER — HEPARIN SODIUM,PORCINE 10 UNIT/ML
5 VIAL (ML) INTRAVENOUS ONCE
Status: COMPLETED | OUTPATIENT
Start: 2018-08-11 | End: 2018-08-11

## 2018-08-11 RX ORDER — PROCHLORPERAZINE MALEATE 5 MG
5 TABLET ORAL EVERY 6 HOURS PRN
Qty: 30 TABLET | Refills: 0 | Status: ON HOLD | OUTPATIENT
Start: 2018-08-11 | End: 2018-08-17

## 2018-08-11 RX ORDER — DIPHENHYDRAMINE HYDROCHLORIDE 50 MG/ML
25 INJECTION INTRAMUSCULAR; INTRAVENOUS DAILY
Status: DISCONTINUED | OUTPATIENT
Start: 2018-08-11 | End: 2018-08-11 | Stop reason: HOSPADM

## 2018-08-11 RX ORDER — HEPARIN SODIUM (PORCINE) LOCK FLUSH IV SOLN 100 UNIT/ML 100 UNIT/ML
5 SOLUTION INTRAVENOUS DAILY PRN
Status: CANCELLED
Start: 2018-08-11

## 2018-08-11 RX ORDER — DIPHENHYDRAMINE HYDROCHLORIDE 50 MG/ML
25 INJECTION INTRAMUSCULAR; INTRAVENOUS DAILY
Status: CANCELLED
Start: 2018-08-11

## 2018-08-11 RX ORDER — CEFTRIAXONE 2 G/1
2 INJECTION, POWDER, FOR SOLUTION INTRAMUSCULAR; INTRAVENOUS EVERY 24 HOURS
Status: CANCELLED
Start: 2018-08-11

## 2018-08-11 RX ORDER — CEFTRIAXONE SODIUM 2 G
2 VIAL (EA) INJECTION EVERY 24 HOURS
Status: DISCONTINUED | OUTPATIENT
Start: 2018-08-11 | End: 2018-08-11 | Stop reason: HOSPADM

## 2018-08-11 RX ADMIN — SODIUM CHLORIDE, PRESERVATIVE FREE 5 ML: 5 INJECTION INTRAVENOUS at 08:24

## 2018-08-11 RX ADMIN — FILGRASTIM 480 MCG: 480 INJECTION, SOLUTION INTRAVENOUS; SUBCUTANEOUS at 09:05

## 2018-08-11 RX ADMIN — SODIUM CHLORIDE, PRESERVATIVE FREE 5 ML: 5 INJECTION INTRAVENOUS at 08:23

## 2018-08-11 RX ADMIN — WATER 2 G: 1 INJECTION INTRAMUSCULAR; INTRAVENOUS; SUBCUTANEOUS at 09:03

## 2018-08-11 RX ADMIN — DIPHENHYDRAMINE HYDROCHLORIDE 25 MG: 50 INJECTION, SOLUTION INTRAMUSCULAR; INTRAVENOUS at 09:03

## 2018-08-11 RX ADMIN — SODIUM CHLORIDE 1000 ML: 9 INJECTION, SOLUTION INTRAVENOUS at 08:34

## 2018-08-11 RX ADMIN — VANCOMYCIN HYDROCHLORIDE 1000 MG: 1 INJECTION, POWDER, LYOPHILIZED, FOR SOLUTION INTRAVENOUS at 09:05

## 2018-08-11 ASSESSMENT — PAIN SCALES - GENERAL: PAINLEVEL: SEVERE PAIN (7)

## 2018-08-11 NOTE — MR AVS SNAPSHOT
After Visit Summary   8/11/2018    Amira Avery    MRN: [id number]           Patient Information     Date Of Birth          1990        Visit Information        Provider Department      8/11/2018 9:00 AM UC BMT DOM [facility] Blood and Marrow Transplant        Today's Diagnoses     Nodular sclerosing Hodgkin's lymphoma, unspecified body region (H)    -  1          [facility] ([facility])  [address] [place] [address]  Phone: [phone]  Clinic Hours:   Monday-Thursday:7am to 7pm   Friday: 7am to 5pm   Weekends and holidays:    8am to noon (in general)  If your fever is 100.5  or greater,   call the clinic.  After hours call the   hospital at [phone] or   [phone]. Ask for the BMT   fellow on-call            Follow-ups after your visit        Your next 10 appointments already scheduled     Aug 12, 2018  8:30 AM CDT   [facility] Lab Draw with UC MASONIC LAB DRAW   [facility] Lab Draw ([facility])    [address] [place] [address]  [address]  [facility] [address]   [phone]            Aug 12, 2018  9:00 AM CDT   Return with UC BMT DOM   [facility] Blood and Marrow Transplant ([facility])    [address] [place] [address]  [address]  [facility] [address]   [phone]            Aug 12, 2018  9:30 AM CDT   Infusion 120 with UC BMT INFUSION, UC 1 ATC   [facility] Blood and Marrow Transplant ([facility])    [address] [place] [address]  [address]  [facility] [address]   [phone]            Aug 13, 2018  1:15 PM CDT   M[facility]ic Lab Draw with UC MASONIC LAB DRAW   [facility] Lab Draw ([facility])    [address] [place] [address]  [address]  [facility] [address]   [phone]            Aug 13, 2018  2:00 PM CDT   Return with UC BMT WES #2   [facility] Blood and Marrow Transplant ([facility])    [address] [place] [address]  [address] [place] [address]  [address]   [phone]            Aug 13, 2018  2:30 PM CDT   Infusion 120 with UC BMT INFUSION, UC 3 ATC   [facility] Blood and Marrow Transplant ([facility])    [address]  [address]  [facility] [address]   [phone]            Sep 05, 2018  1:20 PM CDT   CT SOFT TISSUE NECK W CONTRAST with UCCT1   [facility] CT ([facility])    [address]  1st Floor  [facility] [address] [phone]           Please bring any scans or X-rays taken at other hospitals, if similar tests were done. Also bring a list of your medicines, including vitamins, minerals and over-the-counter drugs. It is safest to leave personal items at home.  Be sure to tell your doctor:   If you have any allergies.   If there s any chance you are pregnant.   If you are breastfeeding.    If you have diabetes as your medication may need to be adjusted for this exam.  You will have contrast for this exam. To prepare:   Do not eat or drink for 2 hours before your exam. If you need to take medicine, you may take it with small sips of water. (We may ask you to take liquid medicine as well.)   The day before your exam, drink extra fluids at least six 8-ounce glasses (unless your doctor tells you to restrict your fluids).  Patients over 70 or patients with diabetes or kidney problems:   If you haven t had a blood test (creatinine test) within the last 30 days, the Cardiologist/Radiologist may require you to get this test prior to your exam.  Please wear loose clothing, such as a sweat suit or jogging clothes. Avoid snaps, zippers and other metal. We may ask you to undress and put on a hospital gown.  If you have any questions, please call the Imaging Department where you will have your exam.            Sep 05, 2018  1:40 PM CDT   CT CHEST/ABDOMEN/PELVIS W CONTRAST with UCCT1   [facility] CT ([facility])    [address] [place] [address]  Se  1st Floor  [facility] [address] [phone]           Please bring any scans or X-rays taken at other hospitals, if similar tests were done. Also bring a list of your medicines, including vitamins, minerals and over-the-counter drugs. It is safest to leave personal items at home.  Be sure to tell your doctor:   If you have any allergies.   If there s any chance you are pregnant.   If you are breastfeeding.  How to prepare:   Do not eat or drink for 2 hours before your exam. If you need to take medicine, you may take it with small sips of water. (We may ask you to take liquid medicine as well.)   Please wear loose clothing, such as a sweat suit or jogging clothes. Avoid snaps, zippers and other metal. We may ask you to undress and put on a hospital gown.  Please arrive 30 minutes early for your CT. Once in the department you might be asked to drink water 15-20 minutes prior to your exam.  If indicated you may be asked to drink an oral contrast in advance of your CT.  If this is the case, the imaging team will let you know or be in contact with you prior to your appointment  Patients over 70 or patients with diabetes or kidney problems:   If you haven t had a blood test (creatinine test) within the last 30 days, the Cardiologist/Radiologist may require you to get this test prior to your exam.  If you have diabetes:   Continue to take your metformin medication on the day of your exam  If you have any questions, please call the Imaging Department where you will have your exam.              Future tests that were ordered for you today     Open Standing Orders        Priority Remaining Interval Expires Ordered    Platelets prepare order unit Routine 99/100 CONDITIONAL (SPECIFY) BLOOD  8/11/2018          Open Future Orders        Priority Expected Expires Ordered    CBC with platelets differential Routine 8/12/2018 8/11/2019 8/11/2018    Basic metabolic panel Routine 8/12/2018 8/11/2019 8/11/2018    Magnesium  Routine 8/12/2018 8/11/2019 8/11/2018            Who to contact     If you have questions or need follow up information about today's clinic visit or your schedule please contact [facility] directly at [phone].  Normal or non-critical lab and imaging results will be communicated to you by Quantitative Medicinehart, letter or phone within 4 business days after the clinic has received the results. If you do not hear from us within 7 days, please contact the clinic through Social[employer]endert or phone. If you have a critical or abnormal lab result, we will notify you by phone as soon as possible.  Submit refill requests through [employer] or call your pharmacy and they will forward the refill request to us. Please allow 3 business days for your refill to be completed.          Additional Information About Your Visit        Quantitative [employer]harLinux Voice Information     [employer] gives you secure access to your electronic health record. If you see a primary care provider, you can also send messages to your care team and make appointments. If you have questions, please call your primary care clinic.  If you do not have a primary care provider, please call [phone] and they will assist you.        Care EveryWhere ID     This is your Care EveryWhere ID. This could be used by other organizations to access your [facility] medical records  [id number]        Your Vitals Were     Pulse Temperature Pulse Oximetry BMI (Body Mass Index)          117 99.2  F (37.3  C) (Oral) 98% 26.69 kg/m2         Blood Pressure from Last 3 Encounters:   08/11/18 112/66   08/10/18 98/61   08/10/18 109/75    Weight from Last 3 Encounters:   08/11/18 68.8 kg (151 lb 9.6 oz)   08/10/18 68.9 kg (152 lb)   08/09/18 69.8 kg (153 lb 14.4 oz)                 Today's Medication Changes          These changes are accurate as of 8/11/18 10:54 AM.  If you have any questions, ask your nurse or doctor.               These medicines have changed or have updated prescriptions.         Dose/Directions    * prochlorperazine 5 MG tablet   Commonly known as:  COMPAZINE   This may have changed:  Another medication with the same name was added. Make sure you understand how and when to take each.   Used for:  Nodular sclerosing Hodgkin's lymphoma, unspecified body region (H)        Dose:  5 mg   Take 1 tablet (5 mg) by mouth every 6 hours   Quantity:  30 tablet   Refills:  0       * prochlorperazine 5 MG tablet   Commonly known as:  COMPAZINE   This may have changed:  You were already taking a medication with the same name, and this prescription was added. Make sure you understand how and when to take each.   Used for:  Nodular sclerosing Hodgkin's lymphoma, unspecified body region (H)        Dose:  5 mg   Take 1 tablet (5 mg) by mouth every 6 hours as needed for nausea or vomiting   Quantity:  30 tablet   Refills:  0       * Notice:  This list has 2 medication(s) that are the same as other medications prescribed for you. Read the directions carefully, and ask your doctor or other care provider to review them with you.         Where to get your medicines      [facility] [address] [place], [place] [address] [address] [facility] OF  [address]  [address] [facility] [address]     Phone:  [phone]     prochlorperazine 5 MG tablet                Recent Review Flowsheet Data     BMT Recent Results Latest Ref Rng & Units 8/5/2018 8/6/2018 8/7/2018 8/8/2018 8/9/2018 8/10/2018 8/11/2018    WBC 4.0 - 11.0 10e9/L 4.6 3.7(L) 4.9 1.8(L) 0.2(LL) 0.1(LL) 0.1(LL)    Hemoglobin 11.7 - 15.7 g/dL 11.1(L) 10.5(L) 10.3(L) 10.5(L) 10.5(L) 10.2(L) 10.2(L)    Platelet Count 150 - 450 10e9/L 197 175 115(L) 107(L) 69(L) 43(LL) 22(LL)    Neutrophils (Absolute) 1.6 - 8.3 10e9/L 4.3 3.4 4.6 1.3(L) - - -    INR 0.86 - 1.14 - 1.32(H) - - - - -    Sodium 133 - 144 mmol/L 139 138 137 135 134 134 137    Potassium 3.4 - 5.3 mmol/L 3.6 3.9 3.9 3.3(L) 3.8 3.8 3.9    Chloride 94 -  109 mmol/L 107 105 105 103 102 102 105    Glucose 70 - 99 mg/dL 103(H) 106(H) 106(H) 108(H) 111(H) 124(H) 124(H)    Urea Nitrogen 7 - 30 mg/dL 19 19 15 20 15 12 12    Creatinine 0.52 - 1.04 mg/dL 0.58 0.62 0.57 0.66 0.61 0.57 0.61    Calcium (Total) 8.5 - 10.1 mg/dL 8.2(L) 8.2(L) 8.2(L) 8.0(L) 7.9(L) 8.1(L) 8.0(L)    Protein (Total) 6.8 - 8.8 g/dL - 6.1(L) 6.1(L) - - - -    Albumin 3.4 - 5.0 g/dL - 3.3(L) 3.5 - - - -    Bilirubin (Direct) 0.0 - 0.2 mg/dL - 0.1 - - - - -    Alkaline Phosphatase 40 - 150 U/L - 52 50 - - - -    AST 0 - 45 U/L - 8 20 - - - -    ALT 0 - 50 U/L - 28 30 - - - -    MCV 78 - 100 fl 90 88 88 90 89 88 88               Primary Care Provider Office Phone # Fax #    [facility] [phone] [address] [facility] [address]        Equal Access to Services     RICHIE RAMEY AH: Hadii velma ku azebo Janelle, waaxda luqadaha, qaybta kaalmada mariluz, jen raymundo. So [facility] [phone].    ATENCIÓN: Si habla español, tiene a isabel disposición servicios gratuitos de asistencia lingüística. Roberto kaye [phone].    We comply with applicable federal civil rights laws and [place] laws. We do not discriminate on the basis of race, color, national origin, age, disability, sex, sexual orientation, or gender identity.            Thank you!     Thank you for choosing [facility]  for your care. Our goal is always to provide you with excellent care. Hearing back from our patients is one way we can continue to improve our services. Please take a few minutes to complete the written survey that you may receive in the mail after your visit with us. Thank you!             Your Updated Medication List - Protect others around you: Learn how to safely use, store and throw away your medicines at www.disposemymeds.org.          This list is accurate as of 8/11/18 10:54 AM.  Always use your most recent med list.                    Brand Name Dispense Instructions for use Diagnosis    acyclovir 800 MG tablet    ZOVIRAX    150 tablet    Take 1 tablet (800 mg) by mouth 5 times daily    Hodgkin lymphoma, unspecified, lymph nodes of inguinal region and lower limb (H)       fluconazole 200 MG tablet    DIFLUCAN    30 tablet    Take 1 tablet (200 mg) by mouth daily    Hodgkin lymphoma, unspecified, lymph nodes of inguinal region and lower limb (H)       levofloxacin 250 MG tablet    LEVAQUIN    30 tablet    Take 1 tablet (250 mg) by mouth daily    Hodgkin lymphoma, unspecified, lymph nodes of inguinal region and lower limb (H)       LORazepam 0.5 MG tablet    ATIVAN    30 tablet    Take 1-2 tablets (0.5-1 mg) by mouth every 4 hours as needed for anxiety (nausea/vomiting/sleep)    Nodular sclerosing Hodgkin's lymphoma, unspecified body region (H)       ondansetron 8 MG ODT tab    ZOFRAN-ODT    30 tablet    Take 1 tablet (8 mg) by mouth every 8 hours as needed for nausea    Nodular sclerosing Hodgkin's lymphoma, unspecified body region (H)       pantoprazole 40 MG EC tablet    PROTONIX    30 tablet    Take 1 tablet (40 mg) by mouth daily    Nodular sclerosing Hodgkin's lymphoma, unspecified body region (H)       * prochlorperazine 5 MG tablet    COMPAZINE    30 tablet    Take 1 tablet (5 mg) by mouth every 6 hours    Nodular sclerosing Hodgkin's lymphoma, unspecified body region (H)       * prochlorperazine 5 MG tablet    COMPAZINE    30 tablet    Take 1 tablet (5 mg) by mouth every 6 hours as needed for nausea or vomiting    Nodular sclerosing Hodgkin's lymphoma, unspecified body region (H)       sennosides 8.6 MG tablet    SENOKOT    60 each    Take 2 tablets by mouth 2 times daily as needed for constipation    Nodular sclerosing Hodgkin's lymphoma, unspecified body region (H)       sulfamethoxazole-trimethoprim 800-160 MG per tablet   Start taking on:  9/3/2018    BACTRIM DS/SEPTRA DS    34 tablet    Take 1 tablet by mouth Every Mon,  Tues two times daily    Hodgkin lymphoma, unspecified, lymph nodes of inguinal region and lower limb (H)       * Notice:  This list has 2 medication(s) that are the same as other medications prescribed for you. Read the directions carefully, and ask your doctor or other care provider to review them with you.

## 2018-08-11 NOTE — MR AVS SNAPSHOT
After Visit Summary   8/11/2018    Amira Avery    MRN: [id number]           Patient Information     Date Of Birth          1990        Visit Information        Provider Department      8/11/2018 9:30 AM UC 1 ATC; UC BMT INFUSION [facility] Blood and Marrow Transplant        Today's Diagnoses     Hodgkin lymphoma, unspecified, lymph nodes of inguinal region and lower limb (H)    -  1    Encounter for harvesting of peripheral blood stem cells        Stem cell donor        Encounter for other procedures for purposes other than remedying health state (CODE)        Nodular sclerosing Hodgkin's lymphoma, unspecified body region (H)              [facility] ([facility])  [address] [place] [address]  Phone: [phone]  Clinic Hours:   Monday-Thursday:7am to 7pm   Friday: 7am to 5pm   Weekends and holidays:    8am to noon (in general)  If your fever is 100.5  or greater,   call the clinic.  After hours call the   hospital at [phone] or   [phone]. Ask for the BMT   fellow on-call            Follow-ups after your visit        Your next 10 appointments already scheduled     Aug 12, 2018  8:30 AM CDT   Masonic Lab Draw with UC MASONIC LAB DRAW   [facility] Masonic Lab Draw ([facility])    [address] [place] [address]  [address]  [facility] [id number] [phone]            Aug 12, 2018  9:00 AM CDT   Return with UC BMT DOM   [facility] Blood and Marrow Transplant ([facility])    [address] [place] [address]  [address]  [facility] [id number] [phone]            Aug 12, 2018  9:30 AM CDT   Infusion 120 with UC BMT INFUSION, UC 1 ATC   [facility] Blood and Marrow Transplant ([facility])    [address] [place] [address]  [address]  [facility] [address] [phone]            Aug 13, 2018  1:15 PM CDT   Masonic Lab Draw with UC MASONIC LAB DRAW   [facility] Masonic Lab Draw ([facility])    [address]  [address]  [facility] [address]   [phone]            Aug 13, 2018  2:00 PM CDT   Return with UC BMT WES #2   [facility] Blood and Marrow Transplant ([facility])    [address]  [address]  [facility] [address]   [phone]            Aug 13, 2018  2:30 PM CDT   Infusion 120 with UC BMT INFUSION, UC 3 ATC   [facility] Blood and Marrow Transplant ([facility])    [address]  [address]  [facility] [address]   [phone]            Sep 05, 2018  1:20 PM CDT   CT SOFT TISSUE NECK W CONTRAST with UCCT1   [facility] CT ([facility])    [address]  1st Floor  [facility] [address]   [phone]           Please bring any scans or X-rays taken at other hospitals, if similar tests were done. Also bring a list of your medicines, including vitamins, minerals and over-the-counter drugs. It is safest to leave personal items at home.  Be sure to tell your doctor:   If you have any allergies.   If there s any chance you are pregnant.   If you are breastfeeding.    If you have diabetes as your medication may need to be adjusted for this exam.  You will have contrast for this exam. To prepare:   Do not eat or drink for 2 hours before your exam. If you need to take medicine, you may take it with small sips of water. (We may ask you to take liquid medicine as well.)   The day before your exam, drink extra fluids at least six 8-ounce glasses (unless your doctor tells you to restrict your fluids).  Patients over 70 or patients with diabetes or kidney problems:   If you haven t had a blood test (creatinine test) within the last 30 days, the Cardiologist/Radiologist may require you to get this test prior to your exam.  Please wear loose clothing, such as a sweat suit or jogging clothes. Avoid snaps, zippers and other metal. We may ask you to undress and put on a hospital  gown.  If you have any questions, please call the Imaging Department where you will have your exam.            Sep 05, 2018  1:40 PM CDT   CT CHEST/ABDOMEN/PELVIS W CONTRAST with UCCT1   [facility] CT ([facility])    [address]  [address]st [facility] [id number] [phone]           Please bring any scans or X-rays taken at other hospitals, if similar tests were done. Also bring a list of your medicines, including vitamins, minerals and over-the-counter drugs. It is safest to leave personal items at home.  Be sure to tell your doctor:   If you have any allergies.   If there s any chance you are pregnant.   If you are breastfeeding.  How to prepare:   Do not eat or drink for 2 hours before your exam. If you need to take medicine, you may take it with small sips of water. (We may ask you to take liquid medicine as well.)   Please wear loose clothing, such as a sweat suit or jogging clothes. Avoid snaps, zippers and other metal. We may ask you to undress and put on a hospital gown.  Please arrive 30 minutes early for your CT. Once in the department you might be asked to drink water 15-20 minutes prior to your exam.  If indicated you may be asked to drink an oral contrast in advance of your CT.  If this is the case, the imaging team will let you know or be in contact with you prior to your appointment  Patients over 70 or patients with diabetes or kidney problems:   If you haven t had a blood test (creatinine test) within the last 30 days, the Cardiologist/Radiologist may require you to get this test prior to your exam.  If you have diabetes:   Continue to take your metformin medication on the day of your exam  If you have any questions, please call the Imaging Department where you will have your exam.              Future tests that were ordered for you today     Open Future Orders        Priority Expected Expires Ordered    CBC with platelets differential Routine  8/12/2018 8/11/2019 8/11/2018    Basic metabolic panel Routine 8/12/2018 8/11/2019 8/11/2018    Magnesium Routine 8/12/2018 8/11/2019 8/11/2018            Who to contact     If you have questions or need follow up information about today's clinic visit or your schedule please contact [facility] directly at [phone].  Normal or non-critical lab and imaging results will be communicated to you by [employer], letter or phone within 4 business days after the clinic has received the results. If you do not hear from us within 7 days, please contact the clinic through [employer] or phone. If you have a critical or abnormal lab result, we will notify you by phone as soon as possible.  Submit refill requests through [employer] or call your pharmacy and they will forward the refill request to us. Please allow 3 business days for your refill to be completed.          Additional Information About Your Visit        [employer] Information     [employer] gives you secure access to your electronic health record. If you see a primary care provider, you can also send messages to your care team and make appointments. If you have questions, please call your primary care clinic.  If you do not have a primary care provider, please call [phone] and they will assist you.        Care EveryWhere ID     This is your Care EveryWhere ID. This could be used by other organizations to access your [facility] medical records  [id number]         Blood Pressure from Last 3 Encounters:   08/11/18 112/66   08/10/18 98/61   08/10/18 109/75    Weight from Last 3 Encounters:   08/11/18 68.8 kg (151 lb 9.6 oz)   08/10/18 68.9 kg (152 lb)   08/09/18 69.8 kg (153 lb 14.4 oz)              We Performed the Following     Basic metabolic panel     CBC with platelets differential     Magnesium          Today's Medication Changes          These changes are accurate as of 8/11/18 10:14 AM.  If you have any questions, ask your nurse or doctor.                These medicines have changed or have updated prescriptions.        Dose/Directions    * prochlorperazine 5 MG tablet   Commonly known as:  COMPAZINE   This may have changed:  Another medication with the same name was added. Make sure you understand how and when to take each.   Used for:  Nodular sclerosing Hodgkin's lymphoma, unspecified body region (H)        Dose:  5 mg   Take 1 tablet (5 mg) by mouth every 6 hours   Quantity:  30 tablet   Refills:  0       * prochlorperazine 5 MG tablet   Commonly known as:  COMPAZINE   This may have changed:  You were already taking a medication with the same name, and this prescription was added. Make sure you understand how and when to take each.   Used for:  Nodular sclerosing Hodgkin's lymphoma, unspecified body region (H)        Dose:  5 mg   Take 1 tablet (5 mg) by mouth every 6 hours as needed for nausea or vomiting   Quantity:  30 tablet   Refills:  0       * Notice:  This list has 2 medication(s) that are the same as other medications prescribed for you. Read the directions carefully, and ask your doctor or other care provider to review them with you.         Where to get your medicines      These medications were sent to [facility] [address] [place] [address] [facility] [address] [facility] OF  [address]  [address] [facility] [address]     Phone:  [phone]     prochlorperazine 5 MG tablet                Recent Review Flowsheet Data     BMT Recent Results Latest Ref Rng & Units 8/5/2018 8/6/2018 8/7/2018 8/8/2018 8/9/2018 8/10/2018 8/11/2018    WBC 4.0 - 11.0 10e9/L 4.6 3.7(L) 4.9 1.8(L) 0.2(LL) 0.1(LL) 0.1(LL)    Hemoglobin 11.7 - 15.7 g/dL 11.1(L) 10.5(L) 10.3(L) 10.5(L) 10.5(L) 10.2(L) 10.2(L)    Platelet Count 150 - 450 10e9/L 197 175 115(L) 107(L) 69(L) 43(LL) 22(LL)    Neutrophils (Absolute) 1.6 - 8.3 10e9/L 4.3 3.4 4.6 1.3(L) - - -    INR 0.86 - 1.14 - 1.32(H) - - - - -    Sodium 133 - 144 mmol/L 139 138 137 135 134 134 137     Potassium 3.4 - 5.3 mmol/L 3.6 3.9 3.9 3.3(L) 3.8 3.8 3.9    Chloride 94 - 109 mmol/L 107 105 105 103 102 102 105    Glucose 70 - 99 mg/dL 103(H) 106(H) 106(H) 108(H) 111(H) 124(H) 124(H)    Urea Nitrogen 7 - 30 mg/dL 19 19 15 20 15 12 12    Creatinine 0.52 - 1.04 mg/dL 0.58 0.62 0.57 0.66 0.61 0.57 0.61    Calcium (Total) 8.5 - 10.1 mg/dL 8.2(L) 8.2(L) 8.2(L) 8.0(L) 7.9(L) 8.1(L) 8.0(L)    Protein (Total) 6.8 - 8.8 g/dL - 6.1(L) 6.1(L) - - - -    Albumin 3.4 - 5.0 g/dL - 3.3(L) 3.5 - - - -    Bilirubin (Direct) 0.0 - 0.2 mg/dL - 0.1 - - - - -    Alkaline Phosphatase 40 - 150 U/L - 52 50 - - - -    AST 0 - 45 U/L - 8 20 - - - -    ALT 0 - 50 U/L - 28 30 - - - -    MCV 78 - 100 fl 90 88 88 90 89 88 88               Primary Care Provider Office Phone # Fax #    [facility] [phone] [address] [facility] [address]        Equal Access to Services     RICHIE RAMEY AH: Hadii aad ku hadasho Soomaali, waaxda luqadaha, qaybta kaalmada adetima, waxay idiodilia raymundo. So [facility] [phone].    ATENCIÓN: Si habla español, tiene a isabel disposición servicios gratuitos de asistencia lingüística. Llame al [phone].    We comply with applicable federal civil rights laws and [place] laws. We do not discriminate on the basis of race, color, national origin, age, disability, sex, sexual orientation, or gender identity.            Thank you!     Thank you for choosing [facility]  for your care. Our goal is always to provide you with excellent care. Hearing back from our patients is one way we can continue to improve our services. Please take a few minutes to complete the written survey that you may receive in the mail after your visit with us. Thank you!             Your Updated Medication List - Protect others around you: Learn how to safely use, store and throw away your medicines at www.disposemymeds.org.          This list  is accurate as of 8/11/18 10:14 AM.  Always use your most recent med list.                   Brand Name Dispense Instructions for use Diagnosis    acyclovir 800 MG tablet    ZOVIRAX    150 tablet    Take 1 tablet (800 mg) by mouth 5 times daily    Hodgkin lymphoma, unspecified, lymph nodes of inguinal region and lower limb (H)       fluconazole 200 MG tablet    DIFLUCAN    30 tablet    Take 1 tablet (200 mg) by mouth daily    Hodgkin lymphoma, unspecified, lymph nodes of inguinal region and lower limb (H)       levofloxacin 250 MG tablet    LEVAQUIN    30 tablet    Take 1 tablet (250 mg) by mouth daily    Hodgkin lymphoma, unspecified, lymph nodes of inguinal region and lower limb (H)       LORazepam 0.5 MG tablet    ATIVAN    30 tablet    Take 1-2 tablets (0.5-1 mg) by mouth every 4 hours as needed for anxiety (nausea/vomiting/sleep)    Nodular sclerosing Hodgkin's lymphoma, unspecified body region (H)       ondansetron 8 MG ODT tab    ZOFRAN-ODT    30 tablet    Take 1 tablet (8 mg) by mouth every 8 hours as needed for nausea    Nodular sclerosing Hodgkin's lymphoma, unspecified body region (H)       pantoprazole 40 MG EC tablet    PROTONIX    30 tablet    Take 1 tablet (40 mg) by mouth daily    Nodular sclerosing Hodgkin's lymphoma, unspecified body region (H)       * prochlorperazine 5 MG tablet    COMPAZINE    30 tablet    Take 1 tablet (5 mg) by mouth every 6 hours    Nodular sclerosing Hodgkin's lymphoma, unspecified body region (H)       * prochlorperazine 5 MG tablet    COMPAZINE    30 tablet    Take 1 tablet (5 mg) by mouth every 6 hours as needed for nausea or vomiting    Nodular sclerosing Hodgkin's lymphoma, unspecified body region (H)       sennosides 8.6 MG tablet    SENOKOT    60 each    Take 2 tablets by mouth 2 times daily as needed for constipation    Nodular sclerosing Hodgkin's lymphoma, unspecified body region (H)       sulfamethoxazole-trimethoprim 800-160 MG per tablet   Start taking on:   9/3/2018    BACTRIM DS/SEPTRA DS    34 tablet    Take 1 tablet by mouth Every Mon, Tues two times daily    Hodgkin lymphoma, unspecified, lymph nodes of inguinal region and lower limb (H)       * Notice:  This list has 2 medication(s) that are the same as other medications prescribed for you. Read the directions carefully, and ask your doctor or other care provider to review them with you.

## 2018-08-11 NOTE — NURSING NOTE
"Oncology Rooming Note    August 11, 2018 8:34 AM   Amira Avery is a 28 year old female who presents for:    Chief Complaint   Patient presents with     Blood Draw     Hodgkin lymphoma, nodular sclerosis (H); labs drawn via cvc by RN     Initial Vitals: /66 (BP Location: Left arm, Patient Position: Chair, Cuff Size: Adult Regular)  Pulse 117  Temp 99.2  F (37.3  C) (Oral)  Wt 68.8 kg (151 lb 9.6 oz)  SpO2 98%  BMI 26.69 kg/m2 Estimated body mass index is 26.69 kg/(m^2) as calculated from the following:    Height as of 7/31/18: 1.605 m (5' 3.19\").    Weight as of this encounter: 68.8 kg (151 lb 9.6 oz). Body surface area is 1.75 meters squared.  Severe Pain (7) Comment: Data Unavailable   No LMP recorded.  Allergies reviewed: Yes  Medications reviewed: Yes    Medications: Medication refills not needed today.  Pharmacy [facility] entered into [employer] [address] [place] [address] [address] AT [facility] OF [address]      5 minutes for nursing intake (face to face time)      Lines accessed by RN. Labs collected through brown lumen and sent. Both lines flushed with NS & Heparin. Pt tolerated well. Pt checked in for next appointment.    Reena Edwards RN                        "

## 2018-08-11 NOTE — PROGRESS NOTES
Infusion Nursing Note:  Amira Avery presents today for IV antibiotics.    Patient seen by provider today: Yes: Russ   present during visit today: Not Applicable.    Note: Patient received IV antibiotics.  1L NS over an hour, GCSF subcutaneous x 1.      Intravenous Access:  Wang.    Treatment Conditions:  Lab Results   Component Value Date    HGB 10.2 08/11/2018     Lab Results   Component Value Date    WBC 0.1 08/11/2018      Lab Results   Component Value Date    ANEU 1.3 08/08/2018     Lab Results   Component Value Date    PLT 22 08/11/2018      Lab Results   Component Value Date     08/11/2018                   Lab Results   Component Value Date    POTASSIUM 3.9 08/11/2018           Lab Results   Component Value Date    MAG 2.1 08/11/2018            Lab Results   Component Value Date    CR 0.61 08/11/2018                   Lab Results   Component Value Date    NEETU 8.0 08/11/2018                Lab Results   Component Value Date    BILITOTAL 0.7 08/07/2018           Lab Results   Component Value Date    ALBUMIN 3.5 08/07/2018                    Lab Results   Component Value Date    ALT 30 08/07/2018           Lab Results   Component Value Date    AST 20 08/07/2018       Results reviewed, labs MET treatment parameters, ok to proceed with treatment.      Post Infusion Assessment:  Patient tolerated infusion without incident.  Patient tolerated injection without incident.  Site patent and intact, free from redness, edema or discomfort.    Discharge Plan:   Discharge instructions reviewed with: Patient.  Patient discharged in stable condition accompanied by: wife.  Departure Mode: Ambulatory.    Katiana Reed RN

## 2018-08-12 ENCOUNTER — INFUSION THERAPY VISIT (OUTPATIENT)
Dept: TRANSPLANT | Facility: CLINIC | Age: 28
DRG: 809 | End: 2018-08-12
Attending: INTERNAL MEDICINE
Payer: COMMERCIAL

## 2018-08-12 ENCOUNTER — HOSPITAL ENCOUNTER (INPATIENT)
Facility: CLINIC | Age: 28
LOS: 5 days | Discharge: HOME | DRG: 809 | End: 2018-08-17
Attending: INTERNAL MEDICINE | Admitting: INTERNAL MEDICINE
Payer: COMMERCIAL

## 2018-08-12 VITALS
SYSTOLIC BLOOD PRESSURE: 106 MMHG | WEIGHT: 150.5 LBS | TEMPERATURE: 99.6 F | DIASTOLIC BLOOD PRESSURE: 73 MMHG | BODY MASS INDEX: 26.5 KG/M2 | OXYGEN SATURATION: 97 % | HEART RATE: 118 BPM

## 2018-08-12 VITALS
TEMPERATURE: 99.9 F | OXYGEN SATURATION: 99 % | SYSTOLIC BLOOD PRESSURE: 110 MMHG | DIASTOLIC BLOOD PRESSURE: 71 MMHG | HEART RATE: 101 BPM | RESPIRATION RATE: 20 BRPM

## 2018-08-12 DIAGNOSIS — Z52.001 STEM CELL DONOR: ICD-10-CM

## 2018-08-12 DIAGNOSIS — C81.95 HODGKIN LYMPHOMA, UNSPECIFIED, LYMPH NODES OF INGUINAL REGION AND LOWER LIMB (H): Primary | ICD-10-CM

## 2018-08-12 DIAGNOSIS — C81.10 NODULAR SCLEROSING HODGKIN'S LYMPHOMA, UNSPECIFIED BODY REGION (H): ICD-10-CM

## 2018-08-12 DIAGNOSIS — C81.95 HODGKIN LYMPHOMA, UNSPECIFIED, LYMPH NODES OF INGUINAL REGION AND LOWER LIMB (H): ICD-10-CM

## 2018-08-12 DIAGNOSIS — Z52.001 ENCOUNTER FOR HARVESTING OF PERIPHERAL BLOOD STEM CELLS: ICD-10-CM

## 2018-08-12 DIAGNOSIS — Z94.81 S/P AUTOLOGOUS BONE MARROW TRANSPLANTATION (H): Primary | ICD-10-CM

## 2018-08-12 DIAGNOSIS — Z41.8 ENCOUNTER FOR OTHER PROCEDURES FOR PURPOSES OTHER THAN REMEDYING HEALTH STATE (CODE): ICD-10-CM

## 2018-08-12 LAB
ALBUMIN UR-MCNC: 30 MG/DL
ANION GAP SERPL CALCULATED.3IONS-SCNC: 7 MMOL/L (ref 3–14)
APPEARANCE UR: CLEAR
APTT PPP: 43 SEC (ref 22–37)
BILIRUB UR QL STRIP: NEGATIVE
BLD PROD TYP BPU: NORMAL
BLD UNIT ID BPU: 0
BLOOD PRODUCT CODE: NORMAL
BPU ID: NORMAL
BUN SERPL-MCNC: 10 MG/DL (ref 7–30)
CALCIUM SERPL-MCNC: 7.9 MG/DL (ref 8.5–10.1)
CHLORIDE SERPL-SCNC: 105 MMOL/L (ref 94–109)
CO2 SERPL-SCNC: 26 MMOL/L (ref 20–32)
COLOR UR AUTO: YELLOW
CREAT SERPL-MCNC: 0.6 MG/DL (ref 0.52–1.04)
DIFFERENTIAL METHOD BLD: ABNORMAL
ERYTHROCYTE [DISTWIDTH] IN BLOOD BY AUTOMATED COUNT: 12.4 % (ref 10–15)
GFR SERPL CREATININE-BSD FRML MDRD: >90 ML/MIN/1.7M2
GLUCOSE BLDC GLUCOMTR-MCNC: 91 MG/DL (ref 70–99)
GLUCOSE SERPL-MCNC: 115 MG/DL (ref 70–99)
GLUCOSE UR STRIP-MCNC: NEGATIVE MG/DL
HCT VFR BLD AUTO: 27.6 % (ref 35–47)
HGB BLD-MCNC: 9.3 G/DL (ref 11.7–15.7)
HGB UR QL STRIP: NEGATIVE
INR PPP: 1.15 (ref 0.86–1.14)
KETONES UR STRIP-MCNC: 10 MG/DL
LACTATE BLD-SCNC: 0.5 MMOL/L (ref 0.7–2)
LACTATE BLD-SCNC: 0.5 MMOL/L (ref 0.7–2)
LEUKOCYTE ESTERASE UR QL STRIP: NEGATIVE
MAGNESIUM SERPL-MCNC: 2 MG/DL (ref 1.6–2.3)
MCH RBC QN AUTO: 29.4 PG (ref 26.5–33)
MCHC RBC AUTO-ENTMCNC: 33.7 G/DL (ref 31.5–36.5)
MCV RBC AUTO: 87 FL (ref 78–100)
MUCOUS THREADS #/AREA URNS LPF: PRESENT /LPF
NITRATE UR QL: NEGATIVE
PH UR STRIP: 6.5 PH (ref 5–7)
PLATELET # BLD AUTO: 7 10E9/L (ref 150–450)
POTASSIUM SERPL-SCNC: 3.7 MMOL/L (ref 3.4–5.3)
RBC # BLD AUTO: 3.16 10E12/L (ref 3.8–5.2)
RBC #/AREA URNS AUTO: 1 /HPF (ref 0–2)
SODIUM SERPL-SCNC: 138 MMOL/L (ref 133–144)
SOURCE: ABNORMAL
SP GR UR STRIP: 1.03 (ref 1–1.03)
TRANSFUSION STATUS PATIENT QL: NORMAL
TRANSFUSION STATUS PATIENT QL: NORMAL
UROBILINOGEN UR STRIP-MCNC: NORMAL MG/DL (ref 0–2)
WBC # BLD AUTO: 0.1 10E9/L (ref 4–11)
WBC #/AREA URNS AUTO: 5 /HPF (ref 0–5)

## 2018-08-12 PROCEDURE — 86850 RBC ANTIBODY SCREEN: CPT | Performed by: INTERNAL MEDICINE

## 2018-08-12 PROCEDURE — 87040 BLOOD CULTURE FOR BACTERIA: CPT | Performed by: INTERNAL MEDICINE

## 2018-08-12 PROCEDURE — 25000125 ZZHC RX 250: Performed by: INTERNAL MEDICINE

## 2018-08-12 PROCEDURE — 25000128 H RX IP 250 OP 636: Mod: ZF | Performed by: INTERNAL MEDICINE

## 2018-08-12 PROCEDURE — 80048 BASIC METABOLIC PNL TOTAL CA: CPT | Performed by: INTERNAL MEDICINE

## 2018-08-12 PROCEDURE — 20000002 ZZH R&B BMT INTERMEDIATE

## 2018-08-12 PROCEDURE — 3E0436Z INTRODUCTION OF NUTRITIONAL SUBSTANCE INTO CENTRAL VEIN, PERCUTANEOUS APPROACH: ICD-10-PCS | Performed by: INTERNAL MEDICINE

## 2018-08-12 PROCEDURE — 85610 PROTHROMBIN TIME: CPT | Performed by: INTERNAL MEDICINE

## 2018-08-12 PROCEDURE — 25000128 H RX IP 250 OP 636: Performed by: INTERNAL MEDICINE

## 2018-08-12 PROCEDURE — 27210995 ZZH RX 272: Mod: ZF | Performed by: NURSE PRACTITIONER

## 2018-08-12 PROCEDURE — 83605 ASSAY OF LACTIC ACID: CPT | Performed by: INTERNAL MEDICINE

## 2018-08-12 PROCEDURE — 84100 ASSAY OF PHOSPHORUS: CPT | Performed by: INTERNAL MEDICINE

## 2018-08-12 PROCEDURE — 86900 BLOOD TYPING SEROLOGIC ABO: CPT | Performed by: INTERNAL MEDICINE

## 2018-08-12 PROCEDURE — 83735 ASSAY OF MAGNESIUM: CPT | Performed by: INTERNAL MEDICINE

## 2018-08-12 PROCEDURE — P9073 PLATELETS PHERESIS PATH REDU: HCPCS | Performed by: NURSE PRACTITIONER

## 2018-08-12 PROCEDURE — 86923 COMPATIBILITY TEST ELECTRIC: CPT | Performed by: INTERNAL MEDICINE

## 2018-08-12 PROCEDURE — 86901 BLOOD TYPING SEROLOGIC RH(D): CPT | Performed by: INTERNAL MEDICINE

## 2018-08-12 PROCEDURE — 81001 URINALYSIS AUTO W/SCOPE: CPT | Performed by: INTERNAL MEDICINE

## 2018-08-12 PROCEDURE — 25000128 H RX IP 250 OP 636: Mod: ZF | Performed by: NURSE PRACTITIONER

## 2018-08-12 PROCEDURE — 85730 THROMBOPLASTIN TIME PARTIAL: CPT | Performed by: INTERNAL MEDICINE

## 2018-08-12 PROCEDURE — 85027 COMPLETE CBC AUTOMATED: CPT

## 2018-08-12 PROCEDURE — 87086 URINE CULTURE/COLONY COUNT: CPT | Performed by: INTERNAL MEDICINE

## 2018-08-12 PROCEDURE — 00000146 ZZHCL STATISTIC GLUCOSE BY METER IP

## 2018-08-12 RX ORDER — CEFTRIAXONE SODIUM 2 G
2 VIAL (EA) INJECTION EVERY 24 HOURS
Status: DISCONTINUED | OUTPATIENT
Start: 2018-08-12 | End: 2018-08-12 | Stop reason: HOSPADM

## 2018-08-12 RX ORDER — ACETAMINOPHEN 325 MG/1
650 TABLET ORAL EVERY 4 HOURS PRN
Status: DISCONTINUED | OUTPATIENT
Start: 2018-08-12 | End: 2018-08-17 | Stop reason: HOSPADM

## 2018-08-12 RX ORDER — DOCUSATE SODIUM 100 MG/1
100 CAPSULE, LIQUID FILLED ORAL 2 TIMES DAILY
Status: DISCONTINUED | OUTPATIENT
Start: 2018-08-12 | End: 2018-08-13

## 2018-08-12 RX ORDER — POTASSIUM CL/LIDO/0.9 % NACL 10MEQ/0.1L
10 INTRAVENOUS SOLUTION, PIGGYBACK (ML) INTRAVENOUS
Status: DISCONTINUED | OUTPATIENT
Start: 2018-08-12 | End: 2018-08-17 | Stop reason: HOSPADM

## 2018-08-12 RX ORDER — FLUCONAZOLE 2 MG/ML
200 INJECTION, SOLUTION INTRAVENOUS EVERY 24 HOURS
Status: DISCONTINUED | OUTPATIENT
Start: 2018-08-12 | End: 2018-08-15

## 2018-08-12 RX ORDER — LEVOFLOXACIN 5 MG/ML
250 INJECTION, SOLUTION INTRAVENOUS EVERY 24 HOURS
Status: DISCONTINUED | OUTPATIENT
Start: 2018-08-12 | End: 2018-08-12

## 2018-08-12 RX ORDER — PROCHLORPERAZINE 25 MG
25 SUPPOSITORY, RECTAL RECTAL EVERY 12 HOURS PRN
Status: DISCONTINUED | OUTPATIENT
Start: 2018-08-12 | End: 2018-08-12

## 2018-08-12 RX ORDER — HEPARIN SODIUM (PORCINE) LOCK FLUSH IV SOLN 100 UNIT/ML 100 UNIT/ML
5 SOLUTION INTRAVENOUS DAILY PRN
Status: CANCELLED
Start: 2018-08-12

## 2018-08-12 RX ORDER — POTASSIUM CHLORIDE 7.45 MG/ML
10 INJECTION INTRAVENOUS
Status: DISCONTINUED | OUTPATIENT
Start: 2018-08-12 | End: 2018-08-17 | Stop reason: HOSPADM

## 2018-08-12 RX ORDER — HEPARIN SODIUM,PORCINE 10 UNIT/ML
5 VIAL (ML) INTRAVENOUS ONCE
Status: COMPLETED | OUTPATIENT
Start: 2018-08-12 | End: 2018-08-12

## 2018-08-12 RX ORDER — LORAZEPAM 2 MG/ML
.5-1 INJECTION INTRAMUSCULAR EVERY 4 HOURS PRN
Status: DISCONTINUED | OUTPATIENT
Start: 2018-08-12 | End: 2018-08-17 | Stop reason: HOSPADM

## 2018-08-12 RX ORDER — ONDANSETRON 2 MG/ML
4 INJECTION INTRAMUSCULAR; INTRAVENOUS EVERY 6 HOURS PRN
Status: DISCONTINUED | OUTPATIENT
Start: 2018-08-12 | End: 2018-08-13

## 2018-08-12 RX ORDER — METOCLOPRAMIDE HYDROCHLORIDE 5 MG/ML
10 INJECTION INTRAMUSCULAR; INTRAVENOUS EVERY 6 HOURS PRN
Status: DISCONTINUED | OUTPATIENT
Start: 2018-08-12 | End: 2018-08-12

## 2018-08-12 RX ORDER — CEFTRIAXONE 2 G/1
2 INJECTION, POWDER, FOR SOLUTION INTRAMUSCULAR; INTRAVENOUS EVERY 24 HOURS
Status: DISCONTINUED | OUTPATIENT
Start: 2018-08-12 | End: 2018-08-12

## 2018-08-12 RX ORDER — SENNOSIDES 8.6 MG
2 TABLET ORAL 2 TIMES DAILY PRN
Status: DISCONTINUED | OUTPATIENT
Start: 2018-08-12 | End: 2018-08-17 | Stop reason: HOSPADM

## 2018-08-12 RX ORDER — LEVOFLOXACIN 250 MG/1
250 TABLET, FILM COATED ORAL DAILY
Status: DISCONTINUED | OUTPATIENT
Start: 2018-08-12 | End: 2018-08-12

## 2018-08-12 RX ORDER — POTASSIUM CHLORIDE 750 MG/1
20-40 TABLET, EXTENDED RELEASE ORAL
Status: DISCONTINUED | OUTPATIENT
Start: 2018-08-12 | End: 2018-08-17 | Stop reason: HOSPADM

## 2018-08-12 RX ORDER — CEFTRIAXONE 2 G/1
2 INJECTION, POWDER, FOR SOLUTION INTRAMUSCULAR; INTRAVENOUS EVERY 24 HOURS
Status: CANCELLED
Start: 2018-08-12

## 2018-08-12 RX ORDER — POTASSIUM CHLORIDE 29.8 MG/ML
20 INJECTION INTRAVENOUS
Status: DISCONTINUED | OUTPATIENT
Start: 2018-08-12 | End: 2018-08-17 | Stop reason: HOSPADM

## 2018-08-12 RX ORDER — POTASSIUM CHLORIDE 1.5 G/1.58G
20-40 POWDER, FOR SOLUTION ORAL
Status: DISCONTINUED | OUTPATIENT
Start: 2018-08-12 | End: 2018-08-17 | Stop reason: HOSPADM

## 2018-08-12 RX ORDER — PANTOPRAZOLE SODIUM 40 MG/1
40 TABLET, DELAYED RELEASE ORAL DAILY
Status: DISCONTINUED | OUTPATIENT
Start: 2018-08-12 | End: 2018-08-12

## 2018-08-12 RX ORDER — LEVOFLOXACIN 5 MG/ML
250 INJECTION, SOLUTION INTRAVENOUS EVERY 24 HOURS
Status: DISCONTINUED | OUTPATIENT
Start: 2018-08-12 | End: 2018-08-12 | Stop reason: CLARIF

## 2018-08-12 RX ORDER — DIPHENHYDRAMINE HYDROCHLORIDE 50 MG/ML
25 INJECTION INTRAMUSCULAR; INTRAVENOUS DAILY
Status: DISCONTINUED | OUTPATIENT
Start: 2018-08-12 | End: 2018-08-12 | Stop reason: HOSPADM

## 2018-08-12 RX ORDER — SULFAMETHOXAZOLE/TRIMETHOPRIM 800-160 MG
1 TABLET ORAL
Status: DISCONTINUED | OUTPATIENT
Start: 2018-09-03 | End: 2018-08-17 | Stop reason: HOSPADM

## 2018-08-12 RX ORDER — DIPHENHYDRAMINE HYDROCHLORIDE 50 MG/ML
25 INJECTION INTRAMUSCULAR; INTRAVENOUS DAILY
Status: CANCELLED
Start: 2018-08-12

## 2018-08-12 RX ORDER — MAGNESIUM SULFATE HEPTAHYDRATE 40 MG/ML
4 INJECTION, SOLUTION INTRAVENOUS EVERY 4 HOURS PRN
Status: DISCONTINUED | OUTPATIENT
Start: 2018-08-12 | End: 2018-08-17 | Stop reason: HOSPADM

## 2018-08-12 RX ORDER — PROCHLORPERAZINE MALEATE 5 MG
10 TABLET ORAL EVERY 6 HOURS PRN
Status: DISCONTINUED | OUTPATIENT
Start: 2018-08-12 | End: 2018-08-17 | Stop reason: HOSPADM

## 2018-08-12 RX ORDER — ONDANSETRON 4 MG/1
4 TABLET, ORALLY DISINTEGRATING ORAL EVERY 6 HOURS PRN
Status: DISCONTINUED | OUTPATIENT
Start: 2018-08-12 | End: 2018-08-13

## 2018-08-12 RX ORDER — ACYCLOVIR 800 MG/1
800 TABLET ORAL
Status: DISCONTINUED | OUTPATIENT
Start: 2018-08-12 | End: 2018-08-12

## 2018-08-12 RX ORDER — METOCLOPRAMIDE 5 MG/1
10 TABLET ORAL EVERY 6 HOURS PRN
Status: DISCONTINUED | OUTPATIENT
Start: 2018-08-12 | End: 2018-08-12

## 2018-08-12 RX ORDER — SODIUM CHLORIDE 9 MG/ML
INJECTION, SOLUTION INTRAVENOUS CONTINUOUS
Status: DISCONTINUED | OUTPATIENT
Start: 2018-08-12 | End: 2018-08-15

## 2018-08-12 RX ORDER — FLUCONAZOLE 200 MG/1
200 TABLET ORAL DAILY
Status: DISCONTINUED | OUTPATIENT
Start: 2018-08-12 | End: 2018-08-12

## 2018-08-12 RX ADMIN — ONDANSETRON HYDROCHLORIDE 4 MG: 2 INJECTION, SOLUTION INTRAMUSCULAR; INTRAVENOUS at 22:42

## 2018-08-12 RX ADMIN — CEFEPIME HYDROCHLORIDE 2 G: 2 INJECTION, POWDER, FOR SOLUTION INTRAVENOUS at 20:23

## 2018-08-12 RX ADMIN — FLUCONAZOLE IN SODIUM CHLORIDE 200 MG: 2 INJECTION, SOLUTION INTRAVENOUS at 17:07

## 2018-08-12 RX ADMIN — SODIUM CHLORIDE 150 MG: 900 INJECTION, SOLUTION INTRAVENOUS at 15:21

## 2018-08-12 RX ADMIN — SODIUM CHLORIDE, PRESERVATIVE FREE 5 ML: 5 INJECTION INTRAVENOUS at 08:22

## 2018-08-12 RX ADMIN — I.V. FAT EMULSION 250 ML: 20 EMULSION INTRAVENOUS at 20:24

## 2018-08-12 RX ADMIN — CEFTRIAXONE SODIUM 2 G: 2 INJECTION, POWDER, FOR SOLUTION INTRAMUSCULAR; INTRAVENOUS at 09:28

## 2018-08-12 RX ADMIN — PANTOPRAZOLE SODIUM 40 MG: 40 INJECTION, POWDER, FOR SOLUTION INTRAVENOUS at 15:21

## 2018-08-12 RX ADMIN — SODIUM CHLORIDE: 9 INJECTION, SOLUTION INTRAVENOUS at 13:58

## 2018-08-12 RX ADMIN — ACYCLOVIR SODIUM 700 MG: 50 INJECTION, SOLUTION INTRAVENOUS at 15:21

## 2018-08-12 RX ADMIN — ACYCLOVIR SODIUM 700 MG: 50 INJECTION, SOLUTION INTRAVENOUS at 22:35

## 2018-08-12 RX ADMIN — PROCHLORPERAZINE EDISYLATE 10 MG: 5 INJECTION INTRAMUSCULAR; INTRAVENOUS at 17:18

## 2018-08-12 RX ADMIN — POTASSIUM CHLORIDE: 2 INJECTION, SOLUTION, CONCENTRATE INTRAVENOUS at 20:24

## 2018-08-12 RX ADMIN — CEFEPIME HYDROCHLORIDE 2 G: 2 INJECTION, POWDER, FOR SOLUTION INTRAVENOUS at 14:08

## 2018-08-12 RX ADMIN — LORAZEPAM 0.5 MG: 2 INJECTION INTRAMUSCULAR; INTRAVENOUS at 13:49

## 2018-08-12 RX ADMIN — DIPHENHYDRAMINE HYDROCHLORIDE 25 MG: 50 INJECTION, SOLUTION INTRAMUSCULAR; INTRAVENOUS at 09:07

## 2018-08-12 RX ADMIN — SODIUM CHLORIDE: 9 INJECTION, SOLUTION INTRAVENOUS at 20:24

## 2018-08-12 RX ADMIN — VANCOMYCIN HYDROCHLORIDE 1000 MG: 1 INJECTION, POWDER, LYOPHILIZED, FOR SOLUTION INTRAVENOUS at 09:36

## 2018-08-12 RX ADMIN — FILGRASTIM 480 MCG: 480 INJECTION, SOLUTION INTRAVENOUS; SUBCUTANEOUS at 09:07

## 2018-08-12 ASSESSMENT — VISUAL ACUITY: OU: NORMAL ACUITY

## 2018-08-12 ASSESSMENT — ACTIVITIES OF DAILY LIVING (ADL)
ADLS_ACUITY_SCORE: 11
ADLS_ACUITY_SCORE: 9
ADLS_ACUITY_SCORE: 11

## 2018-08-12 ASSESSMENT — PAIN SCALES - GENERAL: PAINLEVEL: MODERATE PAIN (5)

## 2018-08-12 NOTE — MR AVS SNAPSHOT
After Visit Summary   8/12/2018    Amira Avery    MRN: [id number]           Patient Information     Date Of Birth          1990        Visit Information        Provider Department      8/12/2018 9:30 AM UC 1 ATC; UC BMT INFUSION [facility] Blood and Marrow Transplant        Today's Diagnoses     Hodgkin lymphoma, unspecified, lymph nodes of inguinal region and lower limb (H)    -  1    Encounter for harvesting of peripheral blood stem cells        Stem cell donor        Encounter for other procedures for purposes other than remedying health state (CODE)        Nodular sclerosing Hodgkin's lymphoma, unspecified body region (H)              [facility] [place] [place]  [address] [place] [id number]  Phone: [phone]  Clinic Hours:   Monday-Thursday:7am to 7pm   Friday: 7am to 5pm   Weekends and holidays:    8am to noon (in general)  If your fever is 100.5  or greater,   call the clinic.  After hours call the   hospital at [phone] or   [phone]. Ask for the BMT   fellow on-call            Follow-ups after your visit        Your next 10 appointments already scheduled     Aug 13, 2018  1:15 PM CDT   Masonic Lab Draw with UC MASONIC LAB DRAW   [facility] Masonic Lab Draw ([facility])    [address] [place] [address]  [address]  [facility] [id number] [phone]            Aug 13, 2018  2:00 PM CDT   Return with UC BMT WES #2   [facility] Blood and Marrow Transplant ([facility])    [address] [place] [address]  [address]  [facility] [id number] [phone]            Aug 13, 2018  2:30 PM CDT   Infusion 120 with UC BMT INFUSION, UC 3 ATC   [facility] Blood and Marrow Transplant ([facility])    [address] [place] [address]  [address]  [facility] [id number] [phone]            Sep 05, 2018  1:20 PM CDT   CT SOFT TISSUE NECK W CONTRAST with UCCT1   [facility] CT ([facility])    [address] [id number] [phone]           Please bring any scans or X-rays taken at other hospitals, if similar tests were done. Also bring a list of your medicines, including vitamins, minerals and over-the-counter drugs. It is safest to leave personal items at home.  Be sure to tell your doctor:   If you have any allergies.   If there s any chance you are pregnant.   If you are breastfeeding.    If you have diabetes as your medication may need to be adjusted for this exam.  You will have contrast for this exam. To prepare:   Do not eat or drink for 2 hours before your exam. If you need to take medicine, you may take it with small sips of water. (We may ask you to take liquid medicine as well.)   The day before your exam, drink extra fluids at least six 8-ounce glasses (unless your doctor tells you to restrict your fluids).  Patients over 70 or patients with diabetes or kidney problems:   If you haven t had a blood test (creatinine test) within the last 30 days, the Cardiologist/Radiologist may require you to get this test prior to your exam.  Please wear loose clothing, such as a sweat suit or jogging clothes. Avoid snaps, zippers and other metal. We may ask you to undress and put on a hospital gown.  If you have any questions, please call the Imaging Department where you will have your exam.            Sep 05, 2018  1:40 PM CDT   CT CHEST/ABDOMEN/PELVIS W CONTRAST with UCCT1   [facility] CT ([facility])    [address] [id number] [phone]           Please bring any scans or X-rays taken at other hospitals, if similar tests were done. Also bring a list of your medicines, including vitamins, minerals and over-the-counter drugs. It is safest to leave personal items at home.  Be sure to tell your doctor:   If you have any allergies.   If there s any chance you are pregnant.   If  you are breastfeeding.  How to prepare:   Do not eat or drink for 2 hours before your exam. If you need to take medicine, you may take it with small sips of water. (We may ask you to take liquid medicine as well.)   Please wear loose clothing, such as a sweat suit or jogging clothes. Avoid snaps, zippers and other metal. We may ask you to undress and put on a hospital gown.  Please arrive 30 minutes early for your CT. Once in the department you might be asked to drink water 15-20 minutes prior to your exam.  If indicated you may be asked to drink an oral contrast in advance of your CT.  If this is the case, the imaging team will let you know or be in contact with you prior to your appointment  Patients over 70 or patients with diabetes or kidney problems:   If you haven t had a blood test (creatinine test) within the last 30 days, the Cardiologist/Radiologist may require you to get this test prior to your exam.  If you have diabetes:   Continue to take your metformin medication on the day of your exam  If you have any questions, please call the Imaging Department where you will have your exam.            Sep 05, 2018  2:00 PM CDT   Masonic Lab Draw with  MASONIC LAB DRAW   [facility] Masonic Lab Draw ([facility])    [address] [place] [address]  [address]  [facility] [address] [phone]            Sep 05, 2018  2:30 PM CDT   (Arrive by 2:15 PM)   BMT 28 Day Anniversary Visit with  BMT DOM   [facility] Blood and Marrow Transplant ([facility])    [address] [place] [address]  [address]  [facility] [address] [phone]              Future tests that were ordered for you today     Open Standing Orders        Priority Remaining Interval Expires Ordered    Transfuse platelets unit Routine 99/100 TRANSFUSE 1 DOSE  8/12/2018    Platelets prepare order unit Routine 99/100 CONDITIONAL (SPECIFY) BLOOD  8/11/2018            Who to contact     If you have questions or need  follow up information about today's clinic visit or your schedule please contact [facility] BLOOD AND MARROW TRANSPLANT directly at [phone].  Normal or non-critical lab and imaging results will be communicated to you by Sumptohart, letter or phone within 4 business days after the clinic has received the results. If you do not hear from us within 7 days, please contact the clinic through [employer] or phone. If you have a critical or abnormal lab result, we will notify you by phone as soon as possible.  Submit refill requests through [employer] or call your pharmacy and they will forward the refill request to us. Please allow 3 business days for your refill to be completed.          Additional Information About Your Visit        [employer] Information     [employer] gives you secure access to your electronic health record. If you see a primary care provider, you can also send messages to your care team and make appointments. If you have questions, please call your primary care clinic.  If you do not have a primary care provider, please call [phone] and they will assist you.        Care EveryWhere ID     This is your Care EveryWhere ID. This could be used by other organizations to access your [place] medical records  [id number]        Your Vitals Were     Pulse Temperature Respirations Pulse Oximetry          101 99.9  F (37.7  C) (Oral) 20 99%         Blood Pressure from Last 3 Encounters:   08/12/18 110/71   08/12/18 106/73   08/11/18 112/66    Weight from Last 3 Encounters:   08/12/18 68.3 kg (150 lb 8 oz)   08/11/18 68.8 kg (151 lb 9.6 oz)   08/10/18 68.9 kg (152 lb)              We Performed the Following     Blood component     Platelets prepare order unit     Transfuse platelets unit        Recent Review Flowsheet Data     BMT Recent Results Latest Ref Rng & Units 8/6/2018 8/7/2018 8/8/2018 8/9/2018 8/10/2018 8/11/2018 8/12/2018    WBC 4.0 - 11.0 10e9/L 3.7(L) 4.9 1.8(L) 0.2(LL) 0.1(LL) 0.1(LL) 0.1(LL)     Hemoglobin 11.7 - 15.7 g/dL 10.5(L) 10.3(L) 10.5(L) 10.5(L) 10.2(L) 10.2(L) 9.3(L)    Platelet Count 150 - 450 10e9/L 175 115(L) 107(L) 69(L) 43(LL) 22(LL) 7(LL)    Neutrophils (Absolute) 1.6 - 8.3 10e9/L 3.4 4.6 1.3(L) - - - -    INR 0.86 - 1.14 1.32(H) - - - - - -    Sodium 133 - 144 mmol/L 138 137 135 134 134 137 138    Potassium 3.4 - 5.3 mmol/L 3.9 3.9 3.3(L) 3.8 3.8 3.9 3.7    Chloride 94 - 109 mmol/L 105 105 103 102 102 105 105    Glucose 70 - 99 mg/dL 106(H) 106(H) 108(H) 111(H) 124(H) 124(H) 115(H)    Urea Nitrogen 7 - 30 mg/dL 19 15 20 15 12 12 10    Creatinine 0.52 - 1.04 mg/dL 0.62 0.57 0.66 0.61 0.57 0.61 0.60    Calcium (Total) 8.5 - 10.1 mg/dL 8.2(L) 8.2(L) 8.0(L) 7.9(L) 8.1(L) 8.0(L) 7.9(L)    Protein (Total) 6.8 - 8.8 g/dL 6.1(L) 6.1(L) - - - - -    Albumin 3.4 - 5.0 g/dL 3.3(L) 3.5 - - - - -    Bilirubin (Direct) 0.0 - 0.2 mg/dL 0.1 - - - - - -    Alkaline Phosphatase 40 - 150 U/L 52 50 - - - - -    AST 0 - 45 U/L 8 20 - - - - -    ALT 0 - 50 U/L 28 30 - - - - -    MCV 78 - 100 fl 88 88 90 89 88 88 87               Primary Care Provider Office Phone # Fax #    [facility] [phone]       [address] [facility] [address]        Equal Access to Services     RICHIE RAMEY : Hadii velma bowieo Soshar, waaxda luqadaha, qaybta kaalmada adeyudiyada, jen schillinghernáncolumba raymundo. So [facility] [phone].    ATENCIÓN: Si habla español, tiene a isabel disposición servicios gratuitos de asistencia lingüística. Llame al [phone].    We comply with applicable federal civil rights laws and [place] laws. We do not discriminate on the basis of race, color, national origin, age, disability, sex, sexual orientation, or gender identity.            Thank you!     Thank you for choosing [facility] BLOOD AND MARROW TRANSPLANT  for your care. Our goal is always to provide you with excellent care. Hearing back from our patients is one way we can continue to  improve our services. Please take a few minutes to complete the written survey that you may receive in the mail after your visit with us. Thank you!             Your Updated Medication List - Protect others around you: Learn how to safely use, store and throw away your medicines at www.disposemymeds.org.          This list is accurate as of 8/12/18 10:46 AM.  Always use your most recent med list.                   Brand Name Dispense Instructions for use Diagnosis    acyclovir 800 MG tablet    ZOVIRAX    150 tablet    Take 1 tablet (800 mg) by mouth 5 times daily    Hodgkin lymphoma, unspecified, lymph nodes of inguinal region and lower limb (H)       fluconazole 200 MG tablet    DIFLUCAN    30 tablet    Take 1 tablet (200 mg) by mouth daily    Hodgkin lymphoma, unspecified, lymph nodes of inguinal region and lower limb (H)       levofloxacin 250 MG tablet    LEVAQUIN    30 tablet    Take 1 tablet (250 mg) by mouth daily    Hodgkin lymphoma, unspecified, lymph nodes of inguinal region and lower limb (H)       LORazepam 0.5 MG tablet    ATIVAN    30 tablet    Take 1-2 tablets (0.5-1 mg) by mouth every 4 hours as needed for anxiety (nausea/vomiting/sleep)    Nodular sclerosing Hodgkin's lymphoma, unspecified body region (H)       ondansetron 8 MG ODT tab    ZOFRAN-ODT    30 tablet    Take 1 tablet (8 mg) by mouth every 8 hours as needed for nausea    Nodular sclerosing Hodgkin's lymphoma, unspecified body region (H)       pantoprazole 40 MG EC tablet    PROTONIX    30 tablet    Take 1 tablet (40 mg) by mouth daily    Nodular sclerosing Hodgkin's lymphoma, unspecified body region (H)       * prochlorperazine 5 MG tablet    COMPAZINE    30 tablet    Take 1 tablet (5 mg) by mouth every 6 hours    Nodular sclerosing Hodgkin's lymphoma, unspecified body region (H)       * prochlorperazine 5 MG tablet    COMPAZINE    30 tablet    Take 1 tablet (5 mg) by mouth every 6 hours as needed for nausea or vomiting    Nodular  sclerosing Hodgkin's lymphoma, unspecified body region (H)       sennosides 8.6 MG tablet    SENOKOT    60 each    Take 2 tablets by mouth 2 times daily as needed for constipation    Nodular sclerosing Hodgkin's lymphoma, unspecified body region (H)       sulfamethoxazole-trimethoprim 800-160 MG per tablet   Start taking on:  9/3/2018    BACTRIM DS/SEPTRA DS    34 tablet    Take 1 tablet by mouth Every Mon, Tues two times daily    Hodgkin lymphoma, unspecified, lymph nodes of inguinal region and lower limb (H)       * Notice:  This list has 2 medication(s) that are the same as other medications prescribed for you. Read the directions carefully, and ask your doctor or other care provider to review them with you.

## 2018-08-12 NOTE — PROGRESS NOTES
[facility] BMT Clinic    HPI and Interval History: 28-year-old woman with Hodgkin lymphoma day +6 of auto HCT.  She is here today to receive her day 3 of antibiotics with ceftriaxone and vancomycin due to fever. T curve coming down and diarrhea better, some cramps in abdomen but not worse. Completely bed bound in the last 24h, not eating/drinking at all, threw up everything she ate, 4 episodes in the last 24h. 10-point ROS otherwise negative.      Lab Results   Component Value Date    WBC 0.1 (LL) 08/12/2018    HGB 9.3 (L) 08/12/2018    HCT 27.6 (L) 08/12/2018    PLT 7 (LL) 08/12/2018     08/12/2018    POTASSIUM 3.7 08/12/2018    CHLORIDE 105 08/12/2018    CO2 26 08/12/2018    BUN 10 08/12/2018    CR 0.60 08/12/2018     (H) 08/12/2018    AST 20 08/07/2018    ALT 30 08/07/2018    ALKPHOS 50 08/07/2018    BILITOTAL 0.7 08/07/2018    INR 1.32 (H) 08/06/2018        Current Outpatient Prescriptions   Medication     acyclovir (ZOVIRAX) 800 MG tablet     fluconazole (DIFLUCAN) 200 MG tablet     levofloxacin (LEVAQUIN) 250 MG tablet     LORazepam (ATIVAN) 0.5 MG tablet     ondansetron (ZOFRAN-ODT) 8 MG ODT tab     pantoprazole (PROTONIX) 40 MG EC tablet     prochlorperazine (COMPAZINE) 5 MG tablet     prochlorperazine (COMPAZINE) 5 MG tablet     sennosides (SENOKOT) 8.6 MG tablet     [START ON 9/3/2018] sulfamethoxazole-trimethoprim (BACTRIM DS/SEPTRA DS) 800-160 MG per tablet     No current facility-administered medications for this visit.      Facility-Administered Medications Ordered in Other Visits   Medication     cefTRIAXone (ROCEPHIN) 2 g in 20 mL SWFI for IVP     diphenhydrAMINE (BENADRYL) injection 25 mg     filgrastim (NEUPOGEN) injection 480 mcg     vancomycin (VANCOCIN) 1,000 mg in sodium chloride 0.9 % 250 mL       Ph/E:   /73 (BP Location: Left arm, Patient Position: Chair, Cuff Size: Adult Regular)  Pulse 118  Temp 99.6  F (37.6  C) (Oral)  Wt 68.3 kg (150 lb 8 oz)  SpO2  97%  BMI 26.5 kg/m2  General: NAD but she looks profoundly weak; H&N: no mucosal lesions; Lungs clear; Heart RRR; Abdomen; Soft, No organomegaly; Extremities: No edema; Skin: No rash; Neuro: Nonfocal; Mood/Affect: appropriate    A&P:   1.  Hodgkin lymphoma: Day +6 of auto HCT. G-CSF daily. Admit inpatient due to no intake, vomiting, and profound weakness.   2. Low-grade fever and neutropenia: Cultures negative, ceftriaxone and vancomycin today.  Continue acyclovir and fluconazole. Fever resolved. I'd be in favor of daily ceftriaxone for another few days or just switching back to prophy levaquin.

## 2018-08-12 NOTE — PLAN OF CARE
Problem: Patient Care Overview  Goal: Plan of Care/Patient Progress Review  Outcome: No Change   08/12/18 1814   OTHER   Plan Of Care Reviewed With patient;significant other   Plan of Care Review   Progress no change       AVSS.  No pain.  Nausea so compazine, ativan, and emend given.  Meds changed to IV.  TPN to start tonight.  No stool since she got here and no vomiting.  Lact 0.5 and recheck 0.5.  Bld cx x2 and UA/UC done.  MIVF going at 75/hr.  Will want to shower when mom brings her stuff here later.  Continue plan of care.    Problem: Stem Cell/Bone Marrow Transplant (Adult)  Goal: Signs and Symptoms of Listed Potential Problems Will be Absent, Minimized or Managed (Stem Cell/Bone Marrow Transplant)  Signs and symptoms of listed potential problems will be absent, minimized or managed by discharge/transition of care (reference Stem Cell/Bone Marrow Transplant (Adult) CPG).   Outcome: No Change   08/12/18 1814   Stem Cell/Bone Marrow Transplant   Problems Assessed (Stem Cell/Bone Marrow Transplant) all   Problems Present (Stem Cell/BMT) altered nutrition;fatigue;gastrointestinal complications;hematologic complications;neutropenia

## 2018-08-12 NOTE — PROGRESS NOTES
CLINICAL NUTRITION SERVICES - ASSESSMENT NOTE     Nutrition Prescription    RECOMMENDATIONS FOR MDs/PROVIDERS TO ORDER:  - Total daily fluids/adjustments per MD; may require adjustments with PN start.   - Electrolyte replacement per protocol as indicated   - Continue oral diet as medically able; encourage oral intakes to help maintain gut integrity.   - Continue regular diet as tolerated as medically able to help maintain gut integrity.     Malnutrition Status:    - Severe malnutrition in the context of acute illness     Recommendations already ordered by Registered Dietitian (RD):  - Ordered calorie counts to assess oral intakes/PN adjustments.   - Ordered oral nutrition supplements IF pt request  - PN, goal volume 1080 ml/day with 175 g GOAL Dex daily (595 kcal), 85 g AA daily (340 kcal) and 250 ml of 20% IV lipids daily = 1435 kcals/day (26 kcal/kg/day), 85 g (1.6 PRO/kg/day), GIR 2.1 with 26% kcals from Fat.  Micro/Rx: per PharmD  - Initiate @ 100 gm dextrose daily and advance as tolerated. Do not start or advance until lytes (Mg++,K+) WNL and phos>1.9   - PharmD may adjust volume as indicated.   - Baseline TG   - Monitor liver function and TG as indicated     Future/Additional Recommendations:  - PO/supp tolerance (fab counts 8/12)  - PN initiation/adv/lytes     REASON FOR ASSESSMENT  Amira Avery is a/an 28 year old female assessed by the dietitian for Pharmacy/Nutrition to Start and Manage PN    NUTRITION HISTORY  Per pt, appetite and intake has been very poor-minimal x 2 weeks PTA. Unable to tolerating anything since admit. Pt reports ~ 10 lb weight loss over 2 weeks. Appetite and weight otherwise stable prior to onset of recent illness. Pt does not take any nutrition supplements/micronutrients.     CURRENT NUTRITION ORDERS  Diet: Regular  Intake/Tolerance: minimal     LABS  Labs reviewed  Lytes WNL  Glucose trends: 124, 115    MEDICATIONS  Medications reviewed  Colace  IVF @ 75 ml/hr  "    ANTHROPOMETRICS  Height: 160.5 cm (5' 3.189\") 63\"   Most Recent Weight: 68.8 kg (151 lb 11.2 oz)    IBW: 52 kg  BMI: Overweight BMI 25-29.9  Weight History:   Wt Readings from Last 9 Encounters:   08/12/18 68.8 kg (151 lb 11.2 oz)   08/12/18 68.3 kg (150 lb 8 oz)   08/11/18 68.8 kg (151 lb 9.6 oz)   08/10/18 68.9 kg (152 lb)   08/09/18 69.8 kg (153 lb 14.4 oz)   08/08/18 69.9 kg (154 lb 3.2 oz)   08/07/18 70.2 kg (154 lb 12.8 oz)   07/25/18 73.8 kg (162 lb 12.8 oz)   07/24/18 72.5 kg (159 lb 13.3 oz)   7% weight loss over ~ 1 month     Dosing Weight: 56 kg (adjusted weight using current weight of 69 kg and IBW of 52 kg)    ASSESSED NUTRITION NEEDS  Estimated Energy Needs: 6460-4965 kcals/day (25 - 30 kcals/kg)  Justification: Maintenance  Estimated Protein Needs: 71-84 grams protein/day (1.2 - 1.5 grams of pro/kg)  Justification: Increased needs  Estimated Fluid Needs: 8630-4628 mL/day (1 mL/kcal)   Justification: Maintenance and Per provider pending fluid status    PHYSICAL FINDINGS  See malnutrition section below.  No abnormal nutrition-related physical findings observed.  Skin, nails, hair WNL; slightly pale skin.      MALNUTRITION  % Intake: </= 50% for >/= 5 days (severe)  % Weight Loss: > 5% in 1 month (severe)  Subcutaneous Fat Loss: None observed  Muscle Loss: None observed; possible decreased tone.   Fluid Accumulation/Edema: None noted  Malnutrition Diagnosis: Severe malnutrition in the context of acute illness     NUTRITION DIAGNOSIS  Inadequate protein-energy intake related to N/V, decreased appetite as evidenced by minimal PO intakes PTA.       INTERVENTIONS  Implementation  Nutrition Education: Provided education on role of RD; PN and nutrition needs; importance of oral intake as able   Parenteral Nutrition/IV Fluids - Initiate     Goals  Total avg nutritional intake to meet a minimum of 20 kcal/kg and 1.5 g PRO/kg daily (per dosing wt 56 kg).     Monitoring/Evaluation  Progress toward goals will " be monitored and evaluated per protocol.     Shayla Paul RD, LD, [facility]  Unit Pager: [phone]

## 2018-08-12 NOTE — NURSING NOTE
Admission SBAR:    Situation:  Why is the patient being admitted?  Fever and Nausea/Vomiting    Background:  /69  Pulse 94  Temp 99.7  F (37.6  C) (Oral)  Resp 16  SpO2 97%  Major diagnosis: Hodgkins lymphoma  Type of donor: Autologous  Type of stem cells: PBSC  Relapsed? Yes  Summary of Interventions (if medications were administered, please specify time and color of lumen if applicable):    Antimicrobials? Yes: Vancomycin, Rocephin    Transfusions? Yes- Platelets    Fluids? No    Neupogen? Yes: Right lower abd    Other Medications? Yes: Benadryl premed for vanco    Electrolytes? No    Blood cultures? No    UA? No    UC? No    Stool culture? No    Respiratory cultures? No    Current type and cross? N/A    Completed preadmission procedures: N/A    Procedures due: N/A    If procedures are scheduled, what time? Not Applicable    Assessment:  Potential Falls risk? No  Accompanied by: Friend  Other Assessment: Nausea, vomiting, weakness, low grade temps.     Recommendations: Admit for further evaluation  Report called to Unit: 5C  Report called to Nurse: Negra  Transported by: Private vehicle by friend  Via: Pt is ambulatory but will be transferred via wheelchair.

## 2018-08-12 NOTE — MR AVS SNAPSHOT
After Visit Summary   8/12/2018    Amira Avery    MRN: [id number]           Patient Information     Date Of Birth          1990        Visit Information        Provider Department      8/12/2018 9:00 AM UC BMT DOM [facility]        Today's Diagnoses     Nodular sclerosing Hodgkin's lymphoma, unspecified body region (H)              [facility] ([facility])  [address] [place] [address]  Phone: [phone]  Clinic Hours:   Monday-Thursday:7am to 7pm   Friday: 7am to 5pm   Weekends and holidays:    8am to noon (in general)  If your fever is 100.5  or greater,   call the clinic.  After hours call the   hospital at [phone] or   [phone]. Ask for the BMT   fellow on-call            Follow-ups after your visit        Your next 10 appointments already scheduled     Aug 12, 2018  9:00 AM CDT   Return with UC BMT DOM   [facility] ([facility])    [address] [place] [address]  [address]  [facility] [id number] [phone]            Aug 12, 2018  9:30 AM CDT   Infusion 120 with UC BMT INFUSION, UC 1 ATC   [facility] ([facility])    [address] [place] [address]  [address]  [facility] [id number] [phone]            Aug 13, 2018  1:15 PM CDT   [facility] Lab Draw with UC MASONIC LAB DRAW   [facility] ([facility])    [address] [place] [address]  [address]  [facility] [id number] [phone]            Aug 13, 2018  2:00 PM CDT   Return with UC BMT WES #2   [facility] ([facility])    [address] [place] [address]  [address]  [facility] [id number] [phone]            Aug 13, 2018  2:30 PM CDT   Infusion 120 with UC BMT INFUSION, UC 3 ATC   [facility] ([facility])    [address] [place] [address]  [address]  [facility] [address]   [phone]            Sep 05, 2018  1:20 PM CDT   CT SOFT TISSUE NECK W CONTRAST with UCCT1   [facility] CT ([facility])    [address] [id number] [phone]           Please bring any scans or X-rays taken at other hospitals, if similar tests were done. Also bring a list of your medicines, including vitamins, minerals and over-the-counter drugs. It is safest to leave personal items at home.  Be sure to tell your doctor:   If you have any allergies.   If there s any chance you are pregnant.   If you are breastfeeding.    If you have diabetes as your medication may need to be adjusted for this exam.  You will have contrast for this exam. To prepare:   Do not eat or drink for 2 hours before your exam. If you need to take medicine, you may take it with small sips of water. (We may ask you to take liquid medicine as well.)   The day before your exam, drink extra fluids at least six 8-ounce glasses (unless your doctor tells you to restrict your fluids).  Patients over 70 or patients with diabetes or kidney problems:   If you haven t had a blood test (creatinine test) within the last 30 days, the Cardiologist/Radiologist may require you to get this test prior to your exam.  Please wear loose clothing, such as a sweat suit or jogging clothes. Avoid snaps, zippers and other metal. We may ask you to undress and put on a hospital gown.  If you have any questions, please call the Imaging Department where you will have your exam.            Sep 05, 2018  1:40 PM CDT   CT CHEST/ABDOMEN/PELVIS W CONTRAST with UCCT1   [facility] CT ([facility])    [address] [id number] [phone]           Please bring any scans or X-rays taken at other hospitals, if similar tests were done. Also bring a list of your medicines, including vitamins, minerals  and over-the-counter drugs. It is safest to leave personal items at home.  Be sure to tell your doctor:   If you have any allergies.   If there s any chance you are pregnant.   If you are breastfeeding.  How to prepare:   Do not eat or drink for 2 hours before your exam. If you need to take medicine, you may take it with small sips of water. (We may ask you to take liquid medicine as well.)   Please wear loose clothing, such as a sweat suit or jogging clothes. Avoid snaps, zippers and other metal. We may ask you to undress and put on a hospital gown.  Please arrive 30 minutes early for your CT. Once in the department you might be asked to drink water 15-20 minutes prior to your exam.  If indicated you may be asked to drink an oral contrast in advance of your CT.  If this is the case, the imaging team will let you know or be in contact with you prior to your appointment  Patients over 70 or patients with diabetes or kidney problems:   If you haven t had a blood test (creatinine test) within the last 30 days, the Cardiologist/Radiologist may require you to get this test prior to your exam.  If you have diabetes:   Continue to take your metformin medication on the day of your exam  If you have any questions, please call the Imaging Department where you will have your exam.            Sep 05, 2018  2:00 PM [facility]   Masonic Lab Draw with  MASONIC LAB DRAW   [facility] Lab Draw ([facility])    [address]  [address]  [facility] [address] [phone]              Future tests that were ordered for you today     Open Standing Orders        Priority Remaining Interval Expires Ordered    Transfuse platelets unit Routine 99/100 TRANSFUSE 1 DOSE  8/12/2018    Platelets prepare order unit Routine 99/100 CONDITIONAL (SPECIFY) BLOOD  8/11/2018            Who to contact     If you have questions or need follow up information about today's clinic visit or your schedule please contact [facility] directly at [phone].  Normal or non-critical lab and imaging results will be communicated to you by [employer], letter or phone within 4 business days after the clinic has received the results. If you do not hear from us within 7 days, please contact the clinic through [employer] or phone. If you have a critical or abnormal lab result, we will notify you by phone as soon as possible.  Submit refill requests through [employer] or call your pharmacy and they will forward the refill request to us. Please allow 3 business days for your refill to be completed.          Additional Information About Your Visit        [employer] Social Information     [employer] gives you secure access to your electronic health record. If you see a primary care provider, you can also send messages to your care team and make appointments. If you have questions, please call your primary care clinic.  If you do not have a primary care provider, please call [phone] and they will assist you.        Care EveryWhere ID     This is your Care EveryWhere ID. This could be used by other organizations to access your [facility] medical records  [id number]        Your Vitals Were     Pulse Temperature Pulse Oximetry BMI (Body Mass Index)          118 99.6  F (37.6  C) (Oral) 97% 26.5 kg/m2         Blood Pressure from Last 3 Encounters:   08/12/18 106/73   08/11/18 112/66   08/10/18 98/61    Weight from Last 3 Encounters:   08/12/18 68.3 kg (150 lb 8 oz)   08/11/18 68.8 kg (151 lb 9.6 oz)   08/10/18 68.9 kg (152 lb)              We Performed the Following     Basic metabolic panel     CBC with platelets differential     Magnesium        Recent Review Flowsheet Data     BMT Recent Results Latest Ref Rng & Units 8/6/2018 8/7/2018 8/8/2018 8/9/2018 8/10/2018 8/11/2018 8/12/2018    WBC 4.0 - 11.0 10e9/L 3.7(L) 4.9 1.8(L) 0.2(LL) 0.1(LL) 0.1(LL) 0.1(LL)    Hemoglobin 11.7 - 15.7 g/dL 10.5(L) 10.3(L) 10.5(L) 10.5(L) 10.2(L) 10.2(L) 9.3(L)     Platelet Count 150 - 450 10e9/L 175 115(L) 107(L) 69(L) 43(LL) 22(LL) 7(LL)    Neutrophils (Absolute) 1.6 - 8.3 10e9/L 3.4 4.6 1.3(L) - - - -    INR 0.86 - 1.14 1.32(H) - - - - - -    Sodium 133 - 144 mmol/L 138 137 135 134 134 137 138    Potassium 3.4 - 5.3 mmol/L 3.9 3.9 3.3(L) 3.8 3.8 3.9 3.7    Chloride 94 - 109 mmol/L 105 105 103 102 102 105 105    Glucose 70 - 99 mg/dL 106(H) 106(H) 108(H) 111(H) 124(H) 124(H) 115(H)    Urea Nitrogen 7 - 30 mg/dL 19 15 20 15 12 12 10    Creatinine 0.52 - 1.04 mg/dL 0.62 0.57 0.66 0.61 0.57 0.61 0.60    Calcium (Total) 8.5 - 10.1 mg/dL 8.2(L) 8.2(L) 8.0(L) 7.9(L) 8.1(L) 8.0(L) 7.9(L)    Protein (Total) 6.8 - 8.8 g/dL 6.1(L) 6.1(L) - - - - -    Albumin 3.4 - 5.0 g/dL 3.3(L) 3.5 - - - - -    Bilirubin (Direct) 0.0 - 0.2 mg/dL 0.1 - - - - - -    Alkaline Phosphatase 40 - 150 U/L 52 50 - - - - -    AST 0 - 45 U/L 8 20 - - - - -    ALT 0 - 50 U/L 28 30 - - - - -    MCV 78 - 100 fl 88 88 90 89 88 88 87               Primary Care Provider Office Phone # Fax #    [facility] [phone] [address] [facility] [place] [address]        Equal Access to Services     [facility] UCLADASH AH: Hadii aad ku azebo Soomaali, waaxda luqadaha, qaybta kaalmada adeegyada, waxay idiin hayaan adeeg khhernáncolumba ramoskirillgeorge ah. So [facility] [phone].    ATENCIÓN: Si habla kelly, tiene a isabel disposición servicios gratuitos de asistencia lingüística. Llame al [phone].    We comply with applicable federal civil rights laws and [place] laws. We do not discriminate on the basis of race, color, national origin, age, disability, sex, sexual orientation, or gender identity.            Thank you!     Thank you for choosing [facility]  for your care. Our goal is always to provide you with excellent care. Hearing back from our patients is one way we can continue to improve our services. Please take a few minutes to complete the written survey that  you may receive in the mail after your visit with us. Thank you!             Your Updated Medication List - Protect others around you: Learn how to safely use, store and throw away your medicines at www.disposemymeds.org.          This list is accurate as of 8/12/18  8:58 AM.  Always use your most recent med list.                   Brand Name Dispense Instructions for use Diagnosis    acyclovir 800 MG tablet    ZOVIRAX    150 tablet    Take 1 tablet (800 mg) by mouth 5 times daily    Hodgkin lymphoma, unspecified, lymph nodes of inguinal region and lower limb (H)       fluconazole 200 MG tablet    DIFLUCAN    30 tablet    Take 1 tablet (200 mg) by mouth daily    Hodgkin lymphoma, unspecified, lymph nodes of inguinal region and lower limb (H)       levofloxacin 250 MG tablet    LEVAQUIN    30 tablet    Take 1 tablet (250 mg) by mouth daily    Hodgkin lymphoma, unspecified, lymph nodes of inguinal region and lower limb (H)       LORazepam 0.5 MG tablet    ATIVAN    30 tablet    Take 1-2 tablets (0.5-1 mg) by mouth every 4 hours as needed for anxiety (nausea/vomiting/sleep)    Nodular sclerosing Hodgkin's lymphoma, unspecified body region (H)       ondansetron 8 MG ODT tab    ZOFRAN-ODT    30 tablet    Take 1 tablet (8 mg) by mouth every 8 hours as needed for nausea    Nodular sclerosing Hodgkin's lymphoma, unspecified body region (H)       pantoprazole 40 MG EC tablet    PROTONIX    30 tablet    Take 1 tablet (40 mg) by mouth daily    Nodular sclerosing Hodgkin's lymphoma, unspecified body region (H)       * prochlorperazine 5 MG tablet    COMPAZINE    30 tablet    Take 1 tablet (5 mg) by mouth every 6 hours    Nodular sclerosing Hodgkin's lymphoma, unspecified body region (H)       * prochlorperazine 5 MG tablet    COMPAZINE    30 tablet    Take 1 tablet (5 mg) by mouth every 6 hours as needed for nausea or vomiting    Nodular sclerosing Hodgkin's lymphoma, unspecified body region (H)       sennosides 8.6 MG tablet     SENOKOT    60 each    Take 2 tablets by mouth 2 times daily as needed for constipation    Nodular sclerosing Hodgkin's lymphoma, unspecified body region (H)       sulfamethoxazole-trimethoprim 800-160 MG per tablet   Start taking on:  9/3/2018    BACTRIM DS/SEPTRA DS    34 tablet    Take 1 tablet by mouth Every Mon, Tues two times daily    Hodgkin lymphoma, unspecified, lymph nodes of inguinal region and lower limb (H)       * Notice:  This list has 2 medication(s) that are the same as other medications prescribed for you. Read the directions carefully, and ask your doctor or other care provider to review them with you.

## 2018-08-12 NOTE — PROGRESS NOTES
Infusion Nursing Note:  Amira Avery presents today for plts, iv abx, and growth factor.    Patient seen by provider today: Yes: Russ   present during visit today: Not Applicable.    Note: Labs monitored, low grade temp.  Plts 7K.  1unit plts transfused. IV benadryl given as premed for vanco. Ceftriaxone given IV push. 1g IV vanco infused over 1hr. Growth factor inj in left lower abd.     Intravenous Access:  Wang.    Treatment Conditions:  Results reviewed, labs MET treatment parameters, ok to proceed with treatment.      Post Infusion Assessment:  Patient tolerated infusion without incident.  Patient tolerated injection without incident.    Discharge Plan: Pt being admitted to hospital due to weakness, nausea, vomiting, and significant decreased intake, and low grade temps.   Pt's friend will drive pt to hospital post infusions.   Rafael Chambers RN

## 2018-08-12 NOTE — PROGRESS NOTES
Patient admitted to: [address] [place] [address] 5-131  Admitted from: BMT clinic  Arrived by: wheelchair  Reason for admission: nausea, vomiting, diarrhea, fevers  Patient accompanied by: significant other  Belongings: in room with patient  Teaching: admission teaching per unit routine

## 2018-08-12 NOTE — NURSING NOTE
"Oncology Rooming Note    August 12, 2018 8:28 AM   Amira Avery is a 28 year old female who presents for:    Chief Complaint   Patient presents with     Blood Draw     Lymphoma (H); labs drawn via cvc by RN     Initial Vitals: /73 (BP Location: Left arm, Patient Position: Chair, Cuff Size: Adult Regular)  Pulse 118  Temp 99.6  F (37.6  C) (Oral)  Wt 68.3 kg (150 lb 8 oz)  SpO2 97%  BMI 26.5 kg/m2 Estimated body mass index is 26.5 kg/(m^2) as calculated from the following:    Height as of 7/31/18: 1.605 m (5' 3.19\").    Weight as of this encounter: 68.3 kg (150 lb 8 oz). Body surface area is 1.75 meters squared.  Moderate Pain (5) Comment: Data Unavailable   No LMP recorded.  Allergies reviewed: Yes  Medications reviewed: Yes    Medications: Medication refills not needed today.  Pharmacy name entered into [facility]: [facility] [address] [place] [address] [address] AT [facility] [address]      5 minutes for nursing intake (face to face time)     Vitals taken. Lines accessed by RN. Labs collected through brown lumen and sent. Both lines flushed with NS & Heparin. Pt tolerated well. Pt checked in for next appointment.    Reena Edwards RN              "

## 2018-08-12 NOTE — H&P
"        Bone Marrow Transplant  History & Physical  2018       Patient: Amira Avery  MRN: [id number]  : 1990 (age 28 year old)  Transplant Date: 18  Admission date: 2018    Home Clinic:   Primary Care Provider: Clinic, [facility]    Assessment & Plan:     Amira Avery is a 28 year old female with a history of Hodgkin's lymphoma day +6 of auto H C T   The patient is now admitted on 2018 for dehydration    Problem List  1. Hodgkins Lymphoma   - plan per oncology   - analgesia and anxiolysis on a prn basis    2. Dehydration 2/2 to poor po intake related to N/V related to BMT   - maintenance IVF   - start TPN   - change PO meds to IV   - initate anti-emetic therapy including Emend  3. Neutropenic fever   - will pan culture    given risk factors for health care associate infection will initiate antibiotic coverage directed at resistant gram neg's with Cefepime   - will f/u micro data and change antibiotic accordingly   - will follow physical exam to assess response to therapy and adequate peripheral perfusion    DVT ppx: not a candidate for chemoprophylaxis, SCDs, encourage ambulation  GI ppx: will start Protonix  FEN: on maintenance IVF with NS 0.9 75 ml/hr, electrolyte replacement per protocol, regular diet    Disposition: patient is in fair condition, I anticipate he will remain hospitalized for greater than 2 midnights and then likely discharged to home.     Code Status: FULL    Jag Benavides MD, [facility]  Pager: [phone]       Chief Complaint:     \"I have nausea and have been throwing up a lot.... I can't keep anything down\"    History of Present Illness:     History obtained from the patient who is a fair historian and the electronic medical record.     Patient presents today for Oncology clinic with a complaint of poor po intake due to N/V and diffuse abdominal pain. She describes the pain as crampy, nonradiating not improved " or worsened by anything. She states all of her problem began about 2 days PTA. She was seen today in oncology clinic. Per their not she had the same complaint but reporting being bed bound. She has been previously started on ceftriaxone and vancomycin due to fever.     Review of Systems:      ROS: 10 point ROS neg other than the symptoms noted above in the HPI.      Medical and Surgical History:     Past Medical History:   Diagnosis Date     Cancer (H)      Tattoos        Past Surgical History:   Procedure Laterality Date     BIOPSY LYMPH NODE CERVICAL Right 5/2/2018    Procedure: BIOPSY LYMPH NODE CERVICAL;  Right Excisional Node Biopsy;  Surgeon: Lia Silva MD;  Location: [facility] OR     excision of deep cervical LN's Left 05/19/2017    positive for Hodgkin's     excision of deep cervical LN's Right 02/13/2018    positive for Hodgkin's     port a cath placement Right 06/01/2017    IJ vein; removed 12/1/17 no longer needed     port a cath placement Left 02/21/2018    IJ vein     US BIOPSY LYMPH NODE FNA Left 03/06/2017    low midline neck, negative for malignancy     Social and Family History:     Social History     Social History     Marital status: Single     Spouse name: N/A     Number of children: N/A     Years of education: N/A     Occupational History     Not on file.     Social History Main Topics     Smoking status: Former Smoker     Packs/day: 1.00     Years: 10.00     Types: Cigarettes, Hookah     Start date: 7/15/2007     Quit date: 10/15/2017     Smokeless tobacco: Never Used     Alcohol use No     Drug use: No     Sexual activity: Yes     Partners: Female     Birth control/ protection: None     Other Topics Concern     Not on file     Social History Narrative       Family History   Problem Relation Age of Onset     Hypertension Father      Coronary Artery Disease Maternal Grandmother      Colon Cancer Maternal Grandmother      Cancer Maternal Grandmother      HEART DISEASE Maternal Grandmother       Allergies and Home Medications:        Allergies   Allergen Reactions     Morphine Hives     No issues with oral narcotics, dilaudid, or fentanyl per pt       Prescriptions Prior to Admission   Medication Sig Dispense Refill Last Dose     acyclovir (ZOVIRAX) 800 MG tablet Take 1 tablet (800 mg) by mouth 5 times daily 150 tablet 0 8/12/2018 at Unknown time     fluconazole (DIFLUCAN) 200 MG tablet Take 1 tablet (200 mg) by mouth daily 30 tablet 0 8/12/2018 at Unknown time     LORazepam (ATIVAN) 0.5 MG tablet Take 1-2 tablets (0.5-1 mg) by mouth every 4 hours as needed for anxiety (nausea/vomiting/sleep) 30 tablet 0 8/12/2018 at Unknown time     ondansetron (ZOFRAN-ODT) 8 MG ODT tab Take 1 tablet (8 mg) by mouth every 8 hours as needed for nausea 30 tablet 0 8/12/2018 at Unknown time     pantoprazole (PROTONIX) 40 MG EC tablet Take 1 tablet (40 mg) by mouth daily 30 tablet 1 8/12/2018 at Unknown time     prochlorperazine (COMPAZINE) 5 MG tablet Take 1 tablet (5 mg) by mouth every 6 hours as needed for nausea or vomiting 30 tablet 0 8/12/2018 at Unknown time     prochlorperazine (COMPAZINE) 5 MG tablet Take 1 tablet (5 mg) by mouth every 6 hours 30 tablet 0 8/12/2018 at Unknown time     levofloxacin (LEVAQUIN) 250 MG tablet Take 1 tablet (250 mg) by mouth daily (Patient not taking: Reported on 8/11/2018) 30 tablet 0 Unknown at Unknown time     sennosides (SENOKOT) 8.6 MG tablet Take 2 tablets by mouth 2 times daily as needed for constipation 60 each 0 Unknown at Unknown time     [START ON 9/3/2018] sulfamethoxazole-trimethoprim (BACTRIM DS/SEPTRA DS) 800-160 MG per tablet Take 1 tablet by mouth Every Mon, Tues two times daily 34 tablet 0 Unknown at Unknown time     Physical Exam:     GEN: Awake and alert, in no acute distress, lying in bed. Pleasant and cooperative  HEENT: Pupils equal and reactive to light, conjunctiva are pink conjunctivae, sclera anicteric,  Oral mucosa moist without lesions.  NECK: supple no  "JVD/LAD  PULM: Good air flow bilaterally, symmetric in expansion, Scattered crackles to lower lobes. No rhonchi, no wheezes. Breathing non-labored.   CV: Normal S1 and S2. RRR.  No murmur, gallop, or rub.  No peripheral edema.  Peripheral pulses intact.   ABD: Soft, (+) mild tenderness to palpation, nondistended. Bowel sound normoactive, no guarding, no rebound.   MSK: .  No apparent muscle wasting. No clubbing, cyanosis, or edema  NEURO: Alert and oriented to person, place, and time, motor 5/5 upper/lower extremity b/l, sensation grossly intact to touch, gait not assessed  SKIN: Warm and dry. No visible rashes or lesions   PSYCH: Mood stable, judgment and insight appropriate.       Lines, Drains, and Devices:  Port A Cath Single Left Chest wall (Active)   Access Date 07/23/18 7/23/2018 11:00 AM   Access Attempts 1 7/23/2018 11:00 AM   Gauge/Length Noncoring 90 degree bend;20 gauge;3/4 inch 7/23/2018 11:00 AM   Site Assessment [facility] 8/7/2018  8:00 AM   Line Status Blood return noted;Heparin locked 7/23/2018 11:00 AM   Extravasation? No 8/7/2018  8:00 AM   Dressing Intervention Gauze 7/23/2018 11:00 AM   De-Access Date 07/23/18 7/23/2018 11:00 AM   Date to be Reflushed 08/20/18 8/7/2018  8:00 AM   Number of days:       CVC Double Lumen 07/23/18 Right Internal jugular (Active)   Site Assessment WDL 8/12/2018 11:16 AM   Extravasation? No 8/6/2018  8:00 AM   Dressing Intervention Chlorhexidine sponge;Transparent;Securing device 8/8/2018 10:02 AM   Dressing Change Due 08/15/18 8/12/2018 11:16 AM   Number of days:20     Data:     Vital signs:  Temp: 99.2  F (37.3  C) Temp src: Axillary BP: 99/74 Pulse: 101   Resp: 18 SpO2: 97 % O2 Device: None (Room air)   Height: 160.5 cm (5' 3.19\") Weight: 68.8 kg (151 lb 11.2 oz)    Pain: # Pain Assessment:  Current Pain Score 8/12/2018   Patient currently in pain? yes   Pain score (0-10) -   Pain location Abdomen   Pain descriptors -   - Amira is experiencing pain due to abdominal " pain. Pain management was discussed and the plan was created in a collaborative fashion.  Amira's response to the current recommendations: engaged  - Please see the plan for pain management as documented above        Weight trend:   Vitals:    08/12/18 1108   Weight: 68.8 kg (151 lb 11.2 oz)        I/O: No intake or output data in the 24 hours ending 08/12/18 1224    Labs    CMP:   Recent Labs  Lab 08/12/18  0827 08/11/18  0828 08/10/18  1249 08/09/18  1046  08/07/18  0353 08/06/18  0345    137 134 134  < > 137 138   POTASSIUM 3.7 3.9 3.8 3.8  < > 3.9 3.9   CHLORIDE 105 105 102 102  < > 105 105   CO2 26 24 24 24  < > 26 27   ANIONGAP 7 8 8 8  < > 6 6   * 124* 124* 111*  < > 106* 106*   BUN 10 12 12 15  < > 15 19   CR 0.60 0.61 0.57 0.61  < > 0.57 0.62   GFRESTIMATED >90 >90 >90 >90  < > >90 >90   GFRESTBLACK >90 >90 >90 >90  < > >90 >90   NEETU 7.9* 8.0* 8.1* 7.9*  < > 8.2* 8.2*   MAG 2.0 2.1 2.1 2.0  --  2.2 2.0   PHOS  --   --   --   --   --   --  3.5   PROTTOTAL  --   --   --   --   --  6.1* 6.1*   ALBUMIN  --   --   --   --   --  3.5 3.3*   BILITOTAL  --   --   --   --   --  0.7 0.6   ALKPHOS  --   --   --   --   --  50 52   AST  --   --   --   --   --  20 8   ALT  --   --   --   --   --  30 28   < > = values in this interval not displayed.  CBC:   Recent Labs  Lab 08/12/18  0827 08/11/18  0828 08/10/18  1249 08/09/18  1046   WBC 0.1* 0.1* 0.1* 0.2*   RBC 3.16* 3.45* 3.41* 3.53*   HGB 9.3* 10.2* 10.2* 10.5*   HCT 27.6* 30.2* 29.9* 31.4*   MCV 87 88 88 89   MCH 29.4 29.6 29.9 29.7   MCHC 33.7 33.8 34.1 33.4   RDW 12.4 12.7 13.0 13.2   PLT 7* 22* 43* 69*     INR:   Recent Labs  Lab 08/06/18  0345   INR 1.32*     Glucose:   Recent Labs  Lab 08/12/18  0827 08/11/18  0828 08/10/18  1249 08/09/18  1046 08/08/18  0958 08/07/18  0353   * 124* 124* 111* 108* 106*     Blood Gas: No lab results found in last 7 days.  Culture Data   Recent Labs  Lab 08/10/18  1249   CULT No growth after 2 days      Virology Data: No results found for: INFLUA, FLUAH1, FLUAH3, MW3356, IFLUB, RSVA, RSVB, PIV1, PIV2, PIV3, HMPV, HRVS, ADVBE, ADVC  Historical CMV results (last 3 of prior testing):  Lab Results   Component Value Date    CMVQNT CMV DNA Not Detected 08/07/2018    CMVQNT CMV DNA Not Detected 07/31/2018     Lab Results   Component Value Date    CMVLOG Not Calculated 08/07/2018    CMVLOG Not Calculated 07/31/2018     Urine Studies  Recent Labs   Lab Test  07/05/18   1011  04/18/18   1130   URINEPH  7.0  8.0*   NITRITE  Negative  Negative   LEUKEST  Negative  Negative   WBCU  0  <1     Most Recent [place] Pulmonary Function Testing (FVC/FEV1 only)  FVC-Pre   Date Value Ref Range Status   07/09/2018 4.98 L    04/23/2018 4.82 L      FVC-%Pred-Pre   Date Value Ref Range Status   07/09/2018 135 %    04/23/2018 130 %      FEV1-Pre   Date Value Ref Range Status   07/09/2018 4.12 L    04/23/2018 3.96 L      FEV1-%Pred-Pre   Date Value Ref Range Status   07/09/2018 131 %    04/23/2018 125 %      Patient was seen and examined by me. I personally reviewed the electronic medical record including labs, flowsheets, imaging reports and films, vitals, and medications.    Clinical update was provided to the patient. I answered all of their questions and provided them support    Jag Benavides MD, [facility]  Pager: [phone]    Patient has been seen and evaluated by me. I have reviewed today's vital signs, medications, labs and imaging results independently. I have discussed the plan with the team and agree with the findings and plan in this note.      28 years old with hodgkin's disease, now day + 6 s/p autologous transplant. Admitted with diarrhea, poor oral intake and neutropenic fever    On exam  HEENT: PERRL, EOMI, MMM  Chest: CTAB  CVS: S1 S2 RRR, no murmurs or gallops  PA: soft, non tender  CNS: non focal    A/P;    28 years old with hodgkin's disease, now day + 6 s/p autologous transplant. Admitted with  diarrhea, poor oral intake and neutropenic fever  Hydrate, will likely need TPN. Fever: pancultured, on cefipime. Diarrhea: Cultures negative. OK to use rolly Garcia MD

## 2018-08-13 ENCOUNTER — APPOINTMENT (OUTPATIENT)
Dept: OCCUPATIONAL THERAPY | Facility: CLINIC | Age: 28
DRG: 809 | End: 2018-08-13
Attending: PHYSICIAN ASSISTANT
Payer: COMMERCIAL

## 2018-08-13 ENCOUNTER — APPOINTMENT (OUTPATIENT)
Dept: GENERAL RADIOLOGY | Facility: CLINIC | Age: 28
DRG: 809 | End: 2018-08-13
Attending: PHYSICIAN ASSISTANT
Payer: COMMERCIAL

## 2018-08-13 LAB
ABO + RH BLD: NORMAL
ABO + RH BLD: NORMAL
ALBUMIN SERPL-MCNC: 2.3 G/DL (ref 3.4–5)
ALP SERPL-CCNC: 36 U/L (ref 40–150)
ALT SERPL W P-5'-P-CCNC: 16 U/L (ref 0–50)
ANION GAP SERPL CALCULATED.3IONS-SCNC: 5 MMOL/L (ref 3–14)
AST SERPL W P-5'-P-CCNC: 5 U/L (ref 0–45)
BACTERIA SPEC CULT: NO GROWTH
BILIRUB SERPL-MCNC: 0.2 MG/DL (ref 0.2–1.3)
BLD GP AB SCN SERPL QL: NORMAL
BLD PROD TYP BPU: NORMAL
BLD UNIT ID BPU: 0
BLD UNIT ID BPU: 0
BLOOD BANK CMNT PATIENT-IMP: NORMAL
BLOOD PRODUCT CODE: NORMAL
BLOOD PRODUCT CODE: NORMAL
BPU ID: NORMAL
BPU ID: NORMAL
BUN SERPL-MCNC: 9 MG/DL (ref 7–30)
CALCIUM SERPL-MCNC: 7.5 MG/DL (ref 8.5–10.1)
CHLORIDE SERPL-SCNC: 110 MMOL/L (ref 94–109)
CO2 SERPL-SCNC: 26 MMOL/L (ref 20–32)
CREAT SERPL-MCNC: 0.52 MG/DL (ref 0.52–1.04)
DIFFERENTIAL METHOD BLD: ABNORMAL
ERYTHROCYTE [DISTWIDTH] IN BLOOD BY AUTOMATED COUNT: 12.7 % (ref 10–15)
GFR SERPL CREATININE-BSD FRML MDRD: >90 ML/MIN/1.7M2
GLUCOSE SERPL-MCNC: 98 MG/DL (ref 70–99)
HCT VFR BLD AUTO: 21.4 % (ref 35–47)
HGB BLD-MCNC: 7.4 G/DL (ref 11.7–15.7)
HGB BLD-MCNC: 8.2 G/DL (ref 11.7–15.7)
INR PPP: 1.3 (ref 0.86–1.14)
Lab: NORMAL
MAGNESIUM SERPL-MCNC: 2.1 MG/DL (ref 1.6–2.3)
MCH RBC QN AUTO: 30.1 PG (ref 26.5–33)
MCHC RBC AUTO-ENTMCNC: 34.6 G/DL (ref 31.5–36.5)
MCV RBC AUTO: 87 FL (ref 78–100)
NUM BPU REQUESTED: 1
NUM BPU REQUESTED: 1
PHOSPHATE SERPL-MCNC: 3.1 MG/DL (ref 2.5–4.5)
PHOSPHATE SERPL-MCNC: 4.4 MG/DL (ref 2.5–4.5)
PLATELET # BLD AUTO: 22 10E9/L (ref 150–450)
PLATELET # BLD AUTO: 23 10E9/L (ref 150–450)
PLATELET # BLD AUTO: 3 10E9/L (ref 150–450)
POTASSIUM SERPL-SCNC: 3.4 MMOL/L (ref 3.4–5.3)
PROT SERPL-MCNC: 5.1 G/DL (ref 6.8–8.8)
RBC # BLD AUTO: 2.46 10E12/L (ref 3.8–5.2)
SODIUM SERPL-SCNC: 141 MMOL/L (ref 133–144)
SPECIMEN EXP DATE BLD: NORMAL
SPECIMEN SOURCE: NORMAL
TRANSFUSION STATUS PATIENT QL: NORMAL
WBC # BLD AUTO: 0.1 10E9/L (ref 4–11)

## 2018-08-13 PROCEDURE — 97165 OT EVAL LOW COMPLEX 30 MIN: CPT | Mod: GO | Performed by: OCCUPATIONAL THERAPIST

## 2018-08-13 PROCEDURE — 85049 AUTOMATED PLATELET COUNT: CPT | Performed by: PHYSICIAN ASSISTANT

## 2018-08-13 PROCEDURE — 84100 ASSAY OF PHOSPHORUS: CPT | Performed by: INTERNAL MEDICINE

## 2018-08-13 PROCEDURE — 83735 ASSAY OF MAGNESIUM: CPT | Performed by: INTERNAL MEDICINE

## 2018-08-13 PROCEDURE — 25000132 ZZH RX MED GY IP 250 OP 250 PS 637: Performed by: PHYSICIAN ASSISTANT

## 2018-08-13 PROCEDURE — 85018 HEMOGLOBIN: CPT | Performed by: PHYSICIAN ASSISTANT

## 2018-08-13 PROCEDURE — 25000128 H RX IP 250 OP 636: Performed by: PHYSICIAN ASSISTANT

## 2018-08-13 PROCEDURE — 25000128 H RX IP 250 OP 636: Performed by: INTERNAL MEDICINE

## 2018-08-13 PROCEDURE — P9073 PLATELETS PHERESIS PATH REDU: HCPCS | Performed by: INTERNAL MEDICINE

## 2018-08-13 PROCEDURE — 25000125 ZZHC RX 250: Performed by: INTERNAL MEDICINE

## 2018-08-13 PROCEDURE — 97110 THERAPEUTIC EXERCISES: CPT | Mod: GO | Performed by: OCCUPATIONAL THERAPIST

## 2018-08-13 PROCEDURE — 40000133 ZZH STATISTIC OT WARD VISIT: Performed by: OCCUPATIONAL THERAPIST

## 2018-08-13 PROCEDURE — 85610 PROTHROMBIN TIME: CPT | Performed by: INTERNAL MEDICINE

## 2018-08-13 PROCEDURE — 20000002 ZZH R&B BMT INTERMEDIATE

## 2018-08-13 PROCEDURE — 25000132 ZZH RX MED GY IP 250 OP 250 PS 637: Performed by: INTERNAL MEDICINE

## 2018-08-13 PROCEDURE — 80053 COMPREHEN METABOLIC PANEL: CPT | Performed by: INTERNAL MEDICINE

## 2018-08-13 PROCEDURE — 85027 COMPLETE CBC AUTOMATED: CPT

## 2018-08-13 PROCEDURE — 71046 X-RAY EXAM CHEST 2 VIEWS: CPT

## 2018-08-13 PROCEDURE — P9040 RBC LEUKOREDUCED IRRADIATED: HCPCS | Performed by: INTERNAL MEDICINE

## 2018-08-13 PROCEDURE — 87081 CULTURE SCREEN ONLY: CPT | Performed by: PHYSICIAN ASSISTANT

## 2018-08-13 PROCEDURE — 84134 ASSAY OF PREALBUMIN: CPT | Performed by: INTERNAL MEDICINE

## 2018-08-13 RX ORDER — CAFFEINE 200 MG
200 TABLET ORAL 2 TIMES DAILY PRN
Status: DISCONTINUED | OUTPATIENT
Start: 2018-08-13 | End: 2018-08-17 | Stop reason: HOSPADM

## 2018-08-13 RX ORDER — DOCUSATE SODIUM 100 MG/1
100 CAPSULE, LIQUID FILLED ORAL 2 TIMES DAILY PRN
Status: DISCONTINUED | OUTPATIENT
Start: 2018-08-13 | End: 2018-08-17 | Stop reason: HOSPADM

## 2018-08-13 RX ORDER — ONDANSETRON 2 MG/ML
8 INJECTION INTRAMUSCULAR; INTRAVENOUS EVERY 8 HOURS PRN
Status: DISCONTINUED | OUTPATIENT
Start: 2018-08-13 | End: 2018-08-17 | Stop reason: HOSPADM

## 2018-08-13 RX ORDER — ONDANSETRON 8 MG/1
8 TABLET, ORALLY DISINTEGRATING ORAL EVERY 8 HOURS PRN
Status: DISCONTINUED | OUTPATIENT
Start: 2018-08-13 | End: 2018-08-17 | Stop reason: HOSPADM

## 2018-08-13 RX ORDER — CHLORHEXIDINE GLUCONATE ORAL RINSE 1.2 MG/ML
15 SOLUTION DENTAL 2 TIMES DAILY
Status: DISCONTINUED | OUTPATIENT
Start: 2018-08-13 | End: 2018-08-17 | Stop reason: HOSPADM

## 2018-08-13 RX ORDER — OXYCODONE AND ACETAMINOPHEN 5; 325 MG/1; MG/1
1 TABLET ORAL ONCE
Status: COMPLETED | OUTPATIENT
Start: 2018-08-13 | End: 2018-08-13

## 2018-08-13 RX ADMIN — ACETAMINOPHEN 650 MG: 325 TABLET, FILM COATED ORAL at 14:29

## 2018-08-13 RX ADMIN — CEFEPIME HYDROCHLORIDE 2 G: 2 INJECTION, POWDER, FOR SOLUTION INTRAVENOUS at 04:00

## 2018-08-13 RX ADMIN — PHYTONADIONE: 1 INJECTION, EMULSION INTRAMUSCULAR; INTRAVENOUS; SUBCUTANEOUS at 20:04

## 2018-08-13 RX ADMIN — CEFEPIME HYDROCHLORIDE 2 G: 2 INJECTION, POWDER, FOR SOLUTION INTRAVENOUS at 20:04

## 2018-08-13 RX ADMIN — Medication 350 MCG: at 20:04

## 2018-08-13 RX ADMIN — FLUCONAZOLE IN SODIUM CHLORIDE 200 MG: 2 INJECTION, SOLUTION INTRAVENOUS at 13:19

## 2018-08-13 RX ADMIN — ACETAMINOPHEN 650 MG: 325 TABLET, FILM COATED ORAL at 00:40

## 2018-08-13 RX ADMIN — PROCHLORPERAZINE EDISYLATE 10 MG: 5 INJECTION INTRAMUSCULAR; INTRAVENOUS at 14:29

## 2018-08-13 RX ADMIN — PANTOPRAZOLE SODIUM 40 MG: 40 INJECTION, POWDER, FOR SOLUTION INTRAVENOUS at 07:58

## 2018-08-13 RX ADMIN — ACYCLOVIR SODIUM 700 MG: 50 INJECTION, SOLUTION INTRAVENOUS at 06:00

## 2018-08-13 RX ADMIN — ACYCLOVIR SODIUM 700 MG: 50 INJECTION, SOLUTION INTRAVENOUS at 21:53

## 2018-08-13 RX ADMIN — LORAZEPAM 0.5 MG: 2 INJECTION INTRAMUSCULAR; INTRAVENOUS at 20:49

## 2018-08-13 RX ADMIN — ACYCLOVIR SODIUM 700 MG: 50 INJECTION, SOLUTION INTRAVENOUS at 13:39

## 2018-08-13 RX ADMIN — CHLORHEXIDINE GLUCONATE 15 ML: 1.2 RINSE ORAL at 20:04

## 2018-08-13 RX ADMIN — ONDANSETRON HYDROCHLORIDE 8 MG: 2 INJECTION, SOLUTION INTRAMUSCULAR; INTRAVENOUS at 09:39

## 2018-08-13 RX ADMIN — CEFEPIME HYDROCHLORIDE 2 G: 2 INJECTION, POWDER, FOR SOLUTION INTRAVENOUS at 12:52

## 2018-08-13 RX ADMIN — OXYCODONE HYDROCHLORIDE AND ACETAMINOPHEN 1 TABLET: 5; 325 TABLET ORAL at 05:40

## 2018-08-13 RX ADMIN — CHLORHEXIDINE GLUCONATE 15 ML: 1.2 RINSE ORAL at 09:36

## 2018-08-13 RX ADMIN — I.V. FAT EMULSION 250 ML: 20 EMULSION INTRAVENOUS at 20:04

## 2018-08-13 ASSESSMENT — ACTIVITIES OF DAILY LIVING (ADL)
ADLS_ACUITY_SCORE: 11
PREVIOUS_RESPONSIBILITIES: MEAL PREP;HOUSEKEEPING;LAUNDRY;SHOPPING;DRIVING

## 2018-08-13 ASSESSMENT — PAIN DESCRIPTION - DESCRIPTORS
DESCRIPTORS: ACHING
DESCRIPTORS: ACHING
DESCRIPTORS: ACHING;HEADACHE

## 2018-08-13 NOTE — PLAN OF CARE
Problem: Patient Care Overview  Goal: Plan of Care/Patient Progress Review  Outcome: No Change   08/13/18 0615   OTHER   Plan Of Care Reviewed With patient;mother   Plan of Care Review   Progress no change     Goal: Individualization & Mutuality  Outcome: No Change  Patient is A & O x 4. Temp max 99.1, vital sign stable and lung sounds clear. Central line dressing is C/D/I and patient needs to shower today. Patient continues to c/o mild nausea without emesis and Headache. Patient received Zofran 4 mg iv x 1, tylenol 650 mg po x 1, percocet 1 tab po and ice packs with some relief. Patient started TPN/LIPIDS last night and BS check every 6 hours, next 10 am. Patient needs both platelets and RBC, blood bank will call the 5C when the platelets is there from [facility]. Patient is up independently and voiding good, urine clear, yellow. Patient's mother is at the bedside and very supportive. Continue with plan of care and notify MD for status changes.     Problem: Stem Cell/Bone Marrow Transplant (Adult)  Goal: Signs and Symptoms of Listed Potential Problems Will be Absent, Minimized or Managed (Stem Cell/Bone Marrow Transplant)  Signs and symptoms of listed potential problems will be absent, minimized or managed by discharge/transition of care (reference Stem Cell/Bone Marrow Transplant (Adult) CPG).   Outcome: No Change   08/13/18 0615   Stem Cell/Bone Marrow Transplant   Problems Assessed (Stem Cell/Bone Marrow Transplant) all   Problems Present (Stem Cell/BMT) fatigue;gastrointestinal complications;situational response

## 2018-08-13 NOTE — PLAN OF CARE
Problem: Patient Care Overview  Goal: Plan of Care/Patient Progress Review  Outcome: No Change   08/13/18 0715   OTHER   Plan Of Care Reviewed With patient   Plan of Care Review   Progress no change     Pt was lethargic today.  She was given zofran and compazine for nausea and tylenol for a headache.  1 unit platelets and 1 unit RBCs given.  She remains on q6h blood sugar checks which have been within normal limits.

## 2018-08-13 NOTE — PLAN OF CARE
Problem: Patient Care Overview  Goal: Plan of Care/Patient Progress Review  Discharge Planner OT   Patient plan for discharge: Home w/ A  Current status: Eval completed and tx initiated. Pt refused OOB activity at this time and educated on BUE/BLE CV therex within lab value precautions. Pt given in-room treadmill and instructed not to use machine until OT completes further assessment on pre/post vitals and provides machine education; pt demonstrated understanding.   Barriers to return to prior living situation: Medical status  Recommendations for discharge: Home w/ A prn    Rationale for recommendations: To further increase pt's ind participation in ADLs/IADLs prn       Entered by: Gina Boswell 08/13/2018 3:35 PM

## 2018-08-13 NOTE — PROGRESS NOTES
08/13/18 1500   Quick Adds   Type of Visit Initial Occupational Therapy Evaluation   Living Environment   Lives With significant other   Living Arrangements apartment   Home Accessibility no concerns   Number of Stairs to Enter Home 36   Number of Stairs Within Home 0   Stair Railings at Home other (see comments)   Transportation Available car   Living Environment Comment Pt lives with SO in an apartment on the third floor of the building with 3 flights of stairs to enter the home, no elevator.    Self-Care   Dominant Hand right   Usual Activity Tolerance good   Current Activity Tolerance poor  (Simultaneous filing. User may not have seen previous data.)   Regular Exercise yes   Activity/Exercise Type strength training;walking   Exercise Amount/Frequency 5-7 times/wk   Equipment Currently Used at Home none   Activity/Exercise/Self-Care Comment Pt reported enjoying lifting weights as much as possible when she feels up for the activity, otherwise she tries to stay active by walking. Pt currently unable to perform typical activities 2/2 nausea and fatigue.  (Simultaneous filing. User may not have seen previous data.)   Functional Level Prior   Ambulation 0-->independent   Transferring 0-->independent   Toileting 0-->independent   Bathing 0-->independent   Dressing 0-->independent   Eating 0-->independent   Communication 0-->understands/communicates without difficulty   Swallowing 0-->swallows foods/liquids without difficulty   Cognition 0 - no cognition issues reported   Fall history within last six months no   Which of the above functional risks had a recent onset or change? none   Prior Functional Level Comment Pt reports independence in all ADLs/IADLs. Pt reported no current employment.       Present no   General Information   Onset of Illness/Injury or Date of Surgery - Date 08/12/18   Referring Physician Shiloh Drake PA-C   Patient/Family Goals Statement Return home   Additional  Occupational Profile Info/Pertinent History of Current Problem Amira Avery is a 28 year old women who is day +7 s/p Auto PBST for Hodgkins Lymphoma. Readmitted 8/12 with dehydration and recent neutropenic fevers   Precautions/Limitations immunosuppressed   Weight-Bearing Status - LUE full weight-bearing   Weight-Bearing Status - RUE full weight-bearing   Weight-Bearing Status - LLE full weight-bearing   Weight-Bearing Status - RLE full weight-bearing   Cognitive Status Examination   Level of Consciousness alert   Able to Follow Commands WNL/WFL   Memory intact   Attention No deficits were identified   Organization/Problem Solving No deficits were identified   Cognitive Comment OT: Pt's cognition appears WFL, no concerns noted.   Visual Perception   Visual Perception No deficits were identified   Visual Perception Comments OT: Pt reports vision is WFL.per previous admission.   Sensory Examination   Sensory Quick Adds No deficits were identified   Sensory Comments OT: Pt reports sensation is WFL per previous admission.   Pain Assessment   Patient Currently in Pain No   Integumentary/Edema   Integumentary/Edema no deficits were identifed   Posture   Posture Comments OT: Pt's posture appears WNL but unable to assess 2/2 pts OOB refusal   Range of Motion (ROM)   ROM Quick Adds No deficits were identified   ROM Comment OT: Pt reported ROM is WFL in previous admission.   Strength   Manual Muscle Testing Quick Adds No deficits were identified   Strength Comments OT: Pt reported adequate strength at previous admission yet possibly decreased 2/2 pt's decreased activity.   Muscle Tone Assessment   Muscle Tone Quick Adds No deficits were identified   Coordination   Coordination Comments OT: Pt's coordination appears WFL but not formally assessed.   Balance   Balance Comments OT: Pt's balance not assessed 2/2 OOB refusal but was WNL at previous admission.   Instrumental Activities of Daily Living (IADL)   Previous  "Responsibilities meal prep;housekeeping;laundry;shopping;driving   IADL Comments OT: Pt reports being ind in ADLs   Activities of Daily Living Analysis   Impairments Contributing to Impaired Activities of Daily Living strength decreased;ROM decreased;balance impaired   General Therapy Interventions   Planned Therapy Interventions home program guidelines;strengthening;ROM;ADL retraining;IADL retraining   Clinical Impression   Criteria for Skilled Therapeutic Interventions Met yes, treatment indicated   OT Diagnosis Pt with decreased ind in ADLs/IADLs   Influenced by the following impairments Decreased strength, decreased activity tolerance, fatigue   Assessment of Occupational Performance 1-3 Performance Deficits   Identified Performance Deficits Decreased ind in ADLs/IADLs 2/2 pt's weakness, fatigue, nausea and fevers from BMT    Clinical Decision Making (Complexity) Low complexity   Therapy Frequency other (see comments)  (1x/week)   Predicted Duration of Therapy Intervention (days/wks) 8/19/18  (1x/week)   Anticipated Discharge Disposition Home with Assist   Risks and Benefits of Treatment have been explained. Yes   Patient, Family & other staff in agreement with plan of care Yes   Clinical Impression Comments Pt with decreased ind in ADLs/IADLs 2/2 pt's BMT and would benefit from skilled OT services.   [facility] AM-PAC  \"6 Clicks\" Daily Activity Inpatient Short Form   1. Putting on and taking off regular lower body clothing? 3 - A Little   2. Bathing (including washing, rinsing, drying)? 3 - A Little   3. Toileting, which includes using toilet, bedpan or urinal? 4 - None   4. Putting on and taking off regular upper body clothing? 3 - A Little   5. Taking care of personal grooming such as brushing teeth? 4 - None   6. Eating meals? 4 - None   Daily Activity Raw Score (Score out of 24.Lower scores equate to lower levels of function) 21   Total Evaluation Time   Total Evaluation Time (Minutes) 3     "
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
## 2018-08-13 NOTE — PROGRESS NOTES
"BMT Progress Note     Patient ID:  Amira Avery is a 28 year old women who is day +7 s/p Auto PBST for Hodgkins Lymphoma. Readmitted 8/12 with dehydration and recent neutropenic fevers     Diagnosis HDN Hodgkin's Disease - NOS  HCT Type Autologous    Prep Regimen BCNU  Lisa-C  Etoposide  Melphalan   Donor Source Autologous    GVHD Prophylaxis No  Primary BMT Provider Dr. Landa     INTERVAL  HISTORY     Admitted from clinic yesterday due to dehydration and recent fevers. Last fever 2 days ago, highest around 101.3. Only focal symptom is abdominal pain and diarrhea. This morning both have subsided. Also complains of headache. No cough or cold symptoms. Bleeding from gums this morning after brushing with a soft toothbrush. No mouth sores. Currently no nausea, but has come and gone. Minimal intake. No other bleeding or rash.     Review of Systems: 10 point ROS negative except as noted above.    PHYSICAL EXAM     /67  Pulse 86  Temp 98.3  F (36.8  C) (Axillary)  Resp 18  Ht 1.605 m (5' 3.19\")  Wt 68.8 kg (151 lb 11.2 oz)  SpO2 97%  BMI 26.71 kg/m2   General: NAD, tired appearing, nontoxic  Eyes: : ARMINDA, sclera anicteric   Nose/Mouth/Throat: OP clear, buccal mucosa moist, no ulcerations   Lungs: CTA bilaterally  Cardiovascular: RRR, no M/R/G   Abdominal/Rectal: +BS, soft, NT, ND, No HSM   Lymphatics: no edema  Skin: no rashes or petechaie  Neuro: A&O   Additional Findings: Gerald: site NT, no drainage.  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  BMT:  Completed GCSF + mozobil priming prior to BEAM Auto PBSCT  - Collected 6.21million CD34/kg on 7/24.  - Transplanted on 8/6/2018 s/p BEAM prep.  -  GCSF started d+5 (8/11/2018) and cont until ANC>2500 x2 consecutive days. Re-stage per protocol.     2.  HEME: Keep Hgb>8 and plts>10K. No pre-meds.   - Hgb drop 9.3 ->7.4 (dilutional?) with oral bleeding this morning. RBC transfusion and repeat Hgb 1600  - Plts 3k, will collect post plt count and repeat at 1600  - INR long at 1.3, add vit K to TPN  - Platelet intercept study    3.  ID:   - Neutropenic fevers starting 8/10. Received rocephin & Vanco in clinic, then admitted 8/12 and started cefepime. Blood cx and UA neg. CXR was never done with fevers, so will empirically do that today  - Cont Fluc, and HD ACV (CMV+, HSV+, EBV+) prophy.   - Stool cx negative for C Diff 8/8  - Bactrim or appropriate PCP therapy to start d+28.                     4.  GI:  N/V/D  - peridex and saline rinse instead of toothbrush  - Continue compazine, Ativan & Zofran prn   - Diarrhea.  Likely due to prep, cx negative.  Ok to use Imodium  - Protonix for GI prophy.    5.  FEN/Renal:    - Lytes and Cr stable. Replace prn per sliding scale  - MIVF with NS at 75cc/hr  - TPN started on admission    6. Headache  - tylenol and caffeine prn    Shiloh Drake PA-C  [phone]    The patient was seen and examined by me separately from the midlevel provider. The note reflects our mutual assessment and plans and were approved by me personally.   I personally reviewed today's lab results vital signs and radiology results.    Each point of the assessment and plan were reviewed by the midlevel and me and either endorsed by me or were my added decisions.    My pertinent physical findings today are: Febrile, clear lungs no rash.    My assessment and plan are: 27 yo day 6 post auto PBSC for HD admitted with  neutropenic fever and diarrhea. - c.dif. Cefepime and vanco for fever. Vit K for prolonged INR. Start TPN.    Solitario Tilley M.D.

## 2018-08-13 NOTE — PLAN OF CARE
Problem: Patient Care Overview  Goal: Discharge Needs Assessment  1. Continue to provide support and assessment re: patient's adjustment to illness and coping.  2. Continue to provide support and assessment re: caregiver's adjustment to role of caregiver and coping.  3. Continue to complete ongoing needs assessment for appropriate resources.  4. Patient will remain involved with discharge planning.

## 2018-08-14 ENCOUNTER — APPOINTMENT (OUTPATIENT)
Dept: OCCUPATIONAL THERAPY | Facility: CLINIC | Age: 28
DRG: 809 | End: 2018-08-14
Attending: INTERNAL MEDICINE
Payer: COMMERCIAL

## 2018-08-14 LAB
ANION GAP SERPL CALCULATED.3IONS-SCNC: 6 MMOL/L (ref 3–14)
BUN SERPL-MCNC: 8 MG/DL (ref 7–30)
CALCIUM SERPL-MCNC: 7.6 MG/DL (ref 8.5–10.1)
CHLORIDE SERPL-SCNC: 110 MMOL/L (ref 94–109)
CO2 SERPL-SCNC: 25 MMOL/L (ref 20–32)
CREAT SERPL-MCNC: 0.45 MG/DL (ref 0.52–1.04)
DIFFERENTIAL METHOD BLD: ABNORMAL
ERYTHROCYTE [DISTWIDTH] IN BLOOD BY AUTOMATED COUNT: 12.7 % (ref 10–15)
GFR SERPL CREATININE-BSD FRML MDRD: >90 ML/MIN/1.7M2
GLUCOSE BLDC GLUCOMTR-MCNC: 105 MG/DL (ref 70–99)
GLUCOSE BLDC GLUCOMTR-MCNC: 115 MG/DL (ref 70–99)
GLUCOSE BLDC GLUCOMTR-MCNC: 96 MG/DL (ref 70–99)
GLUCOSE BLDC GLUCOMTR-MCNC: 97 MG/DL (ref 70–99)
GLUCOSE BLDC GLUCOMTR-MCNC: 98 MG/DL (ref 70–99)
GLUCOSE SERPL-MCNC: 101 MG/DL (ref 70–99)
HCT VFR BLD AUTO: 23.3 % (ref 35–47)
HGB BLD-MCNC: 8.2 G/DL (ref 11.7–15.7)
MAGNESIUM SERPL-MCNC: 2.1 MG/DL (ref 1.6–2.3)
MCH RBC QN AUTO: 30.7 PG (ref 26.5–33)
MCHC RBC AUTO-ENTMCNC: 35.2 G/DL (ref 31.5–36.5)
MCV RBC AUTO: 87 FL (ref 78–100)
PHOSPHATE SERPL-MCNC: 3.5 MG/DL (ref 2.5–4.5)
PLATELET # BLD AUTO: 19 10E9/L (ref 150–450)
POTASSIUM SERPL-SCNC: 3.6 MMOL/L (ref 3.4–5.3)
RBC # BLD AUTO: 2.67 10E12/L (ref 3.8–5.2)
SODIUM SERPL-SCNC: 141 MMOL/L (ref 133–144)
WBC # BLD AUTO: 0.2 10E9/L (ref 4–11)

## 2018-08-14 PROCEDURE — 25000128 H RX IP 250 OP 636: Performed by: INTERNAL MEDICINE

## 2018-08-14 PROCEDURE — 25000132 ZZH RX MED GY IP 250 OP 250 PS 637: Performed by: INTERNAL MEDICINE

## 2018-08-14 PROCEDURE — 25000132 ZZH RX MED GY IP 250 OP 250 PS 637: Performed by: PHYSICIAN ASSISTANT

## 2018-08-14 PROCEDURE — 97535 SELF CARE MNGMENT TRAINING: CPT | Mod: GO | Performed by: OCCUPATIONAL THERAPIST

## 2018-08-14 PROCEDURE — 25000125 ZZHC RX 250: Performed by: INTERNAL MEDICINE

## 2018-08-14 PROCEDURE — 20600000 ZZH R&B BMT

## 2018-08-14 PROCEDURE — 97110 THERAPEUTIC EXERCISES: CPT | Mod: GO | Performed by: OCCUPATIONAL THERAPIST

## 2018-08-14 PROCEDURE — 40000133 ZZH STATISTIC OT WARD VISIT: Performed by: OCCUPATIONAL THERAPIST

## 2018-08-14 PROCEDURE — 00000146 ZZHCL STATISTIC GLUCOSE BY METER IP

## 2018-08-14 PROCEDURE — 85027 COMPLETE CBC AUTOMATED: CPT

## 2018-08-14 PROCEDURE — 84100 ASSAY OF PHOSPHORUS: CPT | Performed by: INTERNAL MEDICINE

## 2018-08-14 PROCEDURE — 80048 BASIC METABOLIC PNL TOTAL CA: CPT | Performed by: INTERNAL MEDICINE

## 2018-08-14 PROCEDURE — 83735 ASSAY OF MAGNESIUM: CPT | Performed by: INTERNAL MEDICINE

## 2018-08-14 RX ADMIN — Medication 350 MCG: at 20:09

## 2018-08-14 RX ADMIN — CEFEPIME HYDROCHLORIDE 2 G: 2 INJECTION, POWDER, FOR SOLUTION INTRAVENOUS at 22:05

## 2018-08-14 RX ADMIN — FLUCONAZOLE IN SODIUM CHLORIDE 200 MG: 2 INJECTION, SOLUTION INTRAVENOUS at 15:15

## 2018-08-14 RX ADMIN — ACETAMINOPHEN 650 MG: 325 TABLET, FILM COATED ORAL at 07:17

## 2018-08-14 RX ADMIN — I.V. FAT EMULSION 250 ML: 20 EMULSION INTRAVENOUS at 20:08

## 2018-08-14 RX ADMIN — CHLORHEXIDINE GLUCONATE 15 ML: 1.2 RINSE ORAL at 20:08

## 2018-08-14 RX ADMIN — SODIUM CHLORIDE 1000 ML: 9 INJECTION, SOLUTION INTRAVENOUS at 22:58

## 2018-08-14 RX ADMIN — ACYCLOVIR SODIUM 700 MG: 50 INJECTION, SOLUTION INTRAVENOUS at 15:16

## 2018-08-14 RX ADMIN — CAFFEINE 200 MG: 200 TABLET ORAL at 10:06

## 2018-08-14 RX ADMIN — PANTOPRAZOLE SODIUM 40 MG: 40 INJECTION, POWDER, FOR SOLUTION INTRAVENOUS at 07:17

## 2018-08-14 RX ADMIN — PROCHLORPERAZINE EDISYLATE 10 MG: 5 INJECTION INTRAMUSCULAR; INTRAVENOUS at 04:03

## 2018-08-14 RX ADMIN — CEFEPIME HYDROCHLORIDE 2 G: 2 INJECTION, POWDER, FOR SOLUTION INTRAVENOUS at 03:56

## 2018-08-14 RX ADMIN — CHLORHEXIDINE GLUCONATE 15 ML: 1.2 RINSE ORAL at 07:17

## 2018-08-14 RX ADMIN — ACYCLOVIR SODIUM 700 MG: 50 INJECTION, SOLUTION INTRAVENOUS at 22:58

## 2018-08-14 RX ADMIN — CEFEPIME HYDROCHLORIDE 2 G: 2 INJECTION, POWDER, FOR SOLUTION INTRAVENOUS at 12:34

## 2018-08-14 RX ADMIN — ACYCLOVIR SODIUM 700 MG: 50 INJECTION, SOLUTION INTRAVENOUS at 06:01

## 2018-08-14 RX ADMIN — PHYTONADIONE: 1 INJECTION, EMULSION INTRAMUSCULAR; INTRAVENOUS; SUBCUTANEOUS at 20:07

## 2018-08-14 RX ADMIN — SENNOSIDES 2 TABLET: 8.6 TABLET, FILM COATED ORAL at 07:20

## 2018-08-14 RX ADMIN — PROCHLORPERAZINE EDISYLATE 10 MG: 5 INJECTION INTRAMUSCULAR; INTRAVENOUS at 16:42

## 2018-08-14 ASSESSMENT — ACTIVITIES OF DAILY LIVING (ADL)
ADLS_ACUITY_SCORE: 11

## 2018-08-14 ASSESSMENT — PAIN DESCRIPTION - DESCRIPTORS: DESCRIPTORS: ACHING

## 2018-08-14 NOTE — PLAN OF CARE
Problem: Patient Care Overview  Goal: Plan of Care/Patient Progress Review  PT 5C: PT order for evaluation and treatment acknowledged. Reviewed pt status with OT following OT evaluation. Pt is independent with ambulation and transfers with normal balance. No PT needs are identified. OT will follow and PT will defer and complete the order.

## 2018-08-14 NOTE — PLAN OF CARE
"Problem: Stem Cell/Bone Marrow Transplant (Adult)  Goal: Signs and Symptoms of Listed Potential Problems Will be Absent, Minimized or Managed (Stem Cell/Bone Marrow Transplant)  Signs and symptoms of listed potential problems will be absent, minimized or managed by discharge/transition of care (reference Stem Cell/Bone Marrow Transplant (Adult) CPG).   Outcome: No Change  /70 (BP Location: Right arm, Cuff Size: Adult Regular)  Pulse 92  Temp 99  F (37.2  C) (Axillary)  Resp 16  Ht 1.605 m (5' 3.19\")  Wt 70.4 kg (155 lb 1.6 oz)  SpO2 96%  BMI 27.31 kg/m2 Pt c/o nausea and got Ativan at 20:50 and compazine at 04:03 Pt denies any pain/headache.. TPN/IL going. And MIV at 75 ML /hour. Significant other at the bedside supportive of pt. Continue to hai   08/14/18 0700   Stem Cell/Bone Marrow Transplant   Problems Assessed (Stem Cell/Bone Marrow Transplant) all   Problems Present (Stem Cell/BMT) altered nutrition;fatigue;neutropenia   tor pt.        "

## 2018-08-14 NOTE — PLAN OF CARE
Problem: Patient Care Overview  Goal: Plan of Care/Patient Progress Review  Outcome: No Change   08/14/18 3665   OTHER   Plan Of Care Reviewed With patient   Plan of Care Review   Progress no change     Pt's appetite and nausea improved today; she ate a full midday meal and was given only 1 PRN antiemetic.  VSS.  Pt did have a headache this morning which resolved with caffeine.

## 2018-08-14 NOTE — PROGRESS NOTES
"BMT Progress Note     Patient ID:  Amira Avery is a 28 year old women who is day +8 s/p Auto PBST for Hodgkins Lymphoma. Readmitted 8/12 with dehydration and recent neutropenic fevers     Diagnosis HDN Hodgkin's Disease - NOS  HCT Type Autologous    Prep Regimen BCNU  Lisa-C  Etoposide  Melphalan   Donor Source Autologous    GVHD Prophylaxis No  Primary BMT Provider Dr. Landa     INTERVAL  HISTORY     Afebrile overnight. Headache this morning. Tylenol didn't relieve pain, will try caffeine tabs. Still a little nauseated. Not eating much, only crackers. No stool x 2 days. No further gingival bleeding.     Review of Systems: 10 point ROS negative except as noted above.    PHYSICAL EXAM     /65 (BP Location: Right arm)  Pulse 87  Temp 99.8  F (37.7  C) (Oral)  Resp 16  Ht 1.605 m (5' 3.19\")  Wt 70.4 kg (155 lb 1.6 oz)  SpO2 96%  BMI 27.31 kg/m2   General: NAD, tired appearing, nontoxic  Eyes: : ARMINDA, sclera anicteric   Nose/Mouth/Throat: OP clear, buccal mucosa moist, no ulcerations   Lungs: CTA bilaterally  Cardiovascular: RRR, no M/R/G   Abdominal/Rectal: +BS, soft, NT, ND, No HSM   Lymphatics: no edema  Skin: no rashes or petechaie  Neuro: A&O   Additional Findings: Gerald: site NT, no drainage.  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  BMT:  Completed GCSF + mozobil priming prior to BEAM Auto PBSCT  - Collected 6.21million CD34/kg on " 7/24.  - Transplanted on 8/6/2018 s/p BEAM prep.  -  GCSF started d+5 (8/11/2018) and cont until ANC>2500 x2 consecutive days. Re-stage per protocol. WBC up to 0.2     2.  HEME: Keep Hgb>8 and plts>10K. No pre-meds.   - Plts better after transfusion yesterday. Bumped from 3 -> 23. No further bleeding  - INR long at 1.3, added vit K to TPN  - Platelet intercept study    3.  ID:   - Neutropenic fevers starting 8/10. Received rocephin & Vanco in clinic, then admitted 8/12 and started cefepime - continue. Blood cx, UA and CXR neg  - Cont Fluc, and HD ACV (CMV+, HSV+, EBV+) prophy.   - Stool cx negative for C Diff 8/8  - Bactrim or appropriate PCP therapy to start d+28.                     4.  GI:    - peridex and saline rinse instead of toothbrush  - Nausea: continue compazine, Ativan & Zofran prn   - Diarrhea. Likely due to prep, cx negative.  Ok to use Imodium prn  - Protonix for GI prophy.    5.  FEN/Renal:    - Lytes and Cr stable. Replace prn per sliding scale  - MIVF with NS at 75cc/hr  - TPN started on admission    6. Headache  - tylenol and caffeine prn    Shiloh Drake PA-C  [phone]    The patient was seen and examined by me separately from the midlevel provider. The note reflects our mutual assessment and plans and were approved by me personally.   I personally reviewed today's lab results vital signs and radiology results.    Each point of the assessment and plan were reviewed by the midlevel and me and either endorsed by me or were my added decisions.    My pertinent physical findings today are: Febrile, clear lungs no rash.    My assessment and plan are: 29 yo day 6 post auto PBSC for HD admitted with neutropenic fever and diarrhea. - c.dif. Cefepime and vanco for fever. Vit K for prolonged INR. Start TPN. Vit K for prolonged INR. Caffeine for headache.    Solitario Tilley M.D.

## 2018-08-14 NOTE — PLAN OF CARE
Problem: Patient Care Overview  Goal: Plan of Care/Patient Progress Review  Discharge Planner OT   Patient plan for discharge: home  Current status: OT: Pre-vitals /75, pt completed 15 min on treadmill, post vitals /95.  Barriers to return to prior living situation: medical status  Recommendations for discharge: home w/ A  Rationale for recommendations: A for heavy lifting prn pending labs       Entered by: Lourdes Rojas 08/14/2018 1:32 PM

## 2018-08-15 LAB
ABO + RH BLD: NORMAL
ABO + RH BLD: NORMAL
ANION GAP SERPL CALCULATED.3IONS-SCNC: 8 MMOL/L (ref 3–14)
BACTERIA SPEC CULT: NORMAL
BLD GP AB SCN SERPL QL: NORMAL
BLOOD BANK CMNT PATIENT-IMP: NORMAL
BUN SERPL-MCNC: 7 MG/DL (ref 7–30)
CALCIUM SERPL-MCNC: 7.8 MG/DL (ref 8.5–10.1)
CHLORIDE SERPL-SCNC: 110 MMOL/L (ref 94–109)
CO2 SERPL-SCNC: 25 MMOL/L (ref 20–32)
CREAT SERPL-MCNC: 0.48 MG/DL (ref 0.52–1.04)
GFR SERPL CREATININE-BSD FRML MDRD: >90 ML/MIN/1.7M2
GLUCOSE SERPL-MCNC: 94 MG/DL (ref 70–99)
Lab: NORMAL
MAGNESIUM SERPL-MCNC: 2.1 MG/DL (ref 1.6–2.3)
PHOSPHATE SERPL-MCNC: 3.5 MG/DL (ref 2.5–4.5)
POTASSIUM SERPL-SCNC: 3.7 MMOL/L (ref 3.4–5.3)
PREALB SERPL IA-MCNC: 10 MG/DL (ref 15–45)
SODIUM SERPL-SCNC: 142 MMOL/L (ref 133–144)
SPECIMEN EXP DATE BLD: NORMAL
SPECIMEN SOURCE: NORMAL

## 2018-08-15 PROCEDURE — 25000132 ZZH RX MED GY IP 250 OP 250 PS 637: Performed by: INTERNAL MEDICINE

## 2018-08-15 PROCEDURE — 86850 RBC ANTIBODY SCREEN: CPT | Performed by: PHYSICIAN ASSISTANT

## 2018-08-15 PROCEDURE — 25000128 H RX IP 250 OP 636: Performed by: INTERNAL MEDICINE

## 2018-08-15 PROCEDURE — 25000125 ZZHC RX 250: Performed by: INTERNAL MEDICINE

## 2018-08-15 PROCEDURE — 80048 BASIC METABOLIC PNL TOTAL CA: CPT | Performed by: INTERNAL MEDICINE

## 2018-08-15 PROCEDURE — 20600000 ZZH R&B BMT

## 2018-08-15 PROCEDURE — 86901 BLOOD TYPING SEROLOGIC RH(D): CPT | Performed by: PHYSICIAN ASSISTANT

## 2018-08-15 PROCEDURE — 85025 COMPLETE CBC W/AUTO DIFF WBC: CPT | Performed by: INTERNAL MEDICINE

## 2018-08-15 PROCEDURE — 87633 RESP VIRUS 12-25 TARGETS: CPT | Performed by: PHYSICIAN ASSISTANT

## 2018-08-15 PROCEDURE — 83735 ASSAY OF MAGNESIUM: CPT | Performed by: INTERNAL MEDICINE

## 2018-08-15 PROCEDURE — 84100 ASSAY OF PHOSPHORUS: CPT | Performed by: INTERNAL MEDICINE

## 2018-08-15 PROCEDURE — 25000132 ZZH RX MED GY IP 250 OP 250 PS 637: Performed by: PHYSICIAN ASSISTANT

## 2018-08-15 PROCEDURE — 86900 BLOOD TYPING SEROLOGIC ABO: CPT | Performed by: PHYSICIAN ASSISTANT

## 2018-08-15 RX ORDER — FLUCONAZOLE 200 MG/1
200 TABLET ORAL DAILY
Status: DISCONTINUED | OUTPATIENT
Start: 2018-08-15 | End: 2018-08-17 | Stop reason: HOSPADM

## 2018-08-15 RX ORDER — LORATADINE 10 MG/1
10 TABLET ORAL DAILY
Status: DISCONTINUED | OUTPATIENT
Start: 2018-08-15 | End: 2018-08-17 | Stop reason: HOSPADM

## 2018-08-15 RX ORDER — LORATADINE 10 MG/1
10 TABLET ORAL ONCE
Status: COMPLETED | OUTPATIENT
Start: 2018-08-15 | End: 2018-08-15

## 2018-08-15 RX ORDER — PANTOPRAZOLE SODIUM 40 MG/1
40 TABLET, DELAYED RELEASE ORAL
Status: DISCONTINUED | OUTPATIENT
Start: 2018-08-16 | End: 2018-08-17 | Stop reason: HOSPADM

## 2018-08-15 RX ORDER — ACYCLOVIR 800 MG/1
800 TABLET ORAL
Status: DISCONTINUED | OUTPATIENT
Start: 2018-08-15 | End: 2018-08-17 | Stop reason: HOSPADM

## 2018-08-15 RX ADMIN — CHLORHEXIDINE GLUCONATE 15 ML: 1.2 RINSE ORAL at 08:06

## 2018-08-15 RX ADMIN — ACETAMINOPHEN 650 MG: 325 TABLET, FILM COATED ORAL at 08:59

## 2018-08-15 RX ADMIN — PROCHLORPERAZINE MALEATE 10 MG: 5 TABLET, FILM COATED ORAL at 20:33

## 2018-08-15 RX ADMIN — CEFEPIME HYDROCHLORIDE 2 G: 2 INJECTION, POWDER, FOR SOLUTION INTRAVENOUS at 11:36

## 2018-08-15 RX ADMIN — Medication 350 MCG: at 20:20

## 2018-08-15 RX ADMIN — CHLORHEXIDINE GLUCONATE 15 ML: 1.2 RINSE ORAL at 20:20

## 2018-08-15 RX ADMIN — LORATADINE 10 MG: 10 TABLET ORAL at 20:20

## 2018-08-15 RX ADMIN — CAFFEINE 200 MG: 200 TABLET ORAL at 06:22

## 2018-08-15 RX ADMIN — ACYCLOVIR 800 MG: 800 TABLET ORAL at 21:32

## 2018-08-15 RX ADMIN — PHYTONADIONE: 1 INJECTION, EMULSION INTRAMUSCULAR; INTRAVENOUS; SUBCUTANEOUS at 20:20

## 2018-08-15 RX ADMIN — ACYCLOVIR 800 MG: 800 TABLET ORAL at 13:53

## 2018-08-15 RX ADMIN — CEFEPIME HYDROCHLORIDE 2 G: 2 INJECTION, POWDER, FOR SOLUTION INTRAVENOUS at 03:38

## 2018-08-15 RX ADMIN — LORAZEPAM 0.5 MG: 2 INJECTION INTRAMUSCULAR; INTRAVENOUS at 23:31

## 2018-08-15 RX ADMIN — ACYCLOVIR 800 MG: 800 TABLET ORAL at 17:42

## 2018-08-15 RX ADMIN — LORAZEPAM 0.5 MG: 2 INJECTION INTRAMUSCULAR; INTRAVENOUS at 03:50

## 2018-08-15 RX ADMIN — LORATADINE 10 MG: 10 TABLET ORAL at 08:06

## 2018-08-15 RX ADMIN — CEFEPIME HYDROCHLORIDE 2 G: 2 INJECTION, POWDER, FOR SOLUTION INTRAVENOUS at 20:20

## 2018-08-15 RX ADMIN — ACYCLOVIR SODIUM 700 MG: 50 INJECTION, SOLUTION INTRAVENOUS at 06:17

## 2018-08-15 RX ADMIN — FLUCONAZOLE 200 MG: 200 TABLET ORAL at 13:53

## 2018-08-15 RX ADMIN — ACETAMINOPHEN 650 MG: 325 TABLET, FILM COATED ORAL at 17:42

## 2018-08-15 RX ADMIN — PANTOPRAZOLE SODIUM 40 MG: 40 INJECTION, POWDER, FOR SOLUTION INTRAVENOUS at 08:06

## 2018-08-15 ASSESSMENT — ACTIVITIES OF DAILY LIVING (ADL)
ADLS_ACUITY_SCORE: 11

## 2018-08-15 ASSESSMENT — PAIN DESCRIPTION - DESCRIPTORS
DESCRIPTORS: ACHING
DESCRIPTORS: ACHING

## 2018-08-15 NOTE — PLAN OF CARE
"Problem: Stem Cell/Bone Marrow Transplant (Adult)  Goal: Signs and Symptoms of Listed Potential Problems Will be Absent, Minimized or Managed (Stem Cell/Bone Marrow Transplant)  Signs and symptoms of listed potential problems will be absent, minimized or managed by discharge/transition of care (reference Stem Cell/Bone Marrow Transplant (Adult) CPG).   Outcome: No Change  /70 (BP Location: Right arm, Cuff Size: Adult Regular)  Pulse 85  Temp 99  F (37.2  C) (Axillary)  Resp 16  Ht 1.605 m (5' 3.19\")  Wt 70.5 kg (155 lb 8 oz)  SpO2 96%  BMI 27.38 kg/m2 Pt had a good shift states she is feeling much better than when she came in. Amira said her appetite has improved but she doesn't want to eat too much because of the TPN Pt was encourage to eat what she wants so that TPN can be circled. C/O mild headache and she requested caffeine tablet. Ativan given for having difficulty sleeping and also /o feeling too hot. Room temperature decreased and cool cloth applied to forehead. Mom at bedside supportive of pt. Continue with plan of care.   08/15/18 0632   Stem Cell/Bone Marrow Transplant   Problems Assessed (Stem Cell/Bone Marrow Transplant) all   Problems Present (Stem Cell/BMT) none           "

## 2018-08-15 NOTE — PROGRESS NOTES
Calorie Counts    Intake recorded for: 8/14 Kcals: 730 Protein: 27g    # Meals Recorded: 100% 4 packets saltines, bagel w/ peanut butter & jelly, chicken noodle soup    # Supplements Recorded: 0

## 2018-08-15 NOTE — PROGRESS NOTES
"BMT Progress Note     Patient ID:  Amira Avery is a 28 year old women who is day +9 s/p Auto PBST for Hodgkins Lymphoma. Readmitted 8/12 with dehydration and recent neutropenic fevers     Diagnosis HDN Hodgkin's Disease - NOS  HCT Type Autologous    Prep Regimen BCNU  Lisa-C  Etoposide  Melphalan   Donor Source Autologous    GVHD Prophylaxis No  Primary BMT Provider Dr. Landa     INTERVAL  HISTORY     Afebrile overnight. Feeling much better. Headache gone with caffeine tabs. Nausea resolved and feels hungry, wants to eat breakfast. Stools normal. No bleeding or rash.    Review of Systems: 10 point ROS negative except as noted above.    PHYSICAL EXAM     /75 (BP Location: Right arm)  Pulse 85  Temp 98.5  F (36.9  C) (Axillary)  Resp 16  Ht 1.605 m (5' 3.19\")  Wt 70.5 kg (155 lb 8 oz)  SpO2 98%  BMI 27.38 kg/m2   General: NAD, tired appearing, nontoxic  Eyes: : ARMINDA, sclera anicteric   Nose/Mouth/Throat: OP clear, buccal mucosa moist, no ulcerations   Lungs: CTA bilaterally  Cardiovascular: RRR, no M/R/G   Abdominal/Rectal: +BS, soft, NT, ND, No HSM   Lymphatics: no edema  Skin: no rashes or petechaie  Neuro: A&O   Additional Findings: [place]: site NT, no drainage.  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  BMT:  Completed GCSF + mozobil priming prior to BEAM Auto PBSCT  - Collected 6.21million CD34/kg on 7/24.  - Transplanted on 8/6/2018 s/p " BEAM prep.  -  GCSF started d+5 (8/11/2018) and cont until ANC>2500 x2 consecutive days. Re-stage per protocol. WBC up to 0.7. Added claritin for bone pain 8/15     2.  HEME: Keep Hgb>8 and plts>10K. No pre-meds.   - INR long at 1.3, added vit K to TPN  - Platelet intercept study    3.  ID:   - Neutropenic fevers starting 8/10. Received rocephin & Vanco in clinic, then admitted 8/12 and started cefepime - continue. Blood cx, UA and CXR neg  - rhinorrhea in the afternoon, check RVP  - Cont Fluc, and HD ACV (CMV+, HSV+, EBV+) prophy.   - Stool cx negative for C Diff 8/8  - Bactrim or appropriate PCP therapy to start d+28.                     4.  GI:    - peridex and saline rinse instead of toothbrush  - Nausea: continue compazine, Ativan & Zofran prn   - Diarrhea. Likely due to prep, cx negative.  Ok to use Imodium prn  - Protonix for GI prophy.    5.  FEN/Renal:    - Lytes and Cr stable. Replace prn per sliding scale  - MIVF with NS at 75cc/hr - dc 8/15 bc eating better and changed meds to PO  - TPN started on admission - will begin to cycle as she is feeling better and wants to eat    6. Headache  - tylenol and caffeine prn    Shiloh Drake PA-C  [phone]    Attending Note:    The patient was seen and examined by me separate from mid-level provider.   I personally reviewed the today's laboratory results, vital signs and radiology results.    The patient is doing well today.  Still has some loose stools.  She is interested in eating more.  I found that vitals are stable and there was no fever. No acute distress. Patient was alert and oriented and grossly neurologically intact. Mouth is clean. Line insertion non-tender and clear. Lungs clear bilateral. Heart regular. Abdomen soft non-tender, BS present. Lower extremity with no edema. No rash.     Main laboratory finding were   Lab Results   Component Value Date    WBC 0.7 (LL) 08/15/2018    ANEU 0.1 (LL) 08/15/2018    HGB 8.3 (L) 08/15/2018    HCT 24.1 (L) 08/15/2018     PLT 14 (LL) 08/15/2018     08/15/2018    POTASSIUM 3.7 08/15/2018    CHLORIDE 110 (H) 08/15/2018    CO2 25 08/15/2018    GLC 94 08/15/2018    BUN 7 08/15/2018    CR 0.48 (L) 08/15/2018    MAG 2.1 08/15/2018    INR 1.30 (H) 08/13/2018    BILITOTAL 0.2 08/13/2018    AST 5 08/13/2018    ALT 16 08/13/2018    ALKPHOS 36 (L) 08/13/2018    PROTTOTAL 5.1 (L) 08/13/2018    ALBUMIN 2.3 (L) 08/13/2018     My plan is to discontinue the lipids.  We will give her 1 more day of TPN and if her oral intake holds we will discontinue the TPN as well.  She will continue the G-CSF.  We will give her Claritin for possible bone pain related to the growth factor.  She has no fevers.  She does not need any red cell or platelet transfusions today.  Findings and plan were shared with the patient and her mother in the room.  Will reassess her in the morning.    Ralph Kim M.D.

## 2018-08-16 LAB
ANION GAP SERPL CALCULATED.3IONS-SCNC: 7 MMOL/L (ref 3–14)
BACTERIA SPEC CULT: NO GROWTH
BASOPHILS # BLD AUTO: 0 10E9/L (ref 0–0.2)
BASOPHILS # BLD AUTO: 0 10E9/L (ref 0–0.2)
BASOPHILS NFR BLD AUTO: 0 %
BASOPHILS NFR BLD AUTO: 0 %
BLASTS # BLD: 0.1 10E9/L
BLASTS BLD QL SMEAR: 1.8 %
BUN SERPL-MCNC: 10 MG/DL (ref 7–30)
CALCIUM SERPL-MCNC: 8.4 MG/DL (ref 8.5–10.1)
CHLORIDE SERPL-SCNC: 107 MMOL/L (ref 94–109)
CO2 SERPL-SCNC: 26 MMOL/L (ref 20–32)
CREAT SERPL-MCNC: 0.58 MG/DL (ref 0.52–1.04)
DIFFERENTIAL METHOD BLD: ABNORMAL
DIFFERENTIAL METHOD BLD: ABNORMAL
EOSINOPHIL # BLD AUTO: 0 10E9/L (ref 0–0.7)
EOSINOPHIL # BLD AUTO: 0 10E9/L (ref 0–0.7)
EOSINOPHIL NFR BLD AUTO: 0 %
EOSINOPHIL NFR BLD AUTO: 0 %
ERYTHROCYTE [DISTWIDTH] IN BLOOD BY AUTOMATED COUNT: 12.7 % (ref 10–15)
ERYTHROCYTE [DISTWIDTH] IN BLOOD BY AUTOMATED COUNT: 12.9 % (ref 10–15)
FLUAV H1 2009 PAND RNA SPEC QL NAA+PROBE: NEGATIVE
FLUAV H1 RNA SPEC QL NAA+PROBE: NEGATIVE
FLUAV H3 RNA SPEC QL NAA+PROBE: NEGATIVE
FLUAV RNA SPEC QL NAA+PROBE: NEGATIVE
FLUBV RNA SPEC QL NAA+PROBE: NEGATIVE
GFR SERPL CREATININE-BSD FRML MDRD: >90 ML/MIN/1.7M2
GLUCOSE SERPL-MCNC: 101 MG/DL (ref 70–99)
HADV DNA SPEC QL NAA+PROBE: NEGATIVE
HADV DNA SPEC QL NAA+PROBE: NEGATIVE
HCT VFR BLD AUTO: 24.1 % (ref 35–47)
HCT VFR BLD AUTO: 26.9 % (ref 35–47)
HGB BLD-MCNC: 8.3 G/DL (ref 11.7–15.7)
HGB BLD-MCNC: 9.1 G/DL (ref 11.7–15.7)
HMPV RNA SPEC QL NAA+PROBE: NEGATIVE
HPIV1 RNA SPEC QL NAA+PROBE: NEGATIVE
HPIV2 RNA SPEC QL NAA+PROBE: NEGATIVE
HPIV3 RNA SPEC QL NAA+PROBE: NEGATIVE
LYMPHOCYTES # BLD AUTO: 0.3 10E9/L (ref 0.8–5.3)
LYMPHOCYTES # BLD AUTO: 0.3 10E9/L (ref 0.8–5.3)
LYMPHOCYTES NFR BLD AUTO: 35.8 %
LYMPHOCYTES NFR BLD AUTO: 8.9 %
Lab: NORMAL
MAGNESIUM SERPL-MCNC: 2 MG/DL (ref 1.6–2.3)
MCH RBC QN AUTO: 29.4 PG (ref 26.5–33)
MCH RBC QN AUTO: 30.1 PG (ref 26.5–33)
MCHC RBC AUTO-ENTMCNC: 33.8 G/DL (ref 31.5–36.5)
MCHC RBC AUTO-ENTMCNC: 34.4 G/DL (ref 31.5–36.5)
MCV RBC AUTO: 87 FL (ref 78–100)
MCV RBC AUTO: 87 FL (ref 78–100)
METAMYELOCYTES # BLD: 0 10E9/L
METAMYELOCYTES # BLD: 0 10E9/L
METAMYELOCYTES NFR BLD MANUAL: 0.5 %
METAMYELOCYTES NFR BLD MANUAL: 0.9 %
MICROBIOLOGIST REVIEW: ABNORMAL
MONOCYTES # BLD AUTO: 0.3 10E9/L (ref 0–1.3)
MONOCYTES # BLD AUTO: 1.3 10E9/L (ref 0–1.3)
MONOCYTES NFR BLD AUTO: 33 %
MONOCYTES NFR BLD AUTO: 49.1 %
NEUTROPHILS # BLD AUTO: 0.1 10E9/L (ref 1.6–8.3)
NEUTROPHILS # BLD AUTO: 2 10E9/L (ref 1.6–8.3)
NEUTROPHILS NFR BLD AUTO: 12.7 %
NEUTROPHILS NFR BLD AUTO: 51.8 %
NRBC # BLD AUTO: 0 10*3/UL
NRBC # BLD AUTO: 0.1 10*3/UL
NRBC BLD AUTO-RTO: 1 /100
NRBC BLD AUTO-RTO: 2 /100
OTHER CELLS # BLD MANUAL: 0 10E9/L
OTHER CELLS NFR BLD MANUAL: 1.9 %
PHOSPHATE SERPL-MCNC: 3.8 MG/DL (ref 2.5–4.5)
PLATELET # BLD AUTO: 14 10E9/L (ref 150–450)
PLATELET # BLD AUTO: 14 10E9/L (ref 150–450)
PLATELET # BLD EST: ABNORMAL 10*3/UL
POTASSIUM SERPL-SCNC: 3.8 MMOL/L (ref 3.4–5.3)
PROMYELOCYTES # BLD MANUAL: 0.1 10E9/L
PROMYELOCYTES NFR BLD MANUAL: 3.6 %
RBC # BLD AUTO: 2.76 10E12/L (ref 3.8–5.2)
RBC # BLD AUTO: 3.09 10E12/L (ref 3.8–5.2)
RBC MORPH BLD: NORMAL
RBC MORPH BLD: NORMAL
RHINOVIRUS RNA SPEC QL NAA+PROBE: POSITIVE
RSV RNA SPEC QL NAA+PROBE: NEGATIVE
RSV RNA SPEC QL NAA+PROBE: NEGATIVE
SODIUM SERPL-SCNC: 140 MMOL/L (ref 133–144)
SPECIMEN SOURCE: ABNORMAL
SPECIMEN SOURCE: NORMAL
WBC # BLD AUTO: 0.7 10E9/L (ref 4–11)
WBC # BLD AUTO: 3.8 10E9/L (ref 4–11)

## 2018-08-16 PROCEDURE — 25000132 ZZH RX MED GY IP 250 OP 250 PS 637: Performed by: INTERNAL MEDICINE

## 2018-08-16 PROCEDURE — 80048 BASIC METABOLIC PNL TOTAL CA: CPT | Performed by: PHYSICIAN ASSISTANT

## 2018-08-16 PROCEDURE — 25000132 ZZH RX MED GY IP 250 OP 250 PS 637: Performed by: PHYSICIAN ASSISTANT

## 2018-08-16 PROCEDURE — 25000128 H RX IP 250 OP 636: Performed by: INTERNAL MEDICINE

## 2018-08-16 PROCEDURE — 84100 ASSAY OF PHOSPHORUS: CPT | Performed by: PHYSICIAN ASSISTANT

## 2018-08-16 PROCEDURE — 20600000 ZZH R&B BMT

## 2018-08-16 PROCEDURE — 83735 ASSAY OF MAGNESIUM: CPT | Performed by: PHYSICIAN ASSISTANT

## 2018-08-16 PROCEDURE — 85025 COMPLETE CBC W/AUTO DIFF WBC: CPT | Performed by: PHYSICIAN ASSISTANT

## 2018-08-16 RX ORDER — NALOXONE HYDROCHLORIDE 0.4 MG/ML
.1-.4 INJECTION, SOLUTION INTRAMUSCULAR; INTRAVENOUS; SUBCUTANEOUS
Status: DISCONTINUED | OUTPATIENT
Start: 2018-08-16 | End: 2018-08-17 | Stop reason: HOSPADM

## 2018-08-16 RX ORDER — OXYCODONE HYDROCHLORIDE 5 MG/1
5 TABLET ORAL EVERY 4 HOURS PRN
Status: DISCONTINUED | OUTPATIENT
Start: 2018-08-16 | End: 2018-08-17 | Stop reason: HOSPADM

## 2018-08-16 RX ORDER — TRAMADOL HYDROCHLORIDE 50 MG/1
50 TABLET ORAL EVERY 6 HOURS PRN
Status: DISCONTINUED | OUTPATIENT
Start: 2018-08-16 | End: 2018-08-17 | Stop reason: HOSPADM

## 2018-08-16 RX ADMIN — ACETAMINOPHEN 650 MG: 325 TABLET, FILM COATED ORAL at 04:13

## 2018-08-16 RX ADMIN — ACYCLOVIR 800 MG: 800 TABLET ORAL at 18:11

## 2018-08-16 RX ADMIN — CHLORHEXIDINE GLUCONATE 15 ML: 1.2 RINSE ORAL at 09:01

## 2018-08-16 RX ADMIN — LORATADINE 10 MG: 10 TABLET ORAL at 09:01

## 2018-08-16 RX ADMIN — ACYCLOVIR 800 MG: 800 TABLET ORAL at 09:01

## 2018-08-16 RX ADMIN — ACYCLOVIR 800 MG: 800 TABLET ORAL at 12:12

## 2018-08-16 RX ADMIN — Medication 350 MCG: at 20:09

## 2018-08-16 RX ADMIN — ACYCLOVIR 800 MG: 800 TABLET ORAL at 21:44

## 2018-08-16 RX ADMIN — ACETAMINOPHEN 650 MG: 325 TABLET, FILM COATED ORAL at 09:20

## 2018-08-16 RX ADMIN — PROCHLORPERAZINE EDISYLATE 10 MG: 5 INJECTION INTRAMUSCULAR; INTRAVENOUS at 16:05

## 2018-08-16 RX ADMIN — ACYCLOVIR 800 MG: 800 TABLET ORAL at 15:30

## 2018-08-16 RX ADMIN — PANTOPRAZOLE SODIUM 40 MG: 40 TABLET, DELAYED RELEASE ORAL at 09:01

## 2018-08-16 RX ADMIN — PROCHLORPERAZINE EDISYLATE 10 MG: 5 INJECTION INTRAMUSCULAR; INTRAVENOUS at 21:46

## 2018-08-16 RX ADMIN — LORAZEPAM 0.5 MG: 2 INJECTION INTRAMUSCULAR; INTRAVENOUS at 22:54

## 2018-08-16 RX ADMIN — FLUCONAZOLE 200 MG: 200 TABLET ORAL at 09:02

## 2018-08-16 RX ADMIN — CAFFEINE 200 MG: 200 TABLET ORAL at 09:04

## 2018-08-16 RX ADMIN — CEFEPIME HYDROCHLORIDE 2 G: 2 INJECTION, POWDER, FOR SOLUTION INTRAVENOUS at 03:37

## 2018-08-16 ASSESSMENT — ACTIVITIES OF DAILY LIVING (ADL)
ADLS_ACUITY_SCORE: 11

## 2018-08-16 ASSESSMENT — PAIN DESCRIPTION - DESCRIPTORS: DESCRIPTORS: HEADACHE

## 2018-08-16 NOTE — PLAN OF CARE
Problem: Patient Care Overview  Goal: Plan of Care/Patient Progress Review  Outcome: No Change  VSS. RA. BMP done. No replacement needed. Gave Tylenol and caffeine for headache pain with relief. Voiding. Had 1 loose BM. Mom here this afternoon. Both IV tubings need to be changed.     Problem: Stem Cell/Bone Marrow Transplant (Adult)  Goal: Signs and Symptoms of Listed Potential Problems Will be Absent, Minimized or Managed (Stem Cell/Bone Marrow Transplant)  Signs and symptoms of listed potential problems will be absent, minimized or managed by discharge/transition of care (reference Stem Cell/Bone Marrow Transplant (Adult) CPG).    08/16/18 14:19   Stem Cell/Bone Marrow Transplant   Problems Assessed (Stem Cell/Bone Marrow Transplant) all   Problems Present (Stem Cell/BMT) fatigue;neutropenia

## 2018-08-16 NOTE — PLAN OF CARE
Problem: Patient Care Overview  Goal: Plan of Care/Patient Progress Review  Outcome: Therapy, progress toward functional goals as expected   08/16/18 1849   OTHER   Plan Of Care Reviewed With patient   Plan of Care Review   Progress progress toward functional goals as expected       Pt afebrile and OVSS on RA. Pt c/o nausea. IV compazine administered with good relief. Pt c/o runny nose, is in droplet precautions due to testing positive for human rhinovirus. IV tubing and caps changed. TPN discontinued, tapered and then taken down. Pt is up ind. Plan is to discharge home tomorrow.       Problem: Stem Cell/Bone Marrow Transplant (Adult)  Goal: Signs and Symptoms of Listed Potential Problems Will be Absent, Minimized or Managed (Stem Cell/Bone Marrow Transplant)  Signs and symptoms of listed potential problems will be absent, minimized or managed by discharge/transition of care (reference Stem Cell/Bone Marrow Transplant (Adult) CPG).   Outcome: Therapy, progress toward functional goals as expected   08/16/18 1849   Stem Cell/Bone Marrow Transplant   Problems Assessed (Stem Cell/Bone Marrow Transplant) all   Problems Present (Stem Cell/BMT) fatigue;gastrointestinal complications;neutropenia

## 2018-08-16 NOTE — PROGRESS NOTES
"BMT Progress Note     Patient ID:  Amira Avery is a 28 year old women who is day +10 s/p Auto PBST for Hodgkins Lymphoma. Readmitted 8/12 with dehydration and recent neutropenic fevers     Diagnosis HDN Hodgkin's Disease - NOS  HCT Type Autologous    Prep Regimen BCNU  Lisa-C  Etoposide  Melphalan   Donor Source Autologous    GVHD Prophylaxis No  Primary BMT Provider Dr. Landa     INTERVAL  HISTORY     Afebrile. Increased bone pain with engraftment. Mostly in her hips. Adequate pain control with tylenol. No nausea, but appetite still lacking and not eating much. Stools normal. No bleeding. No fevers. Mild rhinorrhea and occasional cough.     Review of Systems: 10 point ROS negative except as noted above.    PHYSICAL EXAM     /66 (BP Location: Right arm)  Pulse 85  Temp 98.2  F (36.8  C) (Axillary)  Resp 18  Ht 1.605 m (5' 3.19\")  Wt 69.4 kg (153 lb)  SpO2 100%  BMI 26.94 kg/m2   General: NAD, tired appearing, nontoxic  Eyes: : ARMINDA, sclera anicteric   Nose/Mouth/Throat: OP clear, buccal mucosa moist, no ulcerations   Lungs: CTA bilaterally  Cardiovascular: RRR, no M/R/G   Abdominal/Rectal: +BS, soft, NT, ND, No HSM   Lymphatics: no edema  Skin: no rashes or petechaie  Neuro: A&O   Additional Findings: [place]: site NT, no drainage.  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  BMT:  Completed GCSF + mozobil priming prior to BEAM Auto PBSCT  - " Collected 6.21million CD34/kg on 7/24.  - Transplanted on 8/6/2018 s/p BEAM prep.  -  GCSF started d+5 (8/11/2018) and cont until ANC>2500 x2 consecutive days. Re-stage per protocol. On claritin for bone pain 8/15. WBC up to 3.8, give one more dose today and consider dc tomorrow. Add prn tramadol for pain     2.  HEME: Keep Hgb>8 and plts>10K. No pre-meds.   - INR long at 1.3, added vit K to TPN  - Platelet intercept study    3.  ID:   - Neutropenic fevers starting 8/10. Received rocephin & Vanco in clinic, then admitted 8/12 and started cefepime - continue. Blood cx, UA and CXR neg. Stop cefepime 8/16 with engraftment  - RVP 8/15 due to rhinorrhea and cough = pending  - Cont Fluc, and HD ACV (CMV+, HSV+, EBV+) prophy.   - Stool cx negative for C Diff 8/8  - Bactrim or appropriate PCP therapy to start d+28.                     4.  GI:    - peridex and saline rinse instead of toothbrush  - Nausea: continue compazine, Ativan & Zofran prn   - Diarrhea. Likely due to prep, cx negative.  Ok to use Imodium prn  - Protonix for GI prophy.    5.  FEN/Renal:    - Lytes and Cr stable. Replace prn per sliding scale  - TPN started on admission - will monitor intake today and stop her TPN    6. Headache  - tylenol and caffeine prn    Shiloh Drake PA-C  [phone]    Attending Note:    The patient was seen and examined by me separate from mid-level provider.   I personally reviewed the today's laboratory results, vital signs and radiology results.    The patient is doing well today.  She has improved GI symptoms and oral intake.  Very encouraged with improvement of her counts.  Cerebral that I found that vitals are stable and there was no fever. No acute distress. Patient was alert and oriented and grossly neurologically intact. Mouth is clean. Line insertion non-tender and clear. Lungs clear bilateral. Heart regular. Abdomen soft non-tender, BS present. Lower extremity with no edema. No rash.     Main laboratory finding were   Lab  Results   Component Value Date    WBC 3.8 (L) 08/16/2018    ANEU 0.1 (LL) 08/15/2018    HGB 9.1 (L) 08/16/2018    HCT 26.9 (L) 08/16/2018    PLT 14 (LL) 08/16/2018     08/16/2018    POTASSIUM 3.8 08/16/2018    CHLORIDE 107 08/16/2018    CO2 26 08/16/2018     (H) 08/16/2018    BUN 10 08/16/2018    CR 0.58 08/16/2018    MAG 2.0 08/16/2018    INR 1.30 (H) 08/13/2018    BILITOTAL 0.2 08/13/2018    AST 5 08/13/2018    ALT 16 08/13/2018    ALKPHOS 36 (L) 08/13/2018    PROTTOTAL 5.1 (L) 08/13/2018    ALBUMIN 2.3 (L) 08/13/2018     My plan is to discontinue the TPN.  She will continue the G-CSF.  Discontinue cefepime. Continue Claritin for bone pain related to the growth factor.  She has no fevers.  She does not need any red cell or platelet transfusions today.  Findings and plan were shared with the patient in the room.  Will reassess her in the morning.    Ralph Kim M.D.

## 2018-08-16 NOTE — PROGRESS NOTES
CLINICAL NUTRITION SERVICES - REASSESSMENT NOTE     Nutrition Prescription    RECOMMENDATIONS FOR MDs/PROVIDERS TO ORDER:  Discontinue PN.     Malnutrition Status:    Patient does not meet two criteria necessary for diagnosing malnutrition but is at risk for malnutrition    Recommendations already ordered by Registered Dietitian (RD):  None at this time.     Future/Additional Recommendations:  1.  Continue regular diet and supplements, as ordered. Encourage high kcal/pro intake.   2.  Monitor intake/wt trends; need for calorie counting again.   3.  Monitor electrolytes and fluid status with cessation of PN; may need additional replacement.          EVALUATION OF THE PROGRESS TOWARD GOALS   Diet: Regular; supplements with meals prn.    Nutrition Support: PN 1080 ml/day with 175g Dex, 85g AA and no lipid = 935 kcals/day (17 kcal/kg), 1.6 PRO/kg, GIR 2.1 with 0% kcals.     Intake: PN providing 17 kcal/kg/day. Eating 0-100% of meals/supplements the past week. Pt reports eating has significantly improved the past couple of days. Consumed a full omelette this morning and ordering 2 meals per day (sandwiches) per review of room service.     Calorie counts:  8/13:  130 kcal, 0g PRO  8/14:  730 kcal, 27g PRO  8/15:  0 kcal, 0g PRO--no intake recorded, but meals ordered.    ** 3 day avg = 9 kcal/kg and 0.2g PRO/kg which met 40% of estimated needs.     NEW FINDINGS   -General: lipids stopped 8/16 with plan to discontinue PN today. GI sx improving and pt is engrafting.    MALNUTRITION  % Intake: Decreased intake does not meet criteria  % Weight Loss: None noted  Subcutaneous Fat Loss: None observed  Muscle Loss: None observed  Fluid Accumulation/Edema: None noted  Malnutrition Diagnosis: Patient does not meet two of the above criteria necessary for diagnosing malnutrition but is at risk for malnutrition    Previous Goals   Total avg nutritional intake to meet a minimum of 20 kcal/kg and 1.5 g PRO/kg daily (per dosing wt 56  kg).  Evaluation: Met    Previous Nutrition Diagnosis  Inadequate protein-energy intake  Evaluation: No longer applicable, nutrition diagnosis changed below    CURRENT NUTRITION DIAGNOSIS  Predicted inadequate nutrient intake (protein-calories) related to hx of n/v associated with BMT may continue to hinder oral intake as evidenced by now eating much better ~800 kcal/day (100% of meals) with a stable wt and GI sx       INTERVENTIONS  Implementation  Collaboration with other providers on Parenteral Nutrition/IV Fluids: discussed continuing to wean off PN now that Gi sx are much better.     Goals  Patient to consume % of nutritionally adequate meal trays TID, or the equivalent with supplements/snacks.    Monitoring/Evaluation  Progress toward goals will be monitored and evaluated per protocol.    Yoni Hernandez RD, LD  5C/BMT Dietitian  Pager: [phone]

## 2018-08-16 NOTE — PLAN OF CARE
Problem: Patient Care Overview  Goal: Plan of Care/Patient Progress Review  Outcome: No Change   08/15/18 1902   OTHER   Plan Of Care Reviewed With patient   Plan of Care Review   Progress no change   Patient complains of bone pain; Claritin, hot packs and tylenol give with little relief.  She is eating 100% of her meals with snacks.  Most of her IV medications changed to PO with no problems noted.  Continue to encourage activity.    Problem: Stem Cell/Bone Marrow Transplant (Adult)  Goal: Signs and Symptoms of Listed Potential Problems Will be Absent, Minimized or Managed (Stem Cell/Bone Marrow Transplant)  Signs and symptoms of listed potential problems will be absent, minimized or managed by discharge/transition of care (reference Stem Cell/Bone Marrow Transplant (Adult) CPG).   Outcome: No Change   08/15/18 1902   Stem Cell/Bone Marrow Transplant   Problems Assessed (Stem Cell/Bone Marrow Transplant) all   Problems Present (Stem Cell/BMT) fatigue;neutropenia

## 2018-08-16 NOTE — PROGRESS NOTES
Calorie Count  Intake recorded for: 8/15  Kcals: 0  Protein: 0g  # Meals Recorded: 1 meal ordered, 0 intake recorded  # Supplements Recorded: 0

## 2018-08-16 NOTE — PLAN OF CARE
Problem: Patient Care Overview  Goal: Plan of Care/Patient Progress Review  Outcome: No Change   08/15/18 1902   OTHER   Plan Of Care Reviewed With patient   Plan of Care Review   Progress no change     Afebrile. VSS. Extra dose of claritin given for bone pain. Compazine and ativan given for nausea. Voiding adequately. Had 1 small BM this morning, sample send for study. Slept between cares. Continue with POC.      Problem: Stem Cell/Bone Marrow Transplant (Adult)  Goal: Signs and Symptoms of Listed Potential Problems Will be Absent, Minimized or Managed (Stem Cell/Bone Marrow Transplant)  Signs and symptoms of listed potential problems will be absent, minimized or managed by discharge/transition of care (reference Stem Cell/Bone Marrow Transplant (Adult) CPG).   Outcome: No Change   08/15/18 2025   Stem Cell/Bone Marrow Transplant   Problems Assessed (Stem Cell/Bone Marrow Transplant) all   Problems Present (Stem Cell/BMT) fatigue;neutropenia

## 2018-08-17 ENCOUNTER — HOME INFUSION (PRE-WILLOW HOME INFUSION) (OUTPATIENT)
Dept: PHARMACY | Facility: CLINIC | Age: 28
End: 2018-08-17

## 2018-08-17 ENCOUNTER — CARE COORDINATION (OUTPATIENT)
Dept: ONCOLOGY | Facility: CLINIC | Age: 28
End: 2018-08-17

## 2018-08-17 VITALS
HEART RATE: 106 BPM | SYSTOLIC BLOOD PRESSURE: 115 MMHG | OXYGEN SATURATION: 99 % | DIASTOLIC BLOOD PRESSURE: 72 MMHG | TEMPERATURE: 97.6 F | RESPIRATION RATE: 18 BRPM | BODY MASS INDEX: 26.88 KG/M2 | HEIGHT: 63 IN | WEIGHT: 151.68 LBS

## 2018-08-17 LAB
ANION GAP SERPL CALCULATED.3IONS-SCNC: 9 MMOL/L (ref 3–14)
BASOPHILS # BLD AUTO: 0 10E9/L (ref 0–0.2)
BASOPHILS NFR BLD AUTO: 0 %
BUN SERPL-MCNC: 9 MG/DL (ref 7–30)
CALCIUM SERPL-MCNC: 8.6 MG/DL (ref 8.5–10.1)
CHLORIDE SERPL-SCNC: 106 MMOL/L (ref 94–109)
CO2 SERPL-SCNC: 26 MMOL/L (ref 20–32)
CREAT SERPL-MCNC: 0.61 MG/DL (ref 0.52–1.04)
DIFFERENTIAL METHOD BLD: ABNORMAL
DLCOCOR-%PRED-PRE: 114 %
DLCOCOR-PRE: 28.42 ML/MIN/MMHG
DLCOUNC-%PRED-PRE: 111 %
DLCOUNC-PRE: 27.79 ML/MIN/MMHG
DLCOUNC-PRED: 24.85 ML/MIN/MMHG
EOSINOPHIL # BLD AUTO: 0 10E9/L (ref 0–0.7)
EOSINOPHIL NFR BLD AUTO: 0 %
ERV-%PRED-PRE: 99 %
ERV-PRE: 1.02 L
ERV-PRED: 1.03 L
ERYTHROCYTE [DISTWIDTH] IN BLOOD BY AUTOMATED COUNT: 13.1 % (ref 10–15)
EXPTIME-PRE: 6.25 SEC
FEF2575-%PRED-PRE: 115 %
FEF2575-PRE: 4.16 L/SEC
FEF2575-PRED: 3.61 L/SEC
FEFMAX-%PRED-PRE: 140 %
FEFMAX-PRE: 9.57 L/SEC
FEFMAX-PRED: 6.82 L/SEC
FEV1-%PRED-PRE: 131 %
FEV1-PRE: 4.12 L
FEV1FEV6-PRE: 83 %
FEV1FEV6-PRED: 86 %
FEV1FVC-PRE: 83 %
FEV1FVC-PRED: 85 %
FEV1SVC-PRE: 85 %
FEV1SVC-PRED: 82 %
FIFMAX-PRE: 5.62 L/SEC
FVC-%PRED-PRE: 135 %
FVC-PRE: 4.98 L
FVC-PRED: 3.68 L
GFR SERPL CREATININE-BSD FRML MDRD: >90 ML/MIN/1.7M2
GLUCOSE SERPL-MCNC: 93 MG/DL (ref 70–99)
HCT VFR BLD AUTO: 27.8 % (ref 35–47)
HGB BLD-MCNC: 9.3 G/DL (ref 11.7–15.7)
IC-%PRED-PRE: 136 %
IC-PRE: 3.82 L
IC-PRED: 2.8 L
LYMPHOCYTES # BLD AUTO: 0.6 10E9/L (ref 0.8–5.3)
LYMPHOCYTES NFR BLD AUTO: 7.8 %
MAGNESIUM SERPL-MCNC: 2 MG/DL (ref 1.6–2.3)
MCH RBC QN AUTO: 29.6 PG (ref 26.5–33)
MCHC RBC AUTO-ENTMCNC: 33.5 G/DL (ref 31.5–36.5)
MCV RBC AUTO: 89 FL (ref 78–100)
METAMYELOCYTES # BLD: 0.5 10E9/L
METAMYELOCYTES NFR BLD MANUAL: 7 %
MONOCYTES # BLD AUTO: 0.8 10E9/L (ref 0–1.3)
MONOCYTES NFR BLD AUTO: 10.4 %
MYELOCYTES # BLD: 0.5 10E9/L
MYELOCYTES NFR BLD MANUAL: 7 %
NEUTROPHILS # BLD AUTO: 4.9 10E9/L (ref 1.6–8.3)
NEUTROPHILS NFR BLD AUTO: 64.3 %
PHOSPHATE SERPL-MCNC: 4.1 MG/DL (ref 2.5–4.5)
PLATELET # BLD AUTO: 18 10E9/L (ref 150–450)
PLATELET # BLD EST: ABNORMAL 10*3/UL
POTASSIUM SERPL-SCNC: 4 MMOL/L (ref 3.4–5.3)
PROMYELOCYTES # BLD MANUAL: 0.3 10E9/L
PROMYELOCYTES NFR BLD MANUAL: 3.5 %
RBC # BLD AUTO: 3.14 10E12/L (ref 3.8–5.2)
RBC MORPH BLD: NORMAL
SODIUM SERPL-SCNC: 140 MMOL/L (ref 133–144)
VA-%PRED-PRE: 122 %
VA-PRE: 6.02 L
VC-%PRED-PRE: 126 %
VC-PRE: 4.84 L
VC-PRED: 3.83 L
WBC # BLD AUTO: 7.6 10E9/L (ref 4–11)

## 2018-08-17 PROCEDURE — 85025 COMPLETE CBC W/AUTO DIFF WBC: CPT | Performed by: INTERNAL MEDICINE

## 2018-08-17 PROCEDURE — 80048 BASIC METABOLIC PNL TOTAL CA: CPT | Performed by: INTERNAL MEDICINE

## 2018-08-17 PROCEDURE — 84100 ASSAY OF PHOSPHORUS: CPT | Performed by: INTERNAL MEDICINE

## 2018-08-17 PROCEDURE — 25000128 H RX IP 250 OP 636: Performed by: PHYSICIAN ASSISTANT

## 2018-08-17 PROCEDURE — 83735 ASSAY OF MAGNESIUM: CPT | Performed by: INTERNAL MEDICINE

## 2018-08-17 PROCEDURE — 25000132 ZZH RX MED GY IP 250 OP 250 PS 637: Performed by: PHYSICIAN ASSISTANT

## 2018-08-17 RX ORDER — PROCHLORPERAZINE MALEATE 5 MG
5 TABLET ORAL EVERY 6 HOURS
Qty: 30 TABLET | Refills: 0 | Status: SHIPPED | OUTPATIENT
Start: 2018-08-17 | End: 2018-09-14

## 2018-08-17 RX ORDER — LEVOFLOXACIN 750 MG/1
750 TABLET, FILM COATED ORAL DAILY
Qty: 5 TABLET | Refills: 0 | Status: SHIPPED | OUTPATIENT
Start: 2018-08-17 | End: 2018-08-24

## 2018-08-17 RX ADMIN — ACYCLOVIR 800 MG: 800 TABLET ORAL at 08:54

## 2018-08-17 RX ADMIN — ACYCLOVIR 800 MG: 800 TABLET ORAL at 12:37

## 2018-08-17 RX ADMIN — CAFFEINE 200 MG: 200 TABLET ORAL at 08:55

## 2018-08-17 RX ADMIN — FILGRASTIM 480 MCG: 480 INJECTION, SOLUTION INTRAVENOUS; SUBCUTANEOUS at 12:37

## 2018-08-17 RX ADMIN — PANTOPRAZOLE SODIUM 40 MG: 40 TABLET, DELAYED RELEASE ORAL at 08:54

## 2018-08-17 RX ADMIN — FLUCONAZOLE 200 MG: 200 TABLET ORAL at 08:54

## 2018-08-17 ASSESSMENT — ACTIVITIES OF DAILY LIVING (ADL)
ADLS_ACUITY_SCORE: 11

## 2018-08-17 ASSESSMENT — PAIN DESCRIPTION - DESCRIPTORS: DESCRIPTORS: HEADACHE

## 2018-08-17 NOTE — SUMMARY OF CARE
BMT Summary of Care    This note has data from a flowsheet    August 17, 2018 11:10 AM  Amira Avery  MRN: [id number]    Discharge Date: 8/17/18    BMT Primary Physician: Dr. Landa    BMT Nurse Coordinator: Mary Hobson    Discharge Diagnosis: S/P readmission for neutropenic fever    Discharge To: Home    Activity: As tolerated     Catheter Care:   - Wang - Flush each line with 5mL of 10 unit/mL heparin every 24 hours  - Port-a-cath - Flush each line with 5mL of 100 unit/mL heparin every 28 days    Vascular Access Device Protocol Per Policy  Supplies through home infusion  [facility]  Fax: [phone]  Ph: [phone]       IV Medications through home infusion: None    Nutrition: Regular diet as tolerated    Blood Transfusions:  Transfuse if Hemoglobin < or equal 8 mg/dL  Red Blood Cell Order: 2 units, irradiated and leukoreduced   Transfuse if Platelet count < or equal 10,000 uL  Platelet order: 1 adult dose, irradiated and leukoreduced  Transfusion Pre-meds:  None    Intravenous Electrolyte Replacement:  Potassium  chloride (give only if serum creatinine < 2)     3-3.3  20mEq/hr  over 1 hour x 2 doses     <3      20mEq/hr  over 1 hour x 3 doses  Magnesium sulfate 1.3-1.7     2 grams over 1 hour x1 dose                                     <1.3         2 grams over 2 hours x1 dose    Laboratory Tests:  At next clinic appointment (date: 8/18/18)  Hemogram (CBC) differential, platelet count  Basic Metabolic Panel    Support Services:  None    Appointments:   BMT Clinic (date, time, provider):   1. Saturday, 8/18/18 check in at 8:30 for labs and 9am appt    JUAN Hamlin Christina Marie   [place] Medication Instructions MIKE:[id number]    Printed on:08/17/18 1117   Medication Information                      acyclovir (ZOVIRAX) 800 MG tablet  Take 1 tablet (800 mg) by mouth 5 times daily             fluconazole (DIFLUCAN) 200 MG tablet  Take 1 tablet (200 mg) by mouth  daily             levofloxacin (LEVAQUIN) 750 MG tablet  Take 1 tablet (750 mg) by mouth daily for 5 days             LORazepam (ATIVAN) 0.5 MG tablet  Take 1-2 tablets (0.5-1 mg) by mouth every 4 hours as needed for anxiety (nausea/vomiting/sleep)             ondansetron (ZOFRAN-ODT) 8 MG ODT tab  Take 1 tablet (8 mg) by mouth every 8 hours as needed for nausea             pantoprazole (PROTONIX) 40 MG EC tablet  Take 1 tablet (40 mg) by mouth daily             prochlorperazine (COMPAZINE) 5 MG tablet  Take 1 tablet (5 mg) by mouth every 6 hours for nausea             sennosides (SENOKOT) 8.6 MG tablet  Take 2 tablets by mouth 2 times daily as needed for constipation             sulfamethoxazole-trimethoprim (BACTRIM DS/SEPTRA DS) 800-160 MG per tablet  Take 1 tablet by mouth Every Mon, Tues two times daily  *DO NOT START until D+28

## 2018-08-17 NOTE — PROGRESS NOTES
"Discharge SBAR:    Situation:  Amira Avery is a 28 year old being discharged to: Home  Admission reason: Fever, Nausea  Is this a readmission? Yes    Background:  Primary diagnosis: Hodgkins Lymphoma  /72 (BP Location: Left arm)  Pulse 106  Temp 97.6  F (36.4  C) (Axillary)  Resp 18  Ht 1.605 m (5' 3.19\")  Wt 68.8 kg (151 lb 10.8 oz)  SpO2 99%  BMI 26.71 kg/m2  Type of donor: Autologous  Type of stem cells: PBSC  Relapsed? No  Falls Precautions? No  Isolation? Yes, droplet for rhinovirus   DNR? No  DNI? No  Confidential Patient? No  Positive blood cultures? No    Assessment:  Discharge teaching    Review discharge medications and schedule: Yes    Set up pill box: No, pill box at home    Discharge instructions reviewed: Yes    Special considerations (think about previous reactions, issues with flushing CVC, premedication needs, etc): No    Patient Concerns: No    Recommendations:  Anticipated needs:    Daily infusions: No    Daily transfusions: No    G-CSF: No , last dose today at discharge    Other: Not Applicable  Verbal report called to clinic: No      "

## 2018-08-17 NOTE — PLAN OF CARE
Problem: Patient Care Overview  Goal: Plan of Care/Patient Progress Review  Outcome: Therapy, progress toward functional goals as expected   08/16/18 1849   OTHER   Plan Of Care Reviewed With patient   Plan of Care Review   Progress progress toward functional goals as expected     Afebrile. VSS. Denies pain. PRN compazine and ativan given for nausea- emesis x1. Voiding adquately. No BM overnight. Labs WDL this morning. Planning for discharge today. Continue with POC.     Problem: Stem Cell/Bone Marrow Transplant (Adult)  Goal: Signs and Symptoms of Listed Potential Problems Will be Absent, Minimized or Managed (Stem Cell/Bone Marrow Transplant)  Signs and symptoms of listed potential problems will be absent, minimized or managed by discharge/transition of care (reference Stem Cell/Bone Marrow Transplant (Adult) CPG).   Outcome: No Change   08/16/18 1849 08/17/18 0558   Stem Cell/Bone Marrow Transplant   Problems Assessed (Stem Cell/Bone Marrow Transplant) --  all   Problems Present (Stem Cell/BMT) fatigue;gastrointestinal complications;neutropenia --

## 2018-08-17 NOTE — PROGRESS NOTES
Line Company:  [facility] [phone] for line care supplies   Referral made for line care:  Yes  PT recommended:  No  Referral made for PT:  NA  D/c location:  [facility]  Has placement need been communicated to Social Work?  NA  IV medications needed at discharge:  None   Pharmacy concerns:  None  Teaching time arranged with family:  Tues 8/7 at 11am, with Dana present  Notify nurse to schedule line care class/ DM teaching, prior to d/c:  Patient and caregiver previously received teaching, and decline further need   Medicare form required:  No    Caregiver:  Alicja Avery (mom)  [phone]  Facundo Avery (dad) [phone]  Dana Ramirez (fiance)  [phone]  César Avery (brother) [phone]

## 2018-08-17 NOTE — PROGRESS NOTES
BMT SOCIAL WORK DISCHARGE NOTE:    Focus: Discharge Plan    Data: Amira Avery is a 28 year old women who is day +11 s/p Auto PBST for Hodgkins Lymphoma. Readmitted 8/12 with dehydration and recent neutropenic fevers, per medical record.    Interventions: Per Med Team, pt is medically stable for discharge today. SW met with pt to assess coping, provide psychosocial support and provide discharge packet with post-transplant resources for patient and caregiver. Also present in the room was pt's mother-Alicja. Pt shared the Med Team came by already today and confirmed she can discharge home today. Pt shared she is excited to be going home as she feels a lot better. SW provided the discharge packet and explained information. Pt confirmed that her mother-Alicja, significant other-Dana and brother-César will be primary caregivers. Pt's mother also shared she has FMLA paperwork that needs to be completed again as her work denied the last paperwork completed. SW will provide FMLA paperwork to outpatient RN Care Coordinator-Mary today as pt is discharging home today and will have clinic follow-up on Monday. SW provided empathetic listening, validation of concerns, and encouragement. SW encouraged pt to contact SW for support, questions and/or resources.     Education Provided: Discharge Resource Packet, Caregiver Self-Care Education, Expected Emotional Responses to Transition Home.    Assessment: Pt presented as calm and pleasant, pt was sitting up in bed.  Pt appears to be coping well. Pt continues to be supported by her mother-Alicja, significant other-Dana, brother-César and other family/friends.    Discharge Plan: Pt to discharge home ([facility]) with her mother-Alicja, significant other-Dana and brother-César as her primary caregivers. SW will continue to provide psychosocial support and assistance with resources as needed.     WILLIAM Heaton, [facility]  Phone: [phone]  Pager: [phone]

## 2018-08-17 NOTE — PROGRESS NOTES
"BMT Progress Note     Patient ID:  Amira Avery is a 28 year old women who is day +11 s/p Auto PBST for Hodgkins Lymphoma. Readmitted 8/12 with dehydration and recent neutropenic fevers     Diagnosis HDN Hodgkin's Disease - NOS  HCT Type Autologous    Prep Regimen BCNU  Lisa-C  Etoposide  Melphalan   Donor Source Autologous    GVHD Prophylaxis No  Primary BMT Provider Dr. Landa     INTERVAL  HISTORY     Overall feeling better this morning and engrafting well. Tmax 100.7 around 4:30pm. No further bone pain. Nasal congestion and dry cough. No headache. Emesis x 1 last night after drinking too much water last night around 10pm. Ate well after stopping TPN yesterday, had cereal and an omelette for breakfast, PB and J for lunch, half a PB&J for dinner. Not nauseated this morning. Tolerating pills well. Stools normal. No bleeding. Ready to go home     Review of Systems: 10 point ROS negative except as noted above.    PHYSICAL EXAM     /72 (BP Location: Left arm)  Pulse 106  Temp 97.6  F (36.4  C) (Axillary)  Resp 18  Ht 1.605 m (5' 3.19\")  Wt 68.8 kg (151 lb 10.8 oz)  SpO2 99%  BMI 26.71 kg/m2   General: NAD, tired appearing, nontoxic  Eyes: : ARMINDA, sclera anicteric   Nose/Mouth/Throat: OP clear, buccal mucosa moist, no ulcerations   Lungs: CTA bilaterally  Cardiovascular: RRR, no M/R/G   Abdominal/Rectal: +BS, soft, NT, ND, No HSM   Lymphatics: no edema  Skin: no rashes or petechaie  Neuro: A&O   Additional Findings: Gerald: site NT, no drainage.  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  BMT:  Completed GCSF + mozobil priming prior to BEAM Auto PBSCT  - Collected 6.21million CD34/kg on 7/24.  - Transplanted on 8/6/2018 s/p BEAM prep.  -  GCSF started d+5 (8/11/2018) and cont until ANC>2500 x2 consecutive days. Re-stage per protocol. On claritin for bone pain 8/15. WBC up to 7.6, ANC 4.9. Give last dose 8/17 prior to discharge     2.  HEME: Keep Hgb>8 and plts>10K. No pre-meds.   - Plts and Hgb trending up. No transfusion needs today  - INR long at 1.3, added vit K to TPN  - Platelet intercept study    3.  ID:   - rhinovirus per NP swab 8/15. Since she had a fever, will empirically cover with leva 750mg x 5 days (8/17- 8/21) for any superimposed bacterial process  - Neutropenic fevers starting 8/10-8/13, then ceased. Received rocephin & Vanco in clinic, then admitted and started cefepime 8/12. Blood cx, UA and CXR neg. Stopped cefepime 8/16 with engraftment  - Cont Fluc, and HD ACV (CMV+, HSV+, EBV+) prophy.   - Stool cx negative for C Diff 8/8  - Bactrim or appropriate PCP therapy to start d+28.                     4.  GI:    - peridex and saline rinse instead of toothbrush with some gingival bleeding - now resolved  - Nausea: continue prn compazine, Ativan & Zofran   - Diarrhea. Likely due to prep, cx negative.  Ok to use Imodium prn  - Protonix for GI prophy.    5.  FEN/Renal:    - Lytes and Cr stable. Replace prn per sliding scale  - TPN started on admission - stopped 8/16 and eating much better    6. Headache  - tylenol and caffeine prn    Dispo: Discharge to home today with follow up in BMT clinic tomorrow    Shiloh Drake PA-C  [phone]    Attending Note:    The patient was seen and examined by me separate from mid-level provider.   I personally reviewed the today's laboratory results, vital signs and radiology results.    The patient is doing well today. In fact  macrina than yesterday despite 100.7 fever overnight. Viral PCR + rhinovirus. Today less nasal congestion and no cough or shortness of breath.   improved GI symptoms and very good oral intake.  I found that vitals are stable and there was no fever. No acute distress. Patient was alert and oriented and grossly neurologically intact. Mouth is clean. Line insertion non-tender and clear. Lungs clear bilateral. Heart regular. Abdomen soft non-tender, BS present. Lower extremity with no edema. No rash.     Main laboratory finding were   Lab Results   Component Value Date    WBC 7.6 08/17/2018    ANEU 4.9 08/17/2018    HGB 9.3 (L) 08/17/2018    HCT 27.8 (L) 08/17/2018    PLT 18 (LL) 08/17/2018     08/17/2018    POTASSIUM 4.0 08/17/2018    CHLORIDE 106 08/17/2018    CO2 26 08/17/2018    GLC 93 08/17/2018    BUN 9 08/17/2018    CR 0.61 08/17/2018    MAG 2.0 08/17/2018    INR 1.30 (H) 08/13/2018    BILITOTAL 0.2 08/13/2018    AST 5 08/13/2018    ALT 16 08/13/2018    ALKPHOS 36 (L) 08/13/2018    PROTTOTAL 5.1 (L) 08/13/2018    ALBUMIN 2.3 (L) 08/13/2018     My plan is to discharge home with BMT clinic visit tomorrow. Reviewed precautions and reasons to call. Caregiver plan in place.    Ralph Kim M.D.

## 2018-08-17 NOTE — DISCHARGE SUMMARY
[facility] Discharge Summary   Aimra Avery MRN# [id number]   Age: 28 year old  YOB: 1990   Date of Admission: 8/12/2018  Date of Discharge:  8/17/18  Admitting Physician: Jag Benavides MD  Discharge Physician:  Dr. Kim  Discharge Diagnoses:    1. Neutropenic fever  2. Poor intake requiring TPN  3. S/p autologous PBSCT for Hodgkins Disease  4. Pancytopenia due to chemotherapy, improving  5. Rhinovirus  6. Chemotherapy-induced nausea, emesis and diarrhea  Discharge Medications:       Amira Avery   Home Medication Instructions MIKE:[id number]    Printed on:08/17/18 1123   Medication Information                      acyclovir (ZOVIRAX) 800 MG tablet  Take 1 tablet (800 mg) by mouth 5 times daily             fluconazole (DIFLUCAN) 200 MG tablet  Take 1 tablet (200 mg) by mouth daily             levofloxacin (LEVAQUIN) 750 MG tablet  Take 1 tablet (750 mg) by mouth daily for 5 days             LORazepam (ATIVAN) 0.5 MG tablet  Take 1-2 tablets (0.5-1 mg) by mouth every 4 hours as needed for anxiety (nausea/vomiting/sleep)             ondansetron (ZOFRAN-ODT) 8 MG ODT tab  Take 1 tablet (8 mg) by mouth every 8 hours as needed for nausea             pantoprazole (PROTONIX) 40 MG EC tablet  Take 1 tablet (40 mg) by mouth daily             prochlorperazine (COMPAZINE) 5 MG tablet  Take 1 tablet (5 mg) by mouth every 6 hours as needed for nausea             sennosides (SENOKOT) 8.6 MG tablet  Take 2 tablets by mouth 2 times daily as needed for constipation             sulfamethoxazole-trimethoprim (BACTRIM DS/SEPTRA DS) 800-160 MG per tablet  Take 1 tablet by mouth Every Mon, Tues two times daily               Brief History of Illness:    **Adopted from H&P  History obtained from the patient who is a fair historian and the electronic medical record.      Patient presents today for Oncology clinic with a complaint of poor po intake due to N/V and diffuse abdominal pain. She  describes the pain as crampy, nonradiating not improved or worsened by anything. She states all of her problem began about 2 days PTA. She was seen today in oncology clinic. Per their not she had the same complaint but reporting being bed bound. She has been previously started on ceftriaxone and vancomycin due to fever.     Hospital Course:    Assessment/Plan: Amira was admitted 8/12/18 with history of recent neutropenic fevers and dehydration from chemotherapy-induced diarrhea and inability to tolerate PO due to nausea. She had no focal infectious symptoms and workup was negative. She was treated with cefepime through 8/16 through engraftment. She then reported rhinorrhea and dry cough, postiive swab for rhinovirus. She spiked a temp to 100.7, technically the fist fever since hospital admission. We started her on levaquin 750mg x 5 days for empiric therapy for fever and superimposed bacterial URI. Her intake has improved with resolution of nausea. She was initially on TPN on admission which was tapered off prior to discharge. Her stools are still soft, but infrequent. She had minor gingival bleeding after brushing her teeth, but no other overt bleed. She is not currently requiring transfusion support. Her WBC is up to 7.6 with ANC of 4.9. She will receive one last dose of GCSF today, then follow up in BMT clinic tomorrow. See additional details below.    1. BMT:  Completed GCSF + mozobil priming prior to BEAM Auto PBSCT  - Collected 6.21million CD34/kg on 7/24.  - Transplanted on 8/6/2018 s/p BEAM prep.  -  GCSF started d+5 (8/11/2018) and cont until ANC>2500 x2 consecutive days. Re-stage per protocol. On claritin for bone pain 8/15. WBC up to 7.6, ANC 4.9. Give last dose 8/17 prior to discharge      2.  HEME: Keep Hgb>8 and plts>10K. No pre-meds.   - Plts and Hgb trending up. No transfusion needs today  - INR long at 1.3, added vit K to TPN  - Platelet intercept study     3.  ID:   - rhinovirus per NP swab  8/15. Since she had a fever, will empirically cover with leva 750mg x 5 days (8/17- 8/21) for any superimposed bacterial process  - Neutropenic fevers starting 8/10-8/13, then ceased. Received rocephin & Vanco in clinic, then admitted and started cefepime 8/12. Blood cx, UA and CXR neg. Stopped cefepime 8/16 with engraftment  - Cont Fluc, and HD ACV (CMV+, HSV+, EBV+) prophy.   - Stool cx negative for C Diff 8/8  - Bactrim or appropriate PCP therapy to start d+28.                     4.  GI:    - peridex and saline rinse instead of toothbrush with some gingival bleeding - now resolved  - Nausea: continue prn compazine, Ativan & Zofran   - Diarrhea. Likely due to prep, cx negative.  Ok to use Imodium prn  - Protonix for GI prophy.     5.  FEN/Renal:    - Lytes and Cr stable. Replace prn per sliding scale  - TPN started on admission - stopped 8/16 and eating much better     6. Headache  - tylenol and caffeine prn    CODE STATUS: Full Code  Discharge Instructions and Follow-Up:    Discharge diet: Regular diet as tolerated  Discharge activity: Activity as tolerated   Discharge follow-up: Follow up with BMT Clinic as follows:  1. Saturday, 8/18/18 check in at 8:30 for labs and 9am appt    Discharge Disposition:    Discharged to home.    Shiloh Drake PA-C  [phone]      Attending Note:    The patient was seen and examined by me separate from mid-level provider.   I personally reviewed the today's laboratory results, vital signs and radiology results.    The patient is doing well today. In fact macrina than yesterday despite 100.7 fever overnight. Viral PCR + rhinovirus. Today less nasal congestion and no cough or shortness of breath.   improved GI symptoms and very good oral intake.  I found that vitals are stable and there was no fever. No acute distress. Patient was alert and oriented and grossly neurologically intact. Mouth is clean. Line insertion non-tender and clear. Lungs clear bilateral. Heart regular. Abdomen soft  non-tender, BS present. Lower extremity with no edema. No rash.     Main laboratory finding were   Lab Results   Component Value Date    WBC 7.6 08/17/2018    ANEU 4.9 08/17/2018    HGB 9.3 (L) 08/17/2018    HCT 27.8 (L) 08/17/2018    PLT 18 (LL) 08/17/2018     08/17/2018    POTASSIUM 4.0 08/17/2018    CHLORIDE 106 08/17/2018    CO2 26 08/17/2018    GLC 93 08/17/2018    BUN 9 08/17/2018    CR 0.61 08/17/2018    MAG 2.0 08/17/2018    INR 1.30 (H) 08/13/2018    BILITOTAL 0.2 08/13/2018    AST 5 08/13/2018    ALT 16 08/13/2018    ALKPHOS 36 (L) 08/13/2018    PROTTOTAL 5.1 (L) 08/13/2018    ALBUMIN 2.3 (L) 08/13/2018     My plan is to discharge home with BMT clinic visit tomorrow. Reviewed precautions and reasons to call. Caregiver plan in place.    Ralph Kim M.D

## 2018-08-18 ENCOUNTER — APPOINTMENT (OUTPATIENT)
Dept: LAB | Facility: CLINIC | Age: 28
End: 2018-08-18
Attending: INTERNAL MEDICINE
Payer: COMMERCIAL

## 2018-08-18 ENCOUNTER — ONCOLOGY VISIT (OUTPATIENT)
Dept: TRANSPLANT | Facility: CLINIC | Age: 28
End: 2018-08-18
Attending: INTERNAL MEDICINE
Payer: COMMERCIAL

## 2018-08-18 VITALS
OXYGEN SATURATION: 98 % | HEART RATE: 100 BPM | TEMPERATURE: 98.8 F | SYSTOLIC BLOOD PRESSURE: 113 MMHG | WEIGHT: 151 LBS | BODY MASS INDEX: 26.59 KG/M2 | RESPIRATION RATE: 16 BRPM | DIASTOLIC BLOOD PRESSURE: 75 MMHG

## 2018-08-18 DIAGNOSIS — Z94.81 S/P AUTOLOGOUS BONE MARROW TRANSPLANTATION (H): Primary | ICD-10-CM

## 2018-08-18 DIAGNOSIS — C81.95 HODGKIN LYMPHOMA, UNSPECIFIED, LYMPH NODES OF INGUINAL REGION AND LOWER LIMB (H): ICD-10-CM

## 2018-08-18 LAB
ANION GAP SERPL CALCULATED.3IONS-SCNC: 9 MMOL/L (ref 3–14)
BACTERIA SPEC CULT: NO GROWTH
BACTERIA SPEC CULT: NO GROWTH
BASOPHILS # BLD AUTO: 0.2 10E9/L (ref 0–0.2)
BASOPHILS NFR BLD AUTO: 0.9 %
BLD PROD TYP BPU: NORMAL
BLD UNIT ID BPU: 0
BLOOD PRODUCT CODE: NORMAL
BPU ID: NORMAL
BUN SERPL-MCNC: 8 MG/DL (ref 7–30)
CALCIUM SERPL-MCNC: 8.6 MG/DL (ref 8.5–10.1)
CHLORIDE SERPL-SCNC: 105 MMOL/L (ref 94–109)
CO2 SERPL-SCNC: 25 MMOL/L (ref 20–32)
CREAT SERPL-MCNC: 0.63 MG/DL (ref 0.52–1.04)
DIFFERENTIAL METHOD BLD: ABNORMAL
EOSINOPHIL # BLD AUTO: 0 10E9/L (ref 0–0.7)
EOSINOPHIL NFR BLD AUTO: 0 %
ERYTHROCYTE [DISTWIDTH] IN BLOOD BY AUTOMATED COUNT: 13.1 % (ref 10–15)
GFR SERPL CREATININE-BSD FRML MDRD: >90 ML/MIN/1.7M2
GLUCOSE SERPL-MCNC: 106 MG/DL (ref 70–99)
HCT VFR BLD AUTO: 29.6 % (ref 35–47)
HGB BLD-MCNC: 9.8 G/DL (ref 11.7–15.7)
LYMPHOCYTES # BLD AUTO: 0.6 10E9/L (ref 0.8–5.3)
LYMPHOCYTES NFR BLD AUTO: 2.6 %
MCH RBC QN AUTO: 29.9 PG (ref 26.5–33)
MCHC RBC AUTO-ENTMCNC: 33.1 G/DL (ref 31.5–36.5)
MCV RBC AUTO: 90 FL (ref 78–100)
METAMYELOCYTES # BLD: 0.2 10E9/L
METAMYELOCYTES NFR BLD MANUAL: 0.9 %
MONOCYTES # BLD AUTO: 2.5 10E9/L (ref 0–1.3)
MONOCYTES NFR BLD AUTO: 11.4 %
MYELOCYTES # BLD: 0.6 10E9/L
MYELOCYTES NFR BLD MANUAL: 2.6 %
NEUTROPHILS # BLD AUTO: 17.7 10E9/L (ref 1.6–8.3)
NEUTROPHILS NFR BLD AUTO: 79.8 %
PLATELET # BLD AUTO: 37 10E9/L (ref 150–450)
PLATELET # BLD EST: ABNORMAL 10*3/UL
POLYCHROMASIA BLD QL SMEAR: SLIGHT
POTASSIUM SERPL-SCNC: 3.6 MMOL/L (ref 3.4–5.3)
PROMYELOCYTES # BLD MANUAL: 0.4 10E9/L
PROMYELOCYTES NFR BLD MANUAL: 1.8 %
RBC # BLD AUTO: 3.28 10E12/L (ref 3.8–5.2)
SODIUM SERPL-SCNC: 139 MMOL/L (ref 133–144)
SPECIMEN SOURCE: NORMAL
SPECIMEN SOURCE: NORMAL
TRANSFUSION STATUS PATIENT QL: NORMAL
TRANSFUSION STATUS PATIENT QL: NORMAL
WBC # BLD AUTO: 22.2 10E9/L (ref 4–11)

## 2018-08-18 PROCEDURE — 25000128 H RX IP 250 OP 636: Mod: ZF | Performed by: INTERNAL MEDICINE

## 2018-08-18 PROCEDURE — G0463 HOSPITAL OUTPT CLINIC VISIT: HCPCS

## 2018-08-18 PROCEDURE — 80048 BASIC METABOLIC PNL TOTAL CA: CPT | Performed by: PHYSICIAN ASSISTANT

## 2018-08-18 PROCEDURE — 85025 COMPLETE CBC W/AUTO DIFF WBC: CPT | Performed by: PHYSICIAN ASSISTANT

## 2018-08-18 PROCEDURE — 40000268 ZZH STATISTIC NO CHARGES: Mod: ZF

## 2018-08-18 PROCEDURE — 36591 DRAW BLOOD OFF VENOUS DEVICE: CPT

## 2018-08-18 PROCEDURE — 36592 COLLECT BLOOD FROM PICC: CPT

## 2018-08-18 RX ORDER — HEPARIN SODIUM,PORCINE 10 UNIT/ML
5 VIAL (ML) INTRAVENOUS
Status: DISCONTINUED | OUTPATIENT
Start: 2018-08-18 | End: 2018-08-18 | Stop reason: HOSPADM

## 2018-08-18 RX ORDER — HEPARIN SODIUM (PORCINE) LOCK FLUSH IV SOLN 100 UNIT/ML 100 UNIT/ML
5 SOLUTION INTRAVENOUS ONCE
Status: COMPLETED | OUTPATIENT
Start: 2018-08-18 | End: 2018-08-18

## 2018-08-18 RX ADMIN — SODIUM CHLORIDE, PRESERVATIVE FREE 5 ML: 5 INJECTION INTRAVENOUS at 08:54

## 2018-08-18 RX ADMIN — SODIUM CHLORIDE, PRESERVATIVE FREE 5 ML: 5 INJECTION INTRAVENOUS at 08:57

## 2018-08-18 ASSESSMENT — PAIN SCALES - GENERAL: PAINLEVEL: NO PAIN (0)

## 2018-08-18 NOTE — MR AVS SNAPSHOT
After Visit Summary   8/18/2018    Amira Avery    MRN: [id number]           Patient Information     Date Of Birth          1990        Visit Information        Provider Department      8/18/2018 9:00 AM  BMT DOM [facility] Blood and Marrow Transplant        Today's Diagnoses     S/P autologous bone marrow transplantation (H)    -  1    Hodgkin lymphoma, unspecified, lymph nodes of inguinal region and lower limb (H)              [facility] ([facility])  [address] [place] [address]  Phone: [phone]  Clinic Hours:   Monday-Thursday:7am to 7pm   Friday: 7am to 5pm   Weekends and holidays:    8am to noon (in general)  If your fever is 100.5  or greater,   call the clinic.  After hours call the   hospital at [phone] or   [phone]. Ask for the BMT   fellow on-call            Follow-ups after your visit        Your next 10 appointments already scheduled     Sep 05, 2018  1:20 PM CDT   CT SOFT TISSUE NECK W CONTRAST with UCCT1   [facility] CT ([facility])    [address] [place] [address] [phone]           Please bring any scans or X-rays taken at other hospitals, if similar tests were done. Also bring a list of your medicines, including vitamins, minerals and over-the-counter drugs. It is safest to leave personal items at home.  Be sure to tell your doctor:   If you have any allergies.   If there s any chance you are pregnant.   If you are breastfeeding.    If you have diabetes as your medication may need to be adjusted for this exam.  You will have contrast for this exam. To prepare:   Do not eat or drink for 2 hours before your exam. If you need to take medicine, you may take it with small sips of water. (We may ask you to take liquid medicine as well.)   The day before your exam, drink extra fluids at least six 8-ounce glasses (unless your doctor tells you to restrict your  fluids).  Patients over 70 or patients with diabetes or kidney problems:   If you haven t had a blood test (creatinine test) within the last 30 days, the Cardiologist/Radiologist may require you to get this test prior to your exam.  Please wear loose clothing, such as a sweat suit or jogging clothes. Avoid snaps, zippers and other metal. We may ask you to undress and put on a hospital gown.  If you have any questions, please call the Imaging Department where you will have your exam.            Sep 05, 2018  1:40 PM CDT   CT CHEST/ABDOMEN/PELVIS W CONTRAST with UCCT1   [facility] CT ([facility])    [address]  1st Floor  [facility] [address] [phone]           Please bring any scans or X-rays taken at other hospitals, if similar tests were done. Also bring a list of your medicines, including vitamins, minerals and over-the-counter drugs. It is safest to leave personal items at home.  Be sure to tell your doctor:   If you have any allergies.   If there s any chance you are pregnant.   If you are breastfeeding.  How to prepare:   Do not eat or drink for 2 hours before your exam. If you need to take medicine, you may take it with small sips of water. (We may ask you to take liquid medicine as well.)   Please wear loose clothing, such as a sweat suit or jogging clothes. Avoid snaps, zippers and other metal. We may ask you to undress and put on a hospital gown.  Please arrive 30 minutes early for your CT. Once in the department you might be asked to drink water 15-20 minutes prior to your exam.  If indicated you may be asked to drink an oral contrast in advance of your CT.  If this is the case, the imaging team will let you know or be in contact with you prior to your appointment  Patients over 70 or patients with diabetes or kidney problems:   If you haven t had a blood test (creatinine test) within the last 30 days, the Cardiologist/Radiologist may require you  to get this test prior to your exam.  If you have diabetes:   Continue to take your metformin medication on the day of your exam  If you have any questions, please call the Imaging Department where you will have your exam.            Sep 05, 2018  2:00 PM CDT   [facility] Draw with  [facility] LAB DRAW   [facility] Draw ([facility])    [address]  [address]  [facility] [id number] [phone]            Sep 05, 2018  2:30 PM CDT   (Arrive by 2:15 PM)   BMT 28 Day Anniversary Visit with  BMT DOM   [facility] Blood and Marrow Transplant ([facility])    [address]  [address]  [facility] [address] [phone]              Future tests that were ordered for you today     Open Standing Orders        Priority Remaining Interval Expires Ordered    Platelets prepare order unit Routine 99/100 CONDITIONAL (SPECIFY) BLOOD  8/17/2018          Open Future Orders        Priority Expected Expires Ordered    Comprehensive metabolic panel Routine 8/28/2018 8/31/2018 8/18/2018    INR Routine 8/28/2018 8/31/2018 8/18/2018    CBC with platelets differential Routine 8/28/2018 8/31/2018 8/18/2018            Who to contact     If you have questions or need follow up information about today's clinic visit or your schedule please contact [facility] BLOOD AND MARROW TRANSPLANT directly at [phone].  Normal or non-critical lab and imaging results will be communicated to you by MyChart, letter or phone within 4 business days after the clinic has received the results. If you do not hear from us within 7 days, please contact the clinic through MyChart or phone. If you have a critical or abnormal lab result, we will notify you by phone as soon as possible.  Submit refill requests through [employer] or call your pharmacy and they will forward the refill request to us. Please allow 3 business days for your refill to be completed.          Additional  Information About Your Visit        [employer] Information     [employer] gives you secure access to your electronic health record. If you see a primary care provider, you can also send messages to your care team and make appointments. If you have questions, please call your primary care clinic.  If you do not have a primary care provider, please call [phone] and they will assist you.        Care EveryWhere ID     This is your Care EveryWhere ID. This could be used by other organizations to access your [facility] medical records  [id number]        Your Vitals Were     Pulse Temperature Respirations Pulse Oximetry BMI (Body Mass Index)       100 98.8  F (37.1  C) (Oral) 16 98% 26.59 kg/m2        Blood Pressure from Last 3 Encounters:   08/18/18 113/75   08/17/18 115/72   08/12/18 110/71    Weight from Last 3 Encounters:   08/18/18 68.5 kg (151 lb)   08/17/18 68.8 kg (151 lb 10.8 oz)   08/12/18 68.3 kg (150 lb 8 oz)              We Performed the Following     Basic metabolic panel     Blood component     CBC with platelets differential     Platelets prepare order unit        Recent Review Flowsheet Data     BMT Recent Results Latest Ref Rng & Units 8/14/2018 8/14/2018 8/14/2018 8/15/2018 8/16/2018 8/17/2018 8/18/2018    WBC 4.0 - 11.0 10e9/L - - - 0.7(LL) 3.8(L) 7.6 22.2(H)    Hemoglobin 11.7 - 15.7 g/dL - - - 8.3(L) 9.1(L) 9.3(L) 9.8(L)    Platelet Count 150 - 450 10e9/L - - - 14(LL) 14(LL) 18(LL) 37(LL)    Neutrophils (Absolute) 1.6 - 8.3 10e9/L - - - 0.1(LL) 2.0 4.9 17.7(H)    Blasts (Absolute) 0 10e9/L - - - - 0.1(H) - -    INR 0.86 - 1.14 - - - - - - -    Sodium 133 - 144 mmol/L - - - 142 140 140 139    Potassium 3.4 - 5.3 mmol/L - - - 3.7 3.8 4.0 3.6    Chloride 94 - 109 mmol/L - - - 110(H) 107 106 105    Glucose 70 - 99 mg/dL 115(H) 97 105(H) 94 101(H) 93 106(H)    Urea Nitrogen 7 - 30 mg/dL - - - 7 10 9 8    Creatinine 0.52 - 1.04 mg/dL - - - 0.48(L) 0.58 0.61 0.63    Calcium (Total) 8.5 - 10.1 mg/dL - - -  7.8(L) 8.4(L) 8.6 8.6    Protein (Total) 6.8 - 8.8 g/dL - - - - - - -    Albumin 3.4 - 5.0 g/dL - - - - - - -    Bilirubin (Direct) 0.0 - 0.2 mg/dL - - - - - - -    Alkaline Phosphatase 40 - 150 U/L - - - - - - -    AST 0 - 45 U/L - - - - - - -    ALT 0 - 50 U/L - - - - - - -    MCV 78 - 100 fl - - - 87 87 89 90               Primary Care Provider Office Phone # Fax #    [facility] [phone] [address] [address]        Equal Access to Services     RICHIE RAMEY : Alexis Luis, waaxda luqadaha, qaybta kaalmadavid mcgraw, jen raymundo. So [facility] [phone].    ATENCIÓN: Si habla español, tiene a isabel disposición servicios gratuitos de asistencia lingüística. [facility] [phone].    We comply with applicable federal civil rights laws and [place] laws. We do not discriminate on the basis of race, color, national origin, age, disability, sex, sexual orientation, or gender identity.            Thank you!     Thank you for choosing [facility] BLOOD AND MARROW TRANSPLANT  for your care. Our goal is always to provide you with excellent care. Hearing back from our patients is one way we can continue to improve our services. Please take a few minutes to complete the written survey that you may receive in the mail after your visit with us. Thank you!             Your Updated Medication List - Protect others around you: Learn how to safely use, store and throw away your medicines at www.disposemymeds.org.          This list is accurate as of 8/18/18  9:50 AM.  Always use your most recent med list.                   Brand Name Dispense Instructions for use Diagnosis    ACETAMINOPHEN PO      Take 650 mg by mouth every 4 hours as needed for pain    S/P autologous bone marrow transplantation (H), Hodgkin lymphoma, unspecified, lymph nodes of inguinal region and lower limb (H)       acyclovir 800 MG tablet    ZOVIRAX     150 tablet    Take 1 tablet (800 mg) by mouth 5 times daily    Hodgkin lymphoma, unspecified, lymph nodes of inguinal region and lower limb (H)       fluconazole 200 MG tablet    DIFLUCAN    30 tablet    Take 1 tablet (200 mg) by mouth daily    Hodgkin lymphoma, unspecified, lymph nodes of inguinal region and lower limb (H)       levofloxacin 750 MG tablet    LEVAQUIN    5 tablet    Take 1 tablet (750 mg) by mouth daily for 5 days    Hodgkin lymphoma, unspecified, lymph nodes of inguinal region and lower limb (H)       LORazepam 0.5 MG tablet    ATIVAN    30 tablet    Take 1-2 tablets (0.5-1 mg) by mouth every 4 hours as needed for anxiety (nausea/vomiting/sleep)    Nodular sclerosing Hodgkin's lymphoma, unspecified body region (H)       ondansetron 8 MG ODT tab    ZOFRAN-ODT    30 tablet    Take 1 tablet (8 mg) by mouth every 8 hours as needed for nausea    Nodular sclerosing Hodgkin's lymphoma, unspecified body region (H)       pantoprazole 40 MG EC tablet    PROTONIX    30 tablet    Take 1 tablet (40 mg) by mouth daily    Nodular sclerosing Hodgkin's lymphoma, unspecified body region (H)       prochlorperazine 5 MG tablet    COMPAZINE    30 tablet    Take 1 tablet (5 mg) by mouth every 6 hours    Nodular sclerosing Hodgkin's lymphoma, unspecified body region (H)       sennosides 8.6 MG tablet    SENOKOT    60 each    Take 2 tablets by mouth 2 times daily as needed for constipation    Nodular sclerosing Hodgkin's lymphoma, unspecified body region (H)       sulfamethoxazole-trimethoprim 800-160 MG per tablet   Start taking on:  9/3/2018    BACTRIM DS/SEPTRA DS    34 tablet    Take 1 tablet by mouth Every Mon, Tues two times daily    Hodgkin lymphoma, unspecified, lymph nodes of inguinal region and lower limb (H)

## 2018-08-18 NOTE — PROGRESS NOTES
BMT Progress Note      Patient ID:  Amira Avery is a 28 year old women who is day +12 s/p Auto PBST for Hodgkins Lymphoma. Discharged 8/17 for therapy for dehydration and recent neutropenic fevers      Diagnosis HDN Hodgkin's Disease - NOS  HCT Type Autologous    Prep Regimen BCNU  Lisa-C  Etoposide  Melphalan   Donor Source Autologous    GVHD Prophylaxis No  Primary BMT Provider Dr. Landa      INTERVAL  HISTORY      Feels good post discharge. No fever or chills Some nausea but eating Taking levaquin.    Review of Systems: 10 point ROS negative except as noted above.     PHYSICAL EXAM   /75 (BP Location: Right arm, Cuff Size: Adult Regular)  Pulse 100  Temp 98.8  F (37.1  C) (Oral)  Resp 16  Wt 68.5 kg (151 lb)  SpO2 98%  BMI 26.59 kg/m2    General: NAD, tired appearing, nontoxic  Eyes: : ARMINDA, sclera anicteric   Nose/Mouth/Throat: OP clear, buccal mucosa moist, no ulcerations   Lungs: CTA bilaterally  Cardiovascular: RRR, no M/R/G   Abdominal/Rectal: +BS, soft, NT, ND, No HSM   Lymphatics: no edema  Skin: no rashes or petechaie  Neuro: A&O   Additional Findings: Gerald: site NT, no drainage.  Port a cath: not accessed           Results for AMIRA AVERY (MRN [id number]) as of 8/18/2018 09:40   Ref. Range 8/18/2018 08:52   Sodium Latest Ref Range: 133 - 144 mmol/L 139   Potassium Latest Ref Range: 3.4 - 5.3 mmol/L 3.6   Chloride Latest Ref Range: 94 - 109 mmol/L 105   Carbon Dioxide Latest Ref Range: 20 - 32 mmol/L 25   Urea Nitrogen Latest Ref Range: 7 - 30 mg/dL 8   Creatinine Latest Ref Range: 0.52 - 1.04 mg/dL 0.63   GFR Estimate Latest Ref Range: >60 mL/min/1.7m2 >90   GFR Estimate If Black Latest Ref Range: >60 mL/min/1.7m2 >90   Calcium Latest Ref Range: 8.5 - 10.1 mg/dL 8.6   Anion Gap Latest Ref Range: 3 - 14 mmol/L 9   Glucose Latest Ref Range: 70 - 99 mg/dL 106 (H)   WBC Latest Ref Range: 4.0 - 11.0 10e9/L 22.2 (H)   Hemoglobin Latest Ref Range: 11.7 - 15.7 g/dL 9.8 (L)    Hematocrit Latest Ref Range: 35.0 - 47.0 % 29.6 (L)   Platelet Count Latest Ref Range: 150 - 450 10e9/L 37 (LL)   RBC Count Latest Ref Range: 3.8 - 5.2 10e12/L 3.28 (L)   MCV Latest Ref Range: 78 - 100 fl 90   MCH Latest Ref Range: 26.5 - 33.0 pg 29.9   MCHC Latest Ref Range: 31.5 - 36.5 g/dL 33.1   RDW Latest Ref Range: 10.0 - 15.0 % 13.1   Diff Method Unknown Manual Differential   % Neutrophils Latest Units: % 79.8   % Lymphocytes Latest Units: % 2.6   % Monocytes Latest Units: % 11.4   % Eosinophils Latest Units: % 0.0   % Basophils Latest Units: % 0.9   % Metamyelocytes Latest Units: % 0.9   % Myelocytes Latest Units: % 2.6   % Promyelocytes Latest Units: % 1.8   Absolute Neutrophil Latest Ref Range: 1.6 - 8.3 10e9/L 17.7 (H)   Absolute Lymphocytes Latest Ref Range: 0.8 - 5.3 10e9/L 0.6 (L)   Absolute Monocytes Latest Ref Range: 0.0 - 1.3 10e9/L 2.5 (H)   Absolute Eosinophils Latest Ref Range: 0.0 - 0.7 10e9/L 0.0   Absolute Basophils Latest Ref Range: 0.0 - 0.2 10e9/L 0.2   Absolute Metamyelocytes Latest Ref Range: 0 10e9/L 0.2 (H)   Absolute Myelocytes Latest Ref Range: 0 10e9/L 0.6 (H)   Absolute Promyelocytes Latest Ref Range: 0 10e9/L 0.4 (H)   Polychromasia Unknown Slight   Platelet Estimate Unknown Confirming automa...        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  BMT:  Completed GCSF + mozobil priming prior to BEAM Auto PBSCT  - Collected 6.21million CD34/kg on 7/24.  - Transplanted on 8/6/2018 s/p BEAM prep.  -  GCSF started d+5 (8/11/2018) and cont until ANC>2500 x2 consecutive days. Re-stage per protocol. On claritin for bone pain 8/15. WBC up to 22k, ANC 17 k.Mo more GCSF! Last dose 8/17 prior to discharge      2.  HEME: Keep Hgb>8 and plts>10K. No pre-meds.   - Plts and Hgb trending up. No transfusion needs today  - INR long at 1.3,   - Platelet  intercept study     3.  ID: Doing well  - rhinovirus per NP swab 8/15. Since she had a fever, will empirically cover with leva 750mg x 5 days (8/17- 8/21) for any superimposed bacterial process  - Neutropenic fevers starting 8/10-8/13, then ceased. Received rocephin & Vanco in clinic, then admitted and started cefepime 8/12. Blood cx, UA and CXR neg. Stopped cefepime 8/16 with engraftment  - Cont Fluc, and HD ACV (CMV+, HSV+, EBV+) prophy.   - Stool cx negative for C Diff 8/8  - Bactrim or appropriate PCP therapy to start d+28.                     4.  GI:    - peridex and saline rinse instead of toothbrush with some gingival bleeding - now resolved  - Nausea: continue prn compazine, Ativan & Zofran   - Diarrhea. Likely due to prep, cx negative.  Ok to use Imodium prn  - Protonix for GI prophy.     5.  FEN/Renal:    - Lytes and Cr stable.- TPN started on admission - stopped 8/16 and eating much better     6. Headache  - tylenol and caffeine  RTC on Tuesday with midlevel and labs      Homero Chase MD  [phone]

## 2018-08-18 NOTE — NURSING NOTE
"Chief Complaint   Patient presents with     Blood Draw     Labs drawn from port by RN. Line flushed with saline and heparin.     Port Flush     Port accessed and flushed with saline and heparin by RN.     Labs collected from CVC by RN, line flushed with saline and heparin.  Vitals taken. Port accessed with 20g 3/4\" gripper needle by RN and line flushed with saline and heparin.    Nia Gomez RN  "

## 2018-08-18 NOTE — NURSING NOTE
"Oncology Rooming Note    August 18, 2018 9:07 AM   Amira Avery is a 28 year old female who presents for:    Chief Complaint   Patient presents with     Blood Draw     Labs drawn from port by RN. Line flushed with saline and heparin.     Port Flush     Port accessed and flushed with saline and heparin by RN.     RECHECK     provider visit, \[facility] [place] [facility]\"" s/p transplant Hodgkin lymphoma.     Initial Vitals: /75 (BP Location: Right arm, Cuff Size: Adult Regular)  Pulse 100  Temp 98.8  F (37.1  C) (Oral)  Resp 16  Wt 68.5 kg (151 lb)  SpO2 98%  BMI 26.59 kg/m2 Estimated body mass index is 26.59 kg/(m^2) as calculated from the following:    Height as of 8/12/18: 1.605 m (5' 3.19\").    Weight as of this encounter: 68.5 kg (151 lb). Body surface area is 1.75 meters squared.  No Pain (0) Comment: Data Unavailable   No LMP recorded.  Allergies reviewed: Yes  Medications reviewed: Yes    Medications: Medication refills not needed today.  Pharm[facility] entered into [employer]: [facility] [address] [place] [address] - [address] AT [facility] [address]    Clinical concerns: None, nausea much improved.     0 minutes for nursing intake (face to face time)     Shelley Oleary RN              "

## 2018-08-20 NOTE — PLAN OF CARE
Problem: Patient Care Overview  Goal: Plan of Care/Patient Progress Review  Occupational Therapy Discharge Summary    Reason for therapy discharge:    All goals and outcomes met, no further needs identified.    Progress towards therapy goal(s). See goals on Care Plan in [facility] electronic health record for goal details.  Goals met    Therapy recommendation(s):    Continue home exercise program.

## 2018-08-20 NOTE — PROGRESS NOTES
This is a recent snapshot of the patient's [facility] medical record.  For current drug dose and complete information and questions, call [phone] or In Basket pool, [facility] home infusion ([id number])  CSN Number:  [id number]

## 2018-08-21 ENCOUNTER — APPOINTMENT (OUTPATIENT)
Dept: LAB | Facility: CLINIC | Age: 28
End: 2018-08-21
Attending: INTERNAL MEDICINE
Payer: COMMERCIAL

## 2018-08-21 ENCOUNTER — ONCOLOGY VISIT (OUTPATIENT)
Dept: TRANSPLANT | Facility: CLINIC | Age: 28
End: 2018-08-21
Attending: INTERNAL MEDICINE
Payer: COMMERCIAL

## 2018-08-21 VITALS
RESPIRATION RATE: 16 BRPM | TEMPERATURE: 98.7 F | DIASTOLIC BLOOD PRESSURE: 79 MMHG | SYSTOLIC BLOOD PRESSURE: 116 MMHG | OXYGEN SATURATION: 98 % | WEIGHT: 147.6 LBS | BODY MASS INDEX: 25.99 KG/M2 | HEART RATE: 104 BPM

## 2018-08-21 DIAGNOSIS — C81.95 HODGKIN LYMPHOMA, UNSPECIFIED, LYMPH NODES OF INGUINAL REGION AND LOWER LIMB (H): Primary | ICD-10-CM

## 2018-08-21 LAB
ANION GAP SERPL CALCULATED.3IONS-SCNC: 8 MMOL/L (ref 3–14)
ANISOCYTOSIS BLD QL SMEAR: SLIGHT
BASOPHILS # BLD AUTO: 0 10E9/L (ref 0–0.2)
BASOPHILS NFR BLD AUTO: 0 %
BUN SERPL-MCNC: 9 MG/DL (ref 7–30)
CALCIUM SERPL-MCNC: 8.9 MG/DL (ref 8.5–10.1)
CHLORIDE SERPL-SCNC: 105 MMOL/L (ref 94–109)
CO2 SERPL-SCNC: 25 MMOL/L (ref 20–32)
CREAT SERPL-MCNC: 0.62 MG/DL (ref 0.52–1.04)
DIFFERENTIAL METHOD BLD: ABNORMAL
EOSINOPHIL # BLD AUTO: 0 10E9/L (ref 0–0.7)
EOSINOPHIL NFR BLD AUTO: 0 %
ERYTHROCYTE [DISTWIDTH] IN BLOOD BY AUTOMATED COUNT: 15 % (ref 10–15)
GFR SERPL CREATININE-BSD FRML MDRD: >90 ML/MIN/1.7M2
GLUCOSE SERPL-MCNC: 88 MG/DL (ref 70–99)
HCT VFR BLD AUTO: 33.4 % (ref 35–47)
HGB BLD-MCNC: 10.9 G/DL (ref 11.7–15.7)
LYMPHOCYTES # BLD AUTO: 0.9 10E9/L (ref 0.8–5.3)
LYMPHOCYTES NFR BLD AUTO: 9.6 %
MCH RBC QN AUTO: 30 PG (ref 26.5–33)
MCHC RBC AUTO-ENTMCNC: 32.6 G/DL (ref 31.5–36.5)
MCV RBC AUTO: 92 FL (ref 78–100)
METAMYELOCYTES # BLD: 0.2 10E9/L
METAMYELOCYTES NFR BLD MANUAL: 1.7 %
MONOCYTES # BLD AUTO: 1 10E9/L (ref 0–1.3)
MONOCYTES NFR BLD AUTO: 10.4 %
MYELOCYTES # BLD: 0.2 10E9/L
MYELOCYTES NFR BLD MANUAL: 1.8 %
NEUTROPHILS # BLD AUTO: 7.4 10E9/L (ref 1.6–8.3)
NEUTROPHILS NFR BLD AUTO: 76.5 %
PLATELET # BLD AUTO: 119 10E9/L (ref 150–450)
PLATELET # BLD EST: ABNORMAL 10*3/UL
POLYCHROMASIA BLD QL SMEAR: SLIGHT
POTASSIUM SERPL-SCNC: 3.8 MMOL/L (ref 3.4–5.3)
RBC # BLD AUTO: 3.63 10E12/L (ref 3.8–5.2)
SODIUM SERPL-SCNC: 138 MMOL/L (ref 133–144)
WBC # BLD AUTO: 9.7 10E9/L (ref 4–11)

## 2018-08-21 PROCEDURE — 80048 BASIC METABOLIC PNL TOTAL CA: CPT | Performed by: PHYSICIAN ASSISTANT

## 2018-08-21 PROCEDURE — 85025 COMPLETE CBC W/AUTO DIFF WBC: CPT | Performed by: PHYSICIAN ASSISTANT

## 2018-08-21 PROCEDURE — 25000128 H RX IP 250 OP 636: Mod: ZF | Performed by: INTERNAL MEDICINE

## 2018-08-21 PROCEDURE — G0463 HOSPITAL OUTPT CLINIC VISIT: HCPCS

## 2018-08-21 PROCEDURE — 36592 COLLECT BLOOD FROM PICC: CPT

## 2018-08-21 RX ORDER — HEPARIN SODIUM,PORCINE 10 UNIT/ML
5 VIAL (ML) INTRAVENOUS
Status: DISCONTINUED | OUTPATIENT
Start: 2018-08-21 | End: 2018-08-21 | Stop reason: HOSPADM

## 2018-08-21 RX ADMIN — HEPARIN, PORCINE (PF) 10 UNIT/ML INTRAVENOUS SYRINGE 5 ML: at 11:47

## 2018-08-21 ASSESSMENT — PAIN SCALES - GENERAL: PAINLEVEL: NO PAIN (0)

## 2018-08-21 NOTE — NURSING NOTE
"Oncology Rooming Note    August 21, 2018 12:01 PM   Amira Avery is a 28 year old female who presents for:    Chief Complaint   Patient presents with     Blood Draw     Labs drawn from CVC by RN. Line flushed with saline and heparin; end caps changed. Vs taken and pt checked in for appt.     Oncology Clinic Visit     Patient with Hodgkin Lymphoma here for provider visit and lab review      Initial Vitals: /79 (BP Location: Right arm, Cuff Size: Adult Regular)  Pulse 104  Temp 98.7  F (37.1  C) (Oral)  Resp 16  Wt 67 kg (147 lb 9.6 oz)  SpO2 98%  BMI 25.99 kg/m2 Estimated body mass index is 25.99 kg/(m^2) as calculated from the following:    Height as of 8/12/18: 1.605 m (5' 3.19\").    Weight as of this encounter: 67 kg (147 lb 9.6 oz). Body surface area is 1.73 meters squared.  No Pain (0) Comment: Data Unavailable   No LMP recorded.  Allergies reviewed: Yes  Medications reviewed: Yes    Medications: Medication refills not needed today.  Pharmacy name entered into [employer] [id number] - [place], [place] - [address] AT [facility] [address]    Clinical concerns:     5 minutes for nursing intake (face to face time)     Anitra Flores CMA              "

## 2018-08-21 NOTE — NURSING NOTE
Chief Complaint   Patient presents with     Blood Draw     Labs drawn from CVC by RN. Line flushed with saline and heparin; end caps changed. Vs taken and pt checked in for appt.     Labs collected from CVC by RN, line flushed with saline and heparin; end caps changed.  Vitals taken. Pt checked in for appointment(s).    Nia Gomez RN

## 2018-08-21 NOTE — MR AVS SNAPSHOT
After Visit Summary   8/21/2018    Amira Avery    MRN: [id number]           Patient Information     Date Of Birth          1990        Visit Information        Provider Department      8/21/2018 12:00 PM  BMT WES #1 [facility] Blood and Marrow Transplant        Today's Diagnoses     Hodgkin lymphoma, unspecified, lymph nodes of inguinal region and lower limb (H)    -  1          [facility] [place] [place]  [address] [place] [address]  Phone: [phone]  Clinic Hours:   Monday-Thursday:7am to 7pm   Friday: 7am to 5pm   Weekends and holidays:    8am to noon (in general)  If your fever is 100.5  or greater,   call the clinic.  After hours call the   hospital at [phone] or   [phone]. Ask for the BMT   fellow on-call           Care Instructions    Fri: 12:15pm labs and Georgina          Follow-ups after your visit        Your next 10 appointments already scheduled     Aug 24, 2018 12:15 PM CDT   Masonic Lab Draw with  MASONIC LAB DRAW   [facility] Masonic Lab Draw ([facility])    [address] [place] [address]  [address] [place] [id number] [phone]            Aug 24, 2018  1:00 PM CDT   Return with  BMT WES #2   [facility] Blood and Marrow Transplant ([facility])    [address] [place] [address]  [address] [place] [id number] [phone]            Sep 05, 2018  1:20 PM CDT   CT SOFT TISSUE NECK W CONTRAST with UCCT1   [facility] Imaging [place] [facility] ([facility])    [address] [place] [address]  1st Floor  [facility] [id number] [phone]           Please bring any scans or X-rays taken at other hospitals, if similar tests were done. Also bring a list of your medicines, including vitamins, minerals and over-the-counter drugs. It is safest to leave personal items at home.  Be sure to tell your doctor:   If you have any allergies.   If there s any chance you are pregnant.   If you are  breastfeeding.    If you have diabetes as your medication may need to be adjusted for this exam.  You will have contrast for this exam. To prepare:   Do not eat or drink for 2 hours before your exam. If you need to take medicine, you may take it with small sips of water. (We may ask you to take liquid medicine as well.)   The day before your exam, drink extra fluids at least six 8-ounce glasses (unless your doctor tells you to restrict your fluids).  Patients over 70 or patients with diabetes or kidney problems:   If you haven t had a blood test (creatinine test) within the last 30 days, the Cardiologist/Radiologist may require you to get this test prior to your exam.  Please wear loose clothing, such as a sweat suit or jogging clothes. Avoid snaps, zippers and other metal. We may ask you to undress and put on a hospital gown.  If you have any questions, please call the Imaging Department where you will have your exam.            Sep 05, 2018  1:40 PM CDT   CT CHEST/ABDOMEN/PELVIS W CONTRAST with UCCT1   [facility] Imaging [place] [address] ([facility])    [address] [address] [phone]           Please bring any scans or X-rays taken at other hospitals, if similar tests were done. Also bring a list of your medicines, including vitamins, minerals and over-the-counter drugs. It is safest to leave personal items at home.  Be sure to tell your doctor:   If you have any allergies.   If there s any chance you are pregnant.   If you are breastfeeding.  How to prepare:   Do not eat or drink for 2 hours before your exam. If you need to take medicine, you may take it with small sips of water. (We may ask you to take liquid medicine as well.)   Please wear loose clothing, such as a sweat suit or jogging clothes. Avoid snaps, zippers and other metal. We may ask you to undress and put on a hospital gown.  Please arrive 30 minutes early for your CT. Once in the department  you might be asked to drink water 15-20 minutes prior to your exam.  If indicated you may be asked to drink an oral contrast in advance of your CT.  If this is the case, the imaging team will let you know or be in contact with you prior to your appointment  Patients over 70 or patients with diabetes or kidney problems:   If you haven t had a blood test (creatinine test) within the last 30 days, the Cardiologist/Radiologist may require you to get this test prior to your exam.  If you have diabetes:   Continue to take your metformin medication on the day of your exam  If you have any questions, please call the Imaging Department where you will have your exam.            Sep 05, 2018  2:00 PM CDT   Masonic Lab Draw with  MASONIC LAB DRAW   [facility] Masonic Lab Draw ([facility])    [address] [place] [address]  [address]  [facility] [id number] [phone]            Sep 05, 2018  2:30 PM CDT   (Arrive by 2:15 PM)   BMT 28 Day Anniversary Visit with  BMT DOM   [facility] Blood and Marrow Transplant ([facility])    [address] [place] [address]  [address]  [facility] [id number] [phone]              Future tests that were ordered for you today     Open Future Orders        Priority Expected Expires Ordered    CBC with platelets differential Routine 8/24/2018 2/17/2019 8/21/2018    Basic metabolic panel Routine 8/24/2018 2/17/2019 8/21/2018            Who to contact     If you have questions or need follow up information about today's clinic visit or your schedule please contact [facility] BLOOD AND MARROW TRANSPLANT directly at [phone].  Normal or non-critical lab and imaging results will be communicated to you by MyChart, letter or phone within 4 business days after the clinic has received the results. If you do not hear from us within 7 days, please contact the clinic through MyChart or phone. If you have a critical or abnormal lab result, we will notify you by  phone as soon as possible.  Submit refill requests through IP[facility] or call your pharmacy and they will forward the refill request to us. Please allow 3 business days for your refill to be completed.          Additional Information About Your Visit        IPXI Information     IP[employer] gives you secure access to your electronic health record. If you see a primary care provider, you can also send messages to your care team and make appointments. If you have questions, please call your primary care clinic.  If you do not have a primary care provider, please call [phone] and they will assist you.        Care EveryWhere ID     This is your Care EveryWhere ID. This could be used by other organizations to access your [facility] medical records  [id number]        Your Vitals Were     Pulse Temperature Respirations Pulse Oximetry BMI (Body Mass Index)       104 98.7  F (37.1  C) (Oral) 16 98% 25.99 kg/m2        Blood Pressure from Last 3 Encounters:   08/21/18 116/79   08/18/18 113/75   08/17/18 115/72    Weight from Last 3 Encounters:   08/21/18 67 kg (147 lb 9.6 oz)   08/18/18 68.5 kg (151 lb)   08/17/18 68.8 kg (151 lb 10.8 oz)              We Performed the Following     Basic metabolic panel     CBC with platelets differential        Recent Review Flowsheet Data     BMT Recent Results Latest Ref Rng & Units 8/14/2018 8/14/2018 8/15/2018 8/16/2018 8/17/2018 8/18/2018 8/21/2018    WBC 4.0 - 11.0 10e9/L - - 0.7(LL) 3.8(L) 7.6 22.2(H) 9.7    Hemoglobin 11.7 - 15.7 g/dL - - 8.3(L) 9.1(L) 9.3(L) 9.8(L) 10.9(L)    Platelet Count 150 - 450 10e9/L - - 14(LL) 14(LL) 18(LL) 37(LL) 119(L)    Neutrophils (Absolute) 1.6 - 8.3 10e9/L - - 0.1(LL) 2.0 4.9 17.7(H) -    Blasts (Absolute) 0 10e9/L - - - 0.1(H) - - -    INR 0.86 - 1.14 - - - - - - -    Sodium 133 - 144 mmol/L - - 142 140 140 139 138    Potassium 3.4 - 5.3 mmol/L - - 3.7 3.8 4.0 3.6 3.8    Chloride 94 - 109 mmol/L - - 110(H) 107 106 105 105    Glucose 70 - 99 mg/dL 97  105(H) 94 101(H) 93 106(H) 88    Urea Nitrogen 7 - 30 mg/dL - - 7 10 9 8 9    Creatinine 0.52 - 1.04 mg/dL - - 0.48(L) 0.58 0.61 0.63 0.62    Calcium (Total) 8.5 - 10.1 mg/dL - - 7.8(L) 8.4(L) 8.6 8.6 8.9    Protein (Total) 6.8 - 8.8 g/dL - - - - - - -    Albumin 3.4 - 5.0 g/dL - - - - - - -    Bilirubin (Direct) 0.0 - 0.2 mg/dL - - - - - - -    Alkaline Phosphatase 40 - 150 U/L - - - - - - -    AST 0 - 45 U/L - - - - - - -    ALT 0 - 50 U/L - - - - - - -    MCV 78 - 100 fl - - 87 87 89 90 92               Primary Care Provider Office Phone # Fax #    [facility] [phone] [address] [facility] [address]        Equal Access to Services     MARINE RAMEY : Hadii velma Luis, wasarahda brenna, qaybta karlaallisa mcgraw, jen de la cruz . So [facility] [phone].    ATENCIÓN: Si habla español, tiene a isabel disposición servicios gratuitos de asistencia lingüística. [facility] [phone].    We comply with applicable federal civil rights laws and [place] laws. We do not discriminate on the basis of race, color, national origin, age, disability, sex, sexual orientation, or gender identity.            Thank you!     Thank you for choosing [facility] BLOOD AND MARROW TRANSPLANT  for your care. Our goal is always to provide you with excellent care. Hearing back from our patients is one way we can continue to improve our services. Please take a few minutes to complete the written survey that you may receive in the mail after your visit with us. Thank you!             Your Updated Medication List - Protect others around you: Learn how to safely use, store and throw away your medicines at www.[employer].org.          This list is accurate as of 8/21/18 12:37 PM.  Always use your most recent med list.                   Brand Name Dispense Instructions for use Diagnosis    ACETAMINOPHEN PO      Take 650 mg by mouth every 4 hours as needed  for pain    S/P autologous bone marrow transplantation (H), Hodgkin lymphoma, unspecified, lymph nodes of inguinal region and lower limb (H)       acyclovir 800 MG tablet    ZOVIRAX    150 tablet    Take 1 tablet (800 mg) by mouth 5 times daily    Hodgkin lymphoma, unspecified, lymph nodes of inguinal region and lower limb (H)       fluconazole 200 MG tablet    DIFLUCAN    30 tablet    Take 1 tablet (200 mg) by mouth daily    Hodgkin lymphoma, unspecified, lymph nodes of inguinal region and lower limb (H)       levofloxacin 750 MG tablet    LEVAQUIN    5 tablet    Take 1 tablet (750 mg) by mouth daily for 5 days    Hodgkin lymphoma, unspecified, lymph nodes of inguinal region and lower limb (H)       LORazepam 0.5 MG tablet    ATIVAN    30 tablet    Take 1-2 tablets (0.5-1 mg) by mouth every 4 hours as needed for anxiety (nausea/vomiting/sleep)    Nodular sclerosing Hodgkin's lymphoma, unspecified body region (H)       ondansetron 8 MG ODT tab    ZOFRAN-ODT    30 tablet    Take 1 tablet (8 mg) by mouth every 8 hours as needed for nausea    Nodular sclerosing Hodgkin's lymphoma, unspecified body region (H)       pantoprazole 40 MG EC tablet    PROTONIX    30 tablet    Take 1 tablet (40 mg) by mouth daily    Nodular sclerosing Hodgkin's lymphoma, unspecified body region (H)       prochlorperazine 5 MG tablet    COMPAZINE    30 tablet    Take 1 tablet (5 mg) by mouth every 6 hours    Nodular sclerosing Hodgkin's lymphoma, unspecified body region (H)       sennosides 8.6 MG tablet    SENOKOT    60 each    Take 2 tablets by mouth 2 times daily as needed for constipation    Nodular sclerosing Hodgkin's lymphoma, unspecified body region (H)       sulfamethoxazole-trimethoprim 800-160 MG per tablet   Start taking on:  9/3/2018    BACTRIM DS/SEPTRA DS    34 tablet    Take 1 tablet by mouth Every Mon, Tues two times daily    Hodgkin lymphoma, unspecified, lymph nodes of inguinal region and lower limb (H)

## 2018-08-21 NOTE — PROGRESS NOTES
BMT Progress Note      Patient ID:  Amira Avery is a 28 year old women who is day +15 s/p Auto PBST for Hodgkins Lymphoma. Discharged 8/17 for therapy for dehydration and recent neutropenic fevers      Diagnosis HDN Hodgkin's Disease - NOS  HCT Type Autologous    Prep Regimen BCNU  Lisa-C  Etoposide  Melphalan   Donor Source Autologous    GVHD Prophylaxis No  Primary BMT Provider Dr. Landa      INTERVAL  HISTORY      Feels good.  PO intake is good.  No fever or chills.  No n/v/d.  No new medical complaints.    Review of Systems: 10 point ROS negative except as noted above.     PHYSICAL EXAM   /79 (BP Location: Right arm, Cuff Size: Adult Regular)  Pulse 104  Temp 98.7  F (37.1  C) (Oral)  Resp 16  Wt 67 kg (147 lb 9.6 oz)  SpO2 98%  BMI 25.99 kg/m2    General: NAD, tired appearing, nontoxic  Eyes:  ARMINDA, sclera anicteric   Nose/Mouth/Throat: OP clear, buccal mucosa moist, no ulcerations   Lungs: CTA bilaterally  Cardiovascular: RRR, no M/R/G   Abdominal/Rectal: +BS, soft, NT, ND, No HSM   Lymphatics: no edema  Skin: no rashes or petechaie  Neuro: A&O   Additional Findings: [place]: site NT, no drainage.  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  BMT:  Completed GCSF + mozobil priming prior to BEAM Auto PBSCT  - Collected 6.21million CD34/kg on 7/24.  - Transplanted on 8/6/2018 s/p BEAM prep.  -  GCSF started d+5 (8/11/2018) and cont until  ANC>2500 x2 consecutive days. Re-stage per protocol. On claritin for bone pain 8/15. WBC normalizing off GCSF.      2.  HEME: Keep Hgb>8 and plts>10K. No pre-meds.   - Plts and Hgb trending up. No transfusion needs today  - Platelet intercept study     3.  ID: Doing well  - rhinovirus per NP swab 8/15. Since she had a fever, will empirically cover with leva 750mg x 5 days (8/17- 8/21) for any superimposed bacterial process  - Cont Fluc, and HD ACV (CMV+, HSV+, EBV+) prophy.   - Stool cx negative for C Diff 8/8  - Bactrim or appropriate PCP therapy to start d+28.                     4.  GI:    - peridex and saline rinse instead of toothbrush with some gingival bleeding - now resolved  - Nausea: resolved, has prn compazine, Ativan & Zofran   - Protonix for GI prophy.     5.  FEN/Renal:    - Lytes and Cr stable.       RTC on Friday with midlevel and labs--would like to schedule line removal at that time  9/5 restaging scans/labs & review w/ DOM    Georgina Sanchez pa-c  [phone]

## 2018-08-23 ENCOUNTER — CARE COORDINATION (OUTPATIENT)
Dept: TRANSPLANT | Facility: CLINIC | Age: 28
End: 2018-08-23

## 2018-08-23 NOTE — PROGRESS NOTES
Left messages with HR contact Shiloh Charo at [phone] on 8/22 and 8/23 requesting a call back regarding pt's mother's FMLA forms. There are a couple questions that need clarified before the forms can be completed and faxed in.

## 2018-08-24 ENCOUNTER — ONCOLOGY VISIT (OUTPATIENT)
Dept: TRANSPLANT | Facility: CLINIC | Age: 28
End: 2018-08-24
Attending: PHYSICIAN ASSISTANT
Payer: COMMERCIAL

## 2018-08-24 ENCOUNTER — APPOINTMENT (OUTPATIENT)
Dept: LAB | Facility: CLINIC | Age: 28
End: 2018-08-24
Attending: PHYSICIAN ASSISTANT
Payer: COMMERCIAL

## 2018-08-24 ENCOUNTER — CARE COORDINATION (OUTPATIENT)
Dept: TRANSPLANT | Facility: CLINIC | Age: 28
End: 2018-08-24

## 2018-08-24 VITALS
BODY MASS INDEX: 26.22 KG/M2 | TEMPERATURE: 98.7 F | HEIGHT: 63 IN | OXYGEN SATURATION: 98 % | HEART RATE: 90 BPM | DIASTOLIC BLOOD PRESSURE: 68 MMHG | WEIGHT: 148 LBS | RESPIRATION RATE: 16 BRPM | SYSTOLIC BLOOD PRESSURE: 103 MMHG

## 2018-08-24 DIAGNOSIS — C81.95 HODGKIN LYMPHOMA, UNSPECIFIED, LYMPH NODES OF INGUINAL REGION AND LOWER LIMB (H): ICD-10-CM

## 2018-08-24 LAB
ANION GAP SERPL CALCULATED.3IONS-SCNC: 8 MMOL/L (ref 3–14)
BASOPHILS # BLD AUTO: 0 10E9/L (ref 0–0.2)
BASOPHILS NFR BLD AUTO: 0.7 %
BUN SERPL-MCNC: 9 MG/DL (ref 7–30)
CALCIUM SERPL-MCNC: 8.8 MG/DL (ref 8.5–10.1)
CHLORIDE SERPL-SCNC: 104 MMOL/L (ref 94–109)
CO2 SERPL-SCNC: 25 MMOL/L (ref 20–32)
CREAT SERPL-MCNC: 0.55 MG/DL (ref 0.52–1.04)
DIFFERENTIAL METHOD BLD: ABNORMAL
EOSINOPHIL # BLD AUTO: 0 10E9/L (ref 0–0.7)
EOSINOPHIL NFR BLD AUTO: 0.2 %
ERYTHROCYTE [DISTWIDTH] IN BLOOD BY AUTOMATED COUNT: 15.6 % (ref 10–15)
GFR SERPL CREATININE-BSD FRML MDRD: >90 ML/MIN/1.7M2
GLUCOSE SERPL-MCNC: 96 MG/DL (ref 70–99)
HCT VFR BLD AUTO: 33.7 % (ref 35–47)
HGB BLD-MCNC: 11.2 G/DL (ref 11.7–15.7)
IMM GRANULOCYTES # BLD: 0.1 10E9/L (ref 0–0.4)
IMM GRANULOCYTES NFR BLD: 2.7 %
LYMPHOCYTES # BLD AUTO: 1.1 10E9/L (ref 0.8–5.3)
LYMPHOCYTES NFR BLD AUTO: 23.7 %
MCH RBC QN AUTO: 30.9 PG (ref 26.5–33)
MCHC RBC AUTO-ENTMCNC: 33.2 G/DL (ref 31.5–36.5)
MCV RBC AUTO: 93 FL (ref 78–100)
MONOCYTES # BLD AUTO: 0.6 10E9/L (ref 0–1.3)
MONOCYTES NFR BLD AUTO: 12.9 %
NEUTROPHILS # BLD AUTO: 2.7 10E9/L (ref 1.6–8.3)
NEUTROPHILS NFR BLD AUTO: 59.8 %
NRBC # BLD AUTO: 0 10*3/UL
NRBC BLD AUTO-RTO: 0 /100
PLATELET # BLD AUTO: 235 10E9/L (ref 150–450)
POTASSIUM SERPL-SCNC: 3.8 MMOL/L (ref 3.4–5.3)
RBC # BLD AUTO: 3.62 10E12/L (ref 3.8–5.2)
SODIUM SERPL-SCNC: 137 MMOL/L (ref 133–144)
WBC # BLD AUTO: 4.4 10E9/L (ref 4–11)

## 2018-08-24 PROCEDURE — 36592 COLLECT BLOOD FROM PICC: CPT

## 2018-08-24 PROCEDURE — 80048 BASIC METABOLIC PNL TOTAL CA: CPT | Performed by: PHYSICIAN ASSISTANT

## 2018-08-24 PROCEDURE — 25000128 H RX IP 250 OP 636: Mod: ZF | Performed by: PHYSICIAN ASSISTANT

## 2018-08-24 PROCEDURE — 85025 COMPLETE CBC W/AUTO DIFF WBC: CPT | Performed by: PHYSICIAN ASSISTANT

## 2018-08-24 PROCEDURE — G0463 HOSPITAL OUTPT CLINIC VISIT: HCPCS

## 2018-08-24 RX ORDER — HEPARIN SODIUM,PORCINE 10 UNIT/ML
5 VIAL (ML) INTRAVENOUS
Status: DISCONTINUED | OUTPATIENT
Start: 2018-08-24 | End: 2018-08-24 | Stop reason: HOSPADM

## 2018-08-24 RX ORDER — FLUCONAZOLE 200 MG/1
100 TABLET ORAL DAILY
Qty: 30 TABLET | Refills: 0 | COMMUNITY
Start: 2018-08-24 | End: 2018-09-05

## 2018-08-24 RX ADMIN — HEPARIN, PORCINE (PF) 10 UNIT/ML INTRAVENOUS SYRINGE 5 ML: at 12:55

## 2018-08-24 ASSESSMENT — PAIN SCALES - GENERAL: PAINLEVEL: NO PAIN (0)

## 2018-08-24 NOTE — MR AVS SNAPSHOT
After Visit Summary   8/24/2018    Amira Avery    MRN: [id number]           Patient Information     Date Of Birth          1990        Visit Information        Provider Department      8/24/2018 1:00 PM  BMT WES #2 [facility] Blood and Marrow Transplant        Today's Diagnoses     Hodgkin lymphoma, unspecified, lymph nodes of inguinal region and lower limb (H)              [facility] ([facility])  [address] [place] [address]  Phone: [phone]  Clinic Hours:   Monday-Thursday:7am to 7pm   Friday: 7am to 5pm   Weekends and holidays:    8am to noon (in general)  If your fever is 100.5  or greater,   call the clinic.  After hours call the   hospital at [phone] or   [phone]. Ask for the BMT   fellow on-call           Care Instructions    If you are still taking levaquin, you can stop (your WBC is high enough)  Decrease your fluconazole dose to 1/2 tab/day              Follow-ups after your visit        Additional Services     OB/GYN REFERRAL       Your provider has referred you to:  [facility]: [facility] [phone]   [email or web address]    Please be aware that coverage of these services is subject to the terms and limitations of your health insurance plan.  Call member services at your health plan with any benefit or coverage questions.      Please bring the following with you to your appointment:    (1) Any X-Rays, CTs or MRIs which have been performed.  Contact the facility where they were done to arrange for  prior to your scheduled appointment.   (2) List of current medications   (3) This referral request   (4) Any documents/labs given to you for this referral                  Your next 10 appointments already scheduled     Sep 05, 2018  1:20 PM CDT   CT SOFT TISSUE NECK W CONTRAST with UCCT1   [facility] CT ([facility])     [address] [id number] [phone]           Please bring any scans or X-rays taken at other hospitals, if similar tests were done. Also bring a list of your medicines, including vitamins, minerals and over-the-counter drugs. It is safest to leave personal items at home.  Be sure to tell your doctor:   If you have any allergies.   If there s any chance you are pregnant.   If you are breastfeeding.    If you have diabetes as your medication may need to be adjusted for this exam.  You will have contrast for this exam. To prepare:   Do not eat or drink for 2 hours before your exam. If you need to take medicine, you may take it with small sips of water. (We may ask you to take liquid medicine as well.)   The day before your exam, drink extra fluids at least six 8-ounce glasses (unless your doctor tells you to restrict your fluids).  Patients over 70 or patients with diabetes or kidney problems:   If you haven t had a blood test (creatinine test) within the last 30 days, the Cardiologist/Radiologist may require you to get this test prior to your exam.  Please wear loose clothing, such as a sweat suit or jogging clothes. Avoid snaps, zippers and other metal. We may ask you to undress and put on a hospital gown.  If you have any questions, please call the Imaging Department where you will have your exam.            Sep 05, 2018  1:40 PM CDT   CT CHEST/ABDOMEN/PELVIS W CONTRAST with UCCT1   [facility] ([facility])    [address] [id number] [phone]           Please bring any scans or X-rays taken at other hospitals, if similar tests were done. Also bring a list of your medicines, including vitamins, minerals and over-the-counter drugs. It is safest to leave personal items at home.  Be sure to tell your doctor:   If you have any allergies.   If there s any chance you are pregnant.   If you are  breastfeeding.  How to prepare:   Do not eat or drink for 2 hours before your exam. If you need to take medicine, you may take it with small sips of water. (We may ask you to take liquid medicine as well.)   Please wear loose clothing, such as a sweat suit or jogging clothes. Avoid snaps, zippers and other metal. We may ask you to undress and put on a hospital gown.  Please arrive 30 minutes early for your CT. Once in the department you might be asked to drink water 15-20 minutes prior to your exam.  If indicated you may be asked to drink an oral contrast in advance of your CT.  If this is the case, the imaging team will let you know or be in contact with you prior to your appointment  Patients over 70 or patients with diabetes or kidney problems:   If you haven t had a blood test (creatinine test) within the last 30 days, the Cardiologist/Radiologist may require you to get this test prior to your exam.  If you have diabetes:   Continue to take your metformin medication on the day of your exam  If you have any questions, please call the Imaging Department where you will have your exam.            Sep 05, 2018  2:00 PM CDT   [facility] Lab Draw with  [facility] LAB DRAW   [facility] Lab Draw ([facility])    [address]  [address]  [facility] [address]   [phone]            Sep 05, 2018  2:30 PM CDT   (Arrive by 2:15 PM)   BMT 28 Day Anniversary Visit with  BMT DOM   [facility] Blood and Marrow Transplant ([facility])    [address] [place] [address]  [address]  [facility] [address]   [phone]              Future tests that were ordered for you today     Open Future Orders        Priority Expected Expires Ordered    CBC with platelets differential Routine 8/29/2018 2/20/2019 8/24/2018    INR Routine 8/29/2018 2/20/2019 8/24/2018    Basic metabolic panel Routine 8/29/2018 2/20/2019 8/24/2018    INT Line Removal - Tunnel Line Removal Routine   "8/24/2019 8/24/2018            Who to contact     If you have questions or need follow up information about today's clinic visit or your schedule please contact [facility] BLOOD AND MARROW TRANSPLANT directly at [phone].  Normal or non-critical lab and imaging results will be communicated to you by UpCityhart, letter or phone within 4 business days after the clinic has received the results. If you do not hear from us within 7 days, please contact the clinic through UpCityhart or phone. If you have a critical or abnormal lab result, we will notify you by phone as soon as possible.  Submit refill requests through Jobbr or call your pharmacy and they will forward the refill request to us. Please allow 3 business days for your refill to be completed.          Additional Information About Your Visit        Jobbr Information     Jobbr gives you secure access to your electronic health record. If you see a primary care provider, you can also send messages to your care team and make appointments. If you have questions, please call your primary care clinic.  If you do not have a primary care provider, please call [phone] and they will assist you.        Care EveryWhere ID     This is your Care EveryWhere ID. This could be used by other organizations to access your [place] medical records  [id number]        Your Vitals Were     Pulse Temperature Respirations Height Pulse Oximetry BMI (Body Mass Index)    90 98.7  F (37.1  C) (Oral) 16 1.605 m (5' 3.19\") 98% 26.06 kg/m2       Blood Pressure from Last 3 Encounters:   08/24/18 103/68   08/21/18 116/79   08/18/18 113/75    Weight from Last 3 Encounters:   08/24/18 67.1 kg (148 lb)   08/21/18 67 kg (147 lb 9.6 oz)   08/18/18 68.5 kg (151 lb)              We Performed the Following     Basic metabolic panel     CBC with platelets differential     OB/GYN REFERRAL          Today's Medication Changes          These changes are accurate as of 8/24/18  1:55 PM.  If you have " any questions, ask your nurse or doctor.               These medicines have changed or have updated prescriptions.        Dose/Directions    fluconazole 200 MG tablet   Commonly known as:  DIFLUCAN   Indication:  Fungal Infection Prophylaxis   This may have changed:  how much to take   Used for:  Hodgkin lymphoma, unspecified, lymph nodes of inguinal region and lower limb (H)        Dose:  100 mg   Take 0.5 tablets (100 mg) by mouth daily   Quantity:  30 tablet   Refills:  0         Stop taking these medicines if you haven't already. Please contact your care team if you have questions.     levofloxacin 750 MG tablet   Commonly known as:  LEVAQUIN                    Recent Review Flowsheet Data     BMT Recent Results Latest Ref Rng & Units 8/14/2018 8/15/2018 8/16/2018 8/17/2018 8/18/2018 8/21/2018 8/24/2018    WBC 4.0 - 11.0 10e9/L - 0.7(LL) 3.8(L) 7.6 22.2(H) 9.7 4.4    Hemoglobin 11.7 - 15.7 g/dL - 8.3(L) 9.1(L) 9.3(L) 9.8(L) 10.9(L) 11.2(L)    Platelet Count 150 - 450 10e9/L - 14(LL) 14(LL) 18(LL) 37(LL) 119(L) 235    Neutrophils (Absolute) 1.6 - 8.3 10e9/L - 0.1(LL) 2.0 4.9 17.7(H) 7.4 2.7    Blasts (Absolute) 0 10e9/L - - 0.1(H) - - - -    INR 0.86 - 1.14 - - - - - - -    Sodium 133 - 144 mmol/L - 142 140 140 139 138 137    Potassium 3.4 - 5.3 mmol/L - 3.7 3.8 4.0 3.6 3.8 3.8    Chloride 94 - 109 mmol/L - 110(H) 107 106 105 105 104    Glucose 70 - 99 mg/dL 105(H) 94 101(H) 93 106(H) 88 96    Urea Nitrogen 7 - 30 mg/dL - 7 10 9 8 9 9    Creatinine 0.52 - 1.04 mg/dL - 0.48(L) 0.58 0.61 0.63 0.62 0.55    Calcium (Total) 8.5 - 10.1 mg/dL - 7.8(L) 8.4(L) 8.6 8.6 8.9 8.8    Protein (Total) 6.8 - 8.8 g/dL - - - - - - -    Albumin 3.4 - 5.0 g/dL - - - - - - -    Bilirubin (Direct) 0.0 - 0.2 mg/dL - - - - - - -    Alkaline Phosphatase 40 - 150 U/L - - - - - - -    AST 0 - 45 U/L - - - - - - -    ALT 0 - 50 U/L - - - - - - -    MCV 78 - 100 fl - 87 87 89 90 92 93               Primary Care Provider Office Phone # Fax #     [facility] [phone]       [address] [address]        Equal Access to Services     RICHIE RAMEY : Hadii aad ku hadyvesmary Soshar, wasraahda luqadaha, qaybta kaalmada mariluz, jen ly kirkyudi blanton jono raymundo. So [facility] [phone].    ATENCIÓN: Si habla español, tiene a isabel disposición servicios gratuitos de asistencia lingüística. Llame al [phone].    We comply with applicable federal civil rights laws and [place] laws. We do not discriminate on the basis of race, color, national origin, age, disability, sex, sexual orientation, or gender identity.            Thank you!     Thank you for choosing [facility] BLOOD AND MARROW TRANSPLANT  for your care. Our goal is always to provide you with excellent care. Hearing back from our patients is one way we can continue to improve our services. Please take a few minutes to complete the written survey that you may receive in the mail after your visit with us. Thank you!             Your Updated Medication List - Protect others around you: Learn how to safely use, store and throw away your medicines at www.disposemymeds.org.          This list is accurate as of 8/24/18  1:55 PM.  Always use your most recent med list.                   Brand Name Dispense Instructions for use Diagnosis    ACETAMINOPHEN PO      Take 650 mg by mouth every 4 hours as needed for pain    S/P autologous bone marrow transplantation (H), Hodgkin lymphoma, unspecified, lymph nodes of inguinal region and lower limb (H)       acyclovir 800 MG tablet    ZOVIRAX    150 tablet    Take 1 tablet (800 mg) by mouth 5 times daily    Hodgkin lymphoma, unspecified, lymph nodes of inguinal region and lower limb (H)       fluconazole 200 MG tablet    DIFLUCAN    30 tablet    Take 0.5 tablets (100 mg) by mouth daily    Hodgkin lymphoma, unspecified, lymph nodes of inguinal region and lower limb (H)       LORazepam 0.5 MG tablet    ATIVAN     30 tablet    Take 1-2 tablets (0.5-1 mg) by mouth every 4 hours as needed for anxiety (nausea/vomiting/sleep)    Nodular sclerosing Hodgkin's lymphoma, unspecified body region (H)       ondansetron 8 MG ODT tab    ZOFRAN-ODT    30 tablet    Take 1 tablet (8 mg) by mouth every 8 hours as needed for nausea    Nodular sclerosing Hodgkin's lymphoma, unspecified body region (H)       pantoprazole 40 MG EC tablet    PROTONIX    30 tablet    Take 1 tablet (40 mg) by mouth daily    Nodular sclerosing Hodgkin's lymphoma, unspecified body region (H)       prochlorperazine 5 MG tablet    COMPAZINE    30 tablet    Take 1 tablet (5 mg) by mouth every 6 hours    Nodular sclerosing Hodgkin's lymphoma, unspecified body region (H)       sennosides 8.6 MG tablet    SENOKOT    60 each    Take 2 tablets by mouth 2 times daily as needed for constipation    Nodular sclerosing Hodgkin's lymphoma, unspecified body region (H)       sulfamethoxazole-trimethoprim 800-160 MG per tablet   Start taking on:  9/3/2018    BACTRIM DS/SEPTRA DS    34 tablet    Take 1 tablet by mouth Every Mon, Tues two times daily    Hodgkin lymphoma, unspecified, lymph nodes of inguinal region and lower limb (H)

## 2018-08-24 NOTE — PROGRESS NOTES
"BMT Progress Note      Patient ID:  Amira Avery is a 28 year old women who is day +15 s/p Auto PBST for Hodgkins Lymphoma. Discharged 8/17 for therapy for dehydration and recent neutropenic fevers      Diagnosis HDN Hodgkin's Disease - NOS  HCT Type Autologous    Prep Regimen BCNU  Lisa-C  Etoposide  Melphalan   Donor Source Autologous    GVHD Prophylaxis No  Primary BMT Provider Dr. Landa      INTERVAL  HISTORY      Feels good.  PO intake is good.  No fever or chills.  No n/v/d.  She continues to have hot flashes, mainly at night.  This is keeping her from sleeping.  No other complaints.  Really wants her line out.    Review of Systems: 10 point ROS negative except as noted above.     PHYSICAL EXAM   Blood pressure 103/68, pulse 90, temperature 98.7  F (37.1  C), temperature source Oral, resp. rate 16, height 1.605 m (5' 3.19\"), weight 67.1 kg (148 lb), SpO2 98 %.    General: NAD, tired appearing, nontoxic  Eyes:  ARMINDA, sclera anicteric   Nose/Mouth/Throat: OP clear, buccal mucosa moist, no ulcerations   Lungs: CTA bilaterally  Cardiovascular: RRR, no M/R/G   Abdominal/Rectal: +BS, soft, NT, ND, No HSM   Lymphatics: no edema  Skin: no rashes or petechaie  Neuro: A&O   Additional Findings: Gerald: site NT, no drainage.  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  BMT:  Completed GCSF + mozobil priming prior to BEAM Auto PBSCT  - Collected 6.21million CD34/kg on " 7/24.  - Transplanted on 8/6/2018 s/p BEAM prep.  -  GCSF started d+5 (8/11/2018) and cont until ANC>2500 x2 consecutive days. Re-stage per protocol. On claritin for bone pain 8/15. WBC normalizing off GCSF.      2.  HEME: Keep Hgb>8 and plts>10K. No pre-meds.   - Plts and Hgb trending up. No transfusion needs today  - Platelet intercept study     3.  ID: afebrile.  - Cont Fluc and HD ACV (CMV+, HSV+, EBV+) prophy.   - Stool cx negative for C Diff 8/8  - Bactrim or appropriate PCP therapy to start d+28.                     4.  GI:    - Protonix for GI prophy.     5.  FEN/Renal:    - Lytes and Cr stable.    6.  Gyn:  Hot flashes.  LMP over a month ago (prior to transplant)     RTC Wed with midlevel and labs; line removal  - gyn referral placed for hot flashes (? Early menopause, HRT)  - 9/5 restaging scans/labs & review w/ DOM    Georgina Sanchez pa-c  [phone]

## 2018-08-24 NOTE — PROGRESS NOTES
Received a call back from Shiloh Mancilla (pt's mother, Alicja's HR contact) stating Alicja's FMLA to be a caregiver for the pt has already been approved.

## 2018-08-24 NOTE — NURSING NOTE
Chief Complaint   Patient presents with     Blood Draw     Labs drawn from CVC by RN. Line flushed with saline and heparin. Vs taken and pt checked in for appt.     Labs collected from CVC by RN, line flushed with saline and heparin.  Vitals taken. Pt checked in for appointment(s).    Nia Gomez RN

## 2018-08-24 NOTE — NURSING NOTE
"Oncology Rooming Note    August 24, 2018 1:04 PM   Amira Avery is a 28 year old female who presents for:    Chief Complaint   Patient presents with     Blood Draw     Labs drawn from CVC by RN. Line flushed with saline and heparin. Vs taken and pt checked in for appt.     RECHECK     Return: Hodgkin lymphoma, unspecified, lymph nodes of inguinal region and lower limb      Initial Vitals: /68 (BP Location: Right arm, Cuff Size: Adult Regular)  Pulse 90  Temp 98.7  F (37.1  C) (Oral)  Resp 16  Ht 1.605 m (5' 3.19\")  Wt 67.1 kg (148 lb)  SpO2 98%  BMI 26.06 kg/m2 Estimated body mass index is 26.06 kg/(m^2) as calculated from the following:    Height as of this encounter: 1.605 m (5' 3.19\").    Weight as of this encounter: 67.1 kg (148 lb). Body surface area is 1.73 meters squared.  No Pain (0) Comment: Data Unavailable   No LMP recorded.  Allergies reviewed: Yes  Medications reviewed: Yes    Medications: Medication refills not needed today.  Pharmacy [facility] entered into Ge[facility]: [facility] [address] [place] [address] [address] AT [facility] OF [address]    Clinical concerns: N/A    5 minutes for nursing intake (face to face time)     Yue Montelongo CMA              "

## 2018-08-24 NOTE — PATIENT INSTRUCTIONS
If you are still taking levaquin, you can stop (your WBC is high enough)  Decrease your fluconazole dose to 1/2 tab/day

## 2018-08-29 ENCOUNTER — APPOINTMENT (OUTPATIENT)
Dept: LAB | Facility: CLINIC | Age: 28
End: 2018-08-29
Attending: PHYSICIAN ASSISTANT
Payer: COMMERCIAL

## 2018-08-29 ENCOUNTER — ONCOLOGY VISIT (OUTPATIENT)
Dept: TRANSPLANT | Facility: CLINIC | Age: 28
End: 2018-08-29
Attending: PHYSICIAN ASSISTANT
Payer: COMMERCIAL

## 2018-08-29 VITALS
BODY MASS INDEX: 26.31 KG/M2 | RESPIRATION RATE: 16 BRPM | DIASTOLIC BLOOD PRESSURE: 72 MMHG | WEIGHT: 149.4 LBS | TEMPERATURE: 98.2 F | HEART RATE: 81 BPM | SYSTOLIC BLOOD PRESSURE: 108 MMHG | OXYGEN SATURATION: 98 %

## 2018-08-29 DIAGNOSIS — C81.95 HODGKIN LYMPHOMA, UNSPECIFIED, LYMPH NODES OF INGUINAL REGION AND LOWER LIMB (H): ICD-10-CM

## 2018-08-29 LAB
ANION GAP SERPL CALCULATED.3IONS-SCNC: 8 MMOL/L (ref 3–14)
BASOPHILS # BLD AUTO: 0.1 10E9/L (ref 0–0.2)
BASOPHILS NFR BLD AUTO: 1.3 %
BUN SERPL-MCNC: 9 MG/DL (ref 7–30)
CALCIUM SERPL-MCNC: 8.5 MG/DL (ref 8.5–10.1)
CHLORIDE SERPL-SCNC: 106 MMOL/L (ref 94–109)
CO2 SERPL-SCNC: 26 MMOL/L (ref 20–32)
CREAT SERPL-MCNC: 0.62 MG/DL (ref 0.52–1.04)
DIFFERENTIAL METHOD BLD: ABNORMAL
EOSINOPHIL # BLD AUTO: 0.1 10E9/L (ref 0–0.7)
EOSINOPHIL NFR BLD AUTO: 1.3 %
ERYTHROCYTE [DISTWIDTH] IN BLOOD BY AUTOMATED COUNT: 16.8 % (ref 10–15)
GFR SERPL CREATININE-BSD FRML MDRD: >90 ML/MIN/1.7M2
GLUCOSE SERPL-MCNC: 96 MG/DL (ref 70–99)
HCT VFR BLD AUTO: 34.2 % (ref 35–47)
HGB BLD-MCNC: 11.3 G/DL (ref 11.7–15.7)
IMM GRANULOCYTES # BLD: 0 10E9/L (ref 0–0.4)
IMM GRANULOCYTES NFR BLD: 0.5 %
INR PPP: 1.04 (ref 0.86–1.14)
LYMPHOCYTES # BLD AUTO: 0.8 10E9/L (ref 0.8–5.3)
LYMPHOCYTES NFR BLD AUTO: 21.8 %
MCH RBC QN AUTO: 31 PG (ref 26.5–33)
MCHC RBC AUTO-ENTMCNC: 33 G/DL (ref 31.5–36.5)
MCV RBC AUTO: 94 FL (ref 78–100)
MONOCYTES # BLD AUTO: 0.5 10E9/L (ref 0–1.3)
MONOCYTES NFR BLD AUTO: 12.4 %
NEUTROPHILS # BLD AUTO: 2.3 10E9/L (ref 1.6–8.3)
NEUTROPHILS NFR BLD AUTO: 62.7 %
NRBC # BLD AUTO: 0 10*3/UL
NRBC BLD AUTO-RTO: 0 /100
PLATELET # BLD AUTO: 267 10E9/L (ref 150–450)
POTASSIUM SERPL-SCNC: 3.9 MMOL/L (ref 3.4–5.3)
RBC # BLD AUTO: 3.64 10E12/L (ref 3.8–5.2)
SODIUM SERPL-SCNC: 140 MMOL/L (ref 133–144)
WBC # BLD AUTO: 3.7 10E9/L (ref 4–11)

## 2018-08-29 PROCEDURE — 36592 COLLECT BLOOD FROM PICC: CPT

## 2018-08-29 PROCEDURE — 85025 COMPLETE CBC W/AUTO DIFF WBC: CPT | Performed by: PHYSICIAN ASSISTANT

## 2018-08-29 PROCEDURE — 25000128 H RX IP 250 OP 636: Mod: ZF | Performed by: PHYSICIAN ASSISTANT

## 2018-08-29 PROCEDURE — 85610 PROTHROMBIN TIME: CPT | Performed by: PHYSICIAN ASSISTANT

## 2018-08-29 PROCEDURE — G0463 HOSPITAL OUTPT CLINIC VISIT: HCPCS

## 2018-08-29 PROCEDURE — 80048 BASIC METABOLIC PNL TOTAL CA: CPT | Performed by: PHYSICIAN ASSISTANT

## 2018-08-29 RX ORDER — HEPARIN SODIUM,PORCINE 10 UNIT/ML
5 VIAL (ML) INTRAVENOUS
Status: DISCONTINUED | OUTPATIENT
Start: 2018-08-29 | End: 2018-08-29 | Stop reason: HOSPADM

## 2018-08-29 RX ADMIN — HEPARIN, PORCINE (PF) 10 UNIT/ML INTRAVENOUS SYRINGE 5 ML: at 13:48

## 2018-08-29 RX ADMIN — HEPARIN, PORCINE (PF) 10 UNIT/ML INTRAVENOUS SYRINGE 5 ML: at 13:50

## 2018-08-29 ASSESSMENT — PAIN SCALES - GENERAL: PAINLEVEL: NO PAIN (0)

## 2018-08-29 NOTE — NURSING NOTE
"Oncology Rooming Note    August 29, 2018 2:06 PM   Amira Avery is a 28 year old female who presents for:    Chief Complaint   Patient presents with     Blood Draw     VS done, labs collected via CVC by RN.  Blood return noted and heparin locked x 2.  Hx Hodgkin lymphoma s/p transplant.     RECHECK     Pt is here for labs and  to see NP     Initial Vitals: /72  Pulse 81  Temp 98.2  F (36.8  C)  Resp 16  Wt 67.8 kg (149 lb 6.4 oz)  SpO2 98%  BMI 26.31 kg/m2 Estimated body mass index is 26.31 kg/(m^2) as calculated from the following:    Height as of 8/24/18: 1.605 m (5' 3.19\").    Weight as of this encounter: 67.8 kg (149 lb 6.4 oz). Body surface area is 1.74 meters squared.  No Pain (0) Comment: Data Unavailable   No LMP recorded.  Allergies reviewed: Yes  Medications reviewed: Yes    Medications: Medication refills not needed today.  Pharmacy name entered into [employer]: [facility] [address] [place] [address] [address] AT [facility] [address]    Clinical concerns: none     6 minutes for nursing intake (face to face time)     Mariampham Mojica MA              "

## 2018-08-29 NOTE — PROGRESS NOTES
BMT Progress Note      Patient ID:  Amira Avery is a 28 year old women who is day +23 s/p Auto PBST for Hodgkins Lymphoma. Discharged 8/17 for therapy for dehydration and recent neutropenic fevers      Diagnosis HDN Hodgkin's Disease - NOS  HCT Type Autologous    Prep Regimen BCNU  Lisa-C  Etoposide  Melphalan   Donor Source Autologous    GVHD Prophylaxis No  Primary BMT Provider Dr. Landa      INTERVAL  HISTORY      Feels good.  PO intake is good.  No fever or chills.  No n/v/d.  She continues to have hot flashes, mainly at night.  She hasn't seen gynecology about this yet, as she wasn't sure if she was supposed to call them or what the process is.  Her left neck is a bit sore up near her jaw and it radiates up to her left head; tylenol helps.  She feels no lymphadenopathy in the area; no swelling in neck, shoulder or arm.  No throat pain.     Review of Systems: 10 point ROS negative except as noted above.     PHYSICAL EXAM   Blood pressure 108/72, pulse 81, temperature 98.2  F (36.8  C), resp. rate 16, weight 67.8 kg (149 lb 6.4 oz), SpO2 98 %.    General: NAD, tired appearing, nontoxic  Eyes:  ARMINDA, sclera anicteric   Nose/Mouth/Throat: OP clear, buccal mucosa moist, no ulcerations   Neck: no palpable adenopathy in left neck; no swelling; not really tender on palpation (but noted that she took tylenol not long ago).   Lungs: CTA bilaterally  Cardiovascular: RRR, no M/R/G   Abdominal/Rectal: +BS, soft, NT, ND, No HSM   Lymphatics: no edema  Skin: no rashes or petechaie  Neuro: A&O   Additional Findings: Gerald: site NT, no drainage.  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  BMT:  Completed GCSF + mozobil priming prior to BEAM Auto PBSCT  - Collected 6.21million CD34/kg on 7/24.  - Transplanted on 8/6/2018 s/p BEAM prep.  -  GCSF started d+5 (8/11/2018) and cont until ANC>2500 x2 consecutive days. Re-stage per protocol. On claritin for bone pain 8/15.       2.  HEME: Keep Hgb>8 and plts>10K. No pre-meds.   - transfusion independent  - Platelet intercept study     3.  ID: afebrile.  - Cont Fluc and HD ACV (CMV+, HSV+, EBV+) prophy.   - Bactrim or appropriate PCP therapy to start d+28; advised her to start this on Monday, 9/3.                      4.  GI:    - Protonix for GI prophy.     5.  FEN/Renal:    - Lytes and Cr stable; intake is good.     6.  Gyn:  Hot flashes.  LMP over a month ago (prior to transplant).  Referral to gyn ordered at last visit; have requested schedulers to help coordinate this.    RTC 9/5 restaging scans/labs & review w/ DOM (Treva)

## 2018-08-29 NOTE — MR AVS SNAPSHOT
After Visit Summary   8/29/2018    Amira Avery    MRN: [id number]           Patient Information     Date Of Birth          1990        Visit Information        Provider Department      8/29/2018 2:00 PM  BMT WES #[facility]        Today's Diagnoses     Hodgkin lymphoma, unspecified, lymph nodes of inguinal region and lower limb (H)              [facility]  [address] [place] [address]  Phone: [phone]  Clinic Hours:   Monday-Thursday:7am to 7pm   Friday: 7am to 5pm   Weekends and holidays:    8am to noon (in general)  If your fever is 100.5  or greater,   call the clinic.  After hours call the   hospital at [phone] or   [phone]. Ask for the BMT   fellow on-call           Care Instructions    Pt wishes to call GYN herself. -Jorge L          Follow-ups after your visit        Your next 10 appointments already scheduled     Sep 05, 2018 10:00 AM CDT   IR CVC TUNNEL REMOVAL LEFT with [id number]   [facility], [place], Interventional Radiology ([facility])    [address] [id number] [phone]           The day before the exam:   You may eat your regular diet.   You are encouraged to drink at least 8 eight ounce glasses of clear liquids.  Please wear loose clothing, such as a sweat suit or jogging clothes. Avoid snaps, zippers and other metal. We may ask you to undress and put on a hospital gown.  Please bring any scans or X-rays taken at other hospitals, if similar tests were done. Also bring a list of your medicines, including vitamins, minerals and over-the-counter drugs. It is safest to leave personal items at home.  Someone will need to drive you to and from the hospital.  Tell your doctor in advance:   If you have allergies to x-ray contrast or iodine.   If you are or may be pregnant.   If you are taking Coumadin (or any other blood  thinners) 5 days prior to the exam for any special instructions.   If you are diabetic to determine if your insulin needs have to be adjusted for the exam.  Your doctor will:   Need to do a history and physical within 30 days before this procedure.   Obtain necessary laboratory tests prior to the exam (Basic Metabolic Panel, CBCP, PTT and INR)   (No labs needed if you are having a tunneled catheter exchange or removal)  If you were given sedation, you cannot drive for 24 hours after the procedure, and an adult must be with you until then.  If you have any questions, please call the Imaging Department where you will have your exam. Directions, parking instructions, and other information are available on our website, [email or web address].            Sep 05, 2018  1:20 PM CDT   CT SOFT TISSUE NECK W CONTRAST with UCCT1   [facility] [place] [facility] ([facility])    [address] [address] [phone]           Please bring any scans or X-rays taken at other hospitals, if similar tests were done. Also bring a list of your medicines, including vitamins, minerals and over-the-counter drugs. It is safest to leave personal items at home.  Be sure to tell your doctor:   If you have any allergies.   If there s any chance you are pregnant.   If you are breastfeeding.    If you have diabetes as your medication may need to be adjusted for this exam.  You will have contrast for this exam. To prepare:   Do not eat or drink for 2 hours before your exam. If you need to take medicine, you may take it with small sips of water. (We may ask you to take liquid medicine as well.)   The day before your exam, drink extra fluids at least six 8-ounce glasses (unless your doctor tells you to restrict your fluids).  Patients over 70 or patients with diabetes or kidney problems:   If you haven t had a blood test (creatinine test) within the last 30 days, the Cardiologist/Radiologist  may require you to get this test prior to your exam.  Please wear loose clothing, such as a sweat suit or jogging clothes. Avoid snaps, zippers and other metal. We may ask you to undress and put on a hospital gown.  If you have any questions, please call the Imaging Department where you will have your exam.            Sep 05, 2018  1:40 PM CDT   CT CHEST/ABDOMEN/PELVIS W CONTRAST with UCCT1   [facility] CT ([facility] [place])    [address]  1st [address]  [facility] [address] [phone]           Please bring any scans or X-rays taken at other hospitals, if similar tests were done. Also bring a list of your medicines, including vitamins, minerals and over-the-counter drugs. It is safest to leave personal items at home.  Be sure to tell your doctor:   If you have any allergies.   If there s any chance you are pregnant.   If you are breastfeeding.  How to prepare:   Do not eat or drink for 2 hours before your exam. If you need to take medicine, you may take it with small sips of water. (We may ask you to take liquid medicine as well.)   Please wear loose clothing, such as a sweat suit or jogging clothes. Avoid snaps, zippers and other metal. We may ask you to undress and put on a hospital gown.  Please arrive 30 minutes early for your CT. Once in the department you might be asked to drink water 15-20 minutes prior to your exam.  If indicated you may be asked to drink an oral contrast in advance of your CT.  If this is the case, the imaging team will let you know or be in contact with you prior to your appointment  Patients over 70 or patients with diabetes or kidney problems:   If you haven t had a blood test (creatinine test) within the last 30 days, the Cardiologist/Radiologist may require you to get this test prior to your exam.  If you have diabetes:   Continue to take your metformin medication on the day of your exam  If you have any questions, please call the Imaging  Department where you will have your exam.            Sep 05, 2018  2:00 PM CDT   [facility] Lab Draw with  MASONIC LAB DRAW   [facility] Mason[facility] Lab Draw ([facility])    [address]  [address]  [facility] [id number] [phone]            Sep 05, 2018  2:30 PM CDT   (Arrive by 2:15 PM)   BMT 28 Day Anniversary Visit with  BMT DOM   [facility] Blood and Marrow Transplant ([facility])    [address]  [address]  [facility] [id number] [phone]              Future tests that were ordered for you today     Open Future Orders        Priority Expected Expires Ordered    CBC with platelets differential Routine 9/5/2018 9/28/2018 8/29/2018    Comprehensive metabolic panel Routine 9/5/2018 9/28/2018 8/29/2018    CMV DNA quantification Routine 9/5/2018 9/28/2018 8/29/2018            Who to contact     If you have questions or need follow up information about today's clinic visit or your schedule please contact [facility] B[facility]ARR[facility]ANSPL[facility] directly at [phone].  Normal or non-critical lab and imaging results will be communicated to you by [employer], letter or phone within 4 business days after the clinic has received the results. If you do not hear from us within 7 days, please contact the clinic through [employer] or phone. If you have a critical or abnormal lab result, we will notify you by phone as soon as possible.  Submit refill requests through [facility] or call your pharmacy and they will forward the refill request to us. Please allow 3 business days for your refill to be completed.          Additional Information About Your Visit        [employer] Information     [facility] gives you secure access to your electronic health record. If you see a primary care provider, you can also send messages to your care team and make appointments. If you have questions, please call your primary care clinic.  If you do not have a primary care provider,  please call [phone] and they will assist you.        Care EveryWhere ID     This is your Care EveryWhere ID. This could be used by other organizations to access your [place] medical records  [id number]        Your Vitals Were     Pulse Temperature Respirations Pulse Oximetry BMI (Body Mass Index)       81 98.2  F (36.8  C) 16 98% 26.31 kg/m2        Blood Pressure from Last 3 Encounters:   08/29/18 108/72   08/24/18 103/68   08/21/18 116/79    Weight from Last 3 Encounters:   08/29/18 67.8 kg (149 lb 6.4 oz)   08/24/18 67.1 kg (148 lb)   08/21/18 67 kg (147 lb 9.6 oz)              We Performed the Following     Basic metabolic panel     CBC with platelets differential     INR        Recent Review Flowsheet Data     BMT Recent Results Latest Ref Rng & Units 8/15/2018 8/16/2018 8/17/2018 8/18/2018 8/21/2018 8/24/2018 8/29/2018    WBC 4.0 - 11.0 10e9/L 0.7(LL) 3.8(L) 7.6 22.2(H) 9.7 4.4 3.7(L)    Hemoglobin 11.7 - 15.7 g/dL 8.3(L) 9.1(L) 9.3(L) 9.8(L) 10.9(L) 11.2(L) 11.3(L)    Platelet Count 150 - 450 10e9/L 14(LL) 14(LL) 18(LL) 37(LL) 119(L) 235 267    Neutrophils (Absolute) 1.6 - 8.3 10e9/L 0.1(LL) 2.0 4.9 17.7(H) 7.4 2.7 2.3    Blasts (Absolute) 0 10e9/L - 0.1(H) - - - - -    INR 0.86 - 1.14 - - - - - - 1.04    Sodium 133 - 144 mmol/L 142 140 140 139 138 137 140    Potassium 3.4 - 5.3 mmol/L 3.7 3.8 4.0 3.6 3.8 3.8 3.9    Chloride 94 - 109 mmol/L 110(H) 107 106 105 105 104 106    Glucose 70 - 99 mg/dL 94 101(H) 93 106(H) 88 96 96    Urea Nitrogen 7 - 30 mg/dL 7 10 9 8 9 9 9    Creatinine 0.52 - 1.04 mg/dL 0.48(L) 0.58 0.61 0.63 0.62 0.55 0.62    Calcium (Total) 8.5 - 10.1 mg/dL 7.8(L) 8.4(L) 8.6 8.6 8.9 8.8 8.5    Protein (Total) 6.8 - 8.8 g/dL - - - - - - -    Albumin 3.4 - 5.0 g/dL - - - - - - -    Bilirubin (Direct) 0.0 - 0.2 mg/dL - - - - - - -    Alkaline Phosphatase 40 - 150 U/L - - - - - - -    AST 0 - 45 U/L - - - - - - -    ALT 0 - 50 U/L - - - - - - -    MCV 78 - 100 fl 87 87 89 90 92 93 94                Primary Care Provider Office Phone # Fax #    [facility] [phone] [address] [address]        Equal Access to Services     RICHIE RAMEY : Hadii aad ku hadyveso Sotiagoali, waaxda luqadaha, qaybta kaalmada adetima, jen blanton laBrycekevin raymundo. So [facility] [phone].    ATENCIÓN: Si habla español, tiene a isabel disposición servicios gratuitos de asistencia lingüística. Llame al [phone].    We comply with applicable federal civil rights laws and [place] laws. We do not discriminate on the basis of race, color, national origin, age, disability, sex, sexual orientation, or gender identity.            Thank you!     Thank you for choosing [facility] BLOOD [facility] MARROW TRANSPLANT  for your care. Our goal is always to provide you with excellent care. Hearing back from our patients is one way we can continue to improve our services. Please take a few minutes to complete the written survey that you may receive in the mail after your visit with us. Thank you!             Your Updated Medication List - Protect others around you: Learn how to safely use, store and throw away your medicines at www.disposemymeds.org.          This list is accurate as of 8/29/18  2:46 PM.  Always use your most recent med list.                   Brand Name Dispense Instructions for use Diagnosis    ACETAMINOPHEN PO      Take 650 mg by mouth every 4 hours as needed for pain    S/P autologous bone marrow transplantation (H), Hodgkin lymphoma, unspecified, lymph nodes of inguinal region and lower limb (H)       acyclovir 800 MG tablet    ZOVIRAX    150 tablet    Take 1 tablet (800 mg) by mouth 5 times daily    Hodgkin lymphoma, unspecified, lymph nodes of inguinal region and lower limb (H)       fluconazole 200 MG tablet    DIFLUCAN    30 tablet    Take 0.5 tablets (100 mg) by mouth daily    Hodgkin lymphoma, unspecified, lymph nodes of inguinal region and lower  limb (H)       LORazepam 0.5 MG tablet    ATIVAN    30 tablet    Take 1-2 tablets (0.5-1 mg) by mouth every 4 hours as needed for anxiety (nausea/vomiting/sleep)    Nodular sclerosing Hodgkin's lymphoma, unspecified body region (H)       ondansetron 8 MG ODT tab    ZOFRAN-ODT    30 tablet    Take 1 tablet (8 mg) by mouth every 8 hours as needed for nausea    Nodular sclerosing Hodgkin's lymphoma, unspecified body region (H)       pantoprazole 40 MG EC tablet    PROTONIX    30 tablet    Take 1 tablet (40 mg) by mouth daily    Nodular sclerosing Hodgkin's lymphoma, unspecified body region (H)       prochlorperazine 5 MG tablet    COMPAZINE    30 tablet    Take 1 tablet (5 mg) by mouth every 6 hours    Nodular sclerosing Hodgkin's lymphoma, unspecified body region (H)       sennosides 8.6 MG tablet    SENOKOT    60 each    Take 2 tablets by mouth 2 times daily as needed for constipation    Nodular sclerosing Hodgkin's lymphoma, unspecified body region (H)       sulfamethoxazole-trimethoprim 800-160 MG per tablet   Start taking on:  9/3/2018    BACTRIM DS/SEPTRA DS    34 tablet    Take 1 tablet by mouth Every Mon, Tues two times daily    Hodgkin lymphoma, unspecified, lymph nodes of inguinal region and lower limb (H)

## 2018-08-29 NOTE — NURSING NOTE
Chief Complaint   Patient presents with     Blood Draw     VS done, labs collected via CVC by RN.  Blood return noted and heparin locked x 2.  Hx Hodgkin lymphoma s/p transplant.

## 2018-09-04 ENCOUNTER — TELEPHONE (OUTPATIENT)
Dept: INTERVENTIONAL RADIOLOGY/VASCULAR | Facility: CLINIC | Age: 28
End: 2018-09-04

## 2018-09-05 ENCOUNTER — HOSPITAL ENCOUNTER (OUTPATIENT)
Facility: CLINIC | Age: 28
Discharge: HOME | End: 2018-09-05
Attending: RADIOLOGY | Admitting: RADIOLOGY
Payer: COMMERCIAL

## 2018-09-05 ENCOUNTER — RADIANT APPOINTMENT (OUTPATIENT)
Dept: ULTRASOUND IMAGING | Facility: CLINIC | Age: 28
End: 2018-09-05
Attending: INTERNAL MEDICINE
Payer: COMMERCIAL

## 2018-09-05 ENCOUNTER — RADIANT APPOINTMENT (OUTPATIENT)
Dept: CT IMAGING | Facility: CLINIC | Age: 28
End: 2018-09-05
Attending: NURSE PRACTITIONER
Payer: COMMERCIAL

## 2018-09-05 ENCOUNTER — APPOINTMENT (OUTPATIENT)
Dept: INTERVENTIONAL RADIOLOGY/VASCULAR | Facility: CLINIC | Age: 28
End: 2018-09-05
Attending: PHYSICIAN ASSISTANT
Payer: COMMERCIAL

## 2018-09-05 ENCOUNTER — APPOINTMENT (OUTPATIENT)
Dept: LAB | Facility: CLINIC | Age: 28
End: 2018-09-05
Attending: INTERNAL MEDICINE
Payer: COMMERCIAL

## 2018-09-05 ENCOUNTER — OFFICE VISIT (OUTPATIENT)
Dept: TRANSPLANT | Facility: CLINIC | Age: 28
End: 2018-09-05
Attending: INTERNAL MEDICINE
Payer: COMMERCIAL

## 2018-09-05 VITALS
DIASTOLIC BLOOD PRESSURE: 66 MMHG | SYSTOLIC BLOOD PRESSURE: 115 MMHG | HEART RATE: 84 BPM | RESPIRATION RATE: 15 BRPM | OXYGEN SATURATION: 99 %

## 2018-09-05 VITALS
WEIGHT: 150.6 LBS | DIASTOLIC BLOOD PRESSURE: 70 MMHG | TEMPERATURE: 98.3 F | OXYGEN SATURATION: 99 % | SYSTOLIC BLOOD PRESSURE: 103 MMHG | BODY MASS INDEX: 26.52 KG/M2 | RESPIRATION RATE: 18 BRPM | HEART RATE: 72 BPM

## 2018-09-05 DIAGNOSIS — C81.95 HODGKIN LYMPHOMA, UNSPECIFIED, LYMPH NODES OF INGUINAL REGION AND LOWER LIMB (H): ICD-10-CM

## 2018-09-05 DIAGNOSIS — C81.95 HODGKIN LYMPHOMA, UNSPECIFIED, LYMPH NODES OF INGUINAL REGION AND LOWER LIMB (H): Primary | ICD-10-CM

## 2018-09-05 LAB
ALBUMIN SERPL-MCNC: 3.8 G/DL (ref 3.4–5)
ALP SERPL-CCNC: 72 U/L (ref 40–150)
ALT SERPL W P-5'-P-CCNC: 65 U/L (ref 0–50)
ANION GAP SERPL CALCULATED.3IONS-SCNC: 9 MMOL/L (ref 3–14)
AST SERPL W P-5'-P-CCNC: 41 U/L (ref 0–45)
BASOPHILS # BLD AUTO: 0 10E9/L (ref 0–0.2)
BASOPHILS NFR BLD AUTO: 0.8 %
BILIRUB SERPL-MCNC: 0.2 MG/DL (ref 0.2–1.3)
BUN SERPL-MCNC: 8 MG/DL (ref 7–30)
CALCIUM SERPL-MCNC: 8.9 MG/DL (ref 8.5–10.1)
CHLORIDE SERPL-SCNC: 104 MMOL/L (ref 94–109)
CO2 SERPL-SCNC: 22 MMOL/L (ref 20–32)
CREAT SERPL-MCNC: 0.77 MG/DL (ref 0.52–1.04)
DIFFERENTIAL METHOD BLD: ABNORMAL
EOSINOPHIL # BLD AUTO: 0.2 10E9/L (ref 0–0.7)
EOSINOPHIL NFR BLD AUTO: 4.8 %
ERYTHROCYTE [DISTWIDTH] IN BLOOD BY AUTOMATED COUNT: 18 % (ref 10–15)
GFR SERPL CREATININE-BSD FRML MDRD: >90 ML/MIN/1.7M2
GLUCOSE SERPL-MCNC: 88 MG/DL (ref 70–99)
HCT VFR BLD AUTO: 34 % (ref 35–47)
HGB BLD-MCNC: 11.3 G/DL (ref 11.7–15.7)
IMM GRANULOCYTES # BLD: 0 10E9/L (ref 0–0.4)
IMM GRANULOCYTES NFR BLD: 0.3 %
LDH SERPL L TO P-CCNC: 242 U/L (ref 81–234)
LYMPHOCYTES # BLD AUTO: 0.6 10E9/L (ref 0.8–5.3)
LYMPHOCYTES NFR BLD AUTO: 15.2 %
MCH RBC QN AUTO: 31.6 PG (ref 26.5–33)
MCHC RBC AUTO-ENTMCNC: 33.2 G/DL (ref 31.5–36.5)
MCV RBC AUTO: 95 FL (ref 78–100)
MONOCYTES # BLD AUTO: 0.6 10E9/L (ref 0–1.3)
MONOCYTES NFR BLD AUTO: 15.2 %
NEUTROPHILS # BLD AUTO: 2.5 10E9/L (ref 1.6–8.3)
NEUTROPHILS NFR BLD AUTO: 63.7 %
NRBC # BLD AUTO: 0 10*3/UL
NRBC BLD AUTO-RTO: 0 /100
PLATELET # BLD AUTO: 176 10E9/L (ref 150–450)
POTASSIUM SERPL-SCNC: 3.7 MMOL/L (ref 3.4–5.3)
PROT SERPL-MCNC: 7.3 G/DL (ref 6.8–8.8)
RADIOLOGIST FLAGS: ABNORMAL
RBC # BLD AUTO: 3.58 10E12/L (ref 3.8–5.2)
SODIUM SERPL-SCNC: 135 MMOL/L (ref 133–144)
WBC # BLD AUTO: 4 10E9/L (ref 4–11)

## 2018-09-05 PROCEDURE — 85025 COMPLETE CBC W/AUTO DIFF WBC: CPT | Performed by: NURSE PRACTITIONER

## 2018-09-05 PROCEDURE — G0463 HOSPITAL OUTPT CLINIC VISIT: HCPCS | Mod: 25

## 2018-09-05 PROCEDURE — 36589 REMOVAL TUNNELED CV CATH: CPT | Mod: LT

## 2018-09-05 PROCEDURE — 83615 LACTATE (LD) (LDH) ENZYME: CPT | Performed by: NURSE PRACTITIONER

## 2018-09-05 PROCEDURE — 25000128 H RX IP 250 OP 636: Mod: ZF | Performed by: INTERNAL MEDICINE

## 2018-09-05 PROCEDURE — 80053 COMPREHEN METABOLIC PANEL: CPT | Performed by: NURSE PRACTITIONER

## 2018-09-05 PROCEDURE — 25000125 ZZHC RX 250: Performed by: PHYSICIAN ASSISTANT

## 2018-09-05 RX ORDER — ACYCLOVIR 800 MG/1
800 TABLET ORAL
Qty: 150 TABLET | Refills: 0 | Status: SHIPPED | OUTPATIENT
Start: 2018-09-05 | End: 2018-10-04

## 2018-09-05 RX ORDER — IOPAMIDOL 755 MG/ML
92 INJECTION, SOLUTION INTRAVASCULAR ONCE
Status: COMPLETED | OUTPATIENT
Start: 2018-09-05 | End: 2018-09-05

## 2018-09-05 RX ORDER — IOPAMIDOL 755 MG/ML
100 INJECTION, SOLUTION INTRAVASCULAR ONCE
Status: DISCONTINUED | OUTPATIENT
Start: 2018-09-05 | End: 2018-09-05 | Stop reason: CLARIF

## 2018-09-05 RX ORDER — FLUCONAZOLE 200 MG/1
100 TABLET ORAL DAILY
Qty: 30 TABLET | Refills: 0 | Status: SHIPPED | OUTPATIENT
Start: 2018-09-05 | End: 2018-10-04

## 2018-09-05 RX ORDER — LIDOCAINE HYDROCHLORIDE 10 MG/ML
1-30 INJECTION, SOLUTION EPIDURAL; INFILTRATION; INTRACAUDAL; PERINEURAL
Status: COMPLETED | OUTPATIENT
Start: 2018-09-05 | End: 2018-09-05

## 2018-09-05 RX ORDER — HEPARIN SODIUM (PORCINE) LOCK FLUSH IV SOLN 100 UNIT/ML 100 UNIT/ML
5 SOLUTION INTRAVENOUS EVERY 8 HOURS PRN
Status: DISCONTINUED | OUTPATIENT
Start: 2018-09-05 | End: 2018-09-05 | Stop reason: HOSPADM

## 2018-09-05 RX ADMIN — Medication 5 ML: at 14:25

## 2018-09-05 RX ADMIN — IOPAMIDOL 92 ML: 755 INJECTION, SOLUTION INTRAVASCULAR at 13:11

## 2018-09-05 RX ADMIN — LIDOCAINE HYDROCHLORIDE 5 ML: 10 INJECTION, SOLUTION INFILTRATION; PERINEURAL at 10:38

## 2018-09-05 ASSESSMENT — PAIN SCALES - GENERAL: PAINLEVEL: NO PAIN (0)

## 2018-09-05 NOTE — DISCHARGE INSTRUCTIONS

## 2018-09-05 NOTE — PROGRESS NOTES
BMT Progress Note      Patient ID:  Amira Avery is a 28 year old women who is day +23 s/p Auto PBST for Hodgkins Lymphoma. Discharged 8/17 for therapy for dehydration and recent neutropenic fevers      Diagnosis HDN Hodgkin's Disease - NOS  HCT Type Autologous    Prep Regimen BCNU  Lisa-C  Etoposide  Melphalan   Donor Source Autologous    GVHD Prophylaxis No  Primary BMT Provider Dr. Landa      INTERVAL  HISTORY      Feels good.  PO intake is good.  No fever or chills.  No n/v/d.  She continues to have hot flashes, mainly at night.  She hasn't seen gynecology about this yet, as she wasn't sure if she was supposed to call them or what the process is.  Her left neck is a bit sore up near her jaw and it radiates up to her left head; tylenol helps.  She feels no lymphadenopathy in the area; no swelling in neck, shoulder or arm.  No throat pain.     Review of Systems: 10 point ROS negative except as noted above.     PHYSICAL EXAM   Blood pressure 103/70, pulse 72, temperature 98.3  F (36.8  C), temperature source Oral, resp. rate 18, weight 68.3 kg (150 lb 9.6 oz), SpO2 99 %.    General: NAD, tired appearing, nontoxic  Eyes:  ARMINDA, sclera anicteric   Nose/Mouth/Throat: OP clear, buccal mucosa moist, no ulcerations   Neck: no palpable adenopathy in left neck; no swelling; not really tender on palpation (but noted that she took tylenol not long ago).   Lungs: CTA bilaterally  Cardiovascular: RRR, no M/R/G   Abdominal/Rectal: +BS, soft, NT, ND, No HSM   Lymphatics: no edema  Skin: no rashes or petechaie  Neuro: A&O   Additional Findings: Gerald: site NT, no drainage.  Port a cath left chest: not accessed     Lab Results   Component Value Date    WBC 4.0 09/05/2018    ANEU 2.5 09/05/2018    HGB 11.3 (L) 09/05/2018    HCT 34.0 (L) 09/05/2018     09/05/2018     09/05/2018    POTASSIUM 3.7 09/05/2018    CHLORIDE 104 09/05/2018    CO2 22 09/05/2018    GLC 88 09/05/2018    BUN 8 09/05/2018    CR 0.77  09/05/2018    MAG 2.0 08/17/2018    INR 1.04 08/29/2018       CT CAP:    IMPRESSION: In this patient with history of Hodgkin's lymphoma:  1. No lymphadenopathy within the chest, abdomen, pelvis and inguinal  regions. Stable mild soft tissue density in the anterior mediastinum  likely representing thymic tissue.  2. New filling defect within the SVC adjacent to the Port-A-Cath.  3. Sub-2 mm bilateral pulmonary nodules, stable. No new or enlarging  pulmonary nodules.  4. No evidence for active lymphoma in the abdomen, pelvis and bones.1.  BMT:  Completed GCSF + mozobil priming prior to BEAM Auto PBSCT    LUE/SVC US: No occlussive thrombus innominate and subclavian. SVC not visualized.    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  - Transplanted on 8/6/2018 s/p BEAM prep.  -  GCSF started d+5 (8/11/2018) and cont until ANC>2500 x2 consecutive days. Restaging shows CR but SVC clot. Anticoagulate with lovenox 60 mg subcutaneoulsy BID. Nurses to teach injections.      2.  HEME: Keep Hgb>8 and plts>10K. No pre-meds.   - transfusion independent.  - Platelet intercept study     3.  ID: afebrile.  - Cont Fluc and HD ACV (CMV+, HSV+, EBV+) prophy.   - Bactrim or appropriate PCP therapy to start d+28; advised her to start this on Monday, 9/3.                      4.  GI:    - Protonix for GI prophy.     5.  FEN/Renal:    - Lytes and Cr stable; intake is good.     6.  Gyn:  Hot flashes.  LMP over a month ago (prior to transplant).  Referral to gyn ordered at last visit; have requested schedulers to help coordinate this.      RTC Monday with anti Xa.    Solitario Tilley M.D.

## 2018-09-05 NOTE — MR AVS SNAPSHOT
After Visit Summary   9/5/2018    Amira Avery    MRN: [id number]           Patient Information     Date Of Birth          1990        Visit Information        Provider Department      9/5/2018 2:30 PM  BMT DOM [facility] Blood and Marrow Transplant        Today's Diagnoses     Hodgkin lymphoma, unspecified, lymph nodes of inguinal region and lower limb (H)    -  1          [facility] ([facility])  [address] [place] [address]  Phone: [phone]  Clinic Hours:   Monday-Thursday:7am to 7pm   Friday: 7am to 5pm   Weekends and holidays:    8am to noon (in general)  If your fever is 100.5  or greater,   call the clinic.  After hours call the   hospital at [phone] or   [phone]. Ask for the BMT   fellow on-call            Follow-ups after your visit        Your next 10 appointments already scheduled     Sep 10, 2018 10:00 AM CDT   [facility] with  [employer] [facility] DRAW   [facility] ([facility])    [address] [place] [address]  [address] [place] [id number] [phone]            Sep 10, 2018 10:30 AM CDT   Return with  BMT WES #3   [facility] Blood and Marrow Transplant ([facility])    [address] [place] [id number] [phone]            Sep 12, 2018  1:45 PM CDT   New Patient Visit with Vesna Pedro MD   [facility] ([facility])    [facility] [address]  [address]  [address]  [facility] [id number] [phone]              Future tests that were ordered for you today     Open Future Orders        Priority Expected Expires Ordered    Basic metabolic panel Routine 9/10/2018 9/5/2019 9/5/2018    CBC with platelets differential Routine 9/10/2018 9/5/2019 9/5/2018    Anti XA Level Routine 9/10/2018 9/5/2019 9/5/2018            Who to contact     If you have questions or need follow up information about  today's clinic visit or your schedule please contact [facility] MARR[facility] TRANSPL[facility] directly at [phone].  Normal or non-critical lab and imaging results will be communicated to you by Cryoporthart, letter or phone within 4 business days after the clinic has received the results. If you do not hear from us within 7 days, please contact the clinic through Cryoporthart or phone. If you have a critical or abnormal lab result, we will notify you by phone as soon as possible.  Submit refill requests through [email or web address] or call your pharmacy and they will forward the refill request to us. Please allow 3 business days for your refill to be completed.          Additional Information About Your Visit        CryoportharTUBE Information     [email or web address] gives you secure access to your electronic health record. If you see a primary care provider, you can also send messages to your care team and make appointments. If you have questions, please call your primary care clinic.  If you do not have a primary care provider, please call [phone] and they will assist you.        Care EveryWhere ID     This is your Care EveryWhere ID. This could be used by other organizations to access your [place] medical records  [id number]        Your Vitals Were     Pulse Temperature Respirations Pulse Oximetry BMI (Body Mass Index)       72 98.3  F (36.8  C) (Oral) 18 99% 26.52 kg/m2        Blood Pressure from Last 3 Encounters:   09/05/18 103/70   09/05/18 115/66   08/29/18 108/72    Weight from Last 3 Encounters:   09/05/18 68.3 kg (150 lb 9.6 oz)   08/29/18 67.8 kg (149 lb 6.4 oz)   08/24/18 67.1 kg (148 lb)              We Performed the Following     CBC with platelets differential     CMV DNA quantification     Comprehensive metabolic panel     Lactate Dehydrogenase     US Upper Extremity Venous Duplex Left          Today's Medication Changes          These changes are accurate as of 9/5/18  5:00 PM.  If you have any questions, ask your nurse or doctor.                Start taking these medicines.        Dose/Directions    enoxaparin 60 MG/0.6ML injection   Commonly known as:  LOVENOX   Used for:  Hodgkin lymphoma, unspecified, lymph nodes of inguinal region and lower limb (H)        Dose:  60 mg   Inject 0.6 mLs (60 mg) Subcutaneous 2 times daily   Quantity:  60 Syringe   Refills:  11            Where to get your medicines      These medications were sent to [facility]  [address] [facility] [address], [facility] [address]    Hours:  TRANSPLANT PHONE NUMBER [phone] Phone:  [phone]     enoxaparin 60 MG/0.6ML injection         These medications were sent to [facility] [address] [place] [address] [facility] [address] [facility] OF  [address]  [address] [facility] [place] [address]     Phone:  [phone]     acyclovir 800 MG tablet    fluconazole 200 MG tablet                Recent Review Flowsheet Data     BMT Recent Results Latest Ref Rng & Units 8/16/2018 8/17/2018 8/18/2018 8/21/2018 8/24/2018 8/29/2018 9/5/2018    WBC 4.0 - 11.0 10e9/L 3.8(L) 7.6 22.2(H) 9.7 4.4 3.7(L) 4.0    Hemoglobin 11.7 - 15.7 g/dL 9.1(L) 9.3(L) 9.8(L) 10.9(L) 11.2(L) 11.3(L) 11.3(L)    Platelet Count 150 - 450 10e9/L 14(LL) 18(LL) 37(LL) 119(L) 235 267 176    Neutrophils (Absolute) 1.6 - 8.3 10e9/L 2.0 4.9 17.7(H) 7.4 2.7 2.3 2.5    Blasts (Absolute) 0 10e9/L 0.1(H) - - - - - -    INR 0.86 - 1.14 - - - - - 1.04 -    Sodium 133 - 144 mmol/L 140 140 139 138 137 140 135    Potassium 3.4 - 5.3 mmol/L 3.8 4.0 3.6 3.8 3.8 3.9 3.7    Chloride 94 - 109 mmol/L 107 106 105 105 104 106 104    Glucose 70 - 99 mg/dL 101(H) 93 106(H) 88 96 96 88    Urea Nitrogen 7 - 30 mg/dL 10 9 8 9 9 9 8    Creatinine 0.52 - 1.04 mg/dL 0.58 0.61 0.63 0.62 0.55 0.62 0.77    Calcium (Total) 8.5 - 10.1 mg/dL 8.4(L) 8.6 8.6 8.9 8.8 8.5 8.9    Protein (Total) 6.8 - 8.8 g/dL - - - - - - 7.3    Albumin 3.4 - 5.0 g/dL - - - - - - 3.8     Bilirubin (Direct) 0.0 - 0.2 mg/dL - - - - - - -    Alkaline Phosphatase 40 - 150 U/L - - - - - - 72    AST 0 - 45 U/L - - - - - - 41    ALT 0 - 50 U/L - - - - - - 65(H)    MCV 78 - 100 fl 87 89 90 92 93 94 95               Primary Care Provider Office Phone # Fax #    [facility] [phone] [address] [facility] [address]        Equal Access to Services     RICHIE RAMEY : Hadii aad ku hadasho Soomaali, waaxda luqadaha, qaybta kaalmada adeegyadavid, waxleonardo de la cruz . So [facility] [phone].    ATENCIÓN: Si habla español, tiene a isabel disposición servicios gratuitos de asistencia lingüística. [facility] [phone].    We comply with applicable federal civil rights laws and [place] laws. We do not discriminate on the basis of race, color, national origin, age, disability, sex, sexual orientation, or gender identity.            Thank you!     Thank you for choosing [facility] MARROW TRANSPL[facility]  for your care. Our goal is always to provide you with excellent care. Hearing back from our patients is one way we can continue to improve our services. Please take a few minutes to complete the written survey that you may receive in the mail after your visit with us. Thank you!             Your Updated Medication List - Protect others around you: Learn how to safely use, store and throw away your medicines at www.disposemymeds.org.          This list is accurate as of 9/5/18  5:00 PM.  Always use your most recent med list.                   Brand Name Dispense Instructions for use Diagnosis    ACETAMINOPHEN PO      Take 650 mg by mouth every 4 hours as needed for pain    S/P autologous bone marrow transplantation (H), Hodgkin lymphoma, unspecified, lymph nodes of inguinal region and lower limb (H)       acyclovir 800 MG tablet    ZOVIRAX    150 tablet    Take 1 tablet (800 mg) by mouth 5 times daily    Hodgkin lymphoma, unspecified, lymph  nodes of inguinal region and lower limb (H)       enoxaparin 60 MG/0.6ML injection    LOVENOX    60 Syringe    Inject 0.6 mLs (60 mg) Subcutaneous 2 times daily    Hodgkin lymphoma, unspecified, lymph nodes of inguinal region and lower limb (H)       fluconazole 200 MG tablet    DIFLUCAN    30 tablet    Take 0.5 tablets (100 mg) by mouth daily    Hodgkin lymphoma, unspecified, lymph nodes of inguinal region and lower limb (H)       LORazepam 0.5 MG tablet    ATIVAN    30 tablet    Take 1-2 tablets (0.5-1 mg) by mouth every 4 hours as needed for anxiety (nausea/vomiting/sleep)    Nodular sclerosing Hodgkin's lymphoma, unspecified body region (H)       ondansetron 8 MG ODT tab    ZOFRAN-ODT    30 tablet    Take 1 tablet (8 mg) by mouth every 8 hours as needed for nausea    Nodular sclerosing Hodgkin's lymphoma, unspecified body region (H)       pantoprazole 40 MG EC tablet    PROTONIX    30 tablet    Take 1 tablet (40 mg) by mouth daily    Nodular sclerosing Hodgkin's lymphoma, unspecified body region (H)       prochlorperazine 5 MG tablet    COMPAZINE    30 tablet    Take 1 tablet (5 mg) by mouth every 6 hours    Nodular sclerosing Hodgkin's lymphoma, unspecified body region (H)       sennosides 8.6 MG tablet    SENOKOT    60 each    Take 2 tablets by mouth 2 times daily as needed for constipation    Nodular sclerosing Hodgkin's lymphoma, unspecified body region (H)       sulfamethoxazole-trimethoprim 800-160 MG per tablet    BACTRIM DS/SEPTRA DS    34 tablet    Take 1 tablet by mouth Every Mon, Tues two times daily    Hodgkin lymphoma, unspecified, lymph nodes of inguinal region and lower limb (H)

## 2018-09-05 NOTE — NURSING NOTE
"Oncology Rooming Note    September 5, 2018 2:37 PM   Amira Avery is a 28 year old female who presents for:    Chief Complaint   Patient presents with     Port Draw     Labs drawn via port by RN. Line flushed and hep locked, then de-accessed. VS Taken.     RECHECK     RTN 28 day review- Hodgkins lymphoma     Initial Vitals: /70 (BP Location: Right arm, Patient Position: Sitting, Cuff Size: Adult Regular)  Pulse 72  Temp 98.3  F (36.8  C) (Oral)  Resp 18  Wt 68.3 kg (150 lb 9.6 oz)  SpO2 99%  BMI 26.52 kg/m2 Estimated body mass index is 26.52 kg/(m^2) as calculated from the following:    Height as of 8/24/18: 1.605 m (5' 3.19\").    Weight as of this encounter: 68.3 kg (150 lb 9.6 oz). Body surface area is 1.75 meters squared.  No Pain (0) Comment: Data Unavailable   No LMP recorded.  Allergies reviewed: Yes  Medications reviewed: Yes    Medications: MEDICATION REFILLS NEEDED TODAY. Provider was notified.  Pharm[facility] entered into [facility] [address] [place] [address] [address] AT [facility] OF [address]    Clinical concerns: Patient needs refills on Acyclovir, Pantoprazole, and Fluconazole.     8 minutes for nursing intake (face to face time)     Serenity Faulkner CMA              "

## 2018-09-05 NOTE — DISCHARGE INSTRUCTIONS

## 2018-09-05 NOTE — PROCEDURES
Interventional Radiology Brief Post Procedure Note    Procedure: IR CVC TUNNEL REMOVAL LEFT    Proceduralist: Luis M Hodgson PA-C    Assistant: None    Time Out: Prior to the start of the procedure and with procedural staff participation, I verbally confirmed the patient s identity using two indicators, relevant allergies, that the procedure was appropriate and matched the consent or emergent situation, and that the correct equipment/implants were available. Immediately prior to starting the procedure I conducted the Time Out with the procedural staff and re-confirmed the patient s name, procedure, and site/side. (The Joint Commission universal protocol was followed.)  Yes    Sedation: None. Local Anesthestic used    Findings: Completed removal of 14.5 Greek, double lumen tunneled central venous catheter in its entirety via right IJ initially placed 7/23/18 for apheresis which was no longer needed.     Estimated Blood Loss: Minimal    Fluoroscopy Time: 0 minute(s)    Specimens: None    Complications: 1. None     Condition: Stable    Plan: Keep head elevated for next 2 hours. No strenuous activity for 3 days. Follow up per primary team.     Comments: See dictated procedure note for full details.    Luis M Hodgson PA-C  Interventional Radiology  [phone]

## 2018-09-05 NOTE — IR NOTE
Patient Name: Amira Avery  Medical Record Number: [id number]  Today's Date: 9/5/2018    Procedure: Central line removal - Condition- no longer warrants line   Proceduralist: JOVANNY LANDIS      Procedure start time: 10:19  Puncture time: 10:20  Procedure end time: 10:29      Other Notes: Pt arrived to IR room from gold waiting room. Pt denies any questions or concerns regarding procedure. Pt positioned supine and monitored per protocol. Pt tolerated procedure without any noted complications. Pt discharged to home without incident. Patient given instructions/verbalized all back. All VSS with site intact - primapore placed

## 2018-09-05 NOTE — NURSING NOTE
Chief Complaint   Patient presents with     Port Draw     Labs drawn via port by RN. Line flushed and hep locked, then de-accessed. VS Taken.     Karen Renteria RN

## 2018-09-06 LAB
CMV DNA SPEC NAA+PROBE-ACNC: NORMAL [IU]/ML
CMV DNA SPEC NAA+PROBE-LOG#: NORMAL {LOG_IU}/ML
SPECIMEN SOURCE: NORMAL

## 2018-09-10 ENCOUNTER — APPOINTMENT (OUTPATIENT)
Dept: LAB | Facility: CLINIC | Age: 28
End: 2018-09-10
Attending: INTERNAL MEDICINE
Payer: COMMERCIAL

## 2018-09-10 ENCOUNTER — ONCOLOGY VISIT (OUTPATIENT)
Dept: TRANSPLANT | Facility: CLINIC | Age: 28
End: 2018-09-10
Attending: PHYSICIAN ASSISTANT
Payer: COMMERCIAL

## 2018-09-10 VITALS
BODY MASS INDEX: 26.57 KG/M2 | SYSTOLIC BLOOD PRESSURE: 106 MMHG | WEIGHT: 150.9 LBS | OXYGEN SATURATION: 98 % | TEMPERATURE: 98.6 F | DIASTOLIC BLOOD PRESSURE: 63 MMHG | HEART RATE: 69 BPM

## 2018-09-10 DIAGNOSIS — Z52.001 ENCOUNTER FOR HARVESTING OF PERIPHERAL BLOOD STEM CELLS: ICD-10-CM

## 2018-09-10 DIAGNOSIS — Z52.001 STEM CELL DONOR: ICD-10-CM

## 2018-09-10 DIAGNOSIS — Z94.81 S/P AUTOLOGOUS BONE MARROW TRANSPLANTATION (H): ICD-10-CM

## 2018-09-10 DIAGNOSIS — C81.95 HODGKIN LYMPHOMA, UNSPECIFIED, LYMPH NODES OF INGUINAL REGION AND LOWER LIMB (H): Primary | ICD-10-CM

## 2018-09-10 DIAGNOSIS — Z41.8 ENCOUNTER FOR OTHER PROCEDURES FOR PURPOSES OTHER THAN REMEDYING HEALTH STATE (CODE): ICD-10-CM

## 2018-09-10 DIAGNOSIS — C81.10 NODULAR SCLEROSING HODGKIN'S LYMPHOMA, UNSPECIFIED BODY REGION (H): ICD-10-CM

## 2018-09-10 LAB
ANION GAP SERPL CALCULATED.3IONS-SCNC: 9 MMOL/L (ref 3–14)
BASOPHILS # BLD AUTO: 0 10E9/L (ref 0–0.2)
BASOPHILS NFR BLD AUTO: 0.8 %
BUN SERPL-MCNC: 5 MG/DL (ref 7–30)
CALCIUM SERPL-MCNC: 8.8 MG/DL (ref 8.5–10.1)
CHLORIDE SERPL-SCNC: 109 MMOL/L (ref 94–109)
CO2 SERPL-SCNC: 24 MMOL/L (ref 20–32)
CREAT SERPL-MCNC: 0.71 MG/DL (ref 0.52–1.04)
DIFFERENTIAL METHOD BLD: ABNORMAL
EOSINOPHIL # BLD AUTO: 0.1 10E9/L (ref 0–0.7)
EOSINOPHIL NFR BLD AUTO: 3.8 %
ERYTHROCYTE [DISTWIDTH] IN BLOOD BY AUTOMATED COUNT: 17.6 % (ref 10–15)
GFR SERPL CREATININE-BSD FRML MDRD: >90 ML/MIN/1.7M2
GLUCOSE SERPL-MCNC: 98 MG/DL (ref 70–99)
HCT VFR BLD AUTO: 33 % (ref 35–47)
HGB BLD-MCNC: 11.1 G/DL (ref 11.7–15.7)
IMM GRANULOCYTES # BLD: 0 10E9/L (ref 0–0.4)
IMM GRANULOCYTES NFR BLD: 0.4 %
INR PPP: 1.02 (ref 0.86–1.14)
LMWH PPP CHRO-ACNC: 0.63 IU/ML
LYMPHOCYTES # BLD AUTO: 0.5 10E9/L (ref 0.8–5.3)
LYMPHOCYTES NFR BLD AUTO: 19.7 %
MCH RBC QN AUTO: 32 PG (ref 26.5–33)
MCHC RBC AUTO-ENTMCNC: 33.6 G/DL (ref 31.5–36.5)
MCV RBC AUTO: 95 FL (ref 78–100)
MONOCYTES # BLD AUTO: 0.4 10E9/L (ref 0–1.3)
MONOCYTES NFR BLD AUTO: 17.2 %
NEUTROPHILS # BLD AUTO: 1.4 10E9/L (ref 1.6–8.3)
NEUTROPHILS NFR BLD AUTO: 58.1 %
NRBC # BLD AUTO: 0 10*3/UL
NRBC BLD AUTO-RTO: 0 /100
PLATELET # BLD AUTO: 168 10E9/L (ref 150–450)
POTASSIUM SERPL-SCNC: 3.7 MMOL/L (ref 3.4–5.3)
RBC # BLD AUTO: 3.47 10E12/L (ref 3.8–5.2)
SODIUM SERPL-SCNC: 142 MMOL/L (ref 133–144)
WBC # BLD AUTO: 2.4 10E9/L (ref 4–11)

## 2018-09-10 PROCEDURE — 25000128 H RX IP 250 OP 636: Mod: ZF | Performed by: PHYSICIAN ASSISTANT

## 2018-09-10 PROCEDURE — G0463 HOSPITAL OUTPT CLINIC VISIT: HCPCS | Mod: 25

## 2018-09-10 PROCEDURE — 85610 PROTHROMBIN TIME: CPT | Performed by: INTERNAL MEDICINE

## 2018-09-10 PROCEDURE — 80048 BASIC METABOLIC PNL TOTAL CA: CPT | Performed by: INTERNAL MEDICINE

## 2018-09-10 PROCEDURE — 25000125 ZZHC RX 250: Mod: ZF | Performed by: PHYSICIAN ASSISTANT

## 2018-09-10 PROCEDURE — 85520 HEPARIN ASSAY: CPT | Performed by: INTERNAL MEDICINE

## 2018-09-10 PROCEDURE — 85025 COMPLETE CBC W/AUTO DIFF WBC: CPT | Performed by: INTERNAL MEDICINE

## 2018-09-10 PROCEDURE — 94642 AEROSOL INHALATION TREATMENT: CPT

## 2018-09-10 RX ORDER — WARFARIN SODIUM 5 MG/1
5 TABLET ORAL DAILY
Qty: 30 TABLET | Refills: 1 | Status: SHIPPED | OUTPATIENT
Start: 2018-09-10 | End: 2018-10-19

## 2018-09-10 RX ORDER — PENTAMIDINE ISETHIONATE 300 MG/300MG
300 INHALANT RESPIRATORY (INHALATION)
Status: CANCELLED
Start: 2018-09-10 | End: 2018-09-10

## 2018-09-10 RX ORDER — PENTAMIDINE ISETHIONATE 300 MG/300MG
300 INHALANT RESPIRATORY (INHALATION)
Status: COMPLETED | OUTPATIENT
Start: 2018-09-10 | End: 2018-09-10

## 2018-09-10 RX ORDER — HEPARIN SODIUM (PORCINE) LOCK FLUSH IV SOLN 100 UNIT/ML 100 UNIT/ML
5 SOLUTION INTRAVENOUS DAILY PRN
Status: CANCELLED
Start: 2018-09-10

## 2018-09-10 RX ORDER — HEPARIN SODIUM (PORCINE) LOCK FLUSH IV SOLN 100 UNIT/ML 100 UNIT/ML
5 SOLUTION INTRAVENOUS EVERY 8 HOURS
Status: DISCONTINUED | OUTPATIENT
Start: 2018-09-10 | End: 2018-09-10 | Stop reason: HOSPADM

## 2018-09-10 RX ORDER — SULFAMETHOXAZOLE/TRIMETHOPRIM 800-160 MG
1 TABLET ORAL
Qty: 34 TABLET | Refills: 0 | COMMUNITY
Start: 2018-09-10 | End: 2018-10-19

## 2018-09-10 RX ADMIN — Medication 5 ML: at 10:35

## 2018-09-10 RX ADMIN — PENTAMIDINE ISETHIONATE 300 MG: 300 INHALANT RESPIRATORY (INHALATION) at 11:37

## 2018-09-10 ASSESSMENT — PAIN SCALES - GENERAL: PAINLEVEL: NO PAIN (0)

## 2018-09-10 NOTE — PROGRESS NOTES
BMT Progress Note      Patient ID:  Amira Avery is a 28 year old women who is day +35 s/p Auto PBST for Hodgkins Lymphoma.       Diagnosis HDN Hodgkin's Disease - NOS  HCT Type Autologous    Prep Regimen BCNU  Lisa-C  Etoposide  Melphalan   Donor Source Autologous    GVHD Prophylaxis No  Primary BMT Provider Dr. Landa      INTERVAL  HISTORY      Overall doing well. Taking lovenox as prescribed. No issues. No new problems. Started bactrim last week.     Review of Systems: 10 point ROS negative except as noted above.     PHYSICAL EXAM   There were no vitals taken for this visit.    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  - Transplanted on 8/6/2018 s/p BEAM prep.  - 9/5 Restaging showed CR but SVC clot. Anticoagulate with lovenox 60 mg subcutaneoulsy BID.       2.  HEME: Keep Hgb>8 and plts>10K.   - No pre-meds.   - 9/5 Lovenox for SVC clot as above. Hep 10A pending--not clear why ordered? Also, no reason not to start coumadin? Start coumadin 5mg daily x 3 days then will see her Thursday with INR. Sent referral for coumadin clinic. Continue lovenox.   - Platelet intercept study     3.  ID: afebrile.  - Cont Fluc, Bactrim, and HD ACV (CMV+, HSV+, EBV+) prophy.        - ANC down to 1400 with starting bactrim. Stop bactrim. Pentamidine today.                 4.  GI:    - Protonix for GI prophy.     5.  FEN/Renal:    - Lytes and Cr stable; intake is good.     6.  Gyn:  Hot flashes.  LMP over a month ago (prior to transplant).  Referral to gyn  9/12    RTC: Thursday for INR and counts.     Diana Donovan PA-C  #[id number]

## 2018-09-10 NOTE — MR AVS SNAPSHOT
After Visit Summary   9/10/2018    Amira Avery    MRN: [id number]           Patient Information     Date Of Birth          1990        Visit Information        Provider Department      9/10/2018 10:30 AM  BMT W[facility] #[facility]        Today's Diagnoses     Hodgkin lymphoma, unspecified, lymph nodes of inguinal region and lower limb (H)    -  1    S/P autologous bone marrow transplantation (H)        Encounter for harvesting of peripheral blood stem cells        Stem cell donor        Encounter for other procedures for purposes other than remedying health state (CODE)        Nodular sclerosing Hodgkin's lymphoma, unspecified body region (H)              [facility]  [address] [place] [address]  Phone: [phone]  Clinic Hours:   Monday-Thursday:7am to 7pm   Friday: 7am to 5pm   Weekends and holidays:    8am to noon (in general)  If your fever is 100.5  or greater,   call the clinic.  After hours call the   hospital at [phone] or   [phone]. Ask for the BMT   fellow on-call            Follow-ups after your visit        Additional Services     INR CLINIC REFERRAL       Your provider has referred you to INR Services.    Please be aware that coverage of these services is subject to the terms and limitations of your health insurance plan.  Call member services at your health plan with any benefit or coverage questions.    Indication for Anticoagulation: DVT (first time)  If nonstandard INR is desired, indicate goal range and explanation: 2-3  Expected Duration of Therapy: 3 Months                  Your next 10 appointments already scheduled     Sep 12, 2018  1:45 PM CDT   New Patient Visit with Vesna Pedro MD   [facility] ([facility])    [place] [facility] [address]  [address]  [facility] [id number] [phone]              Future tests that were ordered  for you today     Open Future Orders        Priority Expected Expires Ordered    CBC with platelets differential Routine 9/13/2018 3/9/2019 9/10/2018    Basic metabolic panel Routine 9/13/2018 3/9/2019 9/10/2018    INR Routine 9/13/2018 3/9/2019 9/10/2018    CMV DNA quantification Routine 9/13/2018 3/9/2019 9/10/2018            Who to contact     If you have questions or need follow up information about today's clinic visit or your schedule please contact [facility] MARR[facility] TRANSPL[facility] directly at [phone].  Normal or non-critical lab and imaging results will be communicated to you by [email or web address], letter or phone within 4 business days after the clinic has received the results. If you do not hear from us within 7 days, please contact the clinic through [employer] or phone. If you have a critical or abnormal lab result, we will notify you by phone as soon as possible.  Submit refill requests through [employer] or call your pharmacy and they will forward the refill request to us. Please allow 3 business days for your refill to be completed.          Additional Information About Your Visit        MyChart Information     [employer] gives you secure access to your electronic health record. If you see a primary care provider, you can also send messages to your care team and make appointments. If you have questions, please call your primary care clinic.  If you do not have a primary care provider, please call [phone] and they will assist you.        Care EveryWhere ID     This is your Care EveryWhere ID. This could be used by other organizations to access your [facility] medical records  [id number]        Your Vitals Were     Pulse Temperature Pulse Oximetry BMI (Body Mass Index)          69 98.6  F (37  C) (Oral) 98% 26.57 kg/m2         Blood Pressure from Last 3 Encounters:   09/10/18 106/63   09/05/18 103/70   09/05/18 115/66    Weight from Last 3 Encounters:   09/10/18 68.4 kg (150 lb 14.4 oz)   09/05/18 68.3 kg (150  lb 9.6 oz)   08/29/18 67.8 kg (149 lb 6.4 oz)              We Performed the Following     Anti XA Level     Basic metabolic panel     CBC with platelets differential     INR CLINIC REFERRAL     INR          Today's Medication Changes          These changes are accurate as of 9/10/18 11:28 AM.  If you have any questions, ask your nurse or doctor.               Start taking these medicines.        Dose/Directions    warfarin 5 MG tablet   Commonly known as:  COUMADIN   Used for:  Hodgkin lymphoma, unspecified, lymph nodes of inguinal region and lower limb (H)        Dose:  5 mg   Take 1 tablet (5 mg) by mouth daily   Quantity:  30 tablet   Refills:  1         These medicines have changed or have updated prescriptions.        Dose/Directions    sulfamethoxazole-trimethoprim 800-160 MG per tablet   Commonly known as:  BACTRIM DS/SEPTRA DS   Indication:  prophy   This may have changed:  additional instructions   Used for:  Hodgkin lymphoma, unspecified, lymph nodes of inguinal region and lower limb (H)        Dose:  1 tablet   Take 1 tablet by mouth Every Mon, Tues two times daily HOLD starting 9/10   Quantity:  34 tablet   Refills:  0            Where to get your medicines      These medications were sent to [address] [address]    Hours:  TRANSPLANT PHONE NUMBER [phone] Phone:  [phone]     warfarin 5 MG tablet                Recent Review Flowsheet Data     BMT Recent Results Latest Ref Rng & Units 8/17/2018 8/18/2018 8/21/2018 8/24/2018 8/29/2018 9/5/2018 9/10/2018    WBC 4.0 - 11.0 10e9/L 7.6 22.2(H) 9.7 4.4 3.7(L) 4.0 2.4(L)    Hemoglobin 11.7 - 15.7 g/dL 9.3(L) 9.8(L) 10.9(L) 11.2(L) 11.3(L) 11.3(L) 11.1(L)    Platelet Count 150 - 450 10e9/L 18(LL) 37(LL) 119(L) 235 267 176 168    Neutrophils (Absolute) 1.6 - 8.3 10e9/L 4.9 17.7(H) 7.4 2.7 2.3 2.5 1.4(L)    Blasts (Absolute) 0 10e9/L - - - - - -  -    INR 0.86 - 1.14 - - - - 1.04 - 1.02    Sodium 133 - 144 mmol/L 140 139 138 137 140 135 142    Potassium 3.4 - 5.3 mmol/L 4.0 3.6 3.8 3.8 3.9 3.7 3.7    Chloride 94 - 109 mmol/L 106 105 105 104 106 104 109    Glucose 70 - 99 mg/dL 93 106(H) 88 96 96 88 98    Urea Nitrogen 7 - 30 mg/dL 9 8 9 9 9 8 5(L)    Creatinine 0.52 - 1.04 mg/dL 0.61 0.63 0.62 0.55 0.62 0.77 0.71    Calcium (Total) 8.5 - 10.1 mg/dL 8.6 8.6 8.9 8.8 8.5 8.9 8.8    Protein (Total) 6.8 - 8.8 g/dL - - - - - 7.3 -    Albumin 3.4 - 5.0 g/dL - - - - - 3.8 -    Bilirubin (Direct) 0.0 - 0.2 mg/dL - - - - - - -    Alkaline Phosphatase 40 - 150 U/L - - - - - 72 -    AST 0 - 45 U/L - - - - - 41 -    ALT 0 - 50 U/L - - - - - 65(H) -    MCV 78 - 100 fl 89 90 92 93 94 95 95               Primary Care Provider Office Phone # Fax #    [facility] [phone] [address] [facility] [address]        Equal Access to Services     RICHIE RAMEY AH: Hadii aad ku hadasho Soomaali, waaxda luqadaha, qaybta kaalmada adeegyada, waxay idiin malachi adeyudi de la cruz . So [facility] [phone].    ATENCIÓN: Si habla español, tiene a isabel disposición servicios gratuitos de asistencia lingüística. Roberto al [phone].    We comply with applicable federal civil rights laws and [place] laws. We do not discriminate on the basis of race, color, national origin, age, disability, sex, sexual orientation, or gender identity.            Thank you!     Thank you for choosing [facility] BLOOD [facility] MARROW TRANSPLANT  for your care. Our goal is always to provide you with excellent care. Hearing back from our patients is one way we can continue to improve our services. Please take a few minutes to complete the written survey that you may receive in the mail after your visit with us. Thank you!             Your Updated Medication List - Protect others around you: Learn how to safely use, store and throw away your medicines at  www.disposemymeds.org.          This list is accurate as of 9/10/18 11:28 AM.  Always use your most recent med list.                   Brand Name Dispense Instructions for use Diagnosis    ACETAMINOPHEN PO      Take 650 mg by mouth every 4 hours as needed for pain    S/P autologous bone marrow transplantation (H), Hodgkin lymphoma, unspecified, lymph nodes of inguinal region and lower limb (H)       acyclovir 800 MG tablet    ZOVIRAX    150 tablet    Take 1 tablet (800 mg) by mouth 5 times daily    Hodgkin lymphoma, unspecified, lymph nodes of inguinal region and lower limb (H)       enoxaparin 60 MG/0.6ML injection    LOVENOX    60 Syringe    Inject 0.6 mLs (60 mg) Subcutaneous 2 times daily    Hodgkin lymphoma, unspecified, lymph nodes of inguinal region and lower limb (H)       fluconazole 200 MG tablet    DIFLUCAN    30 tablet    Take 0.5 tablets (100 mg) by mouth daily    Hodgkin lymphoma, unspecified, lymph nodes of inguinal region and lower limb (H)       LORazepam 0.5 MG tablet    ATIVAN    30 tablet    Take 1-2 tablets (0.5-1 mg) by mouth every 4 hours as needed for anxiety (nausea/vomiting/sleep)    Nodular sclerosing Hodgkin's lymphoma, unspecified body region (H)       ondansetron 8 MG ODT tab    ZOFRAN-ODT    30 tablet    Take 1 tablet (8 mg) by mouth every 8 hours as needed for nausea    Nodular sclerosing Hodgkin's lymphoma, unspecified body region (H)       pantoprazole 40 MG EC tablet    PROTONIX    30 tablet    Take 1 tablet (40 mg) by mouth daily    Nodular sclerosing Hodgkin's lymphoma, unspecified body region (H)       prochlorperazine 5 MG tablet    COMPAZINE    30 tablet    Take 1 tablet (5 mg) by mouth every 6 hours    Nodular sclerosing Hodgkin's lymphoma, unspecified body region (H)       sennosides 8.6 MG tablet    SENOKOT    60 each    Take 2 tablets by mouth 2 times daily as needed for constipation    Nodular sclerosing Hodgkin's lymphoma, unspecified body region (H)        sulfamethoxazole-trimethoprim 800-160 MG per tablet    BACTRIM DS/SEPTRA DS    34 tablet    Take 1 tablet by mouth Every Mon, Tues two times daily HOLD starting 9/10    Hodgkin lymphoma, unspecified, lymph nodes of inguinal region and lower limb (H)       warfarin 5 MG tablet    COUMADIN    30 tablet    Take 1 tablet (5 mg) by mouth daily    Hodgkin lymphoma, unspecified, lymph nodes of inguinal region and lower limb (H)

## 2018-09-10 NOTE — NURSING NOTE
"Oncology Rooming Note    September 10, 2018 10:56 AM   Amira Avery is a 28 year old female who presents for:    Chief Complaint   Patient presents with     Lab Only     labs drawn via , vitals completed     Oncology Clinic Visit     Patient with Hodgkin Lymphoma here for provider visit and lab review      Initial Vitals: /63  Pulse 69  Temp 98.6  F (37  C) (Oral)  Wt 68.4 kg (150 lb 14.4 oz)  SpO2 98%  BMI 26.57 kg/m2 Estimated body mass index is 26.57 kg/(m^2) as calculated from the following:    Height as of 8/24/18: 1.605 m (5' 3.19\").    Weight as of this encounter: 68.4 kg (150 lb 14.4 oz). Body surface area is 1.75 meters squared.  No Pain (0) Comment: Data Unavailable   No LMP recorded.  Allergies reviewed: Yes  Medications reviewed: Yes    Medications: Medication refills not needed today.  Pharmacy name entered into [facility]: [facility] [address] [place] [address] [address] AT [facility] OF  [address]    Clinical concerns:     5 minutes for nursing intake (face to face time)     Anitra Flores CMA              "

## 2018-09-11 ENCOUNTER — TELEPHONE (OUTPATIENT)
Dept: OBGYN | Facility: CLINIC | Age: 28
End: 2018-09-11

## 2018-09-13 ENCOUNTER — ANTICOAGULATION THERAPY VISIT (OUTPATIENT)
Dept: ANTICOAGULATION | Facility: CLINIC | Age: 28
End: 2018-09-13

## 2018-09-13 ENCOUNTER — ONCOLOGY VISIT (OUTPATIENT)
Dept: TRANSPLANT | Facility: CLINIC | Age: 28
End: 2018-09-13
Attending: NURSE PRACTITIONER
Payer: COMMERCIAL

## 2018-09-13 ENCOUNTER — APPOINTMENT (OUTPATIENT)
Dept: LAB | Facility: CLINIC | Age: 28
End: 2018-09-13
Attending: NURSE PRACTITIONER
Payer: COMMERCIAL

## 2018-09-13 VITALS
HEART RATE: 79 BPM | SYSTOLIC BLOOD PRESSURE: 100 MMHG | WEIGHT: 150.8 LBS | TEMPERATURE: 97.4 F | DIASTOLIC BLOOD PRESSURE: 66 MMHG | BODY MASS INDEX: 26.55 KG/M2 | OXYGEN SATURATION: 98 %

## 2018-09-13 DIAGNOSIS — C81.95 HODGKIN LYMPHOMA, UNSPECIFIED, LYMPH NODES OF INGUINAL REGION AND LOWER LIMB (H): ICD-10-CM

## 2018-09-13 DIAGNOSIS — I82.409 DEEP VEIN THROMBOSIS (DVT) (H): ICD-10-CM

## 2018-09-13 LAB
ANION GAP SERPL CALCULATED.3IONS-SCNC: 8 MMOL/L (ref 3–14)
BASOPHILS # BLD AUTO: 0 10E9/L (ref 0–0.2)
BASOPHILS NFR BLD AUTO: 0.8 %
BUN SERPL-MCNC: 4 MG/DL (ref 7–30)
CALCIUM SERPL-MCNC: 8.8 MG/DL (ref 8.5–10.1)
CHLORIDE SERPL-SCNC: 110 MMOL/L (ref 94–109)
CO2 SERPL-SCNC: 24 MMOL/L (ref 20–32)
CREAT SERPL-MCNC: 0.64 MG/DL (ref 0.52–1.04)
DIFFERENTIAL METHOD BLD: ABNORMAL
EOSINOPHIL # BLD AUTO: 0.1 10E9/L (ref 0–0.7)
EOSINOPHIL NFR BLD AUTO: 3.4 %
ERYTHROCYTE [DISTWIDTH] IN BLOOD BY AUTOMATED COUNT: 17.1 % (ref 10–15)
GFR SERPL CREATININE-BSD FRML MDRD: >90 ML/MIN/1.7M2
GLUCOSE SERPL-MCNC: 109 MG/DL (ref 70–99)
HCT VFR BLD AUTO: 34.2 % (ref 35–47)
HGB BLD-MCNC: 11 G/DL (ref 11.7–15.7)
IMM GRANULOCYTES # BLD: 0 10E9/L (ref 0–0.4)
IMM GRANULOCYTES NFR BLD: 0.4 %
INR PPP: 2.58 (ref 0.86–1.14)
LYMPHOCYTES # BLD AUTO: 0.5 10E9/L (ref 0.8–5.3)
LYMPHOCYTES NFR BLD AUTO: 22.8 %
MCH RBC QN AUTO: 30.8 PG (ref 26.5–33)
MCHC RBC AUTO-ENTMCNC: 32.2 G/DL (ref 31.5–36.5)
MCV RBC AUTO: 96 FL (ref 78–100)
MONOCYTES # BLD AUTO: 0.3 10E9/L (ref 0–1.3)
MONOCYTES NFR BLD AUTO: 12.2 %
NEUTROPHILS # BLD AUTO: 1.4 10E9/L (ref 1.6–8.3)
NEUTROPHILS NFR BLD AUTO: 60.4 %
NRBC # BLD AUTO: 0 10*3/UL
NRBC BLD AUTO-RTO: 0 /100
PLATELET # BLD AUTO: 167 10E9/L (ref 150–450)
POTASSIUM SERPL-SCNC: 3.7 MMOL/L (ref 3.4–5.3)
RBC # BLD AUTO: 3.57 10E12/L (ref 3.8–5.2)
SODIUM SERPL-SCNC: 141 MMOL/L (ref 133–144)
WBC # BLD AUTO: 2.4 10E9/L (ref 4–11)

## 2018-09-13 PROCEDURE — 80048 BASIC METABOLIC PNL TOTAL CA: CPT | Performed by: PHYSICIAN ASSISTANT

## 2018-09-13 PROCEDURE — 25000128 H RX IP 250 OP 636: Mod: ZF | Performed by: NURSE PRACTITIONER

## 2018-09-13 PROCEDURE — G0463 HOSPITAL OUTPT CLINIC VISIT: HCPCS

## 2018-09-13 PROCEDURE — 85025 COMPLETE CBC W/AUTO DIFF WBC: CPT | Performed by: PHYSICIAN ASSISTANT

## 2018-09-13 PROCEDURE — 36591 DRAW BLOOD OFF VENOUS DEVICE: CPT

## 2018-09-13 PROCEDURE — 85610 PROTHROMBIN TIME: CPT | Performed by: PHYSICIAN ASSISTANT

## 2018-09-13 RX ORDER — HEPARIN SODIUM (PORCINE) LOCK FLUSH IV SOLN 100 UNIT/ML 100 UNIT/ML
5 SOLUTION INTRAVENOUS ONCE
Status: COMPLETED | OUTPATIENT
Start: 2018-09-13 | End: 2018-09-13

## 2018-09-13 RX ADMIN — Medication 5 ML: at 11:09

## 2018-09-13 ASSESSMENT — PAIN SCALES - GENERAL: PAINLEVEL: NO PAIN (0)

## 2018-09-13 NOTE — PROGRESS NOTES
"BMT Progress Note      Patient ID:  Amira Avery is a 28 year old women who is day + 38 s/p Auto PBST for Hodgkins Lymphoma.       Diagnosis HDN Hodgkin's Disease - NOS  HCT Type Autologous    Prep Regimen BCNU  Lisa-C  Etoposide  Melphalan   Donor Source Autologous    GVHD Prophylaxis No  Primary BMT Provider Dr. Landa      INTERVAL  HISTORY      Reports feeling well and back to \"normal\". Fatigue is improving every day. Denied any fevers, chills, cough, or SOB. No bleeding on anticoagulation. No new constitutional symptoms. Eating and drinking adequate amounts of a regular diet. No recent or acute bleeding events.     Review of Systems: 10 point ROS negative except as noted above.     PHYSICAL EXAM   There were no vitals taken for this visit.    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  - Transplanted on 8/6/2018 s/p BEAM prep.  - 9/5 Restaging showed CR but SVC clot. Anticoagulate with lovenox 60 mg subcutaneoulsy BID.       2.  HEME: Keep Hgb>8 and plts>10K.   - No pre-meds.   - 9/5 Lovenox for SVC clot as above. Started coumadin 5mg daily x 3 days. Referral placed to coumadin clinic 9/10/18. INR pending today. Contacted coumadin clinic [phone], they will call her with the plan for Lovenox and dose of Warfarin this afternoon. The nurse will schedule repeat INRs based on results.   - Platelet intercept study      3.  ID: afebrile.  - Cont Fluc, Bactrim, and HD ACV (CMV+, HSV+, EBV+) prophy.        - ANC down to 1400 with starting bactrim. Stop " bactrim. Pentamidine today.                 4.  GI:    - Protonix for GI prophy.     5.  FEN/Renal:    - Lytes and Cr stable; intake is good.     6.  Gyn:  Hot flashes.  LMP over a month ago (prior to transplant).  Referral to gyn 9/12    RTC:   - 1 week on Thursday for INR and counts.  - Provided number to coumadin clinic. Contacted coumadin RN who will call patient with anticoagulation plan and follow up INRs going forward.      Lizbeth Jarrett, MSN, APRN, ACNP-BC  Pager: [phone]

## 2018-09-13 NOTE — NURSING NOTE
Chief Complaint   Patient presents with     Port Draw     labs drawn via port by RN     /66 (BP Location: Right arm, Patient Position: Chair, Cuff Size: Adult Regular)  Pulse 79  Temp 97.4  F (36.3  C) (Oral)  Wt 68.4 kg (150 lb 12.8 oz)  SpO2 98%  BMI 26.55 kg/m2    Vitals taken. Port accessed by RN. Labs collected and sent. Line flushed with NS & Heparin. Pt tolerated well. Pt checked in for next appointment.    Reena Edwards RN

## 2018-09-13 NOTE — MR AVS SNAPSHOT
After Visit Summary   9/13/2018    Amira Avery    MRN: [id number]           Patient Information     Date Of Birth          1990        Visit Information        Provider Department      9/13/2018 11:30 AM  BMT WES #2 [facility]        Today's Diagnoses     Hodgkin lymphoma, unspecified, lymph nodes of inguinal region and lower limb (H)              [facility] ([facility])  [address] [place] [address]  Phone: [phone]  Clinic Hours:   Monday-Thursday:7am to 7pm   Friday: 7am to 5pm   Weekends and holidays:    8am to noon (in general)  If your fever is 100.5  or greater,   call the clinic.  After hours call the   hospital at [phone] or   [phone]. Ask for the BMT   fellow on-call           Care Instructions    RTC next week Thursday for provider visit, labs, recheck WBC  Coumadin clinic will call you today with plan regarding your lovenox and coumadin dosing      Lizbeth Jarrett, MSN, APRN, ACNP-BC  Pager: [phone]            Follow-ups after your visit        Your next 10 appointments already scheduled     Sep 14, 2018  1:30 PM CDT   New Patient Visit with Felecia Delarosa MD   [facility] ([facility])    [place] [facility] [address]  [address]  [address]  [facility] [id number] [phone]              Future tests that were ordered for you today     Open Future Orders        Priority Expected Expires Ordered    CBC with platelets differential Routine 9/20/2018 3/12/2019 9/13/2018    INR Routine 9/20/2018 3/12/2019 9/13/2018            Who to contact     If you have questions or need follow up information about today's clinic visit or your schedule please contact [facility] BLOOD AND MARROW TRANSPLANT directly at [phone].  Normal or non-critical lab and imaging results will be communicated to you by MyChart, letter or phone within 4 business days after the clinic has  received the results. If you do not hear from us within 7 days, please contact the clinic through [employer] or phone. If you have a critical or abnormal lab result, we will notify you by phone as soon as possible.  Submit refill requests through [employer] or call your pharmacy and they will forward the refill request to us. Please allow 3 business days for your refill to be completed.          Additional Information About Your Visit        [employer] Information     [employer] gives you secure access to your electronic health record. If you see a primary care provider, you can also send messages to your care team and make appointments. If you have questions, please call your primary care clinic.  If you do not have a primary care provider, please call [phone] and they will assist you.        Care EveryWhere ID     This is your Care EveryWhere ID. This could be used by other organizations to access your [facility] medical records  [id number]        Your Vitals Were     Pulse Temperature Pulse Oximetry BMI (Body Mass Index)          79 97.4  F (36.3  C) (Oral) 98% 26.55 kg/m2         Blood Pressure from Last 3 Encounters:   09/13/18 100/66   09/10/18 106/63   09/05/18 103/70    Weight from Last 3 Encounters:   09/13/18 68.4 kg (150 lb 12.8 oz)   09/10/18 68.4 kg (150 lb 14.4 oz)   09/05/18 68.3 kg (150 lb 9.6 oz)              We Performed the Following     Basic metabolic panel     CBC with platelets differential     CMV DNA quantification     INR        Recent Review Flowsheet Data     BMT Recent Results Latest Ref Rng & Units 8/18/2018 8/21/2018 8/24/2018 8/29/2018 9/5/2018 9/10/2018 9/13/2018    WBC 4.0 - 11.0 10e9/L 22.2(H) 9.7 4.4 3.7(L) 4.0 2.4(L) 2.4(L)    Hemoglobin 11.7 - 15.7 g/dL 9.8(L) 10.9(L) 11.2(L) 11.3(L) 11.3(L) 11.1(L) 11.0(L)    Platelet Count 150 - 450 10e9/L 37(LL) 119(L) 235 267 176 168 167    Neutrophils (Absolute) 1.6 - 8.3 10e9/L 17.7(H) 7.4 2.7 2.3 2.5 1.4(L) 1.4(L)    Blasts (Absolute) 0 10e9/L -  - - - - - -    INR 0.86 - 1.14 - - - 1.04 - 1.02 -    Sodium 133 - 144 mmol/L 139 138 137 140 135 142 141    Potassium 3.4 - 5.3 mmol/L 3.6 3.8 3.8 3.9 3.7 3.7 3.7    Chloride 94 - 109 mmol/L 105 105 104 106 104 109 110(H)    Glucose 70 - 99 mg/dL 106(H) 88 96 96 88 98 109(H)    Urea Nitrogen 7 - 30 mg/dL 8 9 9 9 8 5(L) 4(L)    Creatinine 0.52 - 1.04 mg/dL 0.63 0.62 0.55 0.62 0.77 0.71 0.64    Calcium (Total) 8.5 - 10.1 mg/dL 8.6 8.9 8.8 8.5 8.9 8.8 8.8    Protein (Total) 6.8 - 8.8 g/dL - - - - 7.3 - -    Albumin 3.4 - 5.0 g/dL - - - - 3.8 - -    Bilirubin (Direct) 0.0 - 0.2 mg/dL - - - - - - -    Alkaline Phosphatase 40 - 150 U/L - - - - 72 - -    AST 0 - 45 U/L - - - - 41 - -    ALT 0 - 50 U/L - - - - 65(H) - -    MCV 78 - 100 fl 90 92 93 94 95 95 96               Primary Care Provider Office Phone # Fax #    [facility] [phone] [address] [facility] [address]        Equal Access to Services     RICHIE RAMEY AH: Hadii aad ku azebo Soshar, waaxda luqadaha, qaybta kaalmada mariluz, waxay idiodilia raymundo. So [facility] [phone].    ATENCIÓN: Si habla español, tiene a isabel disposición servicios gratuitos de asistencia lingüística. Roberto al [phone].    We comply with applicable federal civil rights laws and [place] laws. We do not discriminate on the basis of race, color, national origin, age, disability, sex, sexual orientation, or gender identity.            Thank you!     Thank you for choosing [facility] BLOOD AND MARROW TRANSPLANT  for your care. Our goal is always to provide you with excellent care. Hearing back from our patients is one way we can continue to improve our services. Please take a few minutes to complete the written survey that you may receive in the mail after your visit with us. Thank you!             Your Updated Medication List - Protect others around you: Learn how to safely use, store and throw away your  medicines at www.disposemymeds.org.          This list is accurate as of 9/13/18 12:01 PM.  Always use your most recent med list.                   Brand Name Dispense Instructions for use Diagnosis    ACETAMINOPHEN PO      Take 650 mg by mouth every 4 hours as needed for pain    S/P autologous bone marrow transplantation (H), Hodgkin lymphoma, unspecified, lymph nodes of inguinal region and lower limb (H)       acyclovir 800 MG tablet    ZOVIRAX    150 tablet    Take 1 tablet (800 mg) by mouth 5 times daily    Hodgkin lymphoma, unspecified, lymph nodes of inguinal region and lower limb (H)       enoxaparin 60 MG/0.6ML injection    LOVENOX    60 Syringe    Inject 0.6 mLs (60 mg) Subcutaneous 2 times daily    Hodgkin lymphoma, unspecified, lymph nodes of inguinal region and lower limb (H)       fluconazole 200 MG tablet    DIFLUCAN    30 tablet    Take 0.5 tablets (100 mg) by mouth daily    Hodgkin lymphoma, unspecified, lymph nodes of inguinal region and lower limb (H)       LORazepam 0.5 MG tablet    ATIVAN    30 tablet    Take 1-2 tablets (0.5-1 mg) by mouth every 4 hours as needed for anxiety (nausea/vomiting/sleep)    Nodular sclerosing Hodgkin's lymphoma, unspecified body region (H)       ondansetron 8 MG ODT tab    ZOFRAN-ODT    30 tablet    Take 1 tablet (8 mg) by mouth every 8 hours as needed for nausea    Nodular sclerosing Hodgkin's lymphoma, unspecified body region (H)       pantoprazole 40 MG EC tablet    PROTONIX    30 tablet    Take 1 tablet (40 mg) by mouth daily    Nodular sclerosing Hodgkin's lymphoma, unspecified body region (H)       prochlorperazine 5 MG tablet    COMPAZINE    30 tablet    Take 1 tablet (5 mg) by mouth every 6 hours    Nodular sclerosing Hodgkin's lymphoma, unspecified body region (H)       sennosides 8.6 MG tablet    SENOKOT    60 each    Take 2 tablets by mouth 2 times daily as needed for constipation    Nodular sclerosing Hodgkin's lymphoma, unspecified body region (H)        sulfamethoxazole-trimethoprim 800-160 MG per tablet    BACTRIM DS/SEPTRA DS    34 tablet    Take 1 tablet by mouth Every Mon, Tues two times daily HOLD starting 9/10    Hodgkin lymphoma, unspecified, lymph nodes of inguinal region and lower limb (H)       warfarin 5 MG tablet    COUMADIN    30 tablet    Take 1 tablet (5 mg) by mouth daily    Hodgkin lymphoma, unspecified, lymph nodes of inguinal region and lower limb (H)

## 2018-09-13 NOTE — MR AVS SNAPSHOT
Amira Marie Bennie   9/13/2018   Anticoagulation Therapy Visit    Description:  28 year old female   Provider:  Chica Christianson, RN   Department:  [facility]           INR as of 9/13/2018     Today's INR 1.02! (9/10/2018)      Anticoagulation Summary as of 9/13/2018     INR goal 2.0-3.0   Today's INR 1.02! (9/10/2018)   Full warfarin instructions No maintenance plan   Next INR check 9/13/2018    Indications   Deep vein thrombosis (DVT) (H) [I82.409] [I82.409]         September 2018 Details    Sun Mon Tue Wed Thu Fri Sat           1                 2               3               4               5               6               7               8                 9               10               11               12               13      See details      14               15                 16               17               18               19               20               21               22                 23               24               25               26               27               28               29                 30                      Date Details   09/13 This INR check       Date of next INR:  9/13/2018

## 2018-09-13 NOTE — NURSING NOTE
"Oncology Rooming Note    September 13, 2018 11:27 AM   Amira Avery is a 28 year old female who presents for:    Chief Complaint   Patient presents with     Port Draw     labs drawn via port by RN     RECHECK     PT is here for labs and to see NP for Hodgkins     Initial Vitals: /66 (BP Location: Right arm, Patient Position: Chair, Cuff Size: Adult Regular)  Pulse 79  Temp 97.4  F (36.3  C) (Oral)  Wt 68.4 kg (150 lb 12.8 oz)  SpO2 98%  BMI 26.55 kg/m2 Estimated body mass index is 26.55 kg/(m^2) as calculated from the following:    Height as of 8/24/18: 1.605 m (5' 3.19\").    Weight as of this encounter: 68.4 kg (150 lb 12.8 oz). Body surface area is 1.75 meters squared.  No Pain (0) Comment: Data Unavailable   No LMP recorded.  Allergies reviewed: Yes  Medications reviewed: Yes    Medications: none  Pharmacy name entered into [facility]: [email or web address] [address] [place] [address] [place] [address] AT [facility] OF  [address]    Clinical concerns:  none    6 minutes for nursing intake (face to face time)     Mariam Mojica MA              "

## 2018-09-13 NOTE — PROGRESS NOTES
9/13/18   Spoke to Amira.  Introduced her to the Coumadin clinic.  Sent HIPPA packet.  Patient continues on Lovenox until a second therapeutic INR is recorded.    Rufus Riley RN    ANTICOAGULATION FOLLOW-UP CLINIC VISIT    Patient Name:  Amira Avery  Date:  9/13/2018  Contact Type:  Telephone    SUBJECTIVE:     Patient Findings     Positives No Problem Findings           OBJECTIVE    INR   Date Value Ref Range Status   09/13/2018 2.58 (H) 0.86 - 1.14 Final       ASSESSMENT / PLAN  INR assessment THER    Recheck INR In: 4 DAYS    INR Location Clinic      Anticoagulation Summary as of 9/13/2018     INR goal 2.0-3.0   Today's INR 2.58   Warfarin maintenance plan No maintenance plan   Full warfarin instructions 9/13: 5 mg; 9/14: 5 mg; 9/15: 5 mg; 9/16: 5 mg; Otherwise No maintenance plan   Next INR check 9/17/2018   Target end date 12/10/2018    Indications   Deep vein thrombosis (DVT) (H) [I82.409] [I82.409]         Anticoagulation Episode Summary     INR check location     Preferred lab     Send INR reminders to [facility]    Comments pt has a port r/t oncology dx -  has frequent lab draws  +++3 months of coumadin therapy - last dose on 12/10/18+++      Anticoagulation Care Providers     Provider Role Specialty Phone number    Diana Donovan PA-C Responsible Physician Assistant [phone]            See the Encounter Report to view Anticoagulation Flowsheet and Dosing Calendar (Go to Encounters tab in chart review, and find the Anticoagulation Therapy Visit)    Spoke with patient. Gave them their lab results and new warfarin recommendation.  No changes in health, medication, or diet. No missed doses, no falls. No signs or symptoms of bleed or clotting.  Went over the contact information for the Coumadin clinic.  Answered patient's questions about Coumadin and Vitamin K.    Rufus Riley RN

## 2018-09-13 NOTE — PROGRESS NOTES
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     Amira was diagnosed with Hodgkin lymphoma in 2/2017. She underwent lymphadenectomy followed by chemotherapy and unfortunately had progression of disease. She then underwent Auto PBSCT, day +38 today. CT scan on 9/5 showed complete response to treatment. Unfortunately, a clot in her SVC was also incidentally seen on the scan and she was started on therapeutic lovenox. She is currently being transitioned from Lovenox to Warfarin.    She reports that menopausal symptoms started when she was in the hospital for her transplant. She describes hot flashes that occur \"all day every day\". She states they have become a little less frequent compared to when they first started but are still persistent. They affect her sleep greatly. She also notes vaginal dryness and dyspareunia. Is using lubrication which helps some. Reports libido is lower but sill there. She denies any vaginal itching, burning, or abnormal discharge but does note a change in vaginal odor. This also started around the time of transplant. LMP was prior to transplant (7/29/2018). Prior to this her menses were regular.     Gynecologic History:  LMP- Patient's last menstrual period was 07/29/2018.  Menarche - 12  Menses- Regular  Contraception: None; Never taken OCPs  Difficulties with intercourse:  Yes, pain  Urinary or fecal incontinence:  No issues with bowel or bladder  Frequent UTI: No  Last Pap Date: Unsure (thinks 2-3 years ago)  History of abnormal pap: No     ROS:  ROS negative except as noted in HPI      Problem List:  Patient Active Problem List   Diagnosis     Hodgkin lymphoma, nodular sclerosis (H)     Hodgkin disease (H)     Encounter for other " procedures for purposes other than remedying health state (CODE)     Encounter for harvesting of peripheral blood stem cells     Stem cell donor     Hodgkin lymphoma, unspecified, lymph nodes of inguinal region and lower limb (H)     Lymphoma (H)     Failure to thrive (0-17)     Deep vein thrombosis (DVT) (H) [I82.409]         Current Medications:  Current Outpatient Prescriptions   Medication     ACETAMINOPHEN PO     acyclovir (ZOVIRAX) 800 MG tablet     enoxaparin (LOVENOX) 60 MG/0.6ML injection     fluconazole (DIFLUCAN) 200 MG tablet     sulfamethoxazole-trimethoprim (BACTRIM DS/SEPTRA DS) 800-160 MG per tablet     warfarin (COUMADIN) 5 MG tablet     No current facility-administered medications for this visit.          Past Medical History:  Past Medical History:   Diagnosis Date     Cancer (H)      Tattoos          Past Surgical History:  Past Surgical History:   Procedure Laterality Date     BIOPSY LYMPH NODE CERVICAL Right 5/2/2018    Procedure: BIOPSY LYMPH NODE CERVICAL;  Right Excisional Node Biopsy;  Surgeon: Lia Silva MD;  [facility]     excision of deep cervical LN's Left 05/19/2017    positive for Hodgkin's     excision of deep cervical LN's Right 02/13/2018    positive for Hodgkin's     port a cath placement Right 06/01/2017    IJ vein; removed 12/1/17 no longer needed     port a cath placement Left 02/21/2018    IJ vein     US BIOPSY LYMPH NODE FNA Left 03/06/2017    low midline neck, negative for malignancy         Allergies:  Allergies   Allergen Reactions     Morphine Hives     No issues with oral narcotics, dilaudid, or fentanyl per pt         Social History:  Engaged. 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 working  Social History Main Topics     Smoking status: Former Smoker     Packs/day: 1.00     Years: 10.00     Types: Cigarettes, Hookah     Start date: 7/15/2007     Quit date: 10/15/2017     Smokeless tobacco: Never Used     Alcohol use No     Drug use: No      Sexual activity: Yes     Partners: Female     Birth control/ protection: None       Family History:  Family History   Problem Relation Age of Onset     Hypertension Father      Coronary Artery Disease Maternal Grandmother      Colon Cancer Maternal Grandmother      Cancer Maternal Grandmother      HEART DISEASE Maternal Grandmother    Family history reviewed, noncontributory      Exam:  /75  Pulse 94  Wt 68.5 kg (151 lb)  LMP 07/29/2018  Breastfeeding? No  BMI 26.59 kg/m2  Constitutional: healthy, alert and no distress  Neck: Neck supple. Well-healed surgical incision on right neck.   Respiratory: Unlabored breathing on room air  Psychiatric: mentation appears normal and affect normal     Labs:   FSH, Estradiol pending    Assessment/Plan:  Amira Avery is a 28 year old G0 with recurrent Hodgkins disease who recently underwent autologous PBSCT here for consultation for hot flashes, vaginal dryness, dyspareunia, and likely amenorrhea (LMP 7/29).  We discussed that her symptoms are likely related to ovarian failure secondary to her cancer treatment. We discussed obtaining estradiol and FSH today to get a baseline of her ovarian function. We discussed that our recommendation for treatment would typically be combined OCP however this is complicated in her case due to recently diagnosed SVC clot. Reviewed CDC Medical Eligibility Criteria for Contraceptive Use. Given her active clot we would recommend non-hormonal treatment of hot flashes with Paxil until she has been on therapeutic anti-coagulation for at least 3 months. We could then discuss systemic hormonal therapy with CALOS assuming she continues on warfarin. Patient also has significant vaginal dryness and dyspareunia. We discussed the use of vaginal estrogen for these symptoms and given the low systemic absorption feel that it is safe to start this now. She will follow-up in 4 weeks to follow-up her symptoms. All questions were answered and  patient in agreement of plan.  -Paxil 10mg at bedtime    -Vagifem for vaginal dryness  -Patient would like to be called with lab results, okay to leave VM  -Discussed calcium and vitamin D supplementation for bone health    RTC in 1 month       Patient discussed and seen with Dr. Karlo Delarosa MD  OBGYN Resident PGY1     I, Jovita Dietrich, saw this patient with the resident and agree with the resident's findings and plan of care as documented in the resident's note.   I personally reviewed patient's history. Key findings: active clot, post BMT, avoid hormonal therapy for 3 months, vaginal estrogen ok.  Jovita Dietrich MD

## 2018-09-13 NOTE — PROGRESS NOTES
Call rec'd from PA today in oncology re: this pt.  An INR clinic referral was put through on 9/10, this was discovered today.  Pt was started on 5 mg coumadin on 9/10, as well as lovenox 60 mg subcutaneous twice/day for DVT in the [facility].  PA is informed typically the lovenox is D/C'd once 2 INRs are within the goal range.  An INR from today is currently pending.    Writer has not communicated with the pt - will wait until the INR from today is obtained to introduce her to the Coumadin Clinic  Jonathan NEWSOME

## 2018-09-13 NOTE — PATIENT INSTRUCTIONS
RTC next week Thursday for provider visit, labs, recheck WBC  Coumadin clinic will call you today with plan regarding your lovenox and coumadin dosing      Lizbeth Jarrett, MSN, APRN, [facility]  Pager: [phone]

## 2018-09-14 ENCOUNTER — OFFICE VISIT (OUTPATIENT)
Dept: OBGYN | Facility: CLINIC | Age: 28
End: 2018-09-14
Payer: COMMERCIAL

## 2018-09-14 VITALS
DIASTOLIC BLOOD PRESSURE: 75 MMHG | WEIGHT: 151 LBS | BODY MASS INDEX: 26.59 KG/M2 | SYSTOLIC BLOOD PRESSURE: 128 MMHG | HEART RATE: 94 BPM

## 2018-09-14 DIAGNOSIS — N89.8 VAGINAL DRYNESS: ICD-10-CM

## 2018-09-14 DIAGNOSIS — N95.1 MENOPAUSAL SYNDROME (HOT FLASHES): Primary | ICD-10-CM

## 2018-09-14 DIAGNOSIS — N91.2 AMENORRHEA: ICD-10-CM

## 2018-09-14 DIAGNOSIS — N97.0 ANOVULATION: ICD-10-CM

## 2018-09-14 LAB
CMV DNA SPEC NAA+PROBE-ACNC: NORMAL [IU]/ML
CMV DNA SPEC NAA+PROBE-LOG#: NORMAL {LOG_IU}/ML
ESTRADIOL SERPL-MCNC: 16 PG/ML
FSH SERPL-ACNC: 43.7 IU/L
SPECIMEN SOURCE: NORMAL

## 2018-09-14 PROCEDURE — 83001 ASSAY OF GONADOTROPIN (FSH): CPT | Performed by: STUDENT IN AN ORGANIZED HEALTH CARE EDUCATION/TRAINING PROGRAM

## 2018-09-14 PROCEDURE — 36415 COLL VENOUS BLD VENIPUNCTURE: CPT | Performed by: STUDENT IN AN ORGANIZED HEALTH CARE EDUCATION/TRAINING PROGRAM

## 2018-09-14 PROCEDURE — 82670 ASSAY OF TOTAL ESTRADIOL: CPT | Performed by: STUDENT IN AN ORGANIZED HEALTH CARE EDUCATION/TRAINING PROGRAM

## 2018-09-14 PROCEDURE — G0463 HOSPITAL OUTPT CLINIC VISIT: HCPCS | Mod: ZF

## 2018-09-14 RX ORDER — PAROXETINE 10 MG/1
10 TABLET, FILM COATED ORAL AT BEDTIME
Qty: 30 TABLET | Refills: 1 | Status: SHIPPED | OUTPATIENT
Start: 2018-09-14 | End: 2018-12-04

## 2018-09-14 RX ORDER — ESTRADIOL 10 UG/1
10 INSERT VAGINAL
Qty: 20 TABLET | Refills: 1 | Status: SHIPPED | OUTPATIENT
Start: 2018-09-17 | End: 2019-08-05

## 2018-09-14 ASSESSMENT — PAIN SCALES - GENERAL: PAINLEVEL: NO PAIN (0)

## 2018-09-14 NOTE — MR AVS SNAPSHOT
After Visit Summary   9/14/2018    Amira Avery    MRN: [id number]           Patient Information     Date Of Birth          1990        Visit Information        Provider Department      9/14/2018 1:30 PM Felecia Delarosa MD [facility]        Today's Diagnoses     Menopausal syndrome (hot flashes)    -  1    Anovulation        Amenorrhea        Vaginal dryness           Follow-ups after your visit        Follow-up notes from your care team     Return in about 4 weeks (around 10/12/2018).      Your next 10 appointments already scheduled     Oct 04, 2018 12:30 PM CDT   [facility] with  [facility]   [facility] ([facility]    [address]  [address]  [place] [address] [phone]            Oct 04, 2018  1:00 PM CDT   Return with  BMT WES #1   [facility] Transplant ([facility])    [address]  [address]  [place] [facility] [address] [phone]              Future tests that were ordered for you today     Open Future Orders        Priority Expected Expires Ordered    CBC with platelets differential Routine 10/4/2018 3/19/2019 9/20/2018    Basic metabolic panel Routine 10/4/2018 3/19/2019 9/20/2018    INR Routine 10/4/2018 3/19/2019 9/20/2018            Who to contact     Please call your clinic at [phone] to:    Ask questions about your health    Make or cancel appointments    Discuss your medicines    Learn about your test results    Speak to your doctor            Additional Information About Your Visit        MyChart Information     [employer] gives you secure access to your electronic health record. If you see a primary care provider, you can also send messages to your care team and make appointments. If you have questions, please call your primary care clinic.  If you do not have a primary care provider, please call [phone] and they  will assist you.      [employer] is an electronic gateway that provides easy, online access to your medical records. With [employer], you can request a clinic appointment, read your test results, renew a prescription or communicate with your care team.     To access your existing account, please contact your [facility] or call [phone] for assistance.        Care EveryWhere ID     This is your Care EveryWhere ID. This could be used by other organizations to access your [place] medical records  [id number]        Your Vitals Were     Pulse Last Period Breastfeeding? BMI (Body Mass Index)          94 07/29/2018 No 26.59 kg/m2         Blood Pressure from Last 3 Encounters:   09/20/18 113/72   09/14/18 128/75   09/13/18 100/66    Weight from Last 3 Encounters:   09/20/18 68.3 kg (150 lb 8 oz)   09/14/18 68.5 kg (151 lb)   09/13/18 68.4 kg (150 lb 12.8 oz)              We Performed the Following     Estradiol     Follicle Stimulating Hormone          Today's Medication Changes          These changes are accurate as of 9/14/18 11:59 PM.  If you have any questions, ask your nurse or doctor.               Start taking these medicines.        Dose/Directions    estradiol 10 MCG Tabs vaginal tablet   Commonly known as:  VAGIFEM   Used for:  Vaginal dryness   Started by:  Felecia Delarosa MD        Dose:  10 mcg   Place 1 tablet (10 mcg) vaginally twice a week Nightly for 1-2 weeks then twice weekly   Quantity:  20 tablet   Refills:  1       PARoxetine 10 MG tablet   Commonly known as:  PAXIL   Used for:  Menopausal syndrome (hot flashes)   Started by:  Felecia Delarosa MD        Dose:  10 mg   Take 1 tablet (10 mg) by mouth At Bedtime   Quantity:  30 tablet   Refills:  1         Stop taking these medicines if you haven't already. Please contact your care team if you have questions.     LORazepam 0.5 MG tablet   Commonly known as:  ATIVAN   Stopped by:  Felecia Delarosa MD            ondansetron 8 MG ODT tab   Commonly known as:  ZOFRAN-ODT   Stopped by:  Felecia Delarosa MD           pantoprazole 40 MG EC tablet   Commonly known as:  PROTONIX   Stopped by:  Felecia Delarosa MD           prochlorperazine 5 MG tablet   Commonly known as:  COMPAZINE   Stopped by:  Felecia Delarosa MD           sennosides 8.6 MG tablet   Commonly known as:  SENOKOT   Stopped by:  Felecia Delarosa MD                Where to get your medicines      These medications were sent to [facility] [address] [place] [address] - [address] AT [facility] OF [address]  [address] [facility] [address]     Phone:  [phone]     estradiol 10 MCG Tabs vaginal tablet    PARoxetine 10 MG tablet                Primary Care Provider Office Phone # Fax #    [facility] [phone] [address] [address]        Equal Access to Services     [facility]: Hadii aad ku hadasho Soomaali, waaxda luqadaha, qaybta kaalmada adeegyada, waxay idiin haykevin de la cruz . So [facility] [phone].    ATENCIÓN: Si habla español, tiene a isabel disposición servicios gratuitos de asistencia lingüística. Llame al [phone].    We comply with applicable federal civil rights laws and [place] laws. We do not discriminate on the basis of race, color, national origin, age, disability, sex, sexual orientation, or gender identity.            Thank you!     Thank you for choosing [facility]  for your care. Our goal is always to provide you with excellent care. Hearing back from our patients is one way we can continue to improve our services. Please take a few minutes to complete the written survey that you may receive in the mail after your visit with us. Thank you!             Your Updated Medication List - Protect others around you: Learn how to safely use, store and throw away your medicines at www.disposemymeds.org.           This list is accurate as of 9/14/18 11:59 PM.  Always use your most recent med list.                   Brand Name Dispense Instructions for use Diagnosis    ACETAMINOPHEN PO      Take 650 mg by mouth every 4 hours as needed for pain    S/P autologous bone marrow transplantation (H), Hodgkin lymphoma, unspecified, lymph nodes of inguinal region and lower limb (H)       acyclovir 800 MG tablet    ZOVIRAX    150 tablet    Take 1 tablet (800 mg) by mouth 5 times daily    Hodgkin lymphoma, unspecified, lymph nodes of inguinal region and lower limb (H)       enoxaparin 60 MG/0.6ML injection    LOVENOX    60 Syringe    Inject 0.6 mLs (60 mg) Subcutaneous 2 times daily    Hodgkin lymphoma, unspecified, lymph nodes of inguinal region and lower limb (H)       estradiol 10 MCG Tabs vaginal tablet    VAGIFEM    20 tablet    Place 1 tablet (10 mcg) vaginally twice a week Nightly for 1-2 weeks then twice weekly    Vaginal dryness       fluconazole 200 MG tablet    DIFLUCAN    30 tablet    Take 0.5 tablets (100 mg) by mouth daily    Hodgkin lymphoma, unspecified, lymph nodes of inguinal region and lower limb (H)       PARoxetine 10 MG tablet    PAXIL    30 tablet    Take 1 tablet (10 mg) by mouth At Bedtime    Menopausal syndrome (hot flashes)       sulfamethoxazole-trimethoprim 800-160 MG per tablet    BACTRIM DS/SEPTRA DS    34 tablet    Take 1 tablet by mouth Every Mon, Tues two times daily HOLD starting 9/10    Hodgkin lymphoma, unspecified, lymph nodes of inguinal region and lower limb (H)       warfarin 5 MG tablet    COUMADIN    30 tablet    Take 1 tablet (5 mg) by mouth daily    Hodgkin lymphoma, unspecified, lymph nodes of inguinal region and lower limb (H)

## 2018-09-14 NOTE — NURSING NOTE
Chief Complaint   Patient presents with     Establish Care     C/O hot flashes/hormones   Lindsay Schwab LPN

## 2018-09-17 ENCOUNTER — TELEPHONE (OUTPATIENT)
Dept: OBGYN | Facility: CLINIC | Age: 28
End: 2018-09-17

## 2018-09-17 NOTE — TELEPHONE ENCOUNTER
Telephone encounter    Called patient at phone # listed and left voicemail regarding results of elevated FSH and low estradiol, indicative of likely ovarian insufficiency as discussed in clinic.     Instructed patient to follow-up in clinic over the next few weeks.  Given # to call for clinic.    Arlyn Buck MD   PGY4

## 2018-09-18 ENCOUNTER — ANTICOAGULATION THERAPY VISIT (OUTPATIENT)
Dept: ANTICOAGULATION | Facility: CLINIC | Age: 28
End: 2018-09-18

## 2018-09-18 DIAGNOSIS — I82.409 DEEP VEIN THROMBOSIS (DVT) (H): ICD-10-CM

## 2018-09-18 DIAGNOSIS — C81.95 HODGKIN LYMPHOMA, UNSPECIFIED, LYMPH NODES OF INGUINAL REGION AND LOWER LIMB (H): ICD-10-CM

## 2018-09-18 LAB
ALBUMIN SERPL-MCNC: 4.1 G/DL (ref 3.4–5)
ALP SERPL-CCNC: 81 U/L (ref 40–150)
ALT SERPL W P-5'-P-CCNC: 143 U/L (ref 0–50)
ANION GAP SERPL CALCULATED.3IONS-SCNC: 8 MMOL/L (ref 3–14)
AST SERPL W P-5'-P-CCNC: 72 U/L (ref 0–45)
BASOPHILS # BLD AUTO: 0 10E9/L (ref 0–0.2)
BASOPHILS NFR BLD AUTO: 0.8 %
BILIRUB SERPL-MCNC: 0.3 MG/DL (ref 0.2–1.3)
BUN SERPL-MCNC: 7 MG/DL (ref 7–30)
CALCIUM SERPL-MCNC: 9.1 MG/DL (ref 8.5–10.1)
CHLORIDE SERPL-SCNC: 106 MMOL/L (ref 94–109)
CO2 SERPL-SCNC: 24 MMOL/L (ref 20–32)
CREAT SERPL-MCNC: 0.64 MG/DL (ref 0.52–1.04)
DIFFERENTIAL METHOD BLD: ABNORMAL
EOSINOPHIL # BLD AUTO: 0.1 10E9/L (ref 0–0.7)
EOSINOPHIL NFR BLD AUTO: 1.3 %
ERYTHROCYTE [DISTWIDTH] IN BLOOD BY AUTOMATED COUNT: 16.3 % (ref 10–15)
GFR SERPL CREATININE-BSD FRML MDRD: >90 ML/MIN/1.7M2
GLUCOSE SERPL-MCNC: 100 MG/DL (ref 70–99)
HCT VFR BLD AUTO: 34.6 % (ref 35–47)
HGB BLD-MCNC: 11.8 G/DL (ref 11.7–15.7)
IMM GRANULOCYTES # BLD: 0 10E9/L (ref 0–0.4)
IMM GRANULOCYTES NFR BLD: 0.2 %
INR PPP: 4.37 (ref 0.86–1.14)
LYMPHOCYTES # BLD AUTO: 0.9 10E9/L (ref 0.8–5.3)
LYMPHOCYTES NFR BLD AUTO: 19.6 %
MCH RBC QN AUTO: 31.9 PG (ref 26.5–33)
MCHC RBC AUTO-ENTMCNC: 34.1 G/DL (ref 31.5–36.5)
MCV RBC AUTO: 94 FL (ref 78–100)
MONOCYTES # BLD AUTO: 0.5 10E9/L (ref 0–1.3)
MONOCYTES NFR BLD AUTO: 10.4 %
NEUTROPHILS # BLD AUTO: 3.2 10E9/L (ref 1.6–8.3)
NEUTROPHILS NFR BLD AUTO: 67.7 %
NRBC # BLD AUTO: 0 10*3/UL
NRBC BLD AUTO-RTO: 0 /100
PLATELET # BLD AUTO: 213 10E9/L (ref 150–450)
POTASSIUM SERPL-SCNC: 3.8 MMOL/L (ref 3.4–5.3)
PROT SERPL-MCNC: 7.2 G/DL (ref 6.8–8.8)
RBC # BLD AUTO: 3.7 10E12/L (ref 3.8–5.2)
SODIUM SERPL-SCNC: 138 MMOL/L (ref 133–144)
WBC # BLD AUTO: 4.8 10E9/L (ref 4–11)

## 2018-09-18 PROCEDURE — 85610 PROTHROMBIN TIME: CPT | Performed by: PHYSICIAN ASSISTANT

## 2018-09-18 PROCEDURE — 25000128 H RX IP 250 OP 636: Performed by: NURSE PRACTITIONER

## 2018-09-18 PROCEDURE — 80053 COMPREHEN METABOLIC PANEL: CPT | Performed by: PHYSICIAN ASSISTANT

## 2018-09-18 PROCEDURE — 85025 COMPLETE CBC W/AUTO DIFF WBC: CPT | Performed by: PHYSICIAN ASSISTANT

## 2018-09-18 RX ORDER — HEPARIN SODIUM (PORCINE) LOCK FLUSH IV SOLN 100 UNIT/ML 100 UNIT/ML
5 SOLUTION INTRAVENOUS
Status: COMPLETED | OUTPATIENT
Start: 2018-09-18 | End: 2018-09-18

## 2018-09-18 RX ADMIN — Medication 5 ML: at 15:18

## 2018-09-18 NOTE — PROGRESS NOTES
I left a message for Amira to hold her warfarin today and call the [facility] clinic tomorrow morning

## 2018-09-18 NOTE — MR AVS SNAPSHOT
Amira Avery   9/18/2018   Anticoagulation Therapy Visit    Description:  28 year old female   Provider:  Benson Cedeno, [facility]   Department:  [facility]           INR as of 9/18/2018     Today's INR 4.37!      Anticoagulation Summary as of 9/18/2018     INR goal 2.0-3.0   Today's INR 4.37!   Full warfarin instructions 9/18: Hold; Otherwise No maintenance plan   Next INR check     Indications   Deep vein thrombosis (DVT) (H) [I82.409] [I82.409]         September 2018 Details    Sun Mon Tue Wed Thu Fri Sat           1                 2               3               4               5               6               7               8                 9               10               11               12               13               14               15                 16               17               18      Hold   See details      19               20               21               22                 23               24               25               26               27               28               29                 30                      Date Details   09/18 This INR check      Date of next INR: No date specified         How to take your warfarin dose     Hold Do not take your warfarin dose. See the Details table to the right for additional instructions.

## 2018-09-18 NOTE — NURSING NOTE
"Chief Complaint   Patient presents with     Lab Only     labs drawn from port by rn.     Port accessed with 20 gauge 3/4\" gripper needle and labs drawn by rn.  Port flushed with NS and heparin.  Pt tolerated well.    Aubrie Sanchez RN      "

## 2018-09-19 NOTE — PROGRESS NOTES
ANTICOAGULATION FOLLOW-UP CLINIC VISIT    Patient Name:  Amira Avery  Date:  9/19/2018  Contact Type:  Telephone    SUBJECTIVE:     Patient Findings     Positives No Problem Findings           OBJECTIVE    INR   Date Value Ref Range Status   09/18/2018 4.37 (H) 0.86 - 1.14 Final       ASSESSMENT / PLAN  INR assessment SUPRA    Recheck INR In: 2 DAYS    INR Location Clinic      Anticoagulation Summary as of 9/18/2018     INR goal 2.0-3.0   Today's INR 4.37!   Warfarin maintenance plan No maintenance plan   Full warfarin instructions 9/18: Hold; 9/19: 2.5 mg; Otherwise No maintenance plan   Next INR check 9/20/2018   Target end date 12/10/2018    Indications   Deep vein thrombosis (DVT) (H) [I82.409] [I82.409]         Anticoagulation Episode Summary     INR check location     Preferred lab     Send INR reminders to  [facility]    Comments pt has a port r/t oncology dx -  has frequent lab draws  +++3 months of coumadin therapy - last dose on 12/10/18+++      Anticoagulation Care Providers     Provider Role Specialty Phone number    Diana Donovan PA-C Responsible Physician Assistant [phone]            See the Encounter Report to view Anticoagulation Flowsheet and Dosing Calendar (Go to Encounters tab in chart review, and find the Anticoagulation Therapy Visit)    Spoke with Amira today. She did get our message from 9/18 and held her warfarin last night. She has no signs or symptoms of bleeding. She is aware of what to watch for and will seek medical attention if necessary      Benson Cedeno [facility]

## 2018-09-20 ENCOUNTER — APPOINTMENT (OUTPATIENT)
Dept: LAB | Facility: CLINIC | Age: 28
End: 2018-09-20
Attending: NURSE PRACTITIONER
Payer: COMMERCIAL

## 2018-09-20 ENCOUNTER — ANTICOAGULATION THERAPY VISIT (OUTPATIENT)
Dept: ANTICOAGULATION | Facility: CLINIC | Age: 28
End: 2018-09-20

## 2018-09-20 ENCOUNTER — ONCOLOGY VISIT (OUTPATIENT)
Dept: TRANSPLANT | Facility: CLINIC | Age: 28
End: 2018-09-20
Attending: NURSE PRACTITIONER
Payer: COMMERCIAL

## 2018-09-20 VITALS
SYSTOLIC BLOOD PRESSURE: 113 MMHG | OXYGEN SATURATION: 98 % | WEIGHT: 150.5 LBS | RESPIRATION RATE: 16 BRPM | TEMPERATURE: 98.7 F | BODY MASS INDEX: 26.5 KG/M2 | DIASTOLIC BLOOD PRESSURE: 72 MMHG | HEART RATE: 82 BPM

## 2018-09-20 DIAGNOSIS — C81.95 HODGKIN LYMPHOMA, UNSPECIFIED, LYMPH NODES OF INGUINAL REGION AND LOWER LIMB (H): ICD-10-CM

## 2018-09-20 DIAGNOSIS — I82.409 DEEP VEIN THROMBOSIS (DVT) (H): ICD-10-CM

## 2018-09-20 LAB
BASOPHILS # BLD AUTO: 0 10E9/L (ref 0–0.2)
BASOPHILS NFR BLD AUTO: 0.8 %
DIFFERENTIAL METHOD BLD: ABNORMAL
EOSINOPHIL # BLD AUTO: 0.1 10E9/L (ref 0–0.7)
EOSINOPHIL NFR BLD AUTO: 1 %
ERYTHROCYTE [DISTWIDTH] IN BLOOD BY AUTOMATED COUNT: 15.8 % (ref 10–15)
HCT VFR BLD AUTO: 36.9 % (ref 35–47)
HGB BLD-MCNC: 12.1 G/DL (ref 11.7–15.7)
IMM GRANULOCYTES # BLD: 0 10E9/L (ref 0–0.4)
IMM GRANULOCYTES NFR BLD: 0.2 %
INR PPP: 2.56 (ref 0.86–1.14)
LYMPHOCYTES # BLD AUTO: 0.8 10E9/L (ref 0.8–5.3)
LYMPHOCYTES NFR BLD AUTO: 15.7 %
MCH RBC QN AUTO: 31.2 PG (ref 26.5–33)
MCHC RBC AUTO-ENTMCNC: 32.8 G/DL (ref 31.5–36.5)
MCV RBC AUTO: 95 FL (ref 78–100)
MONOCYTES # BLD AUTO: 0.5 10E9/L (ref 0–1.3)
MONOCYTES NFR BLD AUTO: 10 %
NEUTROPHILS # BLD AUTO: 3.6 10E9/L (ref 1.6–8.3)
NEUTROPHILS NFR BLD AUTO: 72.3 %
NRBC # BLD AUTO: 0 10*3/UL
NRBC BLD AUTO-RTO: 0 /100
PLATELET # BLD AUTO: 228 10E9/L (ref 150–450)
RBC # BLD AUTO: 3.88 10E12/L (ref 3.8–5.2)
WBC # BLD AUTO: 4.9 10E9/L (ref 4–11)

## 2018-09-20 PROCEDURE — 25000128 H RX IP 250 OP 636: Mod: ZF | Performed by: NURSE PRACTITIONER

## 2018-09-20 PROCEDURE — 36591 DRAW BLOOD OFF VENOUS DEVICE: CPT

## 2018-09-20 PROCEDURE — 85025 COMPLETE CBC W/AUTO DIFF WBC: CPT | Performed by: PHYSICIAN ASSISTANT

## 2018-09-20 PROCEDURE — 85610 PROTHROMBIN TIME: CPT | Performed by: NURSE PRACTITIONER

## 2018-09-20 PROCEDURE — G0463 HOSPITAL OUTPT CLINIC VISIT: HCPCS | Mod: ZF

## 2018-09-20 RX ORDER — HEPARIN SODIUM (PORCINE) LOCK FLUSH IV SOLN 100 UNIT/ML 100 UNIT/ML
5 SOLUTION INTRAVENOUS EVERY 8 HOURS PRN
Status: DISCONTINUED | OUTPATIENT
Start: 2018-09-20 | End: 2018-09-20 | Stop reason: HOSPADM

## 2018-09-20 RX ADMIN — Medication 5 ML: at 14:05

## 2018-09-20 ASSESSMENT — PAIN SCALES - GENERAL: PAINLEVEL: NO PAIN (0)

## 2018-09-20 NOTE — NURSING NOTE
"Oncology Rooming Note    September 20, 2018 1:48 PM   Amira Avery is a 28 year old female who presents for:    Chief Complaint   Patient presents with     Port Draw     Labs drawn via port by RN. VS taken. Patient checked in for next appt.      RECHECK     RTN- Hodgkins lymphoma     Initial Vitals: /72 (BP Location: Right arm, Patient Position: Sitting, Cuff Size: Adult Regular)  Pulse 82  Temp 98.7  F (37.1  C) (Oral)  Resp 16  Wt 68.3 kg (150 lb 8 oz)  SpO2 98%  BMI 26.5 kg/m2 Estimated body mass index is 26.5 kg/(m^2) as calculated from the following:    Height as of 8/24/18: 1.605 m (5' 3.19\").    Weight as of this encounter: 68.3 kg (150 lb 8 oz). Body surface area is 1.75 meters squared.  No Pain (0) Comment: Data Unavailable   No LMP recorded.  Allergies reviewed: Yes  Medications reviewed: Yes    Medications: Medication refills not needed today.  Pharmacy name entered into [facility]: [facility] [address] [place] [address] [address] AT [facility] OF [address]    Clinical concerns: None    8 minutes for nursing intake (face to face time)     Serenity Faulkner CMA              "

## 2018-09-20 NOTE — MR AVS SNAPSHOT
After Visit Summary   9/20/2018    Amira Avery    MRN: [id number]           Patient Information     Date Of Birth          1990        Visit Information        Provider Department      9/20/2018 1:30 PM  BMT WES #1 [facility] Blood and Marrow Transplant        Today's Diagnoses     Hodgkin lymphoma, unspecified, lymph nodes of inguinal region and lower limb (H)              [facility] [place] [place]  [address] [place] [id number]  Phone: [phone]  Clinic Hours:   Monday-Thursday:7am to 7pm   Friday: 7am to 5pm   Weekends and holidays:    8am to noon (in general)  If your fever is 100.5  or greater,   call the clinic.  After hours call the   hospital at [phone] or   [phone]. Ask for the BMT   fellow on-call           Care Instructions    - RTC 2 weeks for provider visit, labs, and pentamadine or to resume Bactrim (decision will be made at that time)  - Coumadin clinic will call you with any changes to your therapy based on results of INR today  - STOP Lovenox injections  - Call immediately if you develop bleeding or symptoms of infection    Lizbeth Jarrett, MSN, APRN, ACNP-BC              Follow-ups after your visit        Your next 10 appointments already scheduled     Oct 04, 2018 12:30 PM CDT   [facility] with  [facility]   [facility] ([facility])    [address] [place] [id number] [phone]            Oct 04, 2018  1:00 PM CDT   Return with  BMT WES #1   [facility] Blood and Marrow Transplant ([facility])    [address] [place] [id number] [phone]              Future tests that were ordered for you today     Open Future Orders        Priority Expected Expires Ordered    CBC with platelets differential Routine 10/4/2018 3/19/2019 9/20/2018    Basic metabolic panel Routine 10/4/2018 3/19/2019 9/20/2018     INR Routine 10/4/2018 3/19/2019 9/20/2018            Who to contact     If you have questions or need follow up information about today's clinic visit or your schedule please contact [facility] directly at [phone].  Normal or non-critical lab and imaging results will be communicated to you by EventBughart, letter or phone within 4 business days after the clinic has received the results. If you do not hear from us within 7 days, please contact the clinic through EventBughart or phone. If you have a critical or abnormal lab result, we will notify you by phone as soon as possible.  Submit refill requests through Genlot or call your pharmacy and they will forward the refill request to us. Please allow 3 business days for your refill to be completed.          Additional Information About Your Visit        EventBugharDocument Security Systems Information     Genlot gives you secure access to your electronic health record. If you see a primary care provider, you can also send messages to your care team and make appointments. If you have questions, please call your primary care clinic.  If you do not have a primary care provider, please call [phone] and they will assist you.        Care EveryWhere ID     This is your Care EveryWhere ID. This could be used by other organizations to access your [facility] medical records  [id number]        Your Vitals Were     Pulse Temperature Respirations Pulse Oximetry BMI (Body Mass Index)       82 98.7  F (37.1  C) (Oral) 16 98% 26.5 kg/m2        Blood Pressure from Last 3 Encounters:   09/20/18 113/72   09/14/18 128/75   09/13/18 100/66    Weight from Last 3 Encounters:   09/20/18 68.3 kg (150 lb 8 oz)   09/14/18 68.5 kg (151 lb)   09/13/18 68.4 kg (150 lb 12.8 oz)              We Performed the Following     CBC with platelets differential     INR          Today's Medication Changes          These changes are accurate as of 9/20/18  2:46 PM.  If you have any questions, ask your nurse or  doctor.               Stop taking these medicines if you haven't already. Please contact your care team if you have questions.     enoxaparin 60 MG/0.6ML injection   Commonly known as:  LOVENOX                    Recent Review Flowsheet Data     BMT Recent Results Latest Ref Rng & Units 8/24/2018 8/29/2018 9/5/2018 9/10/2018 9/13/2018 9/18/2018 9/20/2018    WBC 4.0 - 11.0 10e9/L 4.4 3.7(L) 4.0 2.4(L) 2.4(L) 4.8 4.9    Hemoglobin 11.7 - 15.7 g/dL 11.2(L) 11.3(L) 11.3(L) 11.1(L) 11.0(L) 11.8 12.1    Platelet Count 150 - 450 10e9/L 235 267 176 168 167 213 228    Neutrophils (Absolute) 1.6 - 8.3 10e9/L 2.7 2.3 2.5 1.4(L) 1.4(L) 3.2 3.6    Blasts (Absolute) 0 10e9/L - - - - - - -    INR 0.86 - 1.14 - 1.04 - 1.02 2.58(H) 4.37(H) -    Sodium 133 - 144 mmol/L 137 140 135 142 141 138 -    Potassium 3.4 - 5.3 mmol/L 3.8 3.9 3.7 3.7 3.7 3.8 -    Chloride 94 - 109 mmol/L 104 106 104 109 110(H) 106 -    Glucose 70 - 99 mg/dL 96 96 88 98 109(H) 100(H) -    Urea Nitrogen 7 - 30 mg/dL 9 9 8 5(L) 4(L) 7 -    Creatinine 0.52 - 1.04 mg/dL 0.55 0.62 0.77 0.71 0.64 0.64 -    Calcium (Total) 8.5 - 10.1 mg/dL 8.8 8.5 8.9 8.8 8.8 9.1 -    Protein (Total) 6.8 - 8.8 g/dL - - 7.3 - - 7.2 -    Albumin 3.4 - 5.0 g/dL - - 3.8 - - 4.1 -    Bilirubin (Direct) 0.0 - 0.2 mg/dL - - - - - - -    Alkaline Phosphatase 40 - 150 U/L - - 72 - - 81 -    AST 0 - 45 U/L - - 41 - - 72(H) -    ALT 0 - 50 U/L - - 65(H) - - 143(H) -    MCV 78 - 100 fl 93 94 95 95 96 94 95               Primary Care Provider Office Phone # Fax #    [facility] [phone] [address] [facility] [address]        Equal Access to Services     [facility] : Alexis Luis, samaria ceja, qadot acostaallisa mcgraw, jen raymundo. So [facility] [phone].    ATENCIÓN: Si habla español, tiene a isabel disposición servicios gratuitos de asistencia lingüística. Roberto al [phone].    We comply  with applicable federal civil rights laws and [place] laws. We do not discriminate on the basis of race, color, national origin, age, disability, sex, sexual orientation, or gender identity.            Thank you!     Thank you for choosing [facility]  for your care. Our goal is always to provide you with excellent care. Hearing back from our patients is one way we can continue to improve our services. Please take a few minutes to complete the written survey that you may receive in the mail after your visit with us. Thank you!             Your Updated Medication List - Protect others around you: Learn how to safely use, store and throw away your medicines at www.disposemymeds.org.          This list is accurate as of 9/20/18  2:46 PM.  Always use your most recent med list.                   Brand Name Dispense Instructions for use Diagnosis    ACETAMINOPHEN PO      Take 650 mg by mouth every 4 hours as needed for pain    S/P autologous bone marrow transplantation (H), Hodgkin lymphoma, unspecified, lymph nodes of inguinal region and lower limb (H)       acyclovir 800 MG tablet    ZOVIRAX    150 tablet    Take 1 tablet (800 mg) by mouth 5 times daily    Hodgkin lymphoma, unspecified, lymph nodes of inguinal region and lower limb (H)       estradiol 10 MCG Tabs vaginal tablet    VAGIFEM    20 tablet    Place 1 tablet (10 mcg) vaginally twice a week Nightly for 1-2 weeks then twice weekly    Vaginal dryness       fluconazole 200 MG tablet    DIFLUCAN    30 tablet    Take 0.5 tablets (100 mg) by mouth daily    Hodgkin lymphoma, unspecified, lymph nodes of inguinal region and lower limb (H)       PARoxetine 10 MG tablet    PAXIL    30 tablet    Take 1 tablet (10 mg) by mouth At Bedtime    Menopausal syndrome (hot flashes)       sulfamethoxazole-trimethoprim 800-160 MG per tablet    BACTRIM DS/SEPTRA DS    34 tablet    Take 1 tablet by mouth Every Mon, Tues two times daily HOLD starting 9/10     Hodgkin lymphoma, unspecified, lymph nodes of inguinal region and lower limb (H)       warfarin 5 MG tablet    COUMADIN    30 tablet    Take 1 tablet (5 mg) by mouth daily    Hodgkin lymphoma, unspecified, lymph nodes of inguinal region and lower limb (H)

## 2018-09-20 NOTE — MR AVS SNAPSHOT
Amira Carmennes   9/20/2018   Anticoagulation Therapy Visit    Description:  28 year old female   Provider:  Rufus Riley, RN   Department:  [facility]           INR as of 9/20/2018     Today's INR 2.56      Anticoagulation Summary as of 9/20/2018     INR goal 2.0-3.0   Today's INR 2.56   Full warfarin instructions 9/20: 5 mg; 9/21: 5 mg; 9/22: 5 mg; 9/23: 5 mg; Otherwise No maintenance plan   Next INR check 9/24/2018    Indications   Deep vein thrombosis (DVT) (H) [I82.409] [I82.409]         September 2018 Details    Sun Mon Tue Wed Thu Fri Sat           1                 2               3               4               5               6               7               8                 9               10               11               12               13               14               15                 16               17               18               19               20      5 mg   See details      21      5 mg         22      5 mg           23      5 mg         24            25               26               27               28               29                 30                      Date Details   09/20 This INR check       Date of next INR:  9/24/2018         How to take your warfarin dose     To take:  5 mg Take 1 of the 5 mg tablets.

## 2018-09-20 NOTE — NURSING NOTE
"Chief Complaint   Patient presents with     Port Draw     Labs drawn via port by RN. VS taken. Patient checked in for next appt.      Port accessed with 20g 3/4\" gripper needle by RN, labs collected, line flushed with saline and heparin.  Vitals taken. Pt checked in for appointment(s).    Amelia SOTELO RN PHN BSN  BMT/Oncology Lab    "

## 2018-09-20 NOTE — PATIENT INSTRUCTIONS
- RTC 2 weeks for provider visit, labs, and pentamadine or to resume Bactrim (decision will be made at that time)  - Coumadin clinic will call you with any changes to your therapy based on results of INR today  - STOP Lovenox injections  - Call immediately if you develop bleeding or symptoms of infection    Lizbeth Jarrett, MSN, APRN, ACNP-BC

## 2018-09-24 ENCOUNTER — ANTICOAGULATION THERAPY VISIT (OUTPATIENT)
Dept: ANTICOAGULATION | Facility: CLINIC | Age: 28
End: 2018-09-24

## 2018-09-24 DIAGNOSIS — I82.409 DEEP VEIN THROMBOSIS (DVT) (H): ICD-10-CM

## 2018-09-24 DIAGNOSIS — I82.409 DVT (DEEP VENOUS THROMBOSIS) (H): Primary | ICD-10-CM

## 2018-09-24 LAB — INR PPP: 3.48 (ref 0.86–1.14)

## 2018-09-24 PROCEDURE — 85610 PROTHROMBIN TIME: CPT | Performed by: INTERNAL MEDICINE

## 2018-09-24 PROCEDURE — 25000128 H RX IP 250 OP 636: Performed by: INTERNAL MEDICINE

## 2018-09-24 RX ORDER — HEPARIN SODIUM (PORCINE) LOCK FLUSH IV SOLN 100 UNIT/ML 100 UNIT/ML
5 SOLUTION INTRAVENOUS EVERY 8 HOURS
Status: DISCONTINUED | OUTPATIENT
Start: 2018-09-24 | End: 2018-10-02 | Stop reason: HOSPADM

## 2018-09-24 RX ADMIN — Medication 5 ML: at 13:04

## 2018-09-24 NOTE — PROGRESS NOTES
ANTICOAGULATION FOLLOW-UP CLINIC VISIT    Patient Name:  Amira Avery  Date:  9/24/2018  Contact Type:  Telephone    SUBJECTIVE:     Patient Findings     Positives No Problem Findings           OBJECTIVE    INR   Date Value Ref Range Status   09/24/2018 3.48 (H) 0.86 - 1.14 Final       ASSESSMENT / PLAN  INR assessment SUPRA    Recheck INR In: 10 DAYS    INR Location Clinic      Anticoagulation Summary as of 9/24/2018     INR goal 2.0-3.0   Today's INR 3.48!   Warfarin maintenance plan No maintenance plan   Full warfarin instructions 9/24: 2.5 mg; 9/25: 5 mg; 9/26: 2.5 mg; 9/27: 5 mg; 9/28: 2.5 mg; 9/29: 5 mg; 9/30: 5 mg; 10/1: 2.5 mg; 10/2: 5 mg; 10/3: 2.5 mg; Otherwise No maintenance plan   Next INR check 10/4/2018   Target end date 12/10/2018    Indications   Deep vein thrombosis (DVT) (H) [I82.409] [I82.409]         Anticoagulation Episode Summary     INR check location     Preferred lab     Send INR reminders to [facility]    Comments pt has a port r/t oncology dx -  has frequent lab draws  +++3 months of coumadin therapy - last dose on 12/10/18+++      Anticoagulation Care Providers     Provider Role Specialty Phone number    Diana Donovan PA-C Responsible Physician Assistant [phone]            See the Encounter Report to view Anticoagulation Flowsheet and Dosing Calendar (Go to Encounters tab in chart review, and find the Anticoagulation Therapy Visit)  Spoke with patient.  Silva Serrato RN

## 2018-09-24 NOTE — NURSING NOTE
Chief Complaint   Patient presents with     Port Draw     Labs drawn via PORT by RN. Line FLushed with Saline and locked with Heparin. Deaccessed.        Jordan Romero RN

## 2018-09-24 NOTE — MR AVS SNAPSHOT
Amira Carmennes   9/24/2018   Anticoagulation Therapy Visit    Description:  28 year old female   Provider:  Silva Serrato, RN   Department:  [facility]           INR as of 9/24/2018     Today's INR 3.48!      Anticoagulation Summary as of 9/24/2018     INR goal 2.0-3.0   Today's INR 3.48!   Full warfarin instructions 9/24: 2.5 mg; 9/25: 5 mg; 9/26: 2.5 mg; 9/27: 5 mg; 9/28: 2.5 mg; 9/29: 5 mg; 9/30: 5 mg; 10/1: 2.5 mg; 10/2: 5 mg; 10/3: 2.5 mg; Otherwise No maintenance plan   Next INR check 10/4/2018    Indications   Deep vein thrombosis (DVT) (H) [I82.409] [I82.409]         September 2018 Details    Sun Mon Tue Wed Thu Fri Sat           1                 2               3               4               5               6               7               8                 9               10               11               12               13               14               15                 16               17               18               19               20               21               22                 23               24      2.5 mg   See details      25      5 mg         26      2.5 mg         27      5 mg         28      2.5 mg         29      5 mg           30      5 mg                Date Details   09/24 This INR check               How to take your warfarin dose     To take:  2.5 mg Take 0.5 of a 5 mg tablet.    To take:  5 mg Take 1 of the 5 mg tablets.           October 2018 Details    Sun Mon Tue Wed Thu Fri Sat      1      2.5 mg         2      5 mg         3      2.5 mg         4            5               6                 7               8               9               10               11               12               13                 14               15               16               17               18               19               20                 21               22               23               24               25               26               27                  28               29               30               31                   Date Details   No additional details    Date of next INR:  10/4/2018         How to take your warfarin dose     To take:  2.5 mg Take 0.5 of a 5 mg tablet.    To take:  5 mg Take 1 of the 5 mg tablets.

## 2018-10-04 ENCOUNTER — ANTICOAGULATION THERAPY VISIT (OUTPATIENT)
Dept: ANTICOAGULATION | Facility: CLINIC | Age: 28
End: 2018-10-04

## 2018-10-04 ENCOUNTER — APPOINTMENT (OUTPATIENT)
Dept: LAB | Facility: CLINIC | Age: 28
End: 2018-10-04
Attending: PHYSICIAN ASSISTANT
Payer: COMMERCIAL

## 2018-10-04 ENCOUNTER — ONCOLOGY VISIT (OUTPATIENT)
Dept: TRANSPLANT | Facility: CLINIC | Age: 28
End: 2018-10-04
Attending: PHYSICIAN ASSISTANT
Payer: COMMERCIAL

## 2018-10-04 VITALS
SYSTOLIC BLOOD PRESSURE: 108 MMHG | TEMPERATURE: 97.7 F | HEART RATE: 75 BPM | RESPIRATION RATE: 16 BRPM | BODY MASS INDEX: 27.36 KG/M2 | WEIGHT: 155.4 LBS | OXYGEN SATURATION: 97 % | DIASTOLIC BLOOD PRESSURE: 69 MMHG

## 2018-10-04 DIAGNOSIS — C81.95 HODGKIN LYMPHOMA, UNSPECIFIED, LYMPH NODES OF INGUINAL REGION AND LOWER LIMB (H): ICD-10-CM

## 2018-10-04 DIAGNOSIS — I82.409 DEEP VEIN THROMBOSIS (DVT) (H): ICD-10-CM

## 2018-10-04 LAB
ANION GAP SERPL CALCULATED.3IONS-SCNC: 8 MMOL/L (ref 3–14)
BASOPHILS # BLD AUTO: 0 10E9/L (ref 0–0.2)
BASOPHILS NFR BLD AUTO: 0.5 %
BUN SERPL-MCNC: 14 MG/DL (ref 7–30)
CALCIUM SERPL-MCNC: 8.6 MG/DL (ref 8.5–10.1)
CHLORIDE SERPL-SCNC: 108 MMOL/L (ref 94–109)
CO2 SERPL-SCNC: 23 MMOL/L (ref 20–32)
CREAT SERPL-MCNC: 0.69 MG/DL (ref 0.52–1.04)
DIFFERENTIAL METHOD BLD: ABNORMAL
EOSINOPHIL # BLD AUTO: 0 10E9/L (ref 0–0.7)
EOSINOPHIL NFR BLD AUTO: 0.5 %
ERYTHROCYTE [DISTWIDTH] IN BLOOD BY AUTOMATED COUNT: 14.7 % (ref 10–15)
GFR SERPL CREATININE-BSD FRML MDRD: >90 ML/MIN/1.7M2
GLUCOSE SERPL-MCNC: 92 MG/DL (ref 70–99)
HCT VFR BLD AUTO: 32.1 % (ref 35–47)
HGB BLD-MCNC: 10.5 G/DL (ref 11.7–15.7)
IMM GRANULOCYTES # BLD: 0 10E9/L (ref 0–0.4)
IMM GRANULOCYTES NFR BLD: 0.2 %
INR PPP: 1.55 (ref 0.86–1.14)
LYMPHOCYTES # BLD AUTO: 1.1 10E9/L (ref 0.8–5.3)
LYMPHOCYTES NFR BLD AUTO: 25.5 %
MCH RBC QN AUTO: 31.2 PG (ref 26.5–33)
MCHC RBC AUTO-ENTMCNC: 32.7 G/DL (ref 31.5–36.5)
MCV RBC AUTO: 95 FL (ref 78–100)
MONOCYTES # BLD AUTO: 0.4 10E9/L (ref 0–1.3)
MONOCYTES NFR BLD AUTO: 8.8 %
NEUTROPHILS # BLD AUTO: 2.7 10E9/L (ref 1.6–8.3)
NEUTROPHILS NFR BLD AUTO: 64.5 %
NRBC # BLD AUTO: 0 10*3/UL
NRBC BLD AUTO-RTO: 0 /100
PLATELET # BLD AUTO: 184 10E9/L (ref 150–450)
POTASSIUM SERPL-SCNC: 4.1 MMOL/L (ref 3.4–5.3)
RBC # BLD AUTO: 3.37 10E12/L (ref 3.8–5.2)
SODIUM SERPL-SCNC: 138 MMOL/L (ref 133–144)
WBC # BLD AUTO: 4.2 10E9/L (ref 4–11)

## 2018-10-04 PROCEDURE — G0008 ADMIN INFLUENZA VIRUS VAC: HCPCS

## 2018-10-04 PROCEDURE — G0463 HOSPITAL OUTPT CLINIC VISIT: HCPCS | Mod: 25

## 2018-10-04 PROCEDURE — 25000128 H RX IP 250 OP 636: Mod: ZF | Performed by: PHYSICIAN ASSISTANT

## 2018-10-04 PROCEDURE — 85025 COMPLETE CBC W/AUTO DIFF WBC: CPT | Performed by: NURSE PRACTITIONER

## 2018-10-04 PROCEDURE — 85610 PROTHROMBIN TIME: CPT | Performed by: NURSE PRACTITIONER

## 2018-10-04 PROCEDURE — 80048 BASIC METABOLIC PNL TOTAL CA: CPT | Performed by: NURSE PRACTITIONER

## 2018-10-04 PROCEDURE — 90686 IIV4 VACC NO PRSV 0.5 ML IM: CPT | Mod: ZF | Performed by: PHYSICIAN ASSISTANT

## 2018-10-04 RX ORDER — FLUCONAZOLE 100 MG/1
100 TABLET ORAL DAILY
Qty: 30 TABLET | Refills: 3 | Status: SHIPPED | OUTPATIENT
Start: 2018-10-04 | End: 2018-11-14

## 2018-10-04 RX ORDER — ACYCLOVIR 400 MG/1
400 TABLET ORAL 2 TIMES DAILY
Qty: 60 TABLET | Refills: 1 | Status: SHIPPED | OUTPATIENT
Start: 2018-10-04 | End: 2018-12-04

## 2018-10-04 RX ORDER — HEPARIN SODIUM (PORCINE) LOCK FLUSH IV SOLN 100 UNIT/ML 100 UNIT/ML
5 SOLUTION INTRAVENOUS EVERY 8 HOURS
Status: DISCONTINUED | OUTPATIENT
Start: 2018-10-04 | End: 2018-10-04 | Stop reason: HOSPADM

## 2018-10-04 RX ADMIN — Medication 5 ML: at 12:40

## 2018-10-04 RX ADMIN — INFLUENZA A VIRUS A/MICHIGAN/45/2015 X-275 (H1N1) ANTIGEN (FORMALDEHYDE INACTIVATED), INFLUENZA A VIRUS A/SINGAPORE/INFIMH-16-0019/2016 IVR-186 (H3N2) ANTIGEN (FORMALDEHYDE INACTIVATED), INFLUENZA B VIRUS B/PHUKET/3073/2013 ANTIGEN (FORMALDEHYDE INACTIVATED), AND INFLUENZA B VIRUS B/MARYLAND/15/2016 BX-69A ANTIGEN (FORMALDEHYDE INACTIVATED) 0.5 ML: 15; 15; 15; 15 INJECTION, SUSPENSION INTRAMUSCULAR at 13:44

## 2018-10-04 ASSESSMENT — PAIN SCALES - GENERAL: PAINLEVEL: NO PAIN (0)

## 2018-10-04 NOTE — NURSING NOTE
Chief Complaint   Patient presents with     Port Draw     accessed with Gripper needle, heparin locked, vitals checked

## 2018-10-04 NOTE — NURSING NOTE
"Oncology Rooming Note    October 4, 2018 1:20 PM   Amira Avery is a 28 year old female who presents for:    Chief Complaint   Patient presents with     Port Draw     accessed with Gripper needle, heparin locked, vitals checked     RECHECK     Pt is here for labs and to see NP for S/P BMT Hodgkins     Initial Vitals: /69  Pulse 75  Temp 97.7  F (36.5  C)  Resp 16  Wt 70.5 kg (155 lb 6.4 oz)  SpO2 97%  BMI 27.36 kg/m2 Estimated body mass index is 27.36 kg/(m^2) as calculated from the following:    Height as of 8/24/18: 1.605 m (5' 3.19\").    Weight as of this encounter: 70.5 kg (155 lb 6.4 oz). Body surface area is 1.77 meters squared.  No Pain (0) Comment: Data Unavailable   No LMP recorded.  Allergies reviewed: Yes  Medications reviewed: Yes    Medications: Medication refills not needed today.  Pharmacy name entered into Anita Margarita: [facility] [address] [place] [address] [address] AT [facility] OF  [address]    Clinical concerns: none     6 minutes for nursing intake (face to face time)     Mariampham Mojica MA              "

## 2018-10-04 NOTE — PROGRESS NOTES
"BMT Progress Note      Patient ID:  Amira Avery is a 28 year old women who is day + 59 s/p Auto PBST for Hodgkins Lymphoma.       Diagnosis HDN Hodgkin's Disease - NOS  HCT Type Autologous    Prep Regimen BCNU  Lisa-C  Etoposide  Melphalan   Donor Source Autologous    GVHD Prophylaxis No  Primary BMT Provider Dr. Landa      INTERVAL  HISTORY      Amira was seen for a follow up visit today. She feels really good.  She is not having any bleeding.  She says no n,v,d.  No rash.  No cough or sob.  She feels \"almost normal'.  The paxil is helping with her hotflashes.  Still having some vaginal dryness.  Review of Systems: 10 point ROS negative except as noted above.     PHYSICAL EXAM   Blood pressure 108/69, pulse 75, temperature 97.7  F (36.5  C), resp. rate 16, weight 70.5 kg (155 lb 6.4 oz), SpO2 97 %, not currently breastfeeding.    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  - Transplanted on 8/6/2018 s/p BEAM prep.  - 9/5 Restaging showed CR but SVC clot.     2.  HEME: Keep Hgb>8 and plts>10K.   - No pre-meds. No transfusions.  - 9/5  SVC clot on day +28 restage imaging. Started coumadin. Referral placed to coumadin clinic 9/10/18. Now off lovenox.  Inr pending from today.  - Platelet intercept study      3.  ID: afebrile.  - Cont Fluc,  and HD ACV (CMV+, HSV+, EBV+) prophy.        - ANC down  with starting bactrim. DC'd Bactrim w/improvement. Pentamidine last 9/13. Will try restarting bactrim 10/8 and see how counts do      -flu shot today, 10/4/2018            4.  GI:  "   - Protonix for GI prophy.  - addendum: transaminases slt elevated last visit.  Not check today but will get LFT next visit     5.  FEN/Renal:    - Lytes and Cr stable; intake is good.     6.  Gyn:  Hot flashes.  LMP  (prior to transplant).    - Referral to gyn 9/12, FSH/LH indicate ovarian failure/menopause. Started paxil for sx management, HRT contraindicated with active DVT. She is using local vagifem for dryness    RTC:   - 2 wks provider visit, labs, recheck INR,and recheck counts with restarting bactrim  - Coumadin clinic will call pt scott 10/4    Jovita Borges PA-C  [id number]

## 2018-10-04 NOTE — MR AVS SNAPSHOT
Aimra Avery   10/4/2018   Anticoagulation Therapy Visit    Description:  28 year old female   Provider:  Rufus Riley, RN   Department:  [facility]           INR as of 10/4/2018     Today's INR 1.55!      Anticoagulation Summary as of 10/4/2018     INR goal 2.0-3.0   Today's INR 1.55!   Full warfarin instructions 10/4: 7.5 mg; 10/5: 5 mg; 10/6: 5 mg; 10/7: 5 mg; Otherwise No maintenance plan   Next INR check 10/8/2018    Indications   Deep vein thrombosis (DVT) (H) [I82.409] [I82.409]         October 2018 Details    Sun Mon Tue Wed Thu Fri Sat      1               2               3               4      7.5 mg   See details      5      5 mg         6      5 mg           7      5 mg         8            9               10               11               12               13                 14               15               16               17               18               19               20                 21               22               23               24               25               26               27                 28               29               30               31                   Date Details   10/04 This INR check       Date of next INR:  10/8/2018         How to take your warfarin dose     To take:  5 mg Take 1 of the 5 mg tablets.    To take:  7.5 mg Take 1.5 of the 5 mg tablets.

## 2018-10-04 NOTE — MR AVS SNAPSHOT
After Visit Summary   10/4/2018    Amira Avery    MRN: [id number]           Patient Information     Date Of Birth          1990        Visit Information        Provider Department      10/4/2018 1:00 PM  BMT WES #1 [facility] Blood and Marrow Transplant        Today's Diagnoses     Hodgkin lymphoma, unspecified, lymph nodes of inguinal region and lower limb (H)              [facility] ([facility]  [address] [place] [address]  Phone: [phone]  Clinic Hours:   Monday-Thursday:7am to 7pm   Friday: 7am to 5pm   Weekends and holidays:    8am to noon (in general)  If your fever is 100.5  or greater,   call the clinic.  After hours call the   hospital at [phone] or   [phone]. Ask for the BMT   fellow on-call            Follow-ups after your visit        Your next 10 appointments already scheduled     Oct 19, 2018  2:00 PM CDT   Masonic Lab Draw with  MASONIC LAB DRAW   [facility] Masonic Lab Draw ([facility])    [address] [place] [address]  [address] [place] [id number] [phone]            Oct 19, 2018  2:30 PM CDT   Return with  BMT WES #2   [facility] Blood and Marrow Transplant ([facility])    [address] [place] [id number] [phone]            Nov 14, 2018  8:20 AM CST   CT CHEST/ABDOMEN/PELVIS W CONTRAST with UUCT1   [facility], [place], [place] ([facility] [place])    [address] [facility] [address] [phone]           How do I prepare for my exam? (Food and drink instructions) To prepare: Do not eat or drink for 2 hours before your exam. If you need to take medicine, you may take it with small sips of water. (We may ask you to take liquid medicine as well.)  How do I prepare for my exam? (Other instructions) Please arrive 30 minutes early for your CT.  Once in the department you might be asked to drink water  15-20 minutes prior to your exam.  If indicated you may be asked to drink an oral contrast in advance of your CT.  If this is the case, the imaging team will let you know or be in contact with you prior to your appointment  Patients over 70 or patients with diabetes or kidney problems: If you haven t had a blood test (creatinine test) within the last 30 days, the Cardiologist/Radiologist may require you to get this test prior to your exam.  If you have diabetes:  Continue to take your metformin medication on the day of your exam  What should I wear: Please wear loose clothing, such as a sweat suit or jogging clothes. Avoid snaps, zippers and other metal. We may ask you to undress and put on a hospital gown.  How long does the exam take: Most scans take less than 20 minutes.  What should I bring: Please bring any scans or X-rays taken at other hospitals, if similar tests were done. Also bring a list of your medicines, including vitamins, minerals and over-the-counter drugs. It is safest to leave personal items at home.  Do I need a : No  is needed.  What do I need to tell my doctor? Be sure to tell your doctor: * If you have any allergies. * If there s any chance you are pregnant. * If you are breastfeeding.  What should I do after the exam: No restrictions, You may resume normal activities.  What is this test: A CT (computed tomography) scan is a series of pictures that allows us to look inside your body. The scanner creates images of the body in cross sections, much like slices of bread. This helps us see any problems more clearly. You may receive contrast (X-ray dye) before or during your scan. You will be asked to drink the contrast.  Who should I call with questions: If you have any questions, please call the Imaging Department where you will have your exam. Directions, parking instructions, and other information is available on our website, [email or web address].            Nov 14, 2018  8:40 AM CST    CT SOFT TISSUE NECK W CONTRAST with UUCT1   [facility], [place], [place] ([facility] [place])    [address] [facility] [address] [phone]           How do I prepare for my exam? (Food and drink instructions) **You will have contrast for this exam.** Do not eat or drink for 2 hours before your exam. If you need to take medicine, you may take it with small sips of water. (We may ask you to take liquid medicine as well.)  The day before your exam, drink extra fluids at least six 8-ounce glasses (unless your doctor tells you to restrict your fluids).  How do I prepare for my exam? (Other instructions) Patients over 70 or patients with diabetes or kidney problems: If you haven t had a blood test (creatinine test) within the last 30 days, the Cardiologist/Radiologist may require you to get this test prior to your exam.  What should I wear: Please wear loose clothing, such as a sweat suit or jogging clothes.  Avoid snaps, zippers and other metal. We may ask you to undress and put on a hospital gown.  How long does the exam take: Most scans take less than 20 minutes.  What should I bring: Please bring any scans or X-rays taken at other hospitals, if similar tests were done. Also bring a list of your medicines, including vitamins, minerals and over-the-counter drugs. It is safest to leave personal items at home.  Do I need a :  No  is needed.  What do I need to tell my doctor? Be sure to tell your doctor: * If you have any allergies. * If there s any chance you are pregnant. * If you are breastfeeding. * If you have diabetes as your medication may need to be adjusted for this exam.  What should I do after the exam: No restrictions, You may resume normal activities.  What is this test: A CT (computed tomography) scan is a series of pictures that allows us to look inside your body. The scanner creates images of the body in cross sections, much like slices of bread.  This helps us see any problems more clearly. You may receive contrast (X-ray dye) before or during your scan. Contrast is given through an IV (small needle in your arm).  Who should I call with questions: If you have any questions, please call the Imaging Department where you will have your exam. Directions, parking instructions, and other information is available on our website, [email or web address].            Nov 14, 2018  9:00 AM CST   PET ONCOLOGY WHOLE BODY with UUPET1   [facility] [place] [facility] [place] ([facility])    [address] [facility] [address] [phone]           How do I prepare for my exam? (Food and drink instructions) Patients should eat a low carb, high protein meal 7 hours (or the last meal you eat) before the scan. Low carb, high protein meals consist of lean meats, seafood, beans, soy, low-fat dairy, eggs, nuts & seeds.  6 hours before your scan: Stop all food and liquids (except water). Do not chew gum or suck on mints. If you need to take medicine with food, you may take it with a few crackers.  How do I prepare for my exam? (Other instructions) If you have diabetes: Have your exam early in the morning. Your blood glucose will go up as the day goes by. Your glucose level must be 180 or less at the start of the exam. Please take any oral diabetic medication you need to ensure this blood glucose level is below 180, but no insulin 4 hours prior to the exam.  * If you are taking insulin in the morning take with breakfast by 6 am and schedule procedure between 12 and 2:15 pm. * If you are taking insulin at night take nightly dose, fast overnight, schedule procedure before 10 am. * If you take insulin both morning and night take morning dose by 6am and schedule procedure between 12 and 2:15 pm.  24 hours before your scan: Don t do any heavy exercise. (No jogging, aerobics or other workouts.) Exercise will make your pictures less accurate.  What  should I wear? Please wear comfortable clothes.  How long does the exam take?  Most scans will take between 2-3 hours.  What should I bring: Please bring a list of your medicines (including vitamins, minerals and over-the-counter drugs). Leave your valuables at home.  Do I need a :  No  is needed.  What do I need to tell my doctor? Tell your doctor: * If there is any chance you may be pregnant or if you are breastfeeding. * If you have problems lying in small spaces (claustrophobia). If you do, your doctor may give you medicine to help you relax.  What should I do after the exam: No restrictions, You may resume normal activities.  What is this test: Your doctor has ordered a PET scan (positron emission tomography). This will take pictures of the cells and organs in your body. Your doctor may have also ordered a CT scan (computed tomography). This is another way to take pictures of your cells and organs. It is done at the same time as the PET scan. The pictures will help us understand your problem so we can make a treatment plan that fits your needs.  Who should I call with questions: Please call your Imaging Department at your exam site with any questions. Directions, parking instructions, and other information is available on our website, [email or web address].            Nov 14, 2018 12:30 PM CST   (Arrive by 12:15 PM)   Survivorship Visit with  BMT WES #4   [facility] Blood and Marrow Transplant ([facility])    [address] [place] [address]  [address] [place] [address]   [phone]            Nov 14, 2018  1:30 PM CST   Masonic Lab Draw with  MASONIC LAB DRAW   [facility] Masonic Lab Draw ([facility])    [address] [place] [address]  [address] [place] [address]   [phone]            Nov 14, 2018  2:00 PM CST   Bone Marrow Biopsy with  BMT WES #4, UU BONE MARROW BIOPSY   [facility] Blood and Marrow Transplant ([facility])     [address] [facility]  [address]  [facility] [id number] [phone]            Nov 19, 2018  4:15 PM CST   BMT Anniversary Visit with César Deluca MD   [facility] Blood and Marrow Transplant ([facility])    [address]  [address]  [facility] [id number] [phone]              Future tests that were ordered for you today     Open Future Orders        Priority Expected Expires Ordered    INR Routine 10/19/2018 10/24/2018 10/4/2018    CBC with platelets differential Routine 10/19/2018 10/24/2018 10/4/2018    Comprehensive metabolic panel Routine 10/19/2018 10/24/2018 10/4/2018            Who to contact     If you have questions or need follow up information about today's clinic visit or your schedule please contact [facility] BLOOD AND MARROW TRANSPLANT directly at [phone].  Normal or non-critical lab and imaging results will be communicated to you by MyChart, letter or phone within 4 business days after the clinic has received the results. If you do not hear from us within 7 days, please contact the clinic through [facility] or phone. If you have a critical or abnormal lab result, we will notify you by phone as soon as possible.  Submit refill requests through [employer] or call your pharmacy and they will forward the refill request to us. Please allow 3 business days for your refill to be completed.          Additional Information About Your Visit        MyChart Information     [facility] gives you secure access to your electronic health record. If you see a primary care provider, you can also send messages to your care team and make appointments. If you have questions, please call your primary care clinic.  If you do not have a primary care provider, please call [phone] and they will assist you.        Care EveryWhere ID     This is your Care EveryWhere ID. This could be used by other organizations to access your [facility] medical records  [id number]        Your  Vitals Were     Pulse Temperature Respirations Pulse Oximetry BMI (Body Mass Index)       75 97.7  F (36.5  C) 16 97% 27.36 kg/m2        Blood Pressure from Last 3 Encounters:   10/04/18 108/69   09/20/18 113/72   09/14/18 128/75    Weight from Last 3 Encounters:   10/04/18 70.5 kg (155 lb 6.4 oz)   09/20/18 68.3 kg (150 lb 8 oz)   09/14/18 68.5 kg (151 lb)              We Performed the Following     Basic metabolic panel     CBC with platelets differential     INR          Today's Medication Changes          These changes are accurate as of 10/4/18  6:52 PM.  If you have any questions, ask your nurse or doctor.               These medicines have changed or have updated prescriptions.        Dose/Directions    acyclovir 400 MG tablet   Commonly known as:  ZOVIRAX   This may have changed:    - medication strength  - how much to take  - when to take this   Used for:  Hodgkin lymphoma, unspecified, lymph nodes of inguinal region and lower limb (H)        Dose:  400 mg   Take 1 tablet (400 mg) by mouth 2 times daily   Quantity:  60 tablet   Refills:  1       fluconazole 100 MG tablet   Commonly known as:  DIFLUCAN   This may have changed:  medication strength   Used for:  Hodgkin lymphoma, unspecified, lymph nodes of inguinal region and lower limb (H)        Dose:  100 mg   Take 1 tablet (100 mg) by mouth daily   Quantity:  30 tablet   Refills:  3            Where to get your medicines      [facility] [address] [place] [address] [facility] [address] [facility] [address]  [address] [facility] [address]     Phone:  [phone]     acyclovir 400 MG tablet    fluconazole 100 MG tablet                Recent Review Flowsheet Data     BMT Recent Results Latest Ref Rng & Units 9/5/2018 9/10/2018 9/13/2018 9/18/2018 9/20/2018 9/24/2018 10/4/2018    WBC 4.0 - 11.0 10e9/L 4.0 2.4(L) 2.4(L) 4.8 4.9 - 4.2    Hemoglobin 11.7 - 15.7 g/dL 11.3(L) 11.1(L) 11.0(L) 11.8 12.1 -  10.5(L)    Platelet Count 150 - 450 10e9/L 176 168 167 213 228 - 184    Neutrophils (Absolute) 1.6 - 8.3 10e9/L 2.5 1.4(L) 1.4(L) 3.2 3.6 - 2.7    Blasts (Absolute) 0 10e9/L - - - - - - -    INR 0.86 - 1.14 - 1.02 2.58(H) 4.37(H) 2.56(H) 3.48(H) 1.55(H)    Sodium 133 - 144 mmol/L 135 142 141 138 - - 138    Potassium 3.4 - 5.3 mmol/L 3.7 3.7 3.7 3.8 - - 4.1    Chloride 94 - 109 mmol/L 104 109 110(H) 106 - - 108    Glucose 70 - 99 mg/dL 88 98 109(H) 100(H) - - 92    Urea Nitrogen 7 - 30 mg/dL 8 5(L) 4(L) 7 - - 14    Creatinine 0.52 - 1.04 mg/dL 0.77 0.71 0.64 0.64 - - 0.69    Calcium (Total) 8.5 - 10.1 mg/dL 8.9 8.8 8.8 9.1 - - 8.6    Protein (Total) 6.8 - 8.8 g/dL 7.3 - - 7.2 - - -    Albumin 3.4 - 5.0 g/dL 3.8 - - 4.1 - - -    Bilirubin (Direct) 0.0 - 0.2 mg/dL - - - - - - -    Alkaline Phosphatase 40 - 150 U/L 72 - - 81 - - -    AST 0 - 45 U/L 41 - - 72(H) - - -    ALT 0 - 50 U/L 65(H) - - 143(H) - - -    MCV 78 - 100 fl 95 95 96 94 95 - 95               Primary Care Provider Office Phone # Fax #    [facility] [phone] [address] [facility] [address]        Equal Access to Services     RICHIE RAMEY AH: Hadii velma bowieo Soomaali, waaxda luqadaha, qaybta kaalmada adeegyada, jen luna kharash la'aageorge ah. So [facility] [phone].    ATENCIÓN: Si habla español, tiene a isabel disposición servicios gratuitos de asistencia lingüística. Llame al [phone].    We comply with applicable federal civil rights laws and [place] laws. We do not discriminate on the basis of race, color, national origin, age, disability, sex, sexual orientation, or gender identity.            Thank you!     Thank you for choosing [facility] BLOOD AND MARROW TRANSPLANT  for your care. Our goal is always to provide you with excellent care. Hearing back from our patients is one way we can continue to improve our services. Please take a few minutes to complete the written survey  that you may receive in the mail after your visit with us. Thank you!             Your Updated Medication List - Protect others around you: Learn how to safely use, store and throw away your medicines at www.disposemymeds.org.          This list is accurate as of 10/4/18  6:52 PM.  Always use your most recent med list.                   Brand Name Dispense Instructions for use Diagnosis    ACETAMINOPHEN PO      Take 650 mg by mouth every 4 hours as needed for pain    S/P autologous bone marrow transplantation (H), Hodgkin lymphoma, unspecified, lymph nodes of inguinal region and lower limb (H)       acyclovir 400 MG tablet    ZOVIRAX    60 tablet    Take 1 tablet (400 mg) by mouth 2 times daily    Hodgkin lymphoma, unspecified, lymph nodes of inguinal region and lower limb (H)       estradiol 10 MCG Tabs vaginal tablet    VAGIFEM    20 tablet    Place 1 tablet (10 mcg) vaginally twice a week Nightly for 1-2 weeks then twice weekly    Vaginal dryness       fluconazole 100 MG tablet    DIFLUCAN    30 tablet    Take 1 tablet (100 mg) by mouth daily    Hodgkin lymphoma, unspecified, lymph nodes of inguinal region and lower limb (H)       PARoxetine 10 MG tablet    PAXIL    30 tablet    Take 1 tablet (10 mg) by mouth At Bedtime    Menopausal syndrome (hot flashes)       sulfamethoxazole-trimethoprim 800-160 MG per tablet    BACTRIM DS/SEPTRA DS    34 tablet    Take 1 tablet by mouth Every Mon, Tues two times daily HOLD starting 9/10    Hodgkin lymphoma, unspecified, lymph nodes of inguinal region and lower limb (H)       warfarin 5 MG tablet    COUMADIN    30 tablet    Take 1 tablet (5 mg) by mouth daily    Hodgkin lymphoma, unspecified, lymph nodes of inguinal region and lower limb (H)

## 2018-10-05 NOTE — NURSING NOTE
"Injectable Influenza Immunization Documentation    1.  Has the patient received the information for the injectable influenza vaccine? YES     2. Is the patient 6 months of age or older? YES     3. Does the patient have any of the following contraindications?         Severe allergy to eggs? No     Severe allergic reaction to previous influenza vaccines? No   Severe allergy to latex? no       History of Guillain-Seaman syndrome? No     Currently have a temperature greater than 100.4F? No        4.  Severely egg allergic patients should have flu vaccine eligibility assessed by an MD, RN, or pharmacist, and those who received flu vaccine should be observed for 15 min by an MD, RN, Pharmacist, Medical Technician, or member of clinic staff.\":     5. Latex-allergic patients should be given latex-free influenza vaccine . Please reference the Vaccine latex table to determine if your clinic s product is latex-containing.       Vaccination given by Mariam Mojica MA          "

## 2018-10-08 ENCOUNTER — HOME INFUSION (PRE-WILLOW HOME INFUSION) (OUTPATIENT)
Dept: PHARMACY | Facility: CLINIC | Age: 28
End: 2018-10-08

## 2018-10-19 ENCOUNTER — ONCOLOGY VISIT (OUTPATIENT)
Dept: TRANSPLANT | Facility: CLINIC | Age: 28
End: 2018-10-19
Attending: PHYSICIAN ASSISTANT
Payer: COMMERCIAL

## 2018-10-19 ENCOUNTER — ANTICOAGULATION THERAPY VISIT (OUTPATIENT)
Dept: ANTICOAGULATION | Facility: CLINIC | Age: 28
End: 2018-10-19

## 2018-10-19 ENCOUNTER — APPOINTMENT (OUTPATIENT)
Dept: LAB | Facility: CLINIC | Age: 28
End: 2018-10-19
Attending: PHYSICIAN ASSISTANT
Payer: COMMERCIAL

## 2018-10-19 VITALS
TEMPERATURE: 97.9 F | RESPIRATION RATE: 18 BRPM | DIASTOLIC BLOOD PRESSURE: 62 MMHG | OXYGEN SATURATION: 99 % | BODY MASS INDEX: 27.03 KG/M2 | SYSTOLIC BLOOD PRESSURE: 108 MMHG | HEART RATE: 74 BPM | WEIGHT: 153.5 LBS

## 2018-10-19 DIAGNOSIS — C81.10 NODULAR SCLEROSING HODGKIN'S LYMPHOMA, UNSPECIFIED BODY REGION (H): Primary | ICD-10-CM

## 2018-10-19 DIAGNOSIS — I82.409 DEEP VEIN THROMBOSIS (DVT) (H): ICD-10-CM

## 2018-10-19 DIAGNOSIS — Z94.81 STATUS POST AUTOLOGOUS BONE MARROW TRANSPLANT (H): ICD-10-CM

## 2018-10-19 LAB
ALBUMIN SERPL-MCNC: 4 G/DL (ref 3.4–5)
ALP SERPL-CCNC: 63 U/L (ref 40–150)
ALT SERPL W P-5'-P-CCNC: 48 U/L (ref 0–50)
ANION GAP SERPL CALCULATED.3IONS-SCNC: 7 MMOL/L (ref 3–14)
AST SERPL W P-5'-P-CCNC: 34 U/L (ref 0–45)
BASOPHILS # BLD AUTO: 0 10E9/L (ref 0–0.2)
BASOPHILS NFR BLD AUTO: 0.7 %
BILIRUB SERPL-MCNC: 0.4 MG/DL (ref 0.2–1.3)
BUN SERPL-MCNC: 10 MG/DL (ref 7–30)
CALCIUM SERPL-MCNC: 8.4 MG/DL (ref 8.5–10.1)
CHLORIDE SERPL-SCNC: 107 MMOL/L (ref 94–109)
CO2 SERPL-SCNC: 22 MMOL/L (ref 20–32)
CREAT SERPL-MCNC: 0.73 MG/DL (ref 0.52–1.04)
DIFFERENTIAL METHOD BLD: ABNORMAL
EOSINOPHIL # BLD AUTO: 0.1 10E9/L (ref 0–0.7)
EOSINOPHIL NFR BLD AUTO: 1.8 %
ERYTHROCYTE [DISTWIDTH] IN BLOOD BY AUTOMATED COUNT: 14 % (ref 10–15)
GFR SERPL CREATININE-BSD FRML MDRD: >90 ML/MIN/1.7M2
GLUCOSE SERPL-MCNC: 92 MG/DL (ref 70–99)
HCT VFR BLD AUTO: 34.4 % (ref 35–47)
HGB BLD-MCNC: 11.4 G/DL (ref 11.7–15.7)
IMM GRANULOCYTES # BLD: 0 10E9/L (ref 0–0.4)
IMM GRANULOCYTES NFR BLD: 0.2 %
INR PPP: 3.74 (ref 0.86–1.14)
LYMPHOCYTES # BLD AUTO: 2.8 10E9/L (ref 0.8–5.3)
LYMPHOCYTES NFR BLD AUTO: 62.2 %
MCH RBC QN AUTO: 31.8 PG (ref 26.5–33)
MCHC RBC AUTO-ENTMCNC: 33.1 G/DL (ref 31.5–36.5)
MCV RBC AUTO: 96 FL (ref 78–100)
MONOCYTES # BLD AUTO: 0.3 10E9/L (ref 0–1.3)
MONOCYTES NFR BLD AUTO: 7.6 %
NEUTROPHILS # BLD AUTO: 1.2 10E9/L (ref 1.6–8.3)
NEUTROPHILS NFR BLD AUTO: 27.5 %
NRBC # BLD AUTO: 0 10*3/UL
NRBC BLD AUTO-RTO: 0 /100
PLATELET # BLD AUTO: 211 10E9/L (ref 150–450)
POTASSIUM SERPL-SCNC: 3.7 MMOL/L (ref 3.4–5.3)
PROT SERPL-MCNC: 6.7 G/DL (ref 6.8–8.8)
RBC # BLD AUTO: 3.58 10E12/L (ref 3.8–5.2)
SODIUM SERPL-SCNC: 137 MMOL/L (ref 133–144)
WBC # BLD AUTO: 4.5 10E9/L (ref 4–11)

## 2018-10-19 PROCEDURE — 85610 PROTHROMBIN TIME: CPT | Performed by: PHYSICIAN ASSISTANT

## 2018-10-19 PROCEDURE — 94642 AEROSOL INHALATION TREATMENT: CPT

## 2018-10-19 PROCEDURE — 25000128 H RX IP 250 OP 636: Mod: ZF | Performed by: PHYSICIAN ASSISTANT

## 2018-10-19 PROCEDURE — 80053 COMPREHEN METABOLIC PANEL: CPT | Performed by: PHYSICIAN ASSISTANT

## 2018-10-19 PROCEDURE — 85025 COMPLETE CBC W/AUTO DIFF WBC: CPT | Performed by: PHYSICIAN ASSISTANT

## 2018-10-19 PROCEDURE — 25000125 ZZHC RX 250: Mod: ZF | Performed by: PHYSICIAN ASSISTANT

## 2018-10-19 PROCEDURE — G0463 HOSPITAL OUTPT CLINIC VISIT: HCPCS | Mod: 25

## 2018-10-19 RX ORDER — PENTAMIDINE ISETHIONATE 300 MG/300MG
300 INHALANT RESPIRATORY (INHALATION)
Status: CANCELLED
Start: 2018-10-19

## 2018-10-19 RX ORDER — PENTAMIDINE ISETHIONATE 300 MG/300MG
300 INHALANT RESPIRATORY (INHALATION)
Status: DISCONTINUED | OUTPATIENT
Start: 2018-10-19 | End: 2018-10-19 | Stop reason: HOSPADM

## 2018-10-19 RX ORDER — HEPARIN SODIUM (PORCINE) LOCK FLUSH IV SOLN 100 UNIT/ML 100 UNIT/ML
5 SOLUTION INTRAVENOUS EVERY 8 HOURS PRN
Status: DISCONTINUED | OUTPATIENT
Start: 2018-10-19 | End: 2018-10-19 | Stop reason: HOSPADM

## 2018-10-19 RX ORDER — SULFAMETHOXAZOLE/TRIMETHOPRIM 800-160 MG
1 TABLET ORAL
Refills: 0 | COMMUNITY
Start: 2018-10-22 | End: 2018-10-19

## 2018-10-19 RX ORDER — WARFARIN SODIUM 5 MG/1
5 TABLET ORAL DAILY
Qty: 30 TABLET | Refills: 1 | Status: SHIPPED | OUTPATIENT
Start: 2018-10-19 | End: 2018-12-04

## 2018-10-19 RX ORDER — ATOVAQUONE 750 MG/5ML
1500 SUSPENSION ORAL DAILY
Status: CANCELLED | OUTPATIENT
Start: 2018-10-19

## 2018-10-19 RX ORDER — HEPARIN SODIUM (PORCINE) LOCK FLUSH IV SOLN 100 UNIT/ML 100 UNIT/ML
5 SOLUTION INTRAVENOUS DAILY PRN
Status: CANCELLED
Start: 2018-10-19

## 2018-10-19 RX ADMIN — Medication 5 ML: at 14:05

## 2018-10-19 RX ADMIN — PENTAMIDINE ISETHIONATE 300 MG: 300 INHALANT RESPIRATORY (INHALATION) at 15:46

## 2018-10-19 ASSESSMENT — PAIN SCALES - GENERAL: PAINLEVEL: NO PAIN (0)

## 2018-10-19 NOTE — MR AVS SNAPSHOT
Amira Avery   10/19/2018   Anticoagulation Therapy Visit    Description:  28 year old female   Provider:  Rufus Riley, RN   Department:  [facility]           INR as of 10/19/2018     Today's INR 3.74!      Anticoagulation Summary as of 10/19/2018     INR goal 2.0-3.0   Today's INR 3.74!   Full warfarin instructions 10/19: 2.5 mg; 10/20: 2.5 mg; 10/21: 5 mg; 10/22: 2.5 mg; Otherwise No maintenance plan   Next INR check 10/23/2018    Indications   Deep vein thrombosis (DVT) (H) [I82.409] [I82.409]         October 2018 Details    Sun Mon Tue Wed Thu Fri Sat      1               2               3               4               5               6                 7               8               9               10               11               12               13                 14               15               16               17               18               19      2.5 mg   See details      20      2.5 mg           21      5 mg         22      2.5 mg         23            24               25               26               27                 28               29               30               31                   Date Details   10/19 This INR check       Date of next INR:  10/23/2018         How to take your warfarin dose     To take:  2.5 mg Take 0.5 of a 5 mg tablet.    To take:  5 mg Take 1 of the 5 mg tablets.

## 2018-10-19 NOTE — MR AVS SNAPSHOT
After Visit Summary   10/19/2018    Amira Avery    MRN: [id number]           Patient Information     Date Of Birth          1990        Visit Information        Provider Department      10/19/2018 2:30 PM  BMT WES #2 [facility] Blood and Marrow Transplant        Today's Diagnoses     Nodular sclerosing Hodgkin's lymphoma, unspecified body region (H)    -  1    Status post autologous bone marrow transplant (H)              [facility] [place]  [address] [place] [id number]  Phone: [phone]  Clinic Hours:   Monday-Thursday:7am to 7pm   Friday: 7am to 5pm   Weekends and holidays:    8am to noon (in general)  If your fever is 100.5  or greater,   call the clinic.  After hours call the   hospital at [phone] or   [phone]. Ask for the BMT   fellow on-call            Follow-ups after your visit        Your next 10 appointments already scheduled     Nov 01, 2018  2:15 PM CDT   Masonic Lab Draw with  MASONIC LAB DRAW   [facility] Masonic Lab Draw ([facility])    [address] [place] [address]  [address] [place] [id number] [phone]            Nov 01, 2018  3:00 PM CDT   Return with  BMT WES #2   [facility] Blood and Marrow Transplant ([facility])    [address] [place] [address]  [address] [place] [address] [phone]            Nov 14, 2018  8:20 AM CST   CT CHEST/ABDOMEN/PELVIS W CONTRAST with UUCT1   [facility], [place], [place] ([facility] [place])    [address] [facility] [address] [phone]           How do I prepare for my exam? (Food and drink instructions) To prepare: Do not eat or drink for 2 hours before your exam. If you need to take medicine, you may take it with small sips of water. (We may ask you to take liquid medicine as well.)  How do I prepare for my exam? (Other instructions) Please arrive 30 minutes early for your CT.  Once in  the department you might be asked to drink water 15-20 minutes prior to your exam.  If indicated you may be asked to drink an oral contrast in advance of your CT.  If this is the case, the imaging team will let you know or be in contact with you prior to your appointment  Patients over 70 or patients with diabetes or kidney problems: If you haven t had a blood test (creatinine test) within the last 30 days, the Cardiologist/Radiologist may require you to get this test prior to your exam.  If you have diabetes:  Continue to take your metformin medication on the day of your exam  What should I wear: Please wear loose clothing, such as a sweat suit or jogging clothes. Avoid snaps, zippers and other metal. We may ask you to undress and put on a hospital gown.  How long does the exam take: Most scans take less than 20 minutes.  What should I bring: Please bring any scans or X-rays taken at other hospitals, if similar tests were done. Also bring a list of your medicines, including vitamins, minerals and over-the-counter drugs. It is safest to leave personal items at home.  Do I need a : No  is needed.  What do I need to tell my doctor? Be sure to tell your doctor: * If you have any allergies. * If there s any chance you are pregnant. * If you are breastfeeding.  What should I do after the exam: No restrictions, You may resume normal activities.  What is this test: A CT (computed tomography) scan is a series of pictures that allows us to look inside your body. The scanner creates images of the body in cross sections, much like slices of bread. This helps us see any problems more clearly. You may receive contrast (X-ray dye) before or during your scan. You will be asked to drink the contrast.  Who should I call with questions: If you have any questions, please call the Imaging Department where you will have your exam. Directions, parking instructions, and other information is available on our website,  [email or web address].            Nov 14, 2018  8:40 AM CST   CT SOFT TISSUE NECK W CONTRAST with UUCT1   [facility], [place], [place] ([facility])    [address] [facility] [address] [phone]           How do I prepare for my exam? (Food and drink instructions) **You will have contrast for this exam.** Do not eat or drink for 2 hours before your exam. If you need to take medicine, you may take it with small sips of water. (We may ask you to take liquid medicine as well.)  The day before your exam, drink extra fluids at least six 8-ounce glasses (unless your doctor tells you to restrict your fluids).  How do I prepare for my exam? (Other instructions) Patients over 70 or patients with diabetes or kidney problems: If you haven t had a blood test (creatinine test) within the last 30 days, the Cardiologist/Radiologist may require you to get this test prior to your exam.  What should I wear: Please wear loose clothing, such as a sweat suit or jogging clothes.  Avoid snaps, zippers and other metal. We may ask you to undress and put on a hospital gown.  How long does the exam take: Most scans take less than 20 minutes.  What should I bring: Please bring any scans or X-rays taken at other hospitals, if similar tests were done. Also bring a list of your medicines, including vitamins, minerals and over-the-counter drugs. It is safest to leave personal items at home.  Do I need a :  No  is needed.  What do I need to tell my doctor? Be sure to tell your doctor: * If you have any allergies. * If there s any chance you are pregnant. * If you are breastfeeding. * If you have diabetes as your medication may need to be adjusted for this exam.  What should I do after the exam: No restrictions, You may resume normal activities.  What is this test: A CT (computed tomography) scan is a series of pictures that allows us to look inside your body. The scanner creates  images of the body in cross sections, much like slices of bread. This helps us see any problems more clearly. You may receive contrast (X-ray dye) before or during your scan. Contrast is given through an IV (small needle in your arm).  Who should I call with questions: If you have any questions, please call the Imaging Department where you will have your exam. Directions, parking instructions, and other information is available on our website, [email or web address].            Nov 14, 2018  9:00 AM CST   PET ONCOLOGY WHOLE BODY with UUPET1   [facility], [place] PET CT ([facility])    [address] [facility] [address] [phone]           How do I prepare for my exam? (Food and drink instructions) Patients should eat a low carb, high protein meal 7 hours (or the last meal you eat) before the scan. Low carb, high protein meals consist of lean meats, seafood, beans, soy, low-fat dairy, eggs, nuts & seeds.  6 hours before your scan: Stop all food and liquids (except water). Do not chew gum or suck on mints. If you need to take medicine with food, you may take it with a few crackers.  How do I prepare for my exam? (Other instructions) If you have diabetes: Have your exam early in the morning. Your blood glucose will go up as the day goes by. Your glucose level must be 180 or less at the start of the exam. Please take any oral diabetic medication you need to ensure this blood glucose level is below 180, but no insulin 4 hours prior to the exam.  * If you are taking insulin in the morning take with breakfast by 6 am and schedule procedure between 12 and 2:15 pm. * If you are taking insulin at night take nightly dose, fast overnight, schedule procedure before 10 am. * If you take insulin both morning and night take morning dose by 6am and schedule procedure between 12 and 2:15 pm.  24 hours before your scan: Don t do any heavy exercise. (No jogging, aerobics or other  workouts.) Exercise will make your pictures less accurate.  What should I wear? Please wear comfortable clothes.  How long does the exam take?  Most scans will take between 2-3 hours.  What should I bring: Please bring a list of your medicines (including vitamins, minerals and over-the-counter drugs). Leave your valuables at home.  Do I need a :  No  is needed.  What do I need to tell my doctor? Tell your doctor: * If there is any chance you may be pregnant or if you are breastfeeding. * If you have problems lying in small spaces (claustrophobia). If you do, your doctor may give you medicine to help you relax.  What should I do after the exam: No restrictions, You may resume normal activities.  What is this test: Your doctor has ordered a PET scan (positron emission tomography). This will take pictures of the cells and organs in your body. Your doctor may have also ordered a CT scan (computed tomography). This is another way to take pictures of your cells and organs. It is done at the same time as the PET scan. The pictures will help us understand your problem so we can make a treatment plan that fits your needs.  Who should I call with questions: Please call your Imaging Department at your exam site with any questions. Directions, parking instructions, and other information is available on our website, [email or web address].            Nov 14, 2018 12:30 PM CST   (Arrive by 12:15 PM)   Survivorship Visit with  BMT WES #4   [facility] Blood and Marrow Transplant ([facility])    [address] [place] [address]  [address]  [facility] [address]   [phone]            Nov 14, 2018  1:30 PM CST   Masonic Lab Draw with  MASONIC LAB DRAW   [facility] Masonic Lab Draw ([facility])    [address] [place] [address]  [address]  [facility] [address]   [phone]            Nov 14, 2018  2:00 PM CST   Bone Marrow Biopsy with  BMT WES #4, UU BONE MARROW BIOPSY   [facility]  Blood and Marrow Transplant ([facility])    [address] [facility] [address]  [address]  [facility] [address] [phone]            Nov 19, 2018  4:15 PM CST   BMT Anniversary Visit with César Deluca MD   [facility] Blood and Marrow Transplant ([facility])    [address] [facility]  [address]  [facility] [address] [phone]              Future tests that were ordered for you today     Open Future Orders        Priority Expected Expires Ordered    Comprehensive metabolic panel Routine 11/2/2018 11/28/2018 10/19/2018    CBC with platelets differential Routine 11/2/2018 11/28/2018 10/19/2018            Who to contact     If you have questions or need follow up information about today's clinic visit or your schedule please contact [facility] BLOOD AND MARROW TRANSPLANT directly at [phone].  Normal or non-critical lab and imaging results will be communicated to you by [employer], letter or phone within 4 business days after the clinic has received the results. If you do not hear from us within 7 days, please contact the clinic through [employer] or phone. If you have a critical or abnormal lab result, we will notify you by phone as soon as possible.  Submit refill requests through [employer] or call your pharmacy and they will forward the refill request to us. Please allow 3 business days for your refill to be completed.          Additional Information About Your Visit        MyChart Information     [employer] gives you secure access to your electronic health record. If you see a primary care provider, you can also send messages to your care team and make appointments. If you have questions, please call your primary care clinic.  If you do not have a primary care provider, please call [phone] and they will assist you.        Care EveryWhere ID     This is your Care EveryWhere ID. This could be used by other organizations to access your [facility]  records  [id number]        Your Vitals Were     Pulse Temperature Respirations Pulse Oximetry BMI (Body Mass Index)       74 97.9  F (36.6  C) (Oral) 18 99% 27.03 kg/m2        Blood Pressure from Last 3 Encounters:   10/19/18 108/62   10/04/18 108/69   09/20/18 113/72    Weight from Last 3 Encounters:   10/19/18 69.6 kg (153 lb 8 oz)   10/04/18 70.5 kg (155 lb 6.4 oz)   09/20/18 68.3 kg (150 lb 8 oz)              We Performed the Following     CBC with platelets differential     Comprehensive metabolic panel     INR          Today's Medication Changes          These changes are accurate as of 10/19/18  5:35 PM.  If you have any questions, ask your nurse or doctor.               Stop taking these medicines if you haven't already. Please contact your care team if you have questions.     sulfamethoxazole-trimethoprim 800-160 MG per tablet   Commonly known as:  BACTRIM DS/SEPTRA DS                Where to get your medicines      These medications were sent to [facility] [address] [place] [address] - [address] AT [facility] OF [address]  [address] [facility] [address]     Phone:  [phone]     warfarin 5 MG tablet                Recent Review Flowsheet Data     BMT Recent Results Latest Ref Rng & Units 9/10/2018 9/13/2018 9/18/2018 9/20/2018 9/24/2018 10/4/2018 10/19/2018    WBC 4.0 - 11.0 10e9/L 2.4(L) 2.4(L) 4.8 4.9 - 4.2 4.5    Hemoglobin 11.7 - 15.7 g/dL 11.1(L) 11.0(L) 11.8 12.1 - 10.5(L) 11.4(L)    Platelet Count 150 - 450 10e9/L 168 167 213 228 - 184 211    Neutrophils (Absolute) 1.6 - 8.3 10e9/L 1.4(L) 1.4(L) 3.2 3.6 - 2.7 1.2(L)    Blasts (Absolute) 0 10e9/L - - - - - - -    INR 0.86 - 1.14 1.02 2.58(H) 4.37(H) 2.56(H) 3.48(H) 1.55(H) 3.74(H)    Sodium 133 - 144 mmol/L 142 141 138 - - 138 137    Potassium 3.4 - 5.3 mmol/L 3.7 3.7 3.8 - - 4.1 3.7    Chloride 94 - 109 mmol/L 109 110(H) 106 - - 108 107    Glucose 70 - 99 mg/dL 98 109(H) 100(H) - - 92 92    Urea Nitrogen 7 - 30  mg/dL 5(L) 4(L) 7 - - 14 10    Creatinine 0.52 - 1.04 mg/dL 0.71 0.64 0.64 - - 0.69 0.73    Calcium (Total) 8.5 - 10.1 mg/dL 8.8 8.8 9.1 - - 8.6 8.4(L)    Protein (Total) 6.8 - 8.8 g/dL - - 7.2 - - - 6.7(L)    Albumin 3.4 - 5.0 g/dL - - 4.1 - - - 4.0    Bilirubin (Direct) 0.0 - 0.2 mg/dL - - - - - - -    Alkaline Phosphatase 40 - 150 U/L - - 81 - - - 63    AST 0 - 45 U/L - - 72(H) - - - 34    ALT 0 - 50 U/L - - 143(H) - - - 48    MCV 78 - 100 fl 95 96 94 95 - 95 96               Primary Care Provider Office Phone # Fax #    [facility] [phone] [address] [facility] [address]        Equal Access to Services     MARINE RAMEY : Hadii velma Luis, wasarahda brenna, qaybta eduardo mcgraw, jen de la cruz . So [facility] [phone].    ATENCIÓN: Si habla español, tiene a isabel disposición servicios gratuitos de asistencia lingüística. [facility] [phone].    We comply with applicable federal civil rights laws and [place] laws. We do not discriminate on the basis of race, color, national origin, age, disability, sex, sexual orientation, or gender identity.            Thank you!     Thank you for choosing [facility] BLOOD AND MARROW TRANSPLANT  for your care. Our goal is always to provide you with excellent care. Hearing back from our patients is one way we can continue to improve our services. Please take a few minutes to complete the written survey that you may receive in the mail after your visit with us. Thank you!             Your Updated Medication List - Protect others around you: Learn how to safely use, store and throw away your medicines at www.disposemymeds.org.          This list is accurate as of 10/19/18  5:35 PM.  Always use your most recent med list.                   Brand Name Dispense Instructions for use Diagnosis    ACETAMINOPHEN PO      Take 650 mg by mouth every 4 hours as needed for pain    S/P autologous  bone marrow transplantation (H), Hodgkin lymphoma, unspecified, lymph nodes of inguinal region and lower limb (H)       acyclovir 400 MG tablet    ZOVIRAX    60 tablet    Take 1 tablet (400 mg) by mouth 2 times daily    Hodgkin lymphoma, unspecified, lymph nodes of inguinal region and lower limb (H)       estradiol 10 MCG Tabs vaginal tablet    VAGIFEM    20 tablet    Place 1 tablet (10 mcg) vaginally twice a week Nightly for 1-2 weeks then twice weekly    Vaginal dryness       fluconazole 100 MG tablet    DIFLUCAN    30 tablet    Take 1 tablet (100 mg) by mouth daily    Hodgkin lymphoma, unspecified, lymph nodes of inguinal region and lower limb (H)       PARoxetine 10 MG tablet    PAXIL    30 tablet    Take 1 tablet (10 mg) by mouth At Bedtime    Menopausal syndrome (hot flashes)       warfarin 5 MG tablet    COUMADIN    30 tablet    Take 1 tablet (5 mg) by mouth daily

## 2018-10-19 NOTE — NURSING NOTE
"Oncology Rooming Note    October 19, 2018 2:23 PM   Amira Avery is a 28 year old female who presents for:    Chief Complaint   Patient presents with     Port Draw     Labs drawn via port by RN. Line flushed and hep locked. VS taken.     RECHECK     RTN- Hodgkins Lymphoma      Initial Vitals: /62 (BP Location: Right arm, Patient Position: Sitting, Cuff Size: Adult Regular)  Pulse 74  Temp 97.9  F (36.6  C) (Oral)  Resp 18  Wt 69.6 kg (153 lb 8 oz)  SpO2 99%  BMI 27.03 kg/m2 Estimated body mass index is 27.03 kg/(m^2) as calculated from the following:    Height as of 8/24/18: 1.605 m (5' 3.19\").    Weight as of this encounter: 69.6 kg (153 lb 8 oz). Body surface area is 1.76 meters squared.  No Pain (0) Comment: Data Unavailable   No LMP recorded.  Allergies reviewed: Yes  Medications reviewed: Yes    Medications: Medication refills not needed today.  Pharmacy name entered into [employer]: [email or web address] [address] [place] [address] [address] AT [facility] OF [address]    Clinical concerns: None     8 minutes for nursing intake (face to face time)     Serenity Faulkner CMA              "

## 2018-10-19 NOTE — NURSING NOTE
Chief Complaint   Patient presents with     Port Draw     Labs drawn via port by RN. Line flushed and hep locked. VS taken.     Karen Renteria RN

## 2018-10-19 NOTE — PROGRESS NOTES
BMT Progress Note      Patient ID:  Amira Avery is a 28 year old women who is day + 74 s/p Auto PBST for Hodgkins Lymphoma.       Diagnosis HDN Hodgkin's Disease - NOS  HCT Type Autologous    Prep Regimen BCNU  Lisa-C  Etoposide  Melphalan   Donor Source Autologous    GVHD Prophylaxis No  Primary BMT Provider Dr. Landa      INTERVAL  HISTORY      Amira was seen for a follow up visit today. She feels really good. She says no n,v,d.  No rash.  No cough or sob.    The paxil is still helping with her hotflashes.   Vaginal dryness is better with the vagifem.  Review of Systems: 10 point ROS negative except as noted above.     PHYSICAL EXAM   Blood pressure 108/62, pulse 74, temperature 97.9  F (36.6  C), temperature source Oral, resp. rate 18, weight 69.6 kg (153 lb 8 oz), SpO2 99 %, not currently breastfeeding.    General: NAD,   Eyes:  ARMINDA, sclera anicteric   Lungs: CTA bilaterally  Cardiovascular: RRR, no M/R/G   Abdominal/Rectal: +BS, soft, NT, ND, No HSM   Lymphatics: no edema  Skin: no rashes or petechaie  Neuro: A&O   Additional Findings: Port a cath left chest: not accessed    ASSESSMENT BY SYSTEMS      Amira Avery is a 28 year old women who is day + 75 s/p Auto PBSCT with Beam for Nodular sclerosing Hodgkins disease, s/p gcsf+mozobil priming    - 6.21million CD34/kg.  - Transplanted on 8/6/2018 s/p BEAM prep.  - 9/5 Restaging showed CR but SVC clot.     2.  HEME: Keep Hgb>8 and plts>10K.   - No pre-meds. No transfusions.  - 9/5  SVC clot on day +28 restage imaging. Started coumadin. Referral placed to coumadin clinic 9/10/18. Now off lovenox.  Inr elevated likely from the bactrim restarted last visit.  Called coumadin clinic and they gave her instructions on what to do.  Told them we are stopping the bactrim today.  - Platelet intercept study   -ANC=1.2, second time has dropped with starting bactrim.  So will stop bactrim again today.     3.  ID: afebrile.  - Cont Fluc,  and HD ACV  (CMV+, HSV+, EBV+) prophy.        - ANC down  with starting bactrim. DC'd Bactrim w/improvement. Pentamidine last 9/13. Will try restarting bactrim 10/8 and ANC=1.2 - stop Bactrim.  Will continue with pentamidine for PJP, as atovaquone can cause neutropenia as well. Amira is aware she will come in monthly for pentamidine for 1 year.    -flu shot given 10/4/2018            4.  GI:    - Protonix for GI prophy.  - addendum: transaminases slt elevated but resolved today   5.  FEN/Renal:    - Lytes and Cr stable; intake is good.     6.  Gyn:  Hot flashes.  LMP  (prior to transplant).    - Referral to gyn 9/12, FSH/LH indicate ovarian failure/menopause. Cotninues paxil for sx management, HRT contraindicated with active DVT. She is using local vagifem for dryness    RTC:   - 2 wks provider visit, labs, recheck INR,and recheck counts with restarting bactrim  - Coumadin clinic will call pt  Refilled coumadin at 5mg today after calling the coumadin clinic to ask for dose.    Jovita Borges PA-C  [id number]    Jovita Borges PA-C  7978

## 2018-10-19 NOTE — NURSING NOTE
2 puffs of albuterol inhaler was done prior to 300 mg of Nebupent Inhalant.     Mariam Mojica MA

## 2018-10-19 NOTE — PROGRESS NOTES
ANTICOAGULATION FOLLOW-UP CLINIC VISIT    Patient Name:  Amira Avery  Date:  10/19/2018  Contact Type:  Telephone    SUBJECTIVE:     Patient Findings     Positives Change in medications (Patient was on Bactrim, will discontinue today 10/19.), No Problem Findings    Comments Requested Amira get next INR on Tuesday.  Recommended a second dose of 2.5mg tomorrow.  Updated calendar with 2.5mg and 5mg doses alternating every other day. Suspected supratherapeutic dose is due to Bactrim.  Jovita Borges NP recognized that the medication would interact with Warfarin.  She discontinued Bactrim.           OBJECTIVE    INR   Date Value Ref Range Status   10/19/2018 3.74 (H) 0.86 - 1.14 Final       ASSESSMENT / PLAN  INR assessment SUPRA    Recheck INR In: 4 DAYS    INR Location Clinic      Anticoagulation Summary as of 10/19/2018     INR goal 2.0-3.0   Today's INR 3.74!   Warfarin maintenance plan No maintenance plan   Full warfarin instructions 10/19: 2.5 mg; 10/20: 2.5 mg; 10/21: 5 mg; 10/22: 2.5 mg; Otherwise No maintenance plan   Next INR check 10/23/2018   Target end date 12/10/2018    Indications   Deep vein thrombosis (DVT) (H) [I82.409] [I82.409]         Anticoagulation Episode Summary     INR check location     Preferred lab     Send INR reminders to [facility]    Comments pt has a port r/t oncology dx -  has frequent lab draws HIPPA OK to leave messages, may speak with Yun Cope, Liana+3 months of coumadin therapy - last dose on 12/10/18+      Anticoagulation Care Providers     Provider Role Specialty Phone number    Diana Donovan PA-C Responsible Physician Assistant [phone]            See the Encounter Report to view Anticoagulation Flowsheet and Dosing Calendar (Go to Encounters tab in chart review, and find the Anticoagulation Therapy Visit)    Spoke with patient. Gave them their lab results and new warfarin recommendation.  No changes in health, medication, or diet. No missed  doses, no falls. No signs or symptoms of bleed or clotting.    Rufus Riley, RN

## 2018-10-23 ENCOUNTER — ANTICOAGULATION THERAPY VISIT (OUTPATIENT)
Dept: ANTICOAGULATION | Facility: CLINIC | Age: 28
End: 2018-10-23

## 2018-10-23 DIAGNOSIS — I82.409 DVT (DEEP VENOUS THROMBOSIS) (H): Primary | ICD-10-CM

## 2018-10-23 DIAGNOSIS — I82.409 DEEP VEIN THROMBOSIS (DVT) (H): ICD-10-CM

## 2018-10-23 LAB — INR PPP: 2.54 (ref 0.86–1.14)

## 2018-10-23 PROCEDURE — 25000128 H RX IP 250 OP 636: Performed by: INTERNAL MEDICINE

## 2018-10-23 PROCEDURE — 85610 PROTHROMBIN TIME: CPT | Performed by: PHYSICIAN ASSISTANT

## 2018-10-23 RX ORDER — HEPARIN SODIUM (PORCINE) LOCK FLUSH IV SOLN 100 UNIT/ML 100 UNIT/ML
5 SOLUTION INTRAVENOUS EVERY 8 HOURS
Status: DISCONTINUED | OUTPATIENT
Start: 2018-10-23 | End: 2018-10-31 | Stop reason: HOSPADM

## 2018-10-23 RX ADMIN — Medication 5 ML: at 11:04

## 2018-10-23 NOTE — MR AVS SNAPSHOT
Amira Avery   10/23/2018   Anticoagulation Therapy Visit    Description:  28 year old female   Provider:  Chanelle Montana RN   Department:  [facility]           INR as of 10/23/2018     Today's INR 2.54      Anticoagulation Summary as of 10/23/2018     INR goal 2.0-3.0   Today's INR 2.54   Full warfarin instructions 10/23: 5 mg; 10/24: 2.5 mg; 10/25: 5 mg; 10/26: 2.5 mg; 10/27: 5 mg; 10/28: 2.5 mg; Otherwise No maintenance plan   Next INR check 10/29/2018    Indications   Deep vein thrombosis (DVT) (H) [I82.409] [I82.409]         October 2018 Details    Sun Mon Tue Wed Thu Fri Sat      1               2               3               4               5               6                 7               8               9               10               11               12               13                 14               15               16               17               18               19               20                 21               22               23      5 mg   See details      24      2.5 mg         25      5 mg         26      2.5 mg         27      5 mg           28      2.5 mg         29            30               31                   Date Details   10/23 This INR check       Date of next INR:  10/29/2018         How to take your warfarin dose     To take:  2.5 mg Take 0.5 of a 5 mg tablet.    To take:  5 mg Take 1 of the 5 mg tablets.

## 2018-10-23 NOTE — PROGRESS NOTES
ANTICOAGULATION FOLLOW-UP CLINIC VISIT    Patient Name:  Amira Avery  Date:  10/23/2018  Contact Type:  Telephone    SUBJECTIVE:     Patient Findings     Comments Amira reports no changes in health, diet, medications.           OBJECTIVE    INR   Date Value Ref Range Status   10/23/2018 2.54 (H) 0.86 - 1.14 Final       ASSESSMENT / PLAN  INR assessment THER    Recheck INR In: 6 DAYS    INR Location Clinic      Anticoagulation Summary as of 10/23/2018     INR goal 2.0-3.0   Today's INR 2.54   Warfarin maintenance plan No maintenance plan   Full warfarin instructions 10/23: 5 mg; 10/24: 2.5 mg; 10/25: 5 mg; 10/26: 2.5 mg; 10/27: 5 mg; 10/28: 2.5 mg; Otherwise No maintenance plan   Next INR check 10/29/2018   Priority INR   Target end date 12/10/2018    Indications   Deep vein thrombosis (DVT) (H) [I82.409] [I82.409]         Anticoagulation Episode Summary     INR check location     Preferred lab     Send INR reminders to [facility]    Comments pt has a port r/t oncology dx -  has frequent lab draws HIPPA OK to leave messages, may speak with JayyYun quiroz, Dad+3 months of coumadin therapy - last dose on 12/10/18+      Anticoagulation Care Providers     Provider Role Specialty Phone number    Diana Donovan PA-C Responsible Physician Assistant [phone]            See the Encounter Report to view Anticoagulation Flowsheet and Dosing Calendar (Go to Encounters tab in chart review, and find the Anticoagulation Therapy Visit)    Spoke with Amira.  She reports no changes in health, diet, medications.  She will continue to alternate 5 mg and 2.5 mg of warfarin.    Chanelle Montana RN

## 2018-10-23 NOTE — NURSING NOTE
Chief Complaint   Patient presents with     Port Draw     Left port accessed with a gripper needle, labs drawn and sent, flushed with saline and heparin, deaccessed, no further appointments, no vitals needed.   Velma Peña,RN

## 2018-10-29 ENCOUNTER — ANTICOAGULATION THERAPY VISIT (OUTPATIENT)
Dept: ANTICOAGULATION | Facility: CLINIC | Age: 28
End: 2018-10-29

## 2018-10-29 DIAGNOSIS — C81.10 NODULAR SCLEROSING HODGKIN'S LYMPHOMA, UNSPECIFIED BODY REGION (H): ICD-10-CM

## 2018-10-29 DIAGNOSIS — I82.409 DVT (DEEP VENOUS THROMBOSIS) (H): ICD-10-CM

## 2018-10-29 DIAGNOSIS — I82.409 DEEP VEIN THROMBOSIS (DVT) (H): ICD-10-CM

## 2018-10-29 DIAGNOSIS — I82.409 DVT (DEEP VENOUS THROMBOSIS) (H): Primary | ICD-10-CM

## 2018-10-29 LAB
ALBUMIN SERPL-MCNC: 4 G/DL (ref 3.4–5)
ALP SERPL-CCNC: 61 U/L (ref 40–150)
ALT SERPL W P-5'-P-CCNC: 32 U/L (ref 0–50)
ANION GAP SERPL CALCULATED.3IONS-SCNC: 6 MMOL/L (ref 3–14)
AST SERPL W P-5'-P-CCNC: 20 U/L (ref 0–45)
BASOPHILS # BLD AUTO: 0 10E9/L (ref 0–0.2)
BASOPHILS NFR BLD AUTO: 0.5 %
BILIRUB SERPL-MCNC: 0.3 MG/DL (ref 0.2–1.3)
BUN SERPL-MCNC: 10 MG/DL (ref 7–30)
CALCIUM SERPL-MCNC: 8.3 MG/DL (ref 8.5–10.1)
CHLORIDE SERPL-SCNC: 109 MMOL/L (ref 94–109)
CO2 SERPL-SCNC: 24 MMOL/L (ref 20–32)
CREAT SERPL-MCNC: 0.85 MG/DL (ref 0.52–1.04)
DIFFERENTIAL METHOD BLD: ABNORMAL
EOSINOPHIL # BLD AUTO: 0.1 10E9/L (ref 0–0.7)
EOSINOPHIL NFR BLD AUTO: 2.6 %
ERYTHROCYTE [DISTWIDTH] IN BLOOD BY AUTOMATED COUNT: 13.1 % (ref 10–15)
GFR SERPL CREATININE-BSD FRML MDRD: 80 ML/MIN/1.7M2
GLUCOSE SERPL-MCNC: 81 MG/DL (ref 70–99)
HCT VFR BLD AUTO: 35.4 % (ref 35–47)
HGB BLD-MCNC: 11.4 G/DL (ref 11.7–15.7)
IMM GRANULOCYTES # BLD: 0 10E9/L (ref 0–0.4)
IMM GRANULOCYTES NFR BLD: 0 %
INR PPP: 2.29 (ref 0.86–1.14)
LYMPHOCYTES # BLD AUTO: 2.3 10E9/L (ref 0.8–5.3)
LYMPHOCYTES NFR BLD AUTO: 55.3 %
MCH RBC QN AUTO: 31.8 PG (ref 26.5–33)
MCHC RBC AUTO-ENTMCNC: 32.2 G/DL (ref 31.5–36.5)
MCV RBC AUTO: 99 FL (ref 78–100)
MONOCYTES # BLD AUTO: 0.3 10E9/L (ref 0–1.3)
MONOCYTES NFR BLD AUTO: 6.7 %
NEUTROPHILS # BLD AUTO: 1.5 10E9/L (ref 1.6–8.3)
NEUTROPHILS NFR BLD AUTO: 34.9 %
NRBC # BLD AUTO: 0 10*3/UL
NRBC BLD AUTO-RTO: 0 /100
PLATELET # BLD AUTO: 205 10E9/L (ref 150–450)
POTASSIUM SERPL-SCNC: 4.2 MMOL/L (ref 3.4–5.3)
PROT SERPL-MCNC: 6.8 G/DL (ref 6.8–8.8)
RBC # BLD AUTO: 3.59 10E12/L (ref 3.8–5.2)
SODIUM SERPL-SCNC: 140 MMOL/L (ref 133–144)
WBC # BLD AUTO: 4.2 10E9/L (ref 4–11)

## 2018-10-29 NOTE — PROGRESS NOTES
ANTICOAGULATION FOLLOW-UP CLINIC VISIT    Patient Name:  Amira Avery  Date:  10/29/2018  Contact Type:  Telephone    SUBJECTIVE:     Patient Findings     Comments Patient already has an appointment on 11/1 with labs so will check an INR then.  Writer was going to recommend 5mg TThSa and 2.5mg all other days.           OBJECTIVE    INR   Date Value Ref Range Status   10/29/2018 2.29 (H) 0.86 - 1.14 Final       ASSESSMENT / PLAN  No question data found.  Anticoagulation Summary as of 10/29/2018     INR goal 2.0-3.0   Today's INR 2.29   Warfarin maintenance plan No maintenance plan   Full warfarin instructions 10/29: 2.5 mg; 10/30: 5 mg; 10/31: 2.5 mg; Otherwise No maintenance plan   Next INR check 11/1/2018   Priority INR   Target end date 12/10/2018    Indications   Deep vein thrombosis (DVT) (H) [I82.409] [I82.409]         Anticoagulation Episode Summary     INR check location     Preferred lab     Send INR reminders to [facility]    Comments pt has a port r/t oncology dx -  has frequent lab draws HIPPA OK to leave messages, may speak with Anatoliy, Mom, Dad+3 months of coumadin therapy - last dose on 12/10/18+      Anticoagulation Care Providers     Provider Role Specialty Phone number    Diana Donovan PA-C Responsible Physician Assistant [phone]            See the Encounter Report to view Anticoagulation Flowsheet and Dosing Calendar (Go to Encounters tab in chart review, and find the Anticoagulation Therapy Visit)    Spoke with Amira.     Sidra Wagner RN

## 2018-10-29 NOTE — MR AVS SNAPSHOT
Amira Avery   10/29/2018   Anticoagulation Therapy Visit    Description:  28 year old female   Provider:  Sidra Wagner, RN   Department:  [facility]           INR as of 10/29/2018     Today's INR 2.29      Anticoagulation Summary as of 10/29/2018     INR goal 2.0-3.0   Today's INR 2.29   Full warfarin instructions 10/29: 2.5 mg; 10/30: 5 mg; 10/31: 2.5 mg; Otherwise No maintenance plan   Next INR check 11/1/2018    Indications   Deep vein thrombosis (DVT) (H) [I82.409] [I82.409]         October 2018 Details    Sun Mon Tue Wed Thu Fri Sat      1               2               3               4               5               6                 7               8               9               10               11               12               13                 14               15               16               17               18               19               20                 21               22               23               24               25               26               27                 28               29      2.5 mg   See details      30      5 mg         31      2.5 mg             Date Details   10/29 This INR check               How to take your warfarin dose     To take:  2.5 mg Take 0.5 of a 5 mg tablet.    To take:  5 mg Take 1 of the 5 mg tablets.           November 2018 Details    Sun Mon Tue Wed Thu Fri Sat         1            2               3                 4               5               6               7               8               9               10                 11               12               13               14               15               16               17                 18               19               20               21               22               23               24                 25               26               27               28               29               30                 Date Details   No additional details    Date of next INR:   11/1/2018

## 2018-11-01 ENCOUNTER — ONCOLOGY VISIT (OUTPATIENT)
Dept: TRANSPLANT | Facility: CLINIC | Age: 28
End: 2018-11-01
Attending: PHYSICIAN ASSISTANT
Payer: COMMERCIAL

## 2018-11-01 ENCOUNTER — ANTICOAGULATION THERAPY VISIT (OUTPATIENT)
Dept: ANTICOAGULATION | Facility: CLINIC | Age: 28
End: 2018-11-01

## 2018-11-01 ENCOUNTER — APPOINTMENT (OUTPATIENT)
Dept: LAB | Facility: CLINIC | Age: 28
End: 2018-11-01
Attending: PHYSICIAN ASSISTANT
Payer: COMMERCIAL

## 2018-11-01 VITALS
HEART RATE: 68 BPM | DIASTOLIC BLOOD PRESSURE: 70 MMHG | BODY MASS INDEX: 27.49 KG/M2 | TEMPERATURE: 98.2 F | WEIGHT: 156.1 LBS | OXYGEN SATURATION: 99 % | SYSTOLIC BLOOD PRESSURE: 115 MMHG | RESPIRATION RATE: 16 BRPM

## 2018-11-01 DIAGNOSIS — C81.95 HODGKIN LYMPHOMA, UNSPECIFIED, LYMPH NODES OF INGUINAL REGION AND LOWER LIMB (H): Primary | ICD-10-CM

## 2018-11-01 DIAGNOSIS — I82.409 DEEP VEIN THROMBOSIS (DVT) (H): ICD-10-CM

## 2018-11-01 LAB
ALBUMIN SERPL-MCNC: 3.9 G/DL (ref 3.4–5)
ALP SERPL-CCNC: 57 U/L (ref 40–150)
ALT SERPL W P-5'-P-CCNC: 30 U/L (ref 0–50)
ANION GAP SERPL CALCULATED.3IONS-SCNC: 9 MMOL/L (ref 3–14)
AST SERPL W P-5'-P-CCNC: 20 U/L (ref 0–45)
BASOPHILS # BLD AUTO: 0 10E9/L (ref 0–0.2)
BASOPHILS NFR BLD AUTO: 0.7 %
BILIRUB SERPL-MCNC: 0.2 MG/DL (ref 0.2–1.3)
BUN SERPL-MCNC: 11 MG/DL (ref 7–30)
CALCIUM SERPL-MCNC: 8 MG/DL (ref 8.5–10.1)
CHLORIDE SERPL-SCNC: 109 MMOL/L (ref 94–109)
CO2 SERPL-SCNC: 22 MMOL/L (ref 20–32)
CREAT SERPL-MCNC: 0.84 MG/DL (ref 0.52–1.04)
DIFFERENTIAL METHOD BLD: ABNORMAL
EOSINOPHIL # BLD AUTO: 0.2 10E9/L (ref 0–0.7)
EOSINOPHIL NFR BLD AUTO: 3.8 %
ERYTHROCYTE [DISTWIDTH] IN BLOOD BY AUTOMATED COUNT: 12.8 % (ref 10–15)
GFR SERPL CREATININE-BSD FRML MDRD: 81 ML/MIN/1.7M2
GLUCOSE SERPL-MCNC: 102 MG/DL (ref 70–99)
HCT VFR BLD AUTO: 34.9 % (ref 35–47)
HGB BLD-MCNC: 11.5 G/DL (ref 11.7–15.7)
IMM GRANULOCYTES # BLD: 0 10E9/L (ref 0–0.4)
IMM GRANULOCYTES NFR BLD: 0 %
INR PPP: 2.37 (ref 0.86–1.14)
LYMPHOCYTES # BLD AUTO: 1.9 10E9/L (ref 0.8–5.3)
LYMPHOCYTES NFR BLD AUTO: 46 %
MCH RBC QN AUTO: 32.4 PG (ref 26.5–33)
MCHC RBC AUTO-ENTMCNC: 33 G/DL (ref 31.5–36.5)
MCV RBC AUTO: 98 FL (ref 78–100)
MONOCYTES # BLD AUTO: 0.3 10E9/L (ref 0–1.3)
MONOCYTES NFR BLD AUTO: 6.6 %
NEUTROPHILS # BLD AUTO: 1.8 10E9/L (ref 1.6–8.3)
NEUTROPHILS NFR BLD AUTO: 42.9 %
NRBC # BLD AUTO: 0 10*3/UL
NRBC BLD AUTO-RTO: 0 /100
PLATELET # BLD AUTO: 244 10E9/L (ref 150–450)
POTASSIUM SERPL-SCNC: 4.3 MMOL/L (ref 3.4–5.3)
PROT SERPL-MCNC: 6.8 G/DL (ref 6.8–8.8)
RBC # BLD AUTO: 3.55 10E12/L (ref 3.8–5.2)
SODIUM SERPL-SCNC: 140 MMOL/L (ref 133–144)
WBC # BLD AUTO: 4.2 10E9/L (ref 4–11)

## 2018-11-01 PROCEDURE — 25000128 H RX IP 250 OP 636: Mod: ZF | Performed by: PHYSICIAN ASSISTANT

## 2018-11-01 PROCEDURE — 85025 COMPLETE CBC W/AUTO DIFF WBC: CPT | Performed by: PHYSICIAN ASSISTANT

## 2018-11-01 PROCEDURE — 85610 PROTHROMBIN TIME: CPT | Performed by: PHYSICIAN ASSISTANT

## 2018-11-01 PROCEDURE — G0463 HOSPITAL OUTPT CLINIC VISIT: HCPCS | Mod: ZF

## 2018-11-01 PROCEDURE — 80053 COMPREHEN METABOLIC PANEL: CPT | Performed by: PHYSICIAN ASSISTANT

## 2018-11-01 PROCEDURE — 36591 DRAW BLOOD OFF VENOUS DEVICE: CPT

## 2018-11-01 RX ORDER — HEPARIN SODIUM (PORCINE) LOCK FLUSH IV SOLN 100 UNIT/ML 100 UNIT/ML
5 SOLUTION INTRAVENOUS ONCE
Status: COMPLETED | OUTPATIENT
Start: 2018-11-01 | End: 2018-11-01

## 2018-11-01 RX ADMIN — HEPARIN 5 ML: 100 SYRINGE at 14:28

## 2018-11-01 ASSESSMENT — PAIN SCALES - GENERAL: PAINLEVEL: NO PAIN (0)

## 2018-11-01 NOTE — MR AVS SNAPSHOT
After Visit Summary   11/1/2018    Amira Avery    MRN: [id number]           Patient Information     Date Of Birth          1990        Visit Information        Provider Department      11/1/2018 3:00 PM  BMT WES #2 [facility]        Today's Diagnoses     Hodgkin lymphoma, unspecified, lymph nodes of inguinal region and lower limb (H)    -  1          [facility] ([facility]  [address] [place] [address]  Phone: [phone]  Clinic Hours:   Monday-Thursday:7am to 7pm   Friday: 7am to 5pm   Weekends and holidays:    8am to noon (in general)  If your fever is 100.5  or greater,   call the clinic.  After hours call the   hospital at [phone] or   [phone]. Ask for the BMT   fellow on-call            Follow-ups after your visit        Your next 10 appointments already scheduled     Nov 14, 2018  8:20 AM CST   CT CHEST/ABDOMEN/PELVIS W CONTRAST with UUCT1   [facility], [place], [place] ([facility])    [address] [place] [address] [phone]           How do I prepare for my exam? (Food and drink instructions) To prepare: Do not eat or drink for 2 hours before your exam. If you need to take medicine, you may take it with small sips of water. (We may ask you to take liquid medicine as well.)  How do I prepare for my exam? (Other instructions) Please arrive 30 minutes early for your CT.  Once in the department you might be asked to drink water 15-20 minutes prior to your exam.  If indicated you may be asked to drink an oral contrast in advance of your CT.  If this is the case, the imaging team will let you know or be in contact with you prior to your appointment  Patients over 70 or patients with diabetes or kidney problems: If you haven t had a blood test (creatinine test) within the last 30 days, the Cardiologist/Radiologist may require you to get this test prior to your  exam.  If you have diabetes:  Continue to take your metformin medication on the day of your exam  What should I wear: Please wear loose clothing, such as a sweat suit or jogging clothes. Avoid snaps, zippers and other metal. We may ask you to undress and put on a hospital gown.  How long does the exam take: Most scans take less than 20 minutes.  What should I bring: Please bring any scans or X-rays taken at other hospitals, if similar tests were done. Also bring a list of your medicines, including vitamins, minerals and over-the-counter drugs. It is safest to leave personal items at home.  Do I need a : No  is needed.  What do I need to tell my doctor? Be sure to tell your doctor: * If you have any allergies. * If there s any chance you are pregnant. * If you are breastfeeding.  What should I do after the exam: No restrictions, You may resume normal activities.  What is this test: A CT (computed tomography) scan is a series of pictures that allows us to look inside your body. The scanner creates images of the body in cross sections, much like slices of bread. This helps us see any problems more clearly. You may receive contrast (X-ray dye) before or during your scan. You will be asked to drink the contrast.  Who should I call with questions: If you have any questions, please call the Imaging Department where you will have your exam. Directions, parking instructions, and other information is available on our website, "[email or web address].            Nov 14, 2018  8:40 AM CST   CT SOFT TISSUE NECK W CONTRAST with UUCT1   [facility], [place], [place] ([facility])    [address] [address] [phone]           How do I prepare for my exam? (Food and drink instructions) **You will have contrast for this exam.** Do not eat or drink for 2 hours before your exam. If you need to take medicine, you may take it with small sips of water. (We may ask you  to take liquid medicine as well.)  The day before your exam, drink extra fluids at least six 8-ounce glasses (unless your doctor tells you to restrict your fluids).  How do I prepare for my exam? (Other instructions) Patients over 70 or patients with diabetes or kidney problems: If you haven t had a blood test (creatinine test) within the last 30 days, the Cardiologist/Radiologist may require you to get this test prior to your exam.  What should I wear: Please wear loose clothing, such as a sweat suit or jogging clothes.  Avoid snaps, zippers and other metal. We may ask you to undress and put on a hospital gown.  How long does the exam take: Most scans take less than 20 minutes.  What should I bring: Please bring any scans or X-rays taken at other hospitals, if similar tests were done. Also bring a list of your medicines, including vitamins, minerals and over-the-counter drugs. It is safest to leave personal items at home.  Do I need a :  No  is needed.  What do I need to tell my doctor? Be sure to tell your doctor: * If you have any allergies. * If there s any chance you are pregnant. * If you are breastfeeding. * If you have diabetes as your medication may need to be adjusted for this exam.  What should I do after the exam: No restrictions, You may resume normal activities.  What is this test: A CT (computed tomography) scan is a series of pictures that allows us to look inside your body. The scanner creates images of the body in cross sections, much like slices of bread. This helps us see any problems more clearly. You may receive contrast (X-ray dye) before or during your scan. Contrast is given through an IV (small needle in your arm).  Who should I call with questions: If you have any questions, please call the Imaging Department where you will have your exam. Directions, parking instructions, and other information is available on our website, [email or web address].            Nov 14, 2018  9:00 AM  CST   PET ONCOLOGY WHOLE BODY with UUPET1   [facility], [place] CT ([facility])    [address] [address] [phone]           How do I prepare for my exam? (Food and drink instructions) Patients should eat a low carb, high protein meal 7 hours (or the last meal you eat) before the scan. Low carb, high protein meals consist of lean meats, seafood, beans, soy, low-fat dairy, eggs, nuts & seeds.  6 hours before your scan: Stop all food and liquids (except water). Do not chew gum or suck on mints. If you need to take medicine with food, you may take it with a few crackers.  How do I prepare for my exam? (Other instructions) If you have diabetes: Have your exam early in the morning. Your blood glucose will go up as the day goes by. Your glucose level must be 180 or less at the start of the exam. Please take any oral diabetic medication you need to ensure this blood glucose level is below 180, but no insulin 4 hours prior to the exam.  * If you are taking insulin in the morning take with breakfast by 6 am and schedule procedure between 12 and 2:15 pm. * If you are taking insulin at night take nightly dose, fast overnight, schedule procedure before 10 am. * If you take insulin both morning and night take morning dose by 6am and schedule procedure between 12 and 2:15 pm.  24 hours before your scan: Don t do any heavy exercise. (No jogging, aerobics or other workouts.) Exercise will make your pictures less accurate.  What should I wear? Please wear comfortable clothes.  How long does the exam take?  Most scans will take between 2-3 hours.  What should I bring: Please bring a list of your medicines (including vitamins, minerals and over-the-counter drugs). Leave your valuables at home.  Do I need a :  No  is needed.  What do I need to tell my doctor? Tell your doctor: * If there is any chance you may be pregnant or if you are breastfeeding.  * If you have problems lying in small spaces (claustrophobia). If you do, your doctor may give you medicine to help you relax.  What should I do after the exam: No restrictions, You may resume normal activities.  What is this test: Your doctor has ordered a PET scan (positron emission tomography). This will take pictures of the cells and organs in your body. Your doctor may have also ordered a CT scan (computed tomography). This is another way to take pictures of your cells and organs. It is done at the same time as the PET scan. The pictures will help us understand your problem so we can make a treatment plan that fits your needs.  Who should I call with questions: Please call your Imaging Department at your exam site with any questions. Directions, parking instructions, and other information is available on our website, [email or web address].            Nov 14, 2018 12:30 PM CST   (Arrive by 12:15 PM)   Survivorship Visit with  BMT WES #4   [facility] ([facility])    [address] [place] [address]  [address]  [facility] [address]   [phone]            Nov 14, 2018  1:30 PM CST   [facility] Lab Draw with  MASONIC LAB DRAW   [facility] Lab Draw ([facility])    [address]  [address]  [facility] [address]   [phone]            Nov 14, 2018  2:00 PM CST   Bone Marrow Biopsy with  BMT WES #4, UU BONE MARROW BIOPSY   [facility] ([facility])    [address]  [address]  [facility] [address]   [phone]            Nov 19, 2018  4:15 PM CST   BMT Anniversary Visit with César Deluca MD   [facility] ([facility])    [address]  [address]  [facility] [address]   [phone]              Future tests that were ordered for you today     Open Future Orders        Priority Expected Expires Ordered     Comprehensive metabolic panel Routine 11/14/2018 11/28/2018 11/1/2018    CBC with platelets differential Routine 11/14/2018 11/28/2018 11/1/2018    INR Routine 11/14/2018 11/28/2018 11/1/2018            Who to contact     If you have questions or need follow up information about today's clinic visit or your schedule please contact [facility] directly at [phone].  Normal or non-critical lab and imaging results will be communicated to you by [employer], letter or phone within 4 business days after the clinic has received the results. If you do not hear from us within 7 days, please contact the clinic through [employer] or phone. If you have a critical or abnormal lab result, we will notify you by phone as soon as possible.  Submit refill requests through [employer] or call your pharmacy and they will forward the refill request to us. Please allow 3 business days for your refill to be completed.          Additional Information About Your Visit        [employer] Information     [employer] gives you secure access to your electronic health record. If you see a primary care provider, you can also send messages to your care team and make appointments. If you have questions, please call your primary care clinic.  If you do not have a primary care provider, please call [phone] and they will assist you.        Care EveryWhere ID     This is your Care EveryWhere ID. This could be used by other organizations to access your [facility] medical records  [id number]        Your Vitals Were     Pulse Temperature Respirations Pulse Oximetry BMI (Body Mass Index)       68 98.2  F (36.8  C) (Oral) 16 99% 27.49 kg/m2        Blood Pressure from Last 3 Encounters:   11/01/18 115/70   10/19/18 108/62   10/04/18 108/69    Weight from Last 3 Encounters:   11/01/18 70.8 kg (156 lb 1.6 oz)   10/19/18 69.6 kg (153 lb 8 oz)   10/04/18 70.5 kg (155 lb 6.4 oz)              We Performed the Following     CBC with platelets  differential     Comprehensive metabolic panel     INR        Recent Review Flowsheet Data     BMT Recent Results Latest Ref Rng & Units 9/20/2018 9/24/2018 10/4/2018 10/19/2018 10/23/2018 10/29/2018 11/1/2018    WBC 4.0 - 11.0 10e9/L 4.9 - 4.2 4.5 - 4.2 4.2    Hemoglobin 11.7 - 15.7 g/dL 12.1 - 10.5(L) 11.4(L) - 11.4(L) 11.5(L)    Platelet Count 150 - 450 10e9/L 228 - 184 211 - 205 244    Neutrophils (Absolute) 1.6 - 8.3 10e9/L 3.6 - 2.7 1.2(L) - 1.5(L) 1.8    Blasts (Absolute) 0 10e9/L - - - - - - -    INR 0.86 - 1.14 2.56(H) 3.48(H) 1.55(H) 3.74(H) 2.54(H) 2.29(H) 2.37(H)    Sodium 133 - 144 mmol/L - - 138 137 - 140 140    Potassium 3.4 - 5.3 mmol/L - - 4.1 3.7 - 4.2 4.3    Chloride 94 - 109 mmol/L - - 108 107 - 109 109    Glucose 70 - 99 mg/dL - - 92 92 - 81 102(H)    Urea Nitrogen 7 - 30 mg/dL - - 14 10 - 10 11    Creatinine 0.52 - 1.04 mg/dL - - 0.69 0.73 - 0.85 0.84    Calcium (Total) 8.5 - 10.1 mg/dL - - 8.6 8.4(L) - 8.3(L) 8.0(L)    Protein (Total) 6.8 - 8.8 g/dL - - - 6.7(L) - 6.8 6.8    Albumin 3.4 - 5.0 g/dL - - - 4.0 - 4.0 3.9    Bilirubin (Direct) 0.0 - 0.2 mg/dL - - - - - - -    Alkaline Phosphatase 40 - 150 U/L - - - 63 - 61 57    AST 0 - 45 U/L - - - 34 - 20 20    ALT 0 - 50 U/L - - - 48 - 32 30    MCV 78 - 100 fl 95 - 95 96 - 99 98               Primary Care Provider Office Phone # Fax #    [facility] [phone] [address] [address]        Equal Access to Services     RICHIE RAMEY : Hadii aad ku hadasho Soomaali, waaxda luqadaha, qaybta kaalmada adeegyada, waxay idiin hayaan adeyudi kharacolumba de la cruz . So [facility] [phone].    ATENCIÓN: Si habla español, tiene a isabel disposición servicios gratuitos de asistencia lingüística. Roberto al [phone].    We comply with applicable federal civil rights laws and [place] laws. We do not discriminate on the basis of race, color, national origin, age, disability, sex, sexual orientation, or  gender identity.            Thank you!     Thank you for choosing [facility]  for your care. Our goal is always to provide you with excellent care. Hearing back from our patients is one way we can continue to improve our services. Please take a few minutes to complete the written survey that you may receive in the mail after your visit with us. Thank you!             Your Updated Medication List - Protect others around you: Learn how to safely use, store and throw away your medicines at www.disposemymeds.org.          This list is accurate as of 11/1/18  4:43 PM.  Always use your most recent med list.                   Brand Name Dispense Instructions for use Diagnosis    ACETAMINOPHEN PO      Take 650 mg by mouth every 4 hours as needed for pain    S/P autologous bone marrow transplantation (H), Hodgkin lymphoma, unspecified, lymph nodes of inguinal region and lower limb (H)       acyclovir 400 MG tablet    ZOVIRAX    60 tablet    Take 1 tablet (400 mg) by mouth 2 times daily    Hodgkin lymphoma, unspecified, lymph nodes of inguinal region and lower limb (H)       estradiol 10 MCG Tabs vaginal tablet    VAGIFEM    20 tablet    Place 1 tablet (10 mcg) vaginally twice a week Nightly for 1-2 weeks then twice weekly    Vaginal dryness       fluconazole 100 MG tablet    DIFLUCAN    30 tablet    Take 1 tablet (100 mg) by mouth daily    Hodgkin lymphoma, unspecified, lymph nodes of inguinal region and lower limb (H)       PARoxetine 10 MG tablet    PAXIL    30 tablet    Take 1 tablet (10 mg) by mouth At Bedtime    Menopausal syndrome (hot flashes)       warfarin 5 MG tablet    COUMADIN    30 tablet    Take 1 tablet (5 mg) by mouth daily

## 2018-11-01 NOTE — PROGRESS NOTES
BMT Progress Note      Patient ID:  Amira Avery is a 28 year old women who is day + 87 s/p Auto PBST for Hodgkins Lymphoma.       Diagnosis HDN Hodgkin's Disease - NOS  HCT Type Autologous    Prep Regimen BCNU  Lisa-C  Etoposide  Melphalan   Donor Source Autologous    GVHD Prophylaxis No  Primary BMT Provider Dr. Landa      INTERVAL  HISTORY      Amira was seen for a follow up visit today.  She got her period a couple of days ago. She thought she was in menopause.She is going to stop her paxil. She feels  good. She says no n,v,d.  No rash.  No cough or sob.    Vaginal dryness is better with the vagifem.  Review of Systems: 10 point ROS negative except as noted above.     PHYSICAL EXAM   Blood pressure 115/70, pulse 68, temperature 98.2  F (36.8  C), temperature source Oral, resp. rate 16, weight 70.8 kg (156 lb 1.6 oz), SpO2 99 %, not currently breastfeeding.    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  - Transplanted on 8/6/2018 s/p BEAM prep.  - 9/5 Restaging showed CR but SVC clot.     2.  HEME: Keep Hgb>8 and plts>10K.   - No pre-meds. No transfusions.  - 9/5  SVC clot on day +28 restage imaging. Started coumadin. Referral placed to coumadin clinic 9/10/18. Now off lovenox.  Inr elevated likely from the bactrim restarted last visit.  Called coumadin clinic and they gave her instructions on what to do.  Told them we are stopping the bactrim today.  - Platelet intercept study   -ANC=1.2, second time has dropped with starting bactrim.  So  stopped bactrim again on  10/19.     3.  ID: afebrile.  - Cont Fluc,  and HD ACV (CMV+, HSV+, EBV+) prophy. She is going to run out of acyclovir and fluconazole her in the next 7-10 days- ok to stop them both at that time.  No history of shingles(VZV)       - ANC down  with starting bactrim on 2 different occasions. DC'd Bactrim w/improvement. Pentamidine last 10/19.   Will continue with pentamidine for PJP, as atovaquone can cause neutropenia as well. Amira is aware she will come in monthly for pentamidine for 1 year.    -flu shot given 10/4/2018            4.  GI:    - Protonix for GI prophy.  - transaminases slt elevated but resolved x2 visits  5.  FEN/Renal:    - Lytes and Cr stable; intake is good.     6.  Gyn:   She got her period here several days ago. Consulted obgyn resident today, 11/1, should we be concerned about abnormal vaginal bleeding in light of we thought she was in ovarian failure- she said no need for immediate referral back to obgyn. It can happen that as overall health improves she could start having her period again.  If the bleeding does not stop then she should go back to obgyn but if bleeding lasts similar to her period then she should follow up with obgyn in 6 months ( Last menstrual period prior to transplant  7/2018).   -Ok to stop paxil if she wants, at such a low dose per pharmD no need to taper.   - Referred to gyn 9/12, FSH/LH indicate ovarian failure/menopause. Started paxil for sx management, HRT contraindicated with active DVT. She is using local vagifem for dryness    RTC:   - 11/14 survivorship visit, labs, recheck INR,and recheck counts   - Coumadin clinic will call pt  Return 11/19/2018 to see Dr. Deluca and to get pentamidine  Refer back to OBgyn clinic in 6 months sooner if vaginal bleeding does not stop  BOBY Menjivar-C  [phone]

## 2018-11-01 NOTE — NURSING NOTE
"Chief Complaint   Patient presents with     Port Draw     Labs drawn from port by RN. Line flushed with saline and heparin. Vs taken and pt checked in for appt.     Port accessed with 20g 3/4\" gripper needle by RN, labs collected, line flushed with saline and heparin.  Vitals taken. Pt checked in for appointment(s).    Nia Gomez RN  "

## 2018-11-01 NOTE — NURSING NOTE
"Oncology Rooming Note    November 1, 2018 2:56 PM   Amira Avery is a 28 year old female who presents for:    Chief Complaint   Patient presents with     Port Draw     Labs drawn from port by RN. Line flushed with saline and heparin. Vs taken and pt checked in for appt.     RECHECK     RTN- Hodgkins Lymphoma      Initial Vitals: /70 (BP Location: Right arm, Cuff Size: Adult Regular)  Pulse 68  Temp 98.2  F (36.8  C) (Oral)  Resp 16  Wt 70.8 kg (156 lb 1.6 oz)  SpO2 99%  BMI 27.49 kg/m2 Estimated body mass index is 27.49 kg/(m^2) as calculated from the following:    Height as of 8/24/18: 1.605 m (5' 3.19\").    Weight as of this encounter: 70.8 kg (156 lb 1.6 oz). Body surface area is 1.78 meters squared.  No Pain (0) Comment: Data Unavailable   No LMP recorded.  Allergies reviewed: Yes  Medications reviewed: Yes    Medications: Medication refills not needed today.  Pharmacy name entered into [employer]: [facility] [address] [place] [address] [address] AT [facility] [address]    Clinical concerns: None     8 minutes for nursing intake (face to face time)     Serenity Faulkner CMA              "

## 2018-11-01 NOTE — MR AVS SNAPSHOT
Amira Avery   11/1/2018   Anticoagulation Therapy Visit    Description:  28 year old female   Provider:  Sidra Wagner, RN   Department:  [facility]           INR as of 11/1/2018     Today's INR 2.37      Anticoagulation Summary as of 11/1/2018     INR goal 2.0-3.0   Today's INR 2.37   Full warfarin instructions 5 mg on Tue, Thu, Sat; 2.5 mg all other days   Next INR check 11/14/2018    Indications   Deep vein thrombosis (DVT) (H) [I82.409] [I82.409]         November 2018 Details    Sun Mon Tue Wed Thu Fri Sat         1      5 mg   See details      2      2.5 mg         3      5 mg           4      2.5 mg         5      2.5 mg         6      5 mg         7      2.5 mg         8      5 mg         9      2.5 mg         10      5 mg           11      2.5 mg         12      2.5 mg         13      5 mg         14            15               16               17                 18               19               20               21               22               23               24                 25               26               27               28               29               30                 Date Details   11/01 This INR check       Date of next INR:  11/14/2018         How to take your warfarin dose     To take:  2.5 mg Take 0.5 of a 5 mg tablet.    To take:  5 mg Take 1 of the 5 mg tablets.

## 2018-11-01 NOTE — PROGRESS NOTES
ANTICOAGULATION FOLLOW-UP CLINIC VISIT    Patient Name:  Amira Avery  Date:  11/1/2018  Contact Type:  Telephone    SUBJECTIVE:     Patient Findings     Positives No Problem Findings           OBJECTIVE    INR   Date Value Ref Range Status   11/01/2018 2.37 (H) 0.86 - 1.14 Final       ASSESSMENT / PLAN  No question data found.  Anticoagulation Summary as of 11/1/2018     INR goal 2.0-3.0   Today's INR 2.37   Warfarin maintenance plan 5 mg (5 mg x 1) on Tue, Thu, Sat; 2.5 mg (5 mg x 0.5) all other days   Full warfarin instructions 5 mg on Tue, Thu, Sat; 2.5 mg all other days   Weekly warfarin total 25 mg   Plan last modified Sidra Wagner RN (11/1/2018)   Next INR check 11/14/2018   Priority INR   Target end date 12/10/2018    Indications   Deep vein thrombosis (DVT) (H) [I82.409] [I82.409]         Anticoagulation Episode Summary     INR check location     Preferred lab     Send INR reminders to [facility]    Comments pt has a port r/t oncology dx -  has frequent lab draws HIPPA OK to leave messages, may speak with Yun Cope, Dad+3 months of coumadin therapy - last dose on 12/10/18+      Anticoagulation Care Providers     Provider Role Specialty Phone number    Diana Donovan PA-C Responsible Physician Assistant [phone]            See the Encounter Report to view Anticoagulation Flowsheet and Dosing Calendar (Go to Encounters tab in chart review, and find the Anticoagulation Therapy Visit)    Spoke with Aimra.     Sidra Wagner, MERCEDEZ

## 2018-11-08 ENCOUNTER — TELEPHONE (OUTPATIENT)
Dept: TRANSPLANT | Facility: CLINIC | Age: 28
End: 2018-11-08

## 2018-11-08 NOTE — TELEPHONE ENCOUNTER
Called Amira today to follow up with her about her vaginal bleeding and.  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 ( abnormal vaginal bleeding) Amira says her vaginal bleeding lasted for about 3 days and stopped. Asked Amira to make an appointment with OB/gyn in about 6 months. She said she will do that.   Vee is aware if she is sexually active that she needs to some birth control if she does not want to get pregnant.    Jovita Borges PA-C

## 2018-11-14 ENCOUNTER — HOSPITAL ENCOUNTER (OUTPATIENT)
Dept: PET IMAGING | Facility: CLINIC | Age: 28
Discharge: HOME | End: 2018-11-14
Attending: NURSE PRACTITIONER | Admitting: NURSE PRACTITIONER
Payer: COMMERCIAL

## 2018-11-14 ENCOUNTER — HOSPITAL ENCOUNTER (OUTPATIENT)
Dept: PET IMAGING | Facility: CLINIC | Age: 28
End: 2018-11-14
Attending: NURSE PRACTITIONER
Payer: COMMERCIAL

## 2018-11-14 ENCOUNTER — OFFICE VISIT (OUTPATIENT)
Dept: TRANSPLANT | Facility: CLINIC | Age: 28
End: 2018-11-14
Attending: STUDENT IN AN ORGANIZED HEALTH CARE EDUCATION/TRAINING PROGRAM
Payer: COMMERCIAL

## 2018-11-14 ENCOUNTER — APPOINTMENT (OUTPATIENT)
Dept: LAB | Facility: CLINIC | Age: 28
End: 2018-11-14
Attending: STUDENT IN AN ORGANIZED HEALTH CARE EDUCATION/TRAINING PROGRAM
Payer: COMMERCIAL

## 2018-11-14 ENCOUNTER — ANTICOAGULATION THERAPY VISIT (OUTPATIENT)
Dept: ANTICOAGULATION | Facility: CLINIC | Age: 28
End: 2018-11-14

## 2018-11-14 VITALS
WEIGHT: 156.3 LBS | RESPIRATION RATE: 16 BRPM | HEART RATE: 82 BPM | SYSTOLIC BLOOD PRESSURE: 117 MMHG | OXYGEN SATURATION: 100 % | TEMPERATURE: 97 F | DIASTOLIC BLOOD PRESSURE: 72 MMHG | BODY MASS INDEX: 27.52 KG/M2

## 2018-11-14 DIAGNOSIS — C81.10 NODULAR SCLEROSING HODGKIN'S LYMPHOMA, UNSPECIFIED BODY REGION (H): Primary | ICD-10-CM

## 2018-11-14 DIAGNOSIS — C81.95 HODGKIN LYMPHOMA, UNSPECIFIED, LYMPH NODES OF INGUINAL REGION AND LOWER LIMB (H): ICD-10-CM

## 2018-11-14 DIAGNOSIS — I82.409 DEEP VEIN THROMBOSIS (DVT) (H): ICD-10-CM

## 2018-11-14 DIAGNOSIS — Z53.9 ERRONEOUS ENCOUNTER--DISREGARD: ICD-10-CM

## 2018-11-14 DIAGNOSIS — C81.10 HODGKIN LYMPHOMA, NODULAR SCLEROSIS (H): Primary | ICD-10-CM

## 2018-11-14 DIAGNOSIS — C81.95 HODGKIN LYMPHOMA, UNSPECIFIED, LYMPH NODES OF INGUINAL REGION AND LOWER LIMB (H): Primary | ICD-10-CM

## 2018-11-14 LAB
GLUCOSE BLDC GLUCOMTR-MCNC: 95 MG/DL (ref 70–99)
INR PPP: 2.36 (ref 0.86–1.14)

## 2018-11-14 PROCEDURE — 82962 GLUCOSE BLOOD TEST: CPT

## 2018-11-14 PROCEDURE — 25000128 H RX IP 250 OP 636: Performed by: NURSE PRACTITIONER

## 2018-11-14 PROCEDURE — A9552 F18 FDG: HCPCS | Performed by: NURSE PRACTITIONER

## 2018-11-14 PROCEDURE — 34300033 ZZH RX 343: Performed by: NURSE PRACTITIONER

## 2018-11-14 PROCEDURE — 71260 CT THORAX DX C+: CPT

## 2018-11-14 PROCEDURE — G0463 HOSPITAL OUTPT CLINIC VISIT: HCPCS | Mod: 25

## 2018-11-14 PROCEDURE — 83615 LACTATE (LD) (LDH) ENZYME: CPT | Performed by: NURSE PRACTITIONER

## 2018-11-14 PROCEDURE — 70491 CT SOFT TISSUE NECK W/DYE: CPT

## 2018-11-14 PROCEDURE — 85610 PROTHROMBIN TIME: CPT | Performed by: NURSE PRACTITIONER

## 2018-11-14 RX ORDER — IOPAMIDOL 755 MG/ML
1-135 INJECTION, SOLUTION INTRAVASCULAR ONCE
Status: COMPLETED | OUTPATIENT
Start: 2018-11-14 | End: 2018-11-14

## 2018-11-14 RX ADMIN — FLUDEOXYGLUCOSE F-18 10.1 MCI.: 500 INJECTION, SOLUTION INTRAVENOUS at 10:35

## 2018-11-14 RX ADMIN — SODIUM CHLORIDE, PRESERVATIVE FREE 500 UNITS: 5 INJECTION INTRAVENOUS at 10:37

## 2018-11-14 RX ADMIN — IOPAMIDOL 96 ML: 755 INJECTION, SOLUTION INTRAVENOUS at 10:35

## 2018-11-14 NOTE — PROGRESS NOTES
BMT Day 100 Post-Autologous Transplant   Survivorship Care Plan  Date: November 14, 2018    Treatment Team:  Patient Care Team:  Hanny, [facility] as PCP - General  Gina Lawson MD as Referring Physician (Hematology & Oncology)  Aneta Sherman MSW as   Becky William LICSW as  ( - Clinical)  César Deluca MD as BMT Physician (Hematology & Oncology)  Leela Aquino, RN as BMT Nurse Coordinator (Transplant)    Date of Transplant: 8/6/2018    Transplant Essential Data:  Diagnosis HDN Hodgkin's Disease - NOS  HCT Type Autologous    Prep Regimen BCNU  Lisa-C  Etoposide  Melphalan   Donor Source No data was found    GVHD Prophylaxis No  Clinical Trials        Oncology Treatment History:   She is a 28 years old female diagnosed with classical Hodgkin's lymphoma, initially presented in 02/2007 with left neck mass.  Ultrasound-guided FNA was done in 03/2017, negative for malignancy, however, it continued.  Therefore on 04/19/2017, CT scan was done showing diffuse cervical lymphadenopathy left.  The maximum seemed to be around 2.5 x 2 cm in the mediastinum.  She had also lymphadenopathy in the left axilla, retroperitoneum and mediastinum.  The path confirmed this on 05/30/2017, extensive intense hypermetabolic lymphadenopathy in the neck, left axilla, mediastinum, but nothing below the diaphragm, followed by excisional left cervical lymphadenopathy with lymph nodes showing classical Hodgkin's lymphoma, nodular sclerosis type. She was diagnosed stage IIB.  Although she has lost some weight at that time, ESR was high, greater than 50, IPI was score 1.  She received 6 cycles of ABVD between 06/09/2017 and 11/10/2017.  However, 11/09/2017 CT showed increasing minimal activity within poorly visualized but stable-appearing right cervical chain lymph nodes suggests mild progression of disease.  SUV was maximum of 4.7.  She was followed, and repeat PET/CT was repeated  01/09/2018.  There was new and increasing hypermetabolic right cervical adenopathy suspicious for disease progression.  The lymph nodes are persistent hypermetabolic activity in the tonsillar tissues with maximum 7.9, right jugulodigastric lymph nodes with SUV 4.0, posterior cervical chain node SUV 6, new 6 mm right posterior cervical chain, maximum SUV is 3.3, new 7 mm right supraclavicular node with maximum SUV of 4.7.  Chest, no abnormal hypermetabolic activity.  Abdomen, no abnormal hypermetabolic activity.  She also had on 02/05/2018 showed enlarged heterogeneous right jugular chain cervical lymph nodes in level 3 and 4 measuring approximately 1.9 x 2 x 2.6 cm and 2 x 1.8 x 2.2 cm are increased since neck CT from 04/21/2017, although may be similar to the PET/CT from 01/09/2018.  Resolution of previously enlarged left cervical lymph nodes on the CT compared to 04/21/2017.  Nasopharynx, oropharynx, tongue base, hypopharynx, larynx were normal, possible small sub-centimeter left thyroid nodule versus artifact, so she started on GDP 02/16/2018, and she had another PET scan 03/28/2018 showing that head and neck there is stable activity in the tonsillar tissue.  There are post-surgical changes in the right neck.  Hypermetabolic right cervical adenopathy in the prior study no longer demonstrated.  There are no new hypermetabolic lesions or lymph nodes in the neck.  No abnormal hypermetabolic activity in the abdomen.  Before starting GDP she underwent right neck excisional biopsy showing recurrent classical Hodgkin lymphoma, nodular-sclerosing.       5/7/2018: Her excisional LN biopsy from R neck is consistent with HD.     Treatment/Chemotherapy Number of Cycles Date Range Outcomes & Complications   ABVD 6 6/9/17-11/10/17 11/9/17  showed increasing minimal activity within poorly visualized but stable-appearing right cervical chain lymph nodes suggests mild progression of disease.  SUV was maximum of 4.7.  She was  followed, and repeat PET/CT was repeated 01/09/2018.  There was new and increasing hypermetabolic right cervical adenopathy suspicious for disease progression.  The lymph nodes are persistent hypermetabolic activity in the tonsillar tissues with maximum 7.9, right jugulodigastric lymph nodes with SUV 4.0, posterior cervical chain node SUV 6, new 6 mm right posterior cervical chain, maximum SUV is 3.3, new 7 mm right supraclavicular node with maximum SUV of 4.7.  Chest, no abnormal hypermetabolic activity.  Abdomen, no abnormal hypermetabolic activity.  She also had on 02/05/2018 showed enlarged heterogeneous right jugular chain cervical lymph nodes in level 3 and 4 measuring approximately 1.9 x 2 x 2.6 cm and 2 x 1.8 x 2.2 cm are increased since neck CT from 04/21/2017, although may be similar to the PET/CT from 01/09/2018.  Resolution of previously enlarged left cervical lymph nodes on the CT compared to 04/21/2017.  Nasopharynx, oropharynx, tongue base, hypopharynx, larynx were normal, possible small sub-centimeter left thyroid nodule versus artifact   GDP   2/16/18 PET scan 03/28/2018 showing that head and neck there is stable activity in the tonsillar tissue.  There are post-surgical changes in the right neck.  Hypermetabolic right cervical adenopathy in the prior study no longer demonstrated.  There are no new hypermetabolic lesions or lymph nodes in the neck.  No abnormal hypermetabolic activity in the abdomen.  Before starting GDP she underwent right neck excisional biopsy showing recurrent classical Hodgkin lymphoma, nodular-sclerosing.   5/7/2018: Her excisional LN biopsy from R neck is concicent with HD.   Rituximab in combo with PD1 inhibition   6/22/18 A CT PET repeated in her primary oncologist's office shows complete remission.  This CT scan and PET were reviewed here and they agree with that finding.                Survivorship Self-Management Goals:  Goal  Activities Resources   S/p bmt Irradiated  blood products only Obtain [facility]   Dental appt Next 6 mo    Primary care appt Next 6 mo    Eye appt Next 6 mo       General Recommendations:     Ask your physician if you can return to work and usual activity. If you need more time for recovery, please let your physician know.    You can resume a regular diet.    Avoid large crowds and exposure to friends or family members with cold like symptoms while your immune system continues to rebuild.    You can drive without limitations as long as:  o Your platelet count is > 50,000 and your neutrophil count is >1,000.  o You are not taking any narcotics or sedative medications  o You feel well enough to preform driving activities     You can resume sexual activity with your partner. Women should avoid becoming pregnant for 2 years. Birth control should be used to prevent pregnancy.    Vaccinations will be given at your 1- and 2-year anniversary visits in the BMT clinic.  An exception is the influenza vaccine, which can be given after day +60 post-transplant during influenza season.    Continue to take prescribed medications to prevent infection     You can stop wearing your blue N95 mask after your first day + 28 anniversary visit.     For general health concerns you can be seen by your primary care provider.   o If you have questions about your transplant or follow up tests contact your BMT RN coordinator.      Survivorship Care Plan:     This individualized care plan is designed to inform you and your healthcare team of recommended follow-up visits, tests, health maintenance activities, and cancer screening you should receive after transplant.     Possible Late-Effects:  Possible late or long-term complications include infections, cataracts, cancer (oral, skin, blood, solid tumors), cavities, changes in lung function, pneumonia, heart failure, low thyroid and other gland function, kidney and liver disease, nerve pain/neuropathy, altered cognitive function,  muscle weakness, osteoporosis/weak bones, stress, depression, anxiety, sexual dysfunction, and infertility.    Health Monitoring: In the months following autologous stem cell transplant you may experience health conditions that require further evaluation by your healthcare team.     Contact your provider if you experience any of the following conditions or symptoms:   ? Cold symptoms or fever higher than 100.5 F  ? Blurry or double vision, eye pain  ? Persistent or recurrent headaches  ? High blood pressure or high blood sugar   ? Difficulty breathing, cough, shortness of breath  ? Chest pain or palpitations  ? Swelling in legs or extremities    ? Unusual bruising or bleeding  ? Symptoms of cancer recurrence   ? Changes in moles or new skin lesions   ? Increased or worsening fatigue  ? Heat or cold intolerance  ? Loss of interest in sex or erectile dysfunction  ? Muscle weakness or increasing difficulty with daily activates   ? Pain or tingling in hands or feet  ? Changes in memory or difficulty concentrating (chemo brain) that does not improve within 3 months after your transplant  ? Abdominal pain on the upper right side or yellowing of the skin  ? Mouth pain, oral lesions, bleeding gums, or chronic dry mouth   ? Stress, depression, or anxiety  ? Inability to stop using narcotic pain medications or addictive substances    Healthy Behaviors & Lifestyle:    Schedule a full dental exam. Your mouth should be evaluated for oral cancer yearly. Follow your dentist s recommendations for cleanings.    Schedule an eye exam yearly. After transplant your risk for cataracts is higher. Let your eye doctor know you have had cancer treatment.    Avoid smoking or tobacco products.    Wear sunscreen and protect skin from sunburns.     Limit daily alcohol intake to less than 1 drink for women and 2 for men.    Follow general guidelines for physical activity as recommended by the [place] Office of Disease Prevention & Health  Promotion:    Avoid Inactivity: Some physical activity is better than none -- any amount has benefits.  Do Aerobic Activity: Do aerobic physical activity in episodes of at least 10 minutes, as many times as possible per day. This could include going for walks or using the elliptical or stationary bike.  Ask your doctor what aerobic activities would be safe and helpful for you, and set a goal for yourself!  Strengthen Muscles: Do muscle-strengthening activities (such as lifting light weights or using resistance bands and/or going up and down stairs) that are moderate or high intensity and involve all major muscle groups at least 4 days a week.      Recommendations & Follow-Up:    Referrals & tests placed during this visit: No orders of the defined types were placed in this encounter.      When to see your BMT Provider: 11/26/2018    When to see your Primary Care Provider or Local Hematologist/Oncologist: Gina Lawson (to be determined by Dr Deluca)      Rafaela Pinto I spent 30 minutes with the patient and family, over half of which was spent discussing preventative care strategies, self-management practices, and potential complications after transplant.      Information used for these recommendations was obtained from: SHANNAN Loomis., KATHARINE Packer, ADELAIDE Banks, JOVANY Perales., JOSELINE Weaver., Raysa, JMarco L.,   Javy, K. M. (2013). Prevalence of Hematopoietic Cell Transplant Survivors in the [place]. Biology of Blood and Marrow Transplantation?: Journal of the American Society for Blood and Marrow Transplantation, 19(10), 7723-7768. doi 10.1016/j.bbmt.2013.07.020

## 2018-11-14 NOTE — MR AVS SNAPSHOT
After Visit Summary   11/14/2018    Amira Avery    MRN: [id number]           Patient Information     Date Of Birth          1990        Visit Information        Provider Department      11/14/2018 12:30 PM  BMT WES #4 [facility] Blood and Marrow Transplant            [facility] ([facility])  [address] [place] [address]  Phone: [phone]  Clinic Hours:   Monday-Thursday:7am to 7pm   Friday: 7am to 5pm   Weekends and holidays:    8am to noon (in general)  If your fever is 100.5  or greater,   call the clinic.  After hours call the   hospital at [phone] or   [phone]. Ask for the BMT   fellow on-call            Follow-ups after your visit        Your next 10 appointments already scheduled     Nov 14, 2018 12:30 PM CST   (Arrive by 12:15 PM)   Survivorship Visit with  BMT WES #4   [facility] Blood and Marrow Transplant ([facility])    [address] [place] [address]  [address]  [facility] [id number] [phone]            Nov 14, 2018  1:30 PM CST   Masonic Lab Draw with  MASONIC LAB DRAW   [facility] Masonic Lab Draw ([facility])    [address] [place] [address]  [address]  [facility] [id number] [phone]            Nov 14, 2018  2:00 PM CST   Bone Marrow Biopsy with  BMT WES #4, [facility] BONE MARROW BIOPSY   [facility] Blood and Marrow Transplant ([facility])    [address] [place] [address]  [address]  [facility] [id number] [phone]            Nov 19, 2018  4:15 PM CST   BMT Anniversary Visit with César Deluca MD   [facility] Blood and Marrow Transplant ([facility])    [address] [place] [address]  [address]  [facility] [id number] [phone]              Who to contact     If you have questions or need follow up information about today's clinic visit or your schedule please contact [facility] BLOOD AND MARROW TRANSPLANT directly at [phone].  Normal or  non-critical lab and imaging results will be communicated to you by [employer], letter or phone within 4 business days after the clinic has received the results. If you do not hear from us within 7 days, please contact the clinic through [employer] or phone. If you have a critical or abnormal lab result, we will notify you by phone as soon as possible.  Submit refill requests through [employer] or call your pharmacy and they will forward the refill request to us. Please allow 3 business days for your refill to be completed.          Additional Information About Your Visit        [employer] Information     [employer] gives you secure access to your electronic health record. If you see a primary care provider, you can also send messages to your care team and make appointments. If you have questions, please call your primary care clinic.  If you do not have a primary care provider, please call [phone] and they will assist you.        Care EveryWhere ID     This is your Care EveryWhere ID. This could be used by other organizations to access your [place] medical records  [id number]        Your Vitals Were     Pulse Temperature Respirations Pulse Oximetry BMI (Body Mass Index)       82 97  F (36.1  C) (Oral) 16 100% 27.52 kg/m2        Blood Pressure from Last 3 Encounters:   11/14/18 117/72   11/01/18 115/70   10/19/18 108/62    Weight from Last 3 Encounters:   11/14/18 70.9 kg (156 lb 4.8 oz)   11/01/18 70.8 kg (156 lb 1.6 oz)   10/19/18 69.6 kg (153 lb 8 oz)              Today, you had the following     No orders found for display       Recent Review Flowsheet Data     BMT Recent Results Latest Ref Rng & Units 9/20/2018 9/24/2018 10/4/2018 10/19/2018 10/23/2018 10/29/2018 11/1/2018    WBC 4.0 - 11.0 10e9/L 4.9 - 4.2 4.5 - 4.2 4.2    Hemoglobin 11.7 - 15.7 g/dL 12.1 - 10.5(L) 11.4(L) - 11.4(L) 11.5(L)    Platelet Count 150 - 450 10e9/L 228 - 184 211 - 205 244    Neutrophils (Absolute) 1.6 - 8.3 10e9/L 3.6 - 2.7 1.2(L) - 1.5(L)  1.8    Blasts (Absolute) 0 10e9/L - - - - - - -    INR 0.86 - 1.14 2.56(H) 3.48(H) 1.55(H) 3.74(H) 2.54(H) 2.29(H) 2.37(H)    Sodium 133 - 144 mmol/L - - 138 137 - 140 140    Potassium 3.4 - 5.3 mmol/L - - 4.1 3.7 - 4.2 4.3    Chloride 94 - 109 mmol/L - - 108 107 - 109 109    Glucose 70 - 99 mg/dL - - 92 92 - 81 102(H)    Urea Nitrogen 7 - 30 mg/dL - - 14 10 - 10 11    Creatinine 0.52 - 1.04 mg/dL - - 0.69 0.73 - 0.85 0.84    Calcium (Total) 8.5 - 10.1 mg/dL - - 8.6 8.4(L) - 8.3(L) 8.0(L)    Protein (Total) 6.8 - 8.8 g/dL - - - 6.7(L) - 6.8 6.8    Albumin 3.4 - 5.0 g/dL - - - 4.0 - 4.0 3.9    Bilirubin (Direct) 0.0 - 0.2 mg/dL - - - - - - -    Alkaline Phosphatase 40 - 150 U/L - - - 63 - 61 57    AST 0 - 45 U/L - - - 34 - 20 20    ALT 0 - 50 U/L - - - 48 - 32 30    MCV 78 - 100 fl 95 - 95 96 - 99 98               Primary Care Provider Office Phone # Fax #    [facility] [phone] [address] [facility] [address]        Equal Access to Services     MARINE RAMEY AH: Hadii velma Luis, waaxda luqadaha, qaybta kaalmada adetima, jen raymundo. So [facility] [phone].    ATENCIÓN: Si habla español, tiene a isabel disposición servicios gratuitos de asistencia lingüística. Llame al [phone].    We comply with applicable federal civil rights laws and [place] laws. We do not discriminate on the basis of race, color, national origin, age, disability, sex, sexual orientation, or gender identity.            Thank you!     Thank you for choosing [facility] BLOOD AND MARROW TRANSPLANT  for your care. Our goal is always to provide you with excellent care. Hearing back from our patients is one way we can continue to improve our services. Please take a few minutes to complete the written survey that you may receive in the mail after your visit with us. Thank you!             Your Updated Medication List - Protect others around you: Learn  how to safely use, store and throw away your medicines at www.disposemymeds.org.          This list is accurate as of 11/14/18 12:26 PM.  Always use your most recent med list.                   Brand Name Dispense Instructions for use Diagnosis    ACETAMINOPHEN PO      Take 650 mg by mouth every 4 hours as needed for pain    S/P autologous bone marrow transplantation (H), Hodgkin lymphoma, unspecified, lymph nodes of inguinal region and lower limb (H)       acyclovir 400 MG tablet    ZOVIRAX    60 tablet    Take 1 tablet (400 mg) by mouth 2 times daily    Hodgkin lymphoma, unspecified, lymph nodes of inguinal region and lower limb (H)       estradiol 10 MCG Tabs vaginal tablet    VAGIFEM    20 tablet    Place 1 tablet (10 mcg) vaginally twice a week Nightly for 1-2 weeks then twice weekly    Vaginal dryness       fluconazole 100 MG tablet    DIFLUCAN    30 tablet    Take 1 tablet (100 mg) by mouth daily    Hodgkin lymphoma, unspecified, lymph nodes of inguinal region and lower limb (H)       PARoxetine 10 MG tablet    PAXIL    30 tablet    Take 1 tablet (10 mg) by mouth At Bedtime    Menopausal syndrome (hot flashes)       warfarin 5 MG tablet    COUMADIN    30 tablet    Take 1 tablet (5 mg) by mouth daily

## 2018-11-14 NOTE — MR AVS SNAPSHOT
Amira Carmennes   11/14/2018   Anticoagulation Therapy Visit    Description:  28 year old female   Provider:  Sidra Wagner, RN   Department:  [facility]           INR as of 11/14/2018     Today's INR 2.36      Anticoagulation Summary as of 11/14/2018     INR goal 2.0-3.0   Today's INR 2.36   Full warfarin instructions 5 mg on Tue, Thu, Sat; 2.5 mg all other days   Next INR check 11/26/2018    Indications   Deep vein thrombosis (DVT) (H) [I82.409] [I82.409]         November 2018 Details    Sun Mon Tue Wed Thu Fri Sat         1               2               3                 4               5               6               7               8               9               10                 11               12               13               14      2.5 mg   See details      15      5 mg         16      2.5 mg         17      5 mg           18      2.5 mg         19      2.5 mg         20      5 mg         21      2.5 mg         22      5 mg         23      2.5 mg         24      5 mg           25      2.5 mg         26            27               28               29               30                 Date Details   11/14 This INR check       Date of next INR:  11/26/2018         How to take your warfarin dose     To take:  2.5 mg Take 0.5 of a 5 mg tablet.    To take:  5 mg Take 1 of the 5 mg tablets.

## 2018-11-14 NOTE — MR AVS SNAPSHOT
After Visit Summary   11/14/2018    Amira Avery    MRN: [id number]           Patient Information     Date Of Birth          1990        Visit Information        Provider Department      11/14/2018 2:00 PM [facility] BONE MARROW BIOPSY;  BMT WES #4 [facility] Blood and Marrow Transplant        Today's Diagnoses     Hodgkin lymphoma, unspecified, lymph nodes of inguinal region and lower limb (H)    -  1    ERRONEOUS ENCOUNTER--DISREGARD              [facility] ([facility])  [address] [place] [address]  Phone: [phone]  Clinic Hours:   Monday-Thursday:7am to 7pm   Friday: 7am to 5pm   Weekends and holidays:    8am to noon (in general)  If your fever is 100.5  or greater,   call the clinic.  After hours call the   hospital at [phone] or   [phone]. Ask for the BMT   fellow on-call            Follow-ups after your visit        Your next 10 appointments already scheduled     Nov 26, 2018  5:15 PM [facility]   BMT Anniversary Visit with César Deluca MD   [facility] Blood and Marrow Transplant ([facility] [place])    [address] [place] [address]  [address] [place] [address] [phone]              Who to contact     If you have questions or need follow up information about today's clinic visit or your schedule please contact [facility] BLOOD AND MARROW TRANSPLANT directly at [phone].  Normal or non-critical lab and imaging results will be communicated to you by MyChart, letter or phone within 4 business days after the clinic has received the results. If you do not hear from us within 7 days, please contact the clinic through MyChart or phone. If you have a critical or abnormal lab result, we will notify you by phone as soon as possible.  Submit refill requests through [employer] or call your pharmacy and they will forward the refill request to us. Please allow 3 business days for your refill to be completed.          Additional Information About Your  Visit        [employer] Information     [employer] gives you secure access to your electronic health record. If you see a primary care provider, you can also send messages to your care team and make appointments. If you have questions, please call your primary care clinic.  If you do not have a primary care provider, please call [phone] and they will assist you.        Care EveryWhere ID     This is your Care EveryWhere ID. This could be used by other organizations to access your [place] medical records  [id number]         Blood Pressure from Last 3 Encounters:   11/14/18 117/72   11/01/18 115/70   10/19/18 108/62    Weight from Last 3 Encounters:   11/14/18 70.9 kg (156 lb 4.8 oz)   11/01/18 70.8 kg (156 lb 1.6 oz)   10/19/18 69.6 kg (153 lb 8 oz)              We Performed the Following     INR          Today's Medication Changes          These changes are accurate as of 11/14/18 11:59 PM.  If you have any questions, ask your nurse or doctor.               Stop taking these medicines if you haven't already. Please contact your care team if you have questions.     fluconazole 100 MG tablet   Commonly known as:  DIFLUCAN                    Recent Review Flowsheet Data     BMT Recent Results Latest Ref Rng & Units 9/24/2018 10/4/2018 10/19/2018 10/23/2018 10/29/2018 11/1/2018 11/14/2018    WBC 4.0 - 11.0 10e9/L - 4.2 4.5 - 4.2 4.2 -    Hemoglobin 11.7 - 15.7 g/dL - 10.5(L) 11.4(L) - 11.4(L) 11.5(L) -    Platelet Count 150 - 450 10e9/L - 184 211 - 205 244 -    Neutrophils (Absolute) 1.6 - 8.3 10e9/L - 2.7 1.2(L) - 1.5(L) 1.8 -    Blasts (Absolute) 0 10e9/L - - - - - - -    INR 0.86 - 1.14 3.48(H) 1.55(H) 3.74(H) 2.54(H) 2.29(H) 2.37(H) 2.36(H)    Sodium 133 - 144 mmol/L - 138 137 - 140 140 -    Potassium 3.4 - 5.3 mmol/L - 4.1 3.7 - 4.2 4.3 -    Chloride 94 - 109 mmol/L - 108 107 - 109 109 -    Glucose 70 - 99 mg/dL - 92 92 - 81 102(H) 95    Urea Nitrogen 7 - 30 mg/dL - 14 10 - 10 11 -    Creatinine 0.52 - 1.04 mg/dL  - 0.69 0.73 - 0.85 0.84 -    Calcium (Total) 8.5 - 10.1 mg/dL - 8.6 8.4(L) - 8.3(L) 8.0(L) -    Protein (Total) 6.8 - 8.8 g/dL - - 6.7(L) - 6.8 6.8 -    Albumin 3.4 - 5.0 g/dL - - 4.0 - 4.0 3.9 -    Bilirubin (Direct) 0.0 - 0.2 mg/dL - - - - - - -    Alkaline Phosphatase 40 - 150 U/L - - 63 - 61 57 -    AST 0 - 45 U/L - - 34 - 20 20 -    ALT 0 - 50 U/L - - 48 - 32 30 -    MCV 78 - 100 fl - 95 96 - 99 98 -               Primary Care Provider Office Phone # Fax #    [facility] [phone] [address] [facility] [address]        Equal Access to Services     RICHIE RAMEY : Hadii aad ku hadasho Soomaali, waaxda luqadaha, qaybta kaalmada adeegyada, waxay barbra de la cruz . So [facility] [phone].    ATENCIÓN: Si habla español, tiene a isabel disposición servicios gratuitos de asistencia lingüística. [facility] [phone].    We comply with applicable federal civil rights laws and [place] laws. We do not discriminate on the basis of race, color, national origin, age, disability, sex, sexual orientation, or gender identity.            Thank you!     Thank you for choosing [facility] BLOOD AND MARROW TRANSPLANT  for your care. Our goal is always to provide you with excellent care. Hearing back from our patients is one way we can continue to improve our services. Please take a few minutes to complete the written survey that you may receive in the mail after your visit with us. Thank you!             Your Updated Medication List - Protect others around you: Learn how to safely use, store and throw away your medicines at www.disposemymeds.org.          This list is accurate as of 11/14/18 11:59 PM.  Always use your most recent med list.                   Brand Name Dispense Instructions for use Diagnosis    ACETAMINOPHEN PO      Take 650 mg by mouth every 4 hours as needed for pain    S/P autologous bone marrow transplantation (H), Hodgkin lymphoma,  unspecified, lymph nodes of inguinal region and lower limb (H)       acyclovir 400 MG tablet    ZOVIRAX    60 tablet    Take 1 tablet (400 mg) by mouth 2 times daily    Hodgkin lymphoma, unspecified, lymph nodes of inguinal region and lower limb (H)       estradiol 10 MCG Tabs vaginal tablet    VAGIFEM    20 tablet    Place 1 tablet (10 mcg) vaginally twice a week Nightly for 1-2 weeks then twice weekly    Vaginal dryness       PARoxetine 10 MG tablet    PAXIL    30 tablet    Take 1 tablet (10 mg) by mouth At Bedtime    Menopausal syndrome (hot flashes)       warfarin 5 MG tablet    COUMADIN    30 tablet    Take 1 tablet (5 mg) by mouth daily

## 2018-11-14 NOTE — NURSING NOTE
"Oncology Rooming Note    November 14, 2018 12:22 PM   Amira Avery is a 28 year old female who presents for:    Chief Complaint   Patient presents with     RECHECK     Pt is here for Oncology survivorship      Initial Vitals: /72  Pulse 82  Temp 97  F (36.1  C) (Oral)  Resp 16  Wt 70.9 kg (156 lb 4.8 oz)  SpO2 100%  BMI 27.52 kg/m2 Estimated body mass index is 27.52 kg/(m^2) as calculated from the following:    Height as of 8/24/18: 1.605 m (5' 3.19\").    Weight as of this encounter: 70.9 kg (156 lb 4.8 oz). Body surface area is 1.78 meters squared.  Data Unavailable Comment: Data Unavailable   No LMP recorded.  Allergies reviewed: Yes  Medications reviewed: Yes    Medications: Medication refills not needed today.  Pharmacy name entered into [facility]: [facility] [address] [place] [address] [address] AT [facility] OF [address]    Clinical concerns: none     6 minutes for nursing intake (face to face time)     Mariam Mojica MA              "

## 2018-11-14 NOTE — PROGRESS NOTES
ANTICOAGULATION FOLLOW-UP CLINIC VISIT    Patient Name:  Amira Avery  Date:  11/14/2018  Contact Type:  Telephone    SUBJECTIVE:     Patient Findings     Positives No Problem Findings           OBJECTIVE    INR   Date Value Ref Range Status   11/14/2018 2.36 (H) 0.86 - 1.14 Final       ASSESSMENT / PLAN  No question data found.  Anticoagulation Summary as of 11/14/2018     INR goal 2.0-3.0   Today's INR 2.36   Warfarin maintenance plan 5 mg (5 mg x 1) on Tue, Thu, Sat; 2.5 mg (5 mg x 0.5) all other days   Full warfarin instructions 5 mg on Tue, Thu, Sat; 2.5 mg all other days   Weekly warfarin total 25 mg   Plan last modified Sidra Wagner RN (11/1/2018)   Next INR check 11/26/2018   Priority INR   Target end date 12/10/2018    Indications   Deep vein thrombosis (DVT) (H) [I82.409] [I82.409]         Anticoagulation Episode Summary     INR check location     Preferred lab     Send INR reminders to [facility]    Comments pt has a port r/t oncology dx -  has frequent lab draws HIPPA OK to leave messages, may speak with Yun Cope, Dad+3 months of coumadin therapy - last dose on 12/10/18+      Anticoagulation Care Providers     Provider Role Specialty Phone number    Diana Donovan PA-C Responsible Physician Assistant [phone]            See the Encounter Report to view Anticoagulation Flowsheet and Dosing Calendar (Go to Encounters tab in chart review, and find the Anticoagulation Therapy Visit)    Spoke with Amira.     Sidra Wagner, MERCEDEZ

## 2018-11-16 ENCOUNTER — ANESTHESIA EVENT (OUTPATIENT)
Dept: SURGERY | Facility: AMBULATORY SURGERY CENTER | Age: 28
End: 2018-11-16

## 2018-11-16 NOTE — PROGRESS NOTES
This is a recent snapshot of the patient's [facility] medical record.  For current drug dose and complete information and questions, call [phone] or In Basket pool, [facility] home infusion ([id number])  CSN Number:  [id number]

## 2018-11-18 NOTE — PROGRESS NOTES
Pt took Coumadin last night so her BMBX will be cancelled.  Discussed with Dr. Deluca & will re-schedule next available under sedation.  Plan to bridge with Lovenox before & after the procedure & the pt understands to hold Coumadin 5 days prior to the BMBX & start Lovenox, but hold Lovenox the day prior to the BMBX.  Will need anticoagulation for a total of 3months.  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      Kirti

## 2018-11-19 ENCOUNTER — ONCOLOGY VISIT (OUTPATIENT)
Dept: TRANSPLANT | Facility: CLINIC | Age: 28
End: 2018-11-19
Attending: PHYSICIAN ASSISTANT
Payer: COMMERCIAL

## 2018-11-19 ENCOUNTER — ANESTHESIA (OUTPATIENT)
Dept: SURGERY | Facility: AMBULATORY SURGERY CENTER | Age: 28
End: 2018-11-19

## 2018-11-19 ENCOUNTER — APPOINTMENT (OUTPATIENT)
Dept: LAB | Facility: CLINIC | Age: 28
End: 2018-11-19
Attending: PHYSICIAN ASSISTANT
Payer: COMMERCIAL

## 2018-11-19 ENCOUNTER — ANTICOAGULATION THERAPY VISIT (OUTPATIENT)
Dept: ANTICOAGULATION | Facility: CLINIC | Age: 28
End: 2018-11-19

## 2018-11-19 VITALS
TEMPERATURE: 98.3 F | RESPIRATION RATE: 16 BRPM | HEART RATE: 72 BPM | OXYGEN SATURATION: 98 % | SYSTOLIC BLOOD PRESSURE: 115 MMHG | DIASTOLIC BLOOD PRESSURE: 72 MMHG | BODY MASS INDEX: 28.15 KG/M2 | WEIGHT: 159.9 LBS

## 2018-11-19 DIAGNOSIS — C81.95 HODGKIN LYMPHOMA, UNSPECIFIED, LYMPH NODES OF INGUINAL REGION AND LOWER LIMB (H): Primary | ICD-10-CM

## 2018-11-19 DIAGNOSIS — C81.10 NODULAR SCLEROSING HODGKIN'S LYMPHOMA, UNSPECIFIED BODY REGION (H): ICD-10-CM

## 2018-11-19 DIAGNOSIS — I82.409 DEEP VEIN THROMBOSIS (DVT) (H): ICD-10-CM

## 2018-11-19 LAB
ALBUMIN SERPL-MCNC: 4 G/DL (ref 3.4–5)
ALP SERPL-CCNC: 56 U/L (ref 40–150)
ALT SERPL W P-5'-P-CCNC: 26 U/L (ref 0–50)
ANION GAP SERPL CALCULATED.3IONS-SCNC: 8 MMOL/L (ref 3–14)
AST SERPL W P-5'-P-CCNC: 21 U/L (ref 0–45)
BASOPHILS # BLD AUTO: 0 10E9/L (ref 0–0.2)
BASOPHILS NFR BLD AUTO: 0.7 %
BILIRUB SERPL-MCNC: 0.2 MG/DL (ref 0.2–1.3)
BUN SERPL-MCNC: 12 MG/DL (ref 7–30)
CALCIUM SERPL-MCNC: 9 MG/DL (ref 8.5–10.1)
CHLORIDE SERPL-SCNC: 110 MMOL/L (ref 94–109)
CO2 SERPL-SCNC: 21 MMOL/L (ref 20–32)
CREAT SERPL-MCNC: 0.8 MG/DL (ref 0.52–1.04)
DIFFERENTIAL METHOD BLD: NORMAL
EOSINOPHIL # BLD AUTO: 0.3 10E9/L (ref 0–0.7)
EOSINOPHIL NFR BLD AUTO: 7.1 %
ERYTHROCYTE [DISTWIDTH] IN BLOOD BY AUTOMATED COUNT: 11.5 % (ref 10–15)
GFR SERPL CREATININE-BSD FRML MDRD: 85 ML/MIN/1.7M2
GLUCOSE SERPL-MCNC: 88 MG/DL (ref 70–99)
HCT VFR BLD AUTO: 36.4 % (ref 35–47)
HGB BLD-MCNC: 12 G/DL (ref 11.7–15.7)
IGG SERPL-MCNC: 577 MG/DL (ref 695–1620)
IMM GRANULOCYTES # BLD: 0 10E9/L (ref 0–0.4)
IMM GRANULOCYTES NFR BLD: 0 %
INR PPP: 1.53 (ref 0.86–1.14)
LDH SERPL L TO P-CCNC: 199 U/L (ref 81–234)
LYMPHOCYTES # BLD AUTO: 2.2 10E9/L (ref 0.8–5.3)
LYMPHOCYTES NFR BLD AUTO: 47.8 %
MCH RBC QN AUTO: 31.3 PG (ref 26.5–33)
MCHC RBC AUTO-ENTMCNC: 33 G/DL (ref 31.5–36.5)
MCV RBC AUTO: 95 FL (ref 78–100)
MONOCYTES # BLD AUTO: 0.3 10E9/L (ref 0–1.3)
MONOCYTES NFR BLD AUTO: 7.1 %
NEUTROPHILS # BLD AUTO: 1.7 10E9/L (ref 1.6–8.3)
NEUTROPHILS NFR BLD AUTO: 37.3 %
NRBC # BLD AUTO: 0 10*3/UL
NRBC BLD AUTO-RTO: 0 /100
PLATELET # BLD AUTO: 269 10E9/L (ref 150–450)
POTASSIUM SERPL-SCNC: 3.8 MMOL/L (ref 3.4–5.3)
PROT SERPL-MCNC: 7 G/DL (ref 6.8–8.8)
RBC # BLD AUTO: 3.83 10E12/L (ref 3.8–5.2)
SODIUM SERPL-SCNC: 138 MMOL/L (ref 133–144)
WBC # BLD AUTO: 4.5 10E9/L (ref 4–11)

## 2018-11-19 PROCEDURE — 85610 PROTHROMBIN TIME: CPT | Performed by: PHYSICIAN ASSISTANT

## 2018-11-19 PROCEDURE — 25000128 H RX IP 250 OP 636: Mod: ZF | Performed by: PHYSICIAN ASSISTANT

## 2018-11-19 PROCEDURE — 80053 COMPREHEN METABOLIC PANEL: CPT | Performed by: PHYSICIAN ASSISTANT

## 2018-11-19 PROCEDURE — 36591 DRAW BLOOD OFF VENOUS DEVICE: CPT

## 2018-11-19 PROCEDURE — G0463 HOSPITAL OUTPT CLINIC VISIT: HCPCS | Mod: ZF

## 2018-11-19 PROCEDURE — 82784 ASSAY IGA/IGD/IGG/IGM EACH: CPT | Performed by: PHYSICIAN ASSISTANT

## 2018-11-19 PROCEDURE — 83615 LACTATE (LD) (LDH) ENZYME: CPT | Performed by: PHYSICIAN ASSISTANT

## 2018-11-19 PROCEDURE — 85025 COMPLETE CBC W/AUTO DIFF WBC: CPT | Performed by: PHYSICIAN ASSISTANT

## 2018-11-19 RX ORDER — HEPARIN SODIUM (PORCINE) LOCK FLUSH IV SOLN 100 UNIT/ML 100 UNIT/ML
5 SOLUTION INTRAVENOUS EVERY 8 HOURS
Status: DISCONTINUED | OUTPATIENT
Start: 2018-11-19 | End: 2018-11-19 | Stop reason: HOSPADM

## 2018-11-19 RX ADMIN — SODIUM CHLORIDE, PRESERVATIVE FREE 5 ML: 5 INJECTION INTRAVENOUS at 08:36

## 2018-11-19 ASSESSMENT — PAIN SCALES - GENERAL: PAINLEVEL: NO PAIN (0)

## 2018-11-19 NOTE — NURSING NOTE
"Oncology Rooming Note    November 19, 2018 8:53 AM   Amira Avery is a 28 year old female who presents for:    Chief Complaint   Patient presents with     Port Draw     accessed with power needle. hepairn locked, vitals checked     RECHECK     RTN- NHL     Initial Vitals: /72  Pulse 72  Temp 98.3  F (36.8  C)  Resp 16  Wt 72.5 kg (159 lb 14.4 oz)  SpO2 98%  BMI 28.15 kg/m2 Estimated body mass index is 28.15 kg/(m^2) as calculated from the following:    Height as of 8/24/18: 1.605 m (5' 3.19\").    Weight as of this encounter: 72.5 kg (159 lb 14.4 oz). Body surface area is 1.8 meters squared.  No Pain (0) Comment: Data Unavailable   No LMP recorded.  Allergies reviewed: Yes  Medications reviewed: Yes    Medications: Medication refills not needed today.  Pharmacy name entered into [employer]: [facility] [address] [place] [address] [address] AT [facility] OF  [address]    Clinical concerns: None     8 minutes for nursing intake (face to face time)     Serenity Faulkner CMA              "

## 2018-11-19 NOTE — NURSING NOTE
Chief Complaint   Patient presents with     Port Draw     accessed with power needle. hepairn locked, vitals checked     RECHECK     RTN- NHL     Tubes sent to lab, provider will re order tests

## 2018-11-19 NOTE — MR AVS SNAPSHOT
After Visit Summary   11/19/2018    Amira Avery    MRN: [id number]           Patient Information     Date Of Birth          1990        Visit Information        Provider Department      11/19/2018 8:30 AM  BMT WES #2 [facility] Blood and Marrow Transplant        Today's Diagnoses     Hodgkin lymphoma, unspecified, lymph nodes of inguinal region and lower limb (H)    -  1    Nodular sclerosing Hodgkin's lymphoma, unspecified body region (H)              Clinics and Surgery Center ([facility]  [address] [place] [id number]  Phone: [phone]  Clinic Hours:   Monday-Thursday:7am to 7pm   Friday: 7am to 5pm   Weekends and holidays:    8am to noon (in general)  If your fever is 100.5  or greater,   call the clinic.  After hours call the   hospital at [phone] or   [phone]. Ask for the BMT   fellow on-call            Follow-ups after your visit        Your next 10 appointments already scheduled     Nov 19, 2018 11:00 AM CST   Bone Marrow Biopsy with  ASC BONE MARROW BIOPSY WES, UU BONE MARROW BIOPSY   [facility] Blood and Marrow Transplant ([facility] [place])    [address] [place] [address]  [address]  [facility] [address] [phone]            Nov 19, 2018   Procedure with Marialuisa Benjamin PA-C   [facility] Surgery [facility] ([facility] [place])    [address] [place] [address]  5th Floor  [facility] [id number] [phone]            BIOPSY BONE MARROW Open [place]     Located in the Clinics and Surgery Center at [address] [place] [address], [facility] [address].   parking is very convenient and highly recommended.  is a $6 flat rate fee.  Both  and self parkers should enter the main arrival plaza from [facility]; parking attendants will direct you based on your parking preference.            Nov 26, 2018  5:15 PM CST   BMT Anniversary Visit with César Deluca MD   [facility] Blood and Marrow Transplant ([facility]  and Surgery Center)    [address] [facility]  [address]  [facility] [address] [phone]              Who to contact     If you have questions or need follow up information about today's clinic visit or your schedule please contact [facility] BLOOD AND MARROW TRANSPLANT directly at [phone].  Normal or non-critical lab and imaging results will be communicated to you by MyChart, letter or phone within 4 business days after the clinic has received the results. If you do not hear from us within 7 days, please contact the clinic through Zigmohart or phone. If you have a critical or abnormal lab result, we will notify you by phone as soon as possible.  Submit refill requests through [employer] Real-Time or call your pharmacy and they will forward the refill request to us. Please allow 3 business days for your refill to be completed.          Additional Information About Your Visit        Zigmohart Information     [employer]-Time gives you secure access to your electronic health record. If you see a primary care provider, you can also send messages to your care team and make appointments. If you have questions, please call your primary care clinic.  If you do not have a primary care provider, please call [phone] and they will assist you.        Care EveryWhere ID     This is your Care EveryWhere ID. This could be used by other organizations to access your [place] medical records  [id number]        Your Vitals Were     Pulse Temperature Respirations Pulse Oximetry BMI (Body Mass Index)       72 98.3  F (36.8  C) 16 98% 28.15 kg/m2        Blood Pressure from Last 3 Encounters:   11/19/18 115/72   11/14/18 117/72   11/01/18 115/70    Weight from Last 3 Encounters:   11/19/18 72.5 kg (159 lb 14.4 oz)   11/14/18 70.9 kg (156 lb 4.8 oz)   11/01/18 70.8 kg (156 lb 1.6 oz)              We Performed the Following     CBC with platelets differential     Comprehensive metabolic panel     IgG     INR        Recent Review Flowsheet Data      BMT Recent Results Latest Ref Rng & Units 9/24/2018 10/4/2018 10/19/2018 10/23/2018 10/29/2018 11/1/2018 11/14/2018    WBC 4.0 - 11.0 10e9/L - 4.2 4.5 - 4.2 4.2 -    Hemoglobin 11.7 - 15.7 g/dL - 10.5(L) 11.4(L) - 11.4(L) 11.5(L) -    Platelet Count 150 - 450 10e9/L - 184 211 - 205 244 -    Neutrophils (Absolute) 1.6 - 8.3 10e9/L - 2.7 1.2(L) - 1.5(L) 1.8 -    Blasts (Absolute) 0 10e9/L - - - - - - -    INR 0.86 - 1.14 3.48(H) 1.55(H) 3.74(H) 2.54(H) 2.29(H) 2.37(H) 2.36(H)    Sodium 133 - 144 mmol/L - 138 137 - 140 140 -    Potassium 3.4 - 5.3 mmol/L - 4.1 3.7 - 4.2 4.3 -    Chloride 94 - 109 mmol/L - 108 107 - 109 109 -    Glucose 70 - 99 mg/dL - 92 92 - 81 102(H) 95    Urea Nitrogen 7 - 30 mg/dL - 14 10 - 10 11 -    Creatinine 0.52 - 1.04 mg/dL - 0.69 0.73 - 0.85 0.84 -    Calcium (Total) 8.5 - 10.1 mg/dL - 8.6 8.4(L) - 8.3(L) 8.0(L) -    Protein (Total) 6.8 - 8.8 g/dL - - 6.7(L) - 6.8 6.8 -    Albumin 3.4 - 5.0 g/dL - - 4.0 - 4.0 3.9 -    Bilirubin (Direct) 0.0 - 0.2 mg/dL - - - - - - -    Alkaline Phosphatase 40 - 150 U/L - - 63 - 61 57 -    AST 0 - 45 U/L - - 34 - 20 20 -    ALT 0 - 50 U/L - - 48 - 32 30 -    MCV 78 - 100 fl - 95 96 - 99 98 -               Primary Care Provider Office Phone # Fax #    [facility] [phone] [address] [facility] [address]        Equal Access to Services     RICHIE RAMEY : Hadii velma ku hadasho Soomaali, waaxda luqadaha, qaybta kaalmada adeegyada, waxay idiin hayaan adeyudi kharash jono raymundo. So [facility] [phone].    ATENCIÓN: Si habla español, tiene a isabel disposición servicios gratuitos de asistencia lingüística. Llame al [phone].    We comply with applicable federal civil rights laws and [place] laws. We do not discriminate on the basis of race, color, national origin, age, disability, sex, sexual orientation, or gender identity.            Thank you!     Thank you for choosing [facility] BLOOD [facility] MARROW TRANSPLANT   for your care. Our goal is always to provide you with excellent care. Hearing back from our patients is one way we can continue to improve our services. Please take a few minutes to complete the written survey that you may receive in the mail after your visit with us. Thank you!             Your Updated Medication List - Protect others around you: Learn how to safely use, store and throw away your medicines at www.disposemymeds.org.          This list is accurate as of 11/19/18  9:31 AM.  Always use your most recent med list.                   Brand Name Dispense Instructions for use Diagnosis    ACETAMINOPHEN PO      Take 650 mg by mouth every 4 hours as needed for pain    S/P autologous bone marrow transplantation (H), Hodgkin lymphoma, unspecified, lymph nodes of inguinal region and lower limb (H)       acyclovir 400 MG tablet    ZOVIRAX    60 tablet    Take 1 tablet (400 mg) by mouth 2 times daily    Hodgkin lymphoma, unspecified, lymph nodes of inguinal region and lower limb (H)       estradiol 10 MCG Tabs vaginal tablet    VAGIFEM    20 tablet    Place 1 tablet (10 mcg) vaginally twice a week Nightly for 1-2 weeks then twice weekly    Vaginal dryness       PARoxetine 10 MG tablet    PAXIL    30 tablet    Take 1 tablet (10 mg) by mouth At Bedtime    Menopausal syndrome (hot flashes)       warfarin 5 MG tablet    COUMADIN    30 tablet    Take 1 tablet (5 mg) by mouth daily

## 2018-11-19 NOTE — PROGRESS NOTES
Pt may be having a Bone Marrow  Biopsy on 11/26 , and will be holding her coumadin and bridging with Lovenox 60mg every 12 hours . Pt will let us know when she finds out if this is a solid plan or not.  She has the Lovenox syringes.    ANTICOAGULATION FOLLOW-UP CLINIC VISIT    Patient Name:  Amira Avery  Date:  11/19/2018  Contact Type:  Telephone    SUBJECTIVE:        OBJECTIVE    INR   Date Value Ref Range Status   11/19/2018 1.53 (H) 0.86 - 1.14 Final       ASSESSMENT / PLAN  INR assessment SUB    Recheck INR In: 1 WEEK    INR Location Clinic      Anticoagulation Summary as of 11/19/2018     INR goal 2.0-3.0   Today's INR 1.53!   Warfarin maintenance plan 5 mg (5 mg x 1) on Tue, Thu, Sat; 2.5 mg (5 mg x 0.5) all other days   Full warfarin instructions 11/19: 7.5 mg; Otherwise 5 mg on Tue, Thu, Sat; 2.5 mg all other days   Weekly warfarin total 25 mg   Plan last modified Sidra Wagner RN (11/1/2018)   Next INR check 11/26/2018   Priority INR   Target end date 12/10/2018    Indications   Deep vein thrombosis (DVT) (H) [I82.409] [I82.409]         Anticoagulation Episode Summary     INR check location     Preferred lab     Send INR reminders to [facility]    Comments pt has a port r/t oncology dx -  has frequent lab draws HIPPA OK to leave messages, may speak with Anatoliy, Mom, Dad+3 months of coumadin therapy - last dose on 12/10/18+      Anticoagulation Care Providers     Provider Role Specialty Phone number    Diana Donovan PA-C Responsible Physician Assistant [phone]            See the Encounter Report to view Anticoagulation Flowsheet and Dosing Calendar (Go to Encounters tab in chart review, and find the Anticoagulation Therapy Visit)  Spoke with patient.    Silva Serrato RN

## 2018-11-19 NOTE — MR AVS SNAPSHOT
Amira Avery   11/19/2018   Anticoagulation Therapy Visit    Description:  28 year old female   Provider:  Silva Serrato RN   [facility]           INR as of 11/19/2018     Today's INR 1.53!      Anticoagulation Summary as of 11/19/2018     INR goal 2.0-3.0   Today's INR 1.53!   Full warfarin instructions 11/19: 7.5 mg; Otherwise 5 mg on Tue, Thu, Sat; 2.5 mg all other days   Next INR check 11/26/2018    Indications   Deep vein thrombosis (DVT) (H) [I82.409] [I82.409]         November 2018 Details    Sun Mon Tue Wed Thu Fri Sat         1               2               3                 4               5               6               7               8               9               10                 11               12               13               14               15               16               17                 18               19      7.5 mg   See details      20      5 mg         21      2.5 mg         22      5 mg         23      2.5 mg         24      5 mg           25      2.5 mg         26            27               28               29               30                 Date Details   11/19 This INR check       Date of next INR:  11/26/2018         How to take your warfarin dose     To take:  2.5 mg Take 0.5 of a 5 mg tablet.    To take:  5 mg Take 1 of the 5 mg tablets.    To take:  7.5 mg Take 1.5 of the 5 mg tablets.

## 2018-11-29 ENCOUNTER — APPOINTMENT (OUTPATIENT)
Dept: LAB | Facility: CLINIC | Age: 28
End: 2018-11-29
Attending: INTERNAL MEDICINE
Payer: COMMERCIAL

## 2018-11-29 ENCOUNTER — SURGERY (OUTPATIENT)
Age: 28
End: 2018-11-29

## 2018-11-29 ENCOUNTER — ONCOLOGY VISIT (OUTPATIENT)
Dept: TRANSPLANT | Facility: CLINIC | Age: 28
End: 2018-11-29
Attending: INTERNAL MEDICINE
Payer: COMMERCIAL

## 2018-11-29 ENCOUNTER — ANTICOAGULATION THERAPY VISIT (OUTPATIENT)
Dept: ANTICOAGULATION | Facility: CLINIC | Age: 28
End: 2018-11-29

## 2018-11-29 ENCOUNTER — HOSPITAL ENCOUNTER (OUTPATIENT)
Facility: AMBULATORY SURGERY CENTER | Age: 28
End: 2018-11-29
Attending: PHYSICIAN ASSISTANT
Payer: COMMERCIAL

## 2018-11-29 VITALS
RESPIRATION RATE: 16 BRPM | OXYGEN SATURATION: 100 % | BODY MASS INDEX: 28.17 KG/M2 | TEMPERATURE: 97.8 F | HEART RATE: 66 BPM | WEIGHT: 159 LBS | HEIGHT: 63 IN | SYSTOLIC BLOOD PRESSURE: 99 MMHG | DIASTOLIC BLOOD PRESSURE: 55 MMHG

## 2018-11-29 VITALS
SYSTOLIC BLOOD PRESSURE: 119 MMHG | BODY MASS INDEX: 28.1 KG/M2 | OXYGEN SATURATION: 100 % | HEART RATE: 65 BPM | RESPIRATION RATE: 16 BRPM | WEIGHT: 159.6 LBS | TEMPERATURE: 97.4 F | DIASTOLIC BLOOD PRESSURE: 65 MMHG

## 2018-11-29 DIAGNOSIS — C81.95 HODGKIN LYMPHOMA, UNSPECIFIED, LYMPH NODES OF INGUINAL REGION AND LOWER LIMB (H): Primary | ICD-10-CM

## 2018-11-29 DIAGNOSIS — Z94.81 S/P BONE MARROW TRANSPLANT (H): ICD-10-CM

## 2018-11-29 DIAGNOSIS — Z94.81 S/P BONE MARROW TRANSPLANT (H): Primary | ICD-10-CM

## 2018-11-29 DIAGNOSIS — I82.409 DEEP VEIN THROMBOSIS (DVT) (H): ICD-10-CM

## 2018-11-29 LAB
BASOPHILS # BLD AUTO: 0 10E9/L (ref 0–0.2)
BASOPHILS NFR BLD AUTO: 0.4 %
DIFFERENTIAL METHOD BLD: ABNORMAL
EOSINOPHIL # BLD AUTO: 0.4 10E9/L (ref 0–0.7)
EOSINOPHIL NFR BLD AUTO: 6.7 %
ERYTHROCYTE [DISTWIDTH] IN BLOOD BY AUTOMATED COUNT: 11.7 % (ref 10–15)
HCG UR QL: NEGATIVE
HCG UR QL: NEGATIVE
HCT VFR BLD AUTO: 35 % (ref 35–47)
HGB BLD-MCNC: 11.9 G/DL (ref 11.7–15.7)
IMM GRANULOCYTES # BLD: 0 10E9/L (ref 0–0.4)
IMM GRANULOCYTES NFR BLD: 0.2 %
INR PPP: 1.05 (ref 0.86–1.14)
INTERNAL QC OK POCT: YES
INTERNAL QC OK POCT: YES
LYMPHOCYTES # BLD AUTO: 2.6 10E9/L (ref 0.8–5.3)
LYMPHOCYTES NFR BLD AUTO: 48.1 %
MCH RBC QN AUTO: 31.8 PG (ref 26.5–33)
MCHC RBC AUTO-ENTMCNC: 34 G/DL (ref 31.5–36.5)
MCV RBC AUTO: 94 FL (ref 78–100)
MONOCYTES # BLD AUTO: 0.5 10E9/L (ref 0–1.3)
MONOCYTES NFR BLD AUTO: 8.5 %
NEUTROPHILS # BLD AUTO: 2 10E9/L (ref 1.6–8.3)
NEUTROPHILS NFR BLD AUTO: 36.1 %
NRBC # BLD AUTO: 0 10*3/UL
NRBC BLD AUTO-RTO: 0 /100
PLATELET # BLD AUTO: 210 10E9/L (ref 150–450)
RBC # BLD AUTO: 3.74 10E12/L (ref 3.8–5.2)
WBC # BLD AUTO: 5.4 10E9/L (ref 4–11)

## 2018-11-29 PROCEDURE — 40000795 ZZHCL STATISTIC DNA PROCESS AND HOLD: Performed by: PHYSICIAN ASSISTANT

## 2018-11-29 PROCEDURE — 88264 CHROMOSOME ANALYSIS 20-25: CPT | Performed by: PHYSICIAN ASSISTANT

## 2018-11-29 PROCEDURE — 85025 COMPLETE CBC W/AUTO DIFF WBC: CPT | Performed by: PHYSICIAN ASSISTANT

## 2018-11-29 PROCEDURE — 88275 CYTOGENETICS 100-300: CPT | Performed by: PHYSICIAN ASSISTANT

## 2018-11-29 PROCEDURE — 88161 CYTOPATH SMEAR OTHER SOURCE: CPT | Performed by: PHYSICIAN ASSISTANT

## 2018-11-29 PROCEDURE — G0463 HOSPITAL OUTPT CLINIC VISIT: HCPCS | Mod: ZF

## 2018-11-29 PROCEDURE — 88237 TISSUE CULTURE BONE MARROW: CPT | Performed by: PHYSICIAN ASSISTANT

## 2018-11-29 PROCEDURE — 40000951 ZZHCL STATISTIC BONE MARROW INTERP TC 85097: Performed by: PHYSICIAN ASSISTANT

## 2018-11-29 PROCEDURE — 40000424 ZZHCL STATISTIC BONE MARROW CORE PERF TC 38221: Performed by: PHYSICIAN ASSISTANT

## 2018-11-29 PROCEDURE — 88280 CHROMOSOME KARYOTYPE STUDY: CPT | Performed by: PHYSICIAN ASSISTANT

## 2018-11-29 PROCEDURE — 25000128 H RX IP 250 OP 636: Mod: ZF | Performed by: INTERNAL MEDICINE

## 2018-11-29 PROCEDURE — 85610 PROTHROMBIN TIME: CPT | Performed by: PHYSICIAN ASSISTANT

## 2018-11-29 PROCEDURE — 00000058 ZZHCL STATISTIC BONE MARROW ASP PERF TC 38220: Performed by: PHYSICIAN ASSISTANT

## 2018-11-29 PROCEDURE — 88271 CYTOGENETICS DNA PROBE: CPT | Performed by: PHYSICIAN ASSISTANT

## 2018-11-29 PROCEDURE — 88305 TISSUE EXAM BY PATHOLOGIST: CPT | Performed by: PHYSICIAN ASSISTANT

## 2018-11-29 PROCEDURE — 00000161 ZZHCL STATISTIC H-SPHEME PROCESS B/S: Performed by: PHYSICIAN ASSISTANT

## 2018-11-29 PROCEDURE — 38222 DX BONE MARROW BX & ASPIR: CPT | Mod: ZF

## 2018-11-29 PROCEDURE — 40000611 ZZHCL STATISTIC MORPHOLOGY W/INTERP HEMEPATH TC 85060: Performed by: PHYSICIAN ASSISTANT

## 2018-11-29 PROCEDURE — 88311 DECALCIFY TISSUE: CPT | Performed by: PHYSICIAN ASSISTANT

## 2018-11-29 RX ORDER — SODIUM CHLORIDE, SODIUM LACTATE, POTASSIUM CHLORIDE, CALCIUM CHLORIDE 600; 310; 30; 20 MG/100ML; MG/100ML; MG/100ML; MG/100ML
INJECTION, SOLUTION INTRAVENOUS CONTINUOUS
Status: DISCONTINUED | OUTPATIENT
Start: 2018-11-29 | End: 2018-11-30 | Stop reason: HOSPADM

## 2018-11-29 RX ORDER — LIDOCAINE 40 MG/G
CREAM TOPICAL
Status: CANCELLED | OUTPATIENT
Start: 2018-11-29

## 2018-11-29 RX ORDER — GABAPENTIN 300 MG/1
300 CAPSULE ORAL ONCE
Status: COMPLETED | OUTPATIENT
Start: 2018-11-29 | End: 2018-11-29

## 2018-11-29 RX ORDER — HEPARIN SODIUM (PORCINE) LOCK FLUSH IV SOLN 100 UNIT/ML 100 UNIT/ML
5 SOLUTION INTRAVENOUS ONCE
Status: COMPLETED | OUTPATIENT
Start: 2018-11-29 | End: 2018-11-29

## 2018-11-29 RX ORDER — LIDOCAINE 40 MG/G
CREAM TOPICAL
Status: DISCONTINUED | OUTPATIENT
Start: 2018-11-29 | End: 2018-11-30 | Stop reason: HOSPADM

## 2018-11-29 RX ORDER — HYDROMORPHONE HYDROCHLORIDE 1 MG/ML
.3-.5 INJECTION, SOLUTION INTRAMUSCULAR; INTRAVENOUS; SUBCUTANEOUS EVERY 10 MIN PRN
Status: DISCONTINUED | OUTPATIENT
Start: 2018-11-29 | End: 2018-11-30 | Stop reason: HOSPADM

## 2018-11-29 RX ORDER — ONDANSETRON 2 MG/ML
4 INJECTION INTRAMUSCULAR; INTRAVENOUS EVERY 30 MIN PRN
Status: DISCONTINUED | OUTPATIENT
Start: 2018-11-29 | End: 2018-11-30 | Stop reason: HOSPADM

## 2018-11-29 RX ORDER — HEPARIN SODIUM (PORCINE) LOCK FLUSH IV SOLN 100 UNIT/ML 100 UNIT/ML
5 SOLUTION INTRAVENOUS
Status: DISCONTINUED | OUTPATIENT
Start: 2018-11-29 | End: 2018-11-30 | Stop reason: HOSPADM

## 2018-11-29 RX ORDER — NALOXONE HYDROCHLORIDE 0.4 MG/ML
.1-.4 INJECTION, SOLUTION INTRAMUSCULAR; INTRAVENOUS; SUBCUTANEOUS
Status: DISCONTINUED | OUTPATIENT
Start: 2018-11-29 | End: 2018-11-30 | Stop reason: HOSPADM

## 2018-11-29 RX ORDER — LIDOCAINE HYDROCHLORIDE 10 MG/ML
8-10 INJECTION, SOLUTION EPIDURAL; INFILTRATION; INTRACAUDAL; PERINEURAL
Status: CANCELLED | OUTPATIENT
Start: 2018-11-29

## 2018-11-29 RX ORDER — FENTANYL CITRATE 50 UG/ML
25-50 INJECTION, SOLUTION INTRAMUSCULAR; INTRAVENOUS
Status: DISCONTINUED | OUTPATIENT
Start: 2018-11-29 | End: 2018-11-30 | Stop reason: HOSPADM

## 2018-11-29 RX ORDER — ONDANSETRON 4 MG/1
4 TABLET, ORALLY DISINTEGRATING ORAL EVERY 30 MIN PRN
Status: DISCONTINUED | OUTPATIENT
Start: 2018-11-29 | End: 2018-11-30 | Stop reason: HOSPADM

## 2018-11-29 RX ORDER — ACETAMINOPHEN 325 MG/1
975 TABLET ORAL ONCE
Status: COMPLETED | OUTPATIENT
Start: 2018-11-29 | End: 2018-11-29

## 2018-11-29 RX ORDER — OXYCODONE HYDROCHLORIDE 5 MG/1
5-10 TABLET ORAL EVERY 4 HOURS PRN
Status: DISCONTINUED | OUTPATIENT
Start: 2018-11-29 | End: 2018-11-30 | Stop reason: HOSPADM

## 2018-11-29 RX ORDER — LIDOCAINE HYDROCHLORIDE 20 MG/ML
INJECTION, SOLUTION INFILTRATION; PERINEURAL PRN
Status: DISCONTINUED | OUTPATIENT
Start: 2018-11-29 | End: 2018-11-29

## 2018-11-29 RX ORDER — MEPERIDINE HYDROCHLORIDE 25 MG/ML
12.5 INJECTION INTRAMUSCULAR; INTRAVENOUS; SUBCUTANEOUS
Status: DISCONTINUED | OUTPATIENT
Start: 2018-11-29 | End: 2018-11-30 | Stop reason: HOSPADM

## 2018-11-29 RX ORDER — LIDOCAINE HYDROCHLORIDE 10 MG/ML
8-10 INJECTION, SOLUTION EPIDURAL; INFILTRATION; INTRACAUDAL; PERINEURAL
Status: DISCONTINUED | OUTPATIENT
Start: 2018-11-29 | End: 2018-11-30 | Stop reason: HOSPADM

## 2018-11-29 RX ORDER — PROPOFOL 10 MG/ML
INJECTION, EMULSION INTRAVENOUS CONTINUOUS PRN
Status: DISCONTINUED | OUTPATIENT
Start: 2018-11-29 | End: 2018-11-29

## 2018-11-29 RX ORDER — ALBUTEROL SULFATE 0.83 MG/ML
2.5 SOLUTION RESPIRATORY (INHALATION) EVERY 4 HOURS PRN
Status: DISCONTINUED | OUTPATIENT
Start: 2018-11-29 | End: 2018-11-30 | Stop reason: HOSPADM

## 2018-11-29 RX ORDER — PROPOFOL 10 MG/ML
INJECTION, EMULSION INTRAVENOUS PRN
Status: DISCONTINUED | OUTPATIENT
Start: 2018-11-29 | End: 2018-11-29

## 2018-11-29 RX ORDER — DEXAMETHASONE SODIUM PHOSPHATE 4 MG/ML
4 INJECTION, SOLUTION INTRA-ARTICULAR; INTRALESIONAL; INTRAMUSCULAR; INTRAVENOUS; SOFT TISSUE EVERY 10 MIN PRN
Status: DISCONTINUED | OUTPATIENT
Start: 2018-11-29 | End: 2018-11-30 | Stop reason: HOSPADM

## 2018-11-29 RX ORDER — KETOROLAC TROMETHAMINE 30 MG/ML
30 INJECTION, SOLUTION INTRAMUSCULAR; INTRAVENOUS EVERY 6 HOURS PRN
Status: DISCONTINUED | OUTPATIENT
Start: 2018-11-29 | End: 2018-11-30 | Stop reason: HOSPADM

## 2018-11-29 RX ADMIN — PROPOFOL 70 MG: 10 INJECTION, EMULSION INTRAVENOUS at 10:26

## 2018-11-29 RX ADMIN — PROPOFOL 50 MG: 10 INJECTION, EMULSION INTRAVENOUS at 10:42

## 2018-11-29 RX ADMIN — LIDOCAINE HYDROCHLORIDE 60 MG: 20 INJECTION, SOLUTION INFILTRATION; PERINEURAL at 10:25

## 2018-11-29 RX ADMIN — PROPOFOL 50 MG: 10 INJECTION, EMULSION INTRAVENOUS at 10:38

## 2018-11-29 RX ADMIN — ACETAMINOPHEN 975 MG: 325 TABLET ORAL at 09:25

## 2018-11-29 RX ADMIN — GABAPENTIN 300 MG: 300 CAPSULE ORAL at 09:25

## 2018-11-29 RX ADMIN — SODIUM CHLORIDE, SODIUM LACTATE, POTASSIUM CHLORIDE, CALCIUM CHLORIDE: 600; 310; 30; 20 INJECTION, SOLUTION INTRAVENOUS at 10:21

## 2018-11-29 RX ADMIN — PROPOFOL 150 MCG/KG/MIN: 10 INJECTION, EMULSION INTRAVENOUS at 10:25

## 2018-11-29 RX ADMIN — HEPARIN SODIUM (PORCINE) LOCK FLUSH IV SOLN 100 UNIT/ML 5 ML: 100 SOLUTION at 11:34

## 2018-11-29 RX ADMIN — SODIUM CHLORIDE, PRESERVATIVE FREE 5 ML: 5 INJECTION INTRAVENOUS at 07:48

## 2018-11-29 ASSESSMENT — PAIN SCALES - GENERAL: PAINLEVEL: NO PAIN (0)

## 2018-11-29 ASSESSMENT — LIFESTYLE VARIABLES: TOBACCO_USE: 1

## 2018-11-29 NOTE — PROGRESS NOTES
Patient Name: Amira Avery  Patient MRN: [id number]  Patient : 1990    Abbreviated H&P and Pre-sedation Assessment for unilateral bone marrow biopsy and aspirate (procedure name) with sedation    Chief complaint and/or reason for Procedure: hx of hodgkins, s/p SCT    Planned level of sedation: MAC    History of problems with sedation: (patient or family hx): No    ASA Assessment Category: 2 - Mild systemic disease    History of sleep apnea: No    History of blood thinners: Yes; off coumadin x 5 days.  Last took lovenox Tues PM.     Appropriate NPO status: Yes    Current tobacco use: No    Any recent fever, cough, chest or sinus congestion, SOB, weight loss, chest pain, diarrhea or constipation. No    Medications   Currently Scheduled Medications       Home Med List)    (Not in a hospital admission)    Allergies  Morphine    PMH:  Past Medical History:   Diagnosis Date     Cancer (H)      Tattoos        Past Surgical History:   Procedure Laterality Date     BIOPSY LYMPH NODE CERVICAL Right 2018    Procedure: BIOPSY LYMPH NODE CERVICAL;  Right Excisional Node Biopsy;  Surgeon: Lia Silva MD;  Location: [facility] OR     excision of deep cervical LN's Left 2017    positive for Hodgkin's     excision of deep cervical LN's Right 2018    positive for Hodgkin's     port a cath placement Right 2017    IJ vein; removed 17 no longer needed     port a cath placement Left 2018    IJ vein     US BIOPSY LYMPH NODE FNA Left 2017    low midline neck, negative for malignancy       Focused Physical exam pertinent to procedure:          (Details of heart, lung, ASA assessment and mallampati assessment in pre procedure assessment flowsheet)  General- healthy, alert, active, no distress and healthy,alert,no distress   Recent vital signs-  /65 (BP Location: Right arm, Patient Position: Sitting, Cuff Size: Adult Regular)  Pulse 65  Temp 97.4  F (36.3  C) (Oral)  Resp 16   Wt 72.4 kg (159 lb 9.6 oz)  SpO2 100%  BMI 28.1 kg/m2  HEART-regular rate and rhythm and no murmurs, gallops, or rub  LUNGS-Clear to Ausculation  OROPHARYNGEAL - MALLAMPATTI- Class I (visualization of the soft palate, fauces, uvula, anterior and posterior pillars)    A/P:Reviewed history, medications, allergies, clinical information and pre procedure assessment. The patient was informed of the risks and benefits of the procedure.  They would like to proceed.  Amira Avery is approved for use of sedation during their procedure as noted above.      MD signature  Georgina Sanchez

## 2018-11-29 NOTE — MR AVS SNAPSHOT
Amira Avery   11/29/2018   Anticoagulation Therapy Visit    Description:  28 year old female   Provider:  B[facility] C[facility]   Department:  [facility]           INR as of 11/29/2018     Today's INR       Anticoagulation Summary as of 11/29/2018     INR goal 2.0-3.0   Today's INR    Next INR check     Indications   Deep vein thrombosis (DVT) (H) [I82.409] [I82.409]         November 2018 Details    Sun Mon Tue Wed Thu Fri Sat         1               2               3                 4               5               6               7               8               9               10                 11               12               13               14               15               16               17                 18               19      7.5 mg   See details      20      5 mg         21      2.5 mg         22      5 mg         23      2.5 mg         24      5 mg           25      2.5 mg         26            27               28               29               30                 Date Details   11/19 This INR check       Date of next INR:  11/26/2018         How to take your warfarin dose     To take:  2.5 mg Take 0.5 of a 5 mg tablet.    To take:  5 mg Take 1 of the 5 mg tablets.    To take:  7.5 mg Take 1.5 of the 5 mg tablets.

## 2018-11-29 NOTE — PROGRESS NOTES
Patient had a bone marrow biopsy today.  INR was 1.05  I left a messsgae for Amira to call the Coumadin clinic.   Asked her to follow any directions given to her by her providers about restarting the warfarin or lovenox.  If OK to restart tonight I recommended 5mgs of warfarin along with Lovenox 60mgs subcutaneous every 12 hours.

## 2018-11-29 NOTE — DISCHARGE INSTRUCTIONS
How to Care for your Bone Marrow Biopsy    Activity  Relax and take it easy for the next 24 hours.   Resume regular activity after 24 hours.    Diet   Resume pre-procedure diet and drink plenty of fluids.    If you received sedation, you may feel a little nauseated so start with a clear liquid diet until the nausea passes.    Do Not Immerse Bone Marrow Biopsy Puncture Site in Water  Do not take a bath until the puncture site has healed.  Do not sit in a hot tub or spa until the puncture site has healed.  Do not swim until the puncture site has healed.  Wait 24 hours before taking a shower.    Drainage  Drainage should be minimal.  IF bleeding should occur and soaks through the dressing, lie down and put pressure on the puncture site.    IF bleeding persists, apply gentle pressure with your hand over the dressing for 5 minutes.    IF the pressure doesn't stop the bleeding, contact your provider immediately.    Dressing  Keep the dressing dry and in place for 24 hours, unless instructed otherwise.    IF bleeding soaks through the dressing in the first 24 hours do NOT remove the dressing as you may pull off any scab that has formed.  Instead, reinforce the dressing with extra gauze and tape.    No Alcohol  Do not drink alcoholic beverages for the next 24 hours.    No Driving or Operating Machinery  No driving or operating machinery for the next 24 hours.    Notify your provider IF:    Excessive bleeding or drainage at the puncture site    Excessive swelling, redness or tenderness at the puncture site    Fever above 100.5 degrees taken orally    Severe pain    Drainage that is green, yellow, thick white or has a bad odor    Telephone Numbers  Bone Marrow transplant clinic:  [phone] (Monday thru Friday, 8:00 am to 4:00 pm)  After business hours call the [facility]:  [phone] and ask for the Hematology/BMT doctor on call.  Or call the Emergency Room at the [facility]  "[facility]:  [phone].      [facility]  Home Care Following Anesthesia  For 24 hours after surgery:  1. Get plenty of rest.  A responsible adult must stay with you for at least 24 hours after you leave the surgery center.  2. Do not drive or use heavy equipment.  If you have weakness or tingling, don't drive or use heavy equipment until this feeling goes away.   3. Do not drink alcohol.   4. Avoid strenuous or risky activities.  Ask for help when climbing stairs.  5. You may feel lightheaded.  IF so, sit for a few minutes before standing.  Have someone help you get up.   6. If you have nausea (feel sick to your stomach): Drink only clear liquids such as apple juice, ginger ale, broth or 7-Up.  Rest may also help.  Be sure to drink enough fluids.  Move to a regular diet as you feel able.   7. You may have a slight fever.  Call the doctor if your fever is over 100 F (37.7 C) (taken under the tongue) or lasts longer than 24 hours.  8. You may have a dry mouth, a sore throat, muscle aches or trouble sleeping. These should go away after 24 hours.  9. Do not make important or legal decisions.        Today you received a Marcaine or bupivacaine block to numb the nerves near your surgery site.  This is a block using local anesthetic or \"numbing\" medication injected around the nerves to anesthetize or \"numb\" the area supplied by those nerves.  This block is injected into the muscle layer near your surgical site.  The medication may numb the location where you had surgery for 6-18 hours, but may last up to 24 hours.  If your surgical site is an arm or leg you should be careful with your affected limb, since it is possible to injure your limb without being aware of it due to the numbing.  Until full feeling returns, you should guard against bumping or hitting your limb, and avoid extreme hot or cold temperatures on the skin.  As the block wears off, the feeling will return as a " tingling or prickly sensation near your surgical site.  You will experience more discomfort from your incision as the feeling returns.  You may want to take a pain pill (a narcotic or Tylenol if this was prescribed by your surgeon) when you start to experience mild pain before the pain beccomes more severe.  If your pain medications do not control your pain you should notifiy your surgeon.    Tips for taking pain medications  To get the best pain relief possible, remember these points:    Take pain medications as directed, before pain becomes severe.    Pain medication can upset your stomach: taking it with food may help.    Constipation is a common side effect of pain medication. Drink plenty of  fluids.    Eat foods high in fiber. Take a stool softener if recommended by your doctor or pharmacist.    Do not drink alcohol, drive or operate machinery while taking pain medications.    Ask about other ways to control pain, such as with heat, ice or relaxation.    Tylenol/Acetaminophen Consumption  To help encourage the safe use of acetaminophen, the makers of TYLENOL  have lowered the maximum daily dose for single-ingredient Extra Strength TYLENOL  (acetaminophen) products sold in the [place] from 8 pills per day (4,000 mg) to 6 pills per day (3,000 mg). The dosing interval has also changed from 2 pills every 4-6 hours to 2 pills every 6 hours.    If you feel your pain relief is insufficient, you may take Tylenol/Acetaminophen in addition to your narcotic pain medication.     Be careful not to exceed 3,000 mg of Tylenol/Acetaminophen in a 24 hour period from all sources.    If you are taking extra strength Tylenol/acetaminophen (500 mg), the maximum dose is 6 tablets in 24 hours.    If you are taking regular strength acetaminophen (325 mg), the maximum dose is 9 tablets in 24 hours.    Call a doctor for any of the followin. Signs of infection (fever, growing tenderness at the surgery site, a large amount of drainage  or bleeding, severe pain, foul-smelling drainage, redness, swelling).  2. It has been over 8 to 10 hours since surgery and you are still not able to urinate (pass water).  3. Headache for over 24 hours.

## 2018-11-29 NOTE — ANESTHESIA PREPROCEDURE EVALUATION
Anesthesia Evaluation     . Pt has had prior anesthetic. Type: General    No history of anesthetic complications          ROS/MED HX    ENT/Pulmonary:  - neg pulmonary ROS   (+)tobacco use, Past use , . .    Neurologic:  - neg neurologic ROS     Cardiovascular:  - neg cardiovascular ROS       METS/Exercise Tolerance:  >4 METS   Hematologic:  - neg hematologic  ROS       Musculoskeletal:  - neg musculoskeletal ROS       GI/Hepatic:  - neg GI/hepatic ROS       Renal/Genitourinary:  - ROS Renal section negative       Endo:  - neg endo ROS       Psychiatric:         Infectious Disease:  - neg infectious disease ROS       Malignancy:   (+) Malignancy History of Lymphoma/Leukemia          Other:    - neg other ROS                 Physical Exam  Normal systems: dental    Airway   Mallampati: I  TM distance: >3 FB  Neck ROM: full    Dental     Cardiovascular   Rhythm and rate: regular and normal      Pulmonary    breath sounds clear to auscultation                    Anesthesia Plan      History & Physical Review  History and physical reviewed and following examination; no interval change.    ASA Status:  2 .    NPO Status:  > 6 hours    Plan for MAC Maintenance will be TIVA.  Reason for MAC:  Deep or markedly invasive procedure (G8)  PONV prophylaxis:  Ondansetron (or other 5HT-3)       Postoperative Care  Postoperative pain management:  Oral pain medications and Multi-modal analgesia.      Consents  Anesthetic plan, risks, benefits and alternatives discussed with:  Patient.  Use of blood products discussed: No .   .                          .    JJESSICAG [facility] AN PHYSICAL EXAM    Assessment:   ASA SCORE: 2

## 2018-11-29 NOTE — MR AVS SNAPSHOT
After Visit Summary   11/29/2018    Amira Avery    MRN: [id number]           Patient Information     Date Of Birth          1990        Visit Information        Provider Department      11/29/2018 7:30 AM  BMT WES #1 [facility] Blood and Marrow Transplant        Today's Diagnoses     Hodgkin lymphoma, unspecified, lymph nodes of inguinal region and lower limb (H)    -  1          Clinics and Surgery Center ([facility])  [address] [place] [address]  Phone: [phone]  Clinic Hours:   Monday-Thursday:7am to 7pm   Friday: 7am to 5pm   Weekends and holidays:    8am to noon (in general)  If your fever is 100.5  or greater,   call the clinic.  After hours call the   hospital at [phone] or   [phone]. Ask for the BMT   fellow on-call            Follow-ups after your visit        Your next 10 appointments already scheduled     Nov 29, 2018 10:00 AM CST   Bone Marrow Biopsy with  ASC BONE MARROW BIOPSY WES, UU BONE MARROW BIOPSY   [facility] Blood and Marrow Transplant ([facility] [place])    [address] [place] [address]  [address] [place] [address] [phone]            Nov 29, 2018   Procedure with Shiloh Drake PA-C   [facility] ([facility])    [address] [place] [address]  5th Floor  [facility] [address] [phone]            BIOPSY BONE MARROW Open [place]     Located in the Clinics and Surgery Center at [address] [place] [address].   parking is very convenient and highly recommended.  is a $6 flat rate fee.  Both  and self parkers should enter the main arrival plaza from [facility]; parking attendants will direct you based on your parking preference.            Dec 04, 2018 12:00 PM CST   BMT Anniversary Visit with  BMT DOM   [facility] Blood and Marrow Transplant ([facility])    [address] [place] [address]  [address] [place] [address]  [address] [phone]              Who to contact     If you have questions or need follow up information about today's clinic visit or your schedule please contact [facility] BLOOD AND MARROW TRANSPLANT directly at [phone].  Normal or non-critical lab and imaging results will be communicated to you by AdYapperhart, letter or phone within 4 business days after the clinic has received the results. If you do not hear from us within 7 days, please contact the clinic through Personallyt or phone. If you have a critical or abnormal lab result, we will notify you by phone as soon as possible.  Submit refill requests through [email or web address] or call your pharmacy and they will forward the refill request to us. Please allow 3 business days for your refill to be completed.          Additional Information About Your Visit        [email or web address] Information     [email or web address] gives you secure access to your electronic health record. If you see a primary care provider, you can also send messages to your care team and make appointments. If you have questions, please call your primary care clinic.  If you do not have a primary care provider, please call [phone] and they will assist you.        Care EveryWhere ID     This is your Care EveryWhere ID. This could be used by other organizations to access your [facility] medical records  [id number]        Your Vitals Were     Pulse Temperature Respirations Pulse Oximetry BMI (Body Mass Index)       65 97.4  F (36.3  C) (Oral) 16 100% 28.1 kg/m2        Blood Pressure from Last 3 Encounters:   11/29/18 119/65   11/19/18 115/72   11/14/18 117/72    Weight from Last 3 Encounters:   11/29/18 72.4 kg (159 lb 9.6 oz)   11/19/18 72.5 kg (159 lb 14.4 oz)   11/14/18 70.9 kg (156 lb 4.8 oz)              We Performed the Following     CBC with platelets differential     INR        Recent Review Flowsheet Data     BMT Recent Results Latest Ref Rng & Units 10/19/2018 10/23/2018 10/29/2018 11/1/2018 11/14/2018 11/19/2018  11/29/2018    WBC 4.0 - 11.0 10e9/L 4.5 - 4.2 4.2 - 4.5 5.4    Hemoglobin 11.7 - 15.7 g/dL 11.4(L) - 11.4(L) 11.5(L) - 12.0 11.9    Platelet Count 150 - 450 10e9/L 211 - 205 244 - 269 210    Neutrophils (Absolute) 1.6 - 8.3 10e9/L 1.2(L) - 1.5(L) 1.8 - 1.7 2.0    Blasts (Absolute) 0 10e9/L - - - - - - -    INR 0.86 - 1.14 3.74(H) 2.54(H) 2.29(H) 2.37(H) 2.36(H) 1.53(H) 1.05    Sodium 133 - 144 mmol/L 137 - 140 140 - 138 -    Potassium 3.4 - 5.3 mmol/L 3.7 - 4.2 4.3 - 3.8 -    Chloride 94 - 109 mmol/L 107 - 109 109 - 110(H) -    Glucose 70 - 99 mg/dL 92 - 81 102(H) 95 88 -    Urea Nitrogen 7 - 30 mg/dL 10 - 10 11 - 12 -    Creatinine 0.52 - 1.04 mg/dL 0.73 - 0.85 0.84 - 0.80 -    Calcium (Total) 8.5 - 10.1 mg/dL 8.4(L) - 8.3(L) 8.0(L) - 9.0 -    Protein (Total) 6.8 - 8.8 g/dL 6.7(L) - 6.8 6.8 - 7.0 -    Albumin 3.4 - 5.0 g/dL 4.0 - 4.0 3.9 - 4.0 -    Bilirubin (Direct) 0.0 - 0.2 mg/dL - - - - - - -    Alkaline Phosphatase 40 - 150 U/L 63 - 61 57 - 56 -    AST 0 - 45 U/L 34 - 20 20 - 21 -    ALT 0 - 50 U/L 48 - 32 30 - 26 -    MCV 78 - 100 fl 96 - 99 98 - 95 94               Primary Care Provider Office Phone # Fax #    [facility] [phone]       [address] [facility] [address]        Equal Access to Services     RICHIE RAMEY : Hadii velma Luis, waaxda luqadaha, qaybta kaalmada mariluz, jen raymundo. So [facility] [phone].    ATENCIÓN: Si habla español, tiene a isabel disposición servicios gratuitos de asistencia lingüística. Llame al [phone].    We comply with applicable federal civil rights laws and [place] laws. We do not discriminate on the basis of race, color, national origin, age, disability, sex, sexual orientation, or gender identity.            Thank you!     Thank you for choosing [facility] BLOOD AND MARROW TRANSPLANT  for your care. Our goal is always to provide you with excellent care. Hearing back from our  patients is one way we can continue to improve our services. Please take a few minutes to complete the written survey that you may receive in the mail after your visit with us. Thank you!             Your Updated Medication List - Protect others around you: Learn how to safely use, store and throw away your medicines at www.disposemymeds.org.          This list is accurate as of 11/29/18  8:29 AM.  Always use your most recent med list.                   Brand Name Dispense Instructions for use Diagnosis    ACETAMINOPHEN PO      Take 650 mg by mouth every 4 hours as needed for pain    S/P autologous bone marrow transplantation (H), Hodgkin lymphoma, unspecified, lymph nodes of inguinal region and lower limb (H)       acyclovir 400 MG tablet    ZOVIRAX    60 tablet    Take 1 tablet (400 mg) by mouth 2 times daily    Hodgkin lymphoma, unspecified, lymph nodes of inguinal region and lower limb (H)       estradiol 10 MCG Tabs vaginal tablet    VAGIFEM    20 tablet    Place 1 tablet (10 mcg) vaginally twice a week Nightly for 1-2 weeks then twice weekly    Vaginal dryness       PARoxetine 10 MG tablet    PAXIL    30 tablet    Take 1 tablet (10 mg) by mouth At Bedtime    Menopausal syndrome (hot flashes)       warfarin 5 MG tablet    COUMADIN    30 tablet    Take 1 tablet (5 mg) by mouth daily

## 2018-11-29 NOTE — IP AVS SNAPSHOT
[facility]    [address] [place] [address]    Phone:  [phone]    Fax:  [phone]                                       After Visit Summary   11/29/2018    Amira Aevry    MRN: [id number]           After Visit Summary Signature Page     I have received my discharge instructions, and my questions have been answered. I have discussed any challenges I see with this plan with the nurse or doctor.    ..........................................................................................................................................  Patient/Patient Representative Signature      ..........................................................................................................................................  Patient Representative Print Name and Relationship to Patient    ..................................................               ................................................  Date                                   Time    ..........................................................................................................................................  Reviewed by Signature/Title    ...................................................              ..............................................  Date                                               Time          22EPIC Rev 08/18

## 2018-11-29 NOTE — NURSING NOTE
"Oncology Rooming Note    November 29, 2018 8:02 AM   Amira Avery is a 28 year old female who presents for:    Chief Complaint   Patient presents with     Port Draw     Labs drawn via port by RN. VS taken. Pt checked in for next appt     RECHECK     RTN- Hodgkin lymphoma     Initial Vitals: /65 (BP Location: Right arm, Patient Position: Sitting, Cuff Size: Adult Regular)  Pulse 65  Temp 97.4  F (36.3  C) (Oral)  Resp 16  Wt 72.4 kg (159 lb 9.6 oz)  SpO2 100%  BMI 28.1 kg/m2 Estimated body mass index is 28.1 kg/(m^2) as calculated from the following:    Height as of 8/24/18: 1.605 m (5' 3.19\").    Weight as of this encounter: 72.4 kg (159 lb 9.6 oz). Body surface area is 1.8 meters squared.  No Pain (0) Comment: Data Unavailable   No LMP recorded.  Allergies reviewed: Yes  Medications reviewed: Yes    Medications: Medication refills not needed today.  Pharmacy name entered into Updox: [facility] [address] [place] [address] [address] AT [facility] [address]    Clinical concerns: None    8 minutes for nursing intake (face to face time)     Serenity Faulkner CMA              "

## 2018-11-29 NOTE — ANESTHESIA POSTPROCEDURE EVALUATION
Anesthesia POST Procedure Evaluation    Patient: Amira Avery   MRN:     [id number] Gender:   female   Age:    28 year old :      1990        Preoperative Diagnosis: Disease Staging   Procedure(s):  Bone Marrow Biopsy, Left   Postop Comments: No value filed.       Anesthesia Type:  Not documented    Reportable Event: NO     PAIN: Uncomplicated   Sign Out status: Comfortable, Well controlled pain     PONV: No PONV   Sign Out status:  No Nausea or Vomiting     Neuro/Psych: Uneventful perioperative course   Sign Out Status: Preoperative baseline; Age appropriate mentation     Airway/Resp.: Uneventful perioperative course   Sign Out Status: Non labored breathing, age appropriate RR; Resp. Status within EXPECTED Parameters     CV: Uneventful perioperative course   Sign Out status: Appropriate BP and perfusion indices; Appropriate HR/Rhythm     Disposition:   Sign Out in:  Phase II  Disposition:  Home  Recovery Course: Uneventful  Follow-Up: Not required           Last Anesthesia Record Vitals:  CRNA VITALS  2018 1021 - 2018 1121      2018             Pulse: 65    SpO2: 99 %          Last PACU/Preop Vitals:  Vitals:    18 1056 18 1115 18 1125   BP: 92/50 96/52 99/55   Pulse:      Resp: 16 16 16   Temp: 36.5  C (97.7  F)  36.6  C (97.8  F)   SpO2: 100% 100% 100%         Electronically Signed By: Jono Johnson MD, 2018, 1:50 PM

## 2018-11-29 NOTE — IP AVS SNAPSHOT
MRN:[id number]                      After Visit Summary   11/29/2018    Amira Avery    MRN: [id number]           Thank you!     Thank you for choosing [facility] for your care. Our goal is always to provide you with excellent care. Hearing back from our patients is one way we can continue to improve our services. Please take a few minutes to complete the written survey that you may receive in the mail after you visit with us. Thank you!        Patient Information     Date Of Birth          1990        About your hospital stay     You were admitted on:  November 29, 2018 You last received care in the:  [facility]    You were discharged on:  November 29, 2018       Who to Call     For medical emergencies, please call 911.  For non-urgent questions about your medical care, please call your primary care provider or clinic, [phone]  For questions related to your surgery, please call your surgery clinic        Attending Provider     Provider Shiloh Toledo PA-C Physician Assistant       Primary Care Provider Office Phone # Fax #    [facility] [phone]      Your next 10 appointments already scheduled     Dec 04, 2018 12:00 PM [facility]   BMT Anniversary Visit with  BMT DOM   [facility] Blood and Marrow Transplant ([facility])    [address] [facility]  [address] [place] [address] [phone]              Further instructions from your care team       How to Care for your Bone Marrow Biopsy    Activity  Relax and take it easy for the next 24 hours.   Resume regular activity after 24 hours.    Diet   Resume pre-procedure diet and drink plenty of fluids.    If you received sedation, you may feel a little nauseated so start with a clear liquid diet until the nausea passes.    Do Not Immerse Bone Marrow Biopsy Puncture Site in Water  Do not take a bath until the puncture site has  healed.  Do not sit in a hot tub or spa until the puncture site has healed.  Do not swim until the puncture site has healed.  Wait 24 hours before taking a shower.    Drainage  Drainage should be minimal.  IF bleeding should occur and soaks through the dressing, lie down and put pressure on the puncture site.    IF bleeding persists, apply gentle pressure with your hand over the dressing for 5 minutes.    IF the pressure doesn't stop the bleeding, contact your provider immediately.    Dressing  Keep the dressing dry and in place for 24 hours, unless instructed otherwise.    IF bleeding soaks through the dressing in the first 24 hours do NOT remove the dressing as you may pull off any scab that has formed.  Instead, reinforce the dressing with extra gauze and tape.    No Alcohol  Do not drink alcoholic beverages for the next 24 hours.    No Driving or Operating Machinery  No driving or operating machinery for the next 24 hours.    Notify your provider IF:    Excessive bleeding or drainage at the puncture site    Excessive swelling, redness or tenderness at the puncture site    Fever above 100.5 degrees taken orally    Severe pain    Drainage that is green, yellow, thick white or has a bad odor    Telephone Numbers  Bone Marrow transplant clinic:  [phone] (Monday thru Friday, 8:00 am to 4:00 pm)  After business hours call the [facility]:  [phone] and ask for the Hematology/BMT doctor on call.  Or call the Emergency Room at the [facility]:  [phone].      [facility]  Home Care Following Anesthesia  For 24 hours after surgery:  1. Get plenty of rest.  A responsible adult must stay with you for at least 24 hours after you leave the surgery center.  2. Do not drive or use heavy equipment.  If you have weakness or tingling, don't drive or use heavy equipment until this feeling goes away.   3. Do not drink alcohol.  "  4. Avoid strenuous or risky activities.  Ask for help when climbing stairs.  5. You may feel lightheaded.  IF so, sit for a few minutes before standing.  Have someone help you get up.   6. If you have nausea (feel sick to your stomach): Drink only clear liquids such as apple juice, ginger ale, broth or 7-Up.  Rest may also help.  Be sure to drink enough fluids.  Move to a regular diet as you feel able.   7. You may have a slight fever.  Call the doctor if your fever is over 100 F (37.7 C) (taken under the tongue) or lasts longer than 24 hours.  8. You may have a dry mouth, a sore throat, muscle aches or trouble sleeping. These should go away after 24 hours.  9. Do not make important or legal decisions.        Today you received a Marcaine or bupivacaine block to numb the nerves near your surgery site.  This is a block using local anesthetic or \"numbing\" medication injected around the nerves to anesthetize or \"numb\" the area supplied by those nerves.  This block is injected into the muscle layer near your surgical site.  The medication may numb the location where you had surgery for 6-18 hours, but may last up to 24 hours.  If your surgical site is an arm or leg you should be careful with your affected limb, since it is possible to injure your limb without being aware of it due to the numbing.  Until full feeling returns, you should guard against bumping or hitting your limb, and avoid extreme hot or cold temperatures on the skin.  As the block wears off, the feeling will return as a tingling or prickly sensation near your surgical site.  You will experience more discomfort from your incision as the feeling returns.  You may want to take a pain pill (a narcotic or Tylenol if this was prescribed by your surgeon) when you start to experience mild pain before the pain beccomes more severe.  If your pain medications do not control your pain you should notifiy your surgeon.    Tips for taking pain medications  To get " the best pain relief possible, remember these points:    Take pain medications as directed, before pain becomes severe.    Pain medication can upset your stomach: taking it with food may help.    Constipation is a common side effect of pain medication. Drink plenty of  fluids.    Eat foods high in fiber. Take a stool softener if recommended by your doctor or pharmacist.    Do not drink alcohol, drive or operate machinery while taking pain medications.    Ask about other ways to control pain, such as with heat, ice or relaxation.    Tylenol/Acetaminophen Consumption  To help encourage the safe use of acetaminophen, the makers of TYLENOL  have lowered the maximum daily dose for single-ingredient Extra Strength TYLENOL  (acetaminophen) products sold in the [place] from 8 pills per day (4,000 mg) to 6 pills per day (3,000 mg). The dosing interval has also changed from 2 pills every 4-6 hours to 2 pills every 6 hours.    If you feel your pain relief is insufficient, you may take Tylenol/Acetaminophen in addition to your narcotic pain medication.     Be careful not to exceed 3,000 mg of Tylenol/Acetaminophen in a 24 hour period from all sources.    If you are taking extra strength Tylenol/acetaminophen (500 mg), the maximum dose is 6 tablets in 24 hours.    If you are taking regular strength acetaminophen (325 mg), the maximum dose is 9 tablets in 24 hours.    Call a doctor for any of the followin. Signs of infection (fever, growing tenderness at the surgery site, a large amount of drainage or bleeding, severe pain, foul-smelling drainage, redness, swelling).  2. It has been over 8 to 10 hours since surgery and you are still not able to urinate (pass water).  3. Headache for over 24 hours.      Pending Results     No orders found from 2018 to 2018.            Admission Information     Date & Time Provider Department Dept. Phone    2018 Shiloh Drake PA-C [facility]  "[phone]      Your Vitals Were     Blood Pressure Pulse Temperature Respirations Height Weight    92/50 66 97.7  F (36.5  C) (Oral) 16 1.6 m (5' 3\") 72.1 kg (159 lb)    Last Period Pulse Oximetry BMI (Body Mass Index)             11/28/2018 100% 28.17 kg/m2         [employer] Information     [employer] gives you secure access to your electronic health record. If you see a primary care provider, you can also send messages to your care team and make appointments. If you have questions, please call your primary care clinic.  If you do not have a primary care provider, please call [phone] and they will assist you.      [employer] is an electronic gateway that provides easy, online access to your medical records. With [employer], you can request a clinic appointment, read your test results, renew a prescription or communicate with your care team.     To access your existing account, please contact your [facility] or call [phone] for assistance.        Care EveryWhere ID     This is your Care EveryWhere ID. This could be used by other organizations to access your [facility] medical records  [id number]        Equal Access to Services     RICHIE RAMEY : Hadii velma Luis, wasarahda brenna, qaybta kaalmada mariluz, jen raymundo. So [facility] [phone].    ATENCIÓN: Si habla español, tiene a isabel disposición servicios gratuitos de asistencia lingüística. Llame al [phone].    We comply with applicable federal civil rights laws and [place] laws. We do not discriminate on the basis of race, color, national origin, age, disability, sex, sexual orientation, or gender identity.               Review of your medicines      UNREVIEWED medicines. Ask your doctor about these medicines        Dose / Directions    ACETAMINOPHEN PO   Used for:  S/P autologous bone marrow transplantation (H), Hodgkin lymphoma, unspecified, lymph nodes of inguinal region and lower " limb (H)        Dose:  650 mg   Take 650 mg by mouth every 4 hours as needed for pain   Refills:  0       acyclovir 400 MG tablet   Commonly known as:  ZOVIRAX   Used for:  Hodgkin lymphoma, unspecified, lymph nodes of inguinal region and lower limb (H)        Dose:  400 mg   Take 1 tablet (400 mg) by mouth 2 times daily   Quantity:  60 tablet   Refills:  1       estradiol 10 MCG Tabs vaginal tablet   Commonly known as:  VAGIFEM   Used for:  Vaginal dryness        Dose:  10 mcg   Place 1 tablet (10 mcg) vaginally twice a week Nightly for 1-2 weeks then twice weekly   Quantity:  20 tablet   Refills:  1       PARoxetine 10 MG tablet   Commonly known as:  PAXIL   Used for:  Menopausal syndrome (hot flashes)        Dose:  10 mg   Take 1 tablet (10 mg) by mouth At Bedtime   Quantity:  30 tablet   Refills:  1       warfarin 5 MG tablet   Commonly known as:  COUMADIN        Dose:  5 mg   Take 1 tablet (5 mg) by mouth daily   Quantity:  30 tablet   Refills:  1                Protect others around you: Learn how to safely use, store and throw away your medicines at www.disposemymeds.org.             Medication List: This is a list of all your medications and when to take them. Check marks below indicate your daily home schedule. Keep this list as a reference.      Medications           Morning Afternoon Evening Bedtime As Needed    ACETAMINOPHEN PO   Take 650 mg by mouth every 4 hours as needed for pain   Last time this was given:  975 mg on 11/29/2018  9:25 AM                                acyclovir 400 MG tablet   Commonly known as:  ZOVIRAX   Take 1 tablet (400 mg) by mouth 2 times daily                                estradiol 10 MCG Tabs vaginal tablet   Commonly known as:  VAGIFEM   Place 1 tablet (10 mcg) vaginally twice a week Nightly for 1-2 weeks then twice weekly                                PARoxetine 10 MG tablet   Commonly known as:  PAXIL   Take 1 tablet (10 mg) by mouth At Bedtime                                 warfarin 5 MG tablet   Commonly known as:  COUMADIN   Take 1 tablet (5 mg) by mouth daily

## 2018-11-29 NOTE — ANESTHESIA CARE TRANSFER NOTE
Patient: Amira Avery    Procedure(s):  Bone Marrow Biopsy, Left    Diagnosis: Disease Staging  Diagnosis Additional Information: No value filed.    Anesthesia Type:   MAC     Note:  Airway :Nasal Cannula  Patient transferred to:Phase II  Comments: VSS and WNL, appears comfortable, no PONV, report to Catarino RNHandoff Report: Identifed the Patient, Identified the Reponsible Provider, Reviewed the pertinent medical history, Discussed the surgical course, Reviewed Intra-OP anesthesia mangement and issues during anesthesia, Set expectations for post-procedure period and Allowed opportunity for questions and acknowledgement of understanding      Vitals: (Last set prior to Anesthesia Care Transfer)    CRNA VITALS  11/29/2018 1021 - 11/29/2018 1057      11/29/2018             Pulse: 65    SpO2: 99 %                Electronically Signed By: MARIA TERESA eLa CRNA  November 29, 2018  10:57 AM

## 2018-11-29 NOTE — PROGRESS NOTES
BMT ONC Adult Bone Marrow Biopsy Procedure Note  November 29, 2018  LMP 11/28/2018   Last menstrual period 11/28/2018, not currently breastfeeding.     Learning needs assessment complete within 12 months? YES    DIAGNOSIS: D+100 s/o auto BMT for hodgkins lymphoma    PROCEDURE: Unilateral Bone Marrow Biopsy and Unilateral Aspirate    LOCATION: [facility] 5th floor-Procedure Room    Patient s identification was positively verified by verbal identification and invasive procedure safety checklist was completed. Informed consent was obtained. Following the administration of propofol, patient was placed in the prone position and prepped and draped in a sterile manner. Approximately 18 cc of 1% Lidocaine was used over the left posterior iliac spine. Following this a 3 mm incision was made. Trephine bone marrow core(s) was (were) obtained from the [facility]. Bone marrow aspirates were obtained from the [facility]. Aspirates were sent for morphology, immunophenotyping, cytogenetics and molecular diagnostics - process and hold. A total of approximately 20 ml of marrow was aspirated. Following this procedure a sterile dressing was applied to the bone marrow biopsy site(s). The patient was placed in the supine position to maintain pressure on the biopsy site. Post-procedure wound care instructions were given.     Complications: NO    Pre-procedural pain: 0 out of 10 on the numeric pain rating scale.     Procedural pain: 0 out of 10 on the numeric pain rating scale.     Post-procedural pain assessment: 0 out of 10 on the numeric pain rating scale.     Interventions: NO    Length of procedure:20 minutes or less      Procedure performed by: Shiloh Drake PA-C

## 2018-11-29 NOTE — NURSING NOTE
Chief Complaint   Patient presents with     Port Draw     Labs drawn via port by RN. VS taken. Pt checked in for next appt     Port accessed with 20g gripper needle by RN, labs collected, line flushed with saline and heparin.  Vitals taken. Pt checked in for appointment(s).    Amelia SOTELO RN PHN BSN  BMT/Oncology Lab

## 2018-11-29 NOTE — MR AVS SNAPSHOT
After Visit Summary   11/29/2018    Amira Avery    MRN: [id number]           Patient Information     Date Of Birth          1990        Visit Information        Provider Department      11/29/2018 10:00 AM [facility] BONE MARROW BIOPSY;  ASC BONE MARROW BIOPSY [facility] Blood and Marrow Transplant        Today's Diagnoses     S/P bone marrow transplant (H)    -  1          [facility] [place]  [address] [place] [id number]  Phone: [phone]  Clinic Hours:   Monday-Thursday:7am to 7pm   Friday: 7am to 5pm   Weekends and holidays:    8am to noon (in general)  If your fever is 100.5  or greater,   call the clinic.  After hours call the   hospital at [phone] or   [phone]. Ask for the BMT   fellow on-call            Follow-ups after your visit        Your next 10 appointments already scheduled     Dec 04, 2018 12:00 PM CST   BMT Anniversary Visit with  BMT DOM   [facility] Blood and Marrow Transplant ([facility] [place])    [address] [place] [address]  [address] [place] [id number] [phone]              Future tests that were ordered for you today     Open Future Orders        Priority Expected Expires Ordered    CBC with platelets differential Routine  11/29/2019 11/29/2018    INR Routine  11/29/2019 11/29/2018            Who to contact     If you have questions or need follow up information about today's clinic visit or your schedule please contact [facility] BLOOD AND MARROW TRANSPLANT directly at [phone].  Normal or non-critical lab and imaging results will be communicated to you by MyChart, letter or phone within 4 business days after the clinic has received the results. If you do not hear from us within 7 days, please contact the clinic through [employer] or phone. If you have a critical or abnormal lab result, we will notify you by phone as soon as possible.  Submit refill requests through [employer] or call your pharmacy and  they will forward the refill request to us. Please allow 3 business days for your refill to be completed.          Additional Information About Your Visit        [employer] Information     [employer] gives you secure access to your electronic health record. If you see a primary care provider, you can also send messages to your care team and make appointments. If you have questions, please call your primary care clinic.  If you do not have a primary care provider, please call [phone] and they will assist you.        Care EveryWhere ID     This is your Care EveryWhere ID. This could be used by other organizations to access your [facility] medical records  [id number]        Your Vitals Were     Last Period                   11/28/2018            Blood Pressure from Last 3 Encounters:   11/29/18 92/50   11/29/18 119/65   11/19/18 115/72    Weight from Last 3 Encounters:   11/29/18 72.1 kg (159 lb)   11/29/18 72.4 kg (159 lb 9.6 oz)   11/19/18 72.5 kg (159 lb 14.4 oz)              We Performed the Following     Activity     Bone marrow biopsy     Diet Instructions     Do not  immerse bone marrow biopsy puncture site in water     Drainage     Dressing     DX BONE MARROW BIOPSY(IES) & ASPIRATION(S) (Charge)     No Alcohol     No driving or operating machinery     Notify Provider     Shower        Recent Review Flowsheet Data     BMT Recent Results Latest Ref Rng & Units 10/19/2018 10/23/2018 10/29/2018 11/1/2018 11/14/2018 11/19/2018 11/29/2018    WBC 4.0 - 11.0 10e9/L 4.5 - 4.2 4.2 - 4.5 5.4    Hemoglobin 11.7 - 15.7 g/dL 11.4(L) - 11.4(L) 11.5(L) - 12.0 11.9    Platelet Count 150 - 450 10e9/L 211 - 205 244 - 269 210    Neutrophils (Absolute) 1.6 - 8.3 10e9/L 1.2(L) - 1.5(L) 1.8 - 1.7 2.0    Blasts (Absolute) 0 10e9/L - - - - - - -    INR 0.86 - 1.14 3.74(H) 2.54(H) 2.29(H) 2.37(H) 2.36(H) 1.53(H) 1.05    Sodium 133 - 144 mmol/L 137 - 140 140 - 138 -    Potassium 3.4 - 5.3 mmol/L 3.7 - 4.2 4.3 - 3.8 -    Chloride 94 - 109  mmol/L 107 - 109 109 - 110(H) -    Glucose 70 - 99 mg/dL 92 - 81 102(H) 95 88 -    Urea Nitrogen 7 - 30 mg/dL 10 - 10 11 - 12 -    Creatinine 0.52 - 1.04 mg/dL 0.73 - 0.85 0.84 - 0.80 -    Calcium (Total) 8.5 - 10.1 mg/dL 8.4(L) - 8.3(L) 8.0(L) - 9.0 -    Protein (Total) 6.8 - 8.8 g/dL 6.7(L) - 6.8 6.8 - 7.0 -    Albumin 3.4 - 5.0 g/dL 4.0 - 4.0 3.9 - 4.0 -    Bilirubin (Direct) 0.0 - 0.2 mg/dL - - - - - - -    Alkaline Phosphatase 40 - 150 U/L 63 - 61 57 - 56 -    AST 0 - 45 U/L 34 - 20 20 - 21 -    ALT 0 - 50 U/L 48 - 32 30 - 26 -    MCV 78 - 100 fl 96 - 99 98 - 95 94               Primary Care Provider Office Phone # Fax #    [facility] [phone] [address] [facility] [address]        Equal Access to Services     RICHIE RAMEY : Hadii velma ku hadasho Sotiagoali, waaxda luqadaha, qaybta kaalmada adeegyada, jen raymundo. So [facility] [phone].    ATENCIÓN: Si habla español, tiene a isabel disposición servicios gratuitos de asistencia lingüística. ame al [phone].    We comply with applicable federal civil rights laws and [place] laws. We do not discriminate on the basis of race, color, national origin, age, disability, sex, sexual orientation, or gender identity.            Thank you!     Thank you for choosing [facility] BLOOD AND MARROW TRANSPLANT  for your care. Our goal is always to provide you with excellent care. Hearing back from our patients is one way we can continue to improve our services. Please take a few minutes to complete the written survey that you may receive in the mail after your visit with us. Thank you!             Your Updated Medication List - Protect others around you: Learn how to safely use, store and throw away your medicines at www.disposemymeds.org.          This list is accurate as of 11/29/18 11:07 AM.  Always use your most recent med list.                   Brand Name Dispense Instructions for use  Diagnosis    ACETAMINOPHEN PO      Take 650 mg by mouth every 4 hours as needed for pain    S/P autologous bone marrow transplantation (H), Hodgkin lymphoma, unspecified, lymph nodes of inguinal region and lower limb (H)       acyclovir 400 MG tablet    ZOVIRAX    60 tablet    Take 1 tablet (400 mg) by mouth 2 times daily    Hodgkin lymphoma, unspecified, lymph nodes of inguinal region and lower limb (H)       estradiol 10 MCG Tabs vaginal tablet    VAGIFEM    20 tablet    Place 1 tablet (10 mcg) vaginally twice a week Nightly for 1-2 weeks then twice weekly    Vaginal dryness       PARoxetine 10 MG tablet    PAXIL    30 tablet    Take 1 tablet (10 mg) by mouth At Bedtime    Menopausal syndrome (hot flashes)       warfarin 5 MG tablet    COUMADIN    30 tablet    Take 1 tablet (5 mg) by mouth daily

## 2018-11-30 LAB
COPATH REPORT: NORMAL
COPATH REPORT: NORMAL

## 2018-12-04 ENCOUNTER — ANTICOAGULATION THERAPY VISIT (OUTPATIENT)
Dept: ANTICOAGULATION | Facility: CLINIC | Age: 28
End: 2018-12-04

## 2018-12-04 ENCOUNTER — OFFICE VISIT (OUTPATIENT)
Dept: TRANSPLANT | Facility: CLINIC | Age: 28
End: 2018-12-04
Attending: INTERNAL MEDICINE
Payer: COMMERCIAL

## 2018-12-04 VITALS
TEMPERATURE: 97 F | WEIGHT: 157.3 LBS | BODY MASS INDEX: 27.86 KG/M2 | HEART RATE: 71 BPM | OXYGEN SATURATION: 98 % | DIASTOLIC BLOOD PRESSURE: 74 MMHG | SYSTOLIC BLOOD PRESSURE: 123 MMHG

## 2018-12-04 DIAGNOSIS — C81.10 NODULAR SCLEROSING HODGKIN'S LYMPHOMA, UNSPECIFIED BODY REGION (H): Primary | ICD-10-CM

## 2018-12-04 DIAGNOSIS — K21.9 GASTROESOPHAGEAL REFLUX DISEASE WITHOUT ESOPHAGITIS: ICD-10-CM

## 2018-12-04 DIAGNOSIS — I82.409 DEEP VEIN THROMBOSIS (DVT) (H): ICD-10-CM

## 2018-12-04 PROCEDURE — 25000125 ZZHC RX 250: Mod: ZF | Performed by: PHYSICIAN ASSISTANT

## 2018-12-04 PROCEDURE — 94642 AEROSOL INHALATION TREATMENT: CPT

## 2018-12-04 PROCEDURE — G0463 HOSPITAL OUTPT CLINIC VISIT: HCPCS | Mod: ZF

## 2018-12-04 RX ORDER — SULFAMETHOXAZOLE/TRIMETHOPRIM 800-160 MG
1 TABLET ORAL
Status: DISCONTINUED | OUTPATIENT
Start: 2018-12-06 | End: 2018-12-04

## 2018-12-04 RX ORDER — HEPARIN SODIUM (PORCINE) LOCK FLUSH IV SOLN 100 UNIT/ML 100 UNIT/ML
5 SOLUTION INTRAVENOUS DAILY PRN
Status: CANCELLED
Start: 2018-12-04

## 2018-12-04 RX ORDER — PENTAMIDINE ISETHIONATE 300 MG/300MG
300 INHALANT RESPIRATORY (INHALATION)
Status: CANCELLED
Start: 2018-12-04

## 2018-12-04 RX ORDER — PANTOPRAZOLE SODIUM 20 MG/1
40 TABLET, DELAYED RELEASE ORAL DAILY
Qty: 30 TABLET | Refills: 1 | Status: SHIPPED | OUTPATIENT
Start: 2018-12-04

## 2018-12-04 RX ORDER — PENTAMIDINE ISETHIONATE 300 MG/300MG
300 INHALANT RESPIRATORY (INHALATION)
Status: DISCONTINUED | OUTPATIENT
Start: 2018-12-04 | End: 2018-12-04 | Stop reason: HOSPADM

## 2018-12-04 RX ORDER — SULFAMETHOXAZOLE/TRIMETHOPRIM 800-160 MG
1 TABLET ORAL 2 TIMES DAILY
Qty: 30 TABLET | Refills: 1 | Status: SHIPPED | OUTPATIENT
Start: 2018-12-04 | End: 2019-04-02

## 2018-12-04 RX ADMIN — PENTAMIDINE ISETHIONATE 300 MG: 300 INHALANT RESPIRATORY (INHALATION) at 12:48

## 2018-12-04 ASSESSMENT — PAIN SCALES - GENERAL: PAINLEVEL: NO PAIN (0)

## 2018-12-04 NOTE — NURSING NOTE
"Oncology Rooming Note    December 4, 2018 12:11 PM   Amira Avery is a 28 year old female who presents for:    Chief Complaint   Patient presents with     RECHECK     RTN- Hodgkins Lymphoma     Initial Vitals: /74  Pulse 71  Temp 97  F (36.1  C) (Oral)  Wt 71.4 kg (157 lb 4.8 oz)  LMP 11/28/2018  SpO2 98%  BMI 27.86 kg/m2 Estimated body mass index is 27.86 kg/(m^2) as calculated from the following:    Height as of 11/29/18: 1.6 m (5' 3\").    Weight as of this encounter: 71.4 kg (157 lb 4.8 oz). Body surface area is 1.78 meters squared.  No Pain (0) Comment: Data Unavailable   Patient's last menstrual period was 11/28/2018.  Allergies reviewed: Yes  Medications reviewed: Yes    Medications: Medication refills not needed today.  Pharmacy name entered into [facility]: [facility] [address] [place] [address] [address] AT [facility] OF  [address]    Clinical concerns: None     8 minutes for nursing intake (face to face time)     Serenity Faulkner CMA              "

## 2018-12-04 NOTE — PROGRESS NOTES
BMT Progress Note      Patient ID:  Amira Avery is a 28 year old women who is day + 120 s/p Auto PBST for Hodgkins Lymphoma.       Diagnosis HDN Hodgkin's Disease - NOS  HCT Type Autologous    Prep Regimen BCNU  Lisa-C  Etoposide  Melphalan   Donor Source Autologous    GVHD Prophylaxis No  Primary BMT Provider Dr. Landa      INTERVAL  HISTORY      Patient is here today for scheduled anniversary visit she is here with her mother. patient reports no B symptoms no fevers no chills no night sweats no weight loss.  She reports appetite back to baseline he is physically active currently, reports no respiratory symptoms no chest pain no shortness of breath no cough no nausea no vomiting no constipation no diarrhea.  Denies any clinical signs or symptoms of bleeding specifically no recurrence of vaginal bleeding.    Review of Systems: 10 point ROS negative except as noted above.     PHYSICAL EXAM   Last menstrual period 11/28/2018, not currently breastfeeding.    General: NAD,   Eyes:  ARMINDA, sclera anicteric   Lungs: CTA bilaterally  Cardiovascular: RRR, no M/R/G   Abdominal/Rectal: +BS, soft, NT, ND, No HSM   Lymphatics: no edema  Skin: no rashes or petechaie  Neuro: A&O   Additional Findings: Port a cath left chest: not accessed    11/14/2018 CT neck:  Impression:   1. On the fusion PET CT, there is no abnormal metabolic activity in  the mucosal space, soft tissues, or cervical ramon chains.   2. No evidence of mucosal, ramon, or soft tissue abnormality on  contrast enhanced neck CT.  3. Please refer to the whole body PET CT performed as a separate  report, for the findings of the remainder of the body.     11/14/2018: PET/CT  IMPRESSION: In this patient with a history of Hodgkin's lymphoma,  status post BMT:  1. No evidence of lymphadenopathy in the chest, abdomen or pelvis.  2. Please see dedicated neck PET/CT for further discussion.    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  - Transplanted on 8/6/2018 s/p BEAM prep.  - 9/5/2018 Restaging showed CR but SVC clot.   - 11/14/2018 Restaging PET/CT showed CR    2.  HEME: Keep Hgb>8 and plts>10K.   - No pre-meds. No transfusions.  - 9/5  SVC clot on day +28 restage imaging. Started coumadin. Referral placed to coumadin clinic 9/10/18. Now off lovenox.  INR elevated recently likely from the bactrim restarted last visit.     - ANC=1.2, second time has dropped with starting bactrim.  So  stopped bactrim again on 10/19.  - 11/29/2018: Marrow cellularity of 70% with trilineage hematopoietic maturation, No histologic evidence of classical Hodgkin lymphoma in bone marrow   -Patient with high risk relapsed Hodgkin's lymphoma post autologous stem cell transplantation continues to be in CR however given her high risk profile we would recommend initiating her on posttransplant brentuximab maintenance (Jinny LOFTON Lancet. 2015;385(6408):4345) Patient prefers to have this initiated by her local oncologist.    3.  ID: afebrile.  -Completed posttransplant prophylaxis with acyclovir (stopped today), completed fluconazole.  -She was given pentamadine today, and we will initiate PCP prophylaxis for a total of 1 year after autologous stem cell transplantation with Bactrim 2 tablets twice a week.  Prescription was sent today.    - Patient will follow-up within 7-10 days with her local oncologist to check counts after initiating Bactrim as she has had cytopenias with Bactrim that was started shortly after transplant  - No history of shingles(VZV)       - Influenza shot given 10/4/2018            4.  GI:    - Protonix for GI prophylaxis, sent today  - Transaminases slt elevated previously: now WNL    5.  FEN/Renal:    - Lytes and Cr stable; intake is good.     6. Will complete Lovenox tomorrow for incidentally found SVC thrombus on  imaging (started AC on 9/5/2018)    RTC:   - 1 month with Dr. Deluca  - 180 day posttransplant visit per protocol  - Local oncologist in 710 days for count checks and brentuximab as above

## 2018-12-04 NOTE — PATIENT INSTRUCTIONS
Please see your local oncologist in 7-10 days to follow up on blood counts and start brentuximab maintenance

## 2018-12-04 NOTE — MR AVS SNAPSHOT
Amira Maryjosias Avery   12/4/2018   Anticoagulation Therapy Visit    Description:  28 year old female   Provider:  Sidra Wagner, RN   Department:  [facility]           INR as of 12/4/2018     Today's INR       Anticoagulation Summary as of 12/4/2018     INR goal 2.0-3.0   Today's INR    Full warfarin instructions 5 mg on Tue, Thu, Sat; 2.5 mg all other days   Next INR check     Indications   Deep vein thrombosis (DVT) (H) [I82.409] [I82.409]         Anticoagulation Episode Summary     Resolved date 12/4/2018    Resolved reason Therapy  Complete

## 2018-12-04 NOTE — PROGRESS NOTES
Spoke with patient today.  She reports MD stopped warfarin and last day of Lovenox is tomorrow. MASSIEL

## 2018-12-04 NOTE — MR AVS SNAPSHOT
After Visit Summary   12/4/2018    Amira Avery    MRN: [id number]           Patient Information     Date Of Birth          1990        Visit Information        Provider Department      12/4/2018 12:00 PM  BMT DOM [facility] Blood and Marrow Transplant        Today's Diagnoses     Nodular sclerosing Hodgkin's lymphoma, unspecified body region (H)    -  1    Gastroesophageal reflux disease without esophagitis              [facility] ([facility])  [address] [place] [address]  Phone: [phone]  Clinic Hours:   Monday-Thursday:7am to 7pm   Friday: 7am to 5pm   Weekends and holidays:    8am to noon (in general)  If your fever is 100.5  or greater,   call the clinic.  After hours call the   hospital at [phone] or   [phone]. Ask for the BMT   fellow on-call           Care Instructions    Please see your local oncologist in 7-10 days to follow up on blood counts and start brentuximab maintenance             Follow-ups after your visit        Follow-up notes from your care team     Return in about 4 weeks (around 1/1/2019).      Your next 10 appointments already scheduled     Jan 07, 2019  3:30 PM CST   [facility] with  [facility]   [facility] ([facility])    [address] [place] [address]  [address] [place] [id number] [phone]            Jan 07, 2019  4:15 PM CST   Return with César Deluca MD   [facility] Blood and Marrow Transplant ([facility])    [address] [place] [address]  [address] [place] [id number] [phone]            Mar 04, 2019 10:45 AM CST   Return Visit with Silva Pineda MD   [facility] ([facility])    [facility]  [address]  [address]  [facility] [id number] [phone]              Future tests that were ordered for you today     Open Future Orders        Priority Expected  Expires Ordered    CBC with platelets differential Routine 12/5/2018 1/31/2019 12/4/2018    Comprehensive metabolic panel Routine 12/5/2018 1/31/2019 12/4/2018    Lactate Dehydrogenase Routine 12/5/2018 1/31/2019 12/4/2018    Beta 2 microglobulin Routine 12/5/2018 1/31/2019 12/4/2018            Who to contact     If you have questions or need follow up information about today's clinic visit or your schedule please contact [facility] BLOOD AND MARROW TRANSPLANT directly at [phone].  Normal or non-critical lab and imaging results will be communicated to you by [employer], letter or phone within 4 business days after the clinic has received the results. If you do not hear from us within 7 days, please contact the clinic through [employer] or phone. If you have a critical or abnormal lab result, we will notify you by phone as soon as possible.  Submit refill requests through [employer] or call your pharmacy and they will forward the refill request to us. Please allow 3 business days for your refill to be completed.          Additional Information About Your Visit        [employer] Information     [employer] gives you secure access to your electronic health record. If you see a primary care provider, you can also send messages to your care team and make appointments. If you have questions, please call your primary care clinic.  If you do not have a primary care provider, please call [phone] and they will assist you.        Care EveryWhere ID     This is your Care EveryWhere ID. This could be used by other organizations to access your [facility] medical records  [id number]        Your Vitals Were     Pulse Temperature Last Period Pulse Oximetry BMI (Body Mass Index)       71 97  F (36.1  C) (Oral) 11/28/2018 98% 27.86 kg/m2        Blood Pressure from Last 3 Encounters:   12/04/18 123/74   11/29/18 99/55   11/29/18 119/65    Weight from Last 3 Encounters:   12/04/18 71.4 kg (157 lb 4.8 oz)   11/29/18 72.1 kg (159 lb)   11/29/18  72.4 kg (159 lb 9.6 oz)                 Today's Medication Changes          These changes are accurate as of 12/4/18  6:32 PM.  If you have any questions, ask your nurse or doctor.               Start taking these medicines.        Dose/Directions    pantoprazole 20 MG EC tablet   Commonly known as:  PROTONIX   Used for:  Nodular sclerosing Hodgkin's lymphoma, unspecified body region (H), Gastroesophageal reflux disease without esophagitis        Dose:  40 mg   Take 2 tablets (40 mg) by mouth daily   Quantity:  30 tablet   Refills:  1       sulfamethoxazole-trimethoprim 800-160 MG tablet   Commonly known as:  BACTRIM DS/SEPTRA DS   Used for:  Nodular sclerosing Hodgkin's lymphoma, unspecified body region (H)        Dose:  1 tablet   Take 1 tablet by mouth 2 times daily Take only Monday and Thursday   Quantity:  30 tablet   Refills:  1         Stop taking these medicines if you haven't already. Please contact your care team if you have questions.     acyclovir 400 MG tablet   Commonly known as:  ZOVIRAX           PARoxetine 10 MG tablet   Commonly known as:  PAXIL           warfarin 5 MG tablet   Commonly known as:  COUMADIN                Where to get your medicines      These medications were sent to [facility] [address] [place] [address] [place] [address] AT [facility] OF [address]  [address] [facility] [address]     Phone:  [phone]     pantoprazole 20 MG EC tablet    sulfamethoxazole-trimethoprim 800-160 MG tablet                Recent Review Flowsheet Data     BMT Recent Results Latest Ref Rng & Units 10/19/2018 10/23/2018 10/29/2018 11/1/2018 11/14/2018 11/19/2018 11/29/2018    WBC 4.0 - 11.0 10e9/L 4.5 - 4.2 4.2 - 4.5 5.4    Hemoglobin 11.7 - 15.7 g/dL 11.4(L) - 11.4(L) 11.5(L) - 12.0 11.9    Platelet Count 150 - 450 10e9/L 211 - 205 244 - 269 210    Neutrophils (Absolute) 1.6 - 8.3 10e9/L 1.2(L) - 1.5(L) 1.8 - 1.7 2.0    Blasts (Absolute) 0 10e9/L - - - - - - -    INR 0.86 -  1.14 3.74(H) 2.54(H) 2.29(H) 2.37(H) 2.36(H) 1.53(H) 1.05    Sodium 133 - 144 mmol/L 137 - 140 140 - 138 -    Potassium 3.4 - 5.3 mmol/L 3.7 - 4.2 4.3 - 3.8 -    Chloride 94 - 109 mmol/L 107 - 109 109 - 110(H) -    Glucose 70 - 99 mg/dL 92 - 81 102(H) 95 88 -    Urea Nitrogen 7 - 30 mg/dL 10 - 10 11 - 12 -    Creatinine 0.52 - 1.04 mg/dL 0.73 - 0.85 0.84 - 0.80 -    Calcium (Total) 8.5 - 10.1 mg/dL 8.4(L) - 8.3(L) 8.0(L) - 9.0 -    Protein (Total) 6.8 - 8.8 g/dL 6.7(L) - 6.8 6.8 - 7.0 -    Albumin 3.4 - 5.0 g/dL 4.0 - 4.0 3.9 - 4.0 -    Bilirubin (Direct) 0.0 - 0.2 mg/dL - - - - - - -    Alkaline Phosphatase 40 - 150 U/L 63 - 61 57 - 56 -    AST 0 - 45 U/L 34 - 20 20 - 21 -    ALT 0 - 50 U/L 48 - 32 30 - 26 -    MCV 78 - 100 fl 96 - 99 98 - 95 94               Primary Care Provider Office Phone # Fax #    [facility] [phone] [address] [facility] [address]        Equal Access to Services     [facility] AH: Hadii aad ku hadasho Soomaali, waaxda luqadaha, qaybta kaalmada adeegyada, jen idiodilia raymundo. So [facility] [phone].    ATENCIÓN: Si habla español, tiene a isabel disposición servicios gratuitos de asistencia lingüística. Kalaniame al [phone].    We comply with applicable federal civil rights laws and [place] laws. We do not discriminate on the basis of race, color, national origin, age, disability, sex, sexual orientation, or gender identity.            Thank you!     Thank you for choosing [facility] BLOOD AND MARROW TRANSPLANT  for your care. Our goal is always to provide you with excellent care. Hearing back from our patients is one way we can continue to improve our services. Please take a few minutes to complete the written survey that you may receive in the mail after your visit with us. Thank you!             Your Updated Medication List - Protect others around you: Learn how to safely use, store and throw away your medicines  at www.disposemymeds.org.          This list is accurate as of 12/4/18  6:32 PM.  Always use your most recent med list.                   Brand Name Dispense Instructions for use Diagnosis    ACETAMINOPHEN PO      Take 650 mg by mouth every 4 hours as needed for pain    S/P autologous bone marrow transplantation (H), Hodgkin lymphoma, unspecified, lymph nodes of inguinal region and lower limb (H)       estradiol 10 MCG Tabs vaginal tablet    VAGIFEM    20 tablet    Place 1 tablet (10 mcg) vaginally twice a week Nightly for 1-2 weeks then twice weekly    Vaginal dryness       pantoprazole 20 MG EC tablet    PROTONIX    30 tablet    Take 2 tablets (40 mg) by mouth daily    Nodular sclerosing Hodgkin's lymphoma, unspecified body region (H), Gastroesophageal reflux disease without esophagitis       sulfamethoxazole-trimethoprim 800-160 MG tablet    BACTRIM DS/SEPTRA DS    30 tablet    Take 1 tablet by mouth 2 times daily Take only Monday and Thursday    Nodular sclerosing Hodgkin's lymphoma, unspecified body region (H)

## 2018-12-06 LAB — COPATH REPORT: NORMAL

## 2018-12-07 LAB — COPATH REPORT: NORMAL

## 2018-12-19 ENCOUNTER — TRANSFERRED RECORDS (OUTPATIENT)
Dept: HEALTH INFORMATION MANAGEMENT | Facility: CLINIC | Age: 28
End: 2018-12-19

## 2019-01-07 ENCOUNTER — APPOINTMENT (OUTPATIENT)
Dept: LAB | Facility: CLINIC | Age: 29
End: 2019-01-07
Attending: INTERNAL MEDICINE
Payer: COMMERCIAL

## 2019-01-07 ENCOUNTER — ONCOLOGY VISIT (OUTPATIENT)
Dept: TRANSPLANT | Facility: CLINIC | Age: 29
End: 2019-01-07
Attending: INTERNAL MEDICINE
Payer: COMMERCIAL

## 2019-01-07 VITALS
OXYGEN SATURATION: 98 % | DIASTOLIC BLOOD PRESSURE: 75 MMHG | BODY MASS INDEX: 26.91 KG/M2 | SYSTOLIC BLOOD PRESSURE: 114 MMHG | WEIGHT: 151.9 LBS | RESPIRATION RATE: 16 BRPM | HEART RATE: 81 BPM

## 2019-01-07 DIAGNOSIS — J01.40 ACUTE NON-RECURRENT PANSINUSITIS: ICD-10-CM

## 2019-01-07 DIAGNOSIS — C81.10 NODULAR SCLEROSING HODGKIN'S LYMPHOMA, UNSPECIFIED BODY REGION (H): Primary | ICD-10-CM

## 2019-01-07 DIAGNOSIS — K21.9 GASTROESOPHAGEAL REFLUX DISEASE WITHOUT ESOPHAGITIS: ICD-10-CM

## 2019-01-07 LAB
ALBUMIN SERPL-MCNC: 3.5 G/DL (ref 3.4–5)
ALP SERPL-CCNC: 71 U/L (ref 40–150)
ALT SERPL W P-5'-P-CCNC: 19 U/L (ref 0–50)
ANION GAP SERPL CALCULATED.3IONS-SCNC: 10 MMOL/L (ref 3–14)
AST SERPL W P-5'-P-CCNC: 12 U/L (ref 0–45)
BASOPHILS # BLD AUTO: 0 10E9/L (ref 0–0.2)
BASOPHILS NFR BLD AUTO: 0.4 %
BILIRUB SERPL-MCNC: 0.2 MG/DL (ref 0.2–1.3)
BUN SERPL-MCNC: 9 MG/DL (ref 7–30)
CALCIUM SERPL-MCNC: 9 MG/DL (ref 8.5–10.1)
CHLORIDE SERPL-SCNC: 108 MMOL/L (ref 94–109)
CO2 SERPL-SCNC: 23 MMOL/L (ref 20–32)
CREAT SERPL-MCNC: 0.54 MG/DL (ref 0.52–1.04)
DIFFERENTIAL METHOD BLD: NORMAL
EOSINOPHIL # BLD AUTO: 0 10E9/L (ref 0–0.7)
EOSINOPHIL NFR BLD AUTO: 0.1 %
ERYTHROCYTE [DISTWIDTH] IN BLOOD BY AUTOMATED COUNT: 11.9 % (ref 10–15)
GFR SERPL CREATININE-BSD FRML MDRD: >90 ML/MIN/{1.73_M2}
GLUCOSE SERPL-MCNC: 107 MG/DL (ref 70–99)
HCT VFR BLD AUTO: 35.6 % (ref 35–47)
HGB BLD-MCNC: 11.8 G/DL (ref 11.7–15.7)
IMM GRANULOCYTES # BLD: 0.1 10E9/L (ref 0–0.4)
IMM GRANULOCYTES NFR BLD: 0.7 %
LDH SERPL L TO P-CCNC: 133 U/L (ref 81–234)
LYMPHOCYTES # BLD AUTO: 1.7 10E9/L (ref 0.8–5.3)
LYMPHOCYTES NFR BLD AUTO: 25.1 %
MCH RBC QN AUTO: 30.2 PG (ref 26.5–33)
MCHC RBC AUTO-ENTMCNC: 33.1 G/DL (ref 31.5–36.5)
MCV RBC AUTO: 91 FL (ref 78–100)
MONOCYTES # BLD AUTO: 0.5 10E9/L (ref 0–1.3)
MONOCYTES NFR BLD AUTO: 6.9 %
NEUTROPHILS # BLD AUTO: 4.5 10E9/L (ref 1.6–8.3)
NEUTROPHILS NFR BLD AUTO: 66.8 %
NRBC # BLD AUTO: 0 10*3/UL
NRBC BLD AUTO-RTO: 0 /100
PLATELET # BLD AUTO: 349 10E9/L (ref 150–450)
POTASSIUM SERPL-SCNC: 3.5 MMOL/L (ref 3.4–5.3)
PROT SERPL-MCNC: 7.6 G/DL (ref 6.8–8.8)
RBC # BLD AUTO: 3.91 10E12/L (ref 3.8–5.2)
SODIUM SERPL-SCNC: 140 MMOL/L (ref 133–144)
WBC # BLD AUTO: 6.7 10E9/L (ref 4–11)

## 2019-01-07 PROCEDURE — 87633 RESP VIRUS 12-25 TARGETS: CPT | Performed by: INTERNAL MEDICINE

## 2019-01-07 PROCEDURE — 25000128 H RX IP 250 OP 636: Mod: ZF | Performed by: INTERNAL MEDICINE

## 2019-01-07 PROCEDURE — G0463 HOSPITAL OUTPT CLINIC VISIT: HCPCS | Mod: ZF

## 2019-01-07 PROCEDURE — 85025 COMPLETE CBC W/AUTO DIFF WBC: CPT | Performed by: INTERNAL MEDICINE

## 2019-01-07 PROCEDURE — 82232 ASSAY OF BETA-2 PROTEIN: CPT | Performed by: INTERNAL MEDICINE

## 2019-01-07 PROCEDURE — 80053 COMPREHEN METABOLIC PANEL: CPT | Performed by: INTERNAL MEDICINE

## 2019-01-07 PROCEDURE — 82784 ASSAY IGA/IGD/IGG/IGM EACH: CPT | Performed by: INTERNAL MEDICINE

## 2019-01-07 PROCEDURE — 83615 LACTATE (LD) (LDH) ENZYME: CPT | Performed by: INTERNAL MEDICINE

## 2019-01-07 PROCEDURE — 36591 DRAW BLOOD OFF VENOUS DEVICE: CPT

## 2019-01-07 RX ORDER — ACYCLOVIR 400 MG/1
400 TABLET ORAL EVERY 12 HOURS
Qty: 180 TABLET | Refills: 4 | Status: SHIPPED | OUTPATIENT
Start: 2019-01-07 | End: 2020-01-07

## 2019-01-07 RX ORDER — HEPARIN SODIUM (PORCINE) LOCK FLUSH IV SOLN 100 UNIT/ML 100 UNIT/ML
5 SOLUTION INTRAVENOUS EVERY 8 HOURS
Status: DISCONTINUED | OUTPATIENT
Start: 2019-01-07 | End: 2019-01-07 | Stop reason: HOSPADM

## 2019-01-07 RX ORDER — PENTAMIDINE ISETHIONATE 300 MG/300MG
300 INHALANT RESPIRATORY (INHALATION)
Status: CANCELLED
Start: 2019-01-07

## 2019-01-07 RX ORDER — DEXAMETHASONE 4 MG/1
TABLET ORAL
COMMUNITY
Start: 2018-12-19

## 2019-01-07 RX ORDER — PENTAMIDINE ISETHIONATE 300 MG/300MG
300 INHALANT RESPIRATORY (INHALATION)
Status: DISCONTINUED | OUTPATIENT
Start: 2019-01-07 | End: 2019-01-07 | Stop reason: HOSPADM

## 2019-01-07 RX ORDER — HEPARIN SODIUM (PORCINE) LOCK FLUSH IV SOLN 100 UNIT/ML 100 UNIT/ML
5 SOLUTION INTRAVENOUS DAILY PRN
Status: CANCELLED
Start: 2019-01-07

## 2019-01-07 RX ADMIN — Medication 5 ML: at 15:29

## 2019-01-07 ASSESSMENT — PAIN SCALES - GENERAL: PAINLEVEL: MILD PAIN (2)

## 2019-01-07 NOTE — NURSING NOTE
Chief Complaint   Patient presents with     Port Draw     accessed by lab RN power needle, heparin locked, vitals checked     Lidia Brar RN on 1/7/2019 at 3:38 PM

## 2019-01-07 NOTE — PROGRESS NOTES
BMT Clinic Visit Note   01/07/2019    Patient ID:  Amira Avery is a 28 year old female, currently day 154 of her hematocrit for hodgkin lymphoma.  Restaging at last visit showed a CR on imaging and bone marrow biopsy.  She is tolerating Brentuximab maintenance.  She developed a sinus URI over the New Year and was seen at urgent care.  She completed a course of Augmentin, but still reports left ear discomfort and a cough.  She is tolerating Bactrim prophylaxis, with stable ANC.  Will stop aer pentam at this point, unless cytopenias recur.  She will resume acyclovir, given several recent cold sores.  No history of shingles.     Diagnosis HDN Hodgkin's Disease - NOS  HCT Type Autologous    Prep Regimen BCNU  Lisa-C  Etoposide  Melphalan   Donor Source No data was found    GVHD Prophylaxis No  Primary BMT Provider Yosi     Review of Systems: 10 point ROS negative except as noted above.  # Pain Assessment:  Current Pain Score 11/29/2018   Patient currently in pain? denies   Pain score (0-10) -   Pain location -   Pain descriptors -   Amira olson pain level was assessed and she currently denies pain.      Scheduled Medications    albuterol  2 puff Inhalation Q28 Days     heparin  5 mL Intracatheter Q8H     pentamidine  300 mg Inhalation Q28 Days     Current Outpatient Medications   Medication     ACETAMINOPHEN PO     acyclovir (ZOVIRAX) 400 MG tablet     amoxicillin-clavulanate (AUGMENTIN) 875-125 MG tablet     dexamethasone (DECADRON) 4 MG tablet     sulfamethoxazole-trimethoprim (BACTRIM DS/SEPTRA DS) 800-160 MG tablet     estradiol (VAGIFEM) 10 MCG TABS vaginal tablet     pantoprazole (PROTONIX) 20 MG EC tablet     Current Facility-Administered Medications   Medication     albuterol (PROAIR HFA/PROVENTIL HFA/VENTOLIN HFA) Inhaler 2 puff     heparin 100 UNIT/ML injection 5 mL     pentamidine (NEBUPENT) neb solution 300 mg       PHYSICAL EXAM     Weight In/Out     Wt Readings from Last 3 Encounters:   01/07/19  68.9 kg (151 lb 14.4 oz)   12/04/18 71.4 kg (157 lb 4.8 oz)   11/29/18 72.1 kg (159 lb)      [unfilled]       KPS:  90    /75   Pulse 81   Resp 16   Wt 68.9 kg (151 lb 14.4 oz)   SpO2 98%   BMI 26.91 kg/m         General: NAD   Eyes: ARMINDA, sclera anicteric   Nose/Mouth/Throat: OP clear, buccal mucosa moist, no ulcerations.  Left TM clear (no erythema or discharge), though slight posterior effusion noted.  Lungs: CTA bilaterally  Cardiovascular: RRR, no M/R/G   Abdominal/Rectal: +BS, soft, NT, ND, No HSM   Lymphatics: no edema  Skin: no rashes or petechaie  Neuro: A&O   Additional Findings: Wang site NT, no drainage.      LABS AND IMAGING - PAST 24 HOURS     Results for orders placed or performed in visit on 01/07/19 (from the past 24 hour(s))   CBC with platelets differential   Result Value Ref Range    WBC 6.7 4.0 - 11.0 10e9/L    RBC Count 3.91 3.8 - 5.2 10e12/L    Hemoglobin 11.8 11.7 - 15.7 g/dL    Hematocrit 35.6 35.0 - 47.0 %    MCV 91 78 - 100 fl    MCH 30.2 26.5 - 33.0 pg    MCHC 33.1 31.5 - 36.5 g/dL    RDW 11.9 10.0 - 15.0 %    Platelet Count 349 150 - 450 10e9/L    Diff Method Automated Method     % Neutrophils 66.8 %    % Lymphocytes 25.1 %    % Monocytes 6.9 %    % Eosinophils 0.1 %    % Basophils 0.4 %    % Immature Granulocytes 0.7 %    Nucleated RBCs 0 0 /100    Absolute Neutrophil 4.5 1.6 - 8.3 10e9/L    Absolute Lymphocytes 1.7 0.8 - 5.3 10e9/L    Absolute Monocytes 0.5 0.0 - 1.3 10e9/L    Absolute Eosinophils 0.0 0.0 - 0.7 10e9/L    Absolute Basophils 0.0 0.0 - 0.2 10e9/L    Abs Immature Granulocytes 0.1 0 - 0.4 10e9/L    Absolute Nucleated RBC 0.0    Comprehensive metabolic panel   Result Value Ref Range    Sodium 140 133 - 144 mmol/L    Potassium 3.5 3.4 - 5.3 mmol/L    Chloride 108 94 - 109 mmol/L    Carbon Dioxide 23 20 - 32 mmol/L    Anion Gap 10 3 - 14 mmol/L    Glucose 107 (H) 70 - 99 mg/dL    Urea Nitrogen 9 7 - 30 mg/dL    Creatinine 0.54 0.52 - 1.04 mg/dL    GFR  Estimate >90 >60 mL/min/[1.73_m2]    GFR Estimate If Black >90 >60 mL/min/[1.73_m2]    Calcium 9.0 8.5 - 10.1 mg/dL    Bilirubin Total 0.2 0.2 - 1.3 mg/dL    Albumin 3.5 3.4 - 5.0 g/dL    Protein Total 7.6 6.8 - 8.8 g/dL    Alkaline Phosphatase 71 40 - 150 U/L    ALT 19 0 - 50 U/L    AST 12 0 - 45 U/L   Lactate Dehydrogenase   Result Value Ref Range    Lactate Dehydrogenase 133 81 - 234 U/L         ASSESSMENT BY SYSTEMS     BMT:  Day +154 post BEAM ASCT for Hodgkin lymphoma.  Tolerating Brentuximab maintenance (plan 16 cycles).      HEMATOLOGY/COAGULATION: Peripheral counts stable on Bactrim.  Stop aer pentam.    INFECTIONS AND PROPHYLAXIS:     Infection history: Recent URI    Prophylaxis:    Bacterial: N/A    Viral: Resume Acyclovir today.    Fungal: N/A    Pneumocystis: Bactrim    Hypogammaglobulinemia: N/A    Vaccines: Prior Flu shot.     GRAFT VS HOST DISEASE: N/A    GASTROINTESTINAL: N/A    LIVER: N/A    RENAL: N/A    CARDIOVASCULAR: N/A    PULMONARY: Cough.  Awaiting RVP.  Symptomatic post course of Augmentin.  Will order CT sinus (no contrast) and chest X ray if symptoms persist by end of week.  Afebrile.     ENDOCRINE: On estradiol.     MUSCULOSKELETAL: N/A    NEUROLOGIC: N/A    PSYCHIATRIC: N/A    SOCIAL: N/A    CODE STATUS: Full      OVERALL PLAN     Day +154 post ASCT for HL, in CR and tolerating brentuximab maintenance.    1)  URI:  RVP pending.  CT sinuses and chest x-ray at end of week if symptoms persist after completing Augmentin.  Will tailor therapy based on RVP results too.  2)  Cold Sores:  Mouth currently clear.  Resume ACY prophylaxis.  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  Additionally, pt is not fully immunized at this point post ASCT.  Amira could consider employment in a setting with greatly control over workspace infectious exposures.    All questions were answered in full.      César Deluca

## 2019-01-07 NOTE — NURSING NOTE
"Oncology Rooming Note    January 7, 2019 3:44 PM   Amira Avery is a 28 year old female who presents for:    Chief Complaint   Patient presents with     Port Draw     accessed by lab RN power needle, heparin locked, vitals checked     RECHECK     RTN- Hodgkin lymphoma     Initial Vitals: /75   Pulse 81   Resp 16   Wt 68.9 kg (151 lb 14.4 oz)   SpO2 98%   BMI 26.91 kg/m   Estimated body mass index is 26.91 kg/m  as calculated from the following:    Height as of 11/29/18: 1.6 m (5' 3\").    Weight as of this encounter: 68.9 kg (151 lb 14.4 oz). Body surface area is 1.75 meters squared.  Mild Pain (2) Comment: Data Unavailable   No LMP recorded.  Allergies reviewed: Yes  Medications reviewed: Yes    Medications: Medication refills not needed today.  Pharmacy name entered into [facility]: [facility] [address] [place] [address] [address] AT [facility] OF [address]    Clinical concerns: Was on antibiotics for 9 days and still has symptoms     7 minutes for nursing intake (face to face time)     Portia Gutierrez, GABE              "

## 2019-01-08 LAB
B2 MICROGLOB SERPL-MCNC: 2.2 MG/L
FLUAV H1 2009 PAND RNA SPEC QL NAA+PROBE: NEGATIVE
FLUAV H1 RNA SPEC QL NAA+PROBE: NEGATIVE
FLUAV H3 RNA SPEC QL NAA+PROBE: NEGATIVE
FLUAV RNA SPEC QL NAA+PROBE: NEGATIVE
FLUBV RNA SPEC QL NAA+PROBE: NEGATIVE
HADV DNA SPEC QL NAA+PROBE: NEGATIVE
HADV DNA SPEC QL NAA+PROBE: NEGATIVE
HMPV RNA SPEC QL NAA+PROBE: NEGATIVE
HPIV1 RNA SPEC QL NAA+PROBE: NEGATIVE
HPIV2 RNA SPEC QL NAA+PROBE: NEGATIVE
HPIV3 RNA SPEC QL NAA+PROBE: NEGATIVE
IGG SERPL-MCNC: 789 MG/DL (ref 695–1620)
MICROBIOLOGIST REVIEW: ABNORMAL
RHINOVIRUS RNA SPEC QL NAA+PROBE: POSITIVE
RSV RNA SPEC QL NAA+PROBE: NEGATIVE
RSV RNA SPEC QL NAA+PROBE: NEGATIVE
SPECIMEN SOURCE: ABNORMAL

## 2019-02-01 ENCOUNTER — ANCILLARY PROCEDURE (OUTPATIENT)
Dept: CT IMAGING | Facility: CLINIC | Age: 29
End: 2019-02-01
Attending: NURSE PRACTITIONER
Payer: COMMERCIAL

## 2019-02-01 VITALS
SYSTOLIC BLOOD PRESSURE: 107 MMHG | TEMPERATURE: 98.4 F | BODY MASS INDEX: 27.19 KG/M2 | DIASTOLIC BLOOD PRESSURE: 61 MMHG | RESPIRATION RATE: 16 BRPM | OXYGEN SATURATION: 98 % | HEART RATE: 55 BPM | WEIGHT: 153.5 LBS

## 2019-02-01 DIAGNOSIS — C81.95 HODGKIN LYMPHOMA, UNSPECIFIED, LYMPH NODES OF INGUINAL REGION AND LOWER LIMB (H): ICD-10-CM

## 2019-02-01 DIAGNOSIS — C81.95 HODGKIN LYMPHOMA, UNSPECIFIED, LYMPH NODES OF INGUINAL REGION AND LOWER LIMB (H): Primary | ICD-10-CM

## 2019-02-01 LAB
ALBUMIN SERPL-MCNC: 3.5 G/DL (ref 3.4–5)
ALP SERPL-CCNC: 66 U/L (ref 40–150)
ALT SERPL W P-5'-P-CCNC: 44 U/L (ref 0–50)
ANION GAP SERPL CALCULATED.3IONS-SCNC: 9 MMOL/L (ref 3–14)
AST SERPL W P-5'-P-CCNC: 26 U/L (ref 0–45)
BASOPHILS # BLD AUTO: 0 10E9/L (ref 0–0.2)
BASOPHILS NFR BLD AUTO: 0 %
BILIRUB SERPL-MCNC: 0.2 MG/DL (ref 0.2–1.3)
BUN SERPL-MCNC: 10 MG/DL (ref 7–30)
CALCIUM SERPL-MCNC: 9.3 MG/DL (ref 8.5–10.1)
CHLORIDE SERPL-SCNC: 105 MMOL/L (ref 94–109)
CO2 SERPL-SCNC: 23 MMOL/L (ref 20–32)
CREAT SERPL-MCNC: 0.8 MG/DL (ref 0.52–1.04)
DIFFERENTIAL METHOD BLD: ABNORMAL
EOSINOPHIL # BLD AUTO: 0 10E9/L (ref 0–0.7)
EOSINOPHIL NFR BLD AUTO: 0 %
ERYTHROCYTE [DISTWIDTH] IN BLOOD BY AUTOMATED COUNT: 13.8 % (ref 10–15)
GFR SERPL CREATININE-BSD FRML MDRD: >90 ML/MIN/{1.73_M2}
GLUCOSE SERPL-MCNC: 90 MG/DL (ref 70–99)
HCT VFR BLD AUTO: 32.2 % (ref 35–47)
HGB BLD-MCNC: 10.6 G/DL (ref 11.7–15.7)
IMM GRANULOCYTES # BLD: 0 10E9/L (ref 0–0.4)
IMM GRANULOCYTES NFR BLD: 0.4 %
LDH SERPL L TO P-CCNC: 124 U/L (ref 81–234)
LYMPHOCYTES # BLD AUTO: 2.6 10E9/L (ref 0.8–5.3)
LYMPHOCYTES NFR BLD AUTO: 31.6 %
MCH RBC QN AUTO: 30.5 PG (ref 26.5–33)
MCHC RBC AUTO-ENTMCNC: 32.9 G/DL (ref 31.5–36.5)
MCV RBC AUTO: 93 FL (ref 78–100)
MONOCYTES # BLD AUTO: 0.4 10E9/L (ref 0–1.3)
MONOCYTES NFR BLD AUTO: 4.9 %
NEUTROPHILS # BLD AUTO: 5.2 10E9/L (ref 1.6–8.3)
NEUTROPHILS NFR BLD AUTO: 63.1 %
NRBC # BLD AUTO: 0 10*3/UL
NRBC BLD AUTO-RTO: 0 /100
PLATELET # BLD AUTO: 267 10E9/L (ref 150–450)
POTASSIUM SERPL-SCNC: 3.3 MMOL/L (ref 3.4–5.3)
PROT SERPL-MCNC: 7.5 G/DL (ref 6.8–8.8)
RBC # BLD AUTO: 3.48 10E12/L (ref 3.8–5.2)
SODIUM SERPL-SCNC: 137 MMOL/L (ref 133–144)
WBC # BLD AUTO: 8.3 10E9/L (ref 4–11)

## 2019-02-01 PROCEDURE — 80053 COMPREHEN METABOLIC PANEL: CPT | Performed by: NURSE PRACTITIONER

## 2019-02-01 PROCEDURE — 25000128 H RX IP 250 OP 636: Performed by: INTERNAL MEDICINE

## 2019-02-01 PROCEDURE — 85025 COMPLETE CBC W/AUTO DIFF WBC: CPT | Performed by: NURSE PRACTITIONER

## 2019-02-01 PROCEDURE — 82784 ASSAY IGA/IGD/IGG/IGM EACH: CPT | Performed by: NURSE PRACTITIONER

## 2019-02-01 PROCEDURE — 83615 LACTATE (LD) (LDH) ENZYME: CPT | Performed by: NURSE PRACTITIONER

## 2019-02-01 RX ORDER — HEPARIN SODIUM (PORCINE) LOCK FLUSH IV SOLN 100 UNIT/ML 100 UNIT/ML
5 SOLUTION INTRAVENOUS EVERY 8 HOURS
Status: DISCONTINUED | OUTPATIENT
Start: 2019-02-01 | End: 2019-02-08 | Stop reason: HOSPADM

## 2019-02-01 RX ORDER — HEPARIN SODIUM (PORCINE) LOCK FLUSH IV SOLN 100 UNIT/ML 100 UNIT/ML
5 SOLUTION INTRAVENOUS ONCE
Status: COMPLETED | OUTPATIENT
Start: 2019-02-01 | End: 2019-02-01

## 2019-02-01 RX ORDER — IOPAMIDOL 755 MG/ML
93 INJECTION, SOLUTION INTRAVASCULAR ONCE
Status: COMPLETED | OUTPATIENT
Start: 2019-02-01 | End: 2019-02-01

## 2019-02-01 RX ADMIN — HEPARIN SODIUM (PORCINE) LOCK FLUSH IV SOLN 100 UNIT/ML 5 ML: 100 SOLUTION at 11:57

## 2019-02-01 RX ADMIN — HEPARIN SODIUM (PORCINE) LOCK FLUSH IV SOLN 100 UNIT/ML 5 ML: 100 SOLUTION at 11:06

## 2019-02-01 RX ADMIN — IOPAMIDOL 93 ML: 755 INJECTION, SOLUTION INTRAVASCULAR at 11:42

## 2019-02-01 ASSESSMENT — PAIN SCALES - GENERAL: PAINLEVEL: NO PAIN (0)

## 2019-02-01 NOTE — NURSING NOTE
Chief Complaint   Patient presents with     Port Draw     Labs drawn via PORT by RN in lab. Line fllushed with saline and heparin. VS taken.      Jordan Romero RN

## 2019-02-01 NOTE — DISCHARGE INSTRUCTIONS

## 2019-02-01 NOTE — DISCHARGE INSTRUCTIONS

## 2019-02-04 ENCOUNTER — OFFICE VISIT (OUTPATIENT)
Dept: TRANSPLANT | Facility: CLINIC | Age: 29
End: 2019-02-04
Attending: INTERNAL MEDICINE
Payer: COMMERCIAL

## 2019-02-04 VITALS
HEART RATE: 64 BPM | DIASTOLIC BLOOD PRESSURE: 70 MMHG | TEMPERATURE: 97.2 F | SYSTOLIC BLOOD PRESSURE: 113 MMHG | BODY MASS INDEX: 26.39 KG/M2 | OXYGEN SATURATION: 98 % | WEIGHT: 149 LBS

## 2019-02-04 DIAGNOSIS — C81.95 HODGKIN LYMPHOMA, UNSPECIFIED, LYMPH NODES OF INGUINAL REGION AND LOWER LIMB (H): ICD-10-CM

## 2019-02-04 LAB
IGA SERPL-MCNC: 382 MG/DL (ref 70–380)
IGG SERPL-MCNC: 918 MG/DL (ref 695–1620)
IGM SERPL-MCNC: 203 MG/DL (ref 60–265)

## 2019-02-04 PROCEDURE — 87633 RESP VIRUS 12-25 TARGETS: CPT | Performed by: INTERNAL MEDICINE

## 2019-02-04 PROCEDURE — G0463 HOSPITAL OUTPT CLINIC VISIT: HCPCS | Mod: ZF

## 2019-02-04 ASSESSMENT — PAIN SCALES - GENERAL: PAINLEVEL: NO PAIN (0)

## 2019-02-04 NOTE — PROGRESS NOTES
BMT Daily Progress Note   02/04/2019    Patient ID:  Amira Avery is a 28 year old female, currently day 182 of her ASCT for hodgkin lymphoma.  She remains in CR based on imaging.  She is tolerating Brentuximab maintenance.  She developed a sinus URI over the New Year and was seen at urgent care.  We found that she was positive for rhinovirus at our last office visit.  This improved, but she again developed a sore throat and was seen at urgent care.  Strep tests were negative.  She completed a Z-taryn with no real improvement.  We will repeat RVP today.  Given tonsilar enlargement on CT, will consider ENT referral for a biopsy if RVP is negative.  No actual lymphadenopathy seen on restaging scans, however.    She is tolerating Bactrim prophylaxis, with stable ANC.  She will resumed acyclovir at last office visit due to recent cold sores, and this has resolved.  No history of shingles.     Diagnosis HDN Hodgkin's Disease - NOS  HCT Type Autologous    Prep Regimen BCNU  Lisa-C  Etoposide  Melphalan   Donor Source No data was found    GVHD Prophylaxis No  Primary BMT Provider Yosi     Review of Systems: 10 point ROS negative except as noted above.  # Pain Assessment:  Current Pain Score 11/29/2018   Patient currently in pain? denies   Pain score (0-10) -   Pain location -   Pain descriptors -   Amira olson pain level was assessed and she currently denies pain.      Scheduled Medications    Current Outpatient Medications   Medication     ACETAMINOPHEN PO     acyclovir (ZOVIRAX) 400 MG tablet     dexamethasone (DECADRON) 4 MG tablet     estradiol (VAGIFEM) 10 MCG TABS vaginal tablet     pantoprazole (PROTONIX) 20 MG EC tablet     sulfamethoxazole-trimethoprim (BACTRIM DS/SEPTRA DS) 800-160 MG tablet     No current facility-administered medications for this visit.      Facility-Administered Medications Ordered in Other Visits   Medication     heparin 100 UNIT/ML injection 5 mL     sodium chloride (PF) 0.9% PF  flush 20 mL       PHYSICAL EXAM     Weight In/Out     Wt Readings from Last 3 Encounters:   02/01/19 69.6 kg (153 lb 8 oz)   01/07/19 68.9 kg (151 lb 14.4 oz)   12/04/18 71.4 kg (157 lb 4.8 oz)      [unfilled]       KPS:  90    There were no vitals taken for this visit.       General: NAD   Eyes: : ARMINDA, sclera anicteric   Nose/Mouth/Throat: OP clear, buccal mucosa moist, no ulcerations.  TM normal.  Pain with opening mouth.  Lungs: CTA bilaterally  Cardiovascular: RRR, no M/R/G   Abdominal/Rectal: +BS, soft, NT, ND, No HSM   Lymphatics: no edema  Skin: no rashes or petechaie  Neuro: A&O     LABS AND IMAGING - PAST 24 HOURS   No results found for this or any previous visit (from the past 24 hour(s)).      ASSESSMENT BY SYSTEMS     BMT:  Day +182 post BEAM ASCT for Hodgkin lymphoma.  Asymmetric left greater than right palatine tonsillar enlargement noted on recent CT neck.  No adenopathy, however.  Given recent URI with rhinovirus, this is likely reactive.  Additionally, she had a sore throat (strep negative) on 1/27/2019, with films performed on 2/1/2019.  Will repeat RVP today.  If rhinovirus is cleared, will consult ENT for possible biopsy of the tonsil.      Tolerating Brentuximab maintenance (plan 16 cycles).       HEMATOLOGY/COAGULATION: Peripheral counts stable on Bactrim.  ANC 5200.     INFECTIONS AND PROPHYLAXIS:     Infection history: Recent URI    Prophylaxis:    Bacterial: N/A    Viral: Acyclovir    Fungal: N/A    Pneumocystis: Bactrim    Hypogammaglobulinemia: N/A    Vaccines: Prior Flu shot.      GRAFT VS HOST DISEASE: N/A     GASTROINTESTINAL: N/A    LIVER: N/A     RENAL: N/A     CARDIOVASCULAR: N/A     PULMONARY: RVP from 1/7/2019 was positive for rhinovirus.     ENDOCRINE: On estradiol.     MUSCULOSKELETAL: N/A     NEUROLOGIC: N/A     PSYCHIATRIC: N/A     SOCIAL: N/A     CODE STATUS: Full        OVERALL PLAN      Day +182 post ASCT for HL and tolerating brentuximab maintenance.    1)  HL:   "Repeat RVP.  Will consult ENT for consideration of biopsy of enlarged tonsil, if rhinovirus has cleared.  Moreover, CT was performed within a few days of a visit to the urgent care for a \"sore throat\".  Work up at the time was negative, but limited to strep throat tests.  It does not appear that an RVP was performed then.  2)  URI:  RVP positive for rhinovirus on 1/7/2019.  IgG was normal at 789, and did not support the need for IVIg.  Repeat RVP today.  3)  Cold Sores:  Mouth currently clear.  Resume ACY prophylaxis.    All questions were answered in full.       César Yosi    RVP is negative, as well as prior strep throat swab, despite ongoing sore throat.  Recent re-staging CT neck showed left tonsillar enlargement.  Will refer to [facility] ENT for diagnostic biopsy of the enlarged tonsil.  Call made to patient, but unable to reach at this time.  Will re-attempt later today.      "

## 2019-02-04 NOTE — NURSING NOTE
"Oncology Rooming Note    February 4, 2019 4:33 PM   Amira Avery is a 28 year old female who presents for:    Chief Complaint   Patient presents with     RECHECK     RTN- Hodgkin lymphoma Anniversary visit     Initial Vitals: /70 (BP Location: Right arm, Patient Position: Chair, Cuff Size: Adult Regular)   Pulse 64   Temp 97.2  F (36.2  C) (Oral)   Wt 67.6 kg (149 lb)   SpO2 98%   BMI 26.39 kg/m   Estimated body mass index is 26.39 kg/m  as calculated from the following:    Height as of 11/29/18: 1.6 m (5' 3\").    Weight as of this encounter: 67.6 kg (149 lb). Body surface area is 1.73 meters squared.  No Pain (0) Comment: Data Unavailable   No LMP recorded.  Allergies reviewed: Yes  Medications reviewed: Yes    Medications: Medication refills not needed today.  Pharmacy name entered into [employer]: [facility] [address] [place] [address] [address] AT [facility] OF  [address]    Clinical concerns: none     7 minutes for nursing intake (face to face time)     Portia Gutierrez CMA              "

## 2019-02-05 LAB
FLUAV H1 2009 PAND RNA SPEC QL NAA+PROBE: NEGATIVE
FLUAV H1 RNA SPEC QL NAA+PROBE: NEGATIVE
FLUAV H3 RNA SPEC QL NAA+PROBE: NEGATIVE
FLUAV RNA SPEC QL NAA+PROBE: NEGATIVE
FLUBV RNA SPEC QL NAA+PROBE: NEGATIVE
HADV DNA SPEC QL NAA+PROBE: NEGATIVE
HADV DNA SPEC QL NAA+PROBE: NEGATIVE
HMPV RNA SPEC QL NAA+PROBE: NEGATIVE
HPIV1 RNA SPEC QL NAA+PROBE: NEGATIVE
HPIV2 RNA SPEC QL NAA+PROBE: NEGATIVE
HPIV3 RNA SPEC QL NAA+PROBE: NEGATIVE
MICROBIOLOGIST REVIEW: NORMAL
RHINOVIRUS RNA SPEC QL NAA+PROBE: NEGATIVE
RSV RNA SPEC QL NAA+PROBE: NEGATIVE
RSV RNA SPEC QL NAA+PROBE: NEGATIVE
SPECIMEN SOURCE: NORMAL

## 2019-02-07 ENCOUNTER — TELEPHONE (OUTPATIENT)
Dept: OTOLARYNGOLOGY | Facility: CLINIC | Age: 29
End: 2019-02-07

## 2019-04-02 DIAGNOSIS — C81.10 NODULAR SCLEROSING HODGKIN'S LYMPHOMA, UNSPECIFIED BODY REGION (H): ICD-10-CM

## 2019-04-02 RX ORDER — SULFAMETHOXAZOLE/TRIMETHOPRIM 800-160 MG
1 TABLET ORAL 2 TIMES DAILY
Qty: 30 TABLET | Refills: 1 | Status: SHIPPED | OUTPATIENT
Start: 2019-04-02

## 2019-05-06 ENCOUNTER — OFFICE VISIT (OUTPATIENT)
Dept: TRANSPLANT | Facility: CLINIC | Age: 29
End: 2019-05-06
Attending: INTERNAL MEDICINE
Payer: COMMERCIAL

## 2019-05-06 ENCOUNTER — APPOINTMENT (OUTPATIENT)
Dept: LAB | Facility: CLINIC | Age: 29
End: 2019-05-06
Attending: INTERNAL MEDICINE
Payer: COMMERCIAL

## 2019-05-06 VITALS
SYSTOLIC BLOOD PRESSURE: 110 MMHG | OXYGEN SATURATION: 98 % | DIASTOLIC BLOOD PRESSURE: 63 MMHG | HEART RATE: 70 BPM | TEMPERATURE: 97.6 F | RESPIRATION RATE: 18 BRPM

## 2019-05-06 DIAGNOSIS — C81.95 HODGKIN LYMPHOMA, UNSPECIFIED, LYMPH NODES OF INGUINAL REGION AND LOWER LIMB (H): Primary | ICD-10-CM

## 2019-05-06 LAB
ALBUMIN SERPL-MCNC: 4 G/DL (ref 3.4–5)
ALP SERPL-CCNC: 147 U/L (ref 40–150)
ALT SERPL W P-5'-P-CCNC: 23 U/L (ref 0–50)
ANION GAP SERPL CALCULATED.3IONS-SCNC: 6 MMOL/L (ref 3–14)
ANISOCYTOSIS BLD QL SMEAR: SLIGHT
AST SERPL W P-5'-P-CCNC: 20 U/L (ref 0–45)
BASOPHILS # BLD AUTO: 0.5 10E9/L (ref 0–0.2)
BASOPHILS NFR BLD AUTO: 1.7 %
BILIRUB SERPL-MCNC: 0.2 MG/DL (ref 0.2–1.3)
BUN SERPL-MCNC: 10 MG/DL (ref 7–30)
CALCIUM SERPL-MCNC: 8.9 MG/DL (ref 8.5–10.1)
CHLORIDE SERPL-SCNC: 104 MMOL/L (ref 94–109)
CO2 SERPL-SCNC: 26 MMOL/L (ref 20–32)
CREAT SERPL-MCNC: 0.68 MG/DL (ref 0.52–1.04)
DIFFERENTIAL METHOD BLD: ABNORMAL
EOSINOPHIL # BLD AUTO: 0.3 10E9/L (ref 0–0.7)
EOSINOPHIL NFR BLD AUTO: 0.9 %
ERYTHROCYTE [DISTWIDTH] IN BLOOD BY AUTOMATED COUNT: 16.2 % (ref 10–15)
GFR SERPL CREATININE-BSD FRML MDRD: >90 ML/MIN/{1.73_M2}
GLUCOSE SERPL-MCNC: 103 MG/DL (ref 70–99)
HCT VFR BLD AUTO: 37 % (ref 35–47)
HGB BLD-MCNC: 12.5 G/DL (ref 11.7–15.7)
LDH SERPL L TO P-CCNC: 234 U/L (ref 81–234)
LYMPHOCYTES # BLD AUTO: 1.9 10E9/L (ref 0.8–5.3)
LYMPHOCYTES NFR BLD AUTO: 6.1 %
MCH RBC QN AUTO: 32.2 PG (ref 26.5–33)
MCHC RBC AUTO-ENTMCNC: 33.8 G/DL (ref 31.5–36.5)
MCV RBC AUTO: 95 FL (ref 78–100)
MONOCYTES # BLD AUTO: 0.5 10E9/L (ref 0–1.3)
MONOCYTES NFR BLD AUTO: 1.7 %
NEUTROPHILS # BLD AUTO: 28.3 10E9/L (ref 1.6–8.3)
NEUTROPHILS NFR BLD AUTO: 89.6 %
PLATELET # BLD AUTO: 152 10E9/L (ref 150–450)
PLATELET # BLD EST: ABNORMAL 10*3/UL
POTASSIUM SERPL-SCNC: 3.7 MMOL/L (ref 3.4–5.3)
PROT SERPL-MCNC: 7.3 G/DL (ref 6.8–8.8)
RBC # BLD AUTO: 3.88 10E12/L (ref 3.8–5.2)
SODIUM SERPL-SCNC: 136 MMOL/L (ref 133–144)
WBC # BLD AUTO: 31.6 10E9/L (ref 4–11)

## 2019-05-06 PROCEDURE — 25000128 H RX IP 250 OP 636: Mod: ZF | Performed by: INTERNAL MEDICINE

## 2019-05-06 PROCEDURE — 85025 COMPLETE CBC W/AUTO DIFF WBC: CPT | Performed by: NURSE PRACTITIONER

## 2019-05-06 PROCEDURE — 83615 LACTATE (LD) (LDH) ENZYME: CPT | Performed by: NURSE PRACTITIONER

## 2019-05-06 PROCEDURE — G0463 HOSPITAL OUTPT CLINIC VISIT: HCPCS | Mod: ZF

## 2019-05-06 PROCEDURE — 80053 COMPREHEN METABOLIC PANEL: CPT | Performed by: NURSE PRACTITIONER

## 2019-05-06 PROCEDURE — 36591 DRAW BLOOD OFF VENOUS DEVICE: CPT

## 2019-05-06 RX ORDER — PAROXETINE 10 MG/1
10 TABLET, FILM COATED ORAL EVERY MORNING
COMMUNITY

## 2019-05-06 RX ORDER — HEPARIN SODIUM (PORCINE) LOCK FLUSH IV SOLN 100 UNIT/ML 100 UNIT/ML
5 SOLUTION INTRAVENOUS EVERY 8 HOURS
Status: DISCONTINUED | OUTPATIENT
Start: 2019-05-06 | End: 2019-05-06 | Stop reason: HOSPADM

## 2019-05-06 RX ADMIN — HEPARIN SODIUM (PORCINE) LOCK FLUSH IV SOLN 100 UNIT/ML 5 ML: 100 SOLUTION at 17:40

## 2019-05-06 ASSESSMENT — PAIN SCALES - GENERAL: PAINLEVEL: NO PAIN (0)

## 2019-05-06 NOTE — NURSING NOTE
"Oncology Rooming Note    May 6, 2019 5:51 PM   Amira Avery is a 29 year old female who presents for:    Chief Complaint   Patient presents with     Port Draw     Labs drawn via PORT by RN in lab. Line flushed with saline and heparin. VS taken.      RECHECK     Hodgkins~ here for provider visit     Initial Vitals: /63 (BP Location: Left arm, Patient Position: Sitting, Cuff Size: Adult Regular)   Pulse 70   Temp 97.6  F (36.4  C) (Oral)   Resp 18   SpO2 98%  Estimated body mass index is 26.39 kg/m  as calculated from the following:    Height as of 11/29/18: 1.6 m (5' 3\").    Weight as of 2/4/19: 67.6 kg (149 lb). There is no height or weight on file to calculate BSA.  No Pain (0) Comment: Data Unavailable   No LMP recorded.  Allergies reviewed: Yes  Medications reviewed: Yes    Medications: Medication refills not needed today.  Pharmacy name entered into [facility]: [facility] [address] [place] [address] AVE AT [facility] OF [address]    Clinical concerns: no MD was NOT notified.      Katiana Reed RN              "

## 2019-05-06 NOTE — PROGRESS NOTES
"BMT Daily Progress Note   05/06/2019    Patient ID:  Amira Avery is a 28 year old female, currently day 273 of her ASCT for hodgkin lymphoma.  She remains in CR based on imaging from 2/1/2019.  She is tolerating Brentuximab maintenance.  No recurrent URIs since the episode of rhinovirus in early 2019.      Given tonsilar enlargement on prior CT, she was seen by ENT at [facility]. Overall, biopsy was deferred, as the tonsil was grossly normal on inspection.  She feels well now.  No fevers or chills.  No sore throat.  1 year restaging is scheduled.       Diagnosis HDN Hodgkin's Disease - NOS  HCT Type Autologous    Prep Regimen BCNU  Lisa-C  Etoposide  Melphalan   Donor Source No data was found    GVHD Prophylaxis No  Primary BMT Provider Yosi     Review of Systems: 10 point ROS negative except as noted above.  # Pain Assessment:  Current Pain Score 11/29/2018   Patient currently in pain? denies   Pain score (0-10) -   Pain location -   Pain descriptors -   Amira olson pain level was assessed and she currently denies pain.      Scheduled Medications    Current Outpatient Medications   Medication     ACETAMINOPHEN PO     acyclovir (ZOVIRAX) 400 MG tablet     dexamethasone (DECADRON) 4 MG tablet     estradiol (VAGIFEM) 10 MCG TABS vaginal tablet     pantoprazole (PROTONIX) 20 MG EC tablet     sulfamethoxazole-trimethoprim (BACTRIM DS/SEPTRA DS) 800-160 MG tablet     No current facility-administered medications for this visit.        PHYSICAL EXAM     Weight In/Out     Wt Readings from Last 3 Encounters:   02/04/19 67.6 kg (149 lb)   02/01/19 69.6 kg (153 lb 8 oz)   01/07/19 68.9 kg (151 lb 14.4 oz)      [unfilled]       KPS:  100  Vital signs:  Temp: 97.6  F (36.4  C) Temp src: Oral BP: 110/63 Pulse: 70   Resp: 18 SpO2: 98 %          Estimated body mass index is 26.39 kg/m  as calculated from the following:    Height as of 11/29/18: 1.6 m (5' 3\").    Weight as of 2/4/19: 67.6 kg (149 lb).    General: " NAD   Eyes: : ARMINDA, sclera anicteric   Nose/Mouth/Throat: OP clear, buccal mucosa moist, no ulcerations   Lungs: CTA bilaterally  Cardiovascular: RRR, no M/R/G   Abdominal/Rectal: +BS, soft, NT, ND, No HSM   Lymphatics: no edema  Skin: no rashes or petechaie  Neuro: A&O   Additional Findings: Wang site NT, no drainage.      LABS AND IMAGING - PAST 24 HOURS     Lab Results   Component Value Date    WBC PENDING 05/06/2019     Lab Results   Component Value Date    RBC 3.88 05/06/2019     Lab Results   Component Value Date    HGB 12.5 05/06/2019     Lab Results   Component Value Date    HCT 37.0 05/06/2019     No components found for: MCT  Lab Results   Component Value Date    MCV 95 05/06/2019     Lab Results   Component Value Date    MCH 32.2 05/06/2019     Lab Results   Component Value Date    MCHC 33.8 05/06/2019     Lab Results   Component Value Date    RDW 16.2 05/06/2019     Lab Results   Component Value Date     05/06/2019       ASSESSMENT BY SYSTEMS   BMT:  Day +273 post BEAM ASCT for Hodgkin lymphoma.  She remains in CR.     Tolerating Brentuximab maintenance (plan 16 cycles).       HEMATOLOGY/COAGULATION: Peripheral counts stable on Bactrim.       INFECTIONS AND PROPHYLAXIS:     Infection history: Recent URI    Prophylaxis:    Bacterial: N/A    Viral: Acyclovir    Fungal: N/A    Pneumocystis: Bactrim    Hypogammaglobulinemia: N/A    Vaccines: Prior Flu shot.      GRAFT VS HOST DISEASE: N/A     GASTROINTESTINAL: N/A     LIVER: N/A     RENAL: N/A     CARDIOVASCULAR: N/A     PULMONARY: RVP from 1/7/2019 was positive for rhinovirus.     ENDOCRINE: On estradiol.     MUSCULOSKELETAL: N/A     NEUROLOGIC: N/A     PSYCHIATRIC: N/A     SOCIAL: N/A     CODE STATUS: Full        OVERALL PLAN      Day + 273 post ASCT for HL and tolerating brentuximab maintenance.    1)  HL:  She remains in CR.  1 year restaging scheduled.  2)  Cold Sores:  Mouth clear.  Continue ACY prophylaxis.     All questions were answered in  full.       César Deluca

## 2019-05-06 NOTE — NURSING NOTE
Chief Complaint   Patient presents with     Port Draw     Labs drawn via PORT by RN in lab. Line flushed with saline and heparin. VS taken.      Jordan Romero RN

## 2019-05-23 DIAGNOSIS — C81.95 HODGKIN LYMPHOMA, UNSPECIFIED, LYMPH NODES OF INGUINAL REGION AND LOWER LIMB (H): ICD-10-CM

## 2019-08-05 ENCOUNTER — ONCOLOGY SURVIVORSHIP VISIT (OUTPATIENT)
Dept: TRANSPLANT | Facility: CLINIC | Age: 29
End: 2019-08-05
Attending: INTERNAL MEDICINE
Payer: COMMERCIAL

## 2019-08-05 ENCOUNTER — ANCILLARY PROCEDURE (OUTPATIENT)
Dept: CT IMAGING | Facility: CLINIC | Age: 29
End: 2019-08-05
Attending: INTERNAL MEDICINE
Payer: COMMERCIAL

## 2019-08-05 VITALS
TEMPERATURE: 97 F | RESPIRATION RATE: 16 BRPM | OXYGEN SATURATION: 98 % | SYSTOLIC BLOOD PRESSURE: 107 MMHG | WEIGHT: 156.6 LBS | HEART RATE: 61 BPM | DIASTOLIC BLOOD PRESSURE: 66 MMHG | HEIGHT: 63 IN | BODY MASS INDEX: 27.75 KG/M2

## 2019-08-05 DIAGNOSIS — C81.95 HODGKIN LYMPHOMA, UNSPECIFIED, LYMPH NODES OF INGUINAL REGION AND LOWER LIMB (H): Primary | ICD-10-CM

## 2019-08-05 DIAGNOSIS — C81.95 HODGKIN LYMPHOMA, UNSPECIFIED, LYMPH NODES OF INGUINAL REGION AND LOWER LIMB (H): ICD-10-CM

## 2019-08-05 LAB
ALBUMIN SERPL-MCNC: 4.2 G/DL (ref 3.4–5)
ALP SERPL-CCNC: 109 U/L (ref 40–150)
ALT SERPL W P-5'-P-CCNC: 41 U/L (ref 0–50)
ANION GAP SERPL CALCULATED.3IONS-SCNC: 7 MMOL/L (ref 3–14)
AST SERPL W P-5'-P-CCNC: 30 U/L (ref 0–45)
BASOPHILS # BLD AUTO: 0 10E9/L (ref 0–0.2)
BASOPHILS NFR BLD AUTO: 0.2 %
BILIRUB SERPL-MCNC: 0.4 MG/DL (ref 0.2–1.3)
BUN SERPL-MCNC: 16 MG/DL (ref 7–30)
CALCIUM SERPL-MCNC: 8.6 MG/DL (ref 8.5–10.1)
CHLORIDE SERPL-SCNC: 105 MMOL/L (ref 94–109)
CO2 SERPL-SCNC: 23 MMOL/L (ref 20–32)
CREAT SERPL-MCNC: 0.66 MG/DL (ref 0.52–1.04)
DIFFERENTIAL METHOD BLD: ABNORMAL
EOSINOPHIL # BLD AUTO: 0 10E9/L (ref 0–0.7)
EOSINOPHIL NFR BLD AUTO: 0.4 %
ERYTHROCYTE [DISTWIDTH] IN BLOOD BY AUTOMATED COUNT: 14.4 % (ref 10–15)
GFR SERPL CREATININE-BSD FRML MDRD: >90 ML/MIN/{1.73_M2}
GLUCOSE SERPL-MCNC: 87 MG/DL (ref 70–99)
HCT VFR BLD AUTO: 37 % (ref 35–47)
HGB BLD-MCNC: 12.2 G/DL (ref 11.7–15.7)
IMM GRANULOCYTES # BLD: 0.1 10E9/L (ref 0–0.4)
IMM GRANULOCYTES NFR BLD: 0.7 %
LDH SERPL L TO P-CCNC: 230 U/L (ref 81–234)
LYMPHOCYTES # BLD AUTO: 1.6 10E9/L (ref 0.8–5.3)
LYMPHOCYTES NFR BLD AUTO: 17.7 %
MCH RBC QN AUTO: 32.7 PG (ref 26.5–33)
MCHC RBC AUTO-ENTMCNC: 33 G/DL (ref 31.5–36.5)
MCV RBC AUTO: 99 FL (ref 78–100)
MONOCYTES # BLD AUTO: 0.4 10E9/L (ref 0–1.3)
MONOCYTES NFR BLD AUTO: 4.4 %
NEUTROPHILS # BLD AUTO: 7 10E9/L (ref 1.6–8.3)
NEUTROPHILS NFR BLD AUTO: 76.6 %
NRBC # BLD AUTO: 0 10*3/UL
NRBC BLD AUTO-RTO: 0 /100
PLATELET # BLD AUTO: 171 10E9/L (ref 150–450)
POTASSIUM SERPL-SCNC: 3.8 MMOL/L (ref 3.4–5.3)
PROT SERPL-MCNC: 7.9 G/DL (ref 6.8–8.8)
RBC # BLD AUTO: 3.73 10E12/L (ref 3.8–5.2)
SODIUM SERPL-SCNC: 136 MMOL/L (ref 133–144)
TSH SERPL DL<=0.005 MIU/L-ACNC: 1.95 MU/L (ref 0.4–4)
WBC # BLD AUTO: 9.2 10E9/L (ref 4–11)

## 2019-08-05 PROCEDURE — 90648 HIB PRP-T VACCINE 4 DOSE IM: CPT | Mod: ZF | Performed by: INTERNAL MEDICINE

## 2019-08-05 PROCEDURE — 90750 HZV VACC RECOMBINANT IM: CPT | Mod: ZF | Performed by: INTERNAL MEDICINE

## 2019-08-05 PROCEDURE — 85025 COMPLETE CBC W/AUTO DIFF WBC: CPT | Performed by: INTERNAL MEDICINE

## 2019-08-05 PROCEDURE — 90472 IMMUNIZATION ADMIN EACH ADD: CPT

## 2019-08-05 PROCEDURE — 83615 LACTATE (LD) (LDH) ENZYME: CPT | Performed by: INTERNAL MEDICINE

## 2019-08-05 PROCEDURE — 82784 ASSAY IGA/IGD/IGG/IGM EACH: CPT | Performed by: INTERNAL MEDICINE

## 2019-08-05 PROCEDURE — 90670 PCV13 VACCINE IM: CPT | Mod: ZF | Performed by: INTERNAL MEDICINE

## 2019-08-05 PROCEDURE — G0463 HOSPITAL OUTPT CLINIC VISIT: HCPCS | Mod: ZF,25

## 2019-08-05 PROCEDURE — 80053 COMPREHEN METABOLIC PANEL: CPT | Performed by: INTERNAL MEDICINE

## 2019-08-05 PROCEDURE — G0463 HOSPITAL OUTPT CLINIC VISIT: HCPCS | Mod: ZF

## 2019-08-05 PROCEDURE — G0010 ADMIN HEPATITIS B VACCINE: HCPCS

## 2019-08-05 PROCEDURE — 90723 DTAP-HEP B-IPV VACCINE IM: CPT | Mod: ZF | Performed by: INTERNAL MEDICINE

## 2019-08-05 PROCEDURE — G0009 ADMIN PNEUMOCOCCAL VACCINE: HCPCS

## 2019-08-05 PROCEDURE — 84443 ASSAY THYROID STIM HORMONE: CPT | Performed by: PHYSICIAN ASSISTANT

## 2019-08-05 PROCEDURE — 25000128 H RX IP 250 OP 636: Mod: ZF | Performed by: INTERNAL MEDICINE

## 2019-08-05 PROCEDURE — 25000581 ZZH RX MED A9270 GY (STAT IND- M) 250: Mod: ZF | Performed by: INTERNAL MEDICINE

## 2019-08-05 RX ORDER — IOPAMIDOL 755 MG/ML
92 INJECTION, SOLUTION INTRAVASCULAR ONCE
Status: COMPLETED | OUTPATIENT
Start: 2019-08-05 | End: 2019-08-05

## 2019-08-05 RX ADMIN — IOPAMIDOL 92 ML: 755 INJECTION, SOLUTION INTRAVASCULAR at 12:01

## 2019-08-05 RX ADMIN — PNEUMOCOCCAL 13-VALENT CONJUGATE VACCINE 0.5 ML: 2.2; 2.2; 2.2; 2.2; 2.2; 4.4; 2.2; 2.2; 2.2; 2.2; 2.2; 2.2; 2.2 INJECTION, SUSPENSION INTRAMUSCULAR at 16:26

## 2019-08-05 RX ADMIN — DIPHTHERIA AND TETANUS TOXOIDS AND ACELLULAR PERTUSSIS ADSORBED, HEPATITIS B (RECOMBINANT) AND INACTIVATED POLIOVIRUS VACCINE COMBINED 0.5 ML: 25; 10; 25; 25; 8; 10; 40; 8; 32 INJECTION, SUSPENSION INTRAMUSCULAR at 16:25

## 2019-08-05 RX ADMIN — ZOSTER VACCINE RECOMBINANT, ADJUVANTED 0.5 ML: KIT at 16:25

## 2019-08-05 RX ADMIN — HAEMOPHILUS B POLYSACCHARIDE CONJUGATE VACCINE FOR INJ 0.5 ML: RECON SOLN at 16:26

## 2019-08-05 ASSESSMENT — PAIN SCALES - GENERAL: PAINLEVEL: NO PAIN (0)

## 2019-08-05 ASSESSMENT — MIFFLIN-ST. JEOR: SCORE: 1409.46

## 2019-08-05 NOTE — NURSING NOTE
"Oncology Rooming Note    August 5, 2019 1:08 PM   Amira Avery is a 29 year old female who presents for:    Chief Complaint   Patient presents with     RECHECK     1 year survivorship visit- Hodgkin lymphoma, unspecified, lymph nodes of inguinal region and lower limb      Initial Vitals: /66 (BP Location: Right arm, Patient Position: Sitting, Cuff Size: Adult Regular)   Pulse 61   Temp 97  F (36.1  C) (Oral)   Resp 16   Ht 1.6 m (5' 3\")   Wt 71 kg (156 lb 9.6 oz)   SpO2 98%   BMI 27.74 kg/m   Estimated body mass index is 27.74 kg/m  as calculated from the following:    Height as of this encounter: 1.6 m (5' 3\").    Weight as of this encounter: 71 kg (156 lb 9.6 oz). Body surface area is 1.78 meters squared.  No Pain (0) Comment: Data Unavailable   No LMP recorded.  Allergies reviewed: Yes  Medications reviewed: Yes    Medications: Medication refills not needed today.  Pharmacy name entered into [employer]: [id number] - [place], [place] - [address] AT [facility] OF [address]    Clinical concerns: N/A       Yue Montelongo CMA              "

## 2019-08-05 NOTE — DISCHARGE INSTRUCTIONS

## 2019-08-05 NOTE — NURSING NOTE
"Oncology Rooming Note    August 5, 2019 3:53 PM   Amira Avery is a 29 year old female who presents for:    Chief Complaint   Patient presents with     RECHECK     RTN- Hodgkin Lymphoma      Initial Vitals: There were no vitals taken for this visit. Estimated body mass index is 27.74 kg/m  as calculated from the following:    Height as of an earlier encounter on 8/5/19: 1.6 m (5' 3\").    Weight as of an earlier encounter on 8/5/19: 71 kg (156 lb 9.6 oz). There is no height or weight on file to calculate BSA.  Data Unavailable Comment: Data Unavailable   No LMP recorded.  Allergies reviewed: Yes  Medications reviewed: Yes    Medications: Medication refills not needed today.  Pharmacy name entered into [employer]: [facility] #[id number] - [place], [place] - [address] AT [facility] [address]    Clinical concerns: None       Serenity Faulkner CMA              "

## 2019-08-05 NOTE — DISCHARGE INSTRUCTIONS

## 2019-08-05 NOTE — PROGRESS NOTES
BMT 1-Year Post-Autologous Stem Cell Transplant   Survivorship Care Plan    Date: August 5, 2019    Treatment Team:  Patient Care Team:  Hanny, [facility] as PCP - General  Gina Lawson MD as Referring Physician (Hematology & Oncology)  Aneta Sherman MSW as   Becky William LICSW as  ( - Clinical)  César Deluca MD as BMT Physician (Hematology & Oncology)  Leela Aquino, RN as BMT Nurse Coordinator (Transplant)    Date of Transplant: 8/6/2018    Transplant Essential Data:  Diagnosis HDN Hodgkin's Disease - NOS  HCT Type Autologous    Prep Regimen BCNU  Lisa-C  Etoposide  Melphalan   Donor Source No data was found    GVHD Prophylaxis No  Clinical Trials        Oncology Treatment History:      Treatment/Chemotherapy Number of Cycles Date Range Outcomes & Complications   ABVD 6 6/9/17-11/10/17 11/9/17  showed increasing minimal activity within poorly visualized but stable-appearing right cervical chain lymph nodes suggests mild progression of disease.  SUV was maximum of 4.7.  She was followed, and repeat PET/CT was repeated 01/09/2018.  There was new and increasing hypermetabolic right cervical adenopathy suspicious for disease progression.  The lymph nodes are persistent hypermetabolic activity in the tonsillar tissues with maximum 7.9, right jugulodigastric lymph nodes with SUV 4.0, posterior cervical chain node SUV 6, new 6 mm right posterior cervical chain, maximum SUV is 3.3, new 7 mm right supraclavicular node with maximum SUV of 4.7.  Chest, no abnormal hypermetabolic activity.  Abdomen, no abnormal hypermetabolic activity.  She also had on 02/05/2018 showed enlarged heterogeneous right jugular chain cervical lymph nodes in level 3 and 4 measuring approximately 1.9 x 2 x 2.6 cm and 2 x 1.8 x 2.2 cm are increased since neck CT from 04/21/2017, although may be similar to the PET/CT from 01/09/2018.  Resolution of previously enlarged left cervical lymph  nodes on the CT compared to 04/21/2017.  Nasopharynx, oropharynx, tongue base, hypopharynx, larynx were normal, possible small sub-centimeter left thyroid nodule versus artifact   GDP   2/16/18 PET scan 03/28/2018 showing that head and neck there is stable activity in the tonsillar tissue.  There are post-surgical changes in the right neck.  Hypermetabolic right cervical adenopathy in the prior study no longer demonstrated.  There are no new hypermetabolic lesions or lymph nodes in the neck.  No abnormal hypermetabolic activity in the abdomen.  Before starting GDP she underwent right neck excisional biopsy showing recurrent classical Hodgkin lymphoma, nodular-sclerosing.   5/7/2018: Her excisional LN biopsy from R neck is concicent with HD.   Rituximab in combo with PD1 inhibition   6/22/18 A CT PET repeated in her primary oncologist's office shows complete remission.  This CT scan and PET were reviewed here and they agree with that finding.                       Post-Transplant Treatment or Maintenance Therapy: (Delete if not needed)    Treatment  Number of Cycles Date Range Tolerance & Outcomes   brentuximab maintenance ongoing      Survivorship Self-Management Goals:  Nothing identified on questionnaire.    General Health Maintenance:     Vaccinations should be given at 1 and 2 years after your transplant, these may be given at your annual anniversary visits in the BMT clinic, by your primary care provider, or your local oncologist. An exception is the influenza vaccine, which can be given after day +60 post-transplant during influenza season. See table below for more information.     For general health concerns you can be seen by your primary care provider.     If you have questions about your transplant or follow up tests contact your BMT RN coordinator.    Vaccine Administration Schedule - Months Post HCT     12 months 14 months 24 months 26 months   29 months     All Patients   FlubokÆ/Fluzon  Seasonal  administration is recommended annually for all patients at Day +60 or later.  Delay of administration is at the discretion of the provider.   Pediarix  Pediarix  Pediarix  Pediarix      ActHIB  ActHIB  ActHIB  ActHIB      Pneumococcal [id number]  Ftaxmqe31  Tlnbrtt70  Pneumovax  or Lhldksj96     Havrix  Havrix   (peds only)  Havrix     (peds only)     Menveo  MenveoÆ   (all patients up to 17 yoa, then see special populations) MenveoÆ   (all patients up to 17 yoa, then see special populations)      M-M-R II ( live)a   MMR II  MMR II     Varivax (live)a    Varivax  Varivax     Special Populations    Gardasil 9Æc Gardasil 9  Gardasil 9  Gardasil 9      MenveoÆb Menveo  Menveo       BexeroÆ Bexero  Bexero         ShingrixÆ Shingrix  Shingrix       ZostavaxÆ (live)a      Zostavax   May substitute with different brands that contain equivalent vaccine components.      All Patients   Flubok /Fluzone -Influenza quadrivalent (inactivated)    Seasonal administration is recommended annually for all patients at Day +60 or later.  Delay of administration is at the discretion of the provider.    Children age 6 months - 8 yrs may need 2 doses - Please follow CDC guidelines.    Live virus vaccine (nasal FluMist) is contraindicated in HCT patients Min Time Between Vaccines: 4 weeks   Pediarix   -Diphtheria, tetanus, & acellular pertussis (DTaP)  -Hepatitis B (HBV)  -Inactivated Polio (IPV)    Administer at 12 months, 14 months, and 24 months post HCT     Full dose diphtheria, tetanus, & pertussis is recommended (DTaP) for all patients   o Reduced doses of diphtheria toxoid and acellular pertussis in Tdap vaccines may be associated with a poor response in HCT patients  Min Time Between Vaccines:  4 weeks   ActHIB   -Haemophilus influenzae     type B conjugate (Hib)    Administer at 12 months, 14 months, and 24 months post HCT  Min Time Between Vaccines: 4 weeks   Fsemequ10   -Pneumococcal polysaccharide conjugate vaccine (PCV13)     Administer at 12 months, 14 months, and 24 months post HCT    If chronic GVHD, Pervffp40  should be administered at 26 months or later post HCT (in place of Pneumovax )  Min Time Between Vaccines:  8 weeks   Pneumovax    -Pneumococcal polysaccharide (PPSV23)   Administer 26 months or later post HCT     If chronic GVHD, do not administer Pneumovax  (admin a 4th dose of [id number] )  Min Time Between Vaccines:  4 weeks   Havrix   -Hepatitis A (HepA)   Administer 12 months and 24 months post HCT for PEDIATRIC recipients  ONLY    Only administer to ADULT HCT recipients  who anticipate Hepatitis A exposure (e.g., during travel to endemic areas) and/or post-exposure prophylaxis Min Time Between Vaccines:    6 months   M-M-R II a  -Measles, mumps, rubella     Administer at 24 months and 26 months or later post HCT Min Time Between Vaccines:  4 weeks   Varivax a  -Varicella virus (JUAN ANTONIO)    If a recipient develops zoster post transplant, they should NOT receive the varicella vaccine    If a recipient received Shingrix , Varivax  should NOT be administered.    Can use the combination vaccine, ProQuad  (MMR + JUAN ANTONIO) Min Time Between Vaccines:  4 weeks     Special Populations   Gardasil 9   -Nonavalent human papillomavirus (HPV9)   Only for HCT recipients 11-26 years of age (male and female)   o Fanconi Anemia and Dyskeratosis Congenita patients: administer at 9-26 years of age (male and female)     Administer at 12 months, 14 months, and 24 months post HCT  Min Time Between Vaccines:   4 weeks between doses 1 and 2; 12 weeks between doses 2 and 3   Menveo   -Meningococcal vaccine tetravalent protein-conjugated (MCV4); MenACWY-D(Menactra )      If HCT recipient is 18 years of age or older, only administer if:   o Live in a dorm setting,  recruits  o Functionally or anatomically asplenic (including patients with chronic GVHD)  o Complement deficiency and/or human immunodeficiency and/or plan to travel in hyperendemic or  endemic areas  Administer 2 doses ( by 8 weeks) 12 months or later post HCT    Bexero   -Meninococcal vaccine    Only for HCT recipients who are:   o > 2 years of age with functionally or anatomically asplenic, human immunodeficiency, and/or complement deficiency  o 10-25 years of age   o >25 years of age and live in a dorm setting,  recruits, and/or plan to travel in hyperendemic or endemic areas     Administer 2 doses ( by 8 weeks) 12 months or later post HCT  Min Time Between Vaccines:  8 weeks   Shingrix a   -Varicella-zoster virus    Only for HCT recipients who are:   o 10 years of age and older  o IF LESS THAN 50 YEARS OF AGE:  CHECK INSURANCE APPROVAL prior to administration     Administer 2 doses ( by 8 weeks) 12 months or later post HCT    Zostavax a         -Varicella-zoster virus    In patient received Shingrix    Only for HCT recipients who are > 60 years of age and meet the following criteria:   o > 5 years after transplant AND have been off immunosuppression for > 1 year      Administer at 29 months or later post HCT 1 Dose Only   Vaccination Information for Family Members/Close Contacts   General Guidelines    Individuals should receive vaccinations according to ACIP recommendations     Fluzone    -Influenza quadrivalent (inactivated)   Annual administration of inactivated flu vaccine is strongly encouraged     Live attenuated influenza vaccine (FluMist) should be avoided     If FluMist is given, this person should avoid HCT recipients for 7 days    Varivax a  -Varicella virus (JUAN ANTONIO)                 AND  Zostavax a          -Varicella-zoster virus   If post-vaccination rash develops within 42 days of varicella or zoster vaccination, utilize contact precautions until all rash lesions are crusted over  or rash resolved     Direct chicken pox exposure in a HCT recipient with no history of chicken pox or no prior chicken pox vaccination should have VZV serology testing  and receive acyclovir until seropositivity is confirmed negative    Havrix   -Hepatitis A (HepA)   Routine hepatitis A vaccination is recommended in endemic areas or during outbreaks (according to existing ACIP guidelines)    RotaTeq /Rotarix   -Rotavirus (live)    Avoid rotavirus vaccination in household contacts if possible     Highly immunocompromised patients should avoid handling diapers of vaccinated infants/children for the first 4 weeks following vaccination    Ipol (Polio)    -Inactivated polio vaccine (IPV)   Inactivated polio should be used when indicated per ACIP guidelines     Avoid oral polio vaccine as it can be transmitted person-to-person        Survivorship Care Plan:     This individualized care plan is designed to inform you and your healthcare team of the recommended follow-up visits, tests, health maintenance activities, and cancer screening you should receive after transplant.     Immune System:  Risks Preventative Measures Recommendations   Infections   Symptoms of a cold such as fever, cough, congestion, and shortness of breath should be reported to your primary care provider    Immunizations will be administered at 1 & 2 years after transplant. See table above. Other later today     Eyes:   Risks Preventative Measures Recommendations   Cataracts, dry eyes, viral infections, and other eye changes   Yearly eye exam     Screening and treatment for high blood pressure or diabetes    Call if you have eye pain, visual changes, or floaters immediately None at this time     Mouth:  Risks Preventative Measures Recommendations   Dry mouth, cavities, and oral cancer   You can use over the counter Biotene for dry mouth or try sucking on sour candy before meals    Get a dental checkup every year and a cleaning every 6 months    If you have a prosthetic heart valve or central venous catheter or  port , you may need to take an antibiotic before your dental visits None at this time, patient has dental  provider and will schedule routine exam         Lungs  Risks Preventative Measures Recommendations   Changes in function from chemotherapy or radiation; lung infections (pneumonia)   Tell your provider about difficulty breathing, a cough, or new shortness of breath     Avoid use of tobacco products or smoking    Routine lung exams None at this time       Heart and Blood Vessels  Heart Disease Risk Score: The ASCVD Risk score (Jagruti VANCE Jr., et al., 2013) failed to calculate for the following reasons:    The 2013 ASCVD risk score is only valid for ages 40 to 79  Risks Preventative Measures Recommendations   Damage from chemotherapy and/or radiation; early development of heart valve disease    Ask your provider if you should receive consultation from a cardiologist (heart doctor) or have special screening    Follow a  heart healthy  lifestyle. For more information visit the National Heart, Lung, and Blood [place] website at: https://www.nhlbi.nih.gov/health/health-topics/topics/heart-healthy-lifestyle-changes     Don t smoke. If you currently smoke and are ready to quit, we can help you find ways to quit    Maintain a healthy weight    Exercise regularly    Avoid foods that have high amounts of:  o Salt/sodium (less than 2,300 mg of sodium per day)  o Saturated and trans fats  o Limit alcohol to less than 1 drink for women and 2 drinks for men per day  o Sugar such as soft drinks or sugary snacks None at this time       Hormones  Risks Preventative Measures Recommendations   Low thyroid function, low function of other glands (adrenals and others)   Certain endocrine/hormone disorders are more common after transplant. For this reason, talk to your provider about:  o Fatigue  o Muscle weakness  o Changes in cold tolerance  o Reduced interest in sex  o Erectile dysfunction     Certain tests may be used to monitor for hormone changes if you are experiencing symptoms. These tests are done at 1 year TSH with T4 reflex  (Recommended 1 & 2 years post-transplant)       Liver:  Risks Preventative Measures Recommendations    Damage from chemotherapy or other drugs, buildup of iron from blood transfusions, and infections   Certain tests may be ordered at your next survivorship visit to evaluate liver function based on your individual risk factors.    Talk to your provider before taking herbal supplements or over the counter drugs like Tylenol.    Ask your doctor if you should be treated for iron overload    Avoid alcohol in excess   Hepatic Panel/LFTs (Recommended every 3-6 mos the 1st year post-transplant & annually)       Kidneys and Bladder:  Risks Preventative Measures Recommendations   Damage from chemotherapy or other drugs, infections, high blood pressure   Monitoring blood tests of kidney function during follow up visits    Treating high blood pressure and diabetes     Drink adequate amounts of water    Report symptoms of infection such as frequent urination, pain with urination, foul odor to urine, or blood in the urine    Talk to your provider before taking herbal supplements or over the counter drugs like Ibuprofen Serum creatinine checked as part of anniversary labs or at least annually by primary provider       Nervous System:  Risks Preventative Measures Recommendations   Neuropathy from chemotherapy, changes in cognitive function   Report changes in sensation or discomfort in feet or hands    Tell your provider about ongoing changes in memory, ability to concentrate, or inability to make decisions   None at this time       Muscle & Connective Tissue  Risks Preventative Measures Recommendations   Reduced muscle strength & stamina   Let your provider know if:  o You notice changes in your muscle strength  o Require extra assistance with daily activities  o Need help creating an exercise routine  o Notice reduced range of motion of the arms, hips, or legs    Follow general guidelines for physical activity as recommended by  the Office of Disease Prevention & Health Promotion:    Avoid Inactivity  Some physical activity is better than none -- any amount has benefits.    Do Aerobic Activity  Do aerobic physical activity in episodes of at least 10 minutes, as many times as possible per day. This could include going for walks or using the elliptical or stationary bike.  Ask your doctor what aerobic activities would be safe and helpful for you, and set a goal for yourself!    Strengthen Muscles  Do muscle-strengthening activities (such as lifting light weights or using resistance bands and/or going up and down stairs) that are moderate or high intensity and involve all major muscle groups at least 4 days a week. None at this time     Emotional Health  Risks Preventative Measures Recommendations   Stress, depression, anxiety   Talk to your provider about:  o Changes in feelings, mood, or emotional wellbeing  o Interest in support groups  o If you are concerned about your caregivers emotional wellbeing  o Desire to speak with a counselor for ongoing support None at this time       Sexual Health  Risks Preventative Measures Recommendations   Reduced libido due to hormonal changes, erectile dysfunction, vaginal dryness, and sexually transmitted diseases  Talk to your provider about:    Reduced libido or concerns regarding your sexual health    Men: Erectile dysfunction     Women: Vaginal dryness, pain during intercourse, vaginal bleeding after intercourse, changes in vaginal discharge that may indicate infection (green/white/foul odor)   General Recommendations:    It is safe to have sex if your platelet count is > 50,000 and you feel physically and emotionally ready     Women can use water-based lubricants to reduce discomfort from dryness. Prescription topical estrogen may help as well.    Use barrier protection such as condoms to prevent sexually transmitted diseases, you are at higher risk for infections due to a weakened immune  system    For more information check out the National Mar[employer]or Program web page on this topic:  https://bethematch.org/patients-and-families/life-after-transplant/coping-with-life-after-transplant/relationships-and-sexual-health/  Other she has been advised to make annual gynecologic exam including pap smear       Fertility (delete if N/A)  Risks Preventative Measures Recommendations   Difficulty with sexual intercourse; difficulty having a baby   Women should avoid becoming pregnant for 2 years    Birth control should be used to prevent pregnancy    If you are interested in having a baby, discuss this with your provider None at this time       Cancer Screening:  Risks Preventative Measures Recommendations   Higher risk for the development of solid tumors, PTLD, and blood cancers   Preform a full self skin exam monthly to assess for any changes in moles or signs of skin cancer    Minimize excessive sun exposure and wear sunscreen    Women should preform breast-self exams and report any changes such as a new lump, discharge from nipples, and red or dimpled areas of the breast.     Imaging for breast cancer known as mammography is recommended for women starting at age 40 or 8 years after radiation exposure     Screening for colorectal cancer should begin at age 50    All women should have a pap smear every 1-3 years beginning at age 21    Men should perform a monthly testicular self-exam if they have been exposed to radiation as part of their cancer treatment   Other she has been advised to make annual gynecologic exam including pap smear       Recommended Tests & Follow-Up:  Referrals & Tests Ordered Today:  Your BMT physician will review these tests and referral results. If you require treatment based on the results, a member of the BMT team will contact you.  Orders placed today:  No orders of the defined types were placed in this encounter.    (Note to providers: After signing orders use the refresh tool in  the note window to display results)   Future Tests/Referrals:   All recommendations above should be completed within 2 months either by your primary care provider or local oncologist (cancer doctor).   Recommendations that were not ordered today should be completed by your:  BMT Provider Team   Follow Up Care:  Continue to see your care team members routinely after transplant.  BMT Provider: later today  Hematologist/Oncologist:as planned  Primary Care Provider: as needed     Georgina Sanchez    I spent 45 minutes with the patient and family, over half of which was spent discussing preventative care strategies, self-management practices, and potential complications after transplant.      Information used for these recommendations was obtained from: SHANNAN Loomis., KATHARINE Packer, ADELAIDE Banks, JOVANY Perales., JOSELINE Weaver., Raysa, JMarco L.,   Javy, K. M. (2013). Prevalence of Hematopoietic Cell Transplant Survivors in the [place]. Biology of Blood and Marrow Transplantation?: Journal of the American Society for Blood and Marrow Transplantation, 19(10), 0597-6882. doi [email or web address]/j.bbmt.2013.07.020

## 2019-08-05 NOTE — PROGRESS NOTES
BMT Clinic Note   08/05/2019    Patient ID:  Amira Avery is a 28 year old female, currently day 364 of her ASCT for hodgkin lymphoma.  She remains in CR based on imaging from 2/1/2019.  She is tolerating Brentuximab maintenance.       Given tonsilar enlargement on prior CT in 2/2019, she was seen by ENT at [facility]. Overall, biopsy was deferred, as the tonsil was grossly normal on inspection.  She reports new left tonsilar fullness and occasional exudate.  [facility] ENT was referred for evaluation.  No fevers, but she reports that she often requires GCSF after Brentuximab for neutropenia.    1 year restaging CT CAP read is pending, though formally no LAD in the neck.       Diagnosis HDN Hodgkin's Disease - NOS  HCT Type Autologous    Prep Regimen BCNU  Lisa-C  Etoposide  Melphalan   Donor Source No data was found    GVHD Prophylaxis No  Primary BMT Provider Yosi     Review of Systems: 10 point ROS negative except as noted above.  # Pain Assessment:  Current Pain Score 11/29/2018   Patient currently in pain? denies   Pain score (0-10) -   Pain location -   Pain descriptors -   Amira olson pain level was assessed and she currently denies pain.      Scheduled Medications    Current Outpatient Medications   Medication     ACETAMINOPHEN PO     acyclovir (ZOVIRAX) 400 MG tablet     dexamethasone (DECADRON) 4 MG tablet     estradiol (VAGIFEM) 10 MCG TABS vaginal tablet     pantoprazole (PROTONIX) 20 MG EC tablet     PARoxetine (PAXIL) 10 MG tablet     sulfamethoxazole-trimethoprim (BACTRIM DS/SEPTRA DS) 800-160 MG tablet     No current facility-administered medications for this visit.        PHYSICAL EXAM     Weight In/Out     Wt Readings from Last 3 Encounters:   02/04/19 67.6 kg (149 lb)   02/01/19 69.6 kg (153 lb 8 oz)   01/07/19 68.9 kg (151 lb 14.4 oz)      [unfilled]       KPS:  90    General: NAD   Eyes: : ARMINDA, sclera anicteric   Nose/Mouth/Throat: OP clear, buccal mucosa moist, no ulcerations    Lungs: CTA bilaterally  Cardiovascular: RRR, no M/R/G   Abdominal/Rectal: +BS, soft, NT, ND, No HSM   Lymphatics: no edema  Skin: no rashes or petechaie  Neuro: A&O     LABS AND IMAGING - PAST 24 HOURS     Lab Results   Component Value Date    WBC 9.2 08/05/2019     Lab Results   Component Value Date    RBC 3.73 08/05/2019     Lab Results   Component Value Date    HGB 12.2 08/05/2019     Lab Results   Component Value Date    HCT 37.0 08/05/2019     No components found for: MCT  Lab Results   Component Value Date    MCV 99 08/05/2019     Lab Results   Component Value Date    MCH 32.7 08/05/2019     Lab Results   Component Value Date    MCHC 33.0 08/05/2019     Lab Results   Component Value Date    RDW 14.4 08/05/2019     Lab Results   Component Value Date     08/05/2019     Last Comprehensive Metabolic Panel:  Sodium   Date Value Ref Range Status   08/05/2019 136 133 - 144 mmol/L Final     Potassium   Date Value Ref Range Status   08/05/2019 3.8 3.4 - 5.3 mmol/L Final     Chloride   Date Value Ref Range Status   08/05/2019 105 94 - 109 mmol/L Final     Carbon Dioxide   Date Value Ref Range Status   08/05/2019 23 20 - 32 mmol/L Final     Anion Gap   Date Value Ref Range Status   08/05/2019 7 3 - 14 mmol/L Final     Glucose   Date Value Ref Range Status   08/05/2019 87 70 - 99 mg/dL Final     Urea Nitrogen   Date Value Ref Range Status   08/05/2019 16 7 - 30 mg/dL Final     Creatinine   Date Value Ref Range Status   08/05/2019 0.66 0.52 - 1.04 mg/dL Final     GFR Estimate   Date Value Ref Range Status   08/05/2019 >90 >60 mL/min/[1.73_m2] Final     Comment:     Non  GFR Calc  Starting 12/18/2018, serum creatinine based estimated GFR (eGFR) will be   calculated using the Chronic Kidney Disease Epidemiology Collaboration   (CKD-EPI) equation.       Calcium   Date Value Ref Range Status   08/05/2019 8.6 8.5 - 10.1 mg/dL Final     ASSESSMENT BY SYSTEMS     Day + 364 post ASCT for HL and tolerating  brentuximab maintenance.    1)  HL: 1 year restaging imaging pending  2)  1 year vaccinations today  3)  Consult ENT for recurrent left tonsillar swelling     All questions were answered in full.       César Deluca

## 2019-08-06 LAB
IGA SERPL-MCNC: 421 MG/DL (ref 70–380)
IGG SERPL-MCNC: 806 MG/DL (ref 695–1620)
IGM SERPL-MCNC: 79 MG/DL (ref 60–265)

## 2019-08-12 ENCOUNTER — TELEPHONE (OUTPATIENT)
Dept: OTOLARYNGOLOGY | Facility: CLINIC | Age: 29
End: 2019-08-12

## 2019-08-12 NOTE — TELEPHONE ENCOUNTER
Patient is established with Dr. Silva, she has a new referral with the same diagnosis to see Dr. Silva, which should be a return visit.  I left a voicemail message on the ENT scheduling mailbox.  I let the patient know I left a message for ENT to call back to schedule a return visit, using her cell phone number.

## 2019-08-12 NOTE — TELEPHONE ENCOUNTER
Spoke to patient to schedule appointment with Dr. Silva per message left by call center. Scheduled patient for appointment on 8/16/19 at 2:20pm. Patient expressed understanding.    Viktoriya Pakrer, EMT

## 2019-08-16 ENCOUNTER — OFFICE VISIT (OUTPATIENT)
Dept: OTOLARYNGOLOGY | Facility: CLINIC | Age: 29
End: 2019-08-16
Payer: COMMERCIAL

## 2019-08-16 VITALS
HEART RATE: 66 BPM | SYSTOLIC BLOOD PRESSURE: 117 MMHG | RESPIRATION RATE: 16 BRPM | BODY MASS INDEX: 27.64 KG/M2 | WEIGHT: 156 LBS | HEIGHT: 63 IN | DIASTOLIC BLOOD PRESSURE: 70 MMHG

## 2019-08-16 DIAGNOSIS — C81.95 HODGKIN LYMPHOMA, UNSPECIFIED, LYMPH NODES OF INGUINAL REGION AND LOWER LIMB (H): ICD-10-CM

## 2019-08-16 DIAGNOSIS — R09.89 TONSIL PAIN: Primary | ICD-10-CM

## 2019-08-16 ASSESSMENT — MIFFLIN-ST. JEOR: SCORE: 1406.74

## 2019-08-16 ASSESSMENT — PAIN SCALES - GENERAL: PAINLEVEL: NO PAIN (0)

## 2019-08-16 NOTE — NURSING NOTE
"Chief Complaint   Patient presents with     RECHECK     Left Tonsillar Mass      /70 (BP Location: Right arm, Patient Position: Sitting, Cuff Size: Adult Regular)   Pulse 66   Resp 16   Ht 1.6 m (5' 3\")   Wt 70.8 kg (156 lb)   BMI 27.63 kg/m      Chula Black CMA    "

## 2019-08-16 NOTE — LETTER
8/16/2019       RE: Amira Avery  [address]  [facility] [address]     Dear Colleague,    Thank you for referring your patient, Amira Avery, to the [facility] EAR [facility] THROAT at [facility]. Please see a copy of my visit note below.    Dear Dr. Landa:    I had the pleasure of seeing Amira Avery in follow-up today at the [facility].     History of Present Illness:   Amira Avery is a 29 year old woman with a history of Hogkin's lymphoma with her third recurrence. She was initially diagnosed in spring 2017. She presented with an enlarged neck node which was negative on FNA and core biopsy. She then underwent left excisional node biopsy with diagnosis consistent with classic Hodgkin's lymphoma. She was treated with chemotherapy from 6/2017 to 11/2017. She had disease recurrence and had repeat needle biopsies which were nondiagnostic and then had a right excisional node biopsy which again showed classic Hodgkin lymphoma. She was undergoing workup for bone marrow transplant and her PET scan showed a hypermetabolic level IV node. She was taken to the operating room on 5/2/2018 for a excisional node biopsy.  Intraoperatively the node was noted to be adherent to the internal jugular vein as well as the vagus nerve.  The vagus nerve was directly overlying the node and could not be peeled off.  The internal jugular vein could not be dissected from the node.  Final pathology was consistent with a recurrent Hodgkin's lymphoma.     Interval history:  She comes in today for follow-up. She was last seen in clinic 5/2018. At that time she had some hoarseness of voice. She deferred any intervention at that time. She has not had evidence of recurrent disease. She had issues of recurrent sore throat going back to February 2019. She had a CT scan which showed asymmetry of the left tonsil. She was reportedly  seen at [facility] for consideration of a biopsy but there were no abnormalities. The pateint says that she has had intermittent growth/swelling present on the left tonsil since her initial episode back in February. She says that when it occurs it lasts for a few weeks. She recently had resolution of an episode. When it occurs she has pain. She has not had recurrent strep. She denies a history of tonsil stones. She has no fevers, chills, night sweats. With regards to her voice she said that it improved after several months. She is no longer working.      MEDICATIONS:     Current Outpatient Medications   Medication Sig Dispense Refill     ACETAMINOPHEN PO Take 650 mg by mouth every 4 hours as needed for pain       acyclovir (ZOVIRAX) 400 MG tablet Take 1 tablet (400 mg) by mouth every 12 hours 180 tablet 4     dexamethasone (DECADRON) 4 MG tablet Take 2 tabs night prior to treatment, and 1 tab morning prior       pantoprazole (PROTONIX) 20 MG EC tablet Take 2 tablets (40 mg) by mouth daily 30 tablet 1     PARoxetine (PAXIL) 10 MG tablet Take 10 mg by mouth every morning       sulfamethoxazole-trimethoprim (BACTRIM DS/SEPTRA DS) 800-160 MG tablet Take 1 tablet by mouth 2 times daily Take only Monday and Thursday (Patient not taking: Reported on 8/16/2019) 30 tablet 1       ALLERGIES:    Allergies   Allergen Reactions     Morphine Hives     No issues with oral narcotics, dilaudid, or fentanyl per pt       HABITS/SOCIAL HISTORY:     Social History     Socioeconomic History     Marital status: Single     Spouse name: Not on file     Number of children: Not on file     Years of education: Not on file     Highest education level: Not on file   Occupational History     Not on file   Social Needs     Financial resource strain: Not on file     Food insecurity:     Worry: Not on file     Inability: Not on file     Transportation needs:     Medical: Not on file     Non-medical: Not on file   Tobacco Use     Smoking  status: Former Smoker     Packs/day: 1.00     Years: 10.00     Pack years: 10.00     Types: Cigarettes, Hookah     Start date: 7/15/2007     Last attempt to quit: 10/15/2017     Years since quittin.8     Smokeless tobacco: Never Used   Substance and Sexual Activity     Alcohol use: Yes     Comment: Rare     Drug use: No     Sexual activity: Yes     Partners: Female     Birth control/protection: None   Lifestyle     Physical activity:     Days per week: Not on file     Minutes per session: Not on file     Stress: Not on file   Relationships     Social connections:     Talks on phone: Not on file     Gets together: Not on file     Attends Yarsanism service: Not on file     Active member of club or organization: Not on file     Attends meetings of clubs or organizations: Not on file     Relationship status: Not on file     Intimate partner violence:     Fear of current or ex partner: Not on file     Emotionally abused: Not on file     Physically abused: Not on file     Forced sexual activity: Not on file   Other Topics Concern     Parent/sibling w/ CABG, MI or angioplasty before 65F 55M? Not Asked   Social History Narrative     Not on file       PAST MEDICAL HISTORY:   Past Medical History:   Diagnosis Date     Cancer (H)      Tattoos         PAST SURGICAL HISTORY:   Past Surgical History:   Procedure Laterality Date     BIOPSY LYMPH NODE CERVICAL Right 2018    Procedure: BIOPSY LYMPH NODE CERVICAL;  Right Excisional Node Biopsy;  Surgeon: Lia Silva MD;  Location: [facility] OR     BONE MARROW BIOPSY, BONE SPECIMEN, NEEDLE/TROCAR Left 2018    Procedure: Bone Marrow Biopsy, Left;  Surgeon: Shiloh Drake PA-C;  Location: [facility] OR     excision of deep cervical LN's Left 2017    positive for Hodgkin's     excision of deep cervical LN's Right 2018    positive for Hodgkin's     port a cath placement Right 2017    IJ vein; removed 17 no longer needed     port a cath placement Left  "02/21/2018    IJ vein     US BIOPSY LYMPH NODE FNA Left 03/06/2017    low midline neck, negative for malignancy       FAMILY HISTORY:    Family History   Problem Relation Age of Onset     Hypertension Father      Coronary Artery Disease Maternal Grandmother      Colon Cancer Maternal Grandmother      Cancer Maternal Grandmother      Heart Disease Maternal Grandmother        REVIEW OF SYSTEMS:  12 point ROS was negative other than the symptoms noted above in the HPI.  Patient Supplied Answers to Review of Systems  [facility] ROS 8/16/2019   Ears, Nose, Throat Sore throat         PHYSICAL EXAMINATION:   /70 (BP Location: Right arm, Patient Position: Sitting, Cuff Size: Adult Regular)   Pulse 66   Resp 16   Ht 1.6 m (5' 3\")   Wt 70.8 kg (156 lb)   BMI 27.63 kg/m      Appearance:   normal; NAD, age-appropriate appearance, well-developed, normal habitus   Communication:   normal; communicates verbally, normal voice quality   Head/Face:   inspection -  Normal; no scars or visible lesions   Skin:  normal, no rash   Ears:  auricle (AD) -  normal  EAC (AD) -  normal  TM (AD) -  Normal, no effusion  auricle (AS) -  normal  EAC (AS) -  normal  TM (AS) -  Normal, no effusion  Normal clinical speech reception   Nose:  Ext. inspection -  Normal   Oral Cavity:  lips -  Normal mucosa, oral competence, and stoma size   Age-appropriate dentition, healthy gingival mucosa   Hard palate, buccal, floor of mouth mucosa normal   Tongue - normal movement, no lesions, normal sensation   Oropharynx:  mucosa -  Normal, no lesions  soft palate -  Normal, no lesions, no asymmetry, normal elevation  tonsils -  Normal, no exudates, no abnormal lesions, symmetric; along the left posterior pillar there is about a 2 mm area of tissue that appears to be a possible salivary gland - soft on palpation    Neck: Well healed right neck incision  Normal range of motion   Lymphatic:  no abnormal nodes   Cardiovascular:  warm, pink, well-perfused " extremities without swelling, tenderness, or edema   Respiratory:  Normal respiratory effort, no stridor   Neuro/Psych.:  mood/affect -  normal  mental status -  normal       PROCEDURE:      RESULTS REVIEWED:   I reviewed the imaging from 8/2019 which does not show any obvious masses in the tonsils, no significant asymmetry.    I reviewed the records from [facility] in February, May, and August 2019      IMPRESSION AND PLAN:   Amira Avery is a 29 year old with recurrent Hodgkin's lymphoma.  She recently underwent a a node biopsy of the right neck (only node present on PET) after IR declined to do a biopsy.  Intraoperatively the node was densely adherent to the internal jugular vein and vagus nerve without planes identified between the node and the structures.  Given these findings the node was incised and a piece of the node was sent for pathology and was consistent with recurrent lymphoma.    She now has concerns for recurrent swelling in the left tonsil. There are no concerning findings on exam today. She does have some tonsil tissue vs salivary tissue along the left posterior pillar which she says is the area of concern but that it is not active today. I discussed with her that we could biopsy this area but it is not concerning on exam. Moreover there is a risk of a hole being created with a biopsy which would likely have limited functional impact. I discussed that she could have a mucocele or obstructed salivary gland potentially in the this area. We discussed that it may be worthwhile for her to come in for exam when she is having an episode/flair so we can see what her exam is and determine if a biopsy is worthwhile.    She is going to contact us with her next episode. We will work on getting her in the same day or the next day if at all possible. She was given our contact information.    Thank you very much for the opportunity to participate in the care of your patient.      Lia Silva,  M.D.  Otolaryngology- Head & Neck Surgery        CC:  Tanya Landa MD  [address]  [place] [address]

## 2019-08-17 NOTE — PROGRESS NOTES
Dear Dr. Landa:    I had the pleasure of seeing Amira Avery in follow-up today at the [facility].     History of Present Illness:   Amira Avery is a 29 year old woman with a history of Hogkin's lymphoma with her third recurrence. She was initially diagnosed in spring 2017. She presented with an enlarged neck node which was negative on FNA and core biopsy. She then underwent left excisional node biopsy with diagnosis consistent with classic Hodgkin's lymphoma. She was treated with chemotherapy from 6/2017 to 11/2017. She had disease recurrence and had repeat needle biopsies which were nondiagnostic and then had a right excisional node biopsy which again showed classic Hodgkin lymphoma. She was undergoing workup for bone marrow transplant and her PET scan showed a hypermetabolic level IV node. She was taken to the operating room on 5/2/2018 for a excisional node biopsy.  Intraoperatively the node was noted to be adherent to the internal jugular vein as well as the vagus nerve.  The vagus nerve was directly overlying the node and could not be peeled off.  The internal jugular vein could not be dissected from the node.  Final pathology was consistent with a recurrent Hodgkin's lymphoma.     Interval history:  She comes in today for follow-up. She was last seen in clinic 5/2018. At that time she had some hoarseness of voice. She deferred any intervention at that time. She has not had evidence of recurrent disease. She had issues of recurrent sore throat going back to February 2019. She had a CT scan which showed asymmetry of the left tonsil. She was reportedly seen at [facility] for consideration of a biopsy but there were no abnormalities. The pateint says that she has had intermittent growth/swelling present on the left tonsil since her initial episode back in February. She says that when it occurs it lasts for a few weeks. She recently had resolution of an  episode. When it occurs she has pain. She has not had recurrent strep. She denies a history of tonsil stones. She has no fevers, chills, night sweats. With regards to her voice she said that it improved after several months. She is no longer working.      MEDICATIONS:     Current Outpatient Medications   Medication Sig Dispense Refill     ACETAMINOPHEN PO Take 650 mg by mouth every 4 hours as needed for pain       acyclovir (ZOVIRAX) 400 MG tablet Take 1 tablet (400 mg) by mouth every 12 hours 180 tablet 4     dexamethasone (DECADRON) 4 MG tablet Take 2 tabs night prior to treatment, and 1 tab morning prior       pantoprazole (PROTONIX) 20 MG EC tablet Take 2 tablets (40 mg) by mouth daily 30 tablet 1     PARoxetine (PAXIL) 10 MG tablet Take 10 mg by mouth every morning       sulfamethoxazole-trimethoprim (BACTRIM DS/SEPTRA DS) 800-160 MG tablet Take 1 tablet by mouth 2 times daily Take only Monday and Thursday (Patient not taking: Reported on 2019) 30 tablet 1       ALLERGIES:    Allergies   Allergen Reactions     Morphine Hives     No issues with oral narcotics, dilaudid, or fentanyl per pt       HABITS/SOCIAL HISTORY:     Social History     Socioeconomic History     Marital status: Single     Spouse name: Not on file     Number of children: Not on file     Years of education: Not on file     Highest education level: Not on file   Occupational History     Not on file   Social Needs     Financial resource strain: Not on file     Food insecurity:     Worry: Not on file     Inability: Not on file     Transportation needs:     Medical: Not on file     Non-medical: Not on file   Tobacco Use     Smoking status: Former Smoker     Packs/day: 1.00     Years: 10.00     Pack years: 10.00     Types: Cigarettes, Hookah     Start date: 7/15/2007     Last attempt to quit: 10/15/2017     Years since quittin.8     Smokeless tobacco: Never Used   Substance and Sexual Activity     Alcohol use: Yes     Comment: Rare     Drug  use: No     Sexual activity: Yes     Partners: Female     Birth control/protection: None   Lifestyle     Physical activity:     Days per week: Not on file     Minutes per session: Not on file     Stress: Not on file   Relationships     Social connections:     Talks on phone: Not on file     Gets together: Not on file     Attends L[employer] day service: Not on file     Active member of club or organization: Not on file     Attends meetings of clubs or organizations: Not on file     Relationship status: Not on file     Intimate partner violence:     Fear of current or ex partner: Not on file     Emotionally abused: Not on file     Physically abused: Not on file     Forced sexual activity: Not on file   Other Topics Concern     Parent/sibling w/ CABG, MI or angioplasty before 65F 55M? Not Asked   Social History Narrative     Not on file       PAST MEDICAL HISTORY:   Past Medical History:   Diagnosis Date     Cancer (H)      Tattoos         PAST SURGICAL HISTORY:   Past Surgical History:   Procedure Laterality Date     BIOPSY LYMPH NODE CERVICAL Right 5/2/2018    Procedure: BIOPSY LYMPH NODE CERVICAL;  Right Excisional Node Biopsy;  Surgeon: Lia Silva MD;  Location: [facility] OR     BONE MARROW BIOPSY, BONE SPECIMEN, NEEDLE/TROCAR Left 11/29/2018    Procedure: Bone Marrow Biopsy, Left;  Surgeon: Shiloh Drake PA-C;  Location: [facility] OR     excision of deep cervical LN's Left 05/19/2017    positive for Hodgkin's     excision of deep cervical LN's Right 02/13/2018    positive for Hodgkin's     port a cath placement Right 06/01/2017    IJ vein; removed 12/1/17 no longer needed     port a cath placement Left 02/21/2018    IJ vein     US BIOPSY LYMPH NODE FNA Left 03/06/2017    low midline neck, negative for malignancy       FAMILY HISTORY:    Family History   Problem Relation Age of Onset     Hypertension Father      Coronary Artery Disease Maternal Grandmother      Colon Cancer Maternal Grandmother      Cancer Maternal  "Grandmother      Heart Disease Maternal Grandmother        REVIEW OF SYSTEMS:  12 point ROS was negative other than the symptoms noted above in the HPI.  Patient Supplied Answers to Review of Systems  [facility] ROS 8/16/2019   Ears, Nose, Throat Sore throat         PHYSICAL EXAMINATION:   /70 (BP Location: Right arm, Patient Position: Sitting, Cuff Size: Adult Regular)   Pulse 66   Resp 16   Ht 1.6 m (5' 3\")   Wt 70.8 kg (156 lb)   BMI 27.63 kg/m     Appearance:   normal; NAD, age-appropriate appearance, well-developed, normal habitus   Communication:   normal; communicates verbally, normal voice quality   Head/Face:   inspection -  Normal; no scars or visible lesions   Skin:  normal, no rash   Ears:  auricle (AD) -  normal  EAC (AD) -  normal  TM (AD) -  Normal, no effusion  auricle (AS) -  normal  EAC (AS) -  normal  TM (AS) -  Normal, no effusion  Normal clinical speech reception   Nose:  Ext. inspection -  Normal   Oral Cavity:  lips -  Normal mucosa, oral competence, and stoma size   Age-appropriate dentition, healthy gingival mucosa   Hard palate, buccal, floor of mouth mucosa normal   Tongue - normal movement, no lesions, normal sensation   Oropharynx:  mucosa -  Normal, no lesions  soft palate -  Normal, no lesions, no asymmetry, normal elevation  tonsils -  Normal, no exudates, no abnormal lesions, symmetric; along the left posterior pillar there is about a 2 mm area of tissue that appears to be a possible salivary gland - soft on palpation    Neck: Well healed right neck incision  Normal range of motion   Lymphatic:  no abnormal nodes   Cardiovascular:  warm, pink, well-perfused extremities without swelling, tenderness, or edema   Respiratory:  Normal respiratory effort, no stridor   Neuro/Psych.:  mood/affect -  normal  mental status -  normal       PROCEDURE:      RESULTS REVIEWED:   I reviewed the imaging from 8/2019 which does not show any obvious masses in the tonsils, no significant " asymmetry.    I reviewed the records from [facility] in February, May, and August 2019      IMPRESSION AND PLAN:   Amira Avery is a 29 year old with recurrent Hodgkin's lymphoma.  She recently underwent a a node biopsy of the right neck (only node present on PET) after IR declined to do a biopsy.  Intraoperatively the node was densely adherent to the internal jugular vein and vagus nerve without planes identified between the node and the structures.  Given these findings the node was incised and a piece of the node was sent for pathology and was consistent with recurrent lymphoma.    She now has concerns for recurrent swelling in the left tonsil. There are no concerning findings on exam today. She does have some tonsil tissue vs salivary tissue along the left posterior pillar which she says is the area of concern but that it is not active today. I discussed with her that we could biopsy this area but it is not concerning on exam. Moreover there is a risk of a hole being created with a biopsy which would likely have limited functional impact. I discussed that she could have a mucocele or obstructed salivary gland potentially in the this area. We discussed that it may be worthwhile for her to come in for exam when she is having an episode/flair so we can see what her exam is and determine if a biopsy is worthwhile.    She is going to contact us with her next episode. We will work on getting her in the same day or the next day if at all possible. She was given our contact information.    Thank you very much for the opportunity to participate in the care of your patient.      Lia Silva M.D.  Otolaryngology- Head & Neck Surgery        CC:  Tanya Landa MD  [address]  [place] [address]

## 2019-09-30 ENCOUNTER — HEALTH MAINTENANCE LETTER (OUTPATIENT)
Age: 29
End: 2019-09-30

## 2019-10-07 ENCOUNTER — OFFICE VISIT (OUTPATIENT)
Dept: TRANSPLANT | Facility: CLINIC | Age: 29
End: 2019-10-07
Attending: INTERNAL MEDICINE
Payer: COMMERCIAL

## 2019-10-07 VITALS
HEART RATE: 70 BPM | WEIGHT: 153.9 LBS | BODY MASS INDEX: 27.26 KG/M2 | SYSTOLIC BLOOD PRESSURE: 111 MMHG | DIASTOLIC BLOOD PRESSURE: 74 MMHG | TEMPERATURE: 98.2 F | OXYGEN SATURATION: 100 %

## 2019-10-07 DIAGNOSIS — C81.10 NODULAR SCLEROSING HODGKIN'S LYMPHOMA, UNSPECIFIED BODY REGION (H): Primary | ICD-10-CM

## 2019-10-07 PROCEDURE — 90723 DTAP-HEP B-IPV VACCINE IM: CPT | Mod: ZF | Performed by: INTERNAL MEDICINE

## 2019-10-07 PROCEDURE — 25000581 ZZH RX MED A9270 GY (STAT IND- M) 250: Mod: ZF | Performed by: INTERNAL MEDICINE

## 2019-10-07 PROCEDURE — 90648 HIB PRP-T VACCINE 4 DOSE IM: CPT | Mod: ZF | Performed by: INTERNAL MEDICINE

## 2019-10-07 PROCEDURE — 90670 PCV13 VACCINE IM: CPT | Mod: ZF | Performed by: INTERNAL MEDICINE

## 2019-10-07 PROCEDURE — 25000128 H RX IP 250 OP 636: Mod: ZF | Performed by: INTERNAL MEDICINE

## 2019-10-07 PROCEDURE — 90472 IMMUNIZATION ADMIN EACH ADD: CPT

## 2019-10-07 PROCEDURE — 90750 HZV VACC RECOMBINANT IM: CPT | Mod: ZF | Performed by: INTERNAL MEDICINE

## 2019-10-07 PROCEDURE — G0008 ADMIN INFLUENZA VIRUS VAC: HCPCS

## 2019-10-07 PROCEDURE — 90686 IIV4 VACC NO PRSV 0.5 ML IM: CPT | Mod: ZF | Performed by: INTERNAL MEDICINE

## 2019-10-07 RX ADMIN — ZOSTER VACCINE RECOMBINANT, ADJUVANTED 0.5 ML: KIT at 14:59

## 2019-10-07 RX ADMIN — PNEUMOCOCCAL 13-VALENT CONJUGATE VACCINE 0.5 ML: 2.2; 2.2; 2.2; 2.2; 2.2; 4.4; 2.2; 2.2; 2.2; 2.2; 2.2; 2.2; 2.2 INJECTION, SUSPENSION INTRAMUSCULAR at 14:57

## 2019-10-07 RX ADMIN — HAEMOPHILUS B POLYSACCHARIDE CONJUGATE VACCINE FOR INJ 0.5 ML: RECON SOLN at 14:58

## 2019-10-07 RX ADMIN — INFLUENZA A VIRUS A/BRISBANE/02/2018 IVR-190 (H1N1) ANTIGEN (FORMALDEHYDE INACTIVATED), INFLUENZA A VIRUS A/KANSAS/14/2017 X-327 (H3N2) ANTIGEN (FORMALDEHYDE INACTIVATED), INFLUENZA B VIRUS B/PHUKET/3073/2013 ANTIGEN (FORMALDEHYDE INACTIVATED), AND INFLUENZA B VIRUS B/MARYLAND/15/2016 BX-69A ANTIGEN (FORMALDEHYDE INACTIVATED) 0.5 ML: 15; 15; 15; 15 INJECTION, SUSPENSION INTRAMUSCULAR at 14:56

## 2019-10-07 RX ADMIN — DIPHTHERIA AND TETANUS TOXOIDS AND ACELLULAR PERTUSSIS ADSORBED, HEPATITIS B (RECOMBINANT) AND INACTIVATED POLIOVIRUS VACCINE COMBINED 0.5 ML: 25; 10; 25; 25; 8; 10; 40; 8; 32 INJECTION, SUSPENSION INTRAMUSCULAR at 14:58

## 2019-10-07 ASSESSMENT — PAIN SCALES - GENERAL: PAINLEVEL: NO PAIN (0)

## 2019-10-07 NOTE — NURSING NOTE
Vaccines were given to patient today in the right arm, patient tolerated well with no complications. See BRENDAN FraserA

## 2019-12-04 DIAGNOSIS — Z52.001 ENCOUNTER FOR HARVESTING OF PERIPHERAL BLOOD STEM CELLS: ICD-10-CM

## 2019-12-04 DIAGNOSIS — C81.95 HODGKIN LYMPHOMA, UNSPECIFIED, LYMPH NODES OF INGUINAL REGION AND LOWER LIMB (H): ICD-10-CM

## 2020-06-24 DIAGNOSIS — C81.95 HODGKIN LYMPHOMA, UNSPECIFIED, LYMPH NODES OF INGUINAL REGION AND LOWER LIMB (H): Primary | ICD-10-CM

## 2020-07-31 ENCOUNTER — VIRTUAL VISIT (OUTPATIENT)
Dept: TRANSPLANT | Facility: CLINIC | Age: 30
End: 2020-07-31
Attending: NURSE PRACTITIONER
Payer: COMMERCIAL

## 2020-07-31 DIAGNOSIS — C81.95 HODGKIN LYMPHOMA, UNSPECIFIED, LYMPH NODES OF INGUINAL REGION AND LOWER LIMB (H): Primary | ICD-10-CM

## 2020-07-31 PROCEDURE — 40001009 ZZH VIDEO/TELEPHONE VISIT; NO CHARGE

## 2020-07-31 NOTE — LETTER
"    7/31/2020         RE: Amira Avery  [address] [address] [address]  [facility] [address]        Dear Colleague,    Thank you for referring your patient, Amira Avery, to the [facility]PL[facility]. Please see a copy of my visit note below.    Amira Avery is a 30 year old female who is being evaluated via a billable video visit.            I have reviewed and updated the patient's allergies and medication list.    Concerns: No new concerns.   Refills: None needed.     Vitals - Patient Reported  Weight (Patient Reported): 63.9 kg (140 lb 12.8 oz)  Height (Patient Reported): 160 cm (5' 3\")  BMI (Based on Pt Reported Ht/Wt): 24.94  Pain Score: No Pain (0)      Shannon Flores CMA        Video-Visit Details      BMT 2-Year Post-Autologous Transplant   Survivorship Care Plan      Date: July 31, 2020    Treatment Team:  Patient Care Team:  Clinic, [facility] as PCP - General  Gina Lawson MD as Referring Physician (Hematology & Oncology)  Aneta Sherman MSW as   Becky William LICSW as  ( - Clinical)  César Deluca MD as BMT Physician (Hematology & Oncology)  Belinda Bach RN as Specialty Care Coordinator (BMT - Adult)       Date of Transplant: 8/6/2018     Transplant Essential Data:  Diagnosis HDN Hodgkin's Disease - NOS  HCT Type Autologous    Prep Regimen BCNU  Lisa-C  Etoposide  Melphalan   Donor Source No data was found    GVHD Prophylaxis No  Clinical Trials        Oncology Treatment History:      Treatment/Chemotherapy Number of Cycles Date Range Outcomes & Complications   ABVD 6 6/9/17-11/10/17 11/9/17  showed increasing minimal activity within poorly visualized but stable-appearing right cervical chain lymph nodes suggests mild progression of disease.  SUV was maximum of 4.7.  She was followed, and repeat PET/CT was repeated 01/09/2018.  There was new and increasing hypermetabolic right cervical adenopathy suspicious " for disease progression.  The lymph nodes are persistent hypermetabolic activity in the tonsillar tissues with maximum 7.9, right jugulodigastric lymph nodes with SUV 4.0, posterior cervical chain node SUV 6, new 6 mm right posterior cervical chain, maximum SUV is 3.3, new 7 mm right supraclavicular node with maximum SUV of 4.7.  Chest, no abnormal hypermetabolic activity.  Abdomen, no abnormal hypermetabolic activity.  She also had on 02/05/2018 showed enlarged heterogeneous right jugular chain cervical lymph nodes in level 3 and 4 measuring approximately 1.9 x 2 x 2.6 cm and 2 x 1.8 x 2.2 cm are increased since neck CT from 04/21/2017, although may be similar to the PET/CT from 01/09/2018.  Resolution of previously enlarged left cervical lymph nodes on the CT compared to 04/21/2017.  Nasopharynx, oropharynx, tongue base, hypopharynx, larynx were normal, possible small sub-centimeter left thyroid nodule versus artifact   GDP   2/16/18 PET scan 03/28/2018 showing that head and neck there is stable activity in the tonsillar tissue.  There are post-surgical changes in the right neck.  Hypermetabolic right cervical adenopathy in the prior study no longer demonstrated.  There are no new hypermetabolic lesions or lymph nodes in the neck.  No abnormal hypermetabolic activity in the abdomen.  Before starting GDP she underwent right neck excisional biopsy showing recurrent classical Hodgkin lymphoma, nodular-sclerosing.   5/7/2018: Her excisional LN biopsy from R neck is concicent with HD.   Rituximab in combo with PD1 inhibition   6/22/18 A CT PET repeated in her primary oncologist's office shows complete remission.  This CT scan and PET were reviewed here and they agree with that finding.                         Post-Transplant Treatment or Maintenance Therapy: (Delete if not needed)     Treatment  Number of Cycles Date Range Tolerance & Outcomes   brentuximab maintenance ongoing               General Health Maintenance:      Vaccinations should be given at 1 and 2 years after your transplant, these may be given at your annual anniversary visits in the BMT clinic, by your primary care provider, or your local oncologist. An exception is the influenza vaccine, which can be given after day +60 post-transplant during influenza season. See table below for more information.     For general health concerns you can be seen by your primary care provider.     If you have questions about your transplant or follow up tests contact your BMT RN coordinator.    Vaccinations for All Patients:    Vaccine Administration Schedule     Vaccinations Post-BMT: Please refer to our [employer] [facility] BMT Vaccination Guidelines updated in February of 2020.           Survivorship Care Plan:     This individualized care plan is designed to inform you and your healthcare team of the recommended follow-up visits, tests, health maintenance activities, and cancer screening you should receive after transplant.     Immune System:  Risks Preventative Measures Recommendations   Infections   Symptoms of a cold such as fever, cough, congestion, and shortness of breath should be reported to your primary care provider    Immunizations will be administered at 1 & 2 years after transplant. See table above. Vaccines at anniversary visit next week     Eyes:   Risks Preventative Measures Recommendations   Cataracts, dry eyes, viral infections, and other eye changes   Yearly eye exam     Screening and treatment for high blood pressure or diabetes    Call if you have eye pain, visual changes, or floaters immediately Patient will schedule an annual routine exam with community ophthalmologist     Mouth:  Risks Preventative Measures Recommendations   Dry mouth, cavities, and oral cancer   You can use over the counter Biotene for dry mouth or try sucking on sour candy before meals    Get a dental checkup every year and a cleaning every 6 months    If you have a prosthetic heart valve  or central venous catheter or  port , you may need to take an antibiotic before your dental visits None at this time, patient has dental provider and will schedule routine exam         Lungs  Risks Preventative Measures Recommendations   Changes in function from chemotherapy or radiation; lung infections (pneumonia)   Tell your provider about difficulty breathing, a cough, or new shortness of breath     Avoid use of tobacco products or smoking    Routine lung exams Pulmonary Function Tests (Recommended annually post-transplant)       Heart and Blood Vessels  Heart Disease Risk Score: The ASCVD Risk score (Sulphurmichael VANCE Jr., et al., 2013) failed to calculate for the following reasons:    The 2013 ASCVD risk score is only valid for ages 40 to 79  Risks Preventative Measures Recommendations   Damage from chemotherapy and/or radiation; early development of heart valve disease    Ask your provider if you should receive consultation from a cardiologist (heart doctor) or have special screening    Follow a  heart healthy  lifestyle. For more information visit the National Heart, Lung, and Blood [place] website at: https://www.nhlbi.nih.gov/health/health-topics/topics/heart-healthy-lifestyle-changes     Don t smoke. If you currently smoke and are ready to quit, we can help you find ways to quit    Maintain a healthy weight    Exercise regularly    Avoid foods that have high amounts of:  o Salt/sodium (less than 2,300 mg of sodium per day)  o Saturated and trans fats  o Limit alcohol to less than 1 drink for women and 2 drinks for men per day  o Sugar such as soft drinks or sugary snacks Fasting Lipid Profile or Direct LDL (Recommended annually)       Hormones  Risks Preventative Measures Recommendations   Low thyroid function, low function of other glands (adrenals and others)   Certain endocrine/hormone disorders are more common after transplant. For this reason, talk to your provider about:  o Fatigue  o Muscle  weakness  o Changes in cold tolerance  o Reduced interest in sex  o Erectile dysfunction     Certain tests may be used to monitor for hormone changes if you are experiencing symptoms. These tests are done at 1 year TSH with T4 reflex (Recommended 1 & 2 years post-transplant) and Fasting Glucose (Recommended 6 mos & annually post-transplant)       Liver:  Risks Preventative Measures Recommendations    Damage from chemotherapy or other drugs, buildup of iron from blood transfusions, and infections   Certain tests may be ordered at your next survivorship visit to evaluate liver function based on your individual risk factors.    Talk to your provider before taking herbal supplements or over the counter drugs like Tylenol.    Ask your doctor if you should be treated for iron overload    Avoid alcohol in excess   Hepatic Panel/LFTs (Recommended every 3-6 mos the 1st year post-transplant & annually)       Kidneys and Bladder:  Risks Preventative Measures Recommendations   Damage from chemotherapy or other drugs, infections, high blood pressure   Monitoring blood tests of kidney function during follow up visits    Treating high blood pressure and diabetes     Drink adequate amounts of water    Report symptoms of infection such as frequent urination, pain with urination, foul odor to urine, or blood in the urine    Talk to your provider before taking herbal supplements or over the counter drugs like Ibuprofen Serum creatinine checked as part of anniversary labs or at least annually by primary provider       Nervous System:  Risks Preventative Measures Recommendations   Neuropathy from chemotherapy, changes in cognitive function   Report changes in sensation or discomfort in feet or hands    Tell your provider about ongoing changes in memory, ability to concentrate, or inability to make decisions   None at this time       Muscle & Connective Tissue  Risks Preventative Measures Recommendations   Reduced muscle strength &  stamina   Let your provider know if:  o You notice changes in your muscle strength  o Require extra assistance with daily activities  o Need help creating an exercise routine  o Notice reduced range of motion of the arms, hips, or legs    Follow general guidelines for physical activity as recommended by the Office of Disease Prevention & Health Promotion:    Avoid Inactivity  Some physical activity is better than none -- any amount has benefits.    Do Aerobic Activity  Do aerobic physical activity in episodes of at least 10 minutes, as many times as possible per day. This could include going for walks or using the elliptical or stationary bike.  Ask your doctor what aerobic activities would be safe and helpful for you, and set a goal for yourself!    Strengthen Muscles  Do muscle-strengthening activities (such as lifting light weights or using resistance bands and/or going up and down stairs) that are moderate or high intensity and involve all major muscle groups at least 4 days a week. Calcium & Vitamin D Levels (Recommended annually)     Emotional Health  Risks Preventative Measures Recommendations   Stress, depression, anxiety   Talk to your provider about:  o Changes in feelings, mood, or emotional wellbeing  o Interest in support groups  o If you are concerned about your caregivers emotional wellbeing  o Desire to speak with a counselor for ongoing support None at this time       Sexual Health  Risks Preventative Measures Recommendations   Reduced libido due to hormonal changes, erectile dysfunction, vaginal dryness, and sexually transmitted diseases  Talk to your provider about:    Reduced libido or concerns regarding your sexual health    Men: Erectile dysfunction     Women: Vaginal dryness, pain during intercourse, vaginal bleeding after intercourse, changes in vaginal discharge that may indicate infection (green/white/foul odor)   General Recommendations:    It is safe to have sex if your platelet count is  > 50,000 and you feel physically and emotionally ready     Women can use water-based lubricants to reduce discomfort from dryness. Prescription topical estrogen may help as well.    Use barrier protection such as condoms to prevent sexually transmitted diseases, you are at higher risk for infections due to a weakened immune system    For more information check out the National Marrow Donor Program web page on this topic:  https://bethematch.org/patients-and-families/life-after-transplant/coping-with-life-after-transplant/relationships-and-sexual-health/  None at this time       Fertility (delete if N/A)  Risks Preventative Measures Recommendations   Difficulty with sexual intercourse; difficulty having a baby   Women should avoid becoming pregnant for 2 years    Birth control should be used to prevent pregnancy    If you are interested in having a baby, discuss this with your provider None at this time       Cancer Screening:  Risks Preventative Measures Recommendations   Higher risk for the development of solid tumors, PTLD, and blood cancers   Preform a full self skin exam monthly to assess for any changes in moles or signs of skin cancer    Minimize excessive sun exposure and wear sunscreen    Women should preform breast-self exams and report any changes such as a new lump, discharge from nipples, and red or dimpled areas of the breast.     Imaging for breast cancer known as mammography is recommended for women starting at age 40 or 8 years after radiation exposure     Screening for colorectal cancer should begin at age 50    All women should have a pap smear every 1-3 years beginning at age 21    Men should perform a monthly testicular self-exam if they have been exposed to radiation as part of their cancer treatment   Mammogram (Women, 1 year and annually based on radiation exposure/age), Fecal Occult Blood Test (Recommended annually) and Colonoscopy (Recommended every 10 years after the age of 50)        Recommended Tests & Follow-Up:  Referrals & Tests Ordered Today:  Your BMT physician will review these tests and referral results. If you require treatment based on the results, a member of the BMT team will contact you.  Orders placed today:  No orders of the defined types were placed in this encounter.    (Note to providers: After signing orders use the refresh tool in the note window to display results)   Future Tests/Referrals:   All recommendations above should be completed within 2 months either by your primary care provider or local oncologist (cancer doctor).   Recommendations that were not ordered today should be completed by your:  Primary Care Provider   Follow Up Care:  Continue to see your care team members routinely after transplant.  BMT Provider: Dr. Deluca  Hematologist/Oncologist:  Primary Care Provider:    Referral to Transplant Late-Effects Clinic (TLC):  Our TLC clinic is designed to meet the needs of survivors more than 2 years post-transplant. You can call [phone] at anytime in the future to self-refer into this clinic if you feel like you need ongoing survivorship support. Recommend Future TLC Visit:    (Note to providers: To place a TLC referral send an in-basket to [facility] BMT ).     Mandy Castro    I spent 20 minutes with the patient and family, over half of which was spent discussing preventative care strategies, self-management practices, and potential complications after transplant.      Information used for these recommendations was obtained from: SHANNAN Loomis., KATHARINE Packer, ADELAIDE Banks, DEEPAK Perales, JOSELINE Weaver., ERIC Tabares.,   Javy, K. M. (2013). Prevalence of Hematopoietic Cell Transplant Survivors in the [place]. Biology of Blood and Marrow Transplantation?: Journal of the American Society for Blood and Marrow Transplantation, 19(10), 7811-3853. doi 10.1016/j.bbmt.2013.07.020      Type of service:  Video Visit    Video Start Time:  1140  Video End Time: 11:53 AM    Originating Location (pt. Location): Home    Distant Location (provider location):  [facility] BLOOD AND MARROW TRANSPLANT     Platform used for Video Visit: MARIA TERESA Shafer CNP

## 2020-07-31 NOTE — PROGRESS NOTES
"Amira Avery is a 30 year old female who is being evaluated via a billable video visit.      The patient has been notified of following:     \"This video visit will be conducted via a call between you and your physician/provider. We have found that certain health care needs can be provided without the need for an in-person physical exam.  This service lets us provide the care you need with a video conversation.  If a prescription is necessary we can send it directly to your pharmacy.  If lab work is needed we can place an order for that and you can then stop by our lab to have the test done at a later time.    Video visits are billed at different rates depending on your insurance coverage.  Please reach out to your insurance provider with any questions.    If during the course of the call the physician/provider feels a video visit is not appropriate, you will not be charged for this service.\"    Patient has given verbal consent for Video visit? Yes  How would you like to obtain your AVS? MyChart     If you are dropped from the video visit, the video invite should be resent to: Send to e-mail at: [email or web address]     Will anyone else be joining your video visit? No         I have reviewed and updated the patient's allergies and medication list.    Concerns: No new concerns.   Refills: None needed.     Vitals - Patient Reported  Weight (Patient Reported): 63.9 kg (140 lb 12.8 oz)  Height (Patient Reported): 160 cm (5' 3\")  BMI (Based on Pt Reported Ht/Wt): 24.94  Pain Score: No Pain (0)      Shannon Flores CMA        Video-Visit Details        BMT 2-Year Post-Autologous Transplant   Survivorship Care Plan      Date: July 31, 2020    Treatment Team:  Patient Care Team:  Clinic, [facility] as PCP - General  Gina Lawson MD as Referring Physician (Hematology & Oncology)  Aneta Sherman MSW as   Becky William LICSW as  ( - Clinical)  César Deluca MD " as BMT Physician (Hematology & Oncology)  Belinda Bach, RN as Specialty Care Coordinator (BMT - Adult)       Date of Transplant: 8/6/2018     Transplant Essential Data:  Diagnosis HDN Hodgkin's Disease - NOS  HCT Type Autologous    Prep Regimen BCNU  Lisa-C  Etoposide  Melphalan   Donor Source No data was found    GVHD Prophylaxis No  Clinical Trials        Oncology Treatment History:      Treatment/Chemotherapy Number of Cycles Date Range Outcomes & Complications   ABVD 6 6/9/17-11/10/17 11/9/17  showed increasing minimal activity within poorly visualized but stable-appearing right cervical chain lymph nodes suggests mild progression of disease.  SUV was maximum of 4.7.  She was followed, and repeat PET/CT was repeated 01/09/2018.  There was new and increasing hypermetabolic right cervical adenopathy suspicious for disease progression.  The lymph nodes are persistent hypermetabolic activity in the tonsillar tissues with maximum 7.9, right jugulodigastric lymph nodes with SUV 4.0, posterior cervical chain node SUV 6, new 6 mm right posterior cervical chain, maximum SUV is 3.3, new 7 mm right supraclavicular node with maximum SUV of 4.7.  Chest, no abnormal hypermetabolic activity.  Abdomen, no abnormal hypermetabolic activity.  She also had on 02/05/2018 showed enlarged heterogeneous right jugular chain cervical lymph nodes in level 3 and 4 measuring approximately 1.9 x 2 x 2.6 cm and 2 x 1.8 x 2.2 cm are increased since neck CT from 04/21/2017, although may be similar to the PET/CT from 01/09/2018.  Resolution of previously enlarged left cervical lymph nodes on the CT compared to 04/21/2017.  Nasopharynx, oropharynx, tongue base, hypopharynx, larynx were normal, possible small sub-centimeter left thyroid nodule versus artifact   GDP   2/16/18 PET scan 03/28/2018 showing that head and neck there is stable activity in the tonsillar tissue.  There are post-surgical changes in the right neck.  Hypermetabolic right  cervical adenopathy in the prior study no longer demonstrated.  There are no new hypermetabolic lesions or lymph nodes in the neck.  No abnormal hypermetabolic activity in the abdomen.  Before starting GDP she underwent right neck excisional biopsy showing recurrent classical Hodgkin lymphoma, nodular-sclerosing.   5/7/2018: Her excisional LN biopsy from R neck is concicent with HD.   Rituximab in combo with PD1 inhibition   6/22/18 A CT PET repeated in her primary oncologist's office shows complete remission.  This CT scan and PET were reviewed here and they agree with that finding.                         Post-Transplant Treatment or Maintenance Therapy: (Delete if not needed)     Treatment  Number of Cycles Date Range Tolerance & Outcomes   brentuximab maintenance ongoing               General Health Maintenance:     Vaccinations should be given at 1 and 2 years after your transplant, these may be given at your annual anniversary visits in the BMT clinic, by your primary care provider, or your local oncologist. An exception is the influenza vaccine, which can be given after day +60 post-transplant during influenza season. See table below for more information.     For general health concerns you can be seen by your primary care provider.     If you have questions about your transplant or follow up tests contact your BMT RN coordinator.    Vaccinations for All Patients:    Vaccine Administration Schedule     Vaccinations Post-BMT: Please refer to our [facility] B[facility] Vaccination Guidelines updated in February of 2020.           Survivorship Care Plan:     This individualized care plan is designed to inform you and your healthcare team of the recommended follow-up visits, tests, health maintenance activities, and cancer screening you should receive after transplant.     Immune System:  Risks Preventative Measures Recommendations   Infections   Symptoms of a cold such as fever, cough, congestion, and shortness of  breath should be reported to your primary care provider    Immunizations will be administered at 1 & 2 years after transplant. See table above. Vaccines at anniversary visit next week     Eyes:   Risks Preventative Measures Recommendations   Cataracts, dry eyes, viral infections, and other eye changes   Yearly eye exam     Screening and treatment for high blood pressure or diabetes    Call if you have eye pain, visual changes, or floaters immediately Patient will schedule an annual routine exam with community ophthalmologist     Mouth:  Risks Preventative Measures Recommendations   Dry mouth, cavities, and oral cancer   You can use over the counter Biotene for dry mouth or try sucking on sour candy before meals    Get a dental checkup every year and a cleaning every 6 months    If you have a prosthetic heart valve or central venous catheter or  port , you may need to take an antibiotic before your dental visits None at this time, patient has dental provider and will schedule routine exam         Lungs  Risks Preventative Measures Recommendations   Changes in function from chemotherapy or radiation; lung infections (pneumonia)   Tell your provider about difficulty breathing, a cough, or new shortness of breath     Avoid use of tobacco products or smoking    Routine lung exams Pulmonary Function Tests (Recommended annually post-transplant)       Heart and Blood Vessels  Heart Disease Risk Score: The ASCVD Risk score (Jagruti DC Jr., et al., 2013) failed to calculate for the following reasons:    The 2013 ASCVD risk score is only valid for ages 40 to 79  Risks Preventative Measures Recommendations   Damage from chemotherapy and/or radiation; early development of heart valve disease    Ask your provider if you should receive consultation from a cardiologist (heart doctor) or have special screening    Follow a  heart healthy  lifestyle. For more information visit the National Heart, Lung, and Blood [place] website at:  https://www.nhlbi.nih.gov/health/health-topics/topics/heart-healthy-lifestyle-changes     Don t smoke. If you currently smoke and are ready to quit, we can help you find ways to quit    Maintain a healthy weight    Exercise regularly    Avoid foods that have high amounts of:  o Salt/sodium (less than 2,300 mg of sodium per day)  o Saturated and trans fats  o Limit alcohol to less than 1 drink for women and 2 drinks for men per day  o Sugar such as soft drinks or sugary snacks Fasting Lipid Profile or Direct LDL (Recommended annually)       Hormones  Risks Preventative Measures Recommendations   Low thyroid function, low function of other glands (adrenals and others)   Certain endocrine/hormone disorders are more common after transplant. For this reason, talk to your provider about:  o Fatigue  o Muscle weakness  o Changes in cold tolerance  o Reduced interest in sex  o Erectile dysfunction     Certain tests may be used to monitor for hormone changes if you are experiencing symptoms. These tests are done at 1 year TSH with T4 reflex (Recommended 1 & 2 years post-transplant) and Fasting Glucose (Recommended 6 mos & annually post-transplant)       Liver:  Risks Preventative Measures Recommendations    Damage from chemotherapy or other drugs, buildup of iron from blood transfusions, and infections   Certain tests may be ordered at your next survivorship visit to evaluate liver function based on your individual risk factors.    Talk to your provider before taking herbal supplements or over the counter drugs like Tylenol.    Ask your doctor if you should be treated for iron overload    Avoid alcohol in excess   Hepatic Panel/LFTs (Recommended every 3-6 mos the 1st year post-transplant & annually)       Kidneys and Bladder:  Risks Preventative Measures Recommendations   Damage from chemotherapy or other drugs, infections, high blood pressure   Monitoring blood tests of kidney function during follow up visits    Treating  high blood pressure and diabetes     Drink adequate amounts of water    Report symptoms of infection such as frequent urination, pain with urination, foul odor to urine, or blood in the urine    Talk to your provider before taking herbal supplements or over the counter drugs like Ibuprofen Serum creatinine checked as part of anniversary labs or at least annually by primary provider       Nervous System:  Risks Preventative Measures Recommendations   Neuropathy from chemotherapy, changes in cognitive function   Report changes in sensation or discomfort in feet or hands    Tell your provider about ongoing changes in memory, ability to concentrate, or inability to make decisions   None at this time       Muscle & Connective Tissue  Risks Preventative Measures Recommendations   Reduced muscle strength & stamina   Let your provider know if:  o You notice changes in your muscle strength  o Require extra assistance with daily activities  o Need help creating an exercise routine  o Notice reduced range of motion of the arms, hips, or legs    Follow general guidelines for physical activity as recommended by the Office of Disease Prevention & Health Promotion:    Avoid Inactivity  Some physical activity is better than none -- any amount has benefits.    Do Aerobic Activity  Do aerobic physical activity in episodes of at least 10 minutes, as many times as possible per day. This could include going for walks or using the elliptical or stationary bike.  Ask your doctor what aerobic activities would be safe and helpful for you, and set a goal for yourself!    Strengthen Muscles  Do muscle-strengthening activities (such as lifting light weights or using resistance bands and/or going up and down stairs) that are moderate or high intensity and involve all major muscle groups at least 4 days a week. Calcium & Vitamin D Levels (Recommended annually)     Emotional Health  Risks Preventative Measures Recommendations   Stress,  depression, anxiety   Talk to your provider about:  o Changes in feelings, mood, or emotional wellbeing  o Interest in support groups  o If you are concerned about your caregivers emotional wellbeing  o Desire to speak with a counselor for ongoing support None at this time       Sexual Health  Risks Preventative Measures Recommendations   Reduced libido due to hormonal changes, erectile dysfunction, vaginal dryness, and sexually transmitted diseases  Talk to your provider about:    Reduced libido or concerns regarding your sexual health    Men: Erectile dysfunction     Women: Vaginal dryness, pain during intercourse, vaginal bleeding after intercourse, changes in vaginal discharge that may indicate infection (green/white/foul odor)   General Recommendations:    It is safe to have sex if your platelet count is > 50,000 and you feel physically and emotionally ready     Women can use water-based lubricants to reduce discomfort from dryness. Prescription topical estrogen may help as well.    Use barrier protection such as condoms to prevent sexually transmitted diseases, you are at higher risk for infections due to a weakened immune system    For more information check out the National Marrow Donor Program web page on this topic:  https://bethematch.org/patients-and-families/life-after-transplant/coping-with-life-after-transplant/relationships-and-sexual-health/  None at this time       Fertility (delete if N/A)  Risks Preventative Measures Recommendations   Difficulty with sexual intercourse; difficulty having a baby   Women should avoid becoming pregnant for 2 years    Birth control should be used to prevent pregnancy    If you are interested in having a baby, discuss this with your provider None at this time       Cancer Screening:  Risks Preventative Measures Recommendations   Higher risk for the development of solid tumors, PTLD, and blood cancers   Preform a full self skin exam monthly to assess for any changes in  moles or signs of skin cancer    Minimize excessive sun exposure and wear sunscreen    Women should preform breast-self exams and report any changes such as a new lump, discharge from nipples, and red or dimpled areas of the breast.     Imaging for breast cancer known as mammography is recommended for women starting at age 40 or 8 years after radiation exposure     Screening for colorectal cancer should begin at age 50    All women should have a pap smear every 1-3 years beginning at age 21    Men should perform a monthly testicular self-exam if they have been exposed to radiation as part of their cancer treatment   Mammogram (Women, 1 year and annually based on radiation exposure/age), Fecal Occult Blood Test (Recommended annually) and Colonoscopy (Recommended every 10 years after the age of 50)       Recommended Tests & Follow-Up:  Referrals & Tests Ordered Today:  Your BMT physician will review these tests and referral results. If you require treatment based on the results, a member of the BMT team will contact you.  Orders placed today:  No orders of the defined types were placed in this encounter.    (Note to providers: After signing orders use the refresh tool in the note window to display results)   Future Tests/Referrals:   All recommendations above should be completed within 2 months either by your primary care provider or local oncologist (cancer doctor).   Recommendations that were not ordered today should be completed by your:  Primary Care Provider   Follow Up Care:  Continue to see your care team members routinely after transplant.  BMT Provider: Dr. Deluca  Hematologist/Oncologist:  Primary Care Provider:    Referral to Transplant Late-Effects Clinic (TLC):  Our TLC clinic is designed to meet the needs of survivors more than 2 years post-transplant. You can call [phone] at anytime in the future to self-refer into this clinic if you feel like you need ongoing survivorship support.  Recommend Future TLC Visit:    (Note to providers: To place a TLC referral send an in-basket to Lead BMT ).     Mandy Castro    I spent 20 minutes with the patient and family, over half of which was spent discussing preventative care strategies, self-management practices, and potential complications after transplant.      Information used for these recommendations was obtained from: SHANNAN Loomis, KATHARINE Packer, ADELAIDE Banks, DEEPAK Perales, ERIC Weaver, ERIC Tabares.,   Javy, NARINDER DEVRIES. (2013). Prevalence of Hematopoietic Cell Transplant Survivors in the [place]. Biology of Blood and Marrow Transplantation?: Journal of the American Society for Blood and Marrow Transplantation, 19(10), 2835-2984. doi 10.1016/j.bbmt.2013.07.020      Type of service:  Video Visit    Video Start Time: 1140  Video End Time: 11:53 AM    Originating Location (pt. Location): Home    Distant Location (provider location):  [facility] BLOOD AND MARROW TRANSPLANT     Platform used for Video Visit: MARIA TERESA Shafer CNP

## 2020-08-07 ENCOUNTER — ANCILLARY PROCEDURE (OUTPATIENT)
Dept: CT IMAGING | Facility: CLINIC | Age: 30
End: 2020-08-07
Attending: INTERNAL MEDICINE
Payer: COMMERCIAL

## 2020-08-07 DIAGNOSIS — C81.95 HODGKIN LYMPHOMA, UNSPECIFIED, LYMPH NODES OF INGUINAL REGION AND LOWER LIMB (H): ICD-10-CM

## 2020-08-07 DIAGNOSIS — C81.95 HODGKIN LYMPHOMA, UNSPECIFIED, LYMPH NODES OF INGUINAL REGION AND LOWER LIMB (H): Primary | ICD-10-CM

## 2020-08-07 LAB
ALBUMIN SERPL-MCNC: 4.1 G/DL (ref 3.4–5)
ALP SERPL-CCNC: 59 U/L (ref 40–150)
ALT SERPL W P-5'-P-CCNC: 31 U/L (ref 0–50)
ANION GAP SERPL CALCULATED.3IONS-SCNC: 6 MMOL/L (ref 3–14)
AST SERPL W P-5'-P-CCNC: 13 U/L (ref 0–45)
BASOPHILS # BLD AUTO: 0 10E9/L (ref 0–0.2)
BASOPHILS NFR BLD AUTO: 0.3 %
BILIRUB SERPL-MCNC: 0.5 MG/DL (ref 0.2–1.3)
BUN SERPL-MCNC: 18 MG/DL (ref 7–30)
CALCIUM SERPL-MCNC: 8.7 MG/DL (ref 8.5–10.1)
CHLORIDE SERPL-SCNC: 108 MMOL/L (ref 94–109)
CO2 SERPL-SCNC: 24 MMOL/L (ref 20–32)
CREAT SERPL-MCNC: 0.78 MG/DL (ref 0.52–1.04)
DIFFERENTIAL METHOD BLD: ABNORMAL
EOSINOPHIL # BLD AUTO: 0 10E9/L (ref 0–0.7)
EOSINOPHIL NFR BLD AUTO: 0.5 %
ERYTHROCYTE [DISTWIDTH] IN BLOOD BY AUTOMATED COUNT: 12.4 % (ref 10–15)
GFR SERPL CREATININE-BSD FRML MDRD: >90 ML/MIN/{1.73_M2}
GLUCOSE SERPL-MCNC: 70 MG/DL (ref 70–99)
HCT VFR BLD AUTO: 37.4 % (ref 35–47)
HGB BLD-MCNC: 12.4 G/DL (ref 11.7–15.7)
IMM GRANULOCYTES # BLD: 0 10E9/L (ref 0–0.4)
IMM GRANULOCYTES NFR BLD: 0.3 %
LDH SERPL L TO P-CCNC: 132 U/L (ref 81–234)
LYMPHOCYTES # BLD AUTO: 1.4 10E9/L (ref 0.8–5.3)
LYMPHOCYTES NFR BLD AUTO: 34.7 %
MCH RBC QN AUTO: 32.9 PG (ref 26.5–33)
MCHC RBC AUTO-ENTMCNC: 33.2 G/DL (ref 31.5–36.5)
MCV RBC AUTO: 99 FL (ref 78–100)
MONOCYTES # BLD AUTO: 0.2 10E9/L (ref 0–1.3)
MONOCYTES NFR BLD AUTO: 4.6 %
NEUTROPHILS # BLD AUTO: 2.3 10E9/L (ref 1.6–8.3)
NEUTROPHILS NFR BLD AUTO: 59.6 %
NRBC # BLD AUTO: 0 10*3/UL
NRBC BLD AUTO-RTO: 0 /100
PLATELET # BLD AUTO: 184 10E9/L (ref 150–450)
POTASSIUM SERPL-SCNC: 4 MMOL/L (ref 3.4–5.3)
PROT SERPL-MCNC: 7.5 G/DL (ref 6.8–8.8)
RBC # BLD AUTO: 3.77 10E12/L (ref 3.8–5.2)
SODIUM SERPL-SCNC: 138 MMOL/L (ref 133–144)
WBC # BLD AUTO: 3.9 10E9/L (ref 4–11)

## 2020-08-07 PROCEDURE — 85025 COMPLETE CBC W/AUTO DIFF WBC: CPT | Performed by: INTERNAL MEDICINE

## 2020-08-07 PROCEDURE — 83615 LACTATE (LD) (LDH) ENZYME: CPT | Performed by: INTERNAL MEDICINE

## 2020-08-07 PROCEDURE — 80053 COMPREHEN METABOLIC PANEL: CPT | Performed by: INTERNAL MEDICINE

## 2020-08-07 RX ORDER — IOPAMIDOL 755 MG/ML
93 INJECTION, SOLUTION INTRAVASCULAR ONCE
Status: COMPLETED | OUTPATIENT
Start: 2020-08-07 | End: 2020-08-07

## 2020-08-07 RX ADMIN — IOPAMIDOL 93 ML: 755 INJECTION, SOLUTION INTRAVASCULAR at 11:41

## 2020-08-07 NOTE — NURSING NOTE
Chief Complaint   Patient presents with     Blood Draw     Labs drawn via previously placed PIV by RN in lab.     Labs drawn via previously placed PIV. Line removed.    France Zeng RN

## 2020-08-09 NOTE — PROGRESS NOTES
"Amira Avery is a 30 year old female who is being evaluated via a billable video visit.      The patient has been notified of following:     \"This video visit will be conducted via a call between you and your physician/provider. We have found that certain health care needs can be provided without the need for an in-person physical exam.  This service lets us provide the care you need with a video conversation.  If a prescription is necessary we can send it directly to your pharmacy.  If lab work is needed we can place an order for that and you can then stop by our lab to have the test done at a later time.    Video visits are billed at different rates depending on your insurance coverage.  Please reach out to your insurance provider with any questions.    If during the course of the call the physician/provider feels a video visit is not appropriate, you will not be charged for this service.\"    Patient has given verbal consent for Video visit? Yes    How would you like to obtain your AVS? MyChart     If you are dropped from the video visit, the video invite should be resent to: Text to cell phone: [phone]     Will anyone else be joining your video visit? No            I have reviewed and updated the patient's allergies and medication list. Patient was asked to provide any patient recorded vital signs, height and/or weight.  Please see \"Patient Reported Vital Signs\" tab for that information.        Concerns: Patient has no new concerns.      Refills: None           RICKIE Ricci            Video-Visit Details    Type of service:  Video Visit    Video Start Time: 9:01  Video End Time: 9:24    Originating Location (pt. Location): Home    Distant Location (provider location):  [facility] BLOOD AND MARROW TRANSPLANT     Platform used for Video Visit: Yasmin Deluca MD    Attending Summary:    Amira Avery is a 28 year old female, 2 years s/p ASCT for hodgkin lymphoma.  She tolerating " Brentuximab maintenance post autologous ASCT.       The work up for tonsilar enlargement seen on prior CT in 2/2019 was grossly normal, and ENT deferred biopsy as the tonsil was grossly normal on inspection.      The 2 year restaging shows a stable CR by imaging.  Peripheral blood counts show a normal ANC, Hgb, and platelet count.  LDH is normal.    Due for 2 year vaccines.    Limited physical exam: Alert. Non-labored breathing. No cough.    Labs and imaging reviewed in full:    Assessment and Plan:  CR at 2 years post ASCT for HL.    1)  2 year vaccines to be scheduled, with flu shot in the fall locally  2)  Maintenance brentuximab complete  3)  Refer further follow up to primary oncologist.     All questions were answered in full.       César Deluca

## 2020-08-10 ENCOUNTER — VIRTUAL VISIT (OUTPATIENT)
Dept: TRANSPLANT | Facility: CLINIC | Age: 30
End: 2020-08-10
Attending: INTERNAL MEDICINE
Payer: COMMERCIAL

## 2020-08-10 DIAGNOSIS — C81.95 HODGKIN LYMPHOMA, UNSPECIFIED, LYMPH NODES OF INGUINAL REGION AND LOWER LIMB (H): Primary | ICD-10-CM

## 2020-08-10 PROCEDURE — 40001009 ZZH VIDEO/TELEPHONE VISIT; NO CHARGE

## 2020-08-10 NOTE — LETTER
"    8/10/2020         RE: Amira Avery  [address]  [place] [address]        Dear Colleague,    Thank you for referring your patient, Amira Avery, to the [facility] BLOOD AND MARROW TRANSPLANT. Please see a copy of my visit note below.    Amira Avery is a 30 year old female who is being evaluated via a billable video visit.      The patient has been notified of following:     \"This video visit will be conducted via a call between you and your physician/provider. We have found that certain health care needs can be provided without the need for an in-person physical exam.  This service lets us provide the care you need with a video conversation.  If a prescription is necessary we can send it directly to your pharmacy.  If lab work is needed we can place an order for that and you can then stop by our lab to have the test done at a later time.    Video visits are billed at different rates depending on your insurance coverage.  Please reach out to your insurance provider with any questions.    If during the course of the call the physician/provider feels a video visit is not appropriate, you will not be charged for this service.\"    Patient has given verbal consent for Video visit? Yes    How would you like to obtain your AVS? MyChart     If you are dropped from the video visit, the video invite should be resent to: Text to cell phone: [phone]     Will anyone else be joining your video visit? No            I have reviewed and updated the patient's allergies and medication list. Patient was asked to provide any patient recorded vital signs, height and/or weight.  Please see \"Patient Reported Vital Signs\" tab for that information.        Concerns: Patient has no new concerns.      Refills: None           RICKIE Ricci            Video-Visit Details    Type of service:  Video Visit    Video Start Time: 9:01  Video End Time: 9:24    Originating Location (pt. Location): " Home    Distant Location (provider location):  [facility] BLOOD AND MARROW TRANSPLANT     Platform used for Video Visit: Yasmin Deluca MD    Attending Summary:    Amira Avery is a 28 year old female, 2 years s/p ASCT for hodgkin lymphoma.  She tolerating Brentuximab maintenance post autologous ASCT.       The work up for tonsilar enlargement seen on prior CT in 2/2019 was grossly normal, and ENT deferred biopsy as the tonsil was grossly normal on inspection.      The 2 year restaging shows a stable CR by imaging.  Peripheral blood counts show a normal ANC, Hgb, and platelet count.  LDH is normal.    Due for 2 year vaccines.    Limited physical exam: Alert. Non-labored breathing. No cough.    Labs and imaging reviewed in full:    Assessment and Plan:  CR at 2 years post ASCT for HL.    1)  2 year vaccines to be scheduled, with flu shot in the fall locally  2)  Maintenance brentuximab complete  3)  Refer further follow up to primary oncologist.     All questions were answered in full.       César Deluca    Again, thank you for allowing me to participate in the care of your patient.        Sincerely,        César Deluca MD

## 2020-09-09 ENCOUNTER — ALLIED HEALTH/NURSE VISIT (OUTPATIENT)
Dept: FAMILY MEDICINE | Facility: CLINIC | Age: 30
End: 2020-09-09
Payer: COMMERCIAL

## 2020-09-09 DIAGNOSIS — Z23 NEED FOR PROPHYLACTIC VACCINATION AND INOCULATION AGAINST INFLUENZA: ICD-10-CM

## 2020-09-09 DIAGNOSIS — Z52.001 ENCOUNTER FOR HARVESTING OF PERIPHERAL BLOOD STEM CELLS: ICD-10-CM

## 2020-09-09 DIAGNOSIS — C81.70 OTHER CLASSICAL HODGKIN LYMPHOMA, UNSPECIFIED BODY REGION (H): Primary | ICD-10-CM

## 2020-09-09 PROCEDURE — 90471 IMMUNIZATION ADMIN: CPT

## 2020-09-09 PROCEDURE — 90686 IIV4 VACC NO PRSV 0.5 ML IM: CPT

## 2020-09-09 PROCEDURE — 90472 IMMUNIZATION ADMIN EACH ADD: CPT

## 2020-09-09 PROCEDURE — 99207 ZZC NO CHARGE NURSE ONLY: CPT

## 2020-09-09 PROCEDURE — 90732 PPSV23 VACC 2 YRS+ SUBQ/IM: CPT

## 2020-09-24 ENCOUNTER — CARE COORDINATION (OUTPATIENT)
Dept: TRANSPLANT | Facility: CLINIC | Age: 30
End: 2020-09-24

## 2020-09-24 NOTE — PROGRESS NOTES
"Patient in need of her 2 year post-BMT immunizations. Recently had flu shot and pneumovax vaccines at [facility] on 9/9/20.     She remains in need of the following, documented in \" BMT- Recipient immunizations\" treatment plan:    ASAP  -MMR  -ActHib  -Pediarix    Boosters 2 Months after above:  -MMR  -prevnar 13 (this is deviation of standard d/t pneumovax administration on 9/9/20    Belinda Bach RN  BMT Nurse Coordinator  [facility]  [phone] (pager)  [phone] (office)      "

## 2020-09-28 ENCOUNTER — TELEPHONE (OUTPATIENT)
Dept: FAMILY MEDICINE | Facility: CLINIC | Age: 30
End: 2020-09-28

## 2020-09-28 NOTE — TELEPHONE ENCOUNTER
Received fax from Belinda Bach RN blood and marrow transplant [facility]. Phone number [phone] is in need of 2 year post BMT immunizations.    Needs ASAP MMR, ACTHIB and Pediarix    Boosters 2 months after the above:  MMR and Prevnar 13      Fax sent to scanning

## 2020-10-02 ENCOUNTER — ALLIED HEALTH/NURSE VISIT (OUTPATIENT)
Dept: FAMILY MEDICINE | Facility: CLINIC | Age: 30
End: 2020-10-02
Payer: MEDICARE

## 2020-10-02 DIAGNOSIS — Z23 NEED FOR VACCINATION: Primary | ICD-10-CM

## 2020-10-02 PROCEDURE — 90707 MMR VACCINE SC: CPT

## 2020-10-02 PROCEDURE — 99207 PR NO CHARGE NURSE ONLY: CPT

## 2020-10-02 PROCEDURE — 90723 DTAP-HEP B-IPV VACCINE IM: CPT

## 2020-10-02 PROCEDURE — 90648 HIB PRP-T VACCINE 4 DOSE IM: CPT

## 2020-10-02 PROCEDURE — 90472 IMMUNIZATION ADMIN EACH ADD: CPT

## 2020-10-02 PROCEDURE — 90471 IMMUNIZATION ADMIN: CPT

## 2020-12-11 ENCOUNTER — OFFICE VISIT (OUTPATIENT)
Dept: TRANSPLANT | Facility: CLINIC | Age: 30
End: 2020-12-11
Attending: INTERNAL MEDICINE
Payer: MEDICARE

## 2020-12-11 VITALS — HEART RATE: 75 BPM | SYSTOLIC BLOOD PRESSURE: 109 MMHG | TEMPERATURE: 97.9 F | DIASTOLIC BLOOD PRESSURE: 63 MMHG

## 2020-12-11 DIAGNOSIS — Z52.001 STEM CELL DONOR: Primary | ICD-10-CM

## 2020-12-11 DIAGNOSIS — C81.10 NODULAR SCLEROSING HODGKIN'S LYMPHOMA, UNSPECIFIED BODY REGION (H): ICD-10-CM

## 2020-12-11 PROCEDURE — 250N000011 HC RX IP 250 OP 636: Performed by: INTERNAL MEDICINE

## 2020-12-11 PROCEDURE — 90707 MMR VACCINE SC: CPT | Performed by: INTERNAL MEDICINE

## 2020-12-11 PROCEDURE — G0009 ADMIN PNEUMOCOCCAL VACCINE: HCPCS | Performed by: INTERNAL MEDICINE

## 2020-12-11 PROCEDURE — 90472 IMMUNIZATION ADMIN EACH ADD: CPT | Performed by: INTERNAL MEDICINE

## 2020-12-11 PROCEDURE — 90670 PCV13 VACCINE IM: CPT | Performed by: INTERNAL MEDICINE

## 2020-12-11 PROCEDURE — 99207 PR NO CHARGE NURSE ONLY: CPT

## 2020-12-11 RX ADMIN — MEASLES, MUMPS, AND RUBELLA VIRUS VACCINE LIVE 0.5 ML: 1000; 12500; 1000 INJECTION, POWDER, LYOPHILIZED, FOR SUSPENSION SUBCUTANEOUS at 13:20

## 2020-12-11 RX ADMIN — PNEUMOCOCCAL 13-VALENT CONJUGATE VACCINE 0.5 ML: 2.2; 2.2; 2.2; 2.2; 2.2; 4.4; 2.2; 2.2; 2.2; 2.2; 2.2; 2.2; 2.2 INJECTION, SUSPENSION INTRAMUSCULAR at 13:20

## 2020-12-11 NOTE — LETTER
12/11/2020         RE: Amira Avery  [id number] [address]  [facility] [address]        Dear Colleague,    Thank you for referring your patient, Amira Avery, to the [facility]. Please see a copy of my visit note below.    See nursing note.       Laila Reyna MA          Again, thank you for allowing me to participate in the care of your patient.        Sincerely,        BMT Nurse

## 2020-12-11 NOTE — NURSING NOTE
26 Month immunizations given to pt today. Pt tolerated injections well without any complications. VIS were given to pt. Pt had no questions. BP and temp were taken.   - MMR (Left deltoid) and [id number] (Left arm)     See SURESH Reyna MA

## 2021-01-01 NOTE — PROGRESS NOTES
"BMT Progress Note      Patient ID:  Amira Avery is a 28 year old women who is day + 45 s/p Auto PBST for Hodgkins Lymphoma.       Diagnosis HDN Hodgkin's Disease - NOS  HCT Type Autologous    Prep Regimen BCNU  Lisa-C  Etoposide  Melphalan   Donor Source Autologous    GVHD Prophylaxis No  Primary BMT Provider Dr. Landa      INTERVAL  HISTORY      Amira was seen for a follow up visit today. States she has been feeling well other than intermittent hot sweats from ovarian failure/menopause. She was seen by gynecology and started on Paxil for symptom management since she can not currently receive HRT with an active DVT. Otherwise reports feeling well. No fevers, cough, SOB, or CP. Currently followed by the coumadin clinlic with INR pending today. She continued to use her lovenox as she was not given direction on when to stop bridge therapy (despite recent INR of >4) and noted that a small cut on her finger bled for a \"very long time\". Advised her to stop Lovenox immediately. She will follow up in the BMT clinic in 2 weeks, at that time well will make a decision to resume pentamidine as long as her counts remains stable.        Review of Systems: 10 point ROS negative except as noted above.     PHYSICAL EXAM   not currently breastfeeding.    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  - Transplanted on 8/6/2018 s/p BEAM prep.  - 9/5 Restaging showed CR but SVC clot. Anticoagulate with lovenox 60 mg subcutaneoulsy BID.     2.  HEME: Keep Hgb>8 and " "Subjective   History of Present Illness  She was brought in by EMS.  They said that she appears ill.  She is nonverbal with him at this time.  EMS reports that they were called by her  who is taking care of her.  She was recently admitted to [place] for pneumonia and discharged back home.  The patient says that she is having some ongoing pain.  She is communicating  with me.  She answers most questions appropriately however she is slightly disoriented and sometimes she will stop her sentences short of finishing.  I have to speak very loudly to get a response from her.  She follows commands.  She is only partially oriented. When I ask her what is the matter she says 'Some of this and some of that\"    Review of Systems   Constitutional: Positive for activity change and fatigue.   Respiratory:        Increased work of breating       Past Medical History:   Diagnosis Date   • Anemia    • Anterolisthesis     Grade 1 anterolisthesis of L5 on S1 due to bilateral L5 pars defects   • Diverticulosis    • Essential hypertension    • Lacunar infarction (CMS/HCC)    • Type 2 diabetes mellitus (CMS/HCC)        No Known Allergies    Past Surgical History:   Procedure Laterality Date   • CHOLECYSTECTOMY     • HYSTERECTOMY         Family History   Family history unknown: Yes       Social History     Socioeconomic History   • Marital status:      Spouse name: Not on file   • Number of children: Not on file   • Years of education: Not on file   • Highest education level: Not on file   Tobacco Use   • Smoking status: Never Smoker   • Smokeless tobacco: Never Used   Substance and Sexual Activity   • Alcohol use: Never     Frequency: Never   • Drug use: Never   • Sexual activity: Defer           Objective   Physical Exam  Vitals signs and nursing note reviewed. Exam conducted with a chaperone present.   Constitutional:       Appearance: Normal appearance.      Comments: Chronically ill-appearing 75-year-old female who is " having some increased respiratory effort.  She is arousable.  She is hard of hearing.  She is not in any acute distress.  She is ill-appearing however she does not appear toxic flushed or diaphoretic.   HENT:      Head: Normocephalic and atraumatic.      Right Ear: External ear normal.      Left Ear: External ear normal.      Nose: Nose normal.      Mouth/Throat:      Mouth: Mucous membranes are moist.      Pharynx: Oropharynx is clear.   Eyes:      Extraocular Movements: Extraocular movements intact.      Pupils: Pupils are equal, round, and reactive to light.   Neck:      Musculoskeletal: Normal range of motion and neck supple.   Cardiovascular:      Rate and Rhythm: Normal rate and regular rhythm.      Pulses: Normal pulses.      Heart sounds: Normal heart sounds.   Pulmonary:      Effort: Pulmonary effort is normal.      Breath sounds: Normal breath sounds. No wheezing.      Comments: Increased depth and rate of breaths.  Abdominal:      General: Abdomen is flat. Bowel sounds are normal.      Palpations: Abdomen is soft.   Musculoskeletal: Normal range of motion.   Skin:     General: Skin is warm and dry.      Capillary Refill: Capillary refill takes less than 2 seconds.   Neurological:      General: No focal deficit present.      Mental Status: She is alert and oriented to person, place, and time.   Psychiatric:         Mood and Affect: Mood normal.         Behavior: Behavior normal.         Thought Content: Thought content normal.         Judgment: Judgment normal.         Procedures           ED Course  ED Course as of Jan 01 1751   Thu Dec 31, 2020   2041 We are requesting medical records from [facility] at this time.  I also tried to reach her  to find out.    [JM]      ED Course User Index  [JM] Mal Oliver,                                            MDM  Number of Diagnoses or Management Options  Acute renal failure, unspecified acute renal failure type (CMS/HCC): new and requires  plts>10K.   - No pre-meds.   - 9/5 Lovenox for SVC clot as above. Started coumadin 5mg daily x 3 days. Referral placed to coumadin clinic 9/10/18. INR pending today. Contacted coumadin clinic [phone], pt has been using lovenox despite recent supratheraputic INR. Advised to stop Lovenox. Clinic will call her with any changes to coumadin dosing based on today's INR.  - Platelet intercept study      3.  ID: afebrile.  - Cont Fluc, Bactrim, and HD ACV (CMV+, HSV+, EBV+) prophy.        - ANC down to 1400 with starting bactrim. DC'd Bactrim w/improvement. Pentamidine last 9/13.                 4.  GI:    - Protonix for GI prophy.     5.  FEN/Renal:    - Lytes and Cr stable; intake is good.     6.  Gyn:  Hot flashes.  LMP over a month ago (prior to transplant).    - Referral to gyn 9/12, FSH/LH indicate ovarian failure/menopause. Started paxil for sx management, HRT contraindicated with active DVT.     RTC:   - 2 wks provider visit, labs, recheck INR, consider resuming Bactrim vs continuing pentam  - Coumadin clinic will call pt tonight 9/21  - DC'd lovenox    Lizbeth Jarrett, MSN, APRN, ACNP-BC  Pager: [phone]             workup  Diagnosis management comments: Patient is sent to the emergency department by EMS for change in mentation, increased drowsiness, increased work of breathing.  She was recently seen and diagnosed with pneumonia started on Rocephin.  She was also given an infusion of fluids to go home with.  When she was seen in [place] it seems that her bicarbonate was 7 she had severe metabolic acidosis in the setting of renal failure at that time.  Labs the emergency department confirm.  Her renal failure is improved proving however clinically she is not.  She will be admitted to the hospital for further evaluation management       Amount and/or Complexity of Data Reviewed  Clinical lab tests: reviewed and ordered  Tests in the radiology section of CPT®: reviewed and ordered  Tests in the medicine section of CPT®: reviewed and ordered  Decide to obtain previous medical records or to obtain history from someone other than the patient: yes  Obtain history from someone other than the patient: yes  Review and summarize past medical records: yes  Independent visualization of images, tracings, or specimens: yes    Risk of Complications, Morbidity, and/or Mortality  Presenting problems: moderate  Diagnostic procedures: moderate  Management options: moderate    Patient Progress  Patient progress: stable      Final diagnoses:   Acute renal failure, unspecified acute renal failure type (CMS/[facility])            Mal Oliver,   01/01/21 3243

## 2021-01-15 ENCOUNTER — HEALTH MAINTENANCE LETTER (OUTPATIENT)
Age: 31
End: 2021-01-15

## 2021-10-24 ENCOUNTER — HEALTH MAINTENANCE LETTER (OUTPATIENT)
Age: 31
End: 2021-10-24

## 2022-02-13 ENCOUNTER — HEALTH MAINTENANCE LETTER (OUTPATIENT)
Age: 32
End: 2022-02-13

## 2022-02-17 PROBLEM — R62.51 FAILURE TO THRIVE IN PEDIATRIC PATIENT: Status: ACTIVE | Noted: 2018-08-12

## 2022-04-19 NOTE — PROCEDURES
Assessment  1  Bilateral leg weakness    2  Degenerative disc disease, lumbar    3  Bilateral hip pain    4  Chronic pain syndrome        Plan  THIS 67YEAR-OLD FEMALE WHO PRESENTS TO OUR OFFICE with widespread pain and widespread tender points  She is also complaining of bilateral lower extremity weakness  Exam was limited due to pain and Jhony signs  She does describe some claudication type symptoms and has history of lumbar decompression in the past   When she ambulates short distance, she has weakness in the bilateral legs  We will obtain MRI report from her outside facility which was done in 2017  This will help to determine next course of action which may include epidural steroid injection  She would also benefit from physical therapy, specifically aqua therapy, given other signs and symptoms suggestive chronic pain syndrome/fibromyalgia    Her hip x-ray show mild arthritis   Do not think these are causing her symptoms  [place] Prescription Drug Monitoring Program report was reviewed and was appropriate     My impressions and treatment recommendations were discussed in detail with the patient who verbalized understanding and had no further questions  Discharge instructions were provided  I personally saw and examined the patient and I agree with the above discussed plan of care  Orders Placed This Encounter   Procedures    Ambulatory referral to Physical Therapy     Standing Status:   Future     Standing Expiration Date:   4/20/2023     Referral Priority:   Routine     Referral Type:   Physical Therapy     Referral Reason:   Specialty Services Required     Requested Specialty:   Physical Therapy     Number of Visits Requested:   1     Expiration Date:   4/20/2023     No orders of the defined types were placed in this encounter        History of Present Illness    Referring Provider: DO Suha Dunn Chelo is a 67 y o  female who presents with a chief complaint of pain all BMT Post Infusion Documentation    Data   Patient Vitals for the past 72 hrs:   Temp Temp src Pulse Resp Heart Rate BP   08/04/18 1124 97.7  F (36.5  C) Axillary 67 16 - 118/72   08/04/18 1548 98.3  F (36.8  C) Axillary 74 16 74 109/69   08/04/18 2035 98.4  F (36.9  C) Axillary - 16 76 110/68   08/04/18 2309 97.9  F (36.6  C) Axillary 59 16 - 114/66   08/05/18 0421 97.5  F (36.4  C) Axillary 73 18 - 117/75   08/05/18 0756 97.7  F (36.5  C) Axillary - 18 83 -   08/05/18 1305 97.8  F (36.6  C) Axillary 98 18 - 118/77   08/05/18 1550 97.8  F (36.6  C) Axillary - 18 104 120/80   08/05/18 2023 98.1  F (36.7  C) Axillary - 20 86 118/74   08/06/18 0331 97.8  F (36.6  C) Axillary - 18 80 113/74   08/06/18 0738 97.4  F (36.3  C) Axillary - 18 107 -   08/06/18 1145 98.3  F (36.8  C) Axillary - 16 97 119/78   08/06/18 1237 98.9  F (37.2  C) Axillary 86 16 - 120/75   08/06/18 1256 98.4  F (36.9  C) Axillary - 16 91 122/72   08/06/18 1300 - - - 16 - -   08/06/18 1307 96.7  F (35.9  C) Axillary 83 16 - 122/72   08/06/18 1315 98.6  F (37  C) Axillary - 16 78 125/77   08/06/18 1349 98.9  F (37.2  C) Axillary - 16 91 121/79   08/06/18 1605 98.4  F (36.9  C) Axillary - 16 92 125/79   08/06/18 1915 98.9  F (37.2  C) Axillary - 16 85 111/64   08/06/18 2352 98.1  F (36.7  C) Axillary 80 16 80 103/67   08/07/18 0345 97.6  F (36.4  C) Axillary 82 16 82 116/74   08/07/18 0813 98.4  F (36.9  C) Axillary - 16 89 122/80        BMT INFUSION DOCUMENTATION (last 24 hours)      BMT/Cellular Product Infusion                     Cell Therapy Documentation    Product Release Date        Recipient Study ID        Donor        Donor MRN/ID        Donor ABO/Rh        Allogeneic Donor Eligibility Determination and Summary of Records        Type of Infusion        Total Volume Dispensed (mL)        Total NC Dose        Total CD34 Dose        Total CD3 dose        Total NC Dose Left in Storage        Comments for Product Issues        Donor ABO/Rh         Product Types        Product Numbers        Product Types and Numbers        Volume        ABO Mismatch        ZZTotal NC Dose        ZZTotal CD34 Dose        ZZTotal NC Dose Left in Storage        [REMOVED] Product 08/06/18 0915 HPC, Apheresis    Product Details Product Release Date: 08/06/18 -NB Product Release Time: 0915 -NB Product Type: HPC, Apheresis  -NB DIN: [id number]  -NB Product Description Code: [id number]  -NB Volume Dispensed (mL): 134 mL  -NB Completion Date (RN to complete): 08/06/18  -JF Completion Time (RN to complete): 1256  -JF    Checked by (Patient RN)        Checked by (Witness)        Product Volume Infused (mL)        Flush Volume (mL)        Volume Dispensed (mL)        [REMOVED] Product 08/06/18 0915 HPC, Apheresis    Product Details Product Release Date: 08/06/18 -NB Product Release Time: 0915 -NB Product Type: HPC, Apheresis  -NB DIN: [id number]  -NB Product Description Code: [id number]  -NB Volume Dispensed (mL): 134 mL  -NB Completion Date (RN to complete): 08/06/18  -JF Completion Time (RN to complete): 1313  -JF    Checked by (Patient RN)        Checked by (Witness)        Product Volume Infused (mL)        Flush Volume (mL)        Volume Dispensed (mL)        RN Documentation    Patient was premedicated as ordered        Line Type        Patient Stable Prior to Infusion        Time Infusion Started        Checked by (Patient RN)        Checked by (RN 2)        Broken Bag?        Immediate suspected transfusion reaction to the product        Time Infusion Stopped        Total Flush Volume (mL)        Checked by (Witness)        Date Infusion Started        Date Infusion Stopped        Volume Infused (mL)        Total Volume Infused (cc)        Patient tolerance of product infusion    Immediate suspected transfusion reaction to the product        Did patient have prior history of similar signs/symptoms during this hospitalization?        Symptoms during/after infusion         over body this is a chronic issue for last 2 years in 2 months  Patient is retired  Severe  8/10  Constant  Worse in the evening  Burning, cramping, shooting, numbness, sharp, pins and needles, pressure-like, throbbing  She reports notable pain in the knees and legs  Has difficulty trying to focus on one area where her pain is the worst  Both knees hurt equally  Reports weakness in the upper lower extremities  Reports dropping objects ambulates with a walker  Increased with bowel movement  Decreased with lying down, standing, bending, sitting, walking, exercising, relaxation  No change with coughing, sneezing  Had recent x-ray of the bilateral hips which show mild arthritis  She has degenerative changes of the lumbar spine that were noted on the hip x-rays  Reports having back surgery for spinal stenosis in the past   Has also had neck surgery for spinal stenosis  Appears to have had posterior fusion  Also has had right knee replacement  Reports past medical history consistent with chest pain, diabetes, GERD, high cholesterol, hypertension, kidney disease, seizure, rheumatoid arthritis  Moderate relief with surgery, traction, nerve block, nerve injection, PT  No relief with heat/ice therapy  Does not smoke tobacco marijuana  Does drink alcohol  She is not allergic to latex or contrast dye  In the past has been on oxycodone  In the past also been on Lidoderm patch  Currently using acetaminophen with relief  In the past use Celebrex  In the past also been on Lyrica  I have personally reviewed and/or updated the patient's past medical history, past surgical history, family history, social history, current medications, allergies, and vital signs today  Review of Systems   Constitutional: Positive for unexpected weight change  Negative for fever  HENT: Positive for sore throat  Negative for trouble swallowing  Eyes: Negative for visual disturbance  Did the patient tolerate the infusion well        Medications and treatment for symptoms        Did the symptoms resolve?        Enter comments if clots, leaks, broken bag, infusion delays, other issues with bag/infusion        Describe symptoms          User Key  (r) = Recorded By, (t) = Taken By, (c) = Cosigned By    Initials Name Effective Dates    Tayla Lamb RN 04/30/15 -     Bibi Esparza 04/29/14 -             Post-Infusion Evaluation:   Infusion Related Reaction: Grade 0 - none  Dyspnea: Grade 0 - none  Hypoxia: Grade 0 - not present  Fever: Grade 0 - afebrile  Chills: Grade 0 - none  Febrile Neutropenia: Grade 0 - not present  Sinus Bradycardia: Grade 0 - none  Hypertension: Grade 0 - none  Hypotension: Grade 0 - none  Chest Pain: Grade 0 - none  Bronchospasm: Grade 0 - none  Pain: Grade 0 - none  Rash: Grade 0 - None  Neurologic Specify: none    If this was a cord blood transplant, was more than one cord blood unit infused? MARIA TERESA Sosa CNP     Respiratory: Positive for wheezing  Negative for shortness of breath  Cardiovascular: Negative for chest pain and palpitations  Gastrointestinal: Positive for abdominal pain and nausea  Negative for constipation, diarrhea and vomiting  Endocrine: Positive for polyuria  Negative for cold intolerance, heat intolerance and polydipsia  Genitourinary: Negative for difficulty urinating and frequency  Musculoskeletal: Positive for arthralgias and myalgias  Negative for gait problem and joint swelling  Skin: Negative for rash  Neurological: Positive for dizziness, numbness and headaches  Negative for seizures, syncope and weakness  Hematological: Does not bruise/bleed easily  Psychiatric/Behavioral: Positive for decreased concentration  Negative for dysphoric mood  All other systems reviewed and are negative        Patient Active Problem List   Diagnosis    COPD (chronic obstructive pulmonary disease) ([place] [address] )    Hypertension    Vitamin D deficiency    PAD (peripheral artery disease) ([place] [address] )    Stage 3b chronic kidney disease ([place] [address] )    Bilateral leg edema    Chronic pain syndrome    Claudication ([facility] )    Coronary artery disease without angina pectoris    Diabetic peripheral neuropathy (HCC)    [facility] heart failure with preserved ejection fraction [facility]    Gastroesophageal reflux disease without esophagitis    Hyperlipidemia associated with type 2 diabetes mellitus ([facility] )    Left ventricular hypertrophy    Lung nodule    Macular pucker, left    Memory loss    Osteoarthritis    Se[facility]ure disorder [facility] )    Strabismic amblyopia of right eye    Neurologic gait dysfunction    Type 2 diabetes mellitus with diabetic neuropathy (HCC)    Class 1 obesity due to excess calories with serious comorbidity and body mass index (BMI) of 34 0 to 34 9 in adult    Chronic kidney disease-mineral and bone disorder    Osteopenia of multiple sites       Past Medical History:   Diagnosis Date    Acute kidney injury superimposed on chronic kidney disease ([facility] ) 11/26/2016    ADHD (attention deficit hyperactivity disorder) 3/17/2019    Arthritis     BL HIPS    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    Carpal tunnel syndrome     Chest pain 3/6/2017    Chronic kidney disease     Closed fracture of base of middle phalanx of finger 6/22/2017    Closed fracture of middle phalanx of left little finger 7/8/2017    Coagulopathy ([facility] ) 5/15/2019    Colloid cyst of brain (HCC)     COPD (chronic obstructive pulmonary disease) (HCC)     COPD (chronic obstructive pulmonary disease) (HCC)     Coronary artery disease     Cough     Diabetes mellitus (HCC)     GERD (gastroesophageal reflux disease)     Glaucoma     History of brain disorder 10/7/2020    Hyperlipidemia     Hypertension     Irritable bowel syndrome     Melena 2/26/2019    Paronychia of great toe of left foot 10/7/2020    Post-traumatic seizures ([facility] ) 7/23/2015    Renal disorder     Seizures ([facility] )     last 2015    Shortness of breath     Single seizure ([facility] ) 5/31/2016    SOB (shortness of breath)     Syncope and collapse 11/11/2020    Urinary tract infection without hematuria 11/26/2016    Wheezing        Past Surgical History:   Procedure Laterality Date    BRAIN SURGERY      CATARACT EXTRACTION      CHOLECYSTECTOMY      COLECTOMY RADHA      DECOMPRESSION SPINE LUMBAR POSTERIOR  08/19/2019    HERNIA REPAIR      HYSTERECTOMY      INCISION TENDON SHEATH HAND      NECK SURGERY  05/24/2018    NEUROPLASTY / TRANSPOSITION MEDIAN NERVE AT CARPAL TUNNEL      ID COLONOSCOPY FLX DX W/COLLJ SPEC WHEN PFRMD N/A 3/26/2019    Procedure: COLONOSCOPY;  Surgeon: Rowan Vo MD;  Location: [facility]; Service: Gastroenterology    ID ESOPHAGOGASTRODUODENOSCOPY TRANSORAL DIAGNOSTIC N/A 3/26/2019    Procedure: ESOPHAGOGASTRODUODENOSCOPY (EGD); Surgeon: Rowan Vo MD;  Location: [facility];   Service: Gastroenterology    REPLACEMENT TOTAL KNEE Right     RETINAL DETACHMENT SURGERY      TUBAL LIGATION         Family History   Problem Relation Age of Onset    Asthma Mother     Heart disease Mother     Emphysema Father     Cancer Father     Prostate cancer Father     Kidney cancer Sister     Diabetes Sister     Kidney disease Sister     Brain cancer Brother     Bone cancer Brother     Heart disease Brother        Social History     Occupational History    Occupation: retired   Tobacco Use    Smoking status: Former Smoker     Packs/day: 0 50     Years: 40 00     Pack years: 20 00     Start date:      Quit date:      Years since quittin 3    Smokeless tobacco: Never Used    Tobacco comment: stopped many years ago   Vaping Use    Vaping Use: Never used   Substance and Sexual Activity    Alcohol use: Never    Drug use: Never    Sexual activity: Never       Current Outpatient Medications on File Prior to Visit   Medication Sig    acetaminophen (TYLENOL) 650 mg CR tablet Take 650 mg by mouth      albuterol (2 5 mg/3 mL) 0 083 % nebulizer solution Take 3 mL (2 5 mg total) by nebulization every 6 (six) hours as needed for wheezing or shortness of breath    albuterol (Ventolin HFA) 90 mcg/act inhaler Inhale 2 puffs every 6 (six) hours as needed for wheezing    aspirin (ECOTRIN LOW STRENGTH) 81 mg EC tablet Take 81 mg by mouth daily    atorvastatin (LIPITOR) 10 mg tablet Take 1 tablet (10 mg total) by mouth daily at bedtime    B-D ULTRAFINE III SHORT PEN 31G X 8 MM MISC inject UNDER THE SKIN DAILY    carboxymethylcellulose (REFRESH PLUS) 0 5 % SOLN As directed    cholecalciferol (VITAMIN D3) 1,000 units tablet Take 1,000 Units by mouth daily    Continuous Blood Gluc  (FreeStyle [place]) YOLANDA Before Breakfast and 2 hours after meals    Continuous Blood Gluc Sensor (FreeStyle [place] 2 Sensor) MISC Before Breakfast and 2 hours after meals    fluticasone-umeclidinium-vilanterol (Trelegy Ellipta) 200-62 5-25 MCG/INH AEPB inhaler Inhale 1 puff daily Rinse mouth after use   fosinopril (MONOPRIL) 20 mg tablet Take 1 tablet (20 mg total) by mouth daily    furosemide (LASIX) 20 mg tablet Take 20 mg by mouth daily    insulin glargine (Lantus SoloStar) 100 units/mL injection pen Inject 30 Units under the skin daily at bedtime    Insulin Pen Needle 31G X 8 MM MISC use as directed four times a day    latanoprost (XALATAN) 0 005 % ophthalmic solution Administer 1 drop into the left eye daily at bedtime    levETIRAcetam (KEPPRA) 250 mg tablet take 1 tablet by mouth every 12 hours    montelukast (SINGULAIR) 10 mg tablet take 1 tablet by mouth every evening    Multiple Vitamin (MULTI VITAMIN DAILY PO) Take 1 tablet by mouth daily    NovoLOG FlexPen 100 units/mL injection pen INJECT 12 UNITS BEFORE MEALS (3 MEALS) (Patient taking differently: Pt injects 12 units before meals (4 Meals) )    omeprazole (PriLOSEC) 20 mg delayed release capsule Take 1 capsule (20 mg total) by mouth 2 (two) times a day    pentoxifylline (TRENtal) 400 mg ER tablet take 1 tablet by mouth three times a day with meals    Probiotic Product (PROBIOTIC DAILY PO) Take by mouth       No current facility-administered medications on file prior to visit  Allergies   Allergen Reactions    Brimonidine Other (See Comments)    Sulfa Antibiotics Rash       Physical Exam    /76   Pulse 68   Resp 20   Ht 4' 7" (1 397 m)   Wt 66 8 kg (147 lb 3 2 oz)   BMI 34 21 kg/m²     Constitutional: normal, well developed, well nourished, alert, in no distress and non-toxic and no overt pain behavior    Eyes: anicteric  HEENT: grossly intact  Neck: supple, symmetric, trachea midline and no masses   Pulmonary:even and unlabored  Cardiovascular:No edema or pitting edema present  Skin:Normal without rashes or lesions and well hydrated  Psychiatric:Mood and affect appropriate  Neurologic:Cranial Nerves II-XII grossly intact  Musculoskeletal:normal     Lumbar Spine Exam    Appearance:  Normal lordosis  Palpation/Tenderness:  Multiple tender points throughout cervical, thoracic, and lumbar spine  Sensory:  no sensory deficits noted  Range of Motion:  Pain in all ranges of motion  Motor Strength:  Left hip flexion:  4/5  Left hip extension:  4/5  Right hip flexion:  4/5  Right hip extension:  4/5  Left knee flexion:  4/5  Left knee extension:  4/5  Right knee flexion:  4/5  Right knee extension:  4/5  Left foot dorsiflexion:  4/5  Left foot plantar flexion:  4/5  Right foot dorsiflexion:  4/5  Right foot plantar flexion:  4/5  Reflexes:  Left Patellar:  1+   Right Patellar:  1+   Left Achilles:  1+   Right Achilles:  1+   Special Tests:  Difficulty performing straight leg raise due to pain  DIAGNOSTIC IMAGING AND TEST RESULTS:  X-ray bilateral hips, mild arthritis  Also notable lumbar degenerative disease

## 2022-10-15 ENCOUNTER — HEALTH MAINTENANCE LETTER (OUTPATIENT)
Age: 32
End: 2022-10-15

## 2022-11-11 NOTE — PROGRESS NOTES
ANTICOAGULATION FOLLOW-UP CLINIC VISIT    Patient Name:  Amira Avery  Date:  10/4/2018  Contact Type:  Telephone    SUBJECTIVE:     Patient Findings     Positives Unexplained INR or factor level change (Subtherapeutic INR result is 1.55 today.)    Comments Left message for Amira to phone the [facility] if she's missed any doses of Coumadin.  Spoke to VIKAS Nieves about bridging.  She did not recommend bridging patient, just dose up through the weekend.  Will check on Monday.           OBJECTIVE    INR   Date Value Ref Range Status   10/04/2018 1.55 (H) 0.86 - 1.14 Final       ASSESSMENT / PLAN  INR assessment SUB    Recheck INR In: 4 DAYS    INR Location Clinic      Anticoagulation Summary as of 10/4/2018     INR goal 2.0-3.0   Today's INR 1.55!   Warfarin maintenance plan No maintenance plan   Full warfarin instructions 10/4: 7.5 mg; 10/5: 5 mg; 10/6: 5 mg; 10/7: 5 mg; Otherwise No maintenance plan   Next INR check 10/8/2018   Target end date 12/10/2018    Indications   Deep vein thrombosis (DVT) (H) [I82.409] [I82.409]         Anticoagulation Episode Summary     INR check location     Preferred lab     Send INR reminders to [facility]    Comments pt has a port r/t oncology dx -  has frequent lab draws  +++3 months of coumadin therapy - last dose on 12/10/18+++      Anticoagulation Care Providers     Provider Role Specialty Phone number    Diana Donovan PA-C Responsible Physician Assistant [phone]            See the Encounter Report to view Anticoagulation Flowsheet and Dosing Calendar (Go to Encounters tab in chart review, and find the Anticoagulation Therapy Visit)    Left message for patient with results and dosing recommendations. Asked patient to call back to report any missed doses, falls, signs and symptoms of bleeding or clotting, any changes in health, medication, or diet. Asked patient to call back with any questions or concerns.  Dosed patient up.  Reviewed signs of clotting  on voicemail.  Patient will be drawn on Monday.    Rufus Riley RN                Vital Signs Last 24 Hrs  T(C): --  T(F): --  HR: --  BP: --  BP(mean): --  RR: --  SpO2: --

## 2023-03-26 ENCOUNTER — HEALTH MAINTENANCE LETTER (OUTPATIENT)
Age: 33
End: 2023-03-26
